# Patient Record
Sex: FEMALE | Race: BLACK OR AFRICAN AMERICAN | NOT HISPANIC OR LATINO | Employment: UNEMPLOYED | URBAN - METROPOLITAN AREA
[De-identification: names, ages, dates, MRNs, and addresses within clinical notes are randomized per-mention and may not be internally consistent; named-entity substitution may affect disease eponyms.]

---

## 2017-08-03 ENCOUNTER — OFFICE VISIT (OUTPATIENT)
Dept: FAMILY MEDICINE | Facility: CLINIC | Age: 27
End: 2017-08-03
Payer: COMMERCIAL

## 2017-08-03 VITALS
RESPIRATION RATE: 16 BRPM | OXYGEN SATURATION: 97 % | HEIGHT: 64 IN | DIASTOLIC BLOOD PRESSURE: 80 MMHG | SYSTOLIC BLOOD PRESSURE: 120 MMHG | BODY MASS INDEX: 38.24 KG/M2 | HEART RATE: 86 BPM | WEIGHT: 224 LBS | TEMPERATURE: 97 F

## 2017-08-03 DIAGNOSIS — K21.9 GASTROESOPHAGEAL REFLUX DISEASE, ESOPHAGITIS PRESENCE NOT SPECIFIED: ICD-10-CM

## 2017-08-03 DIAGNOSIS — M54.42 LEFT-SIDED LOW BACK PAIN WITH LEFT-SIDED SCIATICA, UNSPECIFIED CHRONICITY: ICD-10-CM

## 2017-08-03 DIAGNOSIS — I10 ESSENTIAL HYPERTENSION: ICD-10-CM

## 2017-08-03 DIAGNOSIS — Z00.00 ANNUAL PHYSICAL EXAM: Primary | ICD-10-CM

## 2017-08-03 LAB
CREAT UR-MCNC: 103 MG/DL
MICROALBUMIN UR DL<=1MG/L-MCNC: 12 UG/ML
MICROALBUMIN/CREATININE RATIO: 11.7 UG/MG

## 2017-08-03 PROCEDURE — 99999 PR PBB SHADOW E&M-NEW PATIENT-LVL III: CPT | Mod: PBBFAC,,, | Performed by: REGISTERED NURSE

## 2017-08-03 PROCEDURE — 82570 ASSAY OF URINE CREATININE: CPT

## 2017-08-03 PROCEDURE — 99385 PREV VISIT NEW AGE 18-39: CPT | Mod: S$GLB,,, | Performed by: REGISTERED NURSE

## 2017-08-03 RX ORDER — MELOXICAM 7.5 MG/1
TABLET ORAL
COMMUNITY
Start: 2017-07-12 | End: 2017-08-03 | Stop reason: ALTCHOICE

## 2017-08-03 RX ORDER — OXAPROZIN 600 MG/1
600 TABLET, FILM COATED ORAL DAILY
Qty: 30 TABLET | Refills: 2 | Status: SHIPPED | OUTPATIENT
Start: 2017-08-03 | End: 2018-05-01

## 2017-08-03 RX ORDER — CYCLOBENZAPRINE HCL 10 MG
TABLET ORAL
COMMUNITY
Start: 2017-07-12 | End: 2017-08-03 | Stop reason: ALTCHOICE

## 2017-08-03 RX ORDER — TIZANIDINE 4 MG/1
4 TABLET ORAL 3 TIMES DAILY PRN
Qty: 60 TABLET | Refills: 0 | Status: SHIPPED | OUTPATIENT
Start: 2017-08-03 | End: 2017-09-22 | Stop reason: SDUPTHER

## 2017-08-03 RX ORDER — AMLODIPINE BESYLATE 10 MG/1
TABLET ORAL
COMMUNITY
Start: 2017-07-13 | End: 2017-08-09

## 2017-08-03 RX ORDER — FAMOTIDINE 40 MG/1
40 TABLET, FILM COATED ORAL
COMMUNITY
End: 2017-08-03

## 2017-08-07 NOTE — PROGRESS NOTES
Subjective:       Patient ID: Nathan Carlos is a 26 y.o. female.    Chief Complaint: Annual Exam      HPI    Nathan is a new patient to the office today, for her annual wellness exam.  I have reviewed the patient's medical history in detail and updated the computerized patient record.    Followed by Dr. Cl Nicole (GI) for chronic GERD and GI issues.  Plans to have an EGD in near future pending insurance approval.  On hold at this time due to deductible issues.  Currently on Dexilant daily, takes as needed.    She has been on Norvasc for HTN ~ 1 year, originally ordered by Dr. Judith Garcia.  Does not check BP on regular basis.  Denies salt or caffeine intake, reports healthy diet, increased baked foods.  Does not exercise.    Reports back pain with sciatica w/out injury or trauma.  Seen at  [facility] ED on 7/12/2017, no xrays done.  RX given for Mobic 7.5 mg to take twice daily with Flexeril 10 mg TID.  She has been taking meds as ordered but not helping.  Massage helps some.    GYN --- followed by Dr. Reid at [facility].      Review of Systems   Constitutional: Negative for activity change, appetite change, chills, diaphoresis, fatigue, fever and unexpected weight change.   HENT: Negative.    Eyes: Negative.    Respiratory: Negative.  Stridor: irregular.    Cardiovascular: Negative for chest pain, palpitations and leg swelling.   Gastrointestinal: Negative.    Endocrine: Negative for cold intolerance, heat intolerance, polydipsia, polyphagia and polyuria.   Genitourinary: Positive for menstrual problem. Negative for dysuria, flank pain, frequency, hematuria, vaginal bleeding, vaginal discharge and vaginal pain.   Musculoskeletal: Positive for back pain. Negative for gait problem, joint swelling, neck pain and neck stiffness.   Skin: Negative.    Neurological: Negative.    Hematological: Negative for adenopathy. Does not bruise/bleed easily.   Psychiatric/Behavioral: Negative.          Patient Active  "Problem List   Diagnosis    Essential hypertension    Gastroesophageal reflux disease    Left-sided low back pain with left-sided sciatica    Obesity, Class II, BMI 35-39.9, with comorbidity       Past Medical History:   Diagnosis Date    GERD (gastroesophageal reflux disease)     Hypertension        Past Surgical History:   Procedure Laterality Date    ANKLE SURGERY Left     BUNIONECTOMY Bilateral        Family History   Problem Relation Age of Onset    Hypertension Mother     Fibroids Mother     No Known Problems Father     Diabetes Sister     Hypertension Brother     Hypertension Maternal Aunt     Diabetes Maternal Aunt     Diabetes Maternal Uncle     Hypertension Maternal Grandmother     Leukemia Maternal Grandfather     Hypertension Maternal Grandfather     Stroke Neg Hx      Social History     Social History    Marital status: Single    Number of children: 0     Occupational History     [employer]     Social History Main Topics    Smoking status: Never Smoker    Smokeless tobacco: Never Used    Alcohol use No    Drug use: No    Sexual activity: Yes     Partners: Male     Birth control/ protection: Condom         Review of patient's allergies indicates:  No Known Allergies        Objective:     Vitals:    08/03/17 0905 08/03/17 0953   BP: (!) 140/90 120/80   Pulse: 86    Resp: 16    Temp: 97.4 °F (36.3 °C)    SpO2: 97%    Weight: 101.6 kg (223 lb 15.8 oz)    Height: 5' 4" (1.626 m)        Physical Exam   Constitutional: She is oriented to person, place, and time. She appears well-developed and well-nourished. No distress.   HENT:   Head: Normocephalic and atraumatic.   Right Ear: External ear normal.   Left Ear: External ear normal.   Nose: Nose normal.   Mouth/Throat: Oropharynx is clear and moist. No oropharyngeal exudate.   Eyes: Conjunctivae and EOM are normal. Pupils are equal, round, and reactive to light. Right eye exhibits no discharge. Left eye exhibits no " discharge. No scleral icterus.   Neck: Normal range of motion. Neck supple. No JVD present. No tracheal deviation present. No thyromegaly present.   Cardiovascular: Normal rate, regular rhythm, normal heart sounds and intact distal pulses.  Exam reveals no gallop and no friction rub.    No murmur heard.  Pulmonary/Chest: Effort normal and breath sounds normal. No stridor. No respiratory distress. She has no wheezes. She exhibits no tenderness.   Abdominal: Soft. Bowel sounds are normal. She exhibits no distension and no mass. There is no tenderness.   Musculoskeletal: Normal range of motion. She exhibits no edema or deformity.        Lumbar back: She exhibits pain and spasm. She exhibits normal range of motion, no swelling, no edema, no deformity and normal pulse.        Back:    Lymphadenopathy:     She has no cervical adenopathy.   Neurological: She is alert and oriented to person, place, and time. She has normal strength and normal reflexes. She displays no atrophy, no tremor and normal reflexes. No cranial nerve deficit or sensory deficit. She exhibits normal muscle tone. She displays no seizure activity. Coordination and gait normal.   Skin: Skin is warm and dry. Capillary refill takes less than 2 seconds. No rash noted. She is not diaphoretic. No erythema.   Psychiatric: She has a normal mood and affect. Her behavior is normal. Judgment and thought content normal.       Medication List with Changes/Refills   New Medications    OXAPROZIN (DAYPRO) 600 MG TABLET    Take 1 tablet (600 mg total) by mouth once daily.    TIZANIDINE (ZANAFLEX) 4 MG TABLET    Take 1 tablet (4 mg total) by mouth 3 (three) times daily as needed.   Current Medications    AMLODIPINE (NORVASC) 10 MG TABLET       Discontinued Medications    CYCLOBENZAPRINE (FLEXERIL) 10 MG TABLET        FAMOTIDINE (PEPCID) 40 MG TABLET    Take 40 mg by mouth.    MELOXICAM (MOBIC) 7.5 MG TABLET           Diagnosis       1. Annual physical exam    2. Essential  hypertension    3. Gastroesophageal reflux disease, esophagitis presence not specified    4. Left-sided low back pain with left-sided sciatica, unspecified chronicity    5. Obesity, Class II, BMI 35-39.9, with comorbidity          Assessment/ Plan     Annual physical exam  -     CBC auto differential; Future; Expected date: 08/03/2017  -     Comprehensive metabolic panel; Future; Expected date: 08/03/2017  -     TSH; Future; Expected date: 08/03/2017  -     Lipid panel; Future; Expected date: 08/03/2017  -     Microalbumin/creatinine urine ratio    Essential hypertension        -     Continue amlodipine 10 mg tab daily for HTN.  -     CBC auto differential; Future; Expected date: 08/03/2017  -     Comprehensive metabolic panel; Future; Expected date: 08/03/2017  -     TSH; Future; Expected date: 08/03/2017  -     Lipid panel; Future; Expected date: 08/03/2017  -     Microalbumin/creatinine urine ratio    Gastroesophageal reflux disease, esophagitis presence not specified        -     Followed by Dr. Nicole, on Dexilant, EGD pending.    Left-sided low back pain with left-sided sciatica, unspecified chronicity  -     oxaprozin (DAYPRO) 600 mg tablet; Take 1 tablet (600 mg total) by mouth once daily.  Dispense: 30 tablet; Refill: 2  -     tizanidine (ZANAFLEX) 4 MG tablet; Take 1 tablet (4 mg total) by mouth 3 (three) times daily as needed.  Dispense: 60 tablet; Refill: 0  -     Ice/heat, rest, HEP.  -     Massage.    Obesity, Class II, BMI 35-39.9, with comorbidity  -     CBC auto differential; Future; Expected date: 08/03/2017  -     Comprehensive metabolic panel; Future; Expected date: 08/03/2017  -     TSH; Future; Expected date: 08/03/2017  -     Lipid panel; Future; Expected date: 08/03/2017        Healthy diet, exercise, weight loss.  Lab pending.  Follow-up in clinic as needed.        Patient Care Team:  Yue Anguiano MD as PCP - General (Family Medicine)  Agnieszka Reid MD (Obstetrics and Gynecology)  SHAHRAM  Cl Nicole MD as Consulting Physician (Gastroenterology)      VANCE Wagner  [facility]

## 2017-08-08 ENCOUNTER — LAB VISIT (OUTPATIENT)
Dept: LAB | Facility: HOSPITAL | Age: 27
End: 2017-08-08
Attending: REGISTERED NURSE
Payer: COMMERCIAL

## 2017-08-08 DIAGNOSIS — Z00.00 ANNUAL PHYSICAL EXAM: ICD-10-CM

## 2017-08-08 DIAGNOSIS — I10 ESSENTIAL HYPERTENSION: ICD-10-CM

## 2017-08-08 LAB
ALBUMIN SERPL BCP-MCNC: 3.7 G/DL
ALP SERPL-CCNC: 74 U/L
ALT SERPL W/O P-5'-P-CCNC: 27 U/L
ANION GAP SERPL CALC-SCNC: 10 MMOL/L
AST SERPL-CCNC: 22 U/L
BASOPHILS # BLD AUTO: 0.02 K/UL
BASOPHILS NFR BLD: 0.2 %
BILIRUB SERPL-MCNC: 0.3 MG/DL
BUN SERPL-MCNC: 9 MG/DL
CALCIUM SERPL-MCNC: 9.3 MG/DL
CHLORIDE SERPL-SCNC: 106 MMOL/L
CHOLEST/HDLC SERPL: 4.4 {RATIO}
CO2 SERPL-SCNC: 26 MMOL/L
CREAT SERPL-MCNC: 0.8 MG/DL
DIFFERENTIAL METHOD: ABNORMAL
EOSINOPHIL # BLD AUTO: 0.1 K/UL
EOSINOPHIL NFR BLD: 1.4 %
ERYTHROCYTE [DISTWIDTH] IN BLOOD BY AUTOMATED COUNT: 14.8 %
EST. GFR  (AFRICAN AMERICAN): >60 ML/MIN/1.73 M^2
EST. GFR  (NON AFRICAN AMERICAN): >60 ML/MIN/1.73 M^2
GLUCOSE SERPL-MCNC: 86 MG/DL
HCT VFR BLD AUTO: 42.7 %
HDL/CHOLESTEROL RATIO: 22.8 %
HDLC SERPL-MCNC: 206 MG/DL
HDLC SERPL-MCNC: 47 MG/DL
HGB BLD-MCNC: 14.5 G/DL
LDLC SERPL CALC-MCNC: 137.2 MG/DL
LYMPHOCYTES # BLD AUTO: 3.3 K/UL
LYMPHOCYTES NFR BLD: 37.6 %
MCH RBC QN AUTO: 26.9 PG
MCHC RBC AUTO-ENTMCNC: 34 G/DL
MCV RBC AUTO: 79 FL
MONOCYTES # BLD AUTO: 0.8 K/UL
MONOCYTES NFR BLD: 8.6 %
NEUTROPHILS # BLD AUTO: 4.6 K/UL
NEUTROPHILS NFR BLD: 52.1 %
NONHDLC SERPL-MCNC: 159 MG/DL
PLATELET # BLD AUTO: 296 K/UL
PMV BLD AUTO: 11.3 FL
POTASSIUM SERPL-SCNC: 4.2 MMOL/L
PROT SERPL-MCNC: 7.1 G/DL
RBC # BLD AUTO: 5.39 M/UL
SODIUM SERPL-SCNC: 142 MMOL/L
TRIGL SERPL-MCNC: 109 MG/DL
TSH SERPL DL<=0.005 MIU/L-ACNC: 3.31 UIU/ML
WBC # BLD AUTO: 8.81 K/UL

## 2017-08-08 PROCEDURE — 85025 COMPLETE CBC W/AUTO DIFF WBC: CPT

## 2017-08-08 PROCEDURE — 36415 COLL VENOUS BLD VENIPUNCTURE: CPT | Mod: PO

## 2017-08-08 PROCEDURE — 80061 LIPID PANEL: CPT

## 2017-08-08 PROCEDURE — 84443 ASSAY THYROID STIM HORMONE: CPT

## 2017-08-08 PROCEDURE — 80053 COMPREHEN METABOLIC PANEL: CPT

## 2017-08-09 ENCOUNTER — TELEPHONE (OUTPATIENT)
Dept: FAMILY MEDICINE | Facility: CLINIC | Age: 27
End: 2017-08-09

## 2017-08-09 PROBLEM — E78.00 HYPERCHOLESTEREMIA: Status: ACTIVE | Noted: 2017-08-09

## 2017-08-09 RX ORDER — LISINOPRIL 10 MG/1
10 TABLET ORAL DAILY
Qty: 30 TABLET | Refills: 6 | Status: SHIPPED | OUTPATIENT
Start: 2017-08-09 | End: 2018-02-13

## 2017-08-09 NOTE — TELEPHONE ENCOUNTER
Depending on how she is doing, she can just continue the Daypro only or I can order different muscle relaxer to take the place of the Zanaflex.  Is she sure the muscle relaxer causing issue, as the NSAID can cause stomach issues ---- taking w/ food?

## 2017-08-09 NOTE — TELEPHONE ENCOUNTER
Pt states that muscle relaxer prescribed makes he stomach ache and wants to know what else can be prescribed.

## 2017-08-09 NOTE — TELEPHONE ENCOUNTER
Take 1/2 of Daypro twice daily with food or can change over to OTC NSAID for pain w/ food.    Increase water intake with blood pressure medication, this is not a new medication.  When did the dry mouth start?

## 2017-08-09 NOTE — TELEPHONE ENCOUNTER
She states it started last month. 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 Pt states all she drinks is water and tea.

## 2017-08-09 NOTE — TELEPHONE ENCOUNTER
Lab/urine reviewed --- okay other than slight elevated cholesterol level.  Need to focus on healthy diet, low-fat/cholesterol intake, weight reduction and healthy changes in lifestyle.  Develop routine exercise regimen.

## 2017-08-09 NOTE — TELEPHONE ENCOUNTER
Pt notified of new medication sent to pharmacy and to bring a log of BP readings to nurse visit next week. Pt voiced understanding,

## 2017-08-09 NOTE — TELEPHONE ENCOUNTER
Stop amlodipine.  RX for lisinopril sent to pharmacy.  Nurse visit 1 week for BP recheck.  Have her bring log of home BP readings to nurse visit.

## 2017-08-14 RX ORDER — FLUCONAZOLE 150 MG/1
150 TABLET ORAL DAILY
Qty: 1 TABLET | Refills: 0 | Status: SHIPPED | OUTPATIENT
Start: 2017-08-14 | End: 2017-08-15

## 2017-08-14 NOTE — TELEPHONE ENCOUNTER
Pt would like you to call something in for a yeast infection, She has used monistat and it hasn't worked

## 2017-08-14 NOTE — TELEPHONE ENCOUNTER
----- Message from Jordana Redd sent at 8/14/2017  4:04 PM CDT -----  Contact: [phone]  .  [id number] - [place], [place] - [address] [facility] [address] services  [address] [address]  Phone: [phone] Fax: [phone]  Pt would like to consult with nurse about yeast infection medication. Pt pharmacy [facility]. Please call pt back at [phone]. Thx. maylin       [id number] - [place], [place] - [address] [address] [address]  Phone: [phone] Fax: [phone]

## 2017-08-14 NOTE — TELEPHONE ENCOUNTER
----- Message from Skylar Williamson sent at 8/14/2017  7:23 AM CDT -----  Contact: pt  Pt would like to speak with nurse. Pt would give no further information.

## 2017-08-16 ENCOUNTER — CLINICAL SUPPORT (OUTPATIENT)
Dept: FAMILY MEDICINE | Facility: CLINIC | Age: 27
End: 2017-08-16
Payer: COMMERCIAL

## 2017-08-16 VITALS — SYSTOLIC BLOOD PRESSURE: 136 MMHG | DIASTOLIC BLOOD PRESSURE: 94 MMHG

## 2017-08-30 ENCOUNTER — LAB VISIT (OUTPATIENT)
Dept: LAB | Facility: HOSPITAL | Age: 27
End: 2017-08-30
Attending: REGISTERED NURSE
Payer: COMMERCIAL

## 2017-08-30 ENCOUNTER — OFFICE VISIT (OUTPATIENT)
Dept: FAMILY MEDICINE | Facility: CLINIC | Age: 27
End: 2017-08-30
Payer: COMMERCIAL

## 2017-08-30 VITALS
OXYGEN SATURATION: 97 % | HEIGHT: 64 IN | WEIGHT: 221.56 LBS | DIASTOLIC BLOOD PRESSURE: 84 MMHG | RESPIRATION RATE: 17 BRPM | TEMPERATURE: 97 F | SYSTOLIC BLOOD PRESSURE: 118 MMHG | HEART RATE: 84 BPM | BODY MASS INDEX: 37.83 KG/M2

## 2017-08-30 DIAGNOSIS — K21.9 GASTROESOPHAGEAL REFLUX DISEASE, ESOPHAGITIS PRESENCE NOT SPECIFIED: ICD-10-CM

## 2017-08-30 DIAGNOSIS — L83 ACANTHOSIS NIGRICANS: ICD-10-CM

## 2017-08-30 DIAGNOSIS — R11.0 NAUSEA: ICD-10-CM

## 2017-08-30 DIAGNOSIS — N92.6 IRREGULAR MENSES: ICD-10-CM

## 2017-08-30 DIAGNOSIS — I10 ESSENTIAL HYPERTENSION: ICD-10-CM

## 2017-08-30 DIAGNOSIS — N92.6 IRREGULAR MENSES: Primary | ICD-10-CM

## 2017-08-30 DIAGNOSIS — N64.4 BREAST TENDERNESS IN FEMALE: ICD-10-CM

## 2017-08-30 DIAGNOSIS — E78.00 HYPERCHOLESTEREMIA: ICD-10-CM

## 2017-08-30 LAB
B-HCG UR QL: NEGATIVE
CTP QC/QA: YES
ERYTHROCYTE [DISTWIDTH] IN BLOOD BY AUTOMATED COUNT: 14.5 %
HCT VFR BLD AUTO: 43.6 %
HGB BLD-MCNC: 14.8 G/DL
INSULIN COLLECTION INTERVAL: ABNORMAL
INSULIN SERPL-ACNC: 33.6 UU/ML
MCH RBC QN AUTO: 27 PG
MCHC RBC AUTO-ENTMCNC: 33.9 G/DL
MCV RBC AUTO: 80 FL
PLATELET # BLD AUTO: 269 K/UL
PMV BLD AUTO: 11 FL
RBC # BLD AUTO: 5.48 M/UL
WBC # BLD AUTO: 9.16 K/UL

## 2017-08-30 PROCEDURE — 80048 BASIC METABOLIC PNL TOTAL CA: CPT

## 2017-08-30 PROCEDURE — 85027 COMPLETE CBC AUTOMATED: CPT

## 2017-08-30 PROCEDURE — 36415 COLL VENOUS BLD VENIPUNCTURE: CPT | Mod: PO

## 2017-08-30 PROCEDURE — 3008F BODY MASS INDEX DOCD: CPT | Mod: S$GLB,,, | Performed by: REGISTERED NURSE

## 2017-08-30 PROCEDURE — 83036 HEMOGLOBIN GLYCOSYLATED A1C: CPT

## 2017-08-30 PROCEDURE — 99214 OFFICE O/P EST MOD 30 MIN: CPT | Mod: 25,S$GLB,, | Performed by: REGISTERED NURSE

## 2017-08-30 PROCEDURE — 83525 ASSAY OF INSULIN: CPT

## 2017-08-30 PROCEDURE — 81025 URINE PREGNANCY TEST: CPT | Mod: QW,S$GLB,, | Performed by: REGISTERED NURSE

## 2017-08-30 PROCEDURE — 84702 CHORIONIC GONADOTROPIN TEST: CPT

## 2017-08-30 PROCEDURE — 99999 PR PBB SHADOW E&M-EST. PATIENT-LVL III: CPT | Mod: PBBFAC,,, | Performed by: REGISTERED NURSE

## 2017-08-30 PROCEDURE — 84403 ASSAY OF TOTAL TESTOSTERONE: CPT

## 2017-08-30 RX ORDER — ONDANSETRON 4 MG/1
4-8 TABLET, ORALLY DISINTEGRATING ORAL
Qty: 30 TABLET | Refills: 0 | Status: SHIPPED | OUTPATIENT
Start: 2017-08-30 | End: 2017-08-30 | Stop reason: SDUPTHER

## 2017-08-30 RX ORDER — ONDANSETRON 4 MG/1
4-8 TABLET, ORALLY DISINTEGRATING ORAL
Qty: 30 TABLET | Refills: 0 | Status: SHIPPED | OUTPATIENT
Start: 2017-08-30 | End: 2017-09-22

## 2017-08-30 NOTE — PROGRESS NOTES
"Subjective:       Patient ID: Nathan Carlos is a 26 y.o. female.    Chief Complaint: Nausea; Breast Problem; and Headache      HPI    Nathan is here today with c/o nausea and headaches x 1 week.  Seem to occur cyclically, mid-morning, clears up toward evening time.  Does report decreased intake of sweets but high-carb intake mainly bread.  Also having some B breast/nipple tenderness over the past week or so.  Does not do a BSE at exam.  Does have irregular cycles, LMP was beginning of this year.  Denies caffeine intake daily.  She has recently taken 2 UPT at home, both negative.  Has had some mild issues with GERD recently, possibly contributing to the nausea.  Not taking PPI.      Review of Systems   Constitutional: Negative.    Gastrointestinal: Positive for nausea. Negative for abdominal pain, constipation, diarrhea and vomiting.   Endocrine: Negative for cold intolerance, heat intolerance, polydipsia, polyphagia and polyuria.   Genitourinary: Positive for menstrual problem (irregular cycles).   Neurological: Positive for headaches. Negative for weakness, light-headedness and numbness.         Patient Active Problem List   Diagnosis    Essential hypertension    Gastroesophageal reflux disease    Left-sided low back pain with left-sided sciatica    Obesity, Class II, BMI 35-39.9, with comorbidity    Hypercholesteremia         Objective:     Vitals:    08/30/17 0852   BP: 118/84   Pulse: 84   Resp: 17   Temp: 97.4 °F (36.3 °C)   TempSrc: Tympanic   SpO2: 97%   Weight: 100.5 kg (221 lb 9 oz)   Height: 5' 4" (1.626 m)   PainSc: 0-No pain       Physical Exam   Constitutional: She is oriented to person, place, and time. She appears well-developed and well-nourished. No distress.   Cardiovascular: Normal rate, regular rhythm and normal heart sounds.    Pulmonary/Chest: Effort normal and breath sounds normal. Right breast exhibits no mass. Left breast exhibits no mass. Breasts are symmetrical.   Genitourinary: " There is breast tenderness (generalized mild tenderness B breast, no mass palpated).   Neurological: She is alert and oriented to person, place, and time. She exhibits normal muscle tone. Coordination normal.   Skin: Skin is warm and dry. Rash (acanthosis noted to neck) noted. She is not diaphoretic.         Medication List with Changes/Refills   New Medications    ONDANSETRON (ZOFRAN-ODT) 4 MG TBDL    Take 1-2 tablets (4-8 mg total) by mouth every 6 to 8 hours as needed (NAUSEA).   Current Medications    LISINOPRIL 10 MG TABLET    Take 1 tablet (10 mg total) by mouth once daily.    OXAPROZIN (DAYPRO) 600 MG TABLET    Take 1 tablet (600 mg total) by mouth once daily.    TIZANIDINE (ZANAFLEX) 4 MG TABLET    Take 1 tablet (4 mg total) by mouth 3 (three) times daily as needed.         Component      Latest Ref Rng & Units 8/30/2017   Preg Test, Ur      Negative Negative    Acceptable       Yes       Diagnosis       1. Irregular menses    2. Nausea    3. Breast tenderness in female    4. Acanthosis nigricans    5. Hypercholesteremia    6. Essential hypertension    7. Gastroesophageal reflux disease, esophagitis presence not specified          Assessment/ Plan     Irregular menses  -     POCT Urine Pregnancy  -     Hemoglobin A1c; Future; Expected date: 08/30/2017  -     Insulin, random; Future; Expected date: 08/30/2017  -     Testosterone; Future; Expected date: 08/30/2017  -     hCG, quantitative; Future; Expected date: 08/30/2017  -     CBC Without Differential; Future; Expected date: 08/30/2017  -     Basic metabolic panel; Future; Expected date: 08/30/2017    Nausea  -     ondansetron (ZOFRAN-ODT) 4 MG TbDL; Take 1-2 tablets (4-8 mg total) by mouth every 6 to 8 hours as needed (NAUSEA).  Dispense: 30 tablet; Refill: 0  -     Hemoglobin A1c; Future; Expected date: 08/30/2017  -     Insulin, random; Future; Expected date: 08/30/2017  -     Testosterone; Future; Expected date: 08/30/2017  -     hCG,  quantitative; Future; Expected date: 08/30/2017  -     CBC Without Differential; Future; Expected date: 08/30/2017  -     Basic metabolic panel; Future; Expected date: 08/30/2017    Breast tenderness in female  -     hCG, quantitative; Future; Expected date: 08/30/2017    Acanthosis nigricans  -     Hemoglobin A1c; Future; Expected date: 08/30/2017  -     Insulin, random; Future; Expected date: 08/30/2017  -     Testosterone; Future; Expected date: 08/30/2017  -     CBC Without Differential; Future; Expected date: 08/30/2017  -     Basic metabolic panel; Future; Expected date: 08/30/2017    Hypercholesteremia        -     Total cholesterol mildly elevated at this time = 206        -     Healthy diet, weight reduction, routine exercise.  -     CBC Without Differential; Future; Expected date: 08/30/2017  -     Basic metabolic panel; Future; Expected date: 08/30/2017      Component      Latest Ref Rng & Units 8/8/2017   Cholesterol      120 - 199 mg/dL 206 (H)   Triglycerides      30 - 150 mg/dL 109   HDL      40 - 75 mg/dL 47   LDL Cholesterol      63.0 - 159.0 mg/dL 137.2   HDL/Chol Ratio      20.0 - 50.0 % 22.8   Total Cholesterol/HDL Ratio      2.0 - 5.0 4.4   Non-HDL Cholesterol      mg/dL 159       Essential hypertension        -     Stable and controlled, on lisinopril 10 mg daily as ordered.  -     CBC Without Differential; Future; Expected date: 08/30/2017  -     Basic metabolic panel; Future; Expected date: 08/30/2017    Gastroesophageal reflux disease, esophagitis presence not specified        -     Avoid GERD triggers.        -     Prilosec-OTC daily.        Lab pending.  BSE as advised, monitor for any changes.  Follow-up in clinic as needed.        VANCE Wagner  [facility]

## 2017-08-31 LAB
ANION GAP SERPL CALC-SCNC: 9 MMOL/L
BUN SERPL-MCNC: 9 MG/DL
CALCIUM SERPL-MCNC: 9.2 MG/DL
CHLORIDE SERPL-SCNC: 106 MMOL/L
CO2 SERPL-SCNC: 24 MMOL/L
CREAT SERPL-MCNC: 0.8 MG/DL
EST. GFR  (AFRICAN AMERICAN): >60 ML/MIN/1.73 M^2
EST. GFR  (NON AFRICAN AMERICAN): >60 ML/MIN/1.73 M^2
ESTIMATED AVG GLUCOSE: 108 MG/DL
GLUCOSE SERPL-MCNC: 74 MG/DL
HBA1C MFR BLD HPLC: 5.4 %
HCG INTACT+B SERPL-ACNC: <1.2 MIU/ML
POTASSIUM SERPL-SCNC: 4.7 MMOL/L
SODIUM SERPL-SCNC: 139 MMOL/L
TESTOST SERPL-MCNC: 92 NG/DL

## 2017-09-01 ENCOUNTER — TELEPHONE (OUTPATIENT)
Dept: FAMILY MEDICINE | Facility: CLINIC | Age: 27
End: 2017-09-01

## 2017-09-01 NOTE — TELEPHONE ENCOUNTER
----- Message from Saba Damian sent at 9/1/2017 10:54 AM CDT -----  Call pt at [phone]//regarding my test results//juliana herrera

## 2017-09-07 ENCOUNTER — OFFICE VISIT (OUTPATIENT)
Dept: FAMILY MEDICINE | Facility: CLINIC | Age: 27
End: 2017-09-07
Payer: COMMERCIAL

## 2017-09-07 ENCOUNTER — LAB VISIT (OUTPATIENT)
Dept: LAB | Facility: HOSPITAL | Age: 27
End: 2017-09-07
Attending: REGISTERED NURSE
Payer: COMMERCIAL

## 2017-09-07 VITALS
TEMPERATURE: 98 F | DIASTOLIC BLOOD PRESSURE: 86 MMHG | SYSTOLIC BLOOD PRESSURE: 122 MMHG | HEIGHT: 64 IN | OXYGEN SATURATION: 98 % | WEIGHT: 219.38 LBS | HEART RATE: 102 BPM | BODY MASS INDEX: 37.45 KG/M2 | RESPIRATION RATE: 17 BRPM

## 2017-09-07 DIAGNOSIS — E88.810 INSULIN RESISTANCE SYNDROME: ICD-10-CM

## 2017-09-07 DIAGNOSIS — R79.89 HIGH SERUM TESTOSTERONE: ICD-10-CM

## 2017-09-07 DIAGNOSIS — N89.8 VAGINAL LESION: ICD-10-CM

## 2017-09-07 DIAGNOSIS — N89.8 VAGINAL LESION: Primary | ICD-10-CM

## 2017-09-07 PROCEDURE — 3008F BODY MASS INDEX DOCD: CPT | Mod: S$GLB,,, | Performed by: REGISTERED NURSE

## 2017-09-07 PROCEDURE — 87529 HSV DNA AMP PROBE: CPT

## 2017-09-07 PROCEDURE — 99214 OFFICE O/P EST MOD 30 MIN: CPT | Mod: S$GLB,,, | Performed by: REGISTERED NURSE

## 2017-09-07 PROCEDURE — 99999 PR PBB SHADOW E&M-EST. PATIENT-LVL III: CPT | Mod: PBBFAC,,, | Performed by: REGISTERED NURSE

## 2017-09-07 RX ORDER — SPIRONOLACTONE 25 MG/1
25 TABLET ORAL DAILY
Qty: 30 TABLET | Refills: 11 | Status: SHIPPED | OUTPATIENT
Start: 2017-09-07 | End: 2018-09-18 | Stop reason: SDUPTHER

## 2017-09-07 RX ORDER — METFORMIN HYDROCHLORIDE 500 MG/1
500 TABLET, EXTENDED RELEASE ORAL
Qty: 30 TABLET | Refills: 11 | Status: SHIPPED | OUTPATIENT
Start: 2017-09-07 | End: 2017-12-20

## 2017-09-07 NOTE — PROGRESS NOTES
"Subjective:       Patient ID: Nathan Carlos is a 26 y.o. female.    Chief Complaint: STD CHECK      HPI    Nathan is here today to discuss vaginal skin sores.  She has been having chronic break-outs in the vaginal area, but only seems to be around menses when wearing pads and liners.  Not sure if she is just sensitive to the feminine products and has irritated her skin.  She did have STD testing at beginning of this year and was told all was negative.  She is pretty sure an HSV test was done.  She was seen this AM at  [facility] ED for vaginal pain and the sores.  However, exam was somewhat limited b/c she is currently on her cycle.  ED doctor told her that they were not going to tell her she has HSV b/c "it would mess up my record".  She was given a RX for Bactroban cream to use on the sores but she was not told exactly what she had, she is still not sure what is going on.  Area painful, tender and swollen.    Discussed recent lab results with her today.  Per last note, she was having issues with nausea and headaches, irregular cycles and breast tenderness.  Cycle has started, breast pain resolved now.  Does report decreased intake of sweets but high-carb intake mainly bread.  Noted on last exam was some acanthosis nigricans around neck.  Noted to have elevated insulin and testosterone level, normal A1c.       Component      Latest Ref Rng & Units 8/30/2017   Insulin      <25.0 uU/mL 33.6 (H)   Insulin Collection Interval       blood     Component      Latest Ref Rng & Units 8/30/2017   Testosterone, Total      5.0 - 73.0 ng/dL 92 (H)     Component      Latest Ref Rng & Units 8/30/2017   Hemoglobin A1C      4.0 - 5.6 % 5.4   Estimated Avg Glucose      68 - 131 mg/dL 108         Review of Systems   Constitutional: Negative.    Respiratory: Negative.    Cardiovascular: Negative.    Skin: Positive for wound (c/o vaginal sore).   Neurological: Negative.          Patient Active Problem List   Diagnosis    Essential " "hypertension    Gastroesophageal reflux disease    Left-sided low back pain with left-sided sciatica    Obesity, Class II, BMI 35-39.9, with comorbidity    Hypercholesteremia         Objective:     Vitals:    09/07/17 1108   BP: 122/86   BP Location: Left arm   Patient Position: Sitting   BP Method: Large (Manual)   Pulse: 102   Resp: 17   Temp: 98.2 °F (36.8 °C)   TempSrc: Tympanic   SpO2: 98%   Weight: 99.5 kg (219 lb 5.7 oz)   Height: 5' 4" (1.626 m)       Physical Exam   Constitutional: She is oriented to person, place, and time. She appears well-developed and well-nourished.   Genitourinary:       There is lesion on the left labia.   Neurological: She is alert and oriented to person, place, and time.   Psychiatric: She has a normal mood and affect. Her behavior is normal. Judgment and thought content normal.   Vitals reviewed.        Medication List with Changes/Refills   New Medications    METFORMIN (GLUCOPHAGE-XR) 500 MG 24 HR TABLET    Take 1 tablet (500 mg total) by mouth daily with dinner or evening meal.    SPIRONOLACTONE (ALDACTONE) 25 MG TABLET    Take 1 tablet (25 mg total) by mouth once daily.   Current Medications    LISINOPRIL 10 MG TABLET    Take 1 tablet (10 mg total) by mouth once daily.    ONDANSETRON (ZOFRAN-ODT) 4 MG TBDL    Take 1-2 tablets (4-8 mg total) by mouth every 6 to 8 hours as needed (NAUSEA).    OXAPROZIN (DAYPRO) 600 MG TABLET    Take 1 tablet (600 mg total) by mouth once daily.    TIZANIDINE (ZANAFLEX) 4 MG TABLET    Take 1 tablet (4 mg total) by mouth 3 (three) times daily as needed.           Diagnosis       1. Vaginal lesion    2. Insulin resistance syndrome    3. High serum testosterone          Assessment/ Plan     Vaginal lesion  -     Herpes Simplex Virus 1&2 PCR Non-Blood Genital; Future; Expected date: 09/07/2017    Insulin resistance syndrome  -     metformin (GLUCOPHAGE-XR) 500 MG 24 hr tablet; Take 1 tablet (500 mg total) by mouth daily with dinner or evening meal. "  Dispense: 30 tablet; Refill: 11    High serum testosterone  -     spironolactone (ALDACTONE) 25 MG tablet; Take 1 tablet (25 mg total) by mouth once daily.  Dispense: 30 tablet; Refill: 11        HSV culture pending.  Medications discussed, take as directed.  Low-carb/starch diet daily.  Routine exercise program.  Follow-up in 6 months for recheck.      VANCE Wagner  [facility]

## 2017-09-11 ENCOUNTER — TELEPHONE (OUTPATIENT)
Dept: FAMILY MEDICINE | Facility: CLINIC | Age: 27
End: 2017-09-11

## 2017-09-11 DIAGNOSIS — B00.9 HSV-2 (HERPES SIMPLEX VIRUS 2) INFECTION: ICD-10-CM

## 2017-09-11 LAB
HSV PCR SPECIMEN SOURCE: ABNORMAL
HSV1 PCR RESULT: NOT DETECTED
HSV2 PCR RESULT: DETECTED

## 2017-09-11 RX ORDER — VALACYCLOVIR HYDROCHLORIDE 1 G/1
1000 TABLET, FILM COATED ORAL 2 TIMES DAILY
Qty: 20 TABLET | Refills: 0 | Status: SHIPPED | OUTPATIENT
Start: 2017-09-11 | End: 2017-10-09 | Stop reason: ALTCHOICE

## 2017-09-12 NOTE — TELEPHONE ENCOUNTER
----- Message from Guilherme Arteaga sent at 9/12/2017  7:56 AM CDT -----  Contact: pt  She's calling in regards to her lab results, please advise, [phone] (home)

## 2017-09-20 ENCOUNTER — TELEPHONE (OUTPATIENT)
Dept: FAMILY MEDICINE | Facility: CLINIC | Age: 27
End: 2017-09-20

## 2017-09-22 ENCOUNTER — OFFICE VISIT (OUTPATIENT)
Dept: FAMILY MEDICINE | Facility: CLINIC | Age: 27
End: 2017-09-22
Payer: COMMERCIAL

## 2017-09-22 VITALS
WEIGHT: 222.44 LBS | BODY MASS INDEX: 39.41 KG/M2 | TEMPERATURE: 99 F | RESPIRATION RATE: 16 BRPM | OXYGEN SATURATION: 98 % | HEIGHT: 63 IN | HEART RATE: 106 BPM | SYSTOLIC BLOOD PRESSURE: 121 MMHG | DIASTOLIC BLOOD PRESSURE: 82 MMHG

## 2017-09-22 DIAGNOSIS — M54.32 SCIATICA OF LEFT SIDE: ICD-10-CM

## 2017-09-22 DIAGNOSIS — R11.0 NAUSEA: ICD-10-CM

## 2017-09-22 DIAGNOSIS — K21.9 GASTROESOPHAGEAL REFLUX DISEASE, ESOPHAGITIS PRESENCE NOT SPECIFIED: Primary | ICD-10-CM

## 2017-09-22 DIAGNOSIS — M54.42 LEFT-SIDED LOW BACK PAIN WITH LEFT-SIDED SCIATICA, UNSPECIFIED CHRONICITY: ICD-10-CM

## 2017-09-22 PROCEDURE — 3008F BODY MASS INDEX DOCD: CPT | Mod: S$GLB,,, | Performed by: FAMILY MEDICINE

## 2017-09-22 PROCEDURE — 96372 THER/PROPH/DIAG INJ SC/IM: CPT | Mod: S$GLB,,, | Performed by: FAMILY MEDICINE

## 2017-09-22 PROCEDURE — 99999 PR PBB SHADOW E&M-EST. PATIENT-LVL IV: CPT | Mod: PBBFAC,,, | Performed by: FAMILY MEDICINE

## 2017-09-22 PROCEDURE — 99214 OFFICE O/P EST MOD 30 MIN: CPT | Mod: 25,S$GLB,, | Performed by: FAMILY MEDICINE

## 2017-09-22 RX ORDER — GABAPENTIN 300 MG/1
300 CAPSULE ORAL 3 TIMES DAILY
Qty: 30 CAPSULE | Refills: 0 | Status: SHIPPED | OUTPATIENT
Start: 2017-09-22 | End: 2017-11-03 | Stop reason: SDUPTHER

## 2017-09-22 RX ORDER — OMEPRAZOLE 20 MG/1
20 CAPSULE, DELAYED RELEASE ORAL DAILY
Qty: 30 CAPSULE | Refills: 0 | Status: SHIPPED | OUTPATIENT
Start: 2017-09-22 | End: 2017-10-16 | Stop reason: ALTCHOICE

## 2017-09-22 RX ORDER — ONDANSETRON 2 MG/ML
4 INJECTION INTRAMUSCULAR; INTRAVENOUS ONCE
Status: COMPLETED | OUTPATIENT
Start: 2017-09-22 | End: 2017-09-22

## 2017-09-22 RX ORDER — BETAMETHASONE SODIUM PHOSPHATE AND BETAMETHASONE ACETATE 3; 3 MG/ML; MG/ML
12 INJECTION, SUSPENSION INTRA-ARTICULAR; INTRALESIONAL; INTRAMUSCULAR; SOFT TISSUE ONCE
Status: COMPLETED | OUTPATIENT
Start: 2017-09-22 | End: 2017-09-22

## 2017-09-22 RX ORDER — TIZANIDINE 4 MG/1
4 TABLET ORAL 3 TIMES DAILY PRN
Qty: 60 TABLET | Refills: 0 | Status: SHIPPED | OUTPATIENT
Start: 2017-09-22 | End: 2017-11-28 | Stop reason: SDUPTHER

## 2017-09-22 RX ADMIN — BETAMETHASONE SODIUM PHOSPHATE AND BETAMETHASONE ACETATE 12 MG: 3; 3 INJECTION, SUSPENSION INTRA-ARTICULAR; INTRALESIONAL; INTRAMUSCULAR; SOFT TISSUE at 03:09

## 2017-09-22 RX ADMIN — ONDANSETRON 4 MG: 2 INJECTION INTRAMUSCULAR; INTRAVENOUS at 03:09

## 2017-09-22 NOTE — PATIENT INSTRUCTIONS
Understanding Lumbar Radiculopathy    Lumbar radiculopathy is irritation or inflammation of a nerve root in the low back. It causes symptoms that spread out from the back down one or both legs. To understand this condition, it helps to understand the parts of the spine:  · Vertebrae. These are bones that stack to form the spine. The lumbar spine contains the 5 bottom vertebrae.  · Disks. These are soft pads of tissue between the vertebrae. They act as shock absorbers for the spine.  · Spinal canal. This is a tunnel formed within the stacked vertebrae. In the lumbar spine, nerves run through this canal.  · Nerves. These branch off and leave the spinal canal, traveling out to parts of the body. As they leave the spinal canal, nerves pass through openings between the vertebrae. The nerve root is the part of the nerve that is closest to the spinal canal.  · Sciatic nerve. This is a large nerve formed from several nerve roots in the low back. This nerve extends down the back of the leg to the foot.  With lumbar radiculopathy, nerve roots in the low back become irritated. This leads to pain and symptoms. The sciatic nerve is commonly involved, so the condition is often called sciatica.  What causes lumbar radiculopathy?  Aging, injury, poor posture, extra body weight, and other issues can lead to problems in the low back. These problems may then irritate nerve roots. They include:  · Damage to a disk in the lumbar spine. The damaged disk may then press on nearby nerve roots.  · Degeneration from wear and tear, and aging. This can lead to narrowing (stenosis) of the openings between the vertebrae. The narrowed openings press on nerve roots as they leave the spinal canal.  · Unstable spine. This is when a vertebra slips forward. It can then press on a nerve root.  Other, less common things can put pressure on nerves in the low back. These include diabetes, infection, or a tumor.  Symptoms of lumbar radiculopathy  These  include:  · Pain in the low back  · Pain, numbness, tingling, or weakness that travels into the buttocks, hip, groin, or leg  · Muscle spasms  Treatment for lumbar radiculopathy  In most cases, your healthcare provider will first try treatments that help relieve symptoms. These may include:  · Prescription and over-the-counter pain medicines. These help relieve pain, swelling, and irritation.  · Limits on positions and activities that increase pain. But lying in bed or avoiding all movement is only recommended for a short period of time.  · Physical therapy, including exercises and stretches. This helps decrease pain and increase movement and function.  · Steroid shots into the lower back. This may help relieve symptoms for a time.  · Weight-loss program. If you are overweight, losing extra pounds may help relieve symptoms.  In some cases, you may need surgery to fix the underlying problem. This depends on the cause, the symptoms, and how long the pain has lasted.  Possible complications  Over time, an irritated and inflamed nerve may become damaged. This may lead to long-lasting (permanent) numbness or weakness in your legs and feet. If symptoms change suddenly or get worse, be sure to let your healthcare provider know.  When to call your healthcare provider  Call your healthcare provider right away if you have any of these:  · New pain or pain that gets worse  · New or increasing weakness, tingling, or numbness in your leg or foot  · Problems controlling your bladder or bowel   Date Last Reviewed: 3/10/2016  © 9837-6842 [employer]. [address] [place] [address], [place] [address]. All rights reserved. This information is not intended as a substitute for professional medical care. Always follow your healthcare professional's instructions.        Sciatica    Sciatica is a condition that causes pain in the lower back that spreads down into the buttock, hip, and leg. Sometimes the leg pain can happen without  any back pain. Sciatica happens when a spinal nerve is irritated or has pressure put on it as comes out of the spinal canal in the lower back. This most often happens when a bulge or rupture of a nearby spinal disk presses on the nerve. Sciatica can also be caused by a narrowing of the spinal canal (spinal stenosis) or spasm of the muscle in the buttocks that the sciatic nerve passes through (pyriform muscle). Sciatica is also called lumbar radiculopathy.  Sciatica may begin after a sudden twisting or bending force, such as in a car accident. Or it can happen after a simple awkward movement. In either case, muscle spasm often also happens. Muscle spasm makes the pain worse.  A healthcare provider makes a diagnosis of sciatica from your symptoms and a physical exam. Unless you had an injury from a car accident or fall, you usually wont have X-rays taken at this time. This is because the nerves and disks in your back cant be seen on an X-ray. If the provider sees signs of a compressed nerve, you will need to schedule an MRI scan as an outpatient. Signs of a compressed nerve include loss of strength in a leg.  Most sciatica gets better with medicine, exercise, and physical therapy. If your symptoms continue after at least 3 months of medical treatment, you may need surgery or injections to your lower back.  Home care  Follow these tips when caring for yourself at home:  · You may need to stay in bed the first few days. But as soon as possible, begin sitting up or walking. This will help you avoid problems that come from staying in bed for long periods.  · When in bed, try to find a position that is comfortable. A firm mattress is best. Try lying flat on your back with pillows under your knees. You can also try lying on your side with your knees bent up toward your chest and a pillow between your knees.  · Avoid sitting for long periods. This puts more stress on your lower back than standing or walking.  · Use heat  from a hot shower, hot bath, or heating pad to help ease pain. Massage can also help. You can also try using an ice pack. You can make your own ice pack by putting ice cubes in a plastic bag. Wrap the bag in a thin towel. Try both heat and cold to see which works best. Use the method that feels best for 20 minutes several times a day.  · You may use acetaminophen or ibuprofen to ease pain, unless another pain medicine was prescribed. Note: If you have chronic liver or kidney disease, talk with your healthcare provider before taking these medicines. Also talk with your provider if youve had a stomach ulcer or gastrointestinal bleeding.  · Use safe lifting methods. Dont lift anything heavier than 15 pounds until all of the pain is gone.  Follow-up care  Follow up with your healthcare provider, or as advised. You may need physical therapy or additional tests.  If X-rays were taken, a radiologist will look at them. You will be told of any new findings that may affect your care.  When to seek medical advice  Call your healthcare provider right away if any of these occur:  · Pain gets worse even after taking prescribed medicine  · Weakness or numbness in 1 or both legs or hips  · Numbness in your groin or genital area  · You cant control your bowel or bladder  · Fever  · Redness or swelling over your back or spine   Date Last Reviewed: 8/1/2016  © 2248-1685 [employer]. [address] [place] [address], [place] [address]. All rights reserved. This information is not intended as a substitute for professional medical care. Always follow your healthcare professional's instructions.

## 2017-09-22 NOTE — PROGRESS NOTES
Subjective:       Patient ID: Nathan Carlos is a 26 y.o. female.    Chief Complaint: Gastroesophageal Reflux      HPI   Ms. Carlos presents to clinic today for complaints of reflux and sciatica.   She states she went to [facility] last month and was diagnosed with sciatica.   She states the pain is on her left side and radiates down the leg.     She also sates she has reflux.  She has not tried any medicine for this.   She did not have reflux issues till this year.   She has had some emesis that was clear today.   She states she does not eat any spicy foods.       Review of Systems   Constitutional: Negative for fever.   Gastrointestinal: Positive for vomiting. Negative for abdominal pain and nausea.       Medication List with Changes/Refills   Current Medications    LISINOPRIL 10 MG TABLET    Take 1 tablet (10 mg total) by mouth once daily.    METFORMIN (GLUCOPHAGE-XR) 500 MG 24 HR TABLET    Take 1 tablet (500 mg total) by mouth daily with dinner or evening meal.    OXAPROZIN (DAYPRO) 600 MG TABLET    Take 1 tablet (600 mg total) by mouth once daily.    SPIRONOLACTONE (ALDACTONE) 25 MG TABLET    Take 1 tablet (25 mg total) by mouth once daily.    TIZANIDINE (ZANAFLEX) 4 MG TABLET    Take 1 tablet (4 mg total) by mouth 3 (three) times daily as needed.    VALACYCLOVIR (VALTREX) 1000 MG TABLET    Take 1 tablet (1,000 mg total) by mouth 2 (two) times daily.   Discontinued Medications    ONDANSETRON (ZOFRAN-ODT) 4 MG TBDL    Take 1-2 tablets (4-8 mg total) by mouth every 6 to 8 hours as needed (NAUSEA).       Patient Active Problem List   Diagnosis    Essential hypertension    Gastroesophageal reflux disease    Left-sided low back pain with left-sided sciatica    Obesity, Class II, BMI 35-39.9, with comorbidity    Hypercholesteremia    High serum testosterone    Insulin resistance syndrome    HSV-2 (herpes simplex virus 2) infection         Objective:     Physical Exam   Constitutional: She is oriented  to person, place, and time. She appears well-developed and well-nourished. No distress.   HENT:   Head: Normocephalic and atraumatic.   Right Ear: External ear normal.   Left Ear: External ear normal.   Eyes: EOM are normal. Right eye exhibits no discharge. Left eye exhibits no discharge.   Cardiovascular: Normal rate and regular rhythm.    Pulmonary/Chest: Effort normal and breath sounds normal. No respiratory distress. She has no wheezes.   Abdominal: Soft. Bowel sounds are normal. She exhibits no distension. There is no tenderness. There is no rebound.   Musculoskeletal: She exhibits no edema.   Gait antalgic due to pain  No spinal or paraspinal tenderness    Neurological: She is alert and oriented to person, place, and time.   Skin: Skin is warm and dry. She is not diaphoretic. No erythema.   Psychiatric: She has a normal mood and affect.   Vitals reviewed.    Vitals:    09/22/17 1446   BP: 121/82   Pulse: 106   Resp: 16   Temp: 98.8 °F (37.1 °C)       Assessment/  PLAN     Gastroesophageal reflux disease, esophagitis presence not specified  -     omeprazole (PRILOSEC) 20 MG capsule; Take 1 capsule (20 mg total) by mouth once daily.  Dispense: 30 capsule; Refill: 0    Sciatica of left side  -     gabapentin (NEURONTIN) 300 MG capsule; Take 1 capsule (300 mg total) by mouth 3 (three) times daily.  Dispense: 30 capsule; Refill: 0  -     betamethasone acetate-betamethasone sodium phosphate injection 12 mg; Inject 2 mLs (12 mg total) into the muscle once.  -     tizanidine (ZANAFLEX) 4 MG tablet; Take 1 tablet (4 mg total) by mouth 3 (three) times daily as needed.  Dispense: 60 tablet; Refill: 0    Left-sided low back pain with left-sided sciatica, unspecified chronicity  -     tizanidine (ZANAFLEX) 4 MG tablet; Take 1 tablet (4 mg total) by mouth 3 (three) times daily as needed.  Dispense: 60 tablet; Refill: 0    Nausea  -     ondansetron injection 4 mg; Inject 4 mg into the muscle once.    plan as above   rtc  matilde Anguiano MD  [facility]

## 2017-09-29 ENCOUNTER — TELEPHONE (OUTPATIENT)
Dept: FAMILY MEDICINE | Facility: CLINIC | Age: 27
End: 2017-09-29

## 2017-09-29 NOTE — TELEPHONE ENCOUNTER
----- Message from Yazmin Gonzales sent at 9/29/2017  3:52 PM CDT -----  Contact: Pt   State she's calling rg the paperwork that was given to her and now is being sent home due to the paperwork not being sent back and can be reached at [phone]//marisabel/dbw

## 2017-09-29 NOTE — TELEPHONE ENCOUNTER
----- Message from Tricia Medina sent at 9/29/2017 12:21 PM CDT -----  Contact: Patient  Patient called to speak with the nurse; she is checking to see if a fax from [employer] was received. She can be contacted at [phone].    Thanks,  Tricia

## 2017-10-02 ENCOUNTER — TELEPHONE (OUTPATIENT)
Dept: FAMILY MEDICINE | Facility: CLINIC | Age: 27
End: 2017-10-02

## 2017-10-02 NOTE — TELEPHONE ENCOUNTER
----- Message from Jessi Howard sent at 10/2/2017  1:06 PM CDT -----  Contact: pt  Please call pt @ [phone], pt need to know if paper work is ready to be picked up.

## 2017-10-02 NOTE — TELEPHONE ENCOUNTER
Spoke with the patient and informed her that we would contact her when the paperwork would be ready to be picked up.

## 2017-10-05 ENCOUNTER — TELEPHONE (OUTPATIENT)
Dept: FAMILY MEDICINE | Facility: CLINIC | Age: 27
End: 2017-10-05

## 2017-10-05 NOTE — TELEPHONE ENCOUNTER
----- Message from Tisha Troncoso sent at 10/5/2017 12:29 PM CDT -----  Contact: self [phone]  States that she is returning call to nurse regarding her paperwork. Please call back at [phone]//thank you acc

## 2017-10-09 DIAGNOSIS — B00.9 HSV-2 (HERPES SIMPLEX VIRUS 2) INFECTION: ICD-10-CM

## 2017-10-09 DIAGNOSIS — B00.9 HSV-2 (HERPES SIMPLEX VIRUS 2) INFECTION: Primary | ICD-10-CM

## 2017-10-09 RX ORDER — VALACYCLOVIR HYDROCHLORIDE 500 MG/1
500 TABLET, FILM COATED ORAL DAILY
Qty: 30 TABLET | Refills: 3 | Status: SHIPPED | OUTPATIENT
Start: 2017-10-09 | End: 2017-12-20 | Stop reason: SDUPTHER

## 2017-10-09 RX ORDER — VALACYCLOVIR HYDROCHLORIDE 500 MG/1
500 TABLET, FILM COATED ORAL DAILY
Qty: 30 TABLET | Refills: 3 | Status: SHIPPED | OUTPATIENT
Start: 2017-10-09 | End: 2017-10-09 | Stop reason: SDUPTHER

## 2017-10-09 NOTE — TELEPHONE ENCOUNTER
----- Message from Jessi Howard sent at 10/9/2017  2:55 PM CDT -----  Contact: pt  Pt returning nurse call, please call pt @ [phone].

## 2017-10-09 NOTE — TELEPHONE ENCOUNTER
I refilled it earlier and put her on a smaller dose.   She only needs the 500 mg for suppression of hsv     Thanks

## 2017-10-09 NOTE — TELEPHONE ENCOUNTER
----- Message from Cha Rivera sent at 10/9/2017  2:17 PM CDT -----  Contact: patient  Calling concerning a RX renewal (Valacyclovir 1 mg 2 x daily). Patient states another provider( Qasim Paez) prescribed and requesting this Dr. Anguiano to fill. Please call patient today ASAP @ [phone]. Thanks, breana [facility] [address] [place], [place] [id number] [address] [address]  Phone: [phone] Fax: [phone]

## 2017-10-10 ENCOUNTER — TELEPHONE (OUTPATIENT)
Dept: FAMILY MEDICINE | Facility: CLINIC | Age: 27
End: 2017-10-10

## 2017-10-10 NOTE — TELEPHONE ENCOUNTER
----- Message from Lupe Betancourt sent at 10/10/2017  8:52 AM CDT -----  Contact: Desirae/Jennifer  States she's calling regarding her FMLA paper work, question 6 for time off. How much time the patient will need off (they need more specific). State they need it by 10/22, initial and dated. Please call Desirae at [phone]. Thank you

## 2017-10-12 ENCOUNTER — TELEPHONE (OUTPATIENT)
Dept: FAMILY MEDICINE | Facility: CLINIC | Age: 27
End: 2017-10-12

## 2017-10-12 NOTE — TELEPHONE ENCOUNTER
----- Message from Winifred Weeks sent at 10/12/2017 10:27 AM CDT -----  Contact: Patient   Patient wanted to know if Dr. Anguiano has her work release package, Please call her at [phone].    Thanks  td

## 2017-10-12 NOTE — TELEPHONE ENCOUNTER
Dr. Anguiano, the patient is returning to work on Monday 10/16/17, and needs a clearance, and her limitiations, would like to know if we could write this for her, please, Thanks

## 2017-10-16 ENCOUNTER — TELEPHONE (OUTPATIENT)
Dept: FAMILY MEDICINE | Facility: CLINIC | Age: 27
End: 2017-10-16

## 2017-10-16 ENCOUNTER — OFFICE VISIT (OUTPATIENT)
Dept: FAMILY MEDICINE | Facility: CLINIC | Age: 27
End: 2017-10-16
Payer: COMMERCIAL

## 2017-10-16 VITALS
OXYGEN SATURATION: 99 % | HEIGHT: 64 IN | TEMPERATURE: 98 F | DIASTOLIC BLOOD PRESSURE: 80 MMHG | SYSTOLIC BLOOD PRESSURE: 120 MMHG | BODY MASS INDEX: 38.42 KG/M2 | WEIGHT: 225.06 LBS | HEART RATE: 89 BPM | RESPIRATION RATE: 16 BRPM

## 2017-10-16 DIAGNOSIS — Z76.89 RETURN TO WORK EVALUATION: Primary | ICD-10-CM

## 2017-10-16 DIAGNOSIS — K21.9 GASTROESOPHAGEAL REFLUX DISEASE, ESOPHAGITIS PRESENCE NOT SPECIFIED: ICD-10-CM

## 2017-10-16 PROCEDURE — 99999 PR PBB SHADOW E&M-EST. PATIENT-LVL III: CPT | Mod: PBBFAC,,, | Performed by: FAMILY MEDICINE

## 2017-10-16 PROCEDURE — 99214 OFFICE O/P EST MOD 30 MIN: CPT | Mod: S$GLB,,, | Performed by: FAMILY MEDICINE

## 2017-10-16 RX ORDER — PANTOPRAZOLE SODIUM 20 MG/1
20 TABLET, DELAYED RELEASE ORAL DAILY
Qty: 30 TABLET | Refills: 0 | Status: SHIPPED | OUTPATIENT
Start: 2017-10-16 | End: 2017-11-03 | Stop reason: ALTCHOICE

## 2017-10-16 NOTE — TELEPHONE ENCOUNTER
----- Message from Tisha Troncoso sent at 10/16/2017  9:35 AM CDT -----  Contact: self [phone]  States that the paperwork is being faxed over and should be received in an hour. States that restrictions should be put on work release. Please call back at [phone]//thank you acc

## 2017-10-16 NOTE — PROGRESS NOTES
Subjective:       Patient ID: Nathan Carlos is a 26 y.o. female.    Chief Complaint: Employment Physical (Return to work)      HPI   Ms. Carlos presents to clinic today for return to work clearance.   She has been off for some days due to sciatica pain.   She stats she has been doing well.   She states the pain is mostly gone and she is ready to go back to work.   She still has some intermittent episodes of vomiting. She states she was not able to get the medication that was called in due to cost.     Review of Systems   Constitutional: Negative for fever.   HENT: Negative for rhinorrhea and sore throat.    Respiratory: Positive for cough.    Gastrointestinal: Positive for vomiting. Negative for abdominal pain and nausea.       Medication List with Changes/Refills   New Medications    PANTOPRAZOLE (PROTONIX) 20 MG TABLET    Take 1 tablet (20 mg total) by mouth once daily.   Current Medications    GABAPENTIN (NEURONTIN) 300 MG CAPSULE    Take 1 capsule (300 mg total) by mouth 3 (three) times daily.    LISINOPRIL 10 MG TABLET    Take 1 tablet (10 mg total) by mouth once daily.    METFORMIN (GLUCOPHAGE-XR) 500 MG 24 HR TABLET    Take 1 tablet (500 mg total) by mouth daily with dinner or evening meal.    OXAPROZIN (DAYPRO) 600 MG TABLET    Take 1 tablet (600 mg total) by mouth once daily.    SPIRONOLACTONE (ALDACTONE) 25 MG TABLET    Take 1 tablet (25 mg total) by mouth once daily.    TIZANIDINE (ZANAFLEX) 4 MG TABLET    Take 1 tablet (4 mg total) by mouth 3 (three) times daily as needed.    VALACYCLOVIR (VALTREX) 500 MG TABLET    Take 1 tablet (500 mg total) by mouth once daily.   Discontinued Medications    OMEPRAZOLE (PRILOSEC) 20 MG CAPSULE    Take 1 capsule (20 mg total) by mouth once daily.       Patient Active Problem List   Diagnosis    Essential hypertension    Gastroesophageal reflux disease    Left-sided low back pain with left-sided sciatica    Obesity, Class II, BMI 35-39.9, with comorbidity     Hypercholesteremia    High serum testosterone    Insulin resistance syndrome    HSV-2 (herpes simplex virus 2) infection         Objective:     Physical Exam   Constitutional: She is oriented to person, place, and time. She appears well-developed and well-nourished. No distress.   HENT:   Head: Normocephalic and atraumatic.   Right Ear: External ear normal.   Left Ear: External ear normal.   Eyes: EOM are normal. Right eye exhibits no discharge. Left eye exhibits no discharge.   Cardiovascular: Normal rate and regular rhythm.    Pulmonary/Chest: Effort normal and breath sounds normal. No respiratory distress. She has no wheezes.   Abdominal: Soft. Bowel sounds are normal. There is no tenderness.   Musculoskeletal: She exhibits no edema or tenderness.   Normal gait   No spinal/ paraspinal tenderness      Neurological: She is alert and oriented to person, place, and time.   Skin: Skin is warm and dry. She is not diaphoretic. No erythema.   Psychiatric: She has a normal mood and affect.   Vitals reviewed.    Vitals:    10/16/17 0858   BP: 120/80   Pulse: 89   Resp: 16   Temp: 97.5 °F (36.4 °C)       Assessment/  PLAN     Return to work evaluation  - form filled out for patient to return to work     Gastroesophageal reflux disease, esophagitis presence not specified  -     pantoprazole (PROTONIX) 20 MG tablet; Take 1 tablet (20 mg total) by mouth once daily.  Dispense: 30 tablet; Refill: 0    Plan as above   rtc prn         Yue Anguiano MD  [facility]

## 2017-10-16 NOTE — TELEPHONE ENCOUNTER
----- Message from Clara Weeks sent at 10/16/2017 12:34 PM CDT -----  Contact: pt   Call pt regarding if a fax was received from her job.    ..[phone] (home)

## 2017-10-17 ENCOUNTER — TELEPHONE (OUTPATIENT)
Dept: FAMILY MEDICINE | Facility: CLINIC | Age: 27
End: 2017-10-17

## 2017-10-17 NOTE — TELEPHONE ENCOUNTER
----- Message from Jessi Howard sent at 10/17/2017  8:08 AM CDT -----  Contact: pt  Please call pt @ [phone], pt need to know if fax was received.

## 2017-10-19 ENCOUNTER — TELEPHONE (OUTPATIENT)
Dept: FAMILY MEDICINE | Facility: CLINIC | Age: 27
End: 2017-10-19

## 2017-10-19 NOTE — TELEPHONE ENCOUNTER
----- Message from Marcia Hidalgo MA sent at 10/19/2017 11:52 AM CDT -----  Contact: Pt  Please advise as the message was routed to Melva Anguiano NP staff. Thanks.  ----- Message -----  From: Shawna Guadarrama  Sent: 10/19/2017   9:52 AM  To: Silvio Frye Staff    PT states she can't come back to work until the leave of absence paper work is filled out and returned. .[phone] (home)

## 2017-10-20 ENCOUNTER — TELEPHONE (OUTPATIENT)
Dept: FAMILY MEDICINE | Facility: CLINIC | Age: 27
End: 2017-10-20

## 2017-10-20 NOTE — TELEPHONE ENCOUNTER
She can take it daily if she wants to suppress it   I prefer her not taking it if she does not need it   How many outbreaks has she had this year?

## 2017-10-20 NOTE — TELEPHONE ENCOUNTER
Pt came in to have questions answered. Pt educated on Herpes virus. Pt voiced understanding and repeated teaching.

## 2017-10-20 NOTE — TELEPHONE ENCOUNTER
----- Message from Maryjane Brunner sent at 10/20/2017 11:46 AM CDT -----  Contact: Pt  Pt states she is returning the nurse call, please contact the pt at [phone]

## 2017-10-31 ENCOUNTER — TELEPHONE (OUTPATIENT)
Dept: FAMILY MEDICINE | Facility: CLINIC | Age: 27
End: 2017-10-31

## 2017-10-31 NOTE — TELEPHONE ENCOUNTER
----- Message from Cha Rivera sent at 10/31/2017  8:57 AM CDT -----  Contact: patient  Calling concerning the status of her get disability papers. States there is a deadline to send paperwork of 11/3 fax to number listed on paperwork.  Please call patient today ASAP @ [phone]. Thanks, breana

## 2017-11-02 DIAGNOSIS — M54.30 SCIATICA, UNSPECIFIED LATERALITY: Primary | ICD-10-CM

## 2017-11-03 ENCOUNTER — OFFICE VISIT (OUTPATIENT)
Dept: FAMILY MEDICINE | Facility: CLINIC | Age: 27
End: 2017-11-03
Payer: COMMERCIAL

## 2017-11-03 VITALS
TEMPERATURE: 97 F | RESPIRATION RATE: 16 BRPM | HEART RATE: 73 BPM | BODY MASS INDEX: 38.88 KG/M2 | WEIGHT: 227.75 LBS | OXYGEN SATURATION: 98 % | DIASTOLIC BLOOD PRESSURE: 80 MMHG | SYSTOLIC BLOOD PRESSURE: 130 MMHG | HEIGHT: 64 IN

## 2017-11-03 DIAGNOSIS — K21.9 GASTROESOPHAGEAL REFLUX DISEASE, ESOPHAGITIS PRESENCE NOT SPECIFIED: Primary | ICD-10-CM

## 2017-11-03 DIAGNOSIS — M54.32 SCIATICA OF LEFT SIDE: ICD-10-CM

## 2017-11-03 PROCEDURE — 99999 PR PBB SHADOW E&M-EST. PATIENT-LVL IV: CPT | Mod: PBBFAC,,, | Performed by: FAMILY MEDICINE

## 2017-11-03 PROCEDURE — 99214 OFFICE O/P EST MOD 30 MIN: CPT | Mod: S$GLB,,, | Performed by: FAMILY MEDICINE

## 2017-11-03 RX ORDER — MUPIROCIN 20 MG/G
OINTMENT TOPICAL
COMMUNITY
Start: 2017-09-07 | End: 2018-02-13

## 2017-11-03 RX ORDER — PANTOPRAZOLE SODIUM 40 MG/1
40 TABLET, DELAYED RELEASE ORAL DAILY
Qty: 30 TABLET | Refills: 0 | Status: SHIPPED | OUTPATIENT
Start: 2017-11-03 | End: 2017-12-20

## 2017-11-03 RX ORDER — GABAPENTIN 300 MG/1
300 CAPSULE ORAL 3 TIMES DAILY
Qty: 30 CAPSULE | Refills: 0 | Status: SHIPPED | OUTPATIENT
Start: 2017-11-03 | End: 2017-11-28 | Stop reason: SDUPTHER

## 2017-11-03 NOTE — PROGRESS NOTES
Subjective:       Patient ID: Nathan Carlos is a 26 y.o. female.    Chief Complaint: Gastroesophageal Reflux      HPI   Ms. Carlos presents to clinic today for complaints of reflux.   She states she was given Dexilant from her GI doctor, but could not afford the actual prescription.   She states she needs another PPI sent in.   Her Gi doctor is Dr. Herbert. She states he wants to do a EGD, but she is waiting on trying to save money for this.   She also has had some emesis. The emesis is food colored.   She states she has been vomiting all week at night.   She states she tries to avoid acid foods.       Review of Systems   Constitutional: Negative for fever.   Respiratory: Positive for cough.    Gastrointestinal: Positive for vomiting. Negative for abdominal pain and blood in stool.   Genitourinary: Negative for hematuria.       Medication List with Changes/Refills   New Medications    PANTOPRAZOLE (PROTONIX) 40 MG TABLET    Take 1 tablet (40 mg total) by mouth once daily.    RANITIDINE (ZANTAC) 150 MG TABLET    Take 1 tablet (150 mg total) by mouth nightly.   Current Medications    LISINOPRIL 10 MG TABLET    Take 1 tablet (10 mg total) by mouth once daily.    METFORMIN (GLUCOPHAGE-XR) 500 MG 24 HR TABLET    Take 1 tablet (500 mg total) by mouth daily with dinner or evening meal.    MUPIROCIN (BACTROBAN) 2 % OINTMENT        OXAPROZIN (DAYPRO) 600 MG TABLET    Take 1 tablet (600 mg total) by mouth once daily.    SPIRONOLACTONE (ALDACTONE) 25 MG TABLET    Take 1 tablet (25 mg total) by mouth once daily.    TIZANIDINE (ZANAFLEX) 4 MG TABLET    Take 1 tablet (4 mg total) by mouth 3 (three) times daily as needed.    VALACYCLOVIR (VALTREX) 500 MG TABLET    Take 1 tablet (500 mg total) by mouth once daily.   Changed and/or Refilled Medications    Modified Medication Previous Medication    GABAPENTIN (NEURONTIN) 300 MG CAPSULE gabapentin (NEURONTIN) 300 MG capsule       Take 1 capsule (300 mg total) by mouth 3 (three) times  daily.    Take 1 capsule (300 mg total) by mouth 3 (three) times daily.   Discontinued Medications    PANTOPRAZOLE (PROTONIX) 20 MG TABLET    Take 1 tablet (20 mg total) by mouth once daily.       Patient Active Problem List   Diagnosis    Essential hypertension    Gastroesophageal reflux disease    Left-sided low back pain with left-sided sciatica    Obesity, Class II, BMI 35-39.9, with comorbidity    Hypercholesteremia    High serum testosterone    Insulin resistance syndrome    HSV-2 (herpes simplex virus 2) infection         Objective:     Physical Exam   Constitutional: She is oriented to person, place, and time. She appears well-developed and well-nourished. No distress.   HENT:   Head: Normocephalic and atraumatic.   Right Ear: External ear normal.   Left Ear: External ear normal.   Eyes: EOM are normal. Right eye exhibits no discharge. Left eye exhibits no discharge.   Cardiovascular: Normal rate and regular rhythm.    Pulmonary/Chest: Effort normal and breath sounds normal. No respiratory distress. She has no wheezes.   Abdominal: Soft. Bowel sounds are normal. She exhibits no distension. There is no tenderness. There is no guarding.   Musculoskeletal: She exhibits no edema.   Neurological: She is alert and oriented to person, place, and time.   Skin: Skin is warm and dry. She is not diaphoretic. No erythema.   Psychiatric: She has a normal mood and affect.   Vitals reviewed.    Vitals:    11/03/17 0808   BP: 130/80   Pulse: 73   Resp: 16   Temp: 97 °F (36.1 °C)       Assessment/  PLAN     Gastroesophageal reflux disease, esophagitis presence not specified  -     ranitidine (ZANTAC) 150 MG tablet; Take 1 tablet (150 mg total) by mouth nightly.  Dispense: 60 tablet; Refill: 3  -     pantoprazole (PROTONIX) 40 MG tablet; Take 1 tablet (40 mg total) by mouth once daily.  Dispense: 30 tablet; Refill: 0    Sciatica of left side  -     gabapentin (NEURONTIN) 300 MG capsule; Take 1 capsule (300 mg total)  by mouth 3 (three) times daily.  Dispense: 30 capsule; Refill: 0  -     Ambulatory Referral to Physical/Occupational Therapy      Plan as above   rtc prn     Yue Anguiano MD  [facility]

## 2017-11-07 ENCOUNTER — TELEPHONE (OUTPATIENT)
Dept: FAMILY MEDICINE | Facility: CLINIC | Age: 27
End: 2017-11-07

## 2017-11-07 NOTE — TELEPHONE ENCOUNTER
----- Message from Lupe Betancourt sent at 11/7/2017  8:23 AM CST -----  Contact: pt  States she talked to Ms Jarvis yesterday regarding her Short Term Disability paper work. States she's calling to make sure it was faxed today.  States today is the deadline. Please call pt at [phone]. Thank you

## 2017-11-10 ENCOUNTER — TELEPHONE (OUTPATIENT)
Dept: FAMILY MEDICINE | Facility: CLINIC | Age: 27
End: 2017-11-10

## 2017-11-10 NOTE — TELEPHONE ENCOUNTER
----- Message from Kristen Osorio sent at 11/10/2017  9:32 AM CST -----  Patient returning nurse call. Please adv/call [phone].//thanks. cw

## 2017-11-10 NOTE — TELEPHONE ENCOUNTER
----- Message from Meme Damian sent at 11/10/2017  9:03 AM CST -----  Contact: pt  She's calling to verify that her disability paperwork was received and if it could be sent back, please advise [phone] (home)

## 2017-11-13 ENCOUNTER — TELEPHONE (OUTPATIENT)
Dept: FAMILY MEDICINE | Facility: CLINIC | Age: 27
End: 2017-11-13

## 2017-11-13 NOTE — TELEPHONE ENCOUNTER
Spoke with the patient and informed her that this was faxed over and that I spoke with Geneva with [facility] and she stated that they did not do FCE's, will have to refer to another PT.

## 2017-11-13 NOTE — TELEPHONE ENCOUNTER
----- Message from Yazmin Gonzales sent at 11/13/2017 10:13 AM CST -----  Contact: PT   States she's calling to see if the paperwork that was faxed over is ready/ states the deadline is tomorrow and wants to know if it's been filled out and can be reached at [phone]//thanks/dbw

## 2017-11-13 NOTE — TELEPHONE ENCOUNTER
----- Message from Skylar Williamson sent at 11/13/2017 12:21 PM CST -----  Contact: Geneva @ [facility]  The pt has brought in a form to be completed.  They are not familiar with the form.  They don't do FCE's.  Please call Geneva at [phone]

## 2017-11-13 NOTE — TELEPHONE ENCOUNTER
----- Message from Lupe Betancourt sent at 11/13/2017 12:41 PM CST -----  Contact: PT  States she has a question regarding her paper work. Please call pt at [phone]. Thank you

## 2017-11-15 ENCOUNTER — TELEPHONE (OUTPATIENT)
Dept: FAMILY MEDICINE | Facility: CLINIC | Age: 27
End: 2017-11-15

## 2017-11-15 DIAGNOSIS — M54.32 BILATERAL SCIATICA: Primary | ICD-10-CM

## 2017-11-15 DIAGNOSIS — M54.31 BILATERAL SCIATICA: Primary | ICD-10-CM

## 2017-11-15 NOTE — TELEPHONE ENCOUNTER
----- Message from Niraj Dhaliwal sent at 11/15/2017 10:17 AM CST -----  Contact: [phone]   Pt would like to consult with the nurse about ER visit and medication .  Please call back at [phone].  x-

## 2017-11-15 NOTE — TELEPHONE ENCOUNTER
Please inform patient that I have put in mri order  Please schedule mri and let patient know     Thanks !

## 2017-11-15 NOTE — TELEPHONE ENCOUNTER
Dr. Anguiano, the patient stated that she went to the ER last night and was given a couple of prescriptions, also they recommended a MRI and the patient would like to know if you would order this for her or your recommendations, please advise, Thanks

## 2017-11-16 ENCOUNTER — TELEPHONE (OUTPATIENT)
Dept: FAMILY MEDICINE | Facility: CLINIC | Age: 27
End: 2017-11-16

## 2017-11-16 NOTE — TELEPHONE ENCOUNTER
----- Message from Jessi Howard sent at 11/16/2017  7:56 AM CST -----  Contact: Sarina/Smiley  Please all Carline @ [phone] regarding pt restriction to return to work, Emp# [id number].

## 2017-11-20 ENCOUNTER — TELEPHONE (OUTPATIENT)
Dept: FAMILY MEDICINE | Facility: CLINIC | Age: 27
End: 2017-11-20

## 2017-11-20 NOTE — TELEPHONE ENCOUNTER
----- Message from Deena Santos sent at 11/20/2017 11:34 AM CST -----  Contact: [employer] (Keren)  Please give Keren a call at [phone] regarding the disability paper work. Caller has questions regarding the medical findings and why restrictions are necessary.

## 2017-11-21 ENCOUNTER — TELEPHONE (OUTPATIENT)
Dept: RADIOLOGY | Facility: HOSPITAL | Age: 27
End: 2017-11-21

## 2017-11-21 ENCOUNTER — TELEPHONE (OUTPATIENT)
Dept: FAMILY MEDICINE | Facility: CLINIC | Age: 27
End: 2017-11-21

## 2017-11-21 NOTE — TELEPHONE ENCOUNTER
----- Message from Deena Santos sent at 11/21/2017 11:44 AM CST -----  Contact: Keren Medrano  Caller needs to speak with nurse regarding a short term disability claim and caller hasn't follow up questions and further medical findings and why restrictions are necessary. Please give Keren a call at [phone].

## 2017-11-22 ENCOUNTER — TELEPHONE (OUTPATIENT)
Dept: FAMILY MEDICINE | Facility: CLINIC | Age: 27
End: 2017-11-22

## 2017-11-22 ENCOUNTER — HOSPITAL ENCOUNTER (OUTPATIENT)
Dept: RADIOLOGY | Facility: HOSPITAL | Age: 27
Discharge: HOME | End: 2017-11-22
Attending: FAMILY MEDICINE
Payer: COMMERCIAL

## 2017-11-22 DIAGNOSIS — M54.32 BILATERAL SCIATICA: ICD-10-CM

## 2017-11-22 DIAGNOSIS — M54.31 BILATERAL SCIATICA: ICD-10-CM

## 2017-11-22 PROCEDURE — 72148 MRI LUMBAR SPINE W/O DYE: CPT | Mod: 26,,, | Performed by: RADIOLOGY

## 2017-11-22 PROCEDURE — 72148 MRI LUMBAR SPINE W/O DYE: CPT | Mod: TC,PO

## 2017-11-22 NOTE — TELEPHONE ENCOUNTER
Called patient and went over mri   She states she needs fce to go back to work   Told her we will try to find a PT that does this

## 2017-11-22 NOTE — TELEPHONE ENCOUNTER
----- Message from Flor Rios sent at 11/22/2017  8:39 AM CST -----  Contact: Patient  Patient needs a note regarding why she needs to be out of work, please call her back at [phone]. Thank you

## 2017-11-22 NOTE — TELEPHONE ENCOUNTER
----- Message from Dominick Avila sent at 11/22/2017  2:08 PM CST -----  Contact: Pt   Pt called in regards to [place] paperwork callback number is..[phone] (home)

## 2017-11-27 ENCOUNTER — TELEPHONE (OUTPATIENT)
Dept: FAMILY MEDICINE | Facility: CLINIC | Age: 27
End: 2017-11-27

## 2017-11-27 NOTE — TELEPHONE ENCOUNTER
----- Message from Kristen Osorio sent at 11/27/2017  8:34 AM CST -----  Patient states she would like to speak to nurse regarding her last conversation with provider on last Wednesday. Patient states she will elaborate when she speak to nurse. Please adv/call [phone].//thanks. cw

## 2017-11-28 ENCOUNTER — OFFICE VISIT (OUTPATIENT)
Dept: FAMILY MEDICINE | Facility: CLINIC | Age: 27
End: 2017-11-28
Payer: COMMERCIAL

## 2017-11-28 ENCOUNTER — LAB VISIT (OUTPATIENT)
Dept: LAB | Facility: HOSPITAL | Age: 27
End: 2017-11-28
Attending: FAMILY MEDICINE
Payer: COMMERCIAL

## 2017-11-28 VITALS
HEART RATE: 76 BPM | SYSTOLIC BLOOD PRESSURE: 120 MMHG | OXYGEN SATURATION: 98 % | RESPIRATION RATE: 16 BRPM | HEIGHT: 64 IN | BODY MASS INDEX: 40.04 KG/M2 | DIASTOLIC BLOOD PRESSURE: 80 MMHG | TEMPERATURE: 98 F | WEIGHT: 234.56 LBS

## 2017-11-28 DIAGNOSIS — R10.9 ABDOMINAL PAIN, UNSPECIFIED ABDOMINAL LOCATION: Primary | ICD-10-CM

## 2017-11-28 DIAGNOSIS — M54.32 SCIATICA OF LEFT SIDE: ICD-10-CM

## 2017-11-28 DIAGNOSIS — Z09 FOLLOW UP: ICD-10-CM

## 2017-11-28 DIAGNOSIS — M54.42 LEFT-SIDED LOW BACK PAIN WITH LEFT-SIDED SCIATICA, UNSPECIFIED CHRONICITY: ICD-10-CM

## 2017-11-28 DIAGNOSIS — R10.9 ABDOMINAL PAIN, UNSPECIFIED ABDOMINAL LOCATION: ICD-10-CM

## 2017-11-28 PROCEDURE — 99999 PR PBB SHADOW E&M-EST. PATIENT-LVL III: CPT | Mod: PBBFAC,,, | Performed by: FAMILY MEDICINE

## 2017-11-28 PROCEDURE — 86677 HELICOBACTER PYLORI ANTIBODY: CPT

## 2017-11-28 PROCEDURE — 99214 OFFICE O/P EST MOD 30 MIN: CPT | Mod: S$GLB,,, | Performed by: FAMILY MEDICINE

## 2017-11-28 PROCEDURE — 36415 COLL VENOUS BLD VENIPUNCTURE: CPT | Mod: PO

## 2017-11-28 RX ORDER — CYCLOBENZAPRINE HCL 5 MG
5 TABLET ORAL
COMMUNITY
Start: 2017-11-14 | End: 2017-11-28

## 2017-11-28 RX ORDER — GABAPENTIN 300 MG/1
300 CAPSULE ORAL 3 TIMES DAILY
Qty: 90 CAPSULE | Refills: 0 | Status: SHIPPED | OUTPATIENT
Start: 2017-11-28 | End: 2017-11-29 | Stop reason: SDUPTHER

## 2017-11-28 RX ORDER — TIZANIDINE 4 MG/1
4 TABLET ORAL 2 TIMES DAILY PRN
Qty: 60 TABLET | Refills: 0 | Status: SHIPPED | OUTPATIENT
Start: 2017-11-28 | End: 2017-11-29 | Stop reason: SDUPTHER

## 2017-11-28 RX ORDER — DICLOFENAC POTASSIUM 50 MG/1
50 TABLET, FILM COATED ORAL
COMMUNITY
Start: 2017-11-14 | End: 2017-12-20

## 2017-11-28 NOTE — TELEPHONE ENCOUNTER
Please advise pt she should not take zanaflex and flexeril at the same time   She can take one on one day and the other on the other day if needed

## 2017-11-28 NOTE — PROGRESS NOTES
Subjective:       Patient ID: Nathan Carlos is a 26 y.o. female.    Chief Complaint: Follow-up      HPI  Ms. Carlos presents to clinic today for follow up.   She states she is feeling better and is ready to go back to work.   She states she still has some abdominal pain, but it is better with the Zantac.   She states she is saving money to get a EGD.     Review of Systems   Constitutional: Negative for fever.   Respiratory: Negative for cough and shortness of breath.    Cardiovascular: Negative for chest pain.   Gastrointestinal: Positive for abdominal pain.   Musculoskeletal: Positive for back pain.       Medication List with Changes/Refills   Current Medications    CYCLOBENZAPRINE (FLEXERIL) 5 MG TABLET    Take 5 mg by mouth.    DICLOFENAC (CATAFLAM) 50 MG TABLET    Take 50 mg by mouth.    GABAPENTIN (NEURONTIN) 300 MG CAPSULE    Take 1 capsule (300 mg total) by mouth 3 (three) times daily.    LISINOPRIL 10 MG TABLET    Take 1 tablet (10 mg total) by mouth once daily.    METFORMIN (GLUCOPHAGE-XR) 500 MG 24 HR TABLET    Take 1 tablet (500 mg total) by mouth daily with dinner or evening meal.    MUPIROCIN (BACTROBAN) 2 % OINTMENT        OXAPROZIN (DAYPRO) 600 MG TABLET    Take 1 tablet (600 mg total) by mouth once daily.    PANTOPRAZOLE (PROTONIX) 40 MG TABLET    Take 1 tablet (40 mg total) by mouth once daily.    RANITIDINE (ZANTAC) 150 MG TABLET    Take 1 tablet (150 mg total) by mouth nightly.    SPIRONOLACTONE (ALDACTONE) 25 MG TABLET    Take 1 tablet (25 mg total) by mouth once daily.    TIZANIDINE (ZANAFLEX) 4 MG TABLET    Take 1 tablet (4 mg total) by mouth 3 (three) times daily as needed.    VALACYCLOVIR (VALTREX) 500 MG TABLET    Take 1 tablet (500 mg total) by mouth once daily.       Patient Active Problem List   Diagnosis    Essential hypertension    Gastroesophageal reflux disease    Left-sided low back pain with left-sided sciatica    Obesity, Class II, BMI 35-39.9, with comorbidity     Hypercholesteremia    High serum testosterone    Insulin resistance syndrome    HSV-2 (herpes simplex virus 2) infection         Objective:     Physical Exam   Constitutional: She is oriented to person, place, and time. She appears well-developed and well-nourished. No distress.   HENT:   Head: Normocephalic and atraumatic.   Right Ear: External ear normal.   Left Ear: External ear normal.   Eyes: EOM are normal. Right eye exhibits no discharge. Left eye exhibits no discharge.   Cardiovascular: Normal rate and regular rhythm.    Pulmonary/Chest: Effort normal and breath sounds normal. No respiratory distress. She has no wheezes.   Musculoskeletal: She exhibits no edema.   Neurological: She is alert and oriented to person, place, and time.   Skin: Skin is warm and dry. She is not diaphoretic. No erythema.   Psychiatric: She has a normal mood and affect.   Vitals reviewed.    Vitals:    11/28/17 0836   BP: 120/80   Pulse: 76   Resp: 16   Temp: 97.8 °F (36.6 °C)       Assessment/  PLAN     Abdominal pain, unspecified abdominal location  -     H.Pylori Antibody IgG; Future; Expected date: 11/28/2017    Follow up  - cleared for return to work     Plan as above   rtc prn          Yue Anguiano MD  [facility]

## 2017-11-29 ENCOUNTER — TELEPHONE (OUTPATIENT)
Dept: FAMILY MEDICINE | Facility: CLINIC | Age: 27
End: 2017-11-29

## 2017-11-29 DIAGNOSIS — M54.42 LEFT-SIDED LOW BACK PAIN WITH LEFT-SIDED SCIATICA, UNSPECIFIED CHRONICITY: ICD-10-CM

## 2017-11-29 DIAGNOSIS — M54.32 SCIATICA OF LEFT SIDE: ICD-10-CM

## 2017-11-29 LAB — H PYLORI IGG SERPL QL IA: POSITIVE

## 2017-11-29 RX ORDER — GABAPENTIN 300 MG/1
300 CAPSULE ORAL 3 TIMES DAILY
Qty: 90 CAPSULE | Refills: 0 | Status: SHIPPED | OUTPATIENT
Start: 2017-11-29 | End: 2018-02-13

## 2017-11-29 RX ORDER — TIZANIDINE 4 MG/1
4 TABLET ORAL 2 TIMES DAILY PRN
Qty: 60 TABLET | Refills: 0 | Status: SHIPPED | OUTPATIENT
Start: 2017-11-29 | End: 2017-12-20

## 2017-11-29 NOTE — TELEPHONE ENCOUNTER
----- Message from Clara Weeks sent at 11/29/2017  2:44 PM CST -----  Contact: pt   Pt states that she was reminding nurse to fax her letter so that she can return back to work on tomorrow. Pt states that she can go back to work if that letter isn't fax today.    ..[phone] (home)

## 2017-11-29 NOTE — TELEPHONE ENCOUNTER
----- Message from Tisha Troncoos sent at 11/29/2017  8:16 AM CST -----  Contact: self [phone]  States that she is calling to check status on release to return to work. Also states that she needs a refill on Gabapentin 300mg and tizanidine 4mg. Pt uses     [facility] [address] [place] [address] [address] [address]  Phone: [phone] Fax: [phone]    Please call back at [phone]//thank you acc

## 2017-11-29 NOTE — TELEPHONE ENCOUNTER
, the patient is calling in reference to the release to work letter, also needs refills on her tizanide, I think you may have already refilled the Gabapentin, please Thanks

## 2017-11-30 ENCOUNTER — TELEPHONE (OUTPATIENT)
Dept: FAMILY MEDICINE | Facility: CLINIC | Age: 27
End: 2017-11-30

## 2017-11-30 ENCOUNTER — PATIENT MESSAGE (OUTPATIENT)
Dept: FAMILY MEDICINE | Facility: CLINIC | Age: 27
End: 2017-11-30

## 2017-11-30 RX ORDER — CLARITHROMYCIN 250 MG/1
500 TABLET, FILM COATED ORAL EVERY 12 HOURS
Qty: 56 TABLET | Refills: 0 | Status: SHIPPED | OUTPATIENT
Start: 2017-11-30 | End: 2017-12-14

## 2017-11-30 RX ORDER — AMOXICILLIN 500 MG/1
1000 CAPSULE ORAL EVERY 12 HOURS
Qty: 56 CAPSULE | Refills: 0 | Status: SHIPPED | OUTPATIENT
Start: 2017-11-30 | End: 2017-12-14

## 2017-11-30 NOTE — TELEPHONE ENCOUNTER
----- Message from Lynne Osorio sent at 11/30/2017 11:59 AM CST -----  Contact: pt  The pt request a call no additional info given, pt can be reached at [phone]///thxMW

## 2017-11-30 NOTE — TELEPHONE ENCOUNTER
----- Message from Judith Phillips sent at 11/30/2017  9:03 AM CST -----  Pt at [phone]//states she is calling to get her results and if  has sent over a letter to her employer releasing her to return to work//please call//marisabel/chris

## 2017-11-30 NOTE — TELEPHONE ENCOUNTER
----- Message from Meme Damian sent at 11/30/2017  2:16 PM CST -----  Contact: pt  She's calling to see if lab results can be sent to Dr. Nicole-fax# [phone], she stated that he needs to know that she is being treated, and stated that the medication that she takes, Dexilant is 60 mg, please advise [phone] (home)

## 2017-11-30 NOTE — TELEPHONE ENCOUNTER
----- Message from Skylar Anguiano sent at 11/30/2017  9:21 AM CST -----  Please call pt back at [phone]. Pt miss the office call.

## 2017-12-01 ENCOUNTER — TELEPHONE (OUTPATIENT)
Dept: FAMILY MEDICINE | Facility: CLINIC | Age: 27
End: 2017-12-01

## 2017-12-01 NOTE — TELEPHONE ENCOUNTER
----- Message from Winifred Weeks sent at 12/1/2017  1:58 PM CST -----  Contact: Patient   Patient will come in on Monday to  paperwork, Please call her at [phone].    Thanks  td

## 2017-12-05 ENCOUNTER — PATIENT MESSAGE (OUTPATIENT)
Dept: FAMILY MEDICINE | Facility: CLINIC | Age: 27
End: 2017-12-05

## 2017-12-05 ENCOUNTER — TELEPHONE (OUTPATIENT)
Dept: FAMILY MEDICINE | Facility: CLINIC | Age: 27
End: 2017-12-05

## 2017-12-05 NOTE — TELEPHONE ENCOUNTER
----- Message from Serafin Juarez sent at 12/5/2017  3:16 PM CST -----  Contact: self [phone]  Would like to consult with nurse regarding Leave of Abscence paperwork, would like to inform nurse the 2 visit days are okay to fax.   Please call back at [phone].   Md Magui

## 2017-12-05 NOTE — TELEPHONE ENCOUNTER
----- Message from Winifred Weeks sent at 12/5/2017  2:41 PM CST -----  Contact: Patient   Patient needs to get a copy of her visit showing what days that she was seen on, Please call her at [phone].    Thanks  td

## 2017-12-06 NOTE — TELEPHONE ENCOUNTER
----- Message from Shawna Guadarrama sent at 12/5/2017  3:36 PM CST -----  Contact: PT   PT returning nurse call. Pt states she is at work and nurse can go ahead and fax over whatever information she has. .[phone] (home)

## 2017-12-11 ENCOUNTER — PATIENT MESSAGE (OUTPATIENT)
Dept: FAMILY MEDICINE | Facility: CLINIC | Age: 27
End: 2017-12-11

## 2017-12-12 ENCOUNTER — PATIENT MESSAGE (OUTPATIENT)
Dept: FAMILY MEDICINE | Facility: CLINIC | Age: 27
End: 2017-12-12

## 2017-12-20 ENCOUNTER — OFFICE VISIT (OUTPATIENT)
Dept: FAMILY MEDICINE | Facility: CLINIC | Age: 27
End: 2017-12-20
Payer: COMMERCIAL

## 2017-12-20 ENCOUNTER — TELEPHONE (OUTPATIENT)
Dept: INTERNAL MEDICINE | Facility: CLINIC | Age: 27
End: 2017-12-20

## 2017-12-20 VITALS
SYSTOLIC BLOOD PRESSURE: 110 MMHG | HEIGHT: 64 IN | RESPIRATION RATE: 16 BRPM | HEART RATE: 86 BPM | DIASTOLIC BLOOD PRESSURE: 70 MMHG | BODY MASS INDEX: 40.01 KG/M2 | TEMPERATURE: 97 F | WEIGHT: 234.38 LBS | OXYGEN SATURATION: 98 %

## 2017-12-20 DIAGNOSIS — B00.9 HSV-2 (HERPES SIMPLEX VIRUS 2) INFECTION: ICD-10-CM

## 2017-12-20 DIAGNOSIS — G89.29 CHRONIC LEFT-SIDED LOW BACK PAIN WITH LEFT-SIDED SCIATICA: ICD-10-CM

## 2017-12-20 DIAGNOSIS — M54.32 SCIATICA OF LEFT SIDE: ICD-10-CM

## 2017-12-20 DIAGNOSIS — M54.42 CHRONIC LEFT-SIDED LOW BACK PAIN WITH LEFT-SIDED SCIATICA: ICD-10-CM

## 2017-12-20 DIAGNOSIS — N89.8 VAGINAL DISCHARGE: Primary | ICD-10-CM

## 2017-12-20 DIAGNOSIS — Z00.00 HEALTH CARE MAINTENANCE: ICD-10-CM

## 2017-12-20 LAB
CANDIDA RRNA VAG QL PROBE: POSITIVE
G VAGINALIS RRNA GENITAL QL PROBE: NEGATIVE
T VAGINALIS RRNA GENITAL QL PROBE: NEGATIVE

## 2017-12-20 PROCEDURE — 99214 OFFICE O/P EST MOD 30 MIN: CPT | Mod: S$GLB,,, | Performed by: FAMILY MEDICINE

## 2017-12-20 PROCEDURE — 99999 PR PBB SHADOW E&M-EST. PATIENT-LVL V: CPT | Mod: PBBFAC,,, | Performed by: FAMILY MEDICINE

## 2017-12-20 PROCEDURE — 87480 CANDIDA DNA DIR PROBE: CPT

## 2017-12-20 PROCEDURE — 87591 N.GONORRHOEAE DNA AMP PROB: CPT

## 2017-12-20 PROCEDURE — 87660 TRICHOMONAS VAGIN DIR PROBE: CPT

## 2017-12-20 PROCEDURE — 88175 CYTOPATH C/V AUTO FLUID REDO: CPT

## 2017-12-20 RX ORDER — VALACYCLOVIR HYDROCHLORIDE 500 MG/1
500 TABLET, FILM COATED ORAL DAILY
Qty: 30 TABLET | Refills: 3 | Status: SHIPPED | OUTPATIENT
Start: 2017-12-20 | End: 2019-09-04

## 2017-12-20 RX ORDER — FLUCONAZOLE 150 MG/1
150 TABLET ORAL DAILY
Qty: 1 TABLET | Refills: 0 | Status: SHIPPED | OUTPATIENT
Start: 2017-12-20 | End: 2017-12-21

## 2017-12-20 NOTE — PROGRESS NOTES
Subjective:       Patient ID: Nathan Carlos is a 26 y.o. female.    Chief Complaint: Vaginitis      HPI  Ms. Carlos presents to clinic today for complaints of vaginal discharge.   She denies any chills.   She has had some frequency.   She was on antibiotic recently.     She still has back pain.       Review of Systems   Constitutional: Negative for chills.   Gastrointestinal: Negative for constipation.   Genitourinary: Positive for frequency and vaginal discharge. Negative for dysuria, flank pain, hematuria and urgency.   Skin: Negative for rash.       Medication List with Changes/Refills   Current Medications    GABAPENTIN (NEURONTIN) 300 MG CAPSULE    Take 1 capsule (300 mg total) by mouth 3 (three) times daily.    LISINOPRIL 10 MG TABLET    Take 1 tablet (10 mg total) by mouth once daily.    METFORMIN (GLUCOPHAGE-XR) 500 MG 24 HR TABLET    Take 1 tablet (500 mg total) by mouth daily with dinner or evening meal.    MUPIROCIN (BACTROBAN) 2 % OINTMENT        OXAPROZIN (DAYPRO) 600 MG TABLET    Take 1 tablet (600 mg total) by mouth once daily.    SPIRONOLACTONE (ALDACTONE) 25 MG TABLET    Take 1 tablet (25 mg total) by mouth once daily.    VALACYCLOVIR (VALTREX) 500 MG TABLET    Take 1 tablet (500 mg total) by mouth once daily.   Discontinued Medications    DICLOFENAC (CATAFLAM) 50 MG TABLET    Take 50 mg by mouth.    PANTOPRAZOLE (PROTONIX) 40 MG TABLET    Take 1 tablet (40 mg total) by mouth once daily.    RANITIDINE (ZANTAC) 150 MG TABLET    Take 1 tablet (150 mg total) by mouth nightly.    TIZANIDINE (ZANAFLEX) 4 MG TABLET    Take 1 tablet (4 mg total) by mouth 2 (two) times daily as needed.       Patient Active Problem List   Diagnosis    Essential hypertension    Gastroesophageal reflux disease    Left-sided low back pain with left-sided sciatica    Obesity, Class II, BMI 35-39.9, with comorbidity    Hypercholesteremia    High serum testosterone    Insulin resistance syndrome    HSV-2 (herpes simplex  virus 2) infection         Objective:     Physical Exam   Constitutional: She is oriented to person, place, and time. She appears well-developed and well-nourished. No distress.   HENT:   Head: Normocephalic and atraumatic.   Right Ear: External ear normal.   Left Ear: External ear normal.   Eyes: EOM are normal. Right eye exhibits no discharge. Left eye exhibits no discharge.   Cardiovascular: Normal rate and regular rhythm.    Pulmonary/Chest: Effort normal and breath sounds normal. No respiratory distress. She has no wheezes.   Genitourinary: Vaginal discharge found.   Genitourinary Comments: White discharge that is thick    Musculoskeletal: She exhibits no edema.   Neurological: She is alert and oriented to person, place, and time.   Skin: Skin is warm and dry. She is not diaphoretic. No erythema.   Psychiatric: She has a normal mood and affect.   Vitals reviewed.    Vitals:    12/20/17 0942   BP: 110/70   Pulse: 86   Resp: 16   Temp: 97.4 °F (36.3 °C)       Assessment/  PLAN     Vaginal discharge  -     C. trachomatis/N. gonorrhoeae by AMP DNA Cervicovaginal  -     Vaginosis Screen by DNA Probe    HSV-2 (herpes simplex virus 2) infection  -     valACYclovir (VALTREX) 500 MG tablet; Take 1 tablet (500 mg total) by mouth once daily.  Dispense: 30 tablet; Refill: 3    Sciatica of left side  -     Ambulatory Referral to Orthopedics    Chronic left-sided low back pain with left-sided sciatica  -     Ambulatory Referral to Orthopedics    Health care maintenance  -     Liquid-based pap smear, screening    Other orders  -     fluconazole (DIFLUCAN) 150 MG Tab; Take 1 tablet (150 mg total) by mouth once daily.  Dispense: 1 tablet; Refill: 0      Plan as above   rtc prn      Yue Anguiano MD  [facility]

## 2017-12-20 NOTE — TELEPHONE ENCOUNTER
----- Message from Meme Damian sent at 12/20/2017  2:02 PM CST -----  Contact: pt  She's calling to discuss the referral sent to Dr. Paiz, she stated that his office called her and stated that they don't see back pain patients and should be referred to Dr. Kirkland in pain management, please advise [phone] (home)

## 2017-12-21 DIAGNOSIS — B37.9 YEAST INFECTION: Primary | ICD-10-CM

## 2017-12-21 DIAGNOSIS — G89.29 CHRONIC LEFT-SIDED LOW BACK PAIN WITH LEFT-SIDED SCIATICA: Primary | ICD-10-CM

## 2017-12-21 DIAGNOSIS — M54.42 CHRONIC LEFT-SIDED LOW BACK PAIN WITH LEFT-SIDED SCIATICA: Primary | ICD-10-CM

## 2017-12-21 LAB
C TRACH DNA SPEC QL NAA+PROBE: NOT DETECTED
N GONORRHOEA DNA SPEC QL NAA+PROBE: NOT DETECTED

## 2017-12-21 RX ORDER — FLUCONAZOLE 150 MG/1
150 TABLET ORAL DAILY
Qty: 1 TABLET | Refills: 0 | Status: SHIPPED | OUTPATIENT
Start: 2017-12-21 | End: 2017-12-22

## 2017-12-27 ENCOUNTER — TELEPHONE (OUTPATIENT)
Dept: FAMILY MEDICINE | Facility: CLINIC | Age: 27
End: 2017-12-27

## 2017-12-27 ENCOUNTER — PATIENT MESSAGE (OUTPATIENT)
Dept: FAMILY MEDICINE | Facility: CLINIC | Age: 27
End: 2017-12-27

## 2017-12-27 DIAGNOSIS — R09.81 CONGESTION OF NASAL SINUS: Primary | ICD-10-CM

## 2017-12-27 RX ORDER — AZELASTINE 1 MG/ML
1 SPRAY, METERED NASAL 2 TIMES DAILY
Qty: 30 ML | Refills: 0 | Status: SHIPPED | OUTPATIENT
Start: 2017-12-27 | End: 2018-02-13

## 2017-12-27 NOTE — TELEPHONE ENCOUNTER
----- Message from Yue Anguiano MD sent at 12/27/2017 10:39 AM CST -----  Contact: pt  Ordered astelin to [facility] on [address]   Please let her know       Thanks     ----- Message -----  From: Matheus Sears LPN  Sent: 12/27/2017  10:21 AM  To: Yue Anguiano MD        ----- Message -----  From: Lynne Osorio  Sent: 12/27/2017  10:09 AM  To: Silvio Turner Staff    The pt states she needs something called in for congestion and ear pain, the pt can be reached at  [phone]///thxMW

## 2018-01-04 ENCOUNTER — HOSPITAL ENCOUNTER (OUTPATIENT)
Dept: RADIOLOGY | Facility: HOSPITAL | Age: 28
Discharge: HOME | End: 2018-01-04
Attending: ANESTHESIOLOGY
Payer: COMMERCIAL

## 2018-01-04 ENCOUNTER — OFFICE VISIT (OUTPATIENT)
Dept: PAIN MEDICINE | Facility: CLINIC | Age: 28
End: 2018-01-04
Payer: COMMERCIAL

## 2018-01-04 VITALS
HEART RATE: 78 BPM | BODY MASS INDEX: 39.9 KG/M2 | WEIGHT: 233.69 LBS | DIASTOLIC BLOOD PRESSURE: 85 MMHG | HEIGHT: 64 IN | SYSTOLIC BLOOD PRESSURE: 146 MMHG

## 2018-01-04 DIAGNOSIS — M53.3 SACROILIAC JOINT PAIN: ICD-10-CM

## 2018-01-04 DIAGNOSIS — M47.816 LUMBAR SPONDYLOSIS: Primary | ICD-10-CM

## 2018-01-04 DIAGNOSIS — M47.816 LUMBAR SPONDYLOSIS: ICD-10-CM

## 2018-01-04 PROCEDURE — 99204 OFFICE O/P NEW MOD 45 MIN: CPT | Mod: S$GLB,,, | Performed by: ANESTHESIOLOGY

## 2018-01-04 PROCEDURE — 72114 X-RAY EXAM L-S SPINE BENDING: CPT | Mod: TC,FY,PO

## 2018-01-04 PROCEDURE — 99999 PR PBB SHADOW E&M-EST. PATIENT-LVL III: CPT | Mod: PBBFAC,,, | Performed by: ANESTHESIOLOGY

## 2018-01-04 PROCEDURE — 72114 X-RAY EXAM L-S SPINE BENDING: CPT | Mod: 26,FY,, | Performed by: RADIOLOGY

## 2018-01-04 RX ORDER — MELOXICAM 15 MG/1
15 TABLET ORAL DAILY
Qty: 30 TABLET | Refills: 0 | Status: SHIPPED | OUTPATIENT
Start: 2018-01-04 | End: 2018-02-03

## 2018-01-04 NOTE — LETTER
January 7, 2018      Yue Anguiano MD  [address]  [place] [address]           [facility]  [address] [place] [address]  Phone: [phone]  Fax: [phone]          Patient: Nathan Carlos   MR Number: [id number]   YOB: 1990   Date of Visit: 1/4/2018       Dear Dr. Yue Anguiano:    Thank you for referring Nathan Carlos to me for evaluation. Attached you will find relevant portions of my assessment and plan of care.    If you have questions, please do not hesitate to call me. I look forward to following Nathan Carlos along with you.    Sincerely,    Yo Kirkland MD    Enclosure  CC:  No Recipients    If you would like to receive this communication electronically, please contact [email or web address] or [phone] to request more information on HOSTING Link access.    For providers and/or their staff who would like to refer a patient to [facility], please contact us through our one-stop-shop provider referral line, [facility] , at [phone].    If you feel you have received this communication in error or would no longer like to receive these types of communications, please e-mail [email or web address]

## 2018-01-04 NOTE — PROGRESS NOTES
Chief Pain Complaint:  Left Buttock Pain       History of Present Illness:   Nathan Carlos is a 27 y.o. female  who is presenting with a chief complaint of Left Buttock Pain. The patient began experiencing this problem insidiously, and the pain has been gradually worsening over the past 3 month(s). The pain is described as throbbing, cramping, aching and heavy and is located in the left buttock. Pain is intermittent and lasts hours. The pain radiates to pain is nonradiating. The patient rates her pain a 8 out of ten and interferes with activities of daily living a 8 out of ten. Pain is exacerbated by getting up from a seated position, and is improved by rest. Patient reports no prior trauma, no prior spinal surgery     - pertinent negatives: No fever, No chills, No weight loss, No bladder dysfunction, No bowel dysfunction, No extremity weakness, No saddle anesthesia  - pertinent positives: none    - medications, other therapies tried (physical therapy, injections):     >> NSAIDs, Tylenol and gabapentin    >> Has previously undergone Physical Therapy with minimal improvement     >> Has NOT previously undergone spinal injection/s      Imaging / Labs / Studies (reviewed on 1/4/2018):    Results for orders placed during the hospital encounter of 11/22/17   MRI Lumbar Spine Without Contrast    Narrative Technique: Standard noncontrast multiplanar multisequence imaging of the lumbar spine was performed.    Findings: There is anatomic spinal alignment.  Disc interspaces are of normal height and signal intensity.  Vertebral marrow signal pattern and architecture are normal.  Distal cord is unremarkable with conus medullaris terminating at L1-L2.  Paravertebral soft tissues are symmetric and normal in appearance.    T12-L1: No disc protrusion, neuroforaminal narrowing, or central canal stenosis.    L1-L2: No disc protrusion, neuroforaminal narrowing, or central canal stenosis.    L2-L3: No disc protrusion, neuroforaminal  "narrowing, or central canal stenosis.    L3-L4: No disc protrusion, neuroforaminal narrowing, or central canal stenosis.    L4-L5: No disc protrusion, neuroforaminal narrowing, or central canal stenosis.    L5-S1: Mild bilateral facet hypertrophy. No disc protrusion, neuroforaminal narrowing, or central canal stenosis.    Impression  Negative MRI of the lumbar spine.        Electronically signed by: SHERIF BEJARANO MD  Date:     11/22/17  Time:    13:39        Review of Systems:  CONSTITUTIONAL: patient denies any fever, chills, or weight loss  SKIN: patient denies any rash or itching  RESPIRATORY: patient denies having any shortness of breath  GASTROINTESTINAL: patient denies having any diarrhea, constipation, or bowel incontinence  GENITOURINARY: patient denies having any abnormal bladder function    MUSCULOSKELETAL:  - patient complains of the above noted pain/s (see chief pain complaint)    NEUROLOGICAL:   - pain as above  - strength in Lower extremities is intact, BILATERALLY  - sensation in Lower extremities is intact, BILATERALLY  - patient denies any loss of bowel or bladder control      PSYCHIATRIC: patient denies any change in mood    Other:  All other systems reviewed and are negative      Physical Exam:  BP (!) 146/85 (BP Location: Right arm, Patient Position: Sitting)   Pulse 78   Ht 5' 4" (1.626 m)   Wt 106 kg (233 lb 11 oz)   BMI 40.11 kg/m²  (reviewed on 1/4/2018)  General: Alert and oriented, in no apparent distress.  Gait: normal gait.  Skin: No rashes, No discoloration, No obvious lesions  HEENT: Normocephalic, atraumatic. Pupils equal and round.  Cardiovascular: Regular rate and rhythm , no significant peripheral edema present  Respiratory: Without audible wheezing, without use of accessory muscles of respiration.    Musculoskeletal:    Lumbar Spine    - Pain on flexion of lumbar spine Absent  - Straight Leg Raise:  Absent    - Pain on extension of lumbar spine Absent  - TTP over the lumbar " facet joints Absent  - Lumbar facet loading Absent    -Pain on palpation over the SI joint  Present on left   - JULISSA: Present on left      Neuro:    Strength:  LE R/L: HF: 5/5, HE: 5/5, KF: 5/5; KE: 5/5; FE: 5/5; FF: 5/5    Extremity Reflexes: Brisk and symmetric throughout.      Extremity Sensory: Sensation to pinprick and temperature symmetric. Proprioception intact.      Psych:  Mood and affect is appropriate      Assessment:    Nathan Carlos is a 27 y.o. year old female who is presenting with   Encounter Diagnoses   Name Primary?    Lumbar spondylosis Yes    Sacroiliac joint pain        Plan:    1. Interventional: Left SI Joint Injection with Local.    2. Pharmacologic: Meloxicam 15 mg PO Q day. Tylenol PRN.     3. Rehabilitative: Continue PT.    4. Diagnostic: Lumbar Xray.     5. Follow up: Post Injection.    30  minutes were spent in this encounter with more than 50% of the time used for counseling and review of the plan.  Imaging / studies reviewed, detailed above.  I discussed in detail the risks, benefits, and alternatives to any and all potential treatment options.  All questions and concerns were fully addressed today in clinic. Medical decision making moderate.    Thank you for the opportunity to assist in the care of this patient.    Best wishes,    Signed:    Yo Kirkland MD          Disclaimer:  This note may have been prepared using voice recognition software, it may have not been extensively proofed, as such there could be errors within the text such as sound alike errors.

## 2018-01-17 ENCOUNTER — HOSPITAL ENCOUNTER (OUTPATIENT)
Dept: RADIOLOGY | Facility: HOSPITAL | Age: 28
Discharge: HOME | End: 2018-01-17
Attending: ANESTHESIOLOGY | Admitting: ANESTHESIOLOGY
Payer: COMMERCIAL

## 2018-01-17 ENCOUNTER — HOSPITAL ENCOUNTER (OUTPATIENT)
Facility: HOSPITAL | Age: 28
Discharge: HOME | End: 2018-01-17
Attending: ANESTHESIOLOGY | Admitting: ANESTHESIOLOGY
Payer: COMMERCIAL

## 2018-01-17 ENCOUNTER — SURGERY (OUTPATIENT)
Age: 28
End: 2018-01-17

## 2018-01-17 VITALS
HEART RATE: 79 BPM | OXYGEN SATURATION: 99 % | SYSTOLIC BLOOD PRESSURE: 120 MMHG | DIASTOLIC BLOOD PRESSURE: 81 MMHG | RESPIRATION RATE: 17 BRPM

## 2018-01-17 DIAGNOSIS — M53.3 SACROILIAC JOINT PAIN: ICD-10-CM

## 2018-01-17 PROCEDURE — 25000003 PHARM REV CODE 250: Performed by: ANESTHESIOLOGY

## 2018-01-17 PROCEDURE — 63600175 PHARM REV CODE 636 W HCPCS

## 2018-01-17 PROCEDURE — 25000003 PHARM REV CODE 250

## 2018-01-17 PROCEDURE — 27096 INJECT SACROILIAC JOINT: CPT | Mod: LT,,, | Performed by: ANESTHESIOLOGY

## 2018-01-17 PROCEDURE — 27096 INJECT SACROILIAC JOINT: CPT | Mod: TC

## 2018-01-17 PROCEDURE — 63600175 PHARM REV CODE 636 W HCPCS: Performed by: ANESTHESIOLOGY

## 2018-01-17 RX ORDER — METHYLPREDNISOLONE ACETATE 40 MG/ML
INJECTION, SUSPENSION INTRA-ARTICULAR; INTRALESIONAL; INTRAMUSCULAR; SOFT TISSUE
Status: DISCONTINUED | OUTPATIENT
Start: 2018-01-17 | End: 2018-01-17 | Stop reason: HOSPADM

## 2018-01-17 RX ORDER — LIDOCAINE HYDROCHLORIDE 20 MG/ML
INJECTION, SOLUTION EPIDURAL; INFILTRATION; INTRACAUDAL; PERINEURAL
Status: DISCONTINUED | OUTPATIENT
Start: 2018-01-17 | End: 2018-01-17 | Stop reason: HOSPADM

## 2018-01-17 RX ADMIN — LIDOCAINE HYDROCHLORIDE 2 ML: 20 INJECTION, SOLUTION EPIDURAL; INFILTRATION; INTRACAUDAL; PERINEURAL at 01:01

## 2018-01-17 RX ADMIN — METHYLPREDNISOLONE ACETATE 80 MG: 40 INJECTION, SUSPENSION INTRA-ARTICULAR; INTRALESIONAL; INTRAMUSCULAR; SOFT TISSUE at 01:01

## 2018-01-17 NOTE — OP NOTE
PROCEDURE: Sacroiliac joint injection under fluoroscopic guidance     SIDE: left      PROCEDURE DATE: 1/17/2018     PREOPERATIVE DIAGNOSIS: Sacroiliitis  POSTOPERATIVE DIAGNOSIS: Sacroiliitis     PROVIDER: Yo Kirkland MD  Assistant(s): None     ANESTHESIA: Local, No Sedation    >> 0 mg of VERSED    >> 0 mcg of FENTANYL      INDICATION: The patient has a history of pain due to sacroiliitis unresponsive to conservative treatments. Fluoroscopy was used to optimize visualization of needle placement and to maximize safety.      PROCEDURE DESCRIPTION: The patient was seen and identified in the preoperative area. Risks, benefits, complications, and alternatives were discussed with the patient. The patient agreed to proceed with the procedure and signed the consent. The site and side of the procedure was identified and marked. An IV was not placed for this procedure.     The patient was taken to the procedural suite. The patient was positioned in prone orientation on procedure table. A time out was performed prior to any intervention. The procedure, site, side, and allergies were stated and agreed to by all present. The lumbosacral area was widely prepped with ChloraPrep. The procedural site was draped in usual sterile fashion. Vital signs were closely monitored throughout this procedure. Conscious sedation was not used for this procedure.     The fluoroscopic camera was placed in contralateral oblique view and was adjusted until the anterior and posterior joint margins of the targeted sacroiliac joint aligned in linear array. The lower pole of the joint was identified, marked, and localized with 1% Lidocaine. A 22 gauge 5 inch spinal needle was introduced and advanced to the joint under fluoroscopic guidance. The joint space was entered and after negative aspiration, 2 mL of injectate was posited into the joint space. The needle was then withdrawn outside of the joint space and after negative aspiration 1 mL of solution  was injected outside of the joint space. The injectant solution used was comprised of 2 mL of 1% PF Lidocaine and 1 mL of Methylprednisolone (40 mg/mL). This techniques was performed for the above noted joint/s.     Description of Findings: Not applicable     Prosthetic devices, grafts, tissues, or devices implanted: None     Specimen Removed: No     Estimated Blood Loss: minimal     COMPLICATIONS: None     DISPOSITION / PLANS: The patient was transferred to the recovery area in a stable condition for observation. The patient was reexamined prior to discharge. There was no evidence of acute neurologic injury following the procedure.  Patient was discharged from the recovery room after meeting discharge criteria. Home discharge instructions were given to the patient by the staff.

## 2018-01-17 NOTE — PLAN OF CARE
Problem: Patient Care Overview  Goal: Plan of Care Review  Outcome: Outcome(s) achieved Date Met: 01/17/18  Patient d/c home in stable condition via wheelchair with ride. Verbalized understanding of d/c instructions. Patient voiced no complaints at this time. Patient stood at side of bed, walked steps with no new motor deficits. Neurologically intact.

## 2018-01-17 NOTE — DISCHARGE SUMMARY
[facility]  Discharge Note       Description of Procedure: Left Sacroiliac Joint Injection under Fluoroscopic Guidance    Procedure Date: 1/17/2018    Admit Date: 1/17/2018  Discharge Date: 1/17/2018     Attending Physician: Isael Ram   Discharge Provider: Isael Ram    Preoperative Diagnosis: Left Sacroiliac Joint Dysfunction      Postoperative Diagnosis: as above, same as preoperative diagnosis    Discharged Condition: Stable    Hospital Course: Patient was admitted for an outpatient procedure. The procedure was tolerated well with no complications.    Final Diagnoses: Same as principal problem.    Disposition: Home, self-care.    Follow up/Patient Instructions:  Follow-up in clinic in 2-3 weeks.    Medications: No medications were prescribed today. The patient was advised to resume normal medication regimen without change.  Specific information was provided regarding restarting any anticoagulant/s.    Discharge Procedure Orders (must include Diet, Follow-up, Activity):  Light activity for the remainder of the day, resume normal activity tomorrow. Resume normal diet. Follow-up in clinic in 2-3 weeks.

## 2018-01-25 ENCOUNTER — OFFICE VISIT (OUTPATIENT)
Dept: FAMILY MEDICINE | Facility: CLINIC | Age: 28
End: 2018-01-25
Payer: COMMERCIAL

## 2018-01-25 VITALS
RESPIRATION RATE: 18 BRPM | HEART RATE: 94 BPM | OXYGEN SATURATION: 98 % | WEIGHT: 232.38 LBS | TEMPERATURE: 97 F | HEIGHT: 64 IN | DIASTOLIC BLOOD PRESSURE: 86 MMHG | BODY MASS INDEX: 39.67 KG/M2 | SYSTOLIC BLOOD PRESSURE: 120 MMHG

## 2018-01-25 DIAGNOSIS — G44.201 ACUTE INTRACTABLE TENSION-TYPE HEADACHE: Primary | ICD-10-CM

## 2018-01-25 PROCEDURE — 99999 PR PBB SHADOW E&M-EST. PATIENT-LVL IV: CPT | Mod: PBBFAC,,, | Performed by: FAMILY MEDICINE

## 2018-01-25 PROCEDURE — 99214 OFFICE O/P EST MOD 30 MIN: CPT | Mod: S$GLB,,, | Performed by: FAMILY MEDICINE

## 2018-01-25 RX ORDER — BUTALBITAL, ACETAMINOPHEN AND CAFFEINE 50; 325; 40 MG/1; MG/1; MG/1
1 TABLET ORAL EVERY 6 HOURS PRN
Qty: 30 TABLET | Refills: 0 | Status: SHIPPED | OUTPATIENT
Start: 2018-01-25 | End: 2018-02-13

## 2018-01-25 NOTE — PATIENT INSTRUCTIONS
Tension Headache    A muscle tension headache is a very common cause of head pain. Its also called a stress headache. When some people are under stress, they tense the muscles of their shoulder, neck, and scalp without knowing it. If this tension lasts long enough, a headache can occur. A tension headache can be quite painful. It can last for hours or even days.  Home care  Follow these tips when caring for yourself at home:  · Dont drive yourself home if you were given pain medicine for your headache. Instead, have someone else drive you home. Try to sleep when you get home. You should feel much better when you wake up.  · Put heat on the back of your neck to help ease neck spasm.  · Drink only clear liquids or eat a light diet until your symptoms get better. This will help you avoid nausea or vomiting.  How to prevent headaches  · Figure out what is causing stress in your life. Learn new ways to handle your stress. Ideas include regular exercise, biofeedback, self-hypnosis, yoga, and meditation. Talk with your healthcare provider to find out more information about managing stress. Many books and digital media are also available on this subject.  · Take time out at the first sign of a tension headache, if possible. Take yourself out of the stressful situation. Find a quiet, comfortable place to sit or lie down and let yourself relax. Heat and deep massage of the tight areas in the neck and shoulders may help ease muscle spasm. You may also get relief from a medicine like ibuprofen or a prescribed muscle relaxant.  Follow-up care  Follow up with your healthcare provider, or as advised. Talk with your provider if you have frequent headaches. He or she can figure out a treatment plan. Ask if you can have medicine to take at home the next time you get a bad headache. This may keep you from having to visit the emergency department in the future. You may need to see a headache specialist (neurologist) if you continue  to have headaches.  When to seek medical advice  Call your healthcare provider right away if any of these occur:  · Your head pain gets worse during sexual intercourse or strenuous activity  · Your head pain doesnt get better within 24 hours  · You arent able to keep liquids down (repeated vomiting)  · Fever of 100.4ºF (38ºC) or higher, or as directed by your healthcare provider  · Stiff neck  · Extreme drowsiness, confusion, or fainting  · Dizziness or dizziness with spinning sensation (vertigo)  · Weakness in an arm or leg or one side of your face  · You have difficulty speaking  · Your vision changes  Date Last Reviewed: 8/1/2016  © 6149-9457 [employer]. [address] [place] [address], [place] [address]. All rights reserved. This information is not intended as a substitute for professional medical care. Always follow your healthcare professional's instructions.

## 2018-01-25 NOTE — PROGRESS NOTES
Subjective:       Patient ID: Nathan Carlos is a 27 y.o. female.    Chief Complaint: Headache and Fatigue      HPI  Ms. Carlos presents to clinic today for complaints of headache.   The headache is there in the morning and in the evening.   At times the headache is there all day.   The headache is located over the temples.   She has been taking tylenol and gabapentin for the headache.   She states the headache gets 8/10 in severity.   The headache are not associated with vomiting.   The headache are associated with phonophobia and photophobia.     Review of Systems   Constitutional: Positive for fatigue.   HENT: Negative for congestion and sore throat.    Eyes: Negative for photophobia and visual disturbance.   Respiratory: Positive for cough.    Gastrointestinal: Positive for vomiting. Negative for abdominal pain.   Neurological: Positive for light-headedness and headaches.       Medication List with Changes/Refills   Current Medications    AZELASTINE (ASTELIN) 137 MCG (0.1 %) NASAL SPRAY    1 spray (137 mcg total) by Nasal route 2 (two) times daily.    GABAPENTIN (NEURONTIN) 300 MG CAPSULE    Take 1 capsule (300 mg total) by mouth 3 (three) times daily.    LISINOPRIL 10 MG TABLET    Take 1 tablet (10 mg total) by mouth once daily.    MELOXICAM (MOBIC) 15 MG TABLET    Take 1 tablet (15 mg total) by mouth once daily.    MUPIROCIN (BACTROBAN) 2 % OINTMENT        OXAPROZIN (DAYPRO) 600 MG TABLET    Take 1 tablet (600 mg total) by mouth once daily.    SPIRONOLACTONE (ALDACTONE) 25 MG TABLET    Take 1 tablet (25 mg total) by mouth once daily.    VALACYCLOVIR (VALTREX) 500 MG TABLET    Take 1 tablet (500 mg total) by mouth once daily.       Patient Active Problem List   Diagnosis    Essential hypertension    Gastroesophageal reflux disease    Left-sided low back pain with left-sided sciatica    Obesity, Class II, BMI 35-39.9, with comorbidity    Hypercholesteremia    High serum testosterone    Insulin  resistance syndrome    HSV-2 (herpes simplex virus 2) infection         Objective:     Physical Exam   Constitutional: She is oriented to person, place, and time. She appears well-developed and well-nourished. No distress.   HENT:   Head: Normocephalic and atraumatic.   Right Ear: External ear normal.   Left Ear: External ear normal.   Mouth/Throat: Oropharynx is clear and moist. No oropharyngeal exudate.   Eyes: EOM are normal. Right eye exhibits no discharge. Left eye exhibits no discharge.   Cardiovascular: Normal rate and regular rhythm.    Pulmonary/Chest: Effort normal and breath sounds normal. No respiratory distress. She has no wheezes.   Musculoskeletal: She exhibits no edema.   Neurological: She is alert and oriented to person, place, and time. No cranial nerve deficit.   Skin: Skin is warm and dry. She is not diaphoretic. No erythema.   Psychiatric: She has a normal mood and affect.   Vitals reviewed.    Vitals:    01/25/18 1340   BP: 120/86   Pulse: 94   Resp: 18   Temp: 97.3 °F (36.3 °C)       Assessment/  PLAN     Acute intractable tension-type headache  -     butalbital-acetaminophen-caffeine -40 mg (FIORICET, ESGIC) -40 mg per tablet; Take 1 tablet by mouth every 6 (six) hours as needed for Pain.  Dispense: 30 tablet; Refill: 0    discussed ways of dealing with headache. Reduce stress, importance of hydration, healthy diet and sleep.       Yue Anguiano MD  [facility]

## 2018-02-01 ENCOUNTER — PATIENT MESSAGE (OUTPATIENT)
Dept: FAMILY MEDICINE | Facility: CLINIC | Age: 28
End: 2018-02-01

## 2018-02-05 ENCOUNTER — HOSPITAL ENCOUNTER (EMERGENCY)
Facility: HOSPITAL | Age: 28
Discharge: HOME | End: 2018-02-05
Attending: EMERGENCY MEDICINE
Payer: COMMERCIAL

## 2018-02-05 ENCOUNTER — PATIENT MESSAGE (OUTPATIENT)
Dept: FAMILY MEDICINE | Facility: CLINIC | Age: 28
End: 2018-02-05

## 2018-02-05 VITALS
WEIGHT: 231 LBS | OXYGEN SATURATION: 99 % | SYSTOLIC BLOOD PRESSURE: 129 MMHG | TEMPERATURE: 99 F | HEIGHT: 64 IN | DIASTOLIC BLOOD PRESSURE: 87 MMHG | BODY MASS INDEX: 39.44 KG/M2 | HEART RATE: 84 BPM | RESPIRATION RATE: 18 BRPM

## 2018-02-05 DIAGNOSIS — K21.9 GASTROESOPHAGEAL REFLUX DISEASE, ESOPHAGITIS PRESENCE NOT SPECIFIED: ICD-10-CM

## 2018-02-05 DIAGNOSIS — R51.9 NONINTRACTABLE HEADACHE, UNSPECIFIED CHRONICITY PATTERN, UNSPECIFIED HEADACHE TYPE: Primary | ICD-10-CM

## 2018-02-05 LAB
B-HCG UR QL: NEGATIVE
BILIRUB UR QL STRIP: NEGATIVE
CLARITY UR: CLEAR
COLOR UR: YELLOW
GLUCOSE UR QL STRIP: NEGATIVE
HGB UR QL STRIP: NEGATIVE
KETONES UR QL STRIP: NEGATIVE
LEUKOCYTE ESTERASE UR QL STRIP: NEGATIVE
NITRITE UR QL STRIP: NEGATIVE
PH UR STRIP: 7 [PH] (ref 5–8)
PROT UR QL STRIP: NEGATIVE
SP GR UR STRIP: 1.02 (ref 1–1.03)
URN SPEC COLLECT METH UR: NORMAL
UROBILINOGEN UR STRIP-ACNC: NEGATIVE EU/DL

## 2018-02-05 PROCEDURE — 81025 URINE PREGNANCY TEST: CPT

## 2018-02-05 PROCEDURE — 81003 URINALYSIS AUTO W/O SCOPE: CPT

## 2018-02-05 PROCEDURE — 99284 EMERGENCY DEPT VISIT MOD MDM: CPT

## 2018-02-05 RX ORDER — KETOROLAC TROMETHAMINE 10 MG/1
10 TABLET, FILM COATED ORAL EVERY 6 HOURS
Qty: 15 TABLET | Refills: 0 | Status: SHIPPED | OUTPATIENT
Start: 2018-02-05 | End: 2018-02-13 | Stop reason: SDUPTHER

## 2018-02-05 RX ORDER — ONDANSETRON 4 MG/1
4 TABLET, ORALLY DISINTEGRATING ORAL EVERY 8 HOURS PRN
Qty: 15 TABLET | Refills: 0 | Status: SHIPPED | OUTPATIENT
Start: 2018-02-05 | End: 2018-02-13

## 2018-02-05 RX ORDER — PANTOPRAZOLE SODIUM 20 MG/1
40 TABLET, DELAYED RELEASE ORAL DAILY
Qty: 30 TABLET | Refills: 0 | Status: SHIPPED | OUTPATIENT
Start: 2018-02-05 | End: 2018-02-13

## 2018-02-05 RX ORDER — HYDROXYZINE PAMOATE 25 MG/1
25 CAPSULE ORAL EVERY 6 HOURS PRN
Qty: 20 CAPSULE | Refills: 0 | Status: SHIPPED | OUTPATIENT
Start: 2018-02-05 | End: 2018-02-13 | Stop reason: SDUPTHER

## 2018-02-05 NOTE — ED PROVIDER NOTES
SCRIBE #1 NOTE: I, Corinne Mack, am scribing for, and in the presence of, Issa Anguiano Jr., MD. I have scribed the entire note.      History      Chief Complaint   Patient presents with    Headache     c/o headache for the last 3 weeks    Gastroesophageal Reflux     c/o acid reflux for the last week        Review of patient's allergies indicates:   Allergen Reactions    Amlodipine      DRY MOUTH        HPI   HPI    2/5/2018, 5:07 PM   History obtained from the patient      History of Present Illness: Nathan Carlos is a 27 y.o. female patient who presents to the Emergency Department for frontal HA which onset gradually a few weeks ago. Pt also c/o acid reflux for the last week. Symptoms are constant and moderate in severity. No mitigating or exacerbating factors reported. Associated sxs include cough, nausea, and emesis. Patient denies any fever, chills, CP, SOB, diarrhea, back pain, neck pain, dizziness, and all other sxs at this time. No prior Tx reported. Pt has Hx of GERD and HTN. No further complaints or concerns at this time. Pt's LNMP was in September 2017.        Arrival mode: Personal vehicle      PCP: Yue Anguiano MD       Past Medical History:  Past Medical History:   Diagnosis Date    GERD (gastroesophageal reflux disease)     Hypertension        Past Surgical History:  Past Surgical History:   Procedure Laterality Date    ANKLE SURGERY Left     BUNIONECTOMY Bilateral          Family History:  Family History   Problem Relation Age of Onset    Hypertension Mother     Fibroids Mother     No Known Problems Father     Diabetes Sister     Hypertension Brother     Hypertension Maternal Aunt     Diabetes Maternal Aunt     Diabetes Maternal Uncle     Hypertension Maternal Grandmother     Leukemia Maternal Grandfather     Hypertension Maternal Grandfather     Stroke Neg Hx        Social History:  Social History     Social History Main Topics    Smoking status: Never Smoker     Smokeless tobacco: Never Used    Alcohol use No    Drug use: No    Sexual activity: Yes     Partners: Male     Birth control/ protection: Condom       ROS   Review of Systems   Constitutional: Negative for chills and fever.   Respiratory: Positive for cough. Negative for shortness of breath.    Cardiovascular: Negative for chest pain and leg swelling.   Gastrointestinal: Positive for nausea and vomiting. Negative for abdominal pain and diarrhea.        (+) acid reflux   Musculoskeletal: Negative for back pain, neck pain and neck stiffness.   Skin: Negative for rash and wound.   Neurological: Positive for headaches. Negative for dizziness, light-headedness and numbness.   All other systems reviewed and are negative.    Physical Exam      Initial Vitals [02/05/18 1559]   BP Pulse Resp Temp SpO2   (!) 146/96 80 18 98.6 °F (37 °C) 97 %      MAP       112.67          Physical Exam  Nursing Notes and Vital Signs Reviewed.  Constitutional: Patient is in no apparent distress. Well-developed and well-nourished. Obese.  Head: Atraumatic. Normocephalic.  Eyes: PERRL. EOM intact. Conjunctivae are not pale. No scleral icterus.  ENT: Mucous membranes are moist. Oropharynx is clear and symmetric.    Neck: Supple. Full ROM. No lymphadenopathy.  Cardiovascular: Regular rate. Regular rhythm. No murmurs, rubs, or gallops. Distal pulses are 2+ and symmetric.  Pulmonary/Chest: No respiratory distress. Clear to auscultation bilaterally. No wheezing or rales.  Abdominal: Soft and non-distended.  There is no tenderness.  No rebound, guarding, or rigidity.  Musculoskeletal: Moves all extremities. No obvious deformities. No edema. No calf tenderness.  Skin: Warm and dry.  Neurological: Patient is alert and oriented to person, place and time. Pupils ERRL and EOM normal. Cranial nerves II-XII are intact. Strength is full bilaterally; it is equal and 5/5 in bilateral upper and lower extremities. Speech is clear and normal. No acute focal  "neurological deficits noted.  Psychiatric: Normal affect. Good eye contact. Appropriate in content.    ED Course    Procedures  ED Vital Signs:  Vitals:    02/05/18 1559   BP: (!) 146/96   Pulse: 80   Resp: 18   Temp: 98.6 °F (37 °C)   SpO2: 97%   Weight: 104.8 kg (231 lb)   Height: 5' 4" (1.626 m)       Abnormal Lab Results:  Labs Reviewed   URINALYSIS   PREGNANCY TEST, URINE RAPID        All Lab Results:  Results for orders placed or performed during the hospital encounter of 02/05/18   Urinalysis   Result Value Ref Range    Specimen UA Urine, Clean Catch     Color, UA Yellow Yellow, Straw, Haley    Appearance, UA Clear Clear    pH, UA 7.0 5.0 - 8.0    Specific Gravity, UA 1.020 1.005 - 1.030    Protein, UA Negative Negative    Glucose, UA Negative Negative    Ketones, UA Negative Negative    Bilirubin (UA) Negative Negative    Occult Blood UA Negative Negative    Nitrite, UA Negative Negative    Urobilinogen, UA Negative <2.0 EU/dL    Leukocytes, UA Negative Negative   Pregnancy, urine rapid (UPT)   Result Value Ref Range    Preg Test, Ur Negative        Imaging Results:  Imaging Results    None                 The Emergency Provider reviewed the vital signs and test results, which are outlined above.    ED Discussion     5:33 PM: Reassessed pt at this time. Pt is awake, alert, and in no distress. Discussed with pt all pertinent ED information and results. Discussed pt dx and plan of tx. Gave pt all f/u and return to the ED instructions. All questions and concerns were addressed at this time. Pt expresses understanding of information and instructions, and is comfortable with plan to discharge. Pt is stable for discharge.    I discussed with patient and/or family/caretaker that evaluation in the ED does not suggest any emergent or life threatening medical conditions requiring immediate intervention beyond what was provided in the ED, and I believe patient is safe for discharge.  Regardless, an unremarkable " evaluation in the ED does not preclude the development or presence of a serious of life threatening condition. As such, patient was instructed to return immediately for any worsening or change in current symptoms.    Patient's headache is either consistent with previous headache and/or lacks features concerning for emergent or life threatening condition.  I do not suspect SAH, meningitis, increased IC pressure, infectious, toxic, vascular, CNS, or other EMC.  I have discussed this at length with patient and/or family/caretaker.      ED Medication(s):  Medications - No data to display    New Prescriptions    No medications on file             Medical Decision Making    Medical Decision Making:   Clinical Tests:   Lab Tests: Ordered and Reviewed           Scribe Attestation:   Scribe #1: I performed the above scribed service and the documentation accurately describes the services I performed. I attest to the accuracy of the note.    Attending:   Physician Attestation Statement for Scribe #1: I, Issa Anguiano Jr., MD, personally performed the services described in this documentation, as scribed by Corinne Mack, in my presence, and it is both accurate and complete.          Clinical Impression       ICD-10-CM ICD-9-CM   1. Nonintractable headache, unspecified chronicity pattern, unspecified headache type R51 784.0   2. Gastroesophageal reflux disease, esophagitis presence not specified K21.9 530.81         Disposition:   Disposition: Discharged  Condition: Stable         Issa Anguiano Jr., MD  02/05/18 1954

## 2018-02-06 ENCOUNTER — PATIENT MESSAGE (OUTPATIENT)
Dept: FAMILY MEDICINE | Facility: CLINIC | Age: 28
End: 2018-02-06

## 2018-02-12 ENCOUNTER — OFFICE VISIT (OUTPATIENT)
Dept: PAIN MEDICINE | Facility: CLINIC | Age: 28
End: 2018-02-12
Payer: COMMERCIAL

## 2018-02-12 VITALS
SYSTOLIC BLOOD PRESSURE: 128 MMHG | RESPIRATION RATE: 18 BRPM | BODY MASS INDEX: 39.44 KG/M2 | HEART RATE: 69 BPM | HEIGHT: 64 IN | WEIGHT: 231 LBS | DIASTOLIC BLOOD PRESSURE: 82 MMHG

## 2018-02-12 DIAGNOSIS — M47.816 LUMBAR SPONDYLOSIS: ICD-10-CM

## 2018-02-12 DIAGNOSIS — M53.3 SACROILIAC JOINT PAIN: Primary | ICD-10-CM

## 2018-02-12 PROCEDURE — 99999 PR PBB SHADOW E&M-EST. PATIENT-LVL III: CPT | Mod: PBBFAC,,, | Performed by: ANESTHESIOLOGY

## 2018-02-12 PROCEDURE — 99213 OFFICE O/P EST LOW 20 MIN: CPT | Mod: S$GLB,,, | Performed by: ANESTHESIOLOGY

## 2018-02-12 PROCEDURE — 3008F BODY MASS INDEX DOCD: CPT | Mod: S$GLB,,, | Performed by: ANESTHESIOLOGY

## 2018-02-12 NOTE — PROGRESS NOTES
Chief Pain Complaint:  Left Buttock Pain     Interval History: The patient was last seen 1/17/2018. At that time she underwent Left Sacroiliac Joint Injection under Fluoroscopic Guidance. The patient reports that  she is/was better following the procedure. She had 90% pain relief with functional improvement. The changes lasted 3 weeks. The changes have continued through this visit.    History of Present Illness:   Nathan Carlos is a 27 y.o. female  who is presenting with a chief complaint of Left Buttock Pain. The patient began experiencing this problem insidiously, and the pain has been gradually worsening over the past 3 month(s). The pain is described as throbbing, cramping, aching and heavy and is located in the left buttock. Pain is intermittent and lasts hours. The pain radiates to pain is nonradiating. The patient rates her pain a 8 out of ten and interferes with activities of daily living a 8 out of ten. Pain is exacerbated by getting up from a seated position, and is improved by rest. Patient reports no prior trauma, no prior spinal surgery     - pertinent negatives: No fever, No chills, No weight loss, No bladder dysfunction, No bowel dysfunction, No extremity weakness, No saddle anesthesia  - pertinent positives: none    - medications, other therapies tried (physical therapy, injections):     >> NSAIDs, Tylenol and gabapentin    >> Has previously undergone Physical Therapy with minimal improvement     >> Has NOT previously undergone spinal injection/s      Imaging / Labs / Studies (reviewed on 2/12/2018):    Results for orders placed during the hospital encounter of 11/22/17   MRI Lumbar Spine Without Contrast    Narrative Technique: Standard noncontrast multiplanar multisequence imaging of the lumbar spine was performed.    Findings: There is anatomic spinal alignment.  Disc interspaces are of normal height and signal intensity.  Vertebral marrow signal pattern and architecture are normal.   "Distal cord is unremarkable with conus medullaris terminating at L1-L2.  Paravertebral soft tissues are symmetric and normal in appearance.    T12-L1: No disc protrusion, neuroforaminal narrowing, or central canal stenosis.    L1-L2: No disc protrusion, neuroforaminal narrowing, or central canal stenosis.    L2-L3: No disc protrusion, neuroforaminal narrowing, or central canal stenosis.    L3-L4: No disc protrusion, neuroforaminal narrowing, or central canal stenosis.    L4-L5: No disc protrusion, neuroforaminal narrowing, or central canal stenosis.    L5-S1: Mild bilateral facet hypertrophy. No disc protrusion, neuroforaminal narrowing, or central canal stenosis.    Impression  Negative MRI of the lumbar spine.        Electronically signed by: SHERIF BEJARANO MD  Date:     11/22/17  Time:    13:39        Review of Systems:  CONSTITUTIONAL: patient denies any fever, chills, or weight loss  SKIN: patient denies any rash or itching  RESPIRATORY: patient denies having any shortness of breath  GASTROINTESTINAL: patient denies having any diarrhea, constipation, or bowel incontinence  GENITOURINARY: patient denies having any abnormal bladder function    MUSCULOSKELETAL:  - patient complains of the above noted pain/s (see chief pain complaint)    NEUROLOGICAL:   - pain as above  - strength in Lower extremities is intact, BILATERALLY  - sensation in Lower extremities is intact, BILATERALLY  - patient denies any loss of bowel or bladder control      PSYCHIATRIC: patient denies any change in mood    Other:  All other systems reviewed and are negative      Physical Exam:  /82 (BP Location: Right arm, Patient Position: Sitting, BP Method: Medium (Automatic))   Pulse 69   Resp 18   Ht 5' 4" (1.626 m)   Wt 104.8 kg (231 lb)   BMI 39.65 kg/m²  (reviewed on 2/12/2018)  General: Alert and oriented, in no apparent distress.  Gait: normal gait.  Skin: No rashes, No discoloration, No obvious lesions  HEENT: Normocephalic, " atraumatic. Pupils equal and round.  Cardiovascular: Regular rate and rhythm , no significant peripheral edema present  Respiratory: Without audible wheezing, without use of accessory muscles of respiration.    Musculoskeletal:    Lumbar Spine    - Pain on flexion of lumbar spine Absent  - Straight Leg Raise:  Absent    - Pain on extension of lumbar spine Absent  - TTP over the lumbar facet joints Absent  - Lumbar facet loading Absent    -Pain on palpation over the SI joint  Present on left   - JULISSA: Present on left      Neuro:    Strength:  LE R/L: HF: 5/5, HE: 5/5, KF: 5/5; KE: 5/5; FE: 5/5; FF: 5/5    Extremity Reflexes: Brisk and symmetric throughout.      Extremity Sensory: Sensation to pinprick and temperature symmetric. Proprioception intact.      Psych:  Mood and affect is appropriate      Assessment:    Nathan Carlos is a 27 y.o. year old female who is presenting with   Encounter Diagnoses   Name Primary?    Sacroiliac joint pain Yes    Lumbar spondylosis        Plan:    1. Interventional: Consider Left SI Joint Injection with local if pain returns.    2. Pharmacologic: Meloxicam and Tylenol PRN.     3. Rehabilitative: Encouraged Pt. SI joint exercises given to patient.     4. Diagnostic: None for now.    5. Follow up: PRN.    30  minutes were spent in this encounter with more than 50% of the time used for counseling and review of the plan.  Imaging / studies reviewed, detailed above.  I discussed in detail the risks, benefits, and alternatives to any and all potential treatment options.  All questions and concerns were fully addressed today in clinic. Medical decision making moderate.    Thank you for the opportunity to assist in the care of this patient.    Best wishes,    Signed:    Yo Kirkland MD          Disclaimer:  This note may have been prepared using voice recognition software, it may have not been extensively proofed, as such there could be errors within the text such as sound alike  errors.

## 2018-02-13 ENCOUNTER — OFFICE VISIT (OUTPATIENT)
Dept: FAMILY MEDICINE | Facility: CLINIC | Age: 28
End: 2018-02-13
Payer: COMMERCIAL

## 2018-02-13 VITALS
BODY MASS INDEX: 40.39 KG/M2 | WEIGHT: 236.56 LBS | HEART RATE: 85 BPM | HEIGHT: 64 IN | TEMPERATURE: 98 F | RESPIRATION RATE: 18 BRPM | DIASTOLIC BLOOD PRESSURE: 80 MMHG | SYSTOLIC BLOOD PRESSURE: 122 MMHG | OXYGEN SATURATION: 98 %

## 2018-02-13 DIAGNOSIS — Z76.0 MEDICATION REFILL: ICD-10-CM

## 2018-02-13 DIAGNOSIS — G47.09 OTHER INSOMNIA: Primary | ICD-10-CM

## 2018-02-13 DIAGNOSIS — I10 ESSENTIAL HYPERTENSION: ICD-10-CM

## 2018-02-13 PROCEDURE — 3008F BODY MASS INDEX DOCD: CPT | Mod: S$GLB,,, | Performed by: FAMILY MEDICINE

## 2018-02-13 PROCEDURE — 99999 PR PBB SHADOW E&M-EST. PATIENT-LVL IV: CPT | Mod: PBBFAC,,, | Performed by: FAMILY MEDICINE

## 2018-02-13 PROCEDURE — 99214 OFFICE O/P EST MOD 30 MIN: CPT | Mod: S$GLB,,, | Performed by: FAMILY MEDICINE

## 2018-02-13 RX ORDER — HYDROXYZINE PAMOATE 25 MG/1
25 CAPSULE ORAL DAILY PRN
Qty: 20 CAPSULE | Refills: 0 | Status: SHIPPED | OUTPATIENT
Start: 2018-02-13 | End: 2018-05-01

## 2018-02-13 RX ORDER — TRAZODONE HYDROCHLORIDE 50 MG/1
50 TABLET ORAL NIGHTLY
Qty: 30 TABLET | Refills: 3 | Status: SHIPPED | OUTPATIENT
Start: 2018-02-13 | End: 2018-06-01

## 2018-02-13 RX ORDER — KETOROLAC TROMETHAMINE 10 MG/1
10 TABLET, FILM COATED ORAL DAILY PRN
Qty: 15 TABLET | Refills: 0 | Status: SHIPPED | OUTPATIENT
Start: 2018-02-13 | End: 2018-05-01

## 2018-02-13 NOTE — PROGRESS NOTES
Subjective:       Patient ID: Nathan Carlos is a 27 y.o. female.    Chief Complaint: Blood Pressure Check      HPI   Ms. Carlos presents to clinic today for blood pressure check.   She states her headache are a bit better.   She states she has not been sleeping well.   She has tried tylenol pm and melatonin for sleep and it does not help.   She states her grandmother has a terminal illness and she has been stressing over that.   She feels a bit depressed and has been crying more.     She still has vomiting on occassion and wants to do EGD.   She is follow up with her GI doctor about this.     She states she has been monitoring her blood pressure and it is better at home.   Her range is in the 120/80s.   She stopped taking lisinopril and her headache are better.     Review of Systems   Constitutional: Positive for activity change. Negative for unexpected weight change.   HENT: Negative for hearing loss, rhinorrhea and trouble swallowing.    Eyes: Negative for discharge and visual disturbance.   Respiratory: Negative for chest tightness and wheezing.    Cardiovascular: Negative for chest pain and palpitations.   Gastrointestinal: Positive for vomiting. Negative for blood in stool, constipation and diarrhea.   Endocrine: Negative for polydipsia and polyuria.   Genitourinary: Negative for difficulty urinating, dysuria, hematuria and menstrual problem.   Musculoskeletal: Negative for arthralgias, joint swelling and neck pain.   Neurological: Positive for weakness and headaches.   Psychiatric/Behavioral: Positive for dysphoric mood. Negative for confusion.       Medication List with Changes/Refills   New Medications    TRAZODONE (DESYREL) 50 MG TABLET    Take 1 tablet (50 mg total) by mouth every evening.   Current Medications    OXAPROZIN (DAYPRO) 600 MG TABLET    Take 1 tablet (600 mg total) by mouth once daily.    SPIRONOLACTONE (ALDACTONE) 25 MG TABLET    Take 1 tablet (25 mg total) by mouth once daily.     VALACYCLOVIR (VALTREX) 500 MG TABLET    Take 1 tablet (500 mg total) by mouth once daily.   Changed and/or Refilled Medications    Modified Medication Previous Medication    HYDROXYZINE PAMOATE (VISTARIL) 25 MG CAP hydrOXYzine pamoate (VISTARIL) 25 MG Cap       Take 1 capsule (25 mg total) by mouth daily as needed (prn nausea and headache).    Take 1 capsule (25 mg total) by mouth every 6 (six) hours as needed (prn nausea and headache).    KETOROLAC (TORADOL) 10 MG TABLET ketorolac (TORADOL) 10 mg tablet       Take 1 tablet (10 mg total) by mouth daily as needed for Pain.    Take 1 tablet (10 mg total) by mouth every 6 (six) hours.   Discontinued Medications    AZELASTINE (ASTELIN) 137 MCG (0.1 %) NASAL SPRAY    1 spray (137 mcg total) by Nasal route 2 (two) times daily.    BUTALBITAL-ACETAMINOPHEN-CAFFEINE -40 MG (FIORICET, ESGIC) -40 MG PER TABLET    Take 1 tablet by mouth every 6 (six) hours as needed for Pain.    GABAPENTIN (NEURONTIN) 300 MG CAPSULE    Take 1 capsule (300 mg total) by mouth 3 (three) times daily.    LISINOPRIL 10 MG TABLET    Take 1 tablet (10 mg total) by mouth once daily.    MUPIROCIN (BACTROBAN) 2 % OINTMENT        ONDANSETRON (ZOFRAN-ODT) 4 MG TBDL    Take 1 tablet (4 mg total) by mouth every 8 (eight) hours as needed (prn nausea).    PANTOPRAZOLE (PROTONIX) 20 MG TABLET    Take 2 tablets (40 mg total) by mouth once daily.       Patient Active Problem List   Diagnosis    Essential hypertension    Gastroesophageal reflux disease    Left-sided low back pain with left-sided sciatica    Obesity, Class II, BMI 35-39.9, with comorbidity    Hypercholesteremia    High serum testosterone    Insulin resistance syndrome    HSV-2 (herpes simplex virus 2) infection         Objective:     Physical Exam   Constitutional: She is oriented to person, place, and time. She appears well-developed and well-nourished. No distress.   HENT:   Head: Normocephalic and atraumatic.   Right Ear:  External ear normal.   Left Ear: External ear normal.   Mouth/Throat: No oropharyngeal exudate.   Eyes: EOM are normal. Right eye exhibits no discharge. Left eye exhibits no discharge.   Cardiovascular: Normal rate and regular rhythm.    Pulmonary/Chest: Effort normal and breath sounds normal. No respiratory distress. She has no wheezes.   Musculoskeletal: She exhibits no edema.   Neurological: She is alert and oriented to person, place, and time.   Skin: Skin is warm and dry. She is not diaphoretic. No erythema.   Psychiatric: She has a normal mood and affect.   Vitals reviewed.    Vitals:    02/13/18 1406   BP: 122/80   Pulse: 85   Resp: 18   Temp: 97.5 °F (36.4 °C)       Assessment/  PLAN     Other insomnia  -     traZODone (DESYREL) 50 MG tablet; Take 1 tablet (50 mg total) by mouth every evening.  Dispense: 30 tablet; Refill: 3    Medication refill  -     hydrOXYzine pamoate (VISTARIL) 25 MG Cap; Take 1 capsule (25 mg total) by mouth daily as needed (prn nausea and headache).  Dispense: 20 capsule; Refill: 0  -     ketorolac (TORADOL) 10 mg tablet; Take 1 tablet (10 mg total) by mouth daily as needed for Pain.  Dispense: 15 tablet; Refill: 0    Essential hypertension  - bp is fine without lisinopril, headache are better   - continue aldactone     Plan as above   rtc prn         Yue Anguiano MD  [facility]

## 2018-03-05 ENCOUNTER — PATIENT MESSAGE (OUTPATIENT)
Dept: FAMILY MEDICINE | Facility: CLINIC | Age: 28
End: 2018-03-05

## 2018-03-06 ENCOUNTER — OFFICE VISIT (OUTPATIENT)
Dept: FAMILY MEDICINE | Facility: CLINIC | Age: 28
End: 2018-03-06
Payer: COMMERCIAL

## 2018-03-06 VITALS
SYSTOLIC BLOOD PRESSURE: 112 MMHG | RESPIRATION RATE: 17 BRPM | HEART RATE: 94 BPM | WEIGHT: 238.56 LBS | HEIGHT: 64 IN | TEMPERATURE: 98 F | DIASTOLIC BLOOD PRESSURE: 80 MMHG | BODY MASS INDEX: 40.73 KG/M2 | OXYGEN SATURATION: 98 %

## 2018-03-06 DIAGNOSIS — J32.9 SINUSITIS, UNSPECIFIED CHRONICITY, UNSPECIFIED LOCATION: Primary | ICD-10-CM

## 2018-03-06 PROCEDURE — 3074F SYST BP LT 130 MM HG: CPT | Mod: S$GLB,,, | Performed by: REGISTERED NURSE

## 2018-03-06 PROCEDURE — 99213 OFFICE O/P EST LOW 20 MIN: CPT | Mod: S$GLB,,, | Performed by: REGISTERED NURSE

## 2018-03-06 PROCEDURE — 3079F DIAST BP 80-89 MM HG: CPT | Mod: S$GLB,,, | Performed by: REGISTERED NURSE

## 2018-03-06 PROCEDURE — 99999 PR PBB SHADOW E&M-EST. PATIENT-LVL IV: CPT | Mod: PBBFAC,,, | Performed by: REGISTERED NURSE

## 2018-03-06 RX ORDER — METHYLPREDNISOLONE 4 MG/1
TABLET ORAL
Qty: 1 PACKAGE | Refills: 0 | Status: SHIPPED | OUTPATIENT
Start: 2018-03-06 | End: 2018-05-01

## 2018-03-06 RX ORDER — AZITHROMYCIN 250 MG/1
TABLET, FILM COATED ORAL
Qty: 6 TABLET | Refills: 0 | Status: SHIPPED | OUTPATIENT
Start: 2018-03-06 | End: 2018-05-01

## 2018-03-06 RX ORDER — FLUNISOLIDE 0.25 MG/ML
2 SOLUTION NASAL 2 TIMES DAILY
Qty: 25 ML | Refills: 2 | Status: SHIPPED | OUTPATIENT
Start: 2018-03-06 | End: 2019-06-12

## 2018-03-06 NOTE — LETTER
March 6, 2018      [facility]  [address] [place] [address]  Phone: [phone]       Patient: Nathan Carlos   YOB: 1990  Date of Visit: 03/06/2018    To Whom It May Concern:    Saida Carlos  was at [facility] on 03/06/2018. She may return to work on 03/07/2018 with no restrictions. If you have any questions or concerns, or if I can be of further assistance, please do not hesitate to contact me.    Sincerely,    Qasim Paez NP

## 2018-03-06 NOTE — PROGRESS NOTES
"Subjective:       Patient ID: Nathan Carlos is a 27 y.o. female.    Chief Complaint: Cough and Sinus Problem      HPI    Nathan is here today with c/o illness x 3 weeks.  She has used OTC meds and Astelin spray but nothing has helped.  Reports worsening of symptoms.  Does have c/o HA, sinus pressure, ear pain/pressure, and productive (yellow) cough.  Does c/o RN, NC, PND and sore throat.  Did lose her voice but now coming back.      Review of Systems   Constitutional: Negative for chills and fever.   HENT: Positive for congestion, ear pain, postnasal drip, rhinorrhea, sinus pressure, sore throat and voice change. Negative for hearing loss, tinnitus and trouble swallowing.    Eyes: Negative.    Respiratory: Positive for cough. Negative for shortness of breath and wheezing.    Cardiovascular: Negative.    Neurological: Positive for headaches. Negative for weakness, light-headedness and numbness.   Hematological: Negative for adenopathy.         Patient Active Problem List   Diagnosis    Essential hypertension    Gastroesophageal reflux disease    Left-sided low back pain with left-sided sciatica    Obesity, Class II, BMI 35-39.9, with comorbidity    Hypercholesteremia    High serum testosterone    Insulin resistance syndrome    HSV-2 (herpes simplex virus 2) infection       Past Medical History:   Diagnosis Date    GERD (gastroesophageal reflux disease)     Hypertension        Past medical, social and family histories have been reviewed today.      Objective:     Vitals:    03/06/18 1116   BP: 112/80   BP Location: Left arm   Patient Position: Sitting   BP Method: Large (Manual)   Pulse: 94   Resp: 17   Temp: 97.8 °F (36.6 °C)   TempSrc: Tympanic   SpO2: 98%   Weight: 108.2 kg (238 lb 8.6 oz)   Height: 5' 4" (1.626 m)       Physical Exam   Constitutional: She is oriented to person, place, and time. She appears well-developed and well-nourished.   HENT:   Head: Normocephalic and atraumatic.   Right " Ear: Tympanic membrane normal.   Left Ear: Tympanic membrane normal.   Nose: Mucosal edema and rhinorrhea present. Right sinus exhibits frontal sinus tenderness. Right sinus exhibits no maxillary sinus tenderness. Left sinus exhibits frontal sinus tenderness. Left sinus exhibits no maxillary sinus tenderness.   Mouth/Throat: Oropharynx is clear and moist.   Eyes: Conjunctivae and EOM are normal. Pupils are equal, round, and reactive to light.   Cardiovascular: Normal rate and regular rhythm.    Pulmonary/Chest: Effort normal and breath sounds normal.   Lymphadenopathy:     She has no cervical adenopathy.   Neurological: She is alert and oriented to person, place, and time.   Vitals reviewed.        Medication List with Changes/Refills   New Medications    AZITHROMYCIN (Z-JEFF) 250 MG TABLET    Take 2 tabs by mouth today, then 1 tab daily x 4 days.    FLUNISOLIDE 25 MCG, 0.025%, (NASALIDE) 25 MCG (0.025 %) SPRY    2 sprays by Nasal route 2 (two) times daily.    METHYLPREDNISOLONE (MEDROL DOSEPACK) 4 MG TABLET    use as directed   Current Medications    HYDROXYZINE PAMOATE (VISTARIL) 25 MG CAP    Take 1 capsule (25 mg total) by mouth daily as needed (prn nausea and headache).    KETOROLAC (TORADOL) 10 MG TABLET    Take 1 tablet (10 mg total) by mouth daily as needed for Pain.    OXAPROZIN (DAYPRO) 600 MG TABLET    Take 1 tablet (600 mg total) by mouth once daily.    SPIRONOLACTONE (ALDACTONE) 25 MG TABLET    Take 1 tablet (25 mg total) by mouth once daily.    TRAZODONE (DESYREL) 50 MG TABLET    Take 1 tablet (50 mg total) by mouth every evening.    VALACYCLOVIR (VALTREX) 500 MG TABLET    Take 1 tablet (500 mg total) by mouth once daily.           Diagnosis       1. Sinusitis, unspecified chronicity, unspecified location          Assessment/ Plan     Sinusitis, unspecified chronicity, unspecified location  -     azithromycin (Z-JEFF) 250 MG tablet; Take 2 tabs by mouth today, then 1 tab daily x 4 days.  Dispense: 6  tablet; Refill: 0  -     methylPREDNISolone (MEDROL DOSEPACK) 4 mg tablet; use as directed  Dispense: 1 Package; Refill: 0  -     flunisolide 25 mcg, 0.025%, (NASALIDE) 25 mcg (0.025 %) Spry; 2 sprays by Nasal route 2 (two) times daily.  Dispense: 25 mL; Refill: 2        Medication discussed, to take as directed.  Symptomatic care, rest, fluids, hydration.  Follow-up in clinic as needed.        VANCE Wagner  [facility]

## 2018-03-27 NOTE — TELEPHONE ENCOUNTER
Pt states that the daypro is giving her stomach issues. She thought both were muscle relaxers. Pt also states that the amlodipine also makes her mouth dry.    normal...

## 2018-04-26 ENCOUNTER — PATIENT MESSAGE (OUTPATIENT)
Dept: PAIN MEDICINE | Facility: CLINIC | Age: 28
End: 2018-04-26

## 2018-05-01 ENCOUNTER — OFFICE VISIT (OUTPATIENT)
Dept: FAMILY MEDICINE | Facility: CLINIC | Age: 28
End: 2018-05-01
Payer: COMMERCIAL

## 2018-05-01 VITALS
RESPIRATION RATE: 20 BRPM | HEART RATE: 96 BPM | TEMPERATURE: 98 F | HEIGHT: 64 IN | BODY MASS INDEX: 42.12 KG/M2 | SYSTOLIC BLOOD PRESSURE: 110 MMHG | OXYGEN SATURATION: 98 % | WEIGHT: 246.69 LBS | DIASTOLIC BLOOD PRESSURE: 74 MMHG

## 2018-05-01 DIAGNOSIS — G43.919 INTRACTABLE MIGRAINE WITHOUT STATUS MIGRAINOSUS, UNSPECIFIED MIGRAINE TYPE: Primary | ICD-10-CM

## 2018-05-01 PROCEDURE — 3074F SYST BP LT 130 MM HG: CPT | Mod: CPTII,S$GLB,, | Performed by: REGISTERED NURSE

## 2018-05-01 PROCEDURE — 99999 PR PBB SHADOW E&M-EST. PATIENT-LVL IV: CPT | Mod: PBBFAC,,, | Performed by: REGISTERED NURSE

## 2018-05-01 PROCEDURE — 99214 OFFICE O/P EST MOD 30 MIN: CPT | Mod: S$GLB,,, | Performed by: REGISTERED NURSE

## 2018-05-01 PROCEDURE — 3008F BODY MASS INDEX DOCD: CPT | Mod: CPTII,S$GLB,, | Performed by: REGISTERED NURSE

## 2018-05-01 PROCEDURE — 3078F DIAST BP <80 MM HG: CPT | Mod: CPTII,S$GLB,, | Performed by: REGISTERED NURSE

## 2018-05-01 RX ORDER — AMITRIPTYLINE HYDROCHLORIDE 10 MG/1
10 TABLET, FILM COATED ORAL NIGHTLY
Qty: 30 TABLET | Refills: 6 | Status: SHIPPED | OUTPATIENT
Start: 2018-05-01 | End: 2018-05-17 | Stop reason: SINTOL

## 2018-05-01 RX ORDER — RIZATRIPTAN BENZOATE 10 MG/1
10 TABLET, ORALLY DISINTEGRATING ORAL
Qty: 12 TABLET | Refills: 6 | Status: SHIPPED | OUTPATIENT
Start: 2018-05-01 | End: 2018-05-17

## 2018-05-02 NOTE — PROGRESS NOTES
"Subjective:       Patient ID: Nathan Carlos is a 27 y.o. female.    Chief Complaint: Headache      HPI    Nathan is here today with c/o HA pain for past few weeks, worsening.  HA occurring more frequently, daily.  Pain is throbbing with c/o eye pain and nausea.  Dark room helps, taking Tylenol for pain but not effective.  Reports h/o migraines in childhood; sister with migraine issues.  She has tried Toradol and hydroxyzine but has not helped.  Triggers unknown.      Review of Systems   Constitutional: Positive for appetite change (due to nausea with HA pain). Negative for chills and fever.   Eyes: Positive for photophobia and pain. Negative for visual disturbance.   Respiratory: Negative.    Cardiovascular: Negative.    Gastrointestinal: Positive for nausea. Negative for abdominal pain and vomiting.   Neurological: Positive for headaches. Negative for weakness, light-headedness and numbness.         Patient Active Problem List   Diagnosis    Essential hypertension    Gastroesophageal reflux disease    Left-sided low back pain with left-sided sciatica    Obesity, Class II, BMI 35-39.9, with comorbidity    Hypercholesteremia    High serum testosterone    Insulin resistance syndrome    HSV-2 (herpes simplex virus 2) infection       Past medical, surgical, family and social histories have been reviewed today.        Objective:     Vitals:    05/01/18 1438   BP: 110/74   Pulse: 96   Resp: 20   Temp: 97.9 °F (36.6 °C)   TempSrc: Tympanic   SpO2: 98%   Weight: 111.9 kg (246 lb 11.1 oz)   Height: 5' 4" (1.626 m)   PainSc:   8   PainLoc: Head       Physical Exam   Constitutional: She is oriented to person, place, and time. She appears well-developed and well-nourished.   HENT:   Head: Normocephalic and atraumatic.   Eyes: Conjunctivae and EOM are normal. Pupils are equal, round, and reactive to light. Right eye exhibits no discharge. Left eye exhibits no discharge. No scleral icterus.   Neck: Normal range " of motion. Neck supple. No JVD present. No tracheal deviation present. No thyromegaly present.   Cardiovascular: Normal rate and regular rhythm.    Pulmonary/Chest: Effort normal and breath sounds normal. No stridor.   Neurological: She is alert and oriented to person, place, and time.   Vitals reviewed.        Medication List with Changes/Refills   New Medications    AMITRIPTYLINE (ELAVIL) 10 MG TABLET    Take 1 tablet (10 mg total) by mouth every evening.    RIZATRIPTAN (MAXALT-MLT) 10 MG DISINTEGRATING TABLET    Take 1 tablet (10 mg total) by mouth as needed for Migraine. May repeat in 2 hours if needed   Current Medications    FLUNISOLIDE 25 MCG, 0.025%, (NASALIDE) 25 MCG (0.025 %) SPRY    2 sprays by Nasal route 2 (two) times daily.    SPIRONOLACTONE (ALDACTONE) 25 MG TABLET    Take 1 tablet (25 mg total) by mouth once daily.    TRAZODONE (DESYREL) 50 MG TABLET    Take 1 tablet (50 mg total) by mouth every evening.    VALACYCLOVIR (VALTREX) 500 MG TABLET    Take 1 tablet (500 mg total) by mouth once daily.   Discontinued Medications    AZITHROMYCIN (Z-EJFF) 250 MG TABLET    Take 2 tabs by mouth today, then 1 tab daily x 4 days.    HYDROXYZINE PAMOATE (VISTARIL) 25 MG CAP    Take 1 capsule (25 mg total) by mouth daily as needed (prn nausea and headache).    KETOROLAC (TORADOL) 10 MG TABLET    Take 1 tablet (10 mg total) by mouth daily as needed for Pain.    METHYLPREDNISOLONE (MEDROL DOSEPACK) 4 MG TABLET    use as directed    OXAPROZIN (DAYPRO) 600 MG TABLET    Take 1 tablet (600 mg total) by mouth once daily.           Diagnosis       1. Intractable migraine without status migrainosus, unspecified migraine type          Assessment/ Plan     Intractable migraine without status migrainosus, unspecified migraine type        -     This problem is currently not controlled.         -     Treatment plan and medication discussed.        -     Wean off trazodone due to likely sedating effects of Elavil and both are  antidepressants.  -     rizatriptan (MAXALT-MLT) 10 MG disintegrating tablet; Take 1 tablet (10 mg total) by mouth as needed for Migraine. May repeat in 2 hours if needed  Dispense: 12 tablet; Refill: 6  -     amitriptyline (ELAVIL) 10 MG tablet; Take 1 tablet (10 mg total) by mouth every evening.  Dispense: 30 tablet; Refill: 6  -     Headache diary.  -     Follow-up if headaches worsen or persist.        VANCE Wagner  [facility]

## 2018-05-07 PROBLEM — G43.919 INTRACTABLE MIGRAINE WITHOUT STATUS MIGRAINOSUS: Status: ACTIVE | Noted: 2018-05-07

## 2018-05-15 ENCOUNTER — PATIENT MESSAGE (OUTPATIENT)
Dept: FAMILY MEDICINE | Facility: CLINIC | Age: 28
End: 2018-05-15

## 2018-05-17 ENCOUNTER — OFFICE VISIT (OUTPATIENT)
Dept: FAMILY MEDICINE | Facility: CLINIC | Age: 28
End: 2018-05-17
Payer: COMMERCIAL

## 2018-05-17 VITALS
WEIGHT: 247.13 LBS | OXYGEN SATURATION: 97 % | TEMPERATURE: 98 F | HEART RATE: 103 BPM | HEIGHT: 64 IN | DIASTOLIC BLOOD PRESSURE: 80 MMHG | SYSTOLIC BLOOD PRESSURE: 122 MMHG | BODY MASS INDEX: 42.19 KG/M2

## 2018-05-17 DIAGNOSIS — G43.919 INTRACTABLE MIGRAINE WITHOUT STATUS MIGRAINOSUS, UNSPECIFIED MIGRAINE TYPE: Primary | ICD-10-CM

## 2018-05-17 PROCEDURE — 3008F BODY MASS INDEX DOCD: CPT | Mod: CPTII,S$GLB,, | Performed by: REGISTERED NURSE

## 2018-05-17 PROCEDURE — 3074F SYST BP LT 130 MM HG: CPT | Mod: CPTII,S$GLB,, | Performed by: REGISTERED NURSE

## 2018-05-17 PROCEDURE — 99214 OFFICE O/P EST MOD 30 MIN: CPT | Mod: S$GLB,,, | Performed by: REGISTERED NURSE

## 2018-05-17 PROCEDURE — 3079F DIAST BP 80-89 MM HG: CPT | Mod: CPTII,S$GLB,, | Performed by: REGISTERED NURSE

## 2018-05-17 PROCEDURE — 99999 PR PBB SHADOW E&M-EST. PATIENT-LVL III: CPT | Mod: PBBFAC,,, | Performed by: REGISTERED NURSE

## 2018-05-17 RX ORDER — BUTALBITAL, ACETAMINOPHEN AND CAFFEINE 50; 325; 40 MG/1; MG/1; MG/1
1 TABLET ORAL
Qty: 60 TABLET | Refills: 0 | Status: SHIPPED | OUTPATIENT
Start: 2018-05-17 | End: 2018-06-01

## 2018-05-17 RX ORDER — PROPRANOLOL HYDROCHLORIDE 60 MG/1
60 CAPSULE, EXTENDED RELEASE ORAL DAILY
Qty: 30 CAPSULE | Refills: 6 | Status: SHIPPED | OUTPATIENT
Start: 2018-05-17 | End: 2018-06-01

## 2018-05-18 ENCOUNTER — LAB VISIT (OUTPATIENT)
Dept: LAB | Facility: HOSPITAL | Age: 28
End: 2018-05-18
Attending: NURSE PRACTITIONER
Payer: COMMERCIAL

## 2018-05-18 ENCOUNTER — OFFICE VISIT (OUTPATIENT)
Dept: OBSTETRICS AND GYNECOLOGY | Facility: CLINIC | Age: 28
End: 2018-05-18
Payer: COMMERCIAL

## 2018-05-18 ENCOUNTER — TELEPHONE (OUTPATIENT)
Dept: OBSTETRICS AND GYNECOLOGY | Facility: CLINIC | Age: 28
End: 2018-05-18

## 2018-05-18 VITALS
WEIGHT: 246.69 LBS | SYSTOLIC BLOOD PRESSURE: 142 MMHG | BODY MASS INDEX: 42.12 KG/M2 | HEIGHT: 64 IN | DIASTOLIC BLOOD PRESSURE: 90 MMHG

## 2018-05-18 DIAGNOSIS — N91.2 AMENORRHEA: ICD-10-CM

## 2018-05-18 DIAGNOSIS — N91.2 AMENORRHEA: Primary | ICD-10-CM

## 2018-05-18 DIAGNOSIS — E88.810 INSULIN RESISTANCE SYNDROME: ICD-10-CM

## 2018-05-18 DIAGNOSIS — R10.9 ABDOMINAL PAIN, UNSPECIFIED ABDOMINAL LOCATION: ICD-10-CM

## 2018-05-18 DIAGNOSIS — R10.9 ABDOMINAL PAIN, UNSPECIFIED ABDOMINAL LOCATION: Primary | ICD-10-CM

## 2018-05-18 DIAGNOSIS — R79.89 HIGH SERUM TESTOSTERONE: ICD-10-CM

## 2018-05-18 LAB
BASOPHILS # BLD AUTO: 0.02 K/UL
BASOPHILS NFR BLD: 0.2 %
DIFFERENTIAL METHOD: NORMAL
EOSINOPHIL # BLD AUTO: 0.1 K/UL
EOSINOPHIL NFR BLD: 1.1 %
ERYTHROCYTE [DISTWIDTH] IN BLOOD BY AUTOMATED COUNT: 13.5 %
FSH SERPL-ACNC: 3.9 MIU/ML
HCG INTACT+B SERPL-ACNC: <2 MIU/ML
HCT VFR BLD AUTO: 42.8 %
HGB BLD-MCNC: 14.5 G/DL
LH SERPL-ACNC: 4.6 MIU/ML
LYMPHOCYTES # BLD AUTO: 3.7 K/UL
LYMPHOCYTES NFR BLD: 40.2 %
MCH RBC QN AUTO: 28 PG
MCHC RBC AUTO-ENTMCNC: 33.9 G/DL
MCV RBC AUTO: 83 FL
MONOCYTES # BLD AUTO: 0.6 K/UL
MONOCYTES NFR BLD: 6.7 %
NEUTROPHILS # BLD AUTO: 4.7 K/UL
NEUTROPHILS NFR BLD: 51.8 %
PLATELET # BLD AUTO: 273 K/UL
PMV BLD AUTO: 10.9 FL
RBC # BLD AUTO: 5.18 M/UL
WBC # BLD AUTO: 9.09 K/UL

## 2018-05-18 PROCEDURE — 3077F SYST BP >= 140 MM HG: CPT | Mod: CPTII,S$GLB,, | Performed by: NURSE PRACTITIONER

## 2018-05-18 PROCEDURE — 83001 ASSAY OF GONADOTROPIN (FSH): CPT

## 2018-05-18 PROCEDURE — 84702 CHORIONIC GONADOTROPIN TEST: CPT | Mod: PO

## 2018-05-18 PROCEDURE — 3008F BODY MASS INDEX DOCD: CPT | Mod: CPTII,S$GLB,, | Performed by: NURSE PRACTITIONER

## 2018-05-18 PROCEDURE — 99999 PR PBB SHADOW E&M-EST. PATIENT-LVL III: CPT | Mod: PBBFAC,,, | Performed by: NURSE PRACTITIONER

## 2018-05-18 PROCEDURE — 81025 URINE PREGNANCY TEST: CPT | Mod: S$GLB,,, | Performed by: NURSE PRACTITIONER

## 2018-05-18 PROCEDURE — 3080F DIAST BP >= 90 MM HG: CPT | Mod: CPTII,S$GLB,, | Performed by: NURSE PRACTITIONER

## 2018-05-18 PROCEDURE — 99204 OFFICE O/P NEW MOD 45 MIN: CPT | Mod: S$GLB,,, | Performed by: NURSE PRACTITIONER

## 2018-05-18 PROCEDURE — 85025 COMPLETE CBC W/AUTO DIFF WBC: CPT | Mod: PO

## 2018-05-18 PROCEDURE — 83002 ASSAY OF GONADOTROPIN (LH): CPT

## 2018-05-18 PROCEDURE — 36415 COLL VENOUS BLD VENIPUNCTURE: CPT | Mod: PO

## 2018-05-18 RX ORDER — MEDROXYPROGESTERONE ACETATE 10 MG/1
10 TABLET ORAL DAILY
Qty: 10 TABLET | Refills: 0 | Status: SHIPPED | OUTPATIENT
Start: 2018-05-18 | End: 2018-09-07

## 2018-05-18 NOTE — PROGRESS NOTES
"  Nathan Carlos is a 27 y.o. female  presents for numerous issues.  Seeing Dr. Nicole for many gastric issues - had CT scan done on Wednesday and was told had ovarian cyst.  No copy or report with her and not sure what side.  Not having pelvic pain. She has not had a cycle since 2017 - upt negative in office today.  She does have an elevated testosterone level on spironolactone for this but not on ocp.  Sexually active this week, sometime use of condoms, not seeking pregnancy.   Has elevated insulin level.   LMP: No LMP recorded..        Past Medical History:   Diagnosis Date    GERD (gastroesophageal reflux disease)     Hyperlipidemia     Hypertension      Past Surgical History:   Procedure Laterality Date    ANKLE SURGERY Left     BUNIONECTOMY Bilateral      Social History     Social History    Marital status: Single     Spouse name: N/A    Number of children: 0    Years of education: N/A     Occupational History     [employer]     Social History Main Topics    Smoking status: Never Smoker    Smokeless tobacco: Never Used    Alcohol use No    Drug use: No    Sexual activity: Yes     Partners: Male     Birth control/ protection: Condom     Other Topics Concern    Not on file     Social History Narrative    No narrative on file     Family History   Problem Relation Age of Onset    Hypertension Mother     Fibroids Mother     No Known Problems Father     Diabetes Sister     Hypertension Brother     Hypertension Maternal Aunt     Diabetes Maternal Aunt     Diabetes Maternal Uncle     Hypertension Maternal Grandmother     Leukemia Maternal Grandfather     Hypertension Maternal Grandfather     Stroke Neg Hx      OB History      Para Term  AB Living    0 0 0 0 0 0    SAB TAB Ectopic Multiple Live Births    0 0 0 0 0          BP (!) 142/90   Ht 5' 4" (1.626 m)   Wt 111.9 kg (246 lb 11.1 oz)   BMI 42.35 kg/m²       ROS:  Per hpi    PHYSICAL " EXAM:  APPEARANCE: Well nourished, well developed, in no acute distress.  AFFECT: WNL, alert and oriented x 3  PELVIC: Normal external genitalia without lesions.  Normal hair distribution.  Adequate perineal body, normal urethral meatus.  Vagina moist and well rugated without lesions or discharge.  Cervix pink, without lesions, discharge or tenderness.  No significant cystocele or rectocele.  Bimanual exam shows uterus to be normal size, regular, mobile and nontender.  Adnexa without masses or tenderness.    EXTREMITIES: No edema.  Physical Exam    1. Abdominal pain, unspecified abdominal location  hCG, quantitative    POCT Urine Pregnancy    US Pelvis Comp with Transvag NON-OB (xpd    CBC auto differential   2. Amenorrhea  hCG, quantitative    POCT Urine Pregnancy    US Pelvis Comp with Transvag NON-OB (xpd    CBC auto differential    Follicle stimulating hormone    Luteinizing hormone   3. High serum testosterone  Follicle stimulating hormone    Luteinizing hormone   4. Insulin resistance syndrome  Follicle stimulating hormone    Luteinizing hormone    AND PLAN:    upt negative  Check us and check labs  If hcg negative start provera and the recheck in 3 weeks for bleeding and to start ocp

## 2018-05-18 NOTE — TELEPHONE ENCOUNTER
hcg negative - sent in provera to [facility] in [place] on [address]  She is to take it daily can start tonight, no sexual activity and may take up to 2 weeks after stopping meds to have cycle which will be heavy and painful - can take tylenol as needed  Keep f/u with me in 3 weeks

## 2018-05-18 NOTE — TELEPHONE ENCOUNTER
Spoke with pt and informed her that [facility] sent in provera to her pharmacy and to take it daily starting today. Advised pt to abstain sexual activity and it may take up to 2 weeks after stopping meds to have cycle which will be heavy and painful. Informed pt that she can take tylenol as needed and to Keep f/u with [facility] in 3 weeks.  Pt verbalized understanding.

## 2018-05-18 NOTE — PATIENT INSTRUCTIONS
Flank Pain, Uncertain Cause  The flank is the area between your upper abdomen and your back. Pain there is often caused by a problem with your kidneys. It might be a kidney infection or a kidney stone. Other causes of flank pain include spinal arthritis, a pinched nerve from a back injury, or a back muscle strain or spasm.  The cause of your flank pain is not certain. You may need other tests.  Home care  Follow these tips when caring for yourself at home:  · You may use acetaminophen or ibuprofen to control pain, unless your health care provider prescribed another medicine. If you have chronic liver or kidney disease, talk with your provider before taking these medicines. Also talk with your provider first if youve ever had a stomach ulcer or GI bleeding.  · If the pain is coming from your muscles, you may get relief with ice or heat. During the first 2 days after the injury, put an ice pack on the painful area for 20 minutes every 2 to 4 hours. This will reduce swelling and pain. A hot shower, hot bath, or heating pad works well for a muscle spasm. You can start with ice, then switch to heat after 2 days. You might find that alternating ice and heat works well. Use the method that feels the best to you.  Follow-up care  Follow up with your healthcare provider if your symptoms dont get better over the next few days.  When to seek medical advice  Call your healthcare provider right away if any of these happen:  · Repeated vomiting  · Fever of 100.4ºF (38ºC) or higher, or as directed by your health care provider  · Flank pain that gets worse  · Pain that spreads to the front of your belly (abdomen)  · Dizziness, weakness, or fainting  · Blood in your urine  · Burning feeling when you urinate or the need to urinate often  · Pain in one of your legs that gets worse  · Numbness or weakness in a leg  Date Last Reviewed: 10/1/2016  © 5155-0565 [employer]. [address] [place] [address], [place] [address]. All  rights reserved. This information is not intended as a substitute for professional medical care. Always follow your healthcare professional's instructions.        Amenorrhea     Normally, the uterine lining builds up and is shed each month during a womans period. With amenorrhea, this process does not happen.   Menstruation occurs when a woman sheds the lining of her uterus (womb). It is also called a period. For most women, this happens once a month. But some women may not get their periods. This is called amenorrhea.  Amenorrhea may be due to a number of causes. These include:  · Pregnancy, breastfeeding, or menopause  · Hormone problems  · Emotional stress  · Being at too low body weight  · Exercising too much  · Poor nutrition or eating disorders  · Taking certain medicines  · Having certain birth defects or genetic disorders  There are 2 types of amenorrhea:  · Primary amenorrhea is when a girl has not had her first period by age 15.  · Secondary amenorrhea is when:  ¨ A girl or woman who has been having normal periods stops getting them for 3 months in a row  ¨ A girl or woman who has been having irregular periods stops getting them for 6 months in a row  One or more tests can be done to find out why youre not having periods. These include blood tests and imaging tests. Once the cause is found, it can be treated. Treatments can range from lifestyle and diet changes to medicines, procedures, or surgery. Your healthcare provider will discuss options with you as needed.  Follow-up care  Follow up with your healthcare provider as advised. You'll be told the results of any tests as soon as they are ready.   Note: Know that you can still become pregnant even if you are not having regular periods. It is important to use some form of birth control if you are sexually active and do not wish to become pregnant.   When to seek medical advice  Call your healthcare provider right way if any of these occur:  · Severe  pain in the abdomen (belly)  · Swelling of the belly  · Sudden, severe vaginal bleeding  · Feeling faint or passing out  Date Last Reviewed: 6/11/2015 © 2000-2017 [employer]. [address] [place] [address], [place] [address]. All rights reserved. This information is not intended as a substitute for professional medical care. Always follow your healthcare professional's instructions.        Metabolic Syndrome: Losing Excess Weight    Metabolic syndrome is a set of five health factors that can lead to serious health problems. The factors greatly increase your risk for diabetes, heart attack, or stroke. Extra weight with a large waist is one of the factors for metabolic syndrome. Being overweight or obese means that you weigh too much for what is healthy for your height. A large waist size is 40 inches or more for men, and 35 inches or more for women.  But you can take steps to lose weight and lower your risk for serious health problems.  Benefits of weight loss  Even with a small weight loss, you may have more energy and feel better. Losing even a small amount of weight can affect your blood pressure, triglycerides, HDL cholesterol, and blood sugar. You may be able to take less medicine for blood pressure, cholesterol, or blood sugar. Or you may be able to stop taking medicine. As you lose weight, your risk for diabetes, heart attack, and stroke will get lower.  Getting started  The best way to lose weight is to do it gradually. For example, lose 1/2 to 1 pound a week. You will need to be more active and eat healthier foods. Make sure to:  · Exercise every day. Talk with your healthcare provider to make sure it is safe for you to exercise. Make sure you start slowly. Begin with 10 to 15 minutes of activity. Try to exercise or be active for at least 30 minutes most days of the week. You can exercise all at once or break it up into 10- or 15-minute sessions. And think about other ways you can be more active  throughout the day.  · Eat healthy foods. Most successful dieters make changes in what, when, and how much they eat. The best way to lose weight is to eat fewer calories. You should make sure you check your portion sizes, eat breakfast, plan your meals and snacks, and eat slowly.  Working with your healthcare provider  Talk with your healthcare provider. He or she can guide you through the process of losing weight. As you begin to make changes, your healthcare provider will:  · Check your weight loss progress  · Check your blood pressure and blood test results  · Talk with you about your results  · Make suggestions about diet and exercise  · Recommend other experts or programs  · Make changes to your medicines and help with any side effects  Getting additional support  It can be hard to make healthy lifestyle changes. It may take some time to create new habits.  Your healthcare provider may suggest other experts or programs to help you, such as:  · Health . A health  gives ongoing support and makes suggestions to help you with healthy lifestyle changes, like weight loss.  · Weight loss programs. There are many safe weight loss programs. Some are free or low-cost.  · Dietitian. He or she can help you make changes to your diet.  · Exercise specialist. He or she can help you with an exercise plan.  · Occupational therapist. He or she can help you make lifestyle changes to help you lose weight more effectively, particularly if you already have health issues or complications.   · Counselor. A counselor can help you deal with your feelings and emotions. There are psychiatrists, psychologists, and social workers who specialize in weight problems.  · Bariatric or obesity specialist. These healthcare providers are experts in obesity. They can help with diet, exercise, behavioral therapy or counseling, medicines for weight loss, and very low-calorie diets.  · Bariatric surgeon. Weight loss surgery may be a choice.  But it is only advised for people who are over a certain weight, who have health problems because of their weight, and who have not been able to lose weight with other treatments.  Keeping the weight off  After losing weight, keeping it off can be even harder. Dont give up. Make sure to:  · Keep exercising. That means at least 40 to 60 minutes most days of the week.  · Keep eating healthy foods. Continue eating foods that are healthy and avoiding those that arent.  · Stay motivated. Watch your health improve. If you eat something unhealthy or skip exercising, dont give up. Simply make your next choice a healthy one.  Date Last Reviewed: 7/1/2016  © 2730-6602 [employer]. [address] [place] [address], [place] [address]. All rights reserved. This information is not intended as a substitute for professional medical care. Always follow your healthcare professional's instructions.        Polycystic Ovary Syndrome  Polycystic ovary syndrome (PCOS) causes harmless, small cysts in the ovaries and other symptoms. PCOS is caused by certain hormones being out of balance. The word syndrome means a group of symptoms. Women with PCOS may have no periods, irregular periods, or very long periods.    Your ovaries  Women store their eggs in their ovaries. Each egg is in a capsule called a follicle. Normally during the reproductive years, one follicle grows to produce a mature egg each month. This egg is released during ovulation and the follicle dissolves.  Hormones out of balance  With polycystic ovary syndrome (PCOS), the hormones that control ovulation are out of balance. These include estrogen, progesterones, and androgen. As a result, ovulation may not occur. Instead, the follicle stays enlarged. This is a fluid-filled sac (cyst). Over time, the ovaries fill with many small cysts. This is why they are called poly (many) cystic ovaries. In some women, the ovaries also make too much androgen.  PCOS symptoms  Women  with PCOS may also have one or more of these symptoms:  · Trouble getting pregnant (fertility problems)  · Weight gain, especially around the waist   · Acne  · Hair growth on the face and other parts of the body  · Patches of thickened, velvety, darkened skin called acanthosis nigricans  Women with PCOS are also at increased risk of developing cancer of the uterine lining, diabetes, and heart disease.   Date Last Reviewed: 5/10/2015  © 8492-2045 [facility]. [address] [place] [address], [place] [address]. All rights reserved. This information is not intended as a substitute for professional medical care. Always follow your healthcare professional's instructions.        Ovarian Cysts    Ovarian cysts are sacs filled with fluid or tissue that form on or inside the ovaries. The ovaries are two small organs located on each side of a womans uterus (womb). They are part of the female reproductive system.  Ovarian cysts are common in women, especially during childbearing years. There are different types of cysts. Most are harmless (benign) and go away on their own. They often cause no symptoms. If symptoms do occur, they can include mild pain or pressure in the lower belly (abdomen).  Cysts that are large or break (rupture) may cause more severe pain and symptoms. In these cases, hospital care or treatment such as surgery may be needed. More extensive treatment may also be needed if a cyst causes an ovary to twist (called torsion) or if a cyst is suspected to be cancerous. Keep in mind that most cysts are not cancerous, however.  General care  · To help relieve pain, your healthcare provider may recommend using over-the-counter pain medicine. If needed, stronger pain medicine may be prescribed.  · Depending on the type of cyst you have, your healthcare provider may advise taking birth control pills. These help shrink cysts in certain cases. They may also help prevent new cysts from forming. Be sure to take these  medicines as directed if they are prescribed.  · Your healthcare provider may advise you to watch your symptoms over time to see if they go away or worsen. Regular ultrasound tests may also be advised. These can help check if a cyst goes away or grows in size.  Follow-up care  Follow up with your healthcare provider, or as advised.  When to seek medical advice  Call your healthcare provider right away if any of these occur:  · Pain worsens or fails to get better with home treatment  · Fever of 100.4°F (38°C) or higher (or other fever amount directed by your healthcare provider)  · Nausea and vomiting  · Weakness, dizziness, or fainting  · Abnormal vaginal bleeding  Date Last Reviewed: 6/11/2015  © 1706-2151 [employer]. [address] [place] [address], [place] [address]. All rights reserved. This information is not intended as a substitute for professional medical care. Always follow your healthcare professional's instructions.        Ovarian Cysts  A cyst is usually a fluid-filled sac, like a small water balloon. Cysts are almost always harmless, and many go away on their own. Usually they grow slowly. They can vary in size from as small as a pea to larger than a grapefruit. Many cause no symptoms at all. Often they are felt only during a pelvic exam. Ovarian cysts are usually not cancerous.       Functional cyst  A functional cyst is the most common kind of cyst. It forms when a follicle does not release a mature egg or continues to grow after releasing the egg. Functional cysts usually occur on only 1 ovary at a time, and shrink on their own in 1 to 3 months. Rarely, a cyst will rupture, causing pain. Pain might also be caused by the twisting of an ovary that is enlarged because of the cyst growing on it.     Dermoid cyst  Sometimes an unfertilized egg will start to grow into different kinds of tissue, such as skin, fat, hair, and teeth. This kind of cyst is called a dermoid cyst. Dermoid cysts can grow on 1  "or both ovaries. Usually they cause no symptoms. But if they leak or the ovary becomes twisted, they can cause severe pain.    Endometrioma ("chocolate" cyst)  Sometimes tissue similar to the lining of the uterus (endometrium) grows and becomes part of the ovary. This kind of cyst is often called a chocolate cyst because of its dark-brown color. These cysts can grow on 1 or both ovaries. They often cause pain, especially around menstruation or during sexual intercourse.    Benign cystadenoma  If the capsule that surrounds the ovary grows, it can form a cystadenoma. These cysts can grow on 1 or both ovaries. Usually they cause no symptoms if they are small. But if they become large, they can press on organs near the ovaries, causing pain. They can also cause pain by stretching the ovarian capsule. A cyst that pushes on the bladder can cause frequent urination. Sometimes these cysts rupture and bleed.  Malignant cysts  These cysts can invade other tissues or spread to other parts of the body.  Date Last Reviewed: 5/11/2015  © 7648-3441 [employer]. [address] [place] [address]. All rights reserved. This information is not intended as a substitute for professional medical care. Always follow your healthcare professional's instructions.        Treatment for Ovarian Cysts  An ovarian cyst is a fluid-filled sac that forms on or inside an ovary. The ovaries are a pair of small, oval-shaped organs in the lower part of a womans belly (abdomen). About once a month, one of the ovaries releases an egg. The ovaries also make the hormones estrogen and progesterone. These hormones are part of pregnancy, the menstrual cycle, and breast growth.  Ovarian cysts are very common in women of all ages. Young girls can also get them, but this is less common. There are different types of ovarian cysts. They can occur for various reasons, and they may need different treatments. A cyst can vary in size from half " an inch to more than 4 inches.  Types of treatment  Treatment for an ovarian cyst will depend on the type of cyst, your age, and your general health. Most women will not need treatment. You may be told to watch your symptoms over time. An ovarian cyst will often go away with no treatment in a few weeks or months.  In some cases, you may need to have follow-up ultrasound tests. These are to check if your cyst has gone away or is not growing. You may not need any other treatment.  If your ultrasound or blood tests show signs of cancer, your doctor may advise surgery. This is done to remove part or all of your ovary. Your doctor might also advise surgery if:  · Your cyst causes ongoing pressure or pain  · Your cyst appears to be growing  · You have a very large cyst  · You have endometriosis and want the cyst removed to help with fertility  Can an ovarian cyst be prevented?  If you have hormone problems, your doctor may advise taking birth control pills. These may help prevent ovarian cysts. Taking antibiotics for a pelvic infection may also prevent a cyst.  Possible complications of an ovarian cyst  An ovarian cyst can sometimes break open (rupture). This may not cause any symptoms. Or it may cause sudden, sharp pain in the lower belly. A ruptured cyst can cause a lot of blood and fluid loss. This can lead to low blood pressure. In some cases, surgery may be needed.  Rarely an ovarian cyst can also cause twisting (torsion) of the fallopian tube. This can block normal blood supply to the ovary. This can lead to sudden pain and may need emergency surgery.  When to call the healthcare provider  Call your healthcare provider right away if you have any of these:  · Sudden belly pain  · Other severe symptoms   Date Last Reviewed: 8/10/2015  © 1634-4032 [employer]. [address] [place] [address], [place] [address]. All rights reserved. This information is not intended as a substitute for professional medical care.  Always follow your healthcare professional's instructions.        Understanding Ovarian Cysts  An ovarian cyst is a fluid-filled sac that forms on or inside an ovary. The ovaries are a pair of small, oval-shaped organs in the lower part of a womans belly (abdomen). About once a month, one of the ovaries releases an egg. The ovaries also make the hormones estrogen and progesterone. These hormones are part of pregnancy, the menstrual cycle, and breast growth.  Ovarian cysts are very common in women of all ages. Young girls can also get them, but this is less common. There are different types of ovarian cysts. They can occur for various reasons, and they may need different treatments. A cyst can vary in size from half an inch to more than 4 inches.  Types of ovarian cysts  There are different types of ovarian cysts:  Functional cyst  This is the most common type of ovarian cyst. They only occur in women who havent gone through menopause. There are 2 types of functional cysts:  · Follicular cyst. This cyst happens when an egg isnt released and it keeps growing inside the ovary.  · Corpus luteum cyst. This type of cyst occurs when the sac around the egg doesnt dissolve after the egg is released.  Endometrioma  This is a cyst filled with old blood and tissue from the lining of the uterus. They are often called chocolate cysts because of their dark color. They can happen in women with endometriosis.  Dermoid cyst  This cyst develops from ovarian cells and eggs. They may have hair, skin, or fat in them. These cysts are common in women of childbearing age.  What causes ovarian cysts?  Cysts can also be caused by:  · Polycystic ovary syndrome (PCOS), a condition that causes multiple cysts on the ovaries  · Pregnancy  · Severe pelvic infection, such as chlamydia  · Noncancerous growths  · Cancer (rare)  · Using fertility medicine to cause ovulation, such as clomiphene  Symptoms of an ovarian cyst  Many women dont have any  symptoms from the cyst. In women with symptoms, the most common is pain or pressure in your lower belly on the side of the cyst. This pain may be dull or sharp, and it may come and go. A cyst that breaks open (ruptures) may lead to sudden, sharp pain.  Other symptoms of an ovarian cyst can include:  · Pain in the lower back or thighs  · Trouble emptying your bladder fully  · Pain during sex  · Weight gain  · Pain during your period  · Breast tenderness  · Abnormal vaginal bleeding (rare)  Diagnosing an ovarian cyst  Your primary care doctor or an obstetrics and gynecology (OB/GYN) doctor may diagnose the condition. Your doctor will ask about your health history and your symptoms. You will also have a physical exam. This will likely include a pelvic exam. During the pelvic exam, your doctor may feel the swelling on your ovary. In women with no symptoms, this is often the first sign of a cyst.  If your doctor thinks you may have an ovarian cyst, you may need tests. These can help your doctor learn the type of cyst. Tests can also help rule out other problems, such as an ectopic pregnancy. The tests may include:  · Ultrasound. This test uses sound waves to view the size, shape, and location of the cyst. The test can also show if the growth is solid or filled with fluid.  · MRI. This uses large magnets and a computer to create a detailed picture of the area.  · Pregnancy test. This is done to check if pregnancy may be the cause of the cyst.  · Blood tests. These check for hormone problems and cancer. They also check if the cyst is bleeding.  · Biopsy. This is a test where a tiny piece of the ovary is taken. The piece is examined in a lab for cancer cells. This may be done if an ultrasound shows a certain type of growth on the ovary.  Date Last Reviewed: 5/6/2015  © 9450-5899 [employer]. [address] [place] [address], [place] [address]. All rights reserved. This information is not intended as a substitute for  professional medical care. Always follow your healthcare professional's instructions.

## 2018-05-21 ENCOUNTER — PATIENT MESSAGE (OUTPATIENT)
Dept: OBSTETRICS AND GYNECOLOGY | Facility: CLINIC | Age: 28
End: 2018-05-21

## 2018-05-21 ENCOUNTER — PATIENT MESSAGE (OUTPATIENT)
Dept: FAMILY MEDICINE | Facility: CLINIC | Age: 28
End: 2018-05-21

## 2018-05-21 NOTE — TELEPHONE ENCOUNTER
Pt is stating that she needs an excuse to return to work, however she will need paperwork in regards to a leave of absence at work for the 3 days that she missed. Please advise.

## 2018-05-21 NOTE — TELEPHONE ENCOUNTER
----- Message from Clive Syed sent at 5/21/2018  8:51 AM CDT -----  Pt is requesting a call from nurse to discuss her return to work. Please fax an documents Jennifer [phone]        Please call pt back at [phone]

## 2018-05-22 NOTE — PROGRESS NOTES
"Subjective:       Patient ID: Nathan Carlos is a 27 y.o. female.    Chief Complaint: Migraine      HPI    Nathan is here today with c/o persistent headache.  Last seen in the office on 5/1/2018, my note is reviewed today.  She was not able to tolerate Elavil as it made her HA worse and caused severe dry mouth.  She has tried Maxalt, Aleve and Tylenol recently, no help.  Fioricet in the remote past.  Does c/o nausea, decreased appetite and floaters in line of vision.  Head has been constantly pounding, no relief.      Review of Systems   Constitutional: Positive for appetite change.   Eyes: Positive for visual disturbance. Negative for pain and redness.   Respiratory: Negative.    Cardiovascular: Negative.    Gastrointestinal: Positive for nausea. Negative for diarrhea and vomiting.   Neurological: Positive for headaches. Negative for weakness, light-headedness and numbness.         Patient Active Problem List   Diagnosis    Essential hypertension    Gastroesophageal reflux disease    Left-sided low back pain with left-sided sciatica    Obesity, Class II, BMI 35-39.9, with comorbidity    Hypercholesteremia    High serum testosterone    Insulin resistance syndrome    HSV-2 (herpes simplex virus 2) infection    Intractable migraine without status migrainosus       Past medical, surgical, family and social histories have been reviewed today.        Objective:     Vitals:    05/17/18 1449   BP: 122/80   Pulse: 103   Temp: 97.9 °F (36.6 °C)   TempSrc: Tympanic   SpO2: 97%   Weight: 112.1 kg (247 lb 2.2 oz)   Height: 5' 4" (1.626 m)       Physical Exam   Constitutional: She is oriented to person, place, and time. She appears well-developed and well-nourished.   HENT:   Head: Normocephalic and atraumatic.   Eyes: Conjunctivae and EOM are normal. Pupils are equal, round, and reactive to light. Right eye exhibits no discharge. Left eye exhibits no discharge. No scleral icterus.   Neck: Normal range of " motion. Neck supple. No JVD present. No tracheal deviation present. No thyromegaly present.   Cardiovascular: Normal rate and regular rhythm.    Pulmonary/Chest: Effort normal and breath sounds normal. No stridor.   Musculoskeletal: Normal range of motion.   Neurological: She is alert and oriented to person, place, and time. No sensory deficit. She exhibits normal muscle tone. Coordination normal.   Vitals reviewed.        Medication List with Changes/Refills   New Medications    BUTALBITAL-ACETAMINOPHEN-CAFFEINE -40 MG (FIORICET, ESGIC) -40 MG PER TABLET    Take 1 tablet by mouth every 4 to 6 hours as needed for Headaches.    PROPRANOLOL (INDERAL LA) 60 MG 24 HR CAPSULE    Take 1 capsule (60 mg total) by mouth once daily.   Current Medications    FLUNISOLIDE 25 MCG, 0.025%, (NASALIDE) 25 MCG (0.025 %) SPRY    2 sprays by Nasal route 2 (two) times daily.    SPIRONOLACTONE (ALDACTONE) 25 MG TABLET    Take 1 tablet (25 mg total) by mouth once daily.    TRAZODONE (DESYREL) 50 MG TABLET    Take 1 tablet (50 mg total) by mouth every evening.    VALACYCLOVIR (VALTREX) 500 MG TABLET    Take 1 tablet (500 mg total) by mouth once daily.   Discontinued Medications    AMITRIPTYLINE (ELAVIL) 10 MG TABLET    Take 1 tablet (10 mg total) by mouth every evening.    RIZATRIPTAN (MAXALT-MLT) 10 MG DISINTEGRATING TABLET    Take 1 tablet (10 mg total) by mouth as needed for Migraine. May repeat in 2 hours if needed           Diagnosis       1. Intractable migraine without status migrainosus, unspecified migraine type          Assessment/ Plan     Intractable migraine without status migrainosus, unspecified migraine type        -     This problem is currently not controlled.         -     Stop Elavil.  -     propranolol (INDERAL LA) 60 MG 24 hr capsule; Take 1 capsule (60 mg total) by mouth once daily.  Dispense: 30 capsule; Refill: 6  -     butalbital-acetaminophen-caffeine -40 mg (FIORICET, ESGIC) -40 mg per  tablet; Take 1 tablet by mouth every 4 to 6 hours as needed for Headaches.  Dispense: 60 tablet; Refill: 0  -     Headache diary.  -     To Optometry for routine eye exam.  -     To RTW on 5/21/2018.  -     Follow-up in 1 month for recheck, or sooner if needed.        VANCE Wagner  [facility]

## 2018-05-23 ENCOUNTER — TELEPHONE (OUTPATIENT)
Dept: RADIOLOGY | Facility: HOSPITAL | Age: 28
End: 2018-05-23

## 2018-05-24 ENCOUNTER — HOSPITAL ENCOUNTER (OUTPATIENT)
Dept: RADIOLOGY | Facility: HOSPITAL | Age: 28
Discharge: HOME | End: 2018-05-24
Attending: NURSE PRACTITIONER
Payer: COMMERCIAL

## 2018-05-24 DIAGNOSIS — R10.9 ABDOMINAL PAIN, UNSPECIFIED ABDOMINAL LOCATION: ICD-10-CM

## 2018-05-24 DIAGNOSIS — N91.2 AMENORRHEA: ICD-10-CM

## 2018-05-24 PROCEDURE — 76856 US EXAM PELVIC COMPLETE: CPT | Mod: 26,,, | Performed by: RADIOLOGY

## 2018-05-24 PROCEDURE — 76830 TRANSVAGINAL US NON-OB: CPT | Mod: TC,PO

## 2018-05-24 PROCEDURE — 76830 TRANSVAGINAL US NON-OB: CPT | Mod: 26,,, | Performed by: RADIOLOGY

## 2018-05-25 ENCOUNTER — PATIENT MESSAGE (OUTPATIENT)
Dept: FAMILY MEDICINE | Facility: CLINIC | Age: 28
End: 2018-05-25

## 2018-05-25 ENCOUNTER — PATIENT MESSAGE (OUTPATIENT)
Dept: OBSTETRICS AND GYNECOLOGY | Facility: CLINIC | Age: 28
End: 2018-05-25

## 2018-05-28 ENCOUNTER — PATIENT MESSAGE (OUTPATIENT)
Dept: OBSTETRICS AND GYNECOLOGY | Facility: CLINIC | Age: 28
End: 2018-05-28

## 2018-05-29 ENCOUNTER — TELEPHONE (OUTPATIENT)
Dept: OBSTETRICS AND GYNECOLOGY | Facility: CLINIC | Age: 28
End: 2018-05-29

## 2018-05-29 ENCOUNTER — PATIENT MESSAGE (OUTPATIENT)
Dept: FAMILY MEDICINE | Facility: CLINIC | Age: 28
End: 2018-05-29

## 2018-05-29 NOTE — TELEPHONE ENCOUNTER
Pt notified that u/s shows fibroids on uterus and needs follow up with gyn has apt scheduled for 6.1.18...morena

## 2018-05-29 NOTE — TELEPHONE ENCOUNTER
Pelvic us is showing some cyst but not to ovaries more to cervix - with the pain she is having (I saw a email from her) want her to f/u with gyn MD to review - not sure why having so much pain   Cancel all appts with me on 6/1 and 6/8 and have her f/u with gyn MD - may have to check [facility] to see if can be seen there

## 2018-05-29 NOTE — TELEPHONE ENCOUNTER
----- Message from Shawna Guadarrama sent at 5/29/2018  3:59 PM CDT -----  Contact: PT   Pt request call back, returning nurse call.[phone] (bupe)

## 2018-06-01 ENCOUNTER — OFFICE VISIT (OUTPATIENT)
Dept: OBSTETRICS AND GYNECOLOGY | Facility: CLINIC | Age: 28
End: 2018-06-01
Payer: COMMERCIAL

## 2018-06-01 VITALS
HEIGHT: 64 IN | SYSTOLIC BLOOD PRESSURE: 112 MMHG | BODY MASS INDEX: 41.18 KG/M2 | WEIGHT: 241.19 LBS | DIASTOLIC BLOOD PRESSURE: 84 MMHG

## 2018-06-01 DIAGNOSIS — R10.9 ABDOMINAL PAIN, UNSPECIFIED ABDOMINAL LOCATION: ICD-10-CM

## 2018-06-01 DIAGNOSIS — N91.2 AMENORRHEA: Primary | ICD-10-CM

## 2018-06-01 PROCEDURE — 3008F BODY MASS INDEX DOCD: CPT | Mod: CPTII,S$GLB,, | Performed by: OBSTETRICS & GYNECOLOGY

## 2018-06-01 PROCEDURE — 3079F DIAST BP 80-89 MM HG: CPT | Mod: CPTII,S$GLB,, | Performed by: OBSTETRICS & GYNECOLOGY

## 2018-06-01 PROCEDURE — 3074F SYST BP LT 130 MM HG: CPT | Mod: CPTII,S$GLB,, | Performed by: OBSTETRICS & GYNECOLOGY

## 2018-06-01 PROCEDURE — 99999 PR PBB SHADOW E&M-EST. PATIENT-LVL III: CPT | Mod: PBBFAC,,, | Performed by: OBSTETRICS & GYNECOLOGY

## 2018-06-01 PROCEDURE — 99213 OFFICE O/P EST LOW 20 MIN: CPT | Mod: S$GLB,,, | Performed by: OBSTETRICS & GYNECOLOGY

## 2018-06-01 RX ORDER — NAPROXEN SODIUM 550 MG/1
550 TABLET ORAL 2 TIMES DAILY PRN
Qty: 30 TABLET | Refills: 0 | Status: SHIPPED | OUTPATIENT
Start: 2018-06-01 | End: 2018-06-11 | Stop reason: SDUPTHER

## 2018-06-01 RX ORDER — SODIUM, POTASSIUM,MAG SULFATES 17.5-3.13G
SOLUTION, RECONSTITUTED, ORAL ORAL
COMMUNITY
Start: 2018-05-18 | End: 2018-07-12

## 2018-06-01 NOTE — PROGRESS NOTES
Subjective:       Patient ID: Nathan Carlos is a 27 y.o. female.    Chief Complaint:  Consult (u/s follow up)      History of Present Illness  HPI  Abdominal Pain  Patient presents for evaluation of abdominal pain. The pain is described as cramping, and is 6/10 in intensity. Pain is located in the RLQ, LLQ and deep pelvis area without radiation. Onset was gradual occurring 3 weeks ago. Symptoms have been gradually worsening since. Aggravating factors: none. Alleviating factors: none. Associated symptoms: constipation. The patient denies anorexia, diarrhea, dysuria, fever, hematochezia, hematuria, nausea and vomiting. Risk factors for pelvic/abdominal pain include history ovarian cysts.  Pt also reports having sporadic periods with last period occurring over 6 months ago.  Completed course of Provera in the past 1-2 days and has noted pain worsening in this time frame.  Pt had a CT scan performed earlier this month which showed bilateral ovarian cysts and a prominent endometrium.  Recent Ultrasound did not identify any ovarian masses/cysts.      GYN & OB History  Patient's last menstrual period was 09/10/2017 (exact date).   Date of Last Pap: 2017    OB History    Para Term  AB Living   0 0 0 0 0 0   SAB TAB Ectopic Multiple Live Births   0 0 0 0 0             Review of Systems  Review of Systems   Constitutional: Negative for activity change, appetite change, diaphoresis, fatigue, fever and unexpected weight change.   Respiratory: Negative for shortness of breath.    Cardiovascular: Negative for chest pain, palpitations and leg swelling.   Gastrointestinal: Positive for abdominal pain and constipation. Negative for bloating, blood in stool, diarrhea, nausea and vomiting.   Genitourinary: Positive for menstrual problem and pelvic pain. Negative for dysuria, flank pain, frequency, genital sores, hematuria, menorrhagia, vaginal bleeding, vaginal discharge, vaginal pain, urinary  incontinence and vaginal odor.   Musculoskeletal: Negative for back pain.   Neurological: Negative for syncope and headaches.           Objective:    Physical Exam:   Constitutional: She is oriented to person, place, and time. She appears well-developed and well-nourished. No distress.                           Neurological: She is alert and oriented to person, place, and time.     Psychiatric: She has a normal mood and affect. Her behavior is normal. Thought content normal.          US pelvis: The uterus measures 10.2 x 3.6 x 5.6 cm.  Endometrial stripe measures 9.1 mm in thickness which is within normal limits.  Nabothian cysts are seen including complex appearing cysts which measure 1.7 x 1.6 x 1.3 cm in aggregate.  Left ovary could not be visualized.  Right ovary measures 3.7 x 2.1 x 2.4 cm.  It contains multiple small follicles and demonstrates flow on Doppler interrogation.  Please note evaluation limited secondary to overlying bowel gas.    Assessment:        1. Amenorrhea    2. Abdominal pain, unspecified abdominal location             Plan:      Amenorrhea  -     Likely related to chronic anovulation.  Labs normal.  Pt just completed Provera and is awaiting withdrawal response.  Pt counseled on expectations and treatment options.  Will monitor progress and return sooner if period does not occur.    Abdominal pain, unspecified abdominal location  -     naproxen sodium (ANAPROX) 550 MG tablet; Take 1 tablet (550 mg total) by mouth 2 (two) times daily as needed (pain).  Dispense: 30 tablet; Refill: 0  -     Clinical presentation is non-specific and pt has several possible etiologies (history of severe constipation, ovarian cysts, due to start menses after provera challenge).  Previously identified cysts appear to have resolved completely and pain has worsened, which suggests that cysts are not etiology of pain.  Recommend Anaprox and observation.  Return if symptoms worsen or fail to improve.      Follow-up in  about 3 months (around 9/1/2018).

## 2018-06-06 ENCOUNTER — TELEPHONE (OUTPATIENT)
Dept: FAMILY MEDICINE | Facility: CLINIC | Age: 28
End: 2018-06-06

## 2018-06-06 ENCOUNTER — PATIENT MESSAGE (OUTPATIENT)
Dept: FAMILY MEDICINE | Facility: CLINIC | Age: 28
End: 2018-06-06

## 2018-06-08 ENCOUNTER — DOCUMENTATION ONLY (OUTPATIENT)
Dept: FAMILY MEDICINE | Facility: CLINIC | Age: 28
End: 2018-06-08

## 2018-06-11 ENCOUNTER — PATIENT MESSAGE (OUTPATIENT)
Dept: FAMILY MEDICINE | Facility: CLINIC | Age: 28
End: 2018-06-11

## 2018-06-11 ENCOUNTER — PATIENT MESSAGE (OUTPATIENT)
Dept: OBSTETRICS AND GYNECOLOGY | Facility: CLINIC | Age: 28
End: 2018-06-11

## 2018-06-11 DIAGNOSIS — R10.9 ABDOMINAL PAIN, UNSPECIFIED ABDOMINAL LOCATION: ICD-10-CM

## 2018-06-11 RX ORDER — NAPROXEN SODIUM 550 MG/1
550 TABLET ORAL 2 TIMES DAILY PRN
Qty: 30 TABLET | Refills: 1 | Status: SHIPPED | OUTPATIENT
Start: 2018-06-11 | End: 2018-09-18

## 2018-06-11 RX ORDER — NAPROXEN SODIUM 550 MG/1
550 TABLET ORAL 2 TIMES DAILY PRN
Qty: 30 TABLET | Refills: 1 | Status: SHIPPED | OUTPATIENT
Start: 2018-06-11 | End: 2018-06-11 | Stop reason: SDUPTHER

## 2018-06-13 LAB — CRC RECOMMENDATION EXT: NORMAL

## 2018-07-12 ENCOUNTER — LAB VISIT (OUTPATIENT)
Dept: LAB | Facility: HOSPITAL | Age: 28
End: 2018-07-12
Attending: FAMILY MEDICINE
Payer: COMMERCIAL

## 2018-07-12 ENCOUNTER — OFFICE VISIT (OUTPATIENT)
Dept: FAMILY MEDICINE | Facility: CLINIC | Age: 28
End: 2018-07-12
Payer: COMMERCIAL

## 2018-07-12 VITALS
WEIGHT: 246.94 LBS | BODY MASS INDEX: 42.16 KG/M2 | OXYGEN SATURATION: 95 % | HEART RATE: 80 BPM | HEIGHT: 64 IN | SYSTOLIC BLOOD PRESSURE: 119 MMHG | DIASTOLIC BLOOD PRESSURE: 71 MMHG | TEMPERATURE: 98 F

## 2018-07-12 DIAGNOSIS — Z00.00 ANNUAL PHYSICAL EXAM: ICD-10-CM

## 2018-07-12 DIAGNOSIS — M54.42 CHRONIC BILATERAL LOW BACK PAIN WITH LEFT-SIDED SCIATICA: ICD-10-CM

## 2018-07-12 DIAGNOSIS — L30.9 DERMATITIS: Primary | ICD-10-CM

## 2018-07-12 DIAGNOSIS — G89.29 CHRONIC BILATERAL LOW BACK PAIN WITH LEFT-SIDED SCIATICA: ICD-10-CM

## 2018-07-12 PROCEDURE — 36415 COLL VENOUS BLD VENIPUNCTURE: CPT | Mod: PO

## 2018-07-12 PROCEDURE — 86703 HIV-1/HIV-2 1 RESULT ANTBDY: CPT

## 2018-07-12 PROCEDURE — 99214 OFFICE O/P EST MOD 30 MIN: CPT | Mod: S$GLB,,, | Performed by: FAMILY MEDICINE

## 2018-07-12 PROCEDURE — 3078F DIAST BP <80 MM HG: CPT | Mod: CPTII,S$GLB,, | Performed by: FAMILY MEDICINE

## 2018-07-12 PROCEDURE — 99999 PR PBB SHADOW E&M-EST. PATIENT-LVL III: CPT | Mod: PBBFAC,,, | Performed by: FAMILY MEDICINE

## 2018-07-12 PROCEDURE — 3074F SYST BP LT 130 MM HG: CPT | Mod: CPTII,S$GLB,, | Performed by: FAMILY MEDICINE

## 2018-07-12 PROCEDURE — 87389 HIV-1 AG W/HIV-1&-2 AB AG IA: CPT

## 2018-07-12 PROCEDURE — 3008F BODY MASS INDEX DOCD: CPT | Mod: CPTII,S$GLB,, | Performed by: FAMILY MEDICINE

## 2018-07-12 PROCEDURE — 86592 SYPHILIS TEST NON-TREP QUAL: CPT

## 2018-07-12 RX ORDER — TRIAMCINOLONE ACETONIDE 1 MG/G
OINTMENT TOPICAL 2 TIMES DAILY
Qty: 80 G | Refills: 0 | Status: SHIPPED | OUTPATIENT
Start: 2018-07-12 | End: 2019-02-07

## 2018-07-12 RX ORDER — HYDROCODONE BITARTRATE AND ACETAMINOPHEN 10; 325 MG/1; MG/1
1 TABLET ORAL
Qty: 10 TABLET | Refills: 0 | Status: SHIPPED | OUTPATIENT
Start: 2018-07-12 | End: 2018-07-17 | Stop reason: SDUPTHER

## 2018-07-12 NOTE — PROGRESS NOTES
Subjective:       Patient ID: Nathan Carlos is a 27 y.o. female.    Chief Complaint: Rash (under arm )      HPI  Ms. Carlos presents to clinic today for rash under her arm.  She states it has been there for 2 weeks.  She has been using powder deodorant.  She states she used a different detergent tide instead of gain.     She also wants std testing.  She also states her back pain is flaring up again.   She has not seen pain management for this recently.   She states the injection does help a bit.   She Is back to lifting and doing things like that at work.     Review of Systems   Constitutional: Negative for fatigue and fever.   HENT: Negative for congestion, rhinorrhea and sore throat.    Eyes: Negative for pain.   Respiratory: Negative for cough and shortness of breath.    Gastrointestinal: Negative for diarrhea and vomiting.   Skin: Positive for rash.       Medication List with Changes/Refills   Current Medications    FLUNISOLIDE 25 MCG, 0.025%, (NASALIDE) 25 MCG (0.025 %) SPRY    2 sprays by Nasal route 2 (two) times daily.    MEDROXYPROGESTERONE (PROVERA) 10 MG TABLET    Take 1 tablet (10 mg total) by mouth once daily.    NAPROXEN SODIUM (ANAPROX) 550 MG TABLET    Take 1 tablet (550 mg total) by mouth 2 (two) times daily as needed (pain).    SPIRONOLACTONE (ALDACTONE) 25 MG TABLET    Take 1 tablet (25 mg total) by mouth once daily.    SUPREP BOWEL PREP KIT 17.5-3.13-1.6 GRAM SOLR        VALACYCLOVIR (VALTREX) 500 MG TABLET    Take 1 tablet (500 mg total) by mouth once daily.       Patient Active Problem List   Diagnosis    Essential hypertension    Gastroesophageal reflux disease    Left-sided low back pain with left-sided sciatica    Obesity, Class II, BMI 35-39.9, with comorbidity    Hypercholesteremia    High serum testosterone    Insulin resistance syndrome    HSV-2 (herpes simplex virus 2) infection    Intractable migraine without status migrainosus         Objective:     Physical Exam    Constitutional: She is oriented to person, place, and time. She appears well-developed and well-nourished. No distress.   HENT:   Head: Normocephalic and atraumatic.   Right Ear: External ear normal.   Left Ear: External ear normal.   Eyes: EOM are normal. Right eye exhibits no discharge. Left eye exhibits no discharge.   Cardiovascular: Normal rate and regular rhythm.    Pulmonary/Chest: Effort normal and breath sounds normal. No respiratory distress. She has no wheezes.   Musculoskeletal: She exhibits no edema.   Neurological: She is alert and oriented to person, place, and time.   Skin: Skin is warm and dry. She is not diaphoretic. No erythema.   Psychiatric: She has a normal mood and affect.   Vitals reviewed.    Vitals:    07/12/18 0950   BP: 119/71   Pulse: 80   Temp: 98.2 °F (36.8 °C)       Assessment/  PLAN     Dermatitis  -     triamcinolone acetonide 0.1% (KENALOG) 0.1 % ointment; Apply topically 2 (two) times daily. for 10 days  Dispense: 80 g; Refill: 0  - use spray deodorant   - switch back to gain or use all free and clear     Chronic bilateral low back pain with left-sided sciatica  -     HYDROcodone-acetaminophen (NORCO)  mg per tablet; Take 1 tablet by mouth every 24 hours as needed for Pain.  Dispense: 10 tablet; Refill: 0    Annual physical exam  -     C. trachomatis/N. gonorrhoeae by AMP DNA Urine  -     HIV-1 and HIV-2 antibodies; Future; Expected date: 07/26/2018  -     RPR; Future; Expected date: 07/26/2018        Yue Anguiano MD  [facility]

## 2018-07-13 LAB
HIV 1+2 AB+HIV1 P24 AG SERPL QL IA: POSITIVE
HIV SUPPLEMENTAL ASSAY INTERPRETATION: NORMAL
HIV-1 RESULT: NEGATIVE
HIV-2 RESULT: NEGATIVE
RPR SER QL: NORMAL

## 2018-07-16 ENCOUNTER — PATIENT MESSAGE (OUTPATIENT)
Dept: FAMILY MEDICINE | Facility: CLINIC | Age: 28
End: 2018-07-16

## 2018-07-17 ENCOUNTER — OFFICE VISIT (OUTPATIENT)
Dept: FAMILY MEDICINE | Facility: CLINIC | Age: 28
End: 2018-07-17
Payer: COMMERCIAL

## 2018-07-17 ENCOUNTER — LAB VISIT (OUTPATIENT)
Dept: LAB | Facility: HOSPITAL | Age: 28
End: 2018-07-17
Attending: FAMILY MEDICINE
Payer: COMMERCIAL

## 2018-07-17 ENCOUNTER — PATIENT MESSAGE (OUTPATIENT)
Dept: FAMILY MEDICINE | Facility: CLINIC | Age: 28
End: 2018-07-17

## 2018-07-17 ENCOUNTER — OFFICE VISIT (OUTPATIENT)
Dept: PAIN MEDICINE | Facility: CLINIC | Age: 28
End: 2018-07-17
Payer: COMMERCIAL

## 2018-07-17 VITALS
SYSTOLIC BLOOD PRESSURE: 149 MMHG | BODY MASS INDEX: 41.59 KG/M2 | TEMPERATURE: 97 F | HEIGHT: 64 IN | DIASTOLIC BLOOD PRESSURE: 86 MMHG | HEART RATE: 70 BPM | WEIGHT: 243.63 LBS | OXYGEN SATURATION: 96 %

## 2018-07-17 VITALS
SYSTOLIC BLOOD PRESSURE: 159 MMHG | HEIGHT: 64 IN | HEART RATE: 60 BPM | WEIGHT: 246.94 LBS | DIASTOLIC BLOOD PRESSURE: 97 MMHG | BODY MASS INDEX: 42.16 KG/M2

## 2018-07-17 DIAGNOSIS — R89.9 ABNORMAL LABORATORY TEST: ICD-10-CM

## 2018-07-17 DIAGNOSIS — M47.816 LUMBAR FACET ARTHROPATHY: ICD-10-CM

## 2018-07-17 DIAGNOSIS — G89.29 CHRONIC BILATERAL LOW BACK PAIN WITH LEFT-SIDED SCIATICA: ICD-10-CM

## 2018-07-17 DIAGNOSIS — M47.816 LUMBAR SPONDYLOSIS: Primary | ICD-10-CM

## 2018-07-17 DIAGNOSIS — R89.9 ABNORMAL LABORATORY TEST: Primary | ICD-10-CM

## 2018-07-17 DIAGNOSIS — M54.42 CHRONIC BILATERAL LOW BACK PAIN WITH LEFT-SIDED SCIATICA: ICD-10-CM

## 2018-07-17 PROCEDURE — 3078F DIAST BP <80 MM HG: CPT | Mod: CPTII,S$GLB,, | Performed by: ANESTHESIOLOGY

## 2018-07-17 PROCEDURE — 36415 COLL VENOUS BLD VENIPUNCTURE: CPT | Mod: PO

## 2018-07-17 PROCEDURE — 99999 PR PBB SHADOW E&M-EST. PATIENT-LVL III: CPT | Mod: PBBFAC,,, | Performed by: ANESTHESIOLOGY

## 2018-07-17 PROCEDURE — 3008F BODY MASS INDEX DOCD: CPT | Mod: CPTII,S$GLB,, | Performed by: FAMILY MEDICINE

## 2018-07-17 PROCEDURE — 99214 OFFICE O/P EST MOD 30 MIN: CPT | Mod: S$GLB,,, | Performed by: ANESTHESIOLOGY

## 2018-07-17 PROCEDURE — 87536 HIV-1 QUANT&REVRSE TRNSCRPJ: CPT

## 2018-07-17 PROCEDURE — 99999 PR PBB SHADOW E&M-EST. PATIENT-LVL IV: CPT | Mod: PBBFAC,,, | Performed by: FAMILY MEDICINE

## 2018-07-17 PROCEDURE — 3079F DIAST BP 80-89 MM HG: CPT | Mod: CPTII,S$GLB,, | Performed by: FAMILY MEDICINE

## 2018-07-17 PROCEDURE — 3077F SYST BP >= 140 MM HG: CPT | Mod: CPTII,S$GLB,, | Performed by: ANESTHESIOLOGY

## 2018-07-17 PROCEDURE — 3077F SYST BP >= 140 MM HG: CPT | Mod: CPTII,S$GLB,, | Performed by: FAMILY MEDICINE

## 2018-07-17 PROCEDURE — 3008F BODY MASS INDEX DOCD: CPT | Mod: CPTII,S$GLB,, | Performed by: ANESTHESIOLOGY

## 2018-07-17 PROCEDURE — 99214 OFFICE O/P EST MOD 30 MIN: CPT | Mod: S$GLB,,, | Performed by: FAMILY MEDICINE

## 2018-07-17 RX ORDER — CYCLOBENZAPRINE HCL 10 MG
10 TABLET ORAL 2 TIMES DAILY PRN
Qty: 60 TABLET | Refills: 0 | Status: SHIPPED | OUTPATIENT
Start: 2018-07-17 | End: 2018-08-16

## 2018-07-17 RX ORDER — HYDROCODONE BITARTRATE AND ACETAMINOPHEN 10; 325 MG/1; MG/1
1 TABLET ORAL
Qty: 15 TABLET | Refills: 0 | Status: SHIPPED | OUTPATIENT
Start: 2018-07-17 | End: 2018-08-23 | Stop reason: SDUPTHER

## 2018-07-17 NOTE — PROGRESS NOTES
Chief Pain Complaint:  Back Pain (patient complains of lower back pain. patient states her back has been hurting for 3 weeks, hasn't had any falls or car accidents.)        History of Present Illness:   Nathan Carlos is a 27 y.o. female  who is presenting with a chief complaint of Back Pain (patient complains of lower back pain. patient states her back has been hurting for 3 weeks, hasn't had any falls or car accidents.)  . The patient began experiencing this problem insidiously, and the pain has been gradually worsening over the past 3 week(s). The pain is described as throbbing, cramping, aching and heavy and is located in the right lumbar spine. Pain is intermittent and lasts hours. The  pain is nonradiating. The patient rates her pain a 8 out of ten and interferes with activities of daily living a 7 out of ten. Pain is exacerbated by extension of the lumbar spine, and is improved by rest. Patient reports no prior trauma, no prior spinal surgery     - pertinent negatives: No fever, No chills, No weight loss, No bladder dysfunction, No bowel dysfunction, No saddle anesthesia  - pertinent positives: none    - medications, other therapies tried (physical therapy, injections):     >> NSAIDs, Tylenol, Norco, zanaflex and flexeril    >> Has previously undergone Physical Therapy    >> Has previously undergone spinal injection/s   Left SI joint injection 1/2018 with 100% relief for 6 months    Imaging / Labs / Studies (reviewed on 7/17/2018):    Results for orders placed during the hospital encounter of 11/22/17   MRI Lumbar Spine Without Contrast    Narrative Technique: Standard noncontrast multiplanar multisequence imaging of the lumbar spine was performed.    Findings: There is anatomic spinal alignment.  Disc interspaces are of normal height and signal intensity.  Vertebral marrow signal pattern and architecture are normal.  Distal cord is unremarkable with conus medullaris terminating at L1-L2.   Paravertebral soft tissues are symmetric and normal in appearance.    T12-L1: No disc protrusion, neuroforaminal narrowing, or central canal stenosis.    L1-L2: No disc protrusion, neuroforaminal narrowing, or central canal stenosis.    L2-L3: No disc protrusion, neuroforaminal narrowing, or central canal stenosis.    L3-L4: No disc protrusion, neuroforaminal narrowing, or central canal stenosis.    L4-L5: No disc protrusion, neuroforaminal narrowing, or central canal stenosis.    L5-S1: Mild bilateral facet hypertrophy. No disc protrusion, neuroforaminal narrowing, or central canal stenosis.    Impression  Negative MRI of the lumbar spine.        Electronically signed by: SHERIF BEJARANO MD  Date:     11/22/17  Time:    13:39        Results for orders placed during the hospital encounter of 01/04/18   X-Ray Lumbar Complete With Flex And Ext    Narrative Comparison: None    Technique: AP, lateral, lateral flexion, lateral extension, bilateral oblique, and lumbosacral coned down views were obtained of the lumbar spine    Findings: There is mild scoliosis of the lower thoracic and upper lumbar spine.  Vertebral body heights are well-maintained.  No spondylolisthesis demonstrated.  No change in spinal alignment with flexion or extension to suggest instability. Intervertebral disk spaces are well preserved.  No pars defects visualized.  Posterior elements appear grossly intact. No acute fractures or subluxations are demonstrated.  The remaining visualized osseous and soft tissue structures demonstrate no appreciable abnormality.    Impression As above.  No acute findings.          Electronically signed by: KEE URIBE MD  Date:     01/04/18  Time:    14:56      Review of Systems:  CONSTITUTIONAL: patient denies any fever, chills, or weight loss  SKIN: patient denies any rash or itching  RESPIRATORY: patient denies having any shortness of breath  GASTROINTESTINAL: patient denies having any diarrhea, constipation, or  "bowel incontinence  GENITOURINARY: patient denies having any abnormal bladder function    MUSCULOSKELETAL:  - patient complains of the above noted pain/s (see chief pain complaint)    NEUROLOGICAL:   - pain as above  - strength in Lower extremities is intact, BILATERALLY  - sensation in Lower extremities is intact, BILATERALLY  - patient denies any loss of bowel or bladder control      PSYCHIATRIC: patient denies any change in mood    Other:  All other systems reviewed and are negative      Physical Exam:  BP (!) 159/97 (BP Location: Right arm, Patient Position: Sitting, BP Method: Medium (Manual))   Pulse 60   Ht 5' 4" (1.626 m)   Wt 112 kg (246 lb 14.6 oz)   LMP 06/05/2018 (Exact Date)   BMI 42.38 kg/m²  (reviewed on 7/17/2018)  General: Alert and oriented, in no apparent distress.  Gait: normal gait.  Skin: No rashes, No discoloration, No obvious lesions  HEENT: Normocephalic, atraumatic. Pupils equal and round.  Cardiovascular: Regular rate and rhythm , no significant peripheral edema present  Respiratory: Without audible wheezing, without use of accessory muscles of respiration.    Musculoskeletal:    Lumbar Spine    - Pain on flexion of lumbar spine Absent  - Straight Leg Raise:  Absent    - Pain on extension of lumbar spine Present  - TTP over the lumbar facet joints Present  - Lumbar facet loading Present    -Pain on palpation over the SI joint  Absent  - JULISSA: Absent      Neuro:    Strength:  UE R/L: D: 5/5; B: 5/5; T: 5/5; WF: 5/5; WE: 5/5; IO: 5/5;  LE R/L: HF: 5/5, HE: 5/5, KF: 5/5; KE: 5/5; FE: 5/5; FF: 5/5    Extremity Reflexes: Brisk and symmetric throughout.      Extremity Sensory: Sensation to pinprick and temperature symmetric. Proprioception intact.      Psych:  Mood and affect is appropriate      Assessment:    Nathan Carlos is a 27 y.o. year old female who is presenting with    Encounter Diagnoses   Name Primary?    Lumbar spondylosis Yes    Lumbar facet arthropathy     Chronic " bilateral low back pain with left-sided sciatica        Plan:    1. Interventional: Schedule patient for Right L3, L4, L5 MBB with local.    2. Pharmacologic: Norco 10/325 mg Po Q day PRN (15 tabs) as a bridge to injection.  checked and appropriate. Tylenol, NSAID's PRN. Flexeril PRN.     3. Rehabilitative: PT post injection.    4. Diagnostic: None for now.    5. Follow up: Follow-up for After Injection.      20 minutes were spent in this encounter with more than 50% of the time used for counseling and review of the plan.  Imaging / studies reviewed, detailed above.  I discussed in detail the risks, benefits, and alternatives to any and all potential treatment options.  All questions and concerns were fully addressed today in clinic. Medical decision making moderate.    Thank you for the opportunity to assist in the care of this patient.    Best wishes,    Signed:    Yo Kirkland MD          Disclaimer:  This note may have been prepared using voice recognition software, it may have not been extensively proofed, as such there could be errors within the text such as sound alike errors.

## 2018-07-17 NOTE — PATIENT INSTRUCTIONS

## 2018-07-17 NOTE — PROGRESS NOTES
Subjective:       Patient ID: Nathan Carlos is a 27 y.o. female.    Chief Complaint: Results      HPI   Ms. Carlos presents to clinic for discussion of test results.   She states she has started to get headaches again.   She states it is better with napping and eating.   She is also having back pain again .  The headaches are not associated with vomiting.     Review of Systems   Constitutional: Negative for activity change and unexpected weight change.   HENT: Negative for hearing loss, rhinorrhea and trouble swallowing.    Eyes: Negative for discharge and visual disturbance.   Respiratory: Negative for chest tightness and wheezing.    Cardiovascular: Negative for chest pain and palpitations.   Gastrointestinal: Positive for nausea. Negative for blood in stool, constipation, diarrhea and vomiting.   Endocrine: Negative for polydipsia and polyuria.   Genitourinary: Negative for difficulty urinating, dysuria, hematuria and menstrual problem.   Musculoskeletal: Negative for arthralgias, joint swelling and neck pain.   Neurological: Positive for headaches. Negative for weakness.   Psychiatric/Behavioral: Negative for confusion and dysphoric mood.       Medication List with Changes/Refills   Current Medications    CYCLOBENZAPRINE (FLEXERIL) 10 MG TABLET    Take 1 tablet (10 mg total) by mouth 2 (two) times daily as needed for Muscle spasms.    FLUNISOLIDE 25 MCG, 0.025%, (NASALIDE) 25 MCG (0.025 %) SPRY    2 sprays by Nasal route 2 (two) times daily.    HYDROCODONE-ACETAMINOPHEN (NORCO)  MG PER TABLET    Take 1 tablet by mouth every 24 hours as needed for Pain.    MEDROXYPROGESTERONE (PROVERA) 10 MG TABLET    Take 1 tablet (10 mg total) by mouth once daily.    NAPROXEN SODIUM (ANAPROX) 550 MG TABLET    Take 1 tablet (550 mg total) by mouth 2 (two) times daily as needed (pain).    SPIRONOLACTONE (ALDACTONE) 25 MG TABLET    Take 1 tablet (25 mg total) by mouth once daily.    TRIAMCINOLONE ACETONIDE 0.1%  (KENALOG) 0.1 % OINTMENT    Apply topically 2 (two) times daily. for 10 days    VALACYCLOVIR (VALTREX) 500 MG TABLET    Take 1 tablet (500 mg total) by mouth once daily.       Patient Active Problem List   Diagnosis    Essential hypertension    Gastroesophageal reflux disease    Left-sided low back pain with left-sided sciatica    Obesity, Class II, BMI 35-39.9, with comorbidity    Hypercholesteremia    High serum testosterone    Insulin resistance syndrome    HSV-2 (herpes simplex virus 2) infection    Intractable migraine without status migrainosus         Objective:     Physical Exam   Constitutional: She is oriented to person, place, and time. She appears well-developed and well-nourished. No distress.   HENT:   Head: Normocephalic and atraumatic.   Eyes: EOM are normal. Right eye exhibits no discharge. Left eye exhibits no discharge.   Cardiovascular: Normal rate and regular rhythm.    Pulmonary/Chest: Effort normal and breath sounds normal. No respiratory distress. She has no wheezes.   Musculoskeletal: She exhibits no edema.   Neurological: She is alert and oriented to person, place, and time.   Skin: Skin is warm and dry. She is not diaphoretic. No erythema.   Psychiatric: She has a normal mood and affect.   Vitals reviewed.    Vitals:    07/17/18 1119   BP: (!) 149/86   Pulse: 70   Temp: 96.6 °F (35.9 °C)       Assessment/  PLAN     Abnormal laboratory test  -     HIV RNA, quantitative, PCR; Future; Expected date: 10/15/2018    Chronic migraine  -     Ambulatory referral to Neurology  - headache may be related to back pain   - she is going to pain management again for her back   - advised on healthy eating, diet and exercise            Yue Anguiano MD  [facility]

## 2018-07-19 LAB
HIV UQ DATE RECEIVED: NORMAL
HIV UQ DATE REPORTED: NORMAL
HIV1 RNA # SERPL NAA+PROBE: <40 COPIES/ML
HIV1 RNA SERPL NAA+PROBE-LOG#: <1.6 LOG (10) COPIES/ML
HIV1 RNA SERPL QL NAA+PROBE: NOT DETECTED

## 2018-08-15 ENCOUNTER — HOSPITAL ENCOUNTER (OUTPATIENT)
Facility: HOSPITAL | Age: 28
Discharge: HOME | End: 2018-08-15
Attending: ANESTHESIOLOGY | Admitting: ANESTHESIOLOGY
Payer: COMMERCIAL

## 2018-08-15 VITALS
OXYGEN SATURATION: 100 % | RESPIRATION RATE: 16 BRPM | DIASTOLIC BLOOD PRESSURE: 102 MMHG | HEART RATE: 99 BPM | SYSTOLIC BLOOD PRESSURE: 136 MMHG

## 2018-08-15 DIAGNOSIS — M47.816 LUMBAR FACET ARTHROPATHY: ICD-10-CM

## 2018-08-15 DIAGNOSIS — M47.816 LUMBAR SPONDYLOSIS: ICD-10-CM

## 2018-08-15 PROCEDURE — 64494 INJ PARAVERT F JNT L/S 2 LEV: CPT | Mod: RT,,, | Performed by: ANESTHESIOLOGY

## 2018-08-15 PROCEDURE — 64493 INJ PARAVERT F JNT L/S 1 LEV: CPT | Mod: RT,,, | Performed by: ANESTHESIOLOGY

## 2018-08-15 PROCEDURE — 25000003 PHARM REV CODE 250: Performed by: ANESTHESIOLOGY

## 2018-08-15 PROCEDURE — 63600175 PHARM REV CODE 636 W HCPCS: Performed by: ANESTHESIOLOGY

## 2018-08-15 PROCEDURE — 25000003 PHARM REV CODE 250

## 2018-08-15 PROCEDURE — 63600175 PHARM REV CODE 636 W HCPCS

## 2018-08-15 RX ORDER — LIDOCAINE HYDROCHLORIDE 20 MG/ML
INJECTION, SOLUTION EPIDURAL; INFILTRATION; INTRACAUDAL; PERINEURAL
Status: DISCONTINUED | OUTPATIENT
Start: 2018-08-15 | End: 2018-08-15 | Stop reason: HOSPADM

## 2018-08-15 RX ORDER — METHYLPREDNISOLONE ACETATE 40 MG/ML
INJECTION, SUSPENSION INTRA-ARTICULAR; INTRALESIONAL; INTRAMUSCULAR; SOFT TISSUE
Status: DISCONTINUED | OUTPATIENT
Start: 2018-08-15 | End: 2018-08-15 | Stop reason: HOSPADM

## 2018-08-15 NOTE — PLAN OF CARE
Problem: Patient Care Overview  Goal: Plan of Care Review  Outcome: Outcome(s) achieved Date Met: 08/15/18  Patient discharged home in stable condition via wheelchair with ride. Verbalized understanding of discharge instructions. Patient voiced no complaints at this time. Patient stood at side of bed, walked steps with no new motor or sensory deficits. Neurologically intact.

## 2018-08-15 NOTE — OP NOTE
Procedure: Lumbar Medial Branch Block under Fluoroscopic Guidance    Side: right     Level:  Sacral ala (Corresponding to the L5 dorsal ramus), L5 transverse process (Corresponding to the L4 medial branch) and L4 transverse process (Corresponding to the L3 medial branch)     PROCEDURE DATE: 8/15/2018    Pre-operative Diagnosis: Lumbar Spondylosis  Post-operative Diagnosis: Lumbar Spondylosis    Provider: Yo Kirkland MD  Assistant(s): none    Anesthesia: Local, No Sedation    >> 0 mg of VERSED    >> 0 mcg of FENTANYL     Indication: Low back pain without radiculopathy. Symptoms unresponsive to conservative treatments. Fluoroscopy was used to optimize visualization of needle placement and to maximize safety.     Procedure Description / Technique:  The patient was seen and identified in the preoperative area. Risks, benefits, complications, and alternatives were discussed with the patient. The patient agreed to proceed with the procedure and signed the consent. The site and side of the procedure was identified and marked. No iv was started.     The patient was taken to the procedural suite and positioned in prone orientation on the procedure table. A pillow was placed under the abdomen to reduce lumbar lordosis. A time out was performed. The procedure, site, side, and allergies were stated and agreed to by all present. The lumbosacral area was widely prepped with ChloraPrep. The procedural site was draped in usual sterile fashion. Vital signs were closely monitored throughout this procedure. Conscious sedation was NOT used for this procedure.    The Right sacral ala and superior articular process was identified and marked on AP fluoroscopic imaging. Subcutaneous tissues were localized using 1% PF Lidocaine to improve patient comfort. A 22 gauge 5 inch spinal needle was advance until the needle rested on OS at the interface of the sacral ala and the base of the sacral superior articular process. After negative  "aspiration, 1 mL of a solution containing 2 mL of 1% PF Lidocaine and 1 mL of Methylprednisolone (40 mg/mL) was injected. No pain or paresthesia was noted on injection. After right side injection, the fluoroscope was obliqued to the Right until the jessika dog outline of the L5 vertebrae came into view. The spinal needle was advanced to the "eye of the jessika dog" and after negative aspiration a 1 mL of the above noted solution was injected. No pain or paresthesia was noted. This technique was repeated at each of the above noted levels. The spinal needle was removed intact following injection at each targeted site. The stylet was replaced prior to needle removal at each site.    Description of Findings: Not applicable    Prosthetic devices, grafts, tissues, or devices implanted: None    Specimen Removed: No    ESTIMATED BLOOD LOSS: minimal    COMPLICATIONS: None    DISPOSITION / PLANS: The patient was transferred to the recovery area in a stable condition for observation. The patient was reexamined prior to discharge. There was no evidence of acute neurologic injury following the procedure.  Patient was discharged from the recovery room after meeting discharge criteria. Home discharge instructions were given to the patient by the staff.  "

## 2018-08-23 ENCOUNTER — PATIENT MESSAGE (OUTPATIENT)
Dept: PAIN MEDICINE | Facility: CLINIC | Age: 28
End: 2018-08-23

## 2018-08-23 DIAGNOSIS — M54.42 CHRONIC BILATERAL LOW BACK PAIN WITH LEFT-SIDED SCIATICA: ICD-10-CM

## 2018-08-23 DIAGNOSIS — G89.29 CHRONIC BILATERAL LOW BACK PAIN WITH LEFT-SIDED SCIATICA: ICD-10-CM

## 2018-08-23 RX ORDER — HYDROCODONE BITARTRATE AND ACETAMINOPHEN 10; 325 MG/1; MG/1
1 TABLET ORAL
Qty: 15 TABLET | Refills: 0 | Status: SHIPPED | OUTPATIENT
Start: 2018-08-23 | End: 2018-09-18

## 2018-08-30 ENCOUNTER — PATIENT MESSAGE (OUTPATIENT)
Dept: FAMILY MEDICINE | Facility: CLINIC | Age: 28
End: 2018-08-30

## 2018-08-30 NOTE — TELEPHONE ENCOUNTER
----- Message from Guilherem Arteaga sent at 8/30/2018  9:17 AM CDT -----  Contact: pt  She's calling in regards to an excuse for her employer in may of this year, she can be reached at [phone] (kjkl)

## 2018-09-04 ENCOUNTER — OFFICE VISIT (OUTPATIENT)
Dept: PAIN MEDICINE | Facility: CLINIC | Age: 28
End: 2018-09-04
Payer: COMMERCIAL

## 2018-09-04 VITALS
SYSTOLIC BLOOD PRESSURE: 129 MMHG | HEIGHT: 64 IN | WEIGHT: 243 LBS | HEART RATE: 82 BPM | RESPIRATION RATE: 18 BRPM | BODY MASS INDEX: 41.48 KG/M2 | DIASTOLIC BLOOD PRESSURE: 89 MMHG

## 2018-09-04 DIAGNOSIS — M47.816 LUMBAR SPONDYLOSIS: Primary | ICD-10-CM

## 2018-09-04 DIAGNOSIS — M54.42 CHRONIC BILATERAL LOW BACK PAIN WITH LEFT-SIDED SCIATICA: ICD-10-CM

## 2018-09-04 DIAGNOSIS — M53.3 SACROILIAC JOINT PAIN: ICD-10-CM

## 2018-09-04 DIAGNOSIS — G89.29 CHRONIC BILATERAL LOW BACK PAIN WITH LEFT-SIDED SCIATICA: ICD-10-CM

## 2018-09-04 DIAGNOSIS — M47.816 LUMBAR FACET ARTHROPATHY: ICD-10-CM

## 2018-09-04 PROCEDURE — 99999 PR PBB SHADOW E&M-EST. PATIENT-LVL III: CPT | Mod: PBBFAC,,, | Performed by: PHYSICIAN ASSISTANT

## 2018-09-04 PROCEDURE — 3008F BODY MASS INDEX DOCD: CPT | Mod: CPTII,S$GLB,, | Performed by: PHYSICIAN ASSISTANT

## 2018-09-04 PROCEDURE — 3074F SYST BP LT 130 MM HG: CPT | Mod: CPTII,S$GLB,, | Performed by: PHYSICIAN ASSISTANT

## 2018-09-04 PROCEDURE — 3079F DIAST BP 80-89 MM HG: CPT | Mod: CPTII,S$GLB,, | Performed by: PHYSICIAN ASSISTANT

## 2018-09-04 PROCEDURE — 99214 OFFICE O/P EST MOD 30 MIN: CPT | Mod: S$GLB,,, | Performed by: PHYSICIAN ASSISTANT

## 2018-09-04 NOTE — PROGRESS NOTES
Chief Pain Complaint:  Low-back Pain      Interval History: Patient was seen on 8/15/18. At that time she underwent Right L3, L4, L5 MBB with local.  The patient reports that she is/was better following the procedure.  she reports 90% pain relief.  The changes lasted ~3 weeks so far.  The changes have continued through this visit. She reports 0/10 pain today.      History of Present Illness:   Nathan Carlos is a 27 y.o. female  who is presenting with a chief complaint of Low-back Pain  . The patient began experiencing this problem insidiously, and the pain has been gradually worsening over the past 3 week(s). The pain is described as throbbing, cramping, aching and heavy and is located in the right lumbar spine. Pain is intermittent and lasts hours. The  pain is nonradiating. The patient rates her pain a 8 out of ten and interferes with activities of daily living a 7 out of ten. Pain is exacerbated by extension of the lumbar spine, and is improved by rest. Patient reports no prior trauma, no prior spinal surgery     - pertinent negatives: No fever, No chills, No weight loss, No bladder dysfunction, No bowel dysfunction, No saddle anesthesia  - pertinent positives: none    - medications, other therapies tried (physical therapy, injections):     >> NSAIDs, Tylenol, Norco, zanaflex and flexeril    >> Has previously undergone Physical Therapy    >> Has previously undergone spinal injection/s   - Left SI joint injection 1/2018 with 100% relief for 6 months   - Right L3, L4, L5 MBB with local on 8/15/18 with 90% pain relief        Imaging / Labs / Studies (reviewed on 9/4/2018):    Results for orders placed during the hospital encounter of 11/22/17   MRI Lumbar Spine Without Contrast    Narrative Technique: Standard noncontrast multiplanar multisequence imaging of the lumbar spine was performed.    Findings: There is anatomic spinal alignment.  Disc interspaces are of normal height and signal intensity.  Vertebral  marrow signal pattern and architecture are normal.  Distal cord is unremarkable with conus medullaris terminating at L1-L2.  Paravertebral soft tissues are symmetric and normal in appearance.    T12-L1: No disc protrusion, neuroforaminal narrowing, or central canal stenosis.    L1-L2: No disc protrusion, neuroforaminal narrowing, or central canal stenosis.    L2-L3: No disc protrusion, neuroforaminal narrowing, or central canal stenosis.    L3-L4: No disc protrusion, neuroforaminal narrowing, or central canal stenosis.    L4-L5: No disc protrusion, neuroforaminal narrowing, or central canal stenosis.    L5-S1: Mild bilateral facet hypertrophy. No disc protrusion, neuroforaminal narrowing, or central canal stenosis.    Impression  Negative MRI of the lumbar spine.         Results for orders placed during the hospital encounter of 01/04/18   X-Ray Lumbar Complete With Flex And Ext    Narrative Comparison: None    Technique: AP, lateral, lateral flexion, lateral extension, bilateral oblique, and lumbosacral coned down views were obtained of the lumbar spine    Findings: There is mild scoliosis of the lower thoracic and upper lumbar spine.  Vertebral body heights are well-maintained.  No spondylolisthesis demonstrated.  No change in spinal alignment with flexion or extension to suggest instability. Intervertebral disk spaces are well preserved.  No pars defects visualized.  Posterior elements appear grossly intact. No acute fractures or subluxations are demonstrated.  The remaining visualized osseous and soft tissue structures demonstrate no appreciable abnormality.    Impression As above.  No acute findings.         Review of Systems:  CONSTITUTIONAL: patient denies any fever, chills, or weight loss  SKIN: patient denies any rash or itching  RESPIRATORY: patient denies having any shortness of breath  GASTROINTESTINAL: patient denies having any diarrhea, constipation, or bowel incontinence  GENITOURINARY: patient denies  "having any abnormal bladder function    MUSCULOSKELETAL:  - patient complains of the above noted pain/s (see chief pain complaint)    NEUROLOGICAL:   - pain as above  - strength in Lower extremities is intact, BILATERALLY  - sensation in Lower extremities is intact, BILATERALLY  - patient denies any loss of bowel or bladder control      PSYCHIATRIC: patient denies any change in mood    Other:  All other systems reviewed and are negative        Physical Exam:  Vitals:  /89 (BP Location: Right arm, Patient Position: Sitting, BP Method: Medium (Automatic))   Pulse 82   Resp 18   Ht 5' 4" (1.626 m)   Wt 110.2 kg (243 lb)   BMI 41.71 kg/m²   (reviewed on 9/4/2018)    General: alert and oriented, in no apparent distress.  Gait: normal gait.  Skin: no rashes, no discoloration, no obvious lesions  HEENT: normocephalic, atraumatic. Pupils equal and round.  Cardiovascular: no significant peripheral edema present.  Respiratory: without use of accessory muscles of respiration.    Musculoskeletal - Lumbar Spine:  - Pain on flexion of lumbar spine: Absent   - Pain on extension of lumbar spine: Absent, improved  - Lumbar facet loading: Absent, improved  - TTP over the lumbar facet joints: Absent, improved  - TTP over the SI joints:  Absent   - TTP over GT bursa: Absent   - Straight Leg Raise: Negative  - JULISSA: Negative    Neuro - Extremities:  - BUE Strength:R/L: D: 5/5; B: 5/5; T: 5/5; WF: 5/5; WE: 5/5; IO: 5/5  - BLE Strength: R/L: HF: 5/5, HE: 5/5, KF: 5/5; KE: 5/5; FE: 5/5; FF: 5/5  - Extremity Reflexes: Brisk and symmetric throughout  - Sensory: Sensation to light touch intact bilaterally      Psych:  Mood and affect is appropriate                    Assessment:  Nathan Carlos is a 27 y.o. year old female who is presenting with    Encounter Diagnoses   Name Primary?    Lumbar spondylosis Yes    Chronic bilateral low back pain with left-sided sciatica     Lumbar facet arthropathy     Sacroiliac joint " pain        Plan:  1. Interventional: S/p Right L3, L4, L5 MBB with local on 8/15/18 with 90% pain relief.    2. Pharmacologic: Continue Flexeril 10mg BID PRN.     3. Rehabilitative: Start PT with passive modalities, including ice and heat, and active modalities, including a regimen for stretching and strengthening. Order sent to [facility] closer to her home, to include aquatic therapy.  I believe weight loss will aid in her goal of lasting pain control.     4. Diagnostic: None for now.    5. Follow up: PRN    - I discussed the risks, benefits, and alternatives to potential treatment options. All questions and concerns were fully addressed today in clinic. Dr. Kirkland was consulted regarding the patient plan and agrees.

## 2018-09-07 ENCOUNTER — PATIENT MESSAGE (OUTPATIENT)
Dept: OBSTETRICS AND GYNECOLOGY | Facility: CLINIC | Age: 28
End: 2018-09-07

## 2018-09-07 ENCOUNTER — OFFICE VISIT (OUTPATIENT)
Dept: OBSTETRICS AND GYNECOLOGY | Facility: CLINIC | Age: 28
End: 2018-09-07
Payer: COMMERCIAL

## 2018-09-07 VITALS
BODY MASS INDEX: 42.61 KG/M2 | WEIGHT: 248.25 LBS | DIASTOLIC BLOOD PRESSURE: 90 MMHG | SYSTOLIC BLOOD PRESSURE: 130 MMHG

## 2018-09-07 DIAGNOSIS — N91.2 AMENORRHEA: ICD-10-CM

## 2018-09-07 PROCEDURE — 3008F BODY MASS INDEX DOCD: CPT | Mod: CPTII,S$GLB,, | Performed by: OBSTETRICS & GYNECOLOGY

## 2018-09-07 PROCEDURE — 81025 URINE PREGNANCY TEST: CPT | Mod: S$GLB,,, | Performed by: OBSTETRICS & GYNECOLOGY

## 2018-09-07 PROCEDURE — 99999 PR PBB SHADOW E&M-EST. PATIENT-LVL III: CPT | Mod: PBBFAC,,, | Performed by: OBSTETRICS & GYNECOLOGY

## 2018-09-07 PROCEDURE — 99212 OFFICE O/P EST SF 10 MIN: CPT | Mod: 25,S$GLB,, | Performed by: OBSTETRICS & GYNECOLOGY

## 2018-09-07 RX ORDER — MEDROXYPROGESTERONE ACETATE 10 MG/1
10 TABLET ORAL DAILY
Qty: 10 TABLET | Refills: 0 | Status: SHIPPED | OUTPATIENT
Start: 2018-09-07 | End: 2018-09-18 | Stop reason: ALTCHOICE

## 2018-09-07 RX ORDER — NORETHINDRONE ACETATE AND ETHINYL ESTRADIOL .03; 1.5 MG/1; MG/1
1 TABLET ORAL DAILY
Qty: 28 EACH | Refills: 6 | Status: SHIPPED | OUTPATIENT
Start: 2018-09-07 | End: 2019-06-12

## 2018-09-07 NOTE — TELEPHONE ENCOUNTER
Take the Provera tablets first (one tablet daily for 10 days) and then wait for her period to start.  After her period starts, then start taking the birth control tablets on the first Sunday after.

## 2018-09-07 NOTE — PROGRESS NOTES
Subjective:       Patient ID: Nathan Carlos is a 27 y.o. female.    Chief Complaint:  Follow-up      History of Present Illness  HPI  Pt is here for follow up.  Pt states that she had a normal period after completion of Provera.  However, has not seen a period since.  Pains are resolved.    GYN & OB History  No LMP recorded.   Date of Last Pap: 2017    OB History    Para Term  AB Living   0 0 0 0 0 0   SAB TAB Ectopic Multiple Live Births   0 0 0 0 0             Review of Systems  Review of Systems   Constitutional: Negative for activity change, appetite change, fatigue, fever and unexpected weight change.   Respiratory: Negative for shortness of breath.    Cardiovascular: Negative for chest pain.   Gastrointestinal: Negative for abdominal pain.   Genitourinary: Positive for menstrual problem. Negative for menorrhagia, vaginal bleeding, vaginal discharge, vaginal pain, dysmenorrhea and vaginal odor.   Musculoskeletal: Positive for back pain.   Neurological: Negative for syncope and headaches.           Objective:    Physical Exam:   Constitutional: She is oriented to person, place, and time. She appears well-developed and well-nourished. No distress.               Genitourinary:   Genitourinary Comments: UPT today Negative               Neurological: She is alert and oriented to person, place, and time.     Psychiatric: She has a normal mood and affect. Her behavior is normal. Thought content normal.          Assessment:        1. Amenorrhea             Plan:      Amenorrhea  -     medroxyPROGESTERone (PROVERA) 10 MG tablet; Take 1 tablet (10 mg total) by mouth once daily. for 10 days  Dispense: 10 tablet; Refill: 0  -     POCT urine pregnancy  -     norethindrone ac-eth estradiol 1.5-30 mg-mcg Tab; Take 1 tablet by mouth once daily.  Dispense: 28 each; Refill: 6  -     Overall picture remains consistent with chronic anovulation/PCOS.  Pt was again counseled on chronic anovulation/PCOS,  including common signs/symptoms and the many factors that influence it.  Pt was also counseled on available treatment options, including the associated risks and benefits of each.  Pt voiced understanding and desires to proceed with Porvera withdrawal followed by OCP.  Medication dosing, side-effects, risks, benefits, and alternatives were discussed.  Medical history was reviewed and pt is a candidate for OCP use.  -     Pt was counseled on the link between obesity and PCOS.  Recommend a healthy diet and exercise regimen with goal 20% weight loss.  -     Pt has appointment with PCP next week (monitoring of Spironolactone use and Insulin Resistance).          Follow-up in about 3 months (around 12/7/2018).

## 2018-09-18 ENCOUNTER — TELEPHONE (OUTPATIENT)
Dept: FAMILY MEDICINE | Facility: CLINIC | Age: 28
End: 2018-09-18

## 2018-09-18 ENCOUNTER — OFFICE VISIT (OUTPATIENT)
Dept: FAMILY MEDICINE | Facility: CLINIC | Age: 28
End: 2018-09-18
Payer: COMMERCIAL

## 2018-09-18 ENCOUNTER — PATIENT MESSAGE (OUTPATIENT)
Dept: FAMILY MEDICINE | Facility: CLINIC | Age: 28
End: 2018-09-18

## 2018-09-18 ENCOUNTER — LAB VISIT (OUTPATIENT)
Dept: LAB | Facility: HOSPITAL | Age: 28
End: 2018-09-18
Attending: FAMILY MEDICINE
Payer: COMMERCIAL

## 2018-09-18 VITALS
OXYGEN SATURATION: 98 % | TEMPERATURE: 98 F | DIASTOLIC BLOOD PRESSURE: 80 MMHG | SYSTOLIC BLOOD PRESSURE: 130 MMHG | BODY MASS INDEX: 42.76 KG/M2 | HEART RATE: 100 BPM | WEIGHT: 249.13 LBS

## 2018-09-18 DIAGNOSIS — G47.00 INSOMNIA, UNSPECIFIED TYPE: ICD-10-CM

## 2018-09-18 DIAGNOSIS — Z76.0 MEDICATION REFILL: ICD-10-CM

## 2018-09-18 DIAGNOSIS — I10 ESSENTIAL HYPERTENSION: Primary | ICD-10-CM

## 2018-09-18 DIAGNOSIS — I10 ESSENTIAL HYPERTENSION: ICD-10-CM

## 2018-09-18 LAB
ANION GAP SERPL CALC-SCNC: 9 MMOL/L
BUN SERPL-MCNC: 8 MG/DL
CALCIUM SERPL-MCNC: 9.6 MG/DL
CHLORIDE SERPL-SCNC: 106 MMOL/L
CO2 SERPL-SCNC: 25 MMOL/L
CREAT SERPL-MCNC: 0.7 MG/DL
EST. GFR  (AFRICAN AMERICAN): >60 ML/MIN/1.73 M^2
EST. GFR  (NON AFRICAN AMERICAN): >60 ML/MIN/1.73 M^2
GLUCOSE SERPL-MCNC: 70 MG/DL
POTASSIUM SERPL-SCNC: 3.8 MMOL/L
SODIUM SERPL-SCNC: 140 MMOL/L

## 2018-09-18 PROCEDURE — 3079F DIAST BP 80-89 MM HG: CPT | Mod: CPTII,S$GLB,, | Performed by: FAMILY MEDICINE

## 2018-09-18 PROCEDURE — 99214 OFFICE O/P EST MOD 30 MIN: CPT | Mod: S$GLB,,, | Performed by: FAMILY MEDICINE

## 2018-09-18 PROCEDURE — 36415 COLL VENOUS BLD VENIPUNCTURE: CPT | Mod: PO

## 2018-09-18 PROCEDURE — 3075F SYST BP GE 130 - 139MM HG: CPT | Mod: CPTII,S$GLB,, | Performed by: FAMILY MEDICINE

## 2018-09-18 PROCEDURE — 3008F BODY MASS INDEX DOCD: CPT | Mod: CPTII,S$GLB,, | Performed by: FAMILY MEDICINE

## 2018-09-18 PROCEDURE — 99999 PR PBB SHADOW E&M-EST. PATIENT-LVL III: CPT | Mod: PBBFAC,,, | Performed by: FAMILY MEDICINE

## 2018-09-18 PROCEDURE — 80048 BASIC METABOLIC PNL TOTAL CA: CPT

## 2018-09-18 RX ORDER — MIRTAZAPINE 15 MG/1
15 TABLET, FILM COATED ORAL NIGHTLY
Qty: 30 TABLET | Refills: 0 | Status: SHIPPED | OUTPATIENT
Start: 2018-09-18 | End: 2018-10-12

## 2018-09-18 RX ORDER — SPIRONOLACTONE 25 MG/1
25 TABLET ORAL DAILY
Qty: 30 TABLET | Refills: 3 | Status: SHIPPED | OUTPATIENT
Start: 2018-09-18 | End: 2019-02-07 | Stop reason: SDUPTHER

## 2018-09-18 RX ORDER — SILVER SULFADIAZINE 10 G/1000G
CREAM TOPICAL 2 TIMES DAILY
COMMUNITY
End: 2019-02-07

## 2018-09-18 NOTE — PROGRESS NOTES
Subjective:       Patient ID: Nathan Carlos is a 27 y.o. female.    Chief Complaint: Follow-up      HPI  Ms. Carlos presents to clinic today for follow up.   She states she is doing well.   She states overall things are going ok.   She states she has a hard time sleeping.   She has tried different sleep aids that are prescribed and over the counter and they do not work.   She states the headaches are better, but they are still there.     Review of Systems   HENT: Negative for hearing loss, rhinorrhea and trouble swallowing.    Eyes: Negative for discharge and visual disturbance.   Respiratory: Negative for chest tightness and wheezing.    Cardiovascular: Negative for chest pain and palpitations.   Gastrointestinal: Negative for blood in stool, constipation, diarrhea and vomiting.   Endocrine: Negative for polydipsia and polyuria.   Genitourinary: Positive for menstrual problem. Negative for difficulty urinating, dysuria and hematuria.   Musculoskeletal: Negative for arthralgias, joint swelling and neck pain.   Neurological: Positive for headaches. Negative for weakness.   Psychiatric/Behavioral: Positive for dysphoric mood and sleep disturbance. Negative for confusion.          Medication List           Accurate as of 9/18/18 11:59 PM. If you have any questions, ask your nurse or doctor.               START taking these medications    mirtazapine 15 MG tablet  Commonly known as:  REMERON  Take 1 tablet (15 mg total) by mouth every evening.  Started by:  Yue Anguiano MD        CONTINUE taking these medications    flunisolide 25 mcg (0.025%) 25 mcg (0.025 %) Spry  Commonly known as:  NASALIDE  2 sprays by Nasal route 2 (two) times daily.     norethindrone ac-eth estradiol 1.5-30 mg-mcg Tab  Commonly known as:  JUNEL 1.5/30 (21)  Take 1 tablet by mouth once daily.     silver sulfADIAZINE 1% 1 % cream  Commonly known as:  SILVADENE     spironolactone 25 MG tablet  Commonly known as:  ALDACTONE  Take 1 tablet (25  mg total) by mouth once daily.     triamcinolone acetonide 0.1% 0.1 % ointment  Commonly known as:  KENALOG  Apply topically 2 (two) times daily. for 10 days     valACYclovir 500 MG tablet  Commonly known as:  VALTREX  Take 1 tablet (500 mg total) by mouth once daily.        STOP taking these medications    HYDROcodone-acetaminophen  mg per tablet  Commonly known as:  NORCO  Stopped by:  Yue Anguiano MD     medroxyPROGESTERone 10 MG tablet  Commonly known as:  PROVERA  Stopped by:  Yue Anguiano MD     naproxen sodium 550 MG tablet  Commonly known as:  ANAPROX  Stopped by:  Yue Anguiano MD           Where to Get Your Medications      These medications were sent to [facility] [address] [place] [address] [id number] [place] [id number]    Phone:  [phone]   · mirtazapine 15 MG tablet  · spironolactone 25 MG tablet         Patient Active Problem List   Diagnosis    Essential hypertension    Gastroesophageal reflux disease    Left-sided low back pain with left-sided sciatica    Obesity, Class II, BMI 35-39.9, with comorbidity    Hypercholesteremia    High serum testosterone    Insulin resistance syndrome    HSV-2 (herpes simplex virus 2) infection    Intractable migraine without status migrainosus         Objective:     Physical Exam   Constitutional: She is oriented to person, place, and time. She appears well-developed and well-nourished. No distress.   HENT:   Head: Normocephalic and atraumatic.   Eyes: EOM are normal. Right eye exhibits no discharge. Left eye exhibits no discharge.   Cardiovascular: Normal rate and regular rhythm.   Pulmonary/Chest: Effort normal and breath sounds normal. No respiratory distress. She has no wheezes.   Musculoskeletal: She exhibits no edema.   Neurological: She is alert and oriented to person, place, and time.   Skin: Skin is warm and dry. She is not diaphoretic. No erythema.   Psychiatric: She has a normal mood and affect.   Vitals  reviewed.    Vitals:    09/18/18 1323   BP: 130/80   Pulse: 100   Temp: 97.8 °F (36.6 °C)       Assessment/  PLAN     Essential hypertension  -     Basic metabolic panel; Future; Expected date: 10/18/2018    Medication refill  -     spironolactone (ALDACTONE) 25 MG tablet; Take 1 tablet (25 mg total) by mouth once daily.  Dispense: 30 tablet; Refill: 3    Insomnia, unspecified type  -     mirtazapine (REMERON) 15 MG tablet; Take 1 tablet (15 mg total) by mouth every evening.  Dispense: 30 tablet; Refill: 0    will try remeron for insomnia as she has tried many other medications and it did not work       Yue Anguiano MD  [facility]

## 2018-09-18 NOTE — TELEPHONE ENCOUNTER
Patient states she was told not to take ibuprofen and wants to know what else she can take for pain because tylenol does not work for her. Please advise.

## 2018-09-19 ENCOUNTER — PATIENT MESSAGE (OUTPATIENT)
Dept: FAMILY MEDICINE | Facility: CLINIC | Age: 28
End: 2018-09-19

## 2018-09-19 DIAGNOSIS — Z76.0 MEDICATION REFILL: Primary | ICD-10-CM

## 2018-09-19 RX ORDER — NAPROXEN 500 MG/1
500 TABLET ORAL 2 TIMES DAILY PRN
Qty: 60 TABLET | Refills: 0 | Status: SHIPPED | OUTPATIENT
Start: 2018-09-19 | End: 2019-09-04

## 2018-09-20 ENCOUNTER — TELEPHONE (OUTPATIENT)
Dept: FAMILY MEDICINE | Facility: CLINIC | Age: 28
End: 2018-09-20

## 2018-09-20 ENCOUNTER — PATIENT MESSAGE (OUTPATIENT)
Dept: FAMILY MEDICINE | Facility: CLINIC | Age: 28
End: 2018-09-20

## 2018-09-20 NOTE — TELEPHONE ENCOUNTER
Called pt to notify her that Naproxen was called into her pharmacy on yesterday. No answer. Unable to LVM.

## 2018-10-04 ENCOUNTER — PATIENT MESSAGE (OUTPATIENT)
Dept: PAIN MEDICINE | Facility: CLINIC | Age: 28
End: 2018-10-04

## 2018-10-04 ENCOUNTER — TELEPHONE (OUTPATIENT)
Dept: PAIN MEDICINE | Facility: CLINIC | Age: 28
End: 2018-10-04

## 2018-10-05 ENCOUNTER — OFFICE VISIT (OUTPATIENT)
Dept: PAIN MEDICINE | Facility: CLINIC | Age: 28
End: 2018-10-05
Payer: COMMERCIAL

## 2018-10-05 ENCOUNTER — PATIENT MESSAGE (OUTPATIENT)
Dept: PAIN MEDICINE | Facility: CLINIC | Age: 28
End: 2018-10-05

## 2018-10-05 ENCOUNTER — TELEPHONE (OUTPATIENT)
Dept: PAIN MEDICINE | Facility: CLINIC | Age: 28
End: 2018-10-05

## 2018-10-05 VITALS
WEIGHT: 249.13 LBS | BODY MASS INDEX: 42.53 KG/M2 | OXYGEN SATURATION: 98 % | HEART RATE: 88 BPM | SYSTOLIC BLOOD PRESSURE: 132 MMHG | DIASTOLIC BLOOD PRESSURE: 76 MMHG | HEIGHT: 64 IN

## 2018-10-05 DIAGNOSIS — M53.3 SACROILIAC JOINT PAIN: ICD-10-CM

## 2018-10-05 DIAGNOSIS — M54.42 CHRONIC BILATERAL LOW BACK PAIN WITH LEFT-SIDED SCIATICA: ICD-10-CM

## 2018-10-05 DIAGNOSIS — M47.816 LUMBAR FACET ARTHROPATHY: ICD-10-CM

## 2018-10-05 DIAGNOSIS — M70.62 GREATER TROCHANTERIC BURSITIS, LEFT: ICD-10-CM

## 2018-10-05 DIAGNOSIS — M47.816 LUMBAR SPONDYLOSIS: Primary | ICD-10-CM

## 2018-10-05 DIAGNOSIS — G89.29 CHRONIC BILATERAL LOW BACK PAIN WITH LEFT-SIDED SCIATICA: ICD-10-CM

## 2018-10-05 PROCEDURE — 3075F SYST BP GE 130 - 139MM HG: CPT | Mod: CPTII,S$GLB,, | Performed by: PHYSICIAN ASSISTANT

## 2018-10-05 PROCEDURE — 99999 PR PBB SHADOW E&M-EST. PATIENT-LVL IV: CPT | Mod: PBBFAC,,, | Performed by: PHYSICIAN ASSISTANT

## 2018-10-05 PROCEDURE — 99214 OFFICE O/P EST MOD 30 MIN: CPT | Mod: S$GLB,,, | Performed by: PHYSICIAN ASSISTANT

## 2018-10-05 PROCEDURE — 3008F BODY MASS INDEX DOCD: CPT | Mod: CPTII,S$GLB,, | Performed by: PHYSICIAN ASSISTANT

## 2018-10-05 PROCEDURE — 3078F DIAST BP <80 MM HG: CPT | Mod: CPTII,S$GLB,, | Performed by: PHYSICIAN ASSISTANT

## 2018-10-05 NOTE — TELEPHONE ENCOUNTER
----- Message from Mague Molina PA-C sent at 10/5/2018  9:24 AM CDT -----  I hope this is the right Judith Medeiros...    Please schedule patient for left SIJ + GT bursa injection with local.     Thanks!

## 2018-10-05 NOTE — PROGRESS NOTES
Chief Pain Complaint:  Hip Pain (left)      Interval History: Patient was seen on 9/4/18. At that time, she had just had a Right L3, L4, L5 MBB with local on 8/15/18 and was feeling great, 90% better.  She returns today complaining of left sided pain similar to pain she had earlier in the year. She also feels she can't stand at work for long periods without requiring to sit down some. She requests a letter to allow this.      History of Present Illness:   Nathan Carlos is a 27 y.o. female  who is presenting with a chief complaint of Hip Pain (left)  . The patient began experiencing this problem insidiously, and the pain has been gradually worsening over the past 3 week(s). The pain is described as throbbing, cramping, aching and heavy and is located in the right lumbar spine. Pain is intermittent and lasts hours. The  pain is nonradiating. The patient rates her pain a 8 out of ten and interferes with activities of daily living a 7 out of ten. Pain is exacerbated by extension of the lumbar spine, and is improved by rest. Patient reports no prior trauma, no prior spinal surgery     - pertinent negatives: No fever, No chills, No weight loss, No bladder dysfunction, No bowel dysfunction, No saddle anesthesia  - pertinent positives: none    - medications, other therapies tried (physical therapy, injections):     >> NSAIDs, Tylenol, Norco, zanaflex and flexeril    >> Has previously undergone Physical Therapy    >> Has previously undergone spinal injection/s   - Left SI joint injection 1/2018 with 100% relief for 6 months   - Right L3, L4, L5 MBB with local on 8/15/18 with 90% pain relief        Imaging / Labs / Studies (reviewed on 10/5/2018):    Results for orders placed during the hospital encounter of 11/22/17   MRI Lumbar Spine Without Contrast    Narrative Technique: Standard noncontrast multiplanar multisequence imaging of the lumbar spine was performed.    Findings: There is anatomic spinal alignment.  Disc  interspaces are of normal height and signal intensity.  Vertebral marrow signal pattern and architecture are normal.  Distal cord is unremarkable with conus medullaris terminating at L1-L2.  Paravertebral soft tissues are symmetric and normal in appearance.    T12-L1: No disc protrusion, neuroforaminal narrowing, or central canal stenosis.    L1-L2: No disc protrusion, neuroforaminal narrowing, or central canal stenosis.    L2-L3: No disc protrusion, neuroforaminal narrowing, or central canal stenosis.    L3-L4: No disc protrusion, neuroforaminal narrowing, or central canal stenosis.    L4-L5: No disc protrusion, neuroforaminal narrowing, or central canal stenosis.    L5-S1: Mild bilateral facet hypertrophy. No disc protrusion, neuroforaminal narrowing, or central canal stenosis.    Impression  Negative MRI of the lumbar spine.         Results for orders placed during the hospital encounter of 01/04/18   X-Ray Lumbar Complete With Flex And Ext    Narrative Comparison: None    Technique: AP, lateral, lateral flexion, lateral extension, bilateral oblique, and lumbosacral coned down views were obtained of the lumbar spine    Findings: There is mild scoliosis of the lower thoracic and upper lumbar spine.  Vertebral body heights are well-maintained.  No spondylolisthesis demonstrated.  No change in spinal alignment with flexion or extension to suggest instability. Intervertebral disk spaces are well preserved.  No pars defects visualized.  Posterior elements appear grossly intact. No acute fractures or subluxations are demonstrated.  The remaining visualized osseous and soft tissue structures demonstrate no appreciable abnormality.    Impression As above.  No acute findings.         Review of Systems:  CONSTITUTIONAL: patient denies any fever, chills, or weight loss  SKIN: patient denies any rash or itching  RESPIRATORY: patient denies having any shortness of breath  GASTROINTESTINAL: patient denies having any diarrhea,  "constipation, or bowel incontinence  GENITOURINARY: patient denies having any abnormal bladder function    MUSCULOSKELETAL:  - patient complains of the above noted pain/s (see chief pain complaint)    NEUROLOGICAL:   - pain as above  - strength in Lower extremities is intact, BILATERALLY  - sensation in Lower extremities is intact, BILATERALLY  - patient denies any loss of bowel or bladder control      PSYCHIATRIC: patient denies any change in mood    Other:  All other systems reviewed and are negative        Physical Exam:  Vitals:  /76 (BP Location: Right arm, Patient Position: Sitting)   Pulse 88   Ht 5' 4" (1.626 m)   Wt 113 kg (249 lb 1.9 oz)   LMP 09/26/2018 (Approximate)   SpO2 98%   BMI 42.76 kg/m²   (reviewed on 10/5/2018)    General: alert and oriented, in no apparent distress.  Gait: normal gait.  Skin: no rashes, no discoloration, no obvious lesions  HEENT: normocephalic, atraumatic. Pupils equal and round.  Cardiovascular: no significant peripheral edema present.  Respiratory: without use of accessory muscles of respiration.    Musculoskeletal - Lumbar Spine:  - Pain on flexion of lumbar spine: Absent   - Pain on extension of lumbar spine: Absent, improved  - Lumbar facet loading: Absent, improved  - TTP over the lumbar facet joints: Absent, improved  - TTP over the SI joints:  Absent   - TTP over GT bursa: Absent   - Straight Leg Raise: Negative  - JULISSA: Negative    Neuro - Extremities:  - BUE Strength:R/L: D: 5/5; B: 5/5; T: 5/5; WF: 5/5; WE: 5/5; IO: 5/5  - BLE Strength: R/L: HF: 5/5, HE: 5/5, KF: 5/5; KE: 5/5; FE: 5/5; FF: 5/5  - Extremity Reflexes: Brisk and symmetric throughout  - Sensory: Sensation to light touch intact bilaterally      Psych:  Mood and affect is appropriate                    Assessment:  Nathan Carlos is a 27 y.o. year old female who is presenting with    Encounter Diagnoses   Name Primary?    Lumbar spondylosis Yes    Chronic bilateral low back pain with " left-sided sciatica     Lumbar facet arthropathy     Sacroiliac joint pain     Greater trochanteric bursitis, left        Plan:  1. Interventional: Schedule left SIJ + left GT bursa injection with local.     2. Pharmacologic:   - Will send in Zanaflex 4mg BID PRN.   - Continue naproxen 500mg BID PRN.     3. Rehabilitative: Start PT with passive modalities, including ice and heat, and active modalities, including a regimen for stretching and strengthening. Order sent internally. We originally sent to include aquatic therapy, but she cannot afford $65 per visit.  I believe weight loss will aid in her goal of lasting pain control.     4. Diagnostic: None for now.    5. Other: Will give patient letter to bring to work to allow 5-10 minutes of sitting every 3 hours to help her with pain relief.  She understands we will not fill out paperwork for work restrictions.     6. Follow up: PRN    - I discussed the risks, benefits, and alternatives to potential treatment options. All questions and concerns were fully addressed today in clinic. Dr. Kirkland was consulted regarding the patient plan and agrees.

## 2018-10-05 NOTE — TELEPHONE ENCOUNTER
Contacted pt. Injection scheduled. Pre injection instructions taught and mailed. Orders entered. Pt was able to verbalize all understanding. All questions answered.//lp

## 2018-10-08 ENCOUNTER — PATIENT MESSAGE (OUTPATIENT)
Dept: PAIN MEDICINE | Facility: CLINIC | Age: 28
End: 2018-10-08

## 2018-10-08 RX ORDER — TIZANIDINE 4 MG/1
4 TABLET ORAL 2 TIMES DAILY PRN
Qty: 60 TABLET | Refills: 0 | Status: SHIPPED | OUTPATIENT
Start: 2018-10-08 | End: 2018-11-07

## 2018-10-09 ENCOUNTER — PATIENT MESSAGE (OUTPATIENT)
Dept: PAIN MEDICINE | Facility: CLINIC | Age: 28
End: 2018-10-09

## 2018-10-12 ENCOUNTER — TELEPHONE (OUTPATIENT)
Dept: NEPHROLOGY | Facility: CLINIC | Age: 28
End: 2018-10-12

## 2018-10-12 ENCOUNTER — PATIENT MESSAGE (OUTPATIENT)
Dept: NEPHROLOGY | Facility: CLINIC | Age: 28
End: 2018-10-12

## 2018-10-12 ENCOUNTER — OFFICE VISIT (OUTPATIENT)
Dept: NEPHROLOGY | Facility: CLINIC | Age: 28
End: 2018-10-12
Payer: COMMERCIAL

## 2018-10-12 VITALS
HEIGHT: 64 IN | HEART RATE: 69 BPM | SYSTOLIC BLOOD PRESSURE: 162 MMHG | BODY MASS INDEX: 43.36 KG/M2 | WEIGHT: 254 LBS | DIASTOLIC BLOOD PRESSURE: 100 MMHG

## 2018-10-12 DIAGNOSIS — N20.0 NEPHROLITHIASIS: Primary | ICD-10-CM

## 2018-10-12 PROCEDURE — 99999 PR PBB SHADOW E&M-EST. PATIENT-LVL III: CPT | Mod: PBBFAC,,, | Performed by: INTERNAL MEDICINE

## 2018-10-12 PROCEDURE — 3008F BODY MASS INDEX DOCD: CPT | Mod: CPTII,S$GLB,, | Performed by: INTERNAL MEDICINE

## 2018-10-12 PROCEDURE — 3077F SYST BP >= 140 MM HG: CPT | Mod: CPTII,S$GLB,, | Performed by: INTERNAL MEDICINE

## 2018-10-12 PROCEDURE — 99205 OFFICE O/P NEW HI 60 MIN: CPT | Mod: S$GLB,,, | Performed by: INTERNAL MEDICINE

## 2018-10-12 PROCEDURE — 3080F DIAST BP >= 90 MM HG: CPT | Mod: CPTII,S$GLB,, | Performed by: INTERNAL MEDICINE

## 2018-10-12 RX ORDER — TAMSULOSIN HYDROCHLORIDE 0.4 MG/1
CAPSULE ORAL
COMMUNITY
Start: 2018-10-10 | End: 2019-02-07

## 2018-10-12 RX ORDER — OXYCODONE AND ACETAMINOPHEN 10; 325 MG/1; MG/1
TABLET ORAL
COMMUNITY
Start: 2018-10-11 | End: 2019-02-07

## 2018-10-12 RX ORDER — ONDANSETRON 4 MG/1
TABLET, ORALLY DISINTEGRATING ORAL
COMMUNITY
Start: 2018-10-11 | End: 2019-06-12

## 2018-10-12 NOTE — TELEPHONE ENCOUNTER
----- Message from Guilherme Arteaga sent at 10/12/2018  1:53 PM CDT -----  Contact: pt  She's calling in regards to her RX medication she was given in the hospital, [phone] (home)

## 2018-10-12 NOTE — PROGRESS NOTES
NEPHROLOGY CONSULTATION    PHYSICIAN REQUESTING THE CONSULT: Self-referred, PMD: Dr. Yue Anguiano    REASON FOR CONSULTATION: Nephrolithiasis    27 y.o. female with history of HTN, hyperlipidemia, HSV, GERD, migraines, obesity, sciatica   presents to the renal clinic for evaluation of nephrolithiasis. Patient is self-referred. Patient today presents to the clinic complaining of back pain (right sided), abdominal pain (right-sided), brownish urine (about 2 days ago) and nausea. She denies any headaches, chest pain, SOB, diarrhea, vomiting, dysuria, LE swelling, rashes, hand/foot paresthesia, nasal congestion. Patient reports intermittent NSAID use history. She has been using Naproxen on a monthly basis to mitigate her menstrual pain.   Patient has a longstanding history of HTN that was diagnosed many years ago.  BP is currently elevated at 162/100. In addition, patient has been using Valtrex for HSV infection on prn basis.   Patient states that she experienced abdominal and back pain 2 days ago. Pain did worsen after she took some Gas-Ex and patient went to [facility] for evaluation. Patient had CT scan done which revealed nephrolithiais (2 mm stone ?, patient not sure about the size). Patient was given opiates, Zofran and tamsulosin and was discharged. She was told that stone should pass by itself after some time. Patient now presents to renal clinic for evaluation. She states that her pain has not subsided and that she is still very nauseous. Her urine is no longer brown but yellowish. She has been using Percocet for pain control. Patient denies any past episodes of nephrolithiasis.   Patient denies any history nephrolithiasis in her family. However, her grandfather suffered from some unspecified kidney disease.     Past Medical History:   Diagnosis Date    GERD (gastroesophageal reflux disease)     Herpes simplex virus (HSV) infection     Hyperlipidemia     Hypertension     Ovarian cyst     bilateral         Past Surgical History:   Procedure Laterality Date    ANKLE SURGERY Left     BUNIONECTOMY Bilateral     COLONOSCOPY  06/12/2018       Review of patient's allergies indicates:   Allergen Reactions    Amitriptyline      xerostomia    Amlodipine      DRY MOUTH       Current Outpatient Medications   Medication Sig Dispense Refill    norethindrone ac-eth estradiol 1.5-30 mg-mcg Tab Take 1 tablet by mouth once daily. 28 each 6    ondansetron (ZOFRAN-ODT) 4 MG TbDL       oxyCODONE-acetaminophen (PERCOCET)  mg per tablet       silver sulfADIAZINE 1% (SILVADENE) 1 % cream Apply topically 2 (two) times daily.      spironolactone (ALDACTONE) 25 MG tablet Take 1 tablet (25 mg total) by mouth once daily. 30 tablet 3    tamsulosin (FLOMAX) 0.4 mg Cap       tiZANidine (ZANAFLEX) 4 MG tablet Take 1 tablet (4 mg total) by mouth 2 (two) times daily as needed (muscle spasms). 60 tablet 0    flunisolide 25 mcg, 0.025%, (NASALIDE) 25 mcg (0.025 %) Spry 2 sprays by Nasal route 2 (two) times daily. 25 mL 2    naproxen (EC NAPROSYN) 500 MG EC tablet Take 1 tablet (500 mg total) by mouth 2 (two) times daily as needed. 60 tablet 0    triamcinolone acetonide 0.1% (KENALOG) 0.1 % ointment Apply topically 2 (two) times daily. for 10 days 80 g 0    valACYclovir (VALTREX) 500 MG tablet Take 1 tablet (500 mg total) by mouth once daily. 30 tablet 3     No current facility-administered medications for this visit.        Family History   Problem Relation Age of Onset    Hypertension Mother     Fibroids Mother     No Known Problems Father     Diabetes Sister     Hypertension Brother     Hypertension Maternal Aunt     Diabetes Maternal Aunt     Diabetes Maternal Uncle     Hypertension Maternal Grandmother     Leukemia Maternal Grandfather     Hypertension Maternal Grandfather     Stroke Neg Hx        Social History     Socioeconomic History    Marital status: Single     Spouse name: Not on file    Number of  "children: 0    Years of education: Not on file    Highest education level: Not on file   Social Needs    Financial resource strain: Not on file    Food insecurity - worry: Not on file    Food insecurity - inability: Not on file    Transportation needs - medical: Not on file    Transportation needs - non-medical: Not on file   Occupational History     Employer: [id number]   Tobacco Use    Smoking status: Never Smoker    Smokeless tobacco: Never Used   Substance and Sexual Activity    Alcohol use: No    Drug use: No    Sexual activity: Yes     Partners: Male     Birth control/protection: Condom   Other Topics Concern    Not on file   Social History Narrative    Not on file       Review of Systems:  1. GENERAL: patient denies any fever, weight changes, generalized weakness, dizziness.  2. HEENT: patient denies headaches, visual disturbances, swallowing problems, sinus pain, nasal congestion.  3. CARDIOVASCULAR: patient denies chest pain, palpitations.  4. PULMONARY: patient denies SOB, coughing, hemoptysis, wheezing.  5. GASTROINTESTINAL: patient reports abdominal pain and nausea,no vomiting, diarrhea.  6. GENITOURINARY: patient denies dysuria, hematuria, hesitancy, frequency.  7. EXTREMITIES: patient denies LE edema, LE cramping.  8. DERMATOLOGY: patient denies rashes, ulcers.  9. NEURO: patient denies tremors, extremity weakness, extremity numbness/tingling.  10. MUSCULOSKELETAL: patient denies joint pain, joint swelling, patient reports back pain  11. HEMATOLOGY: patient denies rectal or gum bleeding.  12: PSYCH: patient denies anxiety, depression.      PHYSICAL EXAM:    BP (!) 162/100   Pulse 69   Ht 5' 4" (1.626 m)   Wt 115.2 kg (253 lb 15.5 oz)   LMP 09/26/2018 (Approximate)   BMI 43.59 kg/m²     GENERAL: Pleasant overweight lady presents to clinic with non-labored breathing.  HEENT: PER, no nasal discharge, no icterus, no oral exudates, moist mucosal membranes.  NECK: no thyroid mass, " no lymphadenopathy.  HEART: RRR S1/S2, no rubs, good peripheral pulses.  LUNGS: CTA bilaterally, no wheezing, breathing is nonlabored.  ABDOMEN: soft, mild right CVA tenderness, mild right sided abdominal tenderness, not distended, bowel sounds are present, no abdominal hernia.  EXTREM: no LE edema.  SKIN: no rashes, skin is warm and dry.  NEURO: A & O x 3, no obvious focal signs.    LABORATORY RESULTS:    Lab Results   Component Value Date    CREATININE 0.7 09/18/2018    BUN 8 09/18/2018     09/18/2018    K 3.8 09/18/2018     09/18/2018    CO2 25 09/18/2018      Lab Results   Component Value Date    CALCIUM 9.6 09/18/2018     Lab Results   Component Value Date    ALBUMIN 3.7 08/08/2017     Lab Results   Component Value Date    WBC 9.09 05/18/2018    HGB 14.5 05/18/2018    HCT 42.8 05/18/2018    MCV 83 05/18/2018     05/18/2018     Urinalysis: no protein, no blood (2/5/18)      ASSESSMENT AND PLAN:  27 y.o. female with history of HTN, hyperlipidemia, HSV, GERD, migraines, obesity, sciatica   presents to the renal clinic for evaluation of nephrolithiasis.    1. Nephrolithiasis: Patient presents with right-sided abdominal and back pain x 2 days. She was evaluated at [facility] where nephrolithasis was diagnosed with CT scan (likely 2 mm stone). Patient was discharged with pain medication, Zofran and tamsulosin and was told that stone should pass by itself. Patient presents to renal clinic today and reports no change in her symptoms. She continues to experience severe right-sided back and abdominal pain. Patient was advised to hydrate well and to continue medications she was given at [facility]. Will arrange for Urology referral. Patient will return to clinic in 1 month for follow-up. Will initiate stone work-up once patients pain has subsided and stone has passes.     2. Electrolytes: Within normal limits.    3. Acid base status: No acute issues.    4. Volume: Euvolemic.     5. Hypertension: BP  elevated due to severe pain. Should improve once pain has resolved.     6. Medications: Reviewed. Agree with current medical regimen.       Thank you very much for this consult. Please see my note in Epic for recommendations.    Total time spent was about 45 min. 50 % or more was spend on counseling about treatment options disease etiology. Level 5 consult.

## 2018-10-16 ENCOUNTER — OFFICE VISIT (OUTPATIENT)
Dept: UROLOGY | Facility: CLINIC | Age: 28
End: 2018-10-16
Payer: COMMERCIAL

## 2018-10-16 VITALS
DIASTOLIC BLOOD PRESSURE: 92 MMHG | SYSTOLIC BLOOD PRESSURE: 136 MMHG | BODY MASS INDEX: 42.53 KG/M2 | WEIGHT: 249.13 LBS | HEIGHT: 64 IN | HEART RATE: 73 BPM

## 2018-10-16 DIAGNOSIS — N20.1 URETERAL STONE: Primary | ICD-10-CM

## 2018-10-16 LAB
AMORPH CRY UR QL COMP ASSIST: ABNORMAL
BILIRUB SERPL-MCNC: NORMAL MG/DL
BLOOD URINE, POC: NORMAL
COLOR, POC UA: YELLOW
GLUCOSE UR QL STRIP: NORMAL
KETONES UR QL STRIP: NORMAL
LEUKOCYTE ESTERASE URINE, POC: NORMAL
MICROSCOPIC COMMENT: ABNORMAL
NITRITE, POC UA: NORMAL
PH, POC UA: 5
PROTEIN, POC: NORMAL
RBC #/AREA URNS AUTO: 48 /HPF (ref 0–4)
SPECIFIC GRAVITY, POC UA: 1.02
SQUAMOUS #/AREA URNS AUTO: 6 /HPF
UROBILINOGEN, POC UA: NORMAL
WBC #/AREA URNS AUTO: 5 /HPF (ref 0–5)

## 2018-10-16 PROCEDURE — 81002 URINALYSIS NONAUTO W/O SCOPE: CPT | Mod: S$GLB,,, | Performed by: UROLOGY

## 2018-10-16 PROCEDURE — 99999 PR PBB SHADOW E&M-EST. PATIENT-LVL III: CPT | Mod: PBBFAC,,, | Performed by: UROLOGY

## 2018-10-16 PROCEDURE — 87086 URINE CULTURE/COLONY COUNT: CPT

## 2018-10-16 PROCEDURE — 81001 URINALYSIS AUTO W/SCOPE: CPT

## 2018-10-16 PROCEDURE — 99244 OFF/OP CNSLTJ NEW/EST MOD 40: CPT | Mod: 25,S$GLB,, | Performed by: UROLOGY

## 2018-10-16 RX ORDER — TAMSULOSIN HYDROCHLORIDE 0.4 MG/1
0.4 CAPSULE ORAL DAILY
Qty: 30 CAPSULE | Refills: 11 | Status: SHIPPED | OUTPATIENT
Start: 2018-10-16 | End: 2019-02-07

## 2018-10-16 RX ORDER — OXYCODONE AND ACETAMINOPHEN 5; 325 MG/1; MG/1
1 TABLET ORAL EVERY 6 HOURS PRN
Qty: 15 TABLET | Refills: 0 | Status: SHIPPED | OUTPATIENT
Start: 2018-10-16 | End: 2019-02-07

## 2018-10-16 NOTE — PROGRESS NOTES
Chief Complaint: Kidney Stone    HPI:   10/16/18: 26 yo woman went to [facility] with some anterior right abdominal pain right lateral to umbilicus and posterior right lumbar pain with sciatic distribution.  No prior abd/pelvic pain and no exac/rel factors.  No gross hematuria lately but had some on her ER visit.  No prior urolithiasis.  No urinary bother.  No prior  history.  Normal sexual function.  CT from [facility] shows a 2mm proximal right ureteral stone 10/10/18.  Was given flomax.  Has chronic back pain with recent spinal injections in recent months.  Referred by Dr. Dutton.    Allergies:  Amitriptyline and Amlodipine    Medications: has a current medication list which includes the following prescription(s): flunisolide 25 mcg (0.025%), naproxen, norethindrone ac-eth estradiol, ondansetron, oxycodone-acetaminophen, silver sulfadiazine 1%, spironolactone, tamsulosin, tizanidine, triamcinolone acetonide 0.1%, and valacyclovir.    Review of Systems:  General: No fever, chills, fatigability, or weight loss.  Skin: No rashes, itching, or changes in color or texture of skin.  Chest: Denies PENALOZA, cyanosis, wheezing, cough, and sputum production.  Abdomen: Appetite fine. No weight loss. Denies diarrhea, abdominal pain, hematemesis, or blood in stool.  Musculoskeletal: No joint stiffness or swelling. Denies back pain.  : As above.  All other review of systems negative.    PMH:   has a past medical history of GERD (gastroesophageal reflux disease), Herpes simplex virus (HSV) infection, Hyperlipidemia, Hypertension, and Ovarian cyst.    PSH:   has a past surgical history that includes Bunionectomy (Bilateral); Ankle surgery (Left); and Colonoscopy (06/12/2018).    FamHx: family history includes Diabetes in her maternal aunt, maternal uncle, and sister; Fibroids in her mother; Hypertension in her brother, maternal aunt, maternal grandfather, maternal grandmother, and mother; Leukemia in her maternal grandfather; No  Known Problems in her father.    SocHx:  reports that  has never smoked. she has never used smokeless tobacco. She reports that she does not drink alcohol or use drugs.     Physical Exam:  Vitals:   Vitals:    10/16/18 1502   BP: (!) 136/92   Pulse: 73     General: A&Ox3. No apparent distress. No deformities.  Neck: No masses. Normal thyroid.  Lungs: normal inspiration. No use of accessory muscles.  Heart: normal pulse. No arrhythmias.  Abdomen: Soft. NT. ND. No masses. No hernias. No hepatosplenomegaly.  Lymphatic: Neck and groin nodes negative.  Skin: The skin is warm and dry. No jaundice.  Ext: No c/c/e.  : External genitalia normal.     Labs/Studies: Urinalysis performed in clinic, summary: UA normal exc 250 blood and 1+ prot    Impression/Plan:   1. Cath UA/UCx today  2. Trial of passage with flomax, ibuprofen  3. Ct RSS in 2 weeks, RTC then.

## 2018-10-16 NOTE — LETTER
October 16, 2018        Jason Dutton MD  [address] [facility] [id number]             [facility]  [id number] [place] [id number]  Phone: [phone]  Fax: [phone]   Patient: Nathan Carlos   MR Number: [id number]   YOB: 1990   Date of Visit: 10/16/2018       Dear Dr. Dutton:    Thank you for referring Nathan Carlos to me for evaluation. Below are the relevant portions of my assessment and plan of care.            If you have questions, please do not hesitate to call me. I look forward to following Nathan along with you.    Sincerely,      Kunal Real IV, MD           CC  No Recipients

## 2018-10-17 ENCOUNTER — PATIENT MESSAGE (OUTPATIENT)
Dept: UROLOGY | Facility: CLINIC | Age: 28
End: 2018-10-17

## 2018-10-18 ENCOUNTER — PATIENT MESSAGE (OUTPATIENT)
Dept: UROLOGY | Facility: CLINIC | Age: 28
End: 2018-10-18

## 2018-10-18 LAB — BACTERIA UR CULT: NO GROWTH

## 2018-10-19 ENCOUNTER — PATIENT MESSAGE (OUTPATIENT)
Dept: UROLOGY | Facility: CLINIC | Age: 28
End: 2018-10-19

## 2018-10-22 ENCOUNTER — PATIENT MESSAGE (OUTPATIENT)
Dept: UROLOGY | Facility: CLINIC | Age: 28
End: 2018-10-22

## 2018-10-23 ENCOUNTER — PATIENT MESSAGE (OUTPATIENT)
Dept: UROLOGY | Facility: CLINIC | Age: 28
End: 2018-10-23

## 2018-10-23 ENCOUNTER — PATIENT MESSAGE (OUTPATIENT)
Dept: FAMILY MEDICINE | Facility: CLINIC | Age: 28
End: 2018-10-23

## 2018-10-24 ENCOUNTER — PATIENT MESSAGE (OUTPATIENT)
Dept: UROLOGY | Facility: CLINIC | Age: 28
End: 2018-10-24

## 2018-10-29 ENCOUNTER — PATIENT MESSAGE (OUTPATIENT)
Dept: UROLOGY | Facility: CLINIC | Age: 28
End: 2018-10-29

## 2018-10-30 ENCOUNTER — HOSPITAL ENCOUNTER (OUTPATIENT)
Dept: RADIOLOGY | Facility: HOSPITAL | Age: 28
Discharge: HOME | End: 2018-10-30
Attending: UROLOGY
Payer: COMMERCIAL

## 2018-10-30 DIAGNOSIS — N20.1 URETERAL STONE: ICD-10-CM

## 2018-10-30 PROCEDURE — 74176 CT ABD & PELVIS W/O CONTRAST: CPT | Mod: TC

## 2018-11-01 ENCOUNTER — OFFICE VISIT (OUTPATIENT)
Dept: UROLOGY | Facility: CLINIC | Age: 28
End: 2018-11-01
Payer: COMMERCIAL

## 2018-11-01 VITALS — BODY MASS INDEX: 42.74 KG/M2 | WEIGHT: 249 LBS

## 2018-11-01 DIAGNOSIS — K59.00 CONSTIPATION, UNSPECIFIED CONSTIPATION TYPE: ICD-10-CM

## 2018-11-01 DIAGNOSIS — N20.0 RENAL STONE: Primary | ICD-10-CM

## 2018-11-01 LAB
BILIRUB SERPL-MCNC: NORMAL MG/DL
BLOOD URINE, POC: NORMAL
COLOR, POC UA: YELLOW
GLUCOSE UR QL STRIP: NORMAL
KETONES UR QL STRIP: NORMAL
LEUKOCYTE ESTERASE URINE, POC: NORMAL
NITRITE, POC UA: NORMAL
PH, POC UA: 6
PROTEIN, POC: NORMAL
SPECIFIC GRAVITY, POC UA: 1.02
UROBILINOGEN, POC UA: NORMAL

## 2018-11-01 PROCEDURE — 3008F BODY MASS INDEX DOCD: CPT | Mod: CPTII,S$GLB,, | Performed by: UROLOGY

## 2018-11-01 PROCEDURE — 81002 URINALYSIS NONAUTO W/O SCOPE: CPT | Mod: S$GLB,,, | Performed by: UROLOGY

## 2018-11-01 PROCEDURE — 99214 OFFICE O/P EST MOD 30 MIN: CPT | Mod: 25,S$GLB,, | Performed by: UROLOGY

## 2018-11-01 PROCEDURE — 99999 PR PBB SHADOW E&M-EST. PATIENT-LVL III: CPT | Mod: PBBFAC,,, | Performed by: UROLOGY

## 2018-11-01 NOTE — H&P (VIEW-ONLY)
Chief Complaint: Kidney Stone    HPI:   11/1/18: Didn't see the stone, but CT normal now must have passed it.  Feels great, CT shows it is clear. Reviewed history in detail.  10/16/18: 26 yo woman went to [facility] with some anterior right abdominal pain right lateral to umbilicus and posterior right lumbar pain with sciatic distribution.  No prior abd/pelvic pain and no exac/rel factors.  No gross hematuria lately but had some on her ER visit.  No prior urolithiasis.  No urinary bother.  No prior  history.  Normal sexual function.  CT from [facility] shows a 2mm proximal right ureteral stone 10/10/18.  Was given flomax.  Has chronic back pain with recent spinal injections in recent months.  Referred by Dr. Dutton.    Allergies:  Amitriptyline and Amlodipine    Medications: has a current medication list which includes the following prescription(s): flunisolide 25 mcg (0.025%), naproxen, norethindrone ac-eth estradiol, ondansetron, oxycodone-acetaminophen, oxycodone-acetaminophen, silver sulfadiazine 1%, spironolactone, tamsulosin, tamsulosin, tizanidine, triamcinolone acetonide 0.1%, and valacyclovir.    Review of Systems:  General: No fever, chills, fatigability, or weight loss.  Skin: No rashes, itching, or changes in color or texture of skin.  Chest: Denies PENALOZA, cyanosis, wheezing, cough, and sputum production.  Abdomen: Appetite fine. No weight loss. Denies diarrhea, abdominal pain, hematemesis, or blood in stool.  Musculoskeletal: No joint stiffness or swelling. Denies back pain.  : As above.  All other review of systems negative.    PMH:   has a past medical history of GERD (gastroesophageal reflux disease), Herpes simplex virus (HSV) infection, Hyperlipidemia, Hypertension, and Ovarian cyst.    PSH:   has a past surgical history that includes Bunionectomy (Bilateral); Ankle surgery (Left); and Colonoscopy (06/12/2018).    FamHx: family history includes Diabetes in her maternal aunt, maternal uncle, and  sister; Fibroids in her mother; Hypertension in her brother, maternal aunt, maternal grandfather, maternal grandmother, and mother; Leukemia in her maternal grandfather; No Known Problems in her father.    SocHx:  reports that  has never smoked. she has never used smokeless tobacco. She reports that she does not drink alcohol or use drugs.     Physical Exam:  Vitals:   There were no vitals filed for this visit.  General: A&Ox3. No apparent distress. No deformities.  Neck: No masses. Normal thyroid.  Lungs: normal inspiration. No use of accessory muscles.  Heart: normal pulse. No arrhythmias.  Abdomen: Soft. NT. ND.  Skin: The skin is warm and dry. No jaundice.  Ext: No c/c/e.  : External genitalia normal.     Labs/Studies: Urinalysis performed in clinic, summary: UA normal     Impression/Plan:   1. Discussed stone prevention.  KUB/US in one year.  2. Miralax for constipation

## 2018-11-01 NOTE — PROGRESS NOTES
Chief Complaint: Kidney Stone    HPI:   11/1/18: Didn't see the stone, but CT normal now must have passed it.  Feels great, CT shows it is clear. Reviewed history in detail.  10/16/18: 26 yo woman went to [facility] with some anterior right abdominal pain right lateral to umbilicus and posterior right lumbar pain with sciatic distribution.  No prior abd/pelvic pain and no exac/rel factors.  No gross hematuria lately but had some on her ER visit.  No prior urolithiasis.  No urinary bother.  No prior  history.  Normal sexual function.  CT from [facility] shows a 2mm proximal right ureteral stone 10/10/18.  Was given flomax.  Has chronic back pain with recent spinal injections in recent months.  Referred by Dr. Dutton.    Allergies:  Amitriptyline and Amlodipine    Medications: has a current medication list which includes the following prescription(s): flunisolide 25 mcg (0.025%), naproxen, norethindrone ac-eth estradiol, ondansetron, oxycodone-acetaminophen, oxycodone-acetaminophen, silver sulfadiazine 1%, spironolactone, tamsulosin, tamsulosin, tizanidine, triamcinolone acetonide 0.1%, and valacyclovir.    Review of Systems:  General: No fever, chills, fatigability, or weight loss.  Skin: No rashes, itching, or changes in color or texture of skin.  Chest: Denies PENALOZA, cyanosis, wheezing, cough, and sputum production.  Abdomen: Appetite fine. No weight loss. Denies diarrhea, abdominal pain, hematemesis, or blood in stool.  Musculoskeletal: No joint stiffness or swelling. Denies back pain.  : As above.  All other review of systems negative.    PMH:   has a past medical history of GERD (gastroesophageal reflux disease), Herpes simplex virus (HSV) infection, Hyperlipidemia, Hypertension, and Ovarian cyst.    PSH:   has a past surgical history that includes Bunionectomy (Bilateral); Ankle surgery (Left); and Colonoscopy (06/12/2018).    FamHx: family history includes Diabetes in her maternal aunt, maternal uncle, and  sister; Fibroids in her mother; Hypertension in her brother, maternal aunt, maternal grandfather, maternal grandmother, and mother; Leukemia in her maternal grandfather; No Known Problems in her father.    SocHx:  reports that  has never smoked. she has never used smokeless tobacco. She reports that she does not drink alcohol or use drugs.     Physical Exam:  Vitals:   There were no vitals filed for this visit.  General: A&Ox3. No apparent distress. No deformities.  Neck: No masses. Normal thyroid.  Lungs: normal inspiration. No use of accessory muscles.  Heart: normal pulse. No arrhythmias.  Abdomen: Soft. NT. ND.  Skin: The skin is warm and dry. No jaundice.  Ext: No c/c/e.  : External genitalia normal.     Labs/Studies: Urinalysis performed in clinic, summary: UA normal     Impression/Plan:   1. Discussed stone prevention.  KUB/US in one year.  2. Miralax for constipation

## 2018-11-05 ENCOUNTER — PATIENT MESSAGE (OUTPATIENT)
Dept: UROLOGY | Facility: CLINIC | Age: 28
End: 2018-11-05

## 2018-11-08 ENCOUNTER — PATIENT MESSAGE (OUTPATIENT)
Dept: UROLOGY | Facility: CLINIC | Age: 28
End: 2018-11-08

## 2018-11-08 ENCOUNTER — HOSPITAL ENCOUNTER (OUTPATIENT)
Facility: HOSPITAL | Age: 28
Discharge: HOME | End: 2018-11-08
Attending: ANESTHESIOLOGY | Admitting: ANESTHESIOLOGY
Payer: COMMERCIAL

## 2018-11-08 ENCOUNTER — PATIENT MESSAGE (OUTPATIENT)
Dept: CARDIOLOGY | Facility: HOSPITAL | Age: 28
End: 2018-11-08

## 2018-11-08 VITALS
OXYGEN SATURATION: 99 % | HEART RATE: 95 BPM | SYSTOLIC BLOOD PRESSURE: 160 MMHG | DIASTOLIC BLOOD PRESSURE: 100 MMHG | RESPIRATION RATE: 16 BRPM

## 2018-11-08 DIAGNOSIS — M70.62 GREATER TROCHANTERIC BURSITIS, LEFT: ICD-10-CM

## 2018-11-08 DIAGNOSIS — M53.3 SACROILIAC JOINT PAIN: ICD-10-CM

## 2018-11-08 PROCEDURE — 63600175 PHARM REV CODE 636 W HCPCS: Performed by: ANESTHESIOLOGY

## 2018-11-08 PROCEDURE — 63600175 PHARM REV CODE 636 W HCPCS

## 2018-11-08 PROCEDURE — 20610 DRAIN/INJ JOINT/BURSA W/O US: CPT | Mod: 59,LT,, | Performed by: ANESTHESIOLOGY

## 2018-11-08 PROCEDURE — 25000003 PHARM REV CODE 250

## 2018-11-08 PROCEDURE — 27096 INJECT SACROILIAC JOINT: CPT | Mod: LT,,, | Performed by: ANESTHESIOLOGY

## 2018-11-08 PROCEDURE — 25000003 PHARM REV CODE 250: Performed by: ANESTHESIOLOGY

## 2018-11-08 RX ORDER — METHYLPREDNISOLONE ACETATE 40 MG/ML
INJECTION, SUSPENSION INTRA-ARTICULAR; INTRALESIONAL; INTRAMUSCULAR; SOFT TISSUE
Status: DISCONTINUED | OUTPATIENT
Start: 2018-11-08 | End: 2018-11-08 | Stop reason: HOSPADM

## 2018-11-08 RX ORDER — LIDOCAINE HYDROCHLORIDE 20 MG/ML
INJECTION, SOLUTION EPIDURAL; INFILTRATION; INTRACAUDAL; PERINEURAL
Status: DISCONTINUED | OUTPATIENT
Start: 2018-11-08 | End: 2018-11-08 | Stop reason: HOSPADM

## 2018-11-08 RX ORDER — TIZANIDINE 4 MG/1
4 TABLET ORAL 2 TIMES DAILY PRN
Qty: 60 TABLET | Refills: 0 | Status: SHIPPED | OUTPATIENT
Start: 2018-11-08 | End: 2018-12-08

## 2018-11-08 NOTE — DISCHARGE SUMMARY
[facility]  Discharge Note       Description of Procedure: Left Sacroiliac Joint and Greater Trochanteric Bursa Injection under Fluoroscopic Guidance    Procedure Date: 11/8/2018    Admit Date: 11/8/2018  Discharge Date: 11/8/2018     Attending Physician: Isael Ram   Discharge Provider: Isael Ram    Preoperative Diagnosis: Sacroiliitis and Greater Trochanteric Bursitis     Postoperative Diagnosis: as above, same as preoperative diagnosis    Discharged Condition: Stable    Hospital Course: Patient was admitted for an outpatient procedure. The procedure was tolerated well with no complications.    Final Diagnoses: Same as principal problem.    Disposition: Home, self-care.    Follow up/Patient Instructions:  Follow-up in clinic in 2-3 weeks.    Medications: No medications were prescribed today. The patient was advised to resume normal medication regimen without change.  Specific information was provided regarding restarting any anticoagulant/s.    Discharge Procedure Orders (must include Diet, Follow-up, Activity):  Light activity for the remainder of the day, resume normal activity tomorrow. Resume normal diet. Follow-up in clinic in 2-3 weeks.

## 2018-11-08 NOTE — PLAN OF CARE
Problem: Patient Care Overview  Goal: Plan of Care Review  Outcome: Outcome(s) achieved Date Met: 11/08/18  Patient discharged home in stable condition via wheelchair with ride. Verbalized understanding of discharge instructions. Patient voiced no complaints at this time. Patient stood at side of bed, walked steps with no new motor or sensory deficits. Neurologically intact.

## 2018-11-08 NOTE — OP NOTE
PROCEDURE: Sacroiliac joint and Greater trochanteric bursa injection under fluoroscopic guidance       SIDE: LEFT Sacroiliac injection and LEFT greater trochanteric bursa injection      PROCEDURE DATE: 11/8/2018    PREOPERATIVE DIAGNOSIS: Sacroiliitis, greater trochanteric bursitis  POSTOPERATIVE DIAGNOSIS: Sacroiliitis, greater trochanteric bursitis    PROVIDER: Yo Kirkland MD  Assistant(s): none    Anesthesia: Local, No Sedation     >> 0 mg of VERSED    >> 0 mcg of FENTANYL     INDICATION: The patient has a history of pain due to sacroiliitis unresponsive to conservative treatments. Fluoroscopy was used to optimize visualization of needle placement and to maximize safety.       PROCEDURE DESCRIPTION: The patient was seen and identified in the preoperative area. Risks, benefits, complications, and alternatives were discussed with the patient. The patient agreed to proceed with the procedure and signed the consent. The site and side of the procedure was identified and marked.     The patient was taken to the procedural suite. The patient was positioned in prone orientation on procedure table. A time out was performed prior to any intervention. The procedure, site, side, and allergies were stated and agreed to by all present. The lumbosacral area extending to the skin overlying the femoral greater trochanter was widely prepped with ChloraPrep. The procedural site was draped in usual sterile fashion. Vital signs were closely monitored throughout this procedure.     The fluoroscopic camera was placed in contralateral oblique view and was adjusted until the anterior and posterior joint margins of the targeted sacroiliac joint aligned in linear array. The lower pole of the joint was identified, marked, and localized with 1% Lidocaine. A 22 gauge 5 inch spinal needle was introduced and advanced to the joint under fluoroscopic guidance. The joint space was entered and after negative aspiration, 2 mL of injectate was  posited into the joint space. The needle was then withdrawn outside of the joint space and after negative aspiration 1 mL of solution was injected outside of the joint space. The injectant solution used was comprised of 4 mL of 1% PF Lidocaine and 2 mL of Methylprednisolone (40 mg/mL). This techniques was performed for the above noted joint/s.    Following Sacroiliac Joint injection, the stylet was replaced and the needle was withdrawn intact. The LEFT greater trochanter was then identified with fluoroscopy. The targeted site of entry was localized with 1% PF Lidocaine. The above noted spinal needle was advanced to the lateral border of the greater trochanter until the osseus interface was met.  After negative aspiration, 3 mL of the above noted solution was injected. No pain or paresthesia was noted on injection. Following injection, the stylet was replaced and the needle was withdrawn intact. This technique was used for the above noted greater trochanteric bursa / bursae. The skin was cleaned and bandages were applied.    Description of Findings: Not applicable    Prosthetic devices, grafts, tissues, or devices implanted: None    Specimen Removed: No    Estimated Blood Loss: minimal    COMPLICATIONS: None    DISPOSITION / PLANS: The patient was transferred to the recovery area in a stable condition for observation. The patient was reexamined prior to discharge. There was no evidence of acute neurologic injury following the procedure.  Patient was discharged from the recovery room after meeting discharge criteria. Home discharge instructions were given to the patient by the staff.

## 2018-11-09 ENCOUNTER — PATIENT MESSAGE (OUTPATIENT)
Dept: UROLOGY | Facility: CLINIC | Age: 28
End: 2018-11-09

## 2018-11-12 ENCOUNTER — PATIENT MESSAGE (OUTPATIENT)
Dept: UROLOGY | Facility: CLINIC | Age: 28
End: 2018-11-12

## 2018-12-03 ENCOUNTER — TELEPHONE (OUTPATIENT)
Dept: OBSTETRICS AND GYNECOLOGY | Facility: CLINIC | Age: 28
End: 2018-12-03

## 2018-12-03 ENCOUNTER — PATIENT MESSAGE (OUTPATIENT)
Dept: OBSTETRICS AND GYNECOLOGY | Facility: CLINIC | Age: 28
End: 2018-12-03

## 2018-12-07 ENCOUNTER — PATIENT MESSAGE (OUTPATIENT)
Dept: PAIN MEDICINE | Facility: CLINIC | Age: 28
End: 2018-12-07

## 2018-12-13 ENCOUNTER — OFFICE VISIT (OUTPATIENT)
Dept: PAIN MEDICINE | Facility: CLINIC | Age: 28
End: 2018-12-13
Payer: COMMERCIAL

## 2018-12-13 VITALS
DIASTOLIC BLOOD PRESSURE: 92 MMHG | HEART RATE: 87 BPM | HEIGHT: 64 IN | SYSTOLIC BLOOD PRESSURE: 127 MMHG | BODY MASS INDEX: 42.51 KG/M2 | WEIGHT: 249 LBS | RESPIRATION RATE: 18 BRPM

## 2018-12-13 DIAGNOSIS — M47.816 LUMBAR SPONDYLOSIS: Primary | ICD-10-CM

## 2018-12-13 DIAGNOSIS — M47.816 LUMBAR FACET ARTHROPATHY: ICD-10-CM

## 2018-12-13 DIAGNOSIS — G89.29 CHRONIC BILATERAL LOW BACK PAIN WITH LEFT-SIDED SCIATICA: ICD-10-CM

## 2018-12-13 DIAGNOSIS — M54.42 CHRONIC BILATERAL LOW BACK PAIN WITH LEFT-SIDED SCIATICA: ICD-10-CM

## 2018-12-13 DIAGNOSIS — M53.3 SACROILIAC JOINT PAIN: ICD-10-CM

## 2018-12-13 DIAGNOSIS — M70.62 GREATER TROCHANTERIC BURSITIS, LEFT: ICD-10-CM

## 2018-12-13 PROCEDURE — 99214 OFFICE O/P EST MOD 30 MIN: CPT | Mod: S$PBB,,, | Performed by: PHYSICIAN ASSISTANT

## 2018-12-13 PROCEDURE — 99999 PR PBB SHADOW E&M-EST. PATIENT-LVL IV: CPT | Mod: PBBFAC,,, | Performed by: PHYSICIAN ASSISTANT

## 2018-12-13 PROCEDURE — 99214 OFFICE O/P EST MOD 30 MIN: CPT | Mod: PBBFAC | Performed by: PHYSICIAN ASSISTANT

## 2018-12-13 RX ORDER — CYCLOBENZAPRINE HCL 10 MG
TABLET ORAL
Qty: 60 TABLET | Refills: 1 | Status: SHIPPED | OUTPATIENT
Start: 2018-12-13 | End: 2019-06-12

## 2018-12-13 NOTE — PROGRESS NOTES
Chief Pain Complaint:  Low-back Pain      Interval History: Patient was seen on 11/8/18. At that time she underwent left SIJ + left GT bursa injection with local.  The patient reports that she is/was mildly better following the procedure.  she reports about 10% pain relief.  GT bursitis has improved, but she still has pain over SIJ and also piriformis on left.  She is no longer working at [employer], so she is standing less.  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      History of Present Illness:   Nathan Carlos is a 27 y.o. female  who is presenting with a chief complaint of Low-back Pain  . The patient began experiencing this problem insidiously, and the pain has been gradually worsening over the past 3 week(s). The pain is described as throbbing, cramping, aching and heavy and is located in the right lumbar spine. Pain is intermittent and lasts hours. The  pain is nonradiating. The patient rates her pain a 8 out of ten and interferes with activities of daily living a 7 out of ten. Pain is exacerbated by extension of the lumbar spine, and is improved by rest. Patient reports no prior trauma, no prior spinal surgery     - pertinent negatives: No fever, No chills, No weight loss, No bladder dysfunction, No bowel dysfunction, No saddle anesthesia  - pertinent positives: none    - medications, other therapies tried (physical therapy, injections):     >> NSAIDs, Tylenol, Norco, zanaflex and flexeril    >> Has previously undergone Physical Therapy    >> Has previously undergone spinal injection/s   - Left SI joint injection 1/2018 with 100% relief for 6 months   - Right L3, L4, L5 MBB with local on 8/15/18 with 90% pain relief   - left SIJ + left GT bursa injection with local on 11/8/18 with good relief of bursitis but only 10% of SIJ pain        Imaging / Labs / Studies (reviewed on 12/13/2018):    Results for orders placed during the hospital encounter of 11/22/17   MRI Lumbar Spine Without Contrast     Narrative Technique: Standard noncontrast multiplanar multisequence imaging of the lumbar spine was performed.  Findings: There is anatomic spinal alignment.  Disc interspaces are of normal height and signal intensity.  Vertebral marrow signal pattern and architecture are normal.  Distal cord is unremarkable with conus medullaris terminating at L1-L2.  Paravertebral soft tissues are symmetric and normal in appearance.  T12-L1: No disc protrusion, neuroforaminal narrowing, or central canal stenosis.  L1-L2: No disc protrusion, neuroforaminal narrowing, or central canal stenosis.  L2-L3: No disc protrusion, neuroforaminal narrowing, or central canal stenosis.  L3-L4: No disc protrusion, neuroforaminal narrowing, or central canal stenosis.  L4-L5: No disc protrusion, neuroforaminal narrowing, or central canal stenosis.  L5-S1: Mild bilateral facet hypertrophy. No disc protrusion, neuroforaminal narrowing, or central canal stenosis.    Impression  Negative MRI of the lumbar spine.         Results for orders placed during the hospital encounter of 01/04/18   X-Ray Lumbar Complete With Flex And Ext    Narrative Comparison: None  Technique: AP, lateral, lateral flexion, lateral extension, bilateral oblique, and lumbosacral coned down views were obtained of the lumbar spine  Findings: There is mild scoliosis of the lower thoracic and upper lumbar spine.  Vertebral body heights are well-maintained.  No spondylolisthesis demonstrated.  No change in spinal alignment with flexion or extension to suggest instability. Intervertebral disk spaces are well preserved.  No pars defects visualized.  Posterior elements appear grossly intact. No acute fractures or subluxations are demonstrated.  The remaining visualized osseous and soft tissue structures demonstrate no appreciable abnormality.    Impression As above.  No acute findings.         Review of Systems:  CONSTITUTIONAL: patient denies any fever, chills, or weight loss  SKIN:  "patient denies any rash or itching  RESPIRATORY: patient denies having any shortness of breath  GASTROINTESTINAL: patient denies having any diarrhea, constipation, or bowel incontinence  GENITOURINARY: patient denies having any abnormal bladder function    MUSCULOSKELETAL:  - patient complains of the above noted pain/s (see chief pain complaint)    NEUROLOGICAL:   - pain as above  - strength in Lower extremities is intact, BILATERALLY  - sensation in Lower extremities is intact, BILATERALLY  - patient denies any loss of bowel or bladder control      PSYCHIATRIC: patient denies any change in mood    Other:  All other systems reviewed and are negative        Physical Exam:  Vitals:  BP (!) 127/92 (BP Location: Right arm, Patient Position: Sitting, BP Method: Medium (Automatic))   Pulse 87   Resp 18   Ht 5' 4" (1.626 m)   Wt 112.9 kg (249 lb)   BMI 42.74 kg/m²   (reviewed on 12/13/2018)    General: alert and oriented, in no apparent distress.  Gait: normal gait.  Skin: no rashes, no discoloration, no obvious lesions  HEENT: normocephalic, atraumatic. Pupils equal and round.  Cardiovascular: no significant peripheral edema present.  Respiratory: without use of accessory muscles of respiration.    Musculoskeletal - Lumbar Spine:  - Pain on flexion of lumbar spine: Absent   - Pain on extension of lumbar spine: Absent  - Lumbar facet loading: Absent  - TTP over the lumbar facet joints: Present on left  - TTP over the SI joints: Present on left  - TTP over the piriformis: Present on left  - TTP over GT bursa: Absent   - Straight Leg Raise: Negative  - JULISSA: Negative    Neuro - Extremities:  - BUE Strength:R/L: D: 5/5; B: 5/5; T: 5/5; WF: 5/5; WE: 5/5; IO: 5/5  - BLE Strength: R/L: HF: 5/5, HE: 5/5, KF: 5/5; KE: 5/5; FE: 5/5; FF: 5/5  - Extremity Reflexes: Brisk and symmetric throughout  - Sensory: Sensation to light touch intact bilaterally      Psych:  Mood and affect is appropriate          Assessment:  Nathan " Nancy Carlos is a 27 y.o. year old female who is presenting with    Encounter Diagnoses   Name Primary?    Lumbar spondylosis Yes    Chronic bilateral low back pain with left-sided sciatica     Lumbar facet arthropathy     Sacroiliac joint pain     Greater trochanteric bursitis, left        Plan:  1. Interventional: S/p left SIJ + left GT bursa injection with local on 11/8/18 with ~10% pain relief. GT bursitis has improved, but she still has pain over SIJ and also piriformis on left.     2. Pharmacologic:   - Start Flexeril 10mg BID PRN.  D/c Zanaflex 4mg BID PRN.   - Continue naproxen 500mg BID PRN.     3. Rehabilitative: Encouraged regular exercise/ stretching.  Will give her exercises on AVS.   I believe weight loss will aid in her goal of lasting pain control.  She will message me if she decides she would like to do formal PT.    4. Diagnostic: None for now.    5. Follow up: PRN    - I discussed the risks, benefits, and alternatives to potential treatment options. All questions and concerns were fully addressed today in clinic. Dr. Kirkland was consulted regarding the patient plan and agrees.

## 2018-12-13 NOTE — PATIENT INSTRUCTIONS
Exercises to Strengthen Your Lower Back  Strong lower back and abdominal muscles work together to support your spine. The exercises below will help strengthen the lower back. It is important that you begin exercising slowly and increase levels gradually.  Always begin any exercise program with stretching. If you feel pain while doing any of these exercises, stop and talk to your doctor about a more specific exercise program that better suits your condition.   Low back stretch  The point of stretching is to make you more flexible and increase your range of motion. Stretch only as much as you are able. Stretch slowly. Do not push your stretch to the limit. If at any point you feel pain while stretching, this is your (temporary) limit.  · Lie on your back with your knees bent and both feet on the ground.  · Slowly raise your left knee to your chest as you flatten your lower back against the floor. Hold for 5 seconds.  · Relax and repeat the exercise with your right knee.  · Do 10 of these exercises for each leg.  · Repeat hugging both knees to your chest at the same time.  Building lower back strength  Start your exercise routine with 10 to 30 minutes a day, 1 to 3 times a day.  Initial exercises  Lying on your back:  1. Ankle pumps: Move your foot up and down, towards your head, and then away. Repeat 10 times with each foot.  2. Heel slides: Slowly bend your knee, drawing the heel of your foot towards you. Then slide your heel/foot from you, straightening your knee. Do not lift your foot off the floor (this is not a leg lift).  3. Abdominal contraction: Bend your knees and put your hands on your stomach. Tighten your stomach muscles. Hold for 5 seconds, then relax. Repeat 10 times.  4. Straight leg raise: Bend one leg at the knee and keep the other leg straight. Tighten your stomach muscles. Slowly lift your straight leg 6 to 12 inches off the floor and hold for up to 5 seconds. Repeat 10 times on each  side.  Standin. Wall squats: Stand with your back against the wall. Move your feet about 12 inches away from the wall. Tighten your stomach muscles, and slowly bend your knees until they are at about a 45 degree angle. Do not go down too far. Hold about 5 seconds. Then slowly return to your starting position. Repeat 10 times.  2. Heel raises: Stand facing the wall. Slowly raise the heels of your feet up and down, while keeping your toes on the floor. If you have trouble balancing, you can touch the wall with your hands. Repeat 10 times.  More advanced exercises  When you feel comfortable enough, try these exercises.  1. Kneeling lumbar extension: Begin on your hands and knees. At the same time, raise and straighten your right arm and left leg until they are parallel to the ground. Hold for 2 seconds and come back slowly to a starting position. Repeat with left arm and right leg, alternating 10 times.  2. Prone lumbar extension: Lie face down, arms extended overhead, palms on the floor. At the same time, raise your right arm and left leg as high as comfortably possible. Hold for 10 seconds and slowly return to start. Repeat with left arm and right leg, alternating 10 times. Gradually build up to 20 times. (Advanced: Repeat this exercise raising both arms and both legs a few inches off the floor at the same time. Hold for 5 seconds and release.)  3. Pelvic tilt: Lie on the floor on your back with your knees bent at 90 degrees. Your feet should be flat on the floor. Inhale, exhale, then slowly contract your abdominal muscles bringing your navel toward your spine. Let your pelvis rock back until your lower back is flat on the floor. Hold for 10 seconds while breathing smoothly.  4. Abdominal crunch: Perform a pelvic tilt (above) flattening your lower back against the floor. Holding the tension in your abdominal muscles, take another breath and raise your shoulder blades off the ground (this is not a full sit-up).  Keep your head in line with your body (dont bend your neck forward). Hold for 2 seconds, then slowly lower.  © 9445-3432 [employer]. [address] [place] [address], [place] [address]. All rights reserved. This information is not intended as a substitute for professional medical care. Always follow your healthcare professional's instructions.

## 2019-01-31 ENCOUNTER — PATIENT MESSAGE (OUTPATIENT)
Dept: FAMILY MEDICINE | Facility: CLINIC | Age: 29
End: 2019-01-31

## 2019-01-31 ENCOUNTER — TELEPHONE (OUTPATIENT)
Dept: FAMILY MEDICINE | Facility: CLINIC | Age: 29
End: 2019-01-31

## 2019-01-31 NOTE — TELEPHONE ENCOUNTER
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

## 2019-02-07 ENCOUNTER — OFFICE VISIT (OUTPATIENT)
Dept: FAMILY MEDICINE | Facility: CLINIC | Age: 29
End: 2019-02-07
Payer: MEDICAID

## 2019-02-07 ENCOUNTER — LAB VISIT (OUTPATIENT)
Dept: LAB | Facility: HOSPITAL | Age: 29
End: 2019-02-07
Attending: REGISTERED NURSE
Payer: MEDICAID

## 2019-02-07 VITALS
TEMPERATURE: 98 F | SYSTOLIC BLOOD PRESSURE: 130 MMHG | HEART RATE: 101 BPM | WEIGHT: 253.06 LBS | OXYGEN SATURATION: 97 % | BODY MASS INDEX: 43.2 KG/M2 | HEIGHT: 64 IN | DIASTOLIC BLOOD PRESSURE: 82 MMHG

## 2019-02-07 DIAGNOSIS — E78.00 HYPERCHOLESTEREMIA: ICD-10-CM

## 2019-02-07 DIAGNOSIS — I10 ESSENTIAL HYPERTENSION: ICD-10-CM

## 2019-02-07 DIAGNOSIS — Z00.00 ANNUAL PHYSICAL EXAM: ICD-10-CM

## 2019-02-07 DIAGNOSIS — R79.89 HIGH SERUM TESTOSTERONE: ICD-10-CM

## 2019-02-07 DIAGNOSIS — F51.04 CHRONIC INSOMNIA: ICD-10-CM

## 2019-02-07 DIAGNOSIS — Z00.00 ANNUAL PHYSICAL EXAM: Primary | ICD-10-CM

## 2019-02-07 DIAGNOSIS — E88.810 INSULIN RESISTANCE SYNDROME: ICD-10-CM

## 2019-02-07 DIAGNOSIS — Z23 NEED FOR HPV VACCINATION: ICD-10-CM

## 2019-02-07 DIAGNOSIS — N20.0 NEPHROLITHIASIS: ICD-10-CM

## 2019-02-07 DIAGNOSIS — E66.01 MORBID OBESITY WITH BMI OF 40.0-44.9, ADULT: ICD-10-CM

## 2019-02-07 PROBLEM — G43.919 INTRACTABLE MIGRAINE WITHOUT STATUS MIGRAINOSUS: Status: RESOLVED | Noted: 2018-05-07 | Resolved: 2019-02-07

## 2019-02-07 PROBLEM — M53.3 SACROILIAC JOINT PAIN: Status: RESOLVED | Noted: 2018-11-08 | Resolved: 2019-02-07

## 2019-02-07 LAB
ALBUMIN SERPL BCP-MCNC: 4 G/DL
ALBUMIN/CREAT UR: 10.8 UG/MG
ALP SERPL-CCNC: 63 U/L
ALT SERPL W/O P-5'-P-CCNC: 37 U/L
ANION GAP SERPL CALC-SCNC: 9 MMOL/L
AST SERPL-CCNC: 23 U/L
BASOPHILS # BLD AUTO: 0.04 K/UL
BASOPHILS NFR BLD: 0.4 %
BILIRUB SERPL-MCNC: 0.3 MG/DL
BUN SERPL-MCNC: 9 MG/DL
CALCIUM SERPL-MCNC: 9.7 MG/DL
CHLORIDE SERPL-SCNC: 103 MMOL/L
CHOLEST SERPL-MCNC: 210 MG/DL
CHOLEST/HDLC SERPL: 4.6 {RATIO}
CO2 SERPL-SCNC: 26 MMOL/L
CREAT SERPL-MCNC: 0.7 MG/DL
CREAT UR-MCNC: 194 MG/DL
DIFFERENTIAL METHOD: ABNORMAL
EOSINOPHIL # BLD AUTO: 0.1 K/UL
EOSINOPHIL NFR BLD: 0.5 %
ERYTHROCYTE [DISTWIDTH] IN BLOOD BY AUTOMATED COUNT: 14.2 %
EST. GFR  (AFRICAN AMERICAN): >60 ML/MIN/1.73 M^2
EST. GFR  (NON AFRICAN AMERICAN): >60 ML/MIN/1.73 M^2
ESTIMATED AVG GLUCOSE: 131 MG/DL
GLUCOSE SERPL-MCNC: 98 MG/DL
HBA1C MFR BLD HPLC: 6.2 %
HCT VFR BLD AUTO: 47.1 %
HDLC SERPL-MCNC: 46 MG/DL
HDLC SERPL: 21.9 %
HGB BLD-MCNC: 14.9 G/DL
IMM GRANULOCYTES # BLD AUTO: 0.03 K/UL
IMM GRANULOCYTES NFR BLD AUTO: 0.3 %
INSULIN COLLECTION INTERVAL: ABNORMAL
INSULIN SERPL-ACNC: 47.9 UU/ML
LDLC SERPL CALC-MCNC: 141.6 MG/DL
LYMPHOCYTES # BLD AUTO: 3.8 K/UL
LYMPHOCYTES NFR BLD: 39.3 %
MCH RBC QN AUTO: 27 PG
MCHC RBC AUTO-ENTMCNC: 31.6 G/DL
MCV RBC AUTO: 86 FL
MICROALBUMIN UR DL<=1MG/L-MCNC: 21 UG/ML
MONOCYTES # BLD AUTO: 0.7 K/UL
MONOCYTES NFR BLD: 7.5 %
NEUTROPHILS # BLD AUTO: 5 K/UL
NEUTROPHILS NFR BLD: 52 %
NONHDLC SERPL-MCNC: 164 MG/DL
NRBC BLD-RTO: 0 /100 WBC
PLATELET # BLD AUTO: 267 K/UL
PMV BLD AUTO: 12.2 FL
POTASSIUM SERPL-SCNC: 4.1 MMOL/L
PROT SERPL-MCNC: 7.6 G/DL
RBC # BLD AUTO: 5.51 M/UL
SODIUM SERPL-SCNC: 138 MMOL/L
TESTOST SERPL-MCNC: 168 NG/DL
TRIGL SERPL-MCNC: 112 MG/DL
TSH SERPL DL<=0.005 MIU/L-ACNC: 2.92 UIU/ML
WBC # BLD AUTO: 9.59 K/UL

## 2019-02-07 PROCEDURE — 99214 OFFICE O/P EST MOD 30 MIN: CPT | Mod: PBBFAC,PO,25 | Performed by: REGISTERED NURSE

## 2019-02-07 PROCEDURE — 83036 HEMOGLOBIN GLYCOSYLATED A1C: CPT

## 2019-02-07 PROCEDURE — 99999 PR PBB SHADOW E&M-EST. PATIENT-LVL IV: ICD-10-PCS | Mod: PBBFAC,,, | Performed by: REGISTERED NURSE

## 2019-02-07 PROCEDURE — 84443 ASSAY THYROID STIM HORMONE: CPT

## 2019-02-07 PROCEDURE — 99395 PREV VISIT EST AGE 18-39: CPT | Mod: 25,S$PBB,, | Performed by: REGISTERED NURSE

## 2019-02-07 PROCEDURE — 99999 PR PBB SHADOW E&M-EST. PATIENT-LVL IV: CPT | Mod: PBBFAC,,, | Performed by: REGISTERED NURSE

## 2019-02-07 PROCEDURE — 80061 LIPID PANEL: CPT

## 2019-02-07 PROCEDURE — 85025 COMPLETE CBC W/AUTO DIFF WBC: CPT

## 2019-02-07 PROCEDURE — 99395 PR PREVENTIVE VISIT,EST,18-39: ICD-10-PCS | Mod: 25,S$PBB,, | Performed by: REGISTERED NURSE

## 2019-02-07 PROCEDURE — 82043 UR ALBUMIN QUANTITATIVE: CPT

## 2019-02-07 PROCEDURE — 90471 IMMUNIZATION ADMIN: CPT | Mod: PBBFAC,PO

## 2019-02-07 PROCEDURE — 36415 COLL VENOUS BLD VENIPUNCTURE: CPT | Mod: PO

## 2019-02-07 PROCEDURE — 83525 ASSAY OF INSULIN: CPT

## 2019-02-07 PROCEDURE — 84403 ASSAY OF TOTAL TESTOSTERONE: CPT

## 2019-02-07 PROCEDURE — 80053 COMPREHEN METABOLIC PANEL: CPT

## 2019-02-07 RX ORDER — MIRTAZAPINE 15 MG/1
15 TABLET, FILM COATED ORAL NIGHTLY
COMMUNITY
End: 2019-02-07 | Stop reason: SDUPTHER

## 2019-02-07 RX ORDER — SPIRONOLACTONE 50 MG/1
50 TABLET, FILM COATED ORAL DAILY
Qty: 30 TABLET | Refills: 6 | Status: SHIPPED | OUTPATIENT
Start: 2019-02-07 | End: 2019-06-12

## 2019-02-07 RX ORDER — MIRTAZAPINE 15 MG/1
15 TABLET, FILM COATED ORAL NIGHTLY
Qty: 30 TABLET | Refills: 6 | Status: SHIPPED | OUTPATIENT
Start: 2019-02-07 | End: 2019-08-05 | Stop reason: SDUPTHER

## 2019-02-11 ENCOUNTER — PATIENT MESSAGE (OUTPATIENT)
Dept: FAMILY MEDICINE | Facility: CLINIC | Age: 29
End: 2019-02-11

## 2019-02-14 ENCOUNTER — PATIENT MESSAGE (OUTPATIENT)
Dept: FAMILY MEDICINE | Facility: CLINIC | Age: 29
End: 2019-02-14

## 2019-02-14 ENCOUNTER — TELEPHONE (OUTPATIENT)
Dept: FAMILY MEDICINE | Facility: CLINIC | Age: 29
End: 2019-02-14

## 2019-02-14 NOTE — TELEPHONE ENCOUNTER
Labs reviewed:    1.  Testosterone level has increased ----- I have already increased her dose at her last visit ---- continue taking 50 mg once daily as ordered.    2.  Lipids ---- TC has gone up some --- not starting medication at this time.  She needs to focus on healthy diet, increase exercise with weight reduction.  Follow a low-fat/cholesterol diet.    3.  A1c ---- shows Pre-DM, insulin level is also elevated.  No medication for now.  Needs to focus on low-carb/starch diet, cut back on sugars.  Exercise.    Recheck lab in 3 mo.

## 2019-02-14 NOTE — TELEPHONE ENCOUNTER
Pt notified of lab results and verbalized understanding. Pt asked when she got her HPV at visit the swelling has gone down but it is still sore and has a knot.//rf

## 2019-02-14 NOTE — TELEPHONE ENCOUNTER
Localized reactions to vaccine can occur.  Can try using warm or cold compresses, antihistamines if itching, and corticosteroid topically to reduce warmth, erythema and tenderness.

## 2019-02-18 NOTE — PROGRESS NOTES
Subjective:       Patient ID: Nathan Carlos is a 28 y.o. female.    Chief Complaint   Patient presents with    Annual Exam         HPI    Nathan Carlos is here today for an annual wellness exam.  I have reviewed the patient's medical history in detail and updated the computerized patient record.    GYN --- followed by Dr. Vidales      Review of Systems   Constitutional: Positive for unexpected weight change (gain ~ 4 lbs since 9/2018). Negative for activity change, appetite change, chills, diaphoresis, fatigue and fever.        EXERCISE:  None.  DIET:  High in carbs daily, overall non-healthy with increased intake of cookies and chips.  Minimal fruits/veg.   HENT: Negative.    Eyes: Negative.    Respiratory: Negative.    Cardiovascular: Negative for chest pain, palpitations and leg swelling.   Gastrointestinal: Positive for constipation. Negative for abdominal distention, abdominal pain, anal bleeding, blood in stool, diarrhea, nausea, rectal pain and vomiting.   Endocrine: Negative for polydipsia, polyphagia and polyuria.   Genitourinary: Negative.    Musculoskeletal: Positive for back pain (followed by Dr. Kirkland). Negative for gait problem and joint swelling.   Skin: Negative.    Neurological: Negative.    Hematological: Negative for adenopathy. Does not bruise/bleed easily.   Psychiatric/Behavioral: Negative.          Review of patient's allergies indicates:   Allergen Reactions    Amitriptyline      xerostomia    Amlodipine      DRY MOUTH       Current Outpatient Medications on File Prior to Visit   Medication Sig Dispense Refill    cyclobenzaprine (FLEXERIL) 10 MG tablet Take 1/2 to 1 tab BID PRN muscle spasms. May cause drowsiness. 60 tablet 1    flunisolide 25 mcg, 0.025%, (NASALIDE) 25 mcg (0.025 %) Spry 2 sprays by Nasal route 2 (two) times daily. 25 mL 2    naproxen (EC NAPROSYN) 500 MG EC tablet Take 1 tablet (500 mg total) by mouth 2 (two) times daily as needed. 60 tablet 0     "norethindrone ac-eth estradiol 1.5-30 mg-mcg Tab Take 1 tablet by mouth once daily. 28 each 6    ondansetron (ZOFRAN-ODT) 4 MG TbDL       valACYclovir (VALTREX) 500 MG tablet Take 1 tablet (500 mg total) by mouth once daily. 30 tablet 3     No current facility-administered medications on file prior to visit.        Patient Active Problem List   Diagnosis    Essential hypertension    Gastroesophageal reflux disease    Left-sided low back pain with left-sided sciatica    Hypercholesteremia    High serum testosterone    Insulin resistance syndrome    HSV-2 (herpes simplex virus 2) infection    Nephrolithiasis       Past Medical History:   Diagnosis Date    GERD (gastroesophageal reflux disease)     Herpes simplex virus (HSV) infection     Hyperlipidemia     Hypertension     Ovarian cyst     bilateral        Past Surgical History:   Procedure Laterality Date    ANKLE SURGERY Left     BUNIONECTOMY Bilateral     COLONOSCOPY  06/12/2018       Family History   Problem Relation Age of Onset    Hypertension Mother     Fibroids Mother     No Known Problems Father     Diabetes Sister     Hypertension Brother     Hypertension Maternal Aunt     Diabetes Maternal Aunt     Diabetes Maternal Uncle     Hypertension Maternal Grandmother     Leukemia Maternal Grandfather     Hypertension Maternal Grandfather     Stroke Neg Hx        Social History     Socioeconomic History    Marital status: Single    Number of children: 0   Occupational History     Employer: [id number]   Tobacco Use    Smoking status: Never Smoker    Smokeless tobacco: Never Used   Substance and Sexual Activity    Alcohol use: No    Drug use: No    Sexual activity: Yes     Partners: Male     Birth control/protection: Condom             Objective:     Vitals:    02/07/19 0805   BP: 130/82   Pulse: 101   Temp: 97.8 °F (36.6 °C)   TempSrc: Oral   SpO2: 97%   Weight: 114.8 kg (253 lb 1.4 oz)   Height: 5' 4" (1.626 m)   PainSc: " 0-No pain         Physical Exam   Constitutional: She is oriented to person, place, and time. She appears well-developed and well-nourished. No distress.   HENT:   Head: Normocephalic and atraumatic.   Right Ear: External ear normal.   Left Ear: External ear normal.   Nose: Nose normal.   Mouth/Throat: Oropharynx is clear and moist. No oropharyngeal exudate.   Eyes: Conjunctivae and EOM are normal. Pupils are equal, round, and reactive to light. Right eye exhibits no discharge. Left eye exhibits no discharge. No scleral icterus.   Neck: Normal range of motion. Neck supple. No JVD present. No tracheal deviation present. No thyromegaly present.   Cardiovascular: Normal rate, regular rhythm, normal heart sounds and intact distal pulses. Exam reveals no gallop and no friction rub.   No murmur heard.  Pulmonary/Chest: Effort normal and breath sounds normal. No respiratory distress. She has no rales. She exhibits no tenderness.   Abdominal: Soft. Bowel sounds are normal. She exhibits no distension and no mass. There is no tenderness.   Genitourinary:   Genitourinary Comments: Deferred to Gyn   Musculoskeletal: Normal range of motion. She exhibits no edema, tenderness or deformity.   Lymphadenopathy:     She has no cervical adenopathy.   Neurological: She is alert and oriented to person, place, and time. No sensory deficit. She exhibits normal muscle tone. Coordination normal.   Skin: Skin is warm and dry. Capillary refill takes less than 2 seconds. No rash noted. She is not diaphoretic.   Psychiatric: She has a normal mood and affect. Her behavior is normal. Judgment and thought content normal.         Diagnosis       1. Annual physical exam    2. Essential hypertension    3. Hypercholesteremia    4. Insulin resistance syndrome    5. High serum testosterone    6. Nephrolithiasis    7. Chronic insomnia    8. Morbid obesity with BMI of 40.0-44.9, adult    9. Need for HPV vaccination          Assessment/ Plan     Annual  physical exam  -     CBC auto differential; Future; Expected date: 02/07/2019  -     Comprehensive metabolic panel; Future; Expected date: 02/07/2019  -     TSH; Future; Expected date: 02/07/2019  -     Lipid panel; Future; Expected date: 02/07/2019  -     Hemoglobin A1c; Future; Expected date: 02/07/2019  -     Microalbumin/creatinine urine ratio  -     Insulin, random; Future; Expected date: 02/07/2019  -     Testosterone; Future; Expected date: 02/07/2019    Essential hypertension  -     CBC auto differential; Future; Expected date: 02/07/2019  -     Comprehensive metabolic panel; Future; Expected date: 02/07/2019  -     TSH; Future; Expected date: 02/07/2019  -     Lipid panel; Future; Expected date: 02/07/2019  -     Hemoglobin A1c; Future; Expected date: 02/07/2019  -     Microalbumin/creatinine urine ratio  -     Insulin, random; Future; Expected date: 02/07/2019  -     Testosterone; Future; Expected date: 02/07/2019    Hypercholesteremia  -     CBC auto differential; Future; Expected date: 02/07/2019  -     Comprehensive metabolic panel; Future; Expected date: 02/07/2019  -     TSH; Future; Expected date: 02/07/2019  -     Lipid panel; Future; Expected date: 02/07/2019  -     Hemoglobin A1c; Future; Expected date: 02/07/2019  -     Insulin, random; Future; Expected date: 02/07/2019  -     Testosterone; Future; Expected date: 02/07/2019    Insulin resistance syndrome  -     spironolactone (ALDACTONE) 50 MG tablet; Take 1 tablet (50 mg total) by mouth once daily.  Dispense: 30 tablet; Refill: 6  -     CBC auto differential; Future; Expected date: 02/07/2019  -     Comprehensive metabolic panel; Future; Expected date: 02/07/2019  -     TSH; Future; Expected date: 02/07/2019  -     Lipid panel; Future; Expected date: 02/07/2019  -     Hemoglobin A1c; Future; Expected date: 02/07/2019  -     Microalbumin/creatinine urine ratio  -     Insulin, random; Future; Expected date: 02/07/2019  -     Testosterone; Future;  Expected date: 02/07/2019    High serum testosterone  · Increase from 25 to 50 mg once daily due to continued c/o facial hair, fatigue, weight issues, acne.  -     spironolactone (ALDACTONE) 50 MG tablet; Take 1 tablet (50 mg total) by mouth once daily.  Dispense: 30 tablet; Refill: 6  -     Testosterone; Future; Expected date: 02/07/2019    Nephrolithiasis  · Stable, no problems reported.    Chronic insomnia  · Stable on Remeron, refilled.  -     mirtazapine (REMERON) 15 MG tablet; Take 1 tablet (15 mg total) by mouth every evening.  Dispense: 30 tablet; Refill: 6    Morbid obesity with BMI of 40.0-44.9, adult  · Healthy dietary changes, routine exercise.  · Low-carb/starch intake.    Need for HPV vaccination  -     (In Office Administered) HPV Vaccine (9-Valent) (3 Dose) (IM)          HPV # 2 due in 2 mo, # 3 in 6 months.  Lab pending.  Follow-up in clinic as needed.          VANCE Wagner  [facility]

## 2019-04-08 ENCOUNTER — PATIENT MESSAGE (OUTPATIENT)
Dept: PAIN MEDICINE | Facility: CLINIC | Age: 29
End: 2019-04-08

## 2019-04-08 ENCOUNTER — PATIENT MESSAGE (OUTPATIENT)
Dept: FAMILY MEDICINE | Facility: CLINIC | Age: 29
End: 2019-04-08

## 2019-04-09 RX ORDER — SPIRONOLACTONE 25 MG/1
50 TABLET ORAL DAILY
Qty: 60 TABLET | Refills: 6 | Status: SHIPPED | OUTPATIENT
Start: 2019-04-09 | End: 2019-11-08 | Stop reason: SDUPTHER

## 2019-04-12 ENCOUNTER — OFFICE VISIT (OUTPATIENT)
Dept: PAIN MEDICINE | Facility: CLINIC | Age: 29
End: 2019-04-12
Payer: MEDICAID

## 2019-04-12 VITALS
HEIGHT: 64 IN | HEART RATE: 100 BPM | DIASTOLIC BLOOD PRESSURE: 91 MMHG | SYSTOLIC BLOOD PRESSURE: 134 MMHG | WEIGHT: 253 LBS | RESPIRATION RATE: 18 BRPM | BODY MASS INDEX: 43.19 KG/M2

## 2019-04-12 DIAGNOSIS — M54.42 CHRONIC BILATERAL LOW BACK PAIN WITH LEFT-SIDED SCIATICA: Primary | ICD-10-CM

## 2019-04-12 DIAGNOSIS — G89.29 CHRONIC BILATERAL LOW BACK PAIN WITH LEFT-SIDED SCIATICA: Primary | ICD-10-CM

## 2019-04-12 DIAGNOSIS — M47.816 LUMBAR SPONDYLOSIS: ICD-10-CM

## 2019-04-12 DIAGNOSIS — M70.62 GREATER TROCHANTERIC BURSITIS, LEFT: ICD-10-CM

## 2019-04-12 DIAGNOSIS — M53.3 SACROILIAC JOINT PAIN: ICD-10-CM

## 2019-04-12 DIAGNOSIS — M47.816 LUMBAR FACET ARTHROPATHY: ICD-10-CM

## 2019-04-12 PROCEDURE — 99999 PR PBB SHADOW E&M-EST. PATIENT-LVL IV: ICD-10-PCS | Mod: PBBFAC,,, | Performed by: PHYSICIAN ASSISTANT

## 2019-04-12 PROCEDURE — 99214 PR OFFICE/OUTPT VISIT, EST, LEVL IV, 30-39 MIN: ICD-10-PCS | Mod: S$PBB,,, | Performed by: PHYSICIAN ASSISTANT

## 2019-04-12 PROCEDURE — 99214 OFFICE O/P EST MOD 30 MIN: CPT | Mod: S$PBB,,, | Performed by: PHYSICIAN ASSISTANT

## 2019-04-12 PROCEDURE — 99999 PR PBB SHADOW E&M-EST. PATIENT-LVL IV: CPT | Mod: PBBFAC,,, | Performed by: PHYSICIAN ASSISTANT

## 2019-04-12 PROCEDURE — 99214 OFFICE O/P EST MOD 30 MIN: CPT | Mod: PBBFAC | Performed by: PHYSICIAN ASSISTANT

## 2019-04-12 RX ORDER — TOPIRAMATE 25 MG/1
TABLET ORAL
Qty: 60 TABLET | Refills: 0 | Status: SHIPPED | OUTPATIENT
Start: 2019-04-12 | End: 2019-06-12 | Stop reason: DRUGHIGH

## 2019-04-12 NOTE — PROGRESS NOTES
Chief Pain Complaint:  Low-back Pain      Interval History: Since her last visit, she reports her left lower back pain has been persistent.  She is no longer working at [employer] and has been doing at home .  She states that she has been doing at home exercises given last visit, but pain has been persistent.       History of Present Illness:   Nathan Carlos is a 28 y.o. female  who is presenting with a chief complaint of Low-back Pain. The patient began experiencing this problem insidiously, and the pain has been gradually worsening. The pain is described as throbbing, cramping, aching and heavy and is located in the right lumbar spine. Pain is intermittent and lasts hours. The  pain is nonradiating. The patient rates her pain a 8 out of ten and interferes with activities of daily living a 7 out of ten. Pain is exacerbated by extension of the lumbar spine, and is improved by rest. Patient reports no prior trauma, no prior spinal surgery     - pertinent negatives: No fever, No chills, No weight loss, No bladder dysfunction, No bowel dysfunction, No saddle anesthesia  - pertinent positives: none    - medications, other therapies tried (physical therapy, injections):     >> NSAIDs, Tylenol, Norco, zanaflex and flexeril    >> Has previously undergone Physical Therapy    >> Has previously undergone spinal injection/s   - Left SI joint injection 1/2018 with 100% relief for 6 months   - Right L3, L4, L5 MBB with local on 8/15/18 with 90% pain relief   - left SIJ + left GT bursa injection with local on 11/8/18 with good relief of bursitis but only 10% of SIJ pain        Imaging / Labs / Studies (reviewed on 4/12/2019):    Results for orders placed during the hospital encounter of 11/22/17   MRI Lumbar Spine Without Contrast    Narrative Technique: Standard noncontrast multiplanar multisequence imaging of the lumbar spine was performed.  Findings: There is anatomic spinal alignment.  Disc interspaces are of  normal height and signal intensity.  Vertebral marrow signal pattern and architecture are normal.  Distal cord is unremarkable with conus medullaris terminating at L1-L2.  Paravertebral soft tissues are symmetric and normal in appearance.  T12-L1: No disc protrusion, neuroforaminal narrowing, or central canal stenosis.  L1-L2: No disc protrusion, neuroforaminal narrowing, or central canal stenosis.  L2-L3: No disc protrusion, neuroforaminal narrowing, or central canal stenosis.  L3-L4: No disc protrusion, neuroforaminal narrowing, or central canal stenosis.  L4-L5: No disc protrusion, neuroforaminal narrowing, or central canal stenosis.  L5-S1: Mild bilateral facet hypertrophy. No disc protrusion, neuroforaminal narrowing, or central canal stenosis.    Impression  Negative MRI of the lumbar spine.         Results for orders placed during the hospital encounter of 01/04/18   X-Ray Lumbar Complete With Flex And Ext    Narrative Comparison: None  Technique: AP, lateral, lateral flexion, lateral extension, bilateral oblique, and lumbosacral coned down views were obtained of the lumbar spine  Findings: There is mild scoliosis of the lower thoracic and upper lumbar spine.  Vertebral body heights are well-maintained.  No spondylolisthesis demonstrated.  No change in spinal alignment with flexion or extension to suggest instability. Intervertebral disk spaces are well preserved.  No pars defects visualized.  Posterior elements appear grossly intact. No acute fractures or subluxations are demonstrated.  The remaining visualized osseous and soft tissue structures demonstrate no appreciable abnormality.    Impression As above.  No acute findings.         Review of Systems:  CONSTITUTIONAL: patient denies any fever, chills, or weight loss  SKIN: patient denies any rash or itching  RESPIRATORY: patient denies having any shortness of breath  GASTROINTESTINAL: patient denies having any diarrhea, constipation, or bowel  "incontinence  GENITOURINARY: patient denies having any abnormal bladder function    MUSCULOSKELETAL:  - patient complains of the above noted pain/s (see chief pain complaint)    NEUROLOGICAL:   - pain as above  - strength in Lower extremities is intact, BILATERALLY  - sensation in Lower extremities is intact, BILATERALLY  - patient denies any loss of bowel or bladder control      PSYCHIATRIC: patient denies any change in mood    Other:  All other systems reviewed and are negative        Physical Exam:  Vitals:  BP (!) 134/91 (BP Location: Right arm, Patient Position: Sitting, BP Method: Medium (Automatic))   Pulse 100   Resp 18   Ht 5' 4" (1.626 m)   Wt 114.8 kg (253 lb)   BMI 43.43 kg/m²   (reviewed on 4/12/2019)    General: alert and oriented, in no apparent distress.  Gait: normal gait.  Skin: no rashes, no discoloration, no obvious lesions  HEENT: normocephalic, atraumatic. Pupils equal and round.  Cardiovascular: no significant peripheral edema present.  Respiratory: without use of accessory muscles of respiration.    Musculoskeletal - Lumbar Spine:  - Pain on flexion of lumbar spine: Absent   - Pain on extension of lumbar spine: Present  - Lumbar facet loading: Present on left  - TTP over the lumbar facet joints: Present on left  - TTP over the SI joints: Present on left  - TTP over GT bursa: Absent   - Straight Leg Raise: Negative  - JULISSA: Negative    Neuro - Extremities:  - BUE Strength:R/L: D: 5/5; B: 5/5; T: 5/5; WF: 5/5; WE: 5/5; IO: 5/5  - BLE Strength: R/L: HF: 5/5, HE: 5/5, KF: 5/5; KE: 5/5; FE: 5/5; FF: 5/5  - Extremity Reflexes: Brisk and symmetric throughout  - Sensory: Sensation to light touch intact bilaterally      Psych:  Mood and affect is appropriate          Assessment:  Nathan Carlos is a 28 y.o. year old female who is presenting with    Encounter Diagnoses   Name Primary?    Chronic bilateral low back pain with left-sided sciatica Yes    Lumbar spondylosis     Lumbar facet " arthropathy     Sacroiliac joint pain     Greater trochanteric bursitis, left        Plan:  1. Interventional: Schedule left L3-5 MBB with local.      2. Pharmacologic:   - Will start Topamax 25mg BID (with titration instructions, take 1 tablet QHS x 1 week then increase to BID increasing as tolerated).   - D/c Flexeril 10mg BID PRN - due to no relief.  - Continue naproxen 500mg BID PRN.     3. Rehabilitative: Encouraged regular exercise/ stretching.  Will give her exercises on AVS.   I believe weight loss will aid in her goal of lasting pain control.       4. Diagnostic: None for now.    5. Follow up: PRN    - I discussed the risks, benefits, and alternatives to potential treatment options. All questions and concerns were fully addressed today in clinic. Dr. Kirkland was consulted regarding the patient plan and agrees.

## 2019-04-25 ENCOUNTER — PATIENT MESSAGE (OUTPATIENT)
Dept: PAIN MEDICINE | Facility: CLINIC | Age: 29
End: 2019-04-25

## 2019-04-25 RX ORDER — TIZANIDINE 4 MG/1
TABLET ORAL
Qty: 60 TABLET | Refills: 0 | Status: SHIPPED | OUTPATIENT
Start: 2019-04-25 | End: 2019-06-12 | Stop reason: SDUPTHER

## 2019-05-16 ENCOUNTER — HOSPITAL ENCOUNTER (OUTPATIENT)
Facility: HOSPITAL | Age: 29
Discharge: HOME | End: 2019-05-16
Attending: ANESTHESIOLOGY | Admitting: ANESTHESIOLOGY
Payer: MEDICAID

## 2019-05-16 VITALS
OXYGEN SATURATION: 99 % | BODY MASS INDEX: 43.19 KG/M2 | RESPIRATION RATE: 18 BRPM | HEART RATE: 85 BPM | DIASTOLIC BLOOD PRESSURE: 91 MMHG | SYSTOLIC BLOOD PRESSURE: 145 MMHG | WEIGHT: 253 LBS | HEIGHT: 64 IN

## 2019-05-16 DIAGNOSIS — M47.816 LUMBAR FACET ARTHROPATHY: Primary | ICD-10-CM

## 2019-05-16 DIAGNOSIS — G89.29 CHRONIC BILATERAL LOW BACK PAIN WITH LEFT-SIDED SCIATICA: ICD-10-CM

## 2019-05-16 DIAGNOSIS — M47.816 LUMBAR SPONDYLOSIS: ICD-10-CM

## 2019-05-16 DIAGNOSIS — M54.42 CHRONIC BILATERAL LOW BACK PAIN WITH LEFT-SIDED SCIATICA: ICD-10-CM

## 2019-05-16 PROCEDURE — 63600175 PHARM REV CODE 636 W HCPCS: Performed by: ANESTHESIOLOGY

## 2019-05-16 PROCEDURE — 63600175 PHARM REV CODE 636 W HCPCS

## 2019-05-16 PROCEDURE — 64493 PR INJ DX/THER AGNT PARAVERT FACET JOINT,IMG GUIDE,LUMBAR/SAC,1ST LVL: ICD-10-PCS | Mod: LT,,, | Performed by: ANESTHESIOLOGY

## 2019-05-16 PROCEDURE — 64494 INJ PARAVERT F JNT L/S 2 LEV: CPT | Mod: LT,,, | Performed by: ANESTHESIOLOGY

## 2019-05-16 PROCEDURE — 64493 INJ PARAVERT F JNT L/S 1 LEV: CPT | Mod: LT,,, | Performed by: ANESTHESIOLOGY

## 2019-05-16 PROCEDURE — 25000003 PHARM REV CODE 250

## 2019-05-16 PROCEDURE — 64494 PR INJ DX/THER AGNT PARAVERT FACET JOINT,IMG GUIDE,LUMBAR/SAC, 2ND LEVEL: ICD-10-PCS | Mod: LT,,, | Performed by: ANESTHESIOLOGY

## 2019-05-16 RX ORDER — METHYLPREDNISOLONE ACETATE 40 MG/ML
INJECTION, SUSPENSION INTRA-ARTICULAR; INTRALESIONAL; INTRAMUSCULAR; SOFT TISSUE
Status: DISCONTINUED | OUTPATIENT
Start: 2019-05-16 | End: 2019-05-16 | Stop reason: HOSPADM

## 2019-05-16 RX ORDER — TIZANIDINE 4 MG/1
4 TABLET ORAL 2 TIMES DAILY PRN
Qty: 60 TABLET | Refills: 0 | Status: SHIPPED | OUTPATIENT
Start: 2019-05-16 | End: 2019-06-15

## 2019-05-16 NOTE — H&P
Chief Pain Complaint:  Low-back Pain        Interval History: Since her last visit, she reports her left lower back pain has been persistent.  She is no longer working at [employer] and has been doing at home .  She states that she has been doing at home exercises given last visit, but pain has been persistent.         History of Present Illness:   Nathan Carlos is a 28 y.o. female  who is presenting with a chief complaint of Low-back Pain. The patient began experiencing this problem insidiously, and the pain has been gradually worsening. The pain is described as throbbing, cramping, aching and heavy and is located in the right lumbar spine. Pain is intermittent and lasts hours. The  pain is nonradiating. The patient rates her pain a 8 out of ten and interferes with activities of daily living a 7 out of ten. Pain is exacerbated by extension of the lumbar spine, and is improved by rest. Patient reports no prior trauma, no prior spinal surgery      - pertinent negatives: No fever, No chills, No weight loss, No bladder dysfunction, No bowel dysfunction, No saddle anesthesia  - pertinent positives: none    - medications, other therapies tried (physical therapy, injections):     >> NSAIDs, Tylenol, Norco, zanaflex and flexeril    >> Has previously undergone Physical Therapy    >> Has previously undergone spinal injection/s              - Left SI joint injection 1/2018 with 100% relief for 6 months              - Right L3, L4, L5 MBB with local on 8/15/18 with 90% pain relief              - left SIJ + left GT bursa injection with local on 11/8/18 with good relief of bursitis but only 10% of SIJ pain           Imaging / Labs / Studies (reviewed on 4/12/2019):          Results for orders placed during the hospital encounter of 11/22/17   MRI Lumbar Spine Without Contrast     Narrative Technique: Standard noncontrast multiplanar multisequence imaging of the lumbar spine was performed.  Findings: There is anatomic  spinal alignment.  Disc interspaces are of normal height and signal intensity.  Vertebral marrow signal pattern and architecture are normal.  Distal cord is unremarkable with conus medullaris terminating at L1-L2.  Paravertebral soft tissues are symmetric and normal in appearance.  T12-L1: No disc protrusion, neuroforaminal narrowing, or central canal stenosis.  L1-L2: No disc protrusion, neuroforaminal narrowing, or central canal stenosis.  L2-L3: No disc protrusion, neuroforaminal narrowing, or central canal stenosis.  L3-L4: No disc protrusion, neuroforaminal narrowing, or central canal stenosis.  L4-L5: No disc protrusion, neuroforaminal narrowing, or central canal stenosis.  L5-S1: Mild bilateral facet hypertrophy. No disc protrusion, neuroforaminal narrowing, or central canal stenosis.     Impression  Negative MRI of the lumbar spine.                 Results for orders placed during the hospital encounter of 01/04/18   X-Ray Lumbar Complete With Flex And Ext     Narrative Comparison: None  Technique: AP, lateral, lateral flexion, lateral extension, bilateral oblique, and lumbosacral coned down views were obtained of the lumbar spine  Findings: There is mild scoliosis of the lower thoracic and upper lumbar spine.  Vertebral body heights are well-maintained.  No spondylolisthesis demonstrated.  No change in spinal alignment with flexion or extension to suggest instability. Intervertebral disk spaces are well preserved.  No pars defects visualized.  Posterior elements appear grossly intact. No acute fractures or subluxations are demonstrated.  The remaining visualized osseous and soft tissue structures demonstrate no appreciable abnormality.     Impression As above.  No acute findings.            Review of Systems:  CONSTITUTIONAL: patient denies any fever, chills, or weight loss  SKIN: patient denies any rash or itching  RESPIRATORY: patient denies having any shortness of breath  GASTROINTESTINAL: patient denies  "having any diarrhea, constipation, or bowel incontinence  GENITOURINARY: patient denies having any abnormal bladder function     MUSCULOSKELETAL:  - patient complains of the above noted pain/s (see chief pain complaint)     NEUROLOGICAL:   - pain as above  - strength in Lower extremities is intact, BILATERALLY  - sensation in Lower extremities is intact, BILATERALLY  - patient denies any loss of bowel or bladder control      PSYCHIATRIC: patient denies any change in mood     Other:  All other systems reviewed and are negative           Physical Exam:  Vitals:  BP (!) 134/91 (BP Location: Right arm, Patient Position: Sitting, BP Method: Medium (Automatic))   Pulse 100   Resp 18   Ht 5' 4" (1.626 m)   Wt 114.8 kg (253 lb)   BMI 43.43 kg/m²   (reviewed on 4/12/2019)     General: alert and oriented, in no apparent distress.  Gait: normal gait.  Skin: no rashes, no discoloration, no obvious lesions  HEENT: normocephalic, atraumatic. Pupils equal and round.  Cardiovascular: no significant peripheral edema present.  Respiratory: without use of accessory muscles of respiration.     Musculoskeletal - Lumbar Spine:  - Pain on flexion of lumbar spine: Absent   - Pain on extension of lumbar spine: Present  - Lumbar facet loading: Present on left  - TTP over the lumbar facet joints: Present on left  - TTP over the SI joints: Present on left  - TTP over GT bursa: Absent   - Straight Leg Raise: Negative  - JULISSA: Negative     Neuro - Extremities:  - BUE Strength:R/L: D: 5/5; B: 5/5; T: 5/5; WF: 5/5; WE: 5/5; IO: 5/5  - BLE Strength: R/L: HF: 5/5, HE: 5/5, KF: 5/5; KE: 5/5; FE: 5/5; FF: 5/5  - Extremity Reflexes: Brisk and symmetric throughout  - Sensory: Sensation to light touch intact bilaterally      Psych:  Mood and affect is appropriate              Assessment:  Nathan Carlos is a 28 y.o. year old female who is presenting with          Encounter Diagnoses   Name Primary?    Chronic bilateral low back pain with " left-sided sciatica Yes    Lumbar spondylosis      Lumbar facet arthropathy      Sacroiliac joint pain      Greater trochanteric bursitis, left           Plan: Proceed with left L3-5 MBB with local.

## 2019-05-16 NOTE — OP NOTE
Procedure: Lumbar Medial Branch Block under Fluoroscopic Guidance    Side: left     Level:  Sacral ala (Corresponding to the L5 dorsal ramus), L5 transverse process (Corresponding to the L4 medial branch) and L4 transverse process (Corresponding to the L3 medial branch)     PROCEDURE DATE: 5/16/2019    Pre-operative Diagnosis: Lumbar Spondylosis  Post-operative Diagnosis: Lumbar Spondylosis    Provider: Yo Kirkland MD  Assistant(s): none    Anesthesia: Local, No Sedation    >> 0 mg of VERSED    >> 0 mcg of FENTANYL     Indication: Low back pain without radiculopathy. Symptoms unresponsive to conservative treatments. Fluoroscopy was used to optimize visualization of needle placement and to maximize safety.     Procedure Description / Technique:  The patient was seen and identified in the preoperative area. Risks, benefits, complications, and alternatives were discussed with the patient. The patient agreed to proceed with the procedure and signed the consent. The site and side of the procedure was identified and marked. No iv was started.     The patient was taken to the procedural suite and positioned in prone orientation on the procedure table. A pillow was placed under the abdomen to reduce lumbar lordosis. A time out was performed. The procedure, site, side, and allergies were stated and agreed to by all present. The lumbosacral area was widely prepped with ChloraPrep. The procedural site was draped in usual sterile fashion. Vital signs were closely monitored throughout this procedure. Conscious sedation was NOT used for this procedure.    The Left sacral ala and superior articular process was identified and marked on AP fluoroscopic imaging. Subcutaneous tissues were localized using 1% PF Lidocaine to improve patient comfort. A 22 gauge 5 inch spinal needle was advance until the needle rested on OS at the interface of the sacral ala and the base of the sacral superior articular process. After negative  "aspiration, 0.75 mL of a solution containing 2 mL of 1% PF Lidocaine and 1 mL of Methylprednisolone (40 mg/mL) was injected. No pain or paresthesia was noted on injection. After left side injection, the fluoroscope was obliqued to the Left until the jessika dog outline of the L5 vertebrae came into view. The spinal needle was advanced to the "eye of the jessika dog" and after negative aspiration a 0.75 mL of the above noted solution was injected. No pain or paresthesia was noted. This technique was repeated at each of the above noted levels. The spinal needle was removed intact following injection at each targeted site. The stylet was replaced prior to needle removal at each site.    Description of Findings: Not applicable    Prosthetic devices, grafts, tissues, or devices implanted: None    Specimen Removed: No    ESTIMATED BLOOD LOSS: minimal    COMPLICATIONS: None    DISPOSITION / PLANS: The patient was transferred to the recovery area in a stable condition for observation. The patient was reexamined prior to discharge. There was no evidence of acute neurologic injury following the procedure.  Patient was discharged from the recovery room after meeting discharge criteria. Home discharge instructions were given to the patient by the staff.  "

## 2019-05-16 NOTE — PLAN OF CARE
Problem: Adult Inpatient Plan of Care  Goal: Plan of Care Review  Outcome: Outcome(s) achieved Date Met: 05/16/19  Patient d/c home in stable condition via wheelchair with ride. Verbalized understanding of d/c instructions. Patient voiced no complaints. Patient stood at side of bed, walked steps with no new motor deficits. Neuro intact.

## 2019-05-16 NOTE — DISCHARGE SUMMARY
[facility]  Discharge Note       Description of Procedure: Left L3, 4, 5 Lumbar Medial Branch Block under Fluoroscopic Guidance    Procedure Date: 5/16/2019    Admit Date: 5/16/2019  Discharge Date: 5/16/2019     Attending Physician: Isael Ram   Discharge Provider: Isael Ram    Preoperative Diagnosis: Lumbar Spondylosis, Lumbar Facet Arthropathy     Postoperative Diagnosis: as above, same as preoperative diagnosis    Discharged Condition: Stable    Hospital Course: Patient was admitted for an outpatient procedure. The procedure was tolerated well with no complications.    Final Diagnoses: Same as principal problem.    Disposition: Home, self-care.    Follow up/Patient Instructions:  Follow-up in clinic in 2-3 weeks.    Medications: No medications were prescribed today. The patient was advised to resume normal medication regimen without change.  Specific information was provided regarding restarting any anticoagulant/s.    Discharge Procedure Orders (must include Diet, Follow-up, Activity):  Light activity for the remainder of the day, resume normal activity tomorrow. Resume normal diet. Follow-up in clinic in 2-3 weeks.

## 2019-05-21 ENCOUNTER — PATIENT MESSAGE (OUTPATIENT)
Dept: FAMILY MEDICINE | Facility: CLINIC | Age: 29
End: 2019-05-21

## 2019-05-21 RX ORDER — FLUCONAZOLE 150 MG/1
150 TABLET ORAL DAILY
Qty: 1 TABLET | Refills: 0 | Status: SHIPPED | OUTPATIENT
Start: 2019-05-21 | End: 2019-05-22

## 2019-05-21 NOTE — TELEPHONE ENCOUNTER
Patient requesting medication for yeast infection with vaginal irritation/white discharge.  Please advise

## 2019-05-29 ENCOUNTER — PATIENT MESSAGE (OUTPATIENT)
Dept: OBSTETRICS AND GYNECOLOGY | Facility: CLINIC | Age: 29
End: 2019-05-29

## 2019-06-06 ENCOUNTER — TELEPHONE (OUTPATIENT)
Dept: OBSTETRICS AND GYNECOLOGY | Facility: CLINIC | Age: 29
End: 2019-06-06

## 2019-06-06 NOTE — TELEPHONE ENCOUNTER
Spoke to pt, pt states she wants to reschedule appt due to weather. Offered pt 06/12/2019 at 2:00 PM. Pt accepted time, informed pt that since she has a previous appt at the same location we will try to accommodate her time frame. Sent message to Christina to confirm schedule time.

## 2019-06-06 NOTE — TELEPHONE ENCOUNTER
----- Message from Deena Santos sent at 6/6/2019  8:09 AM CDT -----  Contact: Pt  Please give pt a call at .[phone] (home) she is calling to see if she can come in later today when the water goes down some

## 2019-06-12 ENCOUNTER — PATIENT MESSAGE (OUTPATIENT)
Dept: PAIN MEDICINE | Facility: CLINIC | Age: 29
End: 2019-06-12

## 2019-06-12 ENCOUNTER — LAB VISIT (OUTPATIENT)
Dept: LAB | Facility: HOSPITAL | Age: 29
End: 2019-06-12
Attending: NURSE PRACTITIONER
Payer: MEDICAID

## 2019-06-12 ENCOUNTER — OFFICE VISIT (OUTPATIENT)
Dept: PAIN MEDICINE | Facility: CLINIC | Age: 29
End: 2019-06-12
Payer: MEDICAID

## 2019-06-12 ENCOUNTER — PATIENT MESSAGE (OUTPATIENT)
Dept: OBSTETRICS AND GYNECOLOGY | Facility: CLINIC | Age: 29
End: 2019-06-12

## 2019-06-12 ENCOUNTER — TELEPHONE (OUTPATIENT)
Dept: OBSTETRICS AND GYNECOLOGY | Facility: CLINIC | Age: 29
End: 2019-06-12

## 2019-06-12 ENCOUNTER — OFFICE VISIT (OUTPATIENT)
Dept: OBSTETRICS AND GYNECOLOGY | Facility: CLINIC | Age: 29
End: 2019-06-12
Payer: MEDICAID

## 2019-06-12 VITALS
SYSTOLIC BLOOD PRESSURE: 129 MMHG | RESPIRATION RATE: 18 BRPM | BODY MASS INDEX: 44.73 KG/M2 | HEART RATE: 70 BPM | HEIGHT: 64 IN | DIASTOLIC BLOOD PRESSURE: 93 MMHG | WEIGHT: 262 LBS

## 2019-06-12 VITALS
DIASTOLIC BLOOD PRESSURE: 96 MMHG | SYSTOLIC BLOOD PRESSURE: 152 MMHG | BODY MASS INDEX: 45.09 KG/M2 | HEIGHT: 64 IN | WEIGHT: 264.13 LBS

## 2019-06-12 DIAGNOSIS — M47.816 LUMBAR FACET ARTHROPATHY: ICD-10-CM

## 2019-06-12 DIAGNOSIS — Z01.419 CERVICAL SMEAR, AS PART OF ROUTINE GYNECOLOGICAL EXAMINATION: ICD-10-CM

## 2019-06-12 DIAGNOSIS — M70.62 GREATER TROCHANTERIC BURSITIS, LEFT: ICD-10-CM

## 2019-06-12 DIAGNOSIS — M53.3 SACROILIAC JOINT PAIN: ICD-10-CM

## 2019-06-12 DIAGNOSIS — Z00.00 PREVENTATIVE HEALTH CARE: ICD-10-CM

## 2019-06-12 DIAGNOSIS — Z71.1 CONCERN ABOUT STD IN FEMALE WITHOUT DIAGNOSIS: Primary | ICD-10-CM

## 2019-06-12 DIAGNOSIS — G89.29 CHRONIC BILATERAL LOW BACK PAIN WITH LEFT-SIDED SCIATICA: Primary | ICD-10-CM

## 2019-06-12 DIAGNOSIS — Z71.1 CONCERN ABOUT STD IN FEMALE WITHOUT DIAGNOSIS: ICD-10-CM

## 2019-06-12 DIAGNOSIS — M47.816 LUMBAR SPONDYLOSIS: ICD-10-CM

## 2019-06-12 DIAGNOSIS — M54.42 CHRONIC BILATERAL LOW BACK PAIN WITH LEFT-SIDED SCIATICA: Primary | ICD-10-CM

## 2019-06-12 PROCEDURE — 99395 PREV VISIT EST AGE 18-39: CPT | Mod: S$PBB,,, | Performed by: NURSE PRACTITIONER

## 2019-06-12 PROCEDURE — 99212 OFFICE O/P EST SF 10 MIN: CPT | Mod: PBBFAC,27 | Performed by: NURSE PRACTITIONER

## 2019-06-12 PROCEDURE — 99999 PR PBB SHADOW E&M-EST. PATIENT-LVL II: ICD-10-PCS | Mod: PBBFAC,,, | Performed by: NURSE PRACTITIONER

## 2019-06-12 PROCEDURE — 87510 GARDNER VAG DNA DIR PROBE: CPT

## 2019-06-12 PROCEDURE — 86703 HIV-1/HIV-2 1 RESULT ANTBDY: CPT

## 2019-06-12 PROCEDURE — 87480 CANDIDA DNA DIR PROBE: CPT

## 2019-06-12 PROCEDURE — 99214 PR OFFICE/OUTPT VISIT, EST, LEVL IV, 30-39 MIN: ICD-10-PCS | Mod: S$PBB,,, | Performed by: PHYSICIAN ASSISTANT

## 2019-06-12 PROCEDURE — 87491 CHLMYD TRACH DNA AMP PROBE: CPT

## 2019-06-12 PROCEDURE — 84702 CHORIONIC GONADOTROPIN TEST: CPT

## 2019-06-12 PROCEDURE — 99999 PR PBB SHADOW E&M-EST. PATIENT-LVL IV: CPT | Mod: PBBFAC,,, | Performed by: PHYSICIAN ASSISTANT

## 2019-06-12 PROCEDURE — 86592 SYPHILIS TEST NON-TREP QUAL: CPT

## 2019-06-12 PROCEDURE — 99999 PR PBB SHADOW E&M-EST. PATIENT-LVL II: CPT | Mod: PBBFAC,,, | Performed by: NURSE PRACTITIONER

## 2019-06-12 PROCEDURE — 99395 PR PREVENTIVE VISIT,EST,18-39: ICD-10-PCS | Mod: S$PBB,,, | Performed by: NURSE PRACTITIONER

## 2019-06-12 PROCEDURE — 88142 CYTOPATH C/V THIN LAYER: CPT

## 2019-06-12 PROCEDURE — 99999 PR PBB SHADOW E&M-EST. PATIENT-LVL IV: ICD-10-PCS | Mod: PBBFAC,,, | Performed by: PHYSICIAN ASSISTANT

## 2019-06-12 PROCEDURE — 36415 COLL VENOUS BLD VENIPUNCTURE: CPT

## 2019-06-12 PROCEDURE — 99214 OFFICE O/P EST MOD 30 MIN: CPT | Mod: S$PBB,,, | Performed by: PHYSICIAN ASSISTANT

## 2019-06-12 PROCEDURE — 99214 OFFICE O/P EST MOD 30 MIN: CPT | Mod: PBBFAC | Performed by: PHYSICIAN ASSISTANT

## 2019-06-12 RX ORDER — TOPIRAMATE 50 MG/1
TABLET, FILM COATED ORAL
Qty: 60 TABLET | Refills: 1 | Status: SHIPPED | OUTPATIENT
Start: 2019-06-12 | End: 2019-06-12 | Stop reason: SDUPTHER

## 2019-06-12 RX ORDER — TOPIRAMATE 50 MG/1
TABLET, FILM COATED ORAL
Qty: 60 TABLET | Refills: 1 | Status: SHIPPED | OUTPATIENT
Start: 2019-06-12 | End: 2019-06-13

## 2019-06-12 RX ORDER — NORGESTIMATE AND ETHINYL ESTRADIOL 0.25-0.035
1 KIT ORAL DAILY
Qty: 28 TABLET | Refills: 11 | Status: SHIPPED | OUTPATIENT
Start: 2019-06-12 | End: 2019-11-10

## 2019-06-12 NOTE — TELEPHONE ENCOUNTER
----- Message from Tisha Troncoso sent at 6/12/2019  1:48 PM CDT -----  Contact: self [phone]  States that she is running late for her appt due to transportation being late to pick her up. States that transportation is on the way to pick her up now. Please call back at [phone]//thank you acc

## 2019-06-12 NOTE — PATIENT INSTRUCTIONS
- message Mague Molina PA-C in 4 weeks regarding relief with topamax. Will consider right RFA at that time

## 2019-06-12 NOTE — TELEPHONE ENCOUNTER
Spoke to pt, pt states transportation was late getting to her and she was on the way, informed pt that Dr. Vidales is in clinic until 3:00, offered pt to schedule her with another provider at a later time, pt accepted. Scheduled pt with Hanh Yoo NP for today at 3:30 PM. Pt verbalized understanding.

## 2019-06-12 NOTE — PROGRESS NOTES
"CC: Well woman exam    Nathan Carlos is a 28 y.o. female  presents for well woman exam.  Patient is insulin resistant and currently on spironolactone. Weight is 264# BMI 45. Is sexually active, concerned about STD exposure. Last pap exam was normal. No cycles since " taking Provera in ".     Past Medical History:   Diagnosis Date    GERD (gastroesophageal reflux disease)     Herpes simplex virus (HSV) infection     Hyperlipidemia     Hypertension     Ovarian cyst     bilateral      Past Surgical History:   Procedure Laterality Date    ANKLE SURGERY Left     BUNIONECTOMY Bilateral     COLONOSCOPY  2018    LUMBAR FUSION       Social History     Socioeconomic History    Marital status: Single     Spouse name: Not on file    Number of children: 0    Years of education: Not on file    Highest education level: Not on file   Occupational History     Employer: [id number]   Social Needs    Financial resource strain: Not on file    Food insecurity:     Worry: Not on file     Inability: Not on file    Transportation needs:     Medical: Not on file     Non-medical: Not on file   Tobacco Use    Smoking status: Never Smoker    Smokeless tobacco: Never Used   Substance and Sexual Activity    Alcohol use: Yes     Frequency: Monthly or less     Drinks per session: 1 or 2     Binge frequency: Never    Drug use: No    Sexual activity: Yes     Partners: Male     Birth control/protection: Condom   Lifestyle    Physical activity:     Days per week: Not on file     Minutes per session: Not on file    Stress: Not on file   Relationships    Social connections:     Talks on phone: Not on file     Gets together: Not on file     Attends Bahai service: Not on file     Active member of club or organization: Not on file     Attends meetings of clubs or organizations: Not on file     Relationship status: Not on file   Other Topics Concern    Not on file   Social History Narrative    " "Not on file     Family History   Problem Relation Age of Onset    Hypertension Mother     Fibroids Mother     No Known Problems Father     Diabetes Sister     Hypertension Brother     Hypertension Maternal Aunt     Diabetes Maternal Aunt     Diabetes Maternal Uncle     Hypertension Maternal Grandmother     Leukemia Maternal Grandfather     Hypertension Maternal Grandfather     Stroke Neg Hx      OB History        0    Para   0    Term   0       0    AB   0    Living   0       SAB   0    TAB   0    Ectopic   0    Multiple   0    Live Births   0                 BP (!) 152/96 (BP Location: Left arm, Patient Position: Sitting, BP Method: Medium (Manual))   Ht 5' 4" (1.626 m)   Wt 119.8 kg (264 lb 1.8 oz)   LMP 2019   BMI 45.33 kg/m²       ROS:  GENERAL: Denies weight gain or weight loss. Feeling well overall.   SKIN: Denies rash or lesions.   HEAD: Denies head injury or headache.   NODES: Denies enlarged lymph nodes.   CHEST: Denies chest pain or shortness of breath.   CARDIOVASCULAR: Denies palpitations or left sided chest pain.   ABDOMEN: No abdominal pain, constipation, diarrhea, nausea, vomiting or rectal bleeding.   URINARY: No frequency, dysuria, hematuria, or burning on urination.  REPRODUCTIVE: See HPI.   BREASTS: The patient performs breast self-examination and denies pain, lumps, or nipple discharge.   HEMATOLOGIC: No easy bruisability or excessive bleeding.   MUSCULOSKELETAL: Denies joint pain or swelling.   NEUROLOGIC: Denies syncope or weakness.   PSYCHIATRIC: Denies depression, anxiety or mood swings.    PHYSICAL EXAM:  APPEARANCE: Obese female, in no acute distress.  AFFECT: WNL, alert and oriented x 3  SKIN: Positive for  acne and hirsutism  NECK: Neck symmetric without masses or thyromegaly  NODES: No inguinal, cervical, axillary, or femoral lymph node enlargement  CHEST: Good respiratory effect  ABDOMEN: Soft.  No tenderness or masses.  No hepatosplenomegaly.  No " hernias.  BREASTS: Symmetrical, no skin changes or visible lesions.  No palpable masses, nipple discharge bilaterally.  PELVIC: Normal external genitalia without lesions.  Normal hair distribution.  Adequate perineal body, normal urethral meatus.  Vagina moist and well rugated without lesions or discharge.  Cervix pink, without lesions, discharge or tenderness.  No significant cystocele or rectocele.  Bimanual exam shows uterus to be normal size, regular, mobile and nontender.  Adnexa without masses or tenderness.    EXTREMITIES: No edema.    1. Concern about STD in female without diagnosis  RPR    HIV 1/2 Ag/Ab (4th Gen)    C. trachomatis/N. gonorrhoeae by AMP DNA    Vaginosis Screen by DNA Probe   2. Preventative health care  Liquid-based pap smear, screening   3. Cervical smear, as part of routine gynecological examination  Liquid-based pap smear, screening     PLAN:  Discussed exercise and healthy eating.  OCP to regulate cycles  STD assessment  Pap exam  Patient was counseled today on A.C.S. Pap guidelines and recommendations for yearly pelvic exams, mammograms and monthly self breast exams; to see her PCP for other health maintenance.

## 2019-06-12 NOTE — PROGRESS NOTES
Chief Pain Complaint:  Low-back Pain      Interval History: Patient was seen on 5/16/19. At that time she underwent left L3-5 MBB with local.  The patient reports that she is/was better following the procedure.  she reports 90% pain relief.  The changes lasted 4 weeks so far.  The changes have continued through this visit.      History of Present Illness:   Nathan Carlos is a 28 y.o. female  who is presenting with a chief complaint of Low-back Pain. The patient began experiencing this problem insidiously, and the pain has been gradually worsening. The pain is described as throbbing, cramping, aching and heavy and is located in the right lumbar spine. Pain is intermittent and lasts hours. The  pain is nonradiating. The patient rates her pain a 8 out of ten and interferes with activities of daily living a 7 out of ten. Pain is exacerbated by extension of the lumbar spine, and is improved by rest. Patient reports no prior trauma, no prior spinal surgery     - pertinent negatives: No fever, No chills, No weight loss, No bladder dysfunction, No bowel dysfunction, No saddle anesthesia  - pertinent positives: none    - medications, other therapies tried (physical therapy, injections):     >> NSAIDs, Tylenol, Norco, zanaflex and flexeril (no relief)    >> Has previously undergone Physical Therapy    >> Has previously undergone spinal injection/s   - Left SI joint injection 1/2018 with 100% relief for 6 months   - Right L3, L4, L5 MBB with local on 8/15/18 with 90% pain relief   - left SIJ + left GT bursa injection with local on 11/8/18 with good relief of bursitis but only 10% of SIJ pain   - left L3-5 MBB with local on 5/16/19 with 90% pain relief        Imaging / Labs / Studies (reviewed on 6/12/2019):    Results for orders placed during the hospital encounter of 11/22/17   MRI Lumbar Spine Without Contrast    Narrative Technique: Standard noncontrast multiplanar multisequence imaging of the lumbar spine was  performed.  Findings: There is anatomic spinal alignment.  Disc interspaces are of normal height and signal intensity.  Vertebral marrow signal pattern and architecture are normal.  Distal cord is unremarkable with conus medullaris terminating at L1-L2.  Paravertebral soft tissues are symmetric and normal in appearance.  T12-L1: No disc protrusion, neuroforaminal narrowing, or central canal stenosis.  L1-L2: No disc protrusion, neuroforaminal narrowing, or central canal stenosis.  L2-L3: No disc protrusion, neuroforaminal narrowing, or central canal stenosis.  L3-L4: No disc protrusion, neuroforaminal narrowing, or central canal stenosis.  L4-L5: No disc protrusion, neuroforaminal narrowing, or central canal stenosis.  L5-S1: Mild bilateral facet hypertrophy. No disc protrusion, neuroforaminal narrowing, or central canal stenosis.    Impression  Negative MRI of the lumbar spine.     Results for orders placed during the hospital encounter of 01/04/18   X-Ray Lumbar Complete With Flex And Ext    Narrative Comparison: None  Technique: AP, lateral, lateral flexion, lateral extension, bilateral oblique, and lumbosacral coned down views were obtained of the lumbar spine  Findings: There is mild scoliosis of the lower thoracic and upper lumbar spine.  Vertebral body heights are well-maintained.  No spondylolisthesis demonstrated.  No change in spinal alignment with flexion or extension to suggest instability. Intervertebral disk spaces are well preserved.  No pars defects visualized.  Posterior elements appear grossly intact. No acute fractures or subluxations are demonstrated.  The remaining visualized osseous and soft tissue structures demonstrate no appreciable abnormality.    Impression As above.  No acute findings.         Review of Systems:  CONSTITUTIONAL: patient denies any fever, chills, or weight loss  SKIN: patient denies any rash or itching  RESPIRATORY: patient denies having any shortness of  "breath  GASTROINTESTINAL: patient denies having any diarrhea, constipation, or bowel incontinence  GENITOURINARY: patient denies having any abnormal bladder function    MUSCULOSKELETAL:  - patient complains of the above noted pain/s (see chief pain complaint)    NEUROLOGICAL:   - pain as above  - strength in Lower extremities is intact, BILATERALLY  - sensation in Lower extremities is intact, BILATERALLY  - patient denies any loss of bowel or bladder control      PSYCHIATRIC: patient denies any change in mood    Other:  All other systems reviewed and are negative        Physical Exam:  Vitals:  BP (!) 129/93 (BP Location: Right arm, Patient Position: Sitting, BP Method: Medium (Automatic))   Pulse 70   Resp 18   Ht 5' 4" (1.626 m)   Wt 114.8 kg (253 lb)   BMI 43.43 kg/m²   (reviewed on 6/12/2019)    General: alert and oriented, in no apparent distress, obese.  Gait: normal gait.  Skin: no rashes, no discoloration, no obvious lesions  HEENT: normocephalic, atraumatic. Pupils equal and round.  Cardiovascular: no significant peripheral edema present.  Respiratory: without use of accessory muscles of respiration.    Musculoskeletal - Lumbar Spine:  - Pain on flexion of lumbar spine: Absent   - Pain on extension of lumbar spine: Present  - Lumbar facet loading: Present on right, left improved  - TTP over the lumbar facet joints: Present on right  - TTP over the SI joints: Present, less so than over facet joints  - TTP over GT bursa: Absent   - Straight Leg Raise: Negative  - JULISSA: Negative    Neuro - Extremities:  - BUE Strength:R/L: D: 5/5; B: 5/5; T: 5/5; WF: 5/5; WE: 5/5; IO: 5/5  - BLE Strength: R/L: HF: 5/5, HE: 5/5, KF: 5/5; KE: 5/5; FE: 5/5; FF: 5/5  - Extremity Reflexes: Brisk and symmetric throughout  - Sensory: Sensation to light touch intact bilaterally      Psych:  Mood and affect is appropriate          Assessment:  Nathan Carlos is a 28 y.o. year old female who is presenting with    Encounter " Diagnoses   Name Primary?    Chronic bilateral low back pain with left-sided sciatica Yes    Lumbar spondylosis     Lumbar facet arthropathy     Sacroiliac joint pain     Greater trochanteric bursitis, left        Plan:  1. Interventional: S/p left L3-5 MBB with local on 5/16/19 with 90% pain relief.  Consider right L3-5 RFA with RN IV sedation.     2. Pharmacologic:   - Will start Topamax 50mg BID (with titration instructions, take 1 tablet QHS x 1 week then increase to BID increasing as tolerated).  She previously tried topamax 25mg with titration with limited relief, but no side effects.  - Continue naproxen 500mg BID PRN and tizanidine 4mg PRN.    3. Rehabilitative: Encouraged regular exercise/ stretching.   I believe weight loss will aid in her goal of lasting pain control.       4. Diagnostic: None for now.    5. Follow up: PRN/ She will message me in about 4 weeks     - I discussed the risks, benefits, and alternatives to potential treatment options. All questions and concerns were fully addressed today in clinic. Dr. Kirkland was consulted regarding the patient plan and agrees.

## 2019-06-13 ENCOUNTER — PATIENT MESSAGE (OUTPATIENT)
Dept: FAMILY MEDICINE | Facility: CLINIC | Age: 29
End: 2019-06-13

## 2019-06-13 ENCOUNTER — PATIENT MESSAGE (OUTPATIENT)
Dept: PAIN MEDICINE | Facility: CLINIC | Age: 29
End: 2019-06-13

## 2019-06-13 LAB
BACTERIAL VAGINOSIS DNA: POSITIVE
C TRACH DNA SPEC QL NAA+PROBE: NOT DETECTED
CANDIDA GLABRATA DNA: NEGATIVE
CANDIDA KRUSEI DNA: NEGATIVE
CANDIDA RRNA VAG QL PROBE: NEGATIVE
HCG INTACT+B SERPL-ACNC: <1.2 MIU/ML
N GONORRHOEA DNA SPEC QL NAA+PROBE: NOT DETECTED
RPR SER QL: NORMAL
T VAGINALIS RRNA GENITAL QL PROBE: NEGATIVE

## 2019-06-13 RX ORDER — METRONIDAZOLE 500 MG/1
500 TABLET ORAL 2 TIMES DAILY
Qty: 14 TABLET | Refills: 0 | Status: SHIPPED | OUTPATIENT
Start: 2019-06-13 | End: 2019-06-20

## 2019-06-13 RX ORDER — TOPIRAMATE 50 MG/1
TABLET, FILM COATED ORAL
Qty: 60 TABLET | Refills: 1 | Status: SHIPPED | OUTPATIENT
Start: 2019-06-13 | End: 2020-01-24 | Stop reason: SDUPTHER

## 2019-06-13 NOTE — PROGRESS NOTES
Please call patient and add a pharmacy to her medical record. Needs Flagyl, please sent the rx to me to sign. Let her know that cx indicated BV.

## 2019-06-14 ENCOUNTER — TELEPHONE (OUTPATIENT)
Dept: OBSTETRICS AND GYNECOLOGY | Facility: CLINIC | Age: 29
End: 2019-06-14

## 2019-06-14 ENCOUNTER — PATIENT MESSAGE (OUTPATIENT)
Dept: OBSTETRICS AND GYNECOLOGY | Facility: CLINIC | Age: 29
End: 2019-06-14

## 2019-06-14 DIAGNOSIS — Z21 HIV TEST POSITIVE: Primary | ICD-10-CM

## 2019-06-14 DIAGNOSIS — R89.9 ABNORMAL LABORATORY TEST: Primary | ICD-10-CM

## 2019-06-14 LAB — HIV 1+2 AB+HIV1 P24 AG SERPL QL IA: ABNORMAL

## 2019-06-14 NOTE — TELEPHONE ENCOUNTER
----- Message from Hanh Yoo NP sent at 6/14/2019  1:17 PM CDT -----  Needs additional HIV testing

## 2019-06-17 ENCOUNTER — LAB VISIT (OUTPATIENT)
Dept: LAB | Facility: HOSPITAL | Age: 29
End: 2019-06-17
Attending: REGISTERED NURSE
Payer: MEDICAID

## 2019-06-17 ENCOUNTER — OFFICE VISIT (OUTPATIENT)
Dept: FAMILY MEDICINE | Facility: CLINIC | Age: 29
End: 2019-06-17
Payer: MEDICAID

## 2019-06-17 VITALS
DIASTOLIC BLOOD PRESSURE: 100 MMHG | HEIGHT: 64 IN | OXYGEN SATURATION: 98 % | TEMPERATURE: 99 F | BODY MASS INDEX: 44.04 KG/M2 | WEIGHT: 257.94 LBS | SYSTOLIC BLOOD PRESSURE: 140 MMHG | HEART RATE: 97 BPM

## 2019-06-17 DIAGNOSIS — R11.0 NAUSEA: ICD-10-CM

## 2019-06-17 DIAGNOSIS — R89.9 ABNORMAL LABORATORY TEST: ICD-10-CM

## 2019-06-17 DIAGNOSIS — I10 ESSENTIAL HYPERTENSION: Primary | ICD-10-CM

## 2019-06-17 PROCEDURE — 99999 PR PBB SHADOW E&M-EST. PATIENT-LVL IV: CPT | Mod: PBBFAC,,, | Performed by: REGISTERED NURSE

## 2019-06-17 PROCEDURE — 87535 HIV-1 PROBE&REVERSE TRNSCRPJ: CPT

## 2019-06-17 PROCEDURE — 99214 PR OFFICE/OUTPT VISIT, EST, LEVL IV, 30-39 MIN: ICD-10-PCS | Mod: 25,S$PBB,, | Performed by: REGISTERED NURSE

## 2019-06-17 PROCEDURE — 99214 OFFICE O/P EST MOD 30 MIN: CPT | Mod: PBBFAC,PO | Performed by: REGISTERED NURSE

## 2019-06-17 PROCEDURE — 99214 OFFICE O/P EST MOD 30 MIN: CPT | Mod: 25,S$PBB,, | Performed by: REGISTERED NURSE

## 2019-06-17 PROCEDURE — 96372 THER/PROPH/DIAG INJ SC/IM: CPT | Mod: PBBFAC,PO

## 2019-06-17 PROCEDURE — 36415 COLL VENOUS BLD VENIPUNCTURE: CPT | Mod: PO

## 2019-06-17 PROCEDURE — 99999 PR PBB SHADOW E&M-EST. PATIENT-LVL IV: ICD-10-PCS | Mod: PBBFAC,,, | Performed by: REGISTERED NURSE

## 2019-06-17 RX ORDER — ONDANSETRON 4 MG/1
4 TABLET, ORALLY DISINTEGRATING ORAL 3 TIMES DAILY PRN
Qty: 30 TABLET | Refills: 0 | Status: SHIPPED | OUTPATIENT
Start: 2019-06-17 | End: 2019-09-04

## 2019-06-17 RX ORDER — METOPROLOL SUCCINATE 50 MG/1
50 TABLET, EXTENDED RELEASE ORAL DAILY
Qty: 30 TABLET | Refills: 6 | Status: SHIPPED | OUTPATIENT
Start: 2019-06-17 | End: 2020-01-06 | Stop reason: SDUPTHER

## 2019-06-17 RX ORDER — PROMETHAZINE HYDROCHLORIDE 25 MG/ML
25 INJECTION, SOLUTION INTRAMUSCULAR; INTRAVENOUS
Status: COMPLETED | OUTPATIENT
Start: 2019-06-17 | End: 2019-06-17

## 2019-06-17 RX ADMIN — PROMETHAZINE HYDROCHLORIDE 25 MG: 25 INJECTION INTRAMUSCULAR; INTRAVENOUS at 03:06

## 2019-06-17 NOTE — PATIENT INSTRUCTIONS
Promethazine injection  What is this medicine?  PROMETHAZINE (proe METH a zeen) is an antihistamine. It is used to treat allergic reactions and to treat or prevent nausea and vomiting from illness or motion sickness. It is also used to make you sleep before surgery, and to help treat pain or nausea after surgery.  How should I use this medicine?  This medicine is for injection into a muscle, or into a vein. It is given by a health care professional in a hospital or clinic setting.  Talk to your pediatrician regarding the use of this medicine in children. This medicine should not be given to infants and children younger than 2 years old.  What side effects may I notice from receiving this medicine?  Side effects that you should report to your doctor or health care professional as soon as possible:  · allergic reactions like skin rash, itching or hives, swelling of the face, lips, or tongue  · blurred vision  · burning, blistering, pain, redness, and/or swelling at the injection site  · irregular heartbeat, palpitations or chest pain  · muscle or facial twitches  · pain or difficulty passing urine  · seizures  · slowed or shallow breathing  · unusual bleeding or bruising  · yellowing of the eyes or skin  Side effects that usually do not require medical attention (report to your doctor or health care professional if they continue or are bothersome):  · headache  · nightmares, agitation, nervousness, excitability, not able to sleep (these are more likely in children)  · stuffy nose  What may interact with this medicine?  Do not take this medicine with any of the following medications:  · cisapride  · dofetilide  · dronedarone  · MAOIs like Carbex, Eldepryl, Marplan, Nardil, Parnate  · pimozide  · quinidine, including dextromethorphan; quinidine  · thioridazine  · ziprasidone  This medicine may also interact with the following medications:  · certain medicines for depression, anxiety, or psychotic  disturbances  · certain medicines for anxiety or sleep  · certain medicines for seizures like carbamazepine, phenobarbital, phenytoin  · certain medicines for movement abnormalities as in Parkinson's disease, or for gastrointestinal problems  · epinephrine  · medicines for allergies or colds  · muscle relaxants  · narcotic medicines for pain  · other medicines that prolong the QT interval (cause an abnormal heart rhythm)  · tramadol  · trimethobenzamide  What if I miss a dose?  This does not apply.  Where should I keep my medicine?  This drug is given in a hospital or clinic and will not be stored at home.  What should I tell my health care provider before I take this medicine?  They need to know if you have any of these conditions:  · glaucoma  · high blood pressure or heart disease  · kidney disease  · liver disease  · lung or breathing disease, like asthma  · prostate trouble  · pain or difficulty passing urine  · seizures  · an unusual or allergic reaction to promethazine or phenothiazines, other medicines, foods, dyes, or preservatives  · pregnant or trying to get pregnant  · breast-feeding  What should I watch for while using this medicine?  Your condition will be monitored carefully while you are receiving this medicine.  Your healthcare professional will discuss with you the risks and the benefits of using this medicine. This medicine has caused serious side effects in some patients after it was injected into a vein. Watch closely for any signs or symptoms of a local reaction like burning, pain, redness, swelling, and blistering and tell your healthcare professional immediately if any occur. These symptoms may occur when you receive the injection or may occur hours or even days after the injection.  You may get drowsy or dizzy. Do not drive, use machinery, or do anything that needs mental alertness until you know how this medicine affects you. To reduce the risk of dizzy or fainting spells, do not stand or  sit up quickly, especially if you are an older patient. Alcohol may increase dizziness and drowsiness. Avoid alcoholic drinks.  Your mouth may get dry. Chewing sugarless gum or sucking hard candy, and drinking plenty of water will help.  This medicine may cause dry eyes and blurred vision. If you wear contact lenses you may feel some discomfort. Lubricating drops may help. See your eye doctor if the problem does not go away or is severe.  Keep out of the sun, or wear protective clothing outdoors and use a sunscreen. Do not use sun lamps or sun tanning beds or booths.  If you are diabetic, check your blood-sugar levels regularly.  NOTE:This sheet is a summary. It may not cover all possible information. If you have questions about this medicine, talk to your doctor, pharmacist, or health care provider. Copyright© 2017 [employer]

## 2019-06-17 NOTE — PROGRESS NOTES
Phenergan 25mg IM injection administered left ventral gluteal. Tolerated without difficulty. Instructed to wait in waiting area for 15 mins. Agreed to do so.     3:55p 15 min follow up. Denies abnormal feelings.

## 2019-06-21 LAB — HIV 1 RNA+PROVIRAL DNA PLAS QL NAA+PROBE: NORMAL

## 2019-06-30 NOTE — PROGRESS NOTES
Subjective:       Patient ID: Nathan Carlos is a 28 y.o. female.    Chief Complaint   Patient presents with    Hypertension    Headache    Nausea       HPI    Nathan Carlos is here today with c/o not feeling well over past several days.  Reports dizziness, HA pain, blurred vision and nausea.  Currently on spironolactone for HTN and elevated testosterone level.  Reports last week, BP running ~ 130 to 150 over 90s.  Does admit to increased daily sodium intake.  Decreased appetite due to HA pain and nausea.  Does try to walk regularly for exercise.  Occ edema to lower extremities reported.        Review of Systems   Constitutional: Positive for appetite change and fatigue. Negative for activity change.   Eyes: Positive for visual disturbance. Negative for pain and redness.   Respiratory: Negative.    Cardiovascular: Positive for leg swelling. Negative for chest pain and palpitations.   Neurological: Positive for light-headedness and headaches. Negative for weakness and numbness.       Review of patient's allergies indicates:   Allergen Reactions    Amitriptyline      xerostomia    Amlodipine      DRY MOUTH       Patient Active Problem List   Diagnosis    Essential hypertension    Gastroesophageal reflux disease    Left-sided low back pain with left-sided sciatica    Hypercholesteremia    High serum testosterone    Insulin resistance syndrome    HSV-2 (herpes simplex virus 2) infection    Nephrolithiasis    Chronic bilateral low back pain with left-sided sciatica    Polycystic ovaries       Current Outpatient Medications on File Prior to Visit   Medication Sig Dispense Refill    mirtazapine (REMERON) 15 MG tablet Take 1 tablet (15 mg total) by mouth every evening. 30 tablet 6    naproxen (EC NAPROSYN) 500 MG EC tablet Take 1 tablet (500 mg total) by mouth 2 (two) times daily as needed. 60 tablet 0    norgestimate-ethinyl estradiol (ORTHO-CYCLEN) 0.25-35 mg-mcg per tablet Take 1 tablet  "by mouth once daily. 28 tablet 11    spironolactone (ALDACTONE) 25 MG tablet Take 2 tablets (50 mg total) by mouth once daily. 60 tablet 6    topiramate (TOPAMAX) 50 MG tablet Take 1 tablet (50 mg total) by mouth at night x 1 week then increase to 2 (two) times daily. Increase as tolerated. 60 tablet 1    valACYclovir (VALTREX) 500 MG tablet Take 1 tablet (500 mg total) by mouth once daily. 30 tablet 3     No current facility-administered medications on file prior to visit.          Past medical, surgical, family and social histories have been reviewed today.        Objective:     Vitals:    06/17/19 1453   BP: (!) 140/100   Pulse: 97   Temp: 98.5 °F (36.9 °C)   TempSrc: Oral   SpO2: 98%   Weight: 117 kg (257 lb 15 oz)   Height: 5' 4" (1.626 m)   PainSc: 10-Worst pain ever         Physical Exam   Constitutional: She is oriented to person, place, and time. She appears well-developed and well-nourished. No distress.   HENT:   Head: Normocephalic and atraumatic.   Eyes: Pupils are equal, round, and reactive to light.   Neck: Normal range of motion. Neck supple. No JVD present. No tracheal deviation present. No thyromegaly present.   Cardiovascular: Normal rate and regular rhythm.   Pulmonary/Chest: Effort normal and breath sounds normal.   Musculoskeletal: Normal range of motion. She exhibits no edema, tenderness or deformity.   Neurological: She is alert and oriented to person, place, and time. She has normal strength. She displays no atrophy and no tremor. No sensory deficit. Coordination and gait normal.   Skin: Skin is warm and dry. Capillary refill takes less than 2 seconds. She is not diaphoretic.   Psychiatric: She has a normal mood and affect. Her behavior is normal. Judgment and thought content normal.         Diagnosis       1. Essential hypertension    2. Nausea          Assessment/ Plan     Essential hypertension  -     metoprolol succinate (TOPROL-XL) 50 MG 24 hr tablet; Take 1 tablet (50 mg total) by " mouth once daily.  Dispense: 30 tablet; Refill: 6    Nausea  -     promethazine injection 25 mg  -     ondansetron (ZOFRAN-ODT) 4 MG TbDL; Take 1 tablet (4 mg total) by mouth 3 (three) times daily as needed (Nausea/Vomiting).  Dispense: 30 tablet; Refill: 0        Phenergan injection today for increased nausea (cousin here to drive her home).  Continue spironolactone 50 mg daily.  Added Toprol-XL 50 mg once daily.  Zofran prn NV.  Low-salt/caffeine intake, regular exercise, weight reduction.  To see nurse in 1 week for BP recheck.  Follow-up in clinic as needed.        VANCE Wagner  [facility]

## 2019-07-15 ENCOUNTER — PATIENT MESSAGE (OUTPATIENT)
Dept: OBSTETRICS AND GYNECOLOGY | Facility: CLINIC | Age: 29
End: 2019-07-15

## 2019-07-17 ENCOUNTER — PATIENT MESSAGE (OUTPATIENT)
Dept: PAIN MEDICINE | Facility: CLINIC | Age: 29
End: 2019-07-17

## 2019-07-22 ENCOUNTER — PATIENT MESSAGE (OUTPATIENT)
Dept: PAIN MEDICINE | Facility: CLINIC | Age: 29
End: 2019-07-22

## 2019-07-23 ENCOUNTER — PATIENT MESSAGE (OUTPATIENT)
Dept: PAIN MEDICINE | Facility: CLINIC | Age: 29
End: 2019-07-23

## 2019-07-23 ENCOUNTER — TELEPHONE (OUTPATIENT)
Dept: PAIN MEDICINE | Facility: CLINIC | Age: 29
End: 2019-07-23

## 2019-07-23 DIAGNOSIS — M47.816 LUMBAR FACET ARTHROPATHY: ICD-10-CM

## 2019-07-23 DIAGNOSIS — M54.42 CHRONIC BILATERAL LOW BACK PAIN WITH LEFT-SIDED SCIATICA: Primary | ICD-10-CM

## 2019-07-23 DIAGNOSIS — G89.29 CHRONIC BILATERAL LOW BACK PAIN WITH LEFT-SIDED SCIATICA: Primary | ICD-10-CM

## 2019-07-23 RX ORDER — TIZANIDINE 4 MG/1
TABLET ORAL
Qty: 60 TABLET | Refills: 0 | Status: SHIPPED | OUTPATIENT
Start: 2019-07-23 | End: 2019-09-04

## 2019-07-23 RX ORDER — TIZANIDINE 4 MG/1
TABLET ORAL
Qty: 60 TABLET | Refills: 0 | Status: SHIPPED | OUTPATIENT
Start: 2019-07-23 | End: 2019-07-23 | Stop reason: SDUPTHER
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
## 2019-07-23 NOTE — TELEPHONE ENCOUNTER
----- Message from Mague Molina PA-C sent at 7/23/2019  9:13 AM CDT -----  Bilateral L3-5 MBB with sedation    F/u @ ON

## 2019-08-05 ENCOUNTER — PATIENT MESSAGE (OUTPATIENT)
Dept: FAMILY MEDICINE | Facility: CLINIC | Age: 29
End: 2019-08-05

## 2019-08-05 DIAGNOSIS — F51.04 CHRONIC INSOMNIA: ICD-10-CM

## 2019-08-05 RX ORDER — MIRTAZAPINE 15 MG/1
15 TABLET, FILM COATED ORAL NIGHTLY
Qty: 30 TABLET | Refills: 6 | Status: SHIPPED | OUTPATIENT
Start: 2019-08-05 | End: 2020-01-24 | Stop reason: SDUPTHER

## 2019-08-06 ENCOUNTER — TELEPHONE (OUTPATIENT)
Dept: FAMILY MEDICINE | Facility: CLINIC | Age: 29
End: 2019-08-06

## 2019-08-14 ENCOUNTER — HOSPITAL ENCOUNTER (OUTPATIENT)
Facility: HOSPITAL | Age: 29
Discharge: HOME | End: 2019-08-14
Attending: ANESTHESIOLOGY | Admitting: ANESTHESIOLOGY
Payer: MEDICAID

## 2019-08-14 VITALS
HEIGHT: 64 IN | WEIGHT: 259.56 LBS | RESPIRATION RATE: 17 BRPM | OXYGEN SATURATION: 96 % | TEMPERATURE: 98 F | DIASTOLIC BLOOD PRESSURE: 88 MMHG | HEART RATE: 73 BPM | BODY MASS INDEX: 44.31 KG/M2 | SYSTOLIC BLOOD PRESSURE: 141 MMHG

## 2019-08-14 DIAGNOSIS — M47.816 LUMBAR FACET ARTHROPATHY: Primary | ICD-10-CM

## 2019-08-14 LAB
B-HCG UR QL: NEGATIVE
CTP QC/QA: YES

## 2019-08-14 PROCEDURE — 25000003 PHARM REV CODE 250: Performed by: ANESTHESIOLOGY

## 2019-08-14 PROCEDURE — 63600175 PHARM REV CODE 636 W HCPCS: Performed by: ANESTHESIOLOGY

## 2019-08-14 PROCEDURE — 64494 INJ PARAVERT F JNT L/S 2 LEV: CPT | Mod: 50 | Performed by: ANESTHESIOLOGY

## 2019-08-14 PROCEDURE — 64493 PR INJ DX/THER AGNT PARAVERT FACET JOINT,IMG GUIDE,LUMBAR/SAC,1ST LVL: ICD-10-PCS | Mod: 50,,, | Performed by: ANESTHESIOLOGY

## 2019-08-14 PROCEDURE — 64493 INJ PARAVERT F JNT L/S 1 LEV: CPT | Mod: 50,,, | Performed by: ANESTHESIOLOGY

## 2019-08-14 PROCEDURE — 64494 INJ PARAVERT F JNT L/S 2 LEV: CPT | Mod: 50,,, | Performed by: ANESTHESIOLOGY

## 2019-08-14 PROCEDURE — 64495 INJ PARAVERT F JNT L/S 3 LEV: CPT | Mod: 50 | Performed by: ANESTHESIOLOGY

## 2019-08-14 PROCEDURE — 99152 MOD SED SAME PHYS/QHP 5/>YRS: CPT | Mod: ,,, | Performed by: ANESTHESIOLOGY

## 2019-08-14 PROCEDURE — 64494 PR INJ DX/THER AGNT PARAVERT FACET JOINT,IMG GUIDE,LUMBAR/SAC, 2ND LEVEL: ICD-10-PCS | Mod: 50,,, | Performed by: ANESTHESIOLOGY

## 2019-08-14 PROCEDURE — 81025 URINE PREGNANCY TEST: CPT | Performed by: ANESTHESIOLOGY

## 2019-08-14 PROCEDURE — 99152 PR MOD CONSCIOUS SEDATION, SAME PHYS, 5+ YRS, FIRST 15 MIN: ICD-10-PCS | Mod: ,,, | Performed by: ANESTHESIOLOGY

## 2019-08-14 PROCEDURE — 64493 INJ PARAVERT F JNT L/S 1 LEV: CPT | Mod: 50 | Performed by: ANESTHESIOLOGY

## 2019-08-14 RX ORDER — METHYLPREDNISOLONE ACETATE 40 MG/ML
INJECTION, SUSPENSION INTRA-ARTICULAR; INTRALESIONAL; INTRAMUSCULAR; SOFT TISSUE
Status: DISCONTINUED | OUTPATIENT
Start: 2019-08-14 | End: 2019-08-14 | Stop reason: HOSPADM

## 2019-08-14 RX ORDER — FENTANYL CITRATE 50 UG/ML
50 INJECTION, SOLUTION INTRAMUSCULAR; INTRAVENOUS ONCE
Status: DISCONTINUED | OUTPATIENT
Start: 2019-08-14 | End: 2019-08-14 | Stop reason: HOSPADM

## 2019-08-14 RX ORDER — MIDAZOLAM HYDROCHLORIDE 1 MG/ML
INJECTION, SOLUTION INTRAMUSCULAR; INTRAVENOUS
Status: DISCONTINUED | OUTPATIENT
Start: 2019-08-14 | End: 2019-08-14 | Stop reason: HOSPADM

## 2019-08-14 RX ORDER — FENTANYL CITRATE 50 UG/ML
INJECTION, SOLUTION INTRAMUSCULAR; INTRAVENOUS
Status: DISCONTINUED | OUTPATIENT
Start: 2019-08-14 | End: 2019-08-14 | Stop reason: HOSPADM

## 2019-08-14 RX ORDER — LIDOCAINE HYDROCHLORIDE 20 MG/ML
INJECTION, SOLUTION EPIDURAL; INFILTRATION; INTRACAUDAL; PERINEURAL
Status: DISCONTINUED | OUTPATIENT
Start: 2019-08-14 | End: 2019-08-14 | Stop reason: HOSPADM

## 2019-08-14 RX ORDER — TIZANIDINE 4 MG/1
4 TABLET ORAL 2 TIMES DAILY PRN
Qty: 60 TABLET | Refills: 0 | Status: SHIPPED | OUTPATIENT
Start: 2019-08-14 | End: 2019-09-11

## 2019-08-14 NOTE — H&P
Chief Pain Complaint:  Low-back Pain        Interval History: Patient was seen on 5/16/19. At that time she underwent left L3-5 MBB with local.  The patient reports that she is/was better following the procedure.  she reports 90% pain relief.  The changes lasted 4 weeks so far.  The changes have continued through this visit.        History of Present Illness:   Nathan Carlos is a 28 y.o. female  who is presenting with a chief complaint of Low-back Pain. The patient began experiencing this problem insidiously, and the pain has been gradually worsening. The pain is described as throbbing, cramping, aching and heavy and is located in the right lumbar spine. Pain is intermittent and lasts hours. The  pain is nonradiating. The patient rates her pain a 8 out of ten and interferes with activities of daily living a 7 out of ten. Pain is exacerbated by extension of the lumbar spine, and is improved by rest. Patient reports no prior trauma, no prior spinal surgery      - pertinent negatives: No fever, No chills, No weight loss, No bladder dysfunction, No bowel dysfunction, No saddle anesthesia  - pertinent positives: none    - medications, other therapies tried (physical therapy, injections):     >> NSAIDs, Tylenol, Norco, zanaflex and flexeril (no relief)    >> Has previously undergone Physical Therapy    >> Has previously undergone spinal injection/s              - Left SI joint injection 1/2018 with 100% relief for 6 months              - Right L3, L4, L5 MBB with local on 8/15/18 with 90% pain relief              - left SIJ + left GT bursa injection with local on 11/8/18 with good relief of bursitis but only 10% of SIJ pain              - left L3-5 MBB with local on 5/16/19 with 90% pain relief           Imaging / Labs / Studies (reviewed on 6/12/2019):          Results for orders placed during the hospital encounter of 11/22/17   MRI Lumbar Spine Without Contrast     Narrative Technique: Standard noncontrast  multiplanar multisequence imaging of the lumbar spine was performed.  Findings: There is anatomic spinal alignment.  Disc interspaces are of normal height and signal intensity.  Vertebral marrow signal pattern and architecture are normal.  Distal cord is unremarkable with conus medullaris terminating at L1-L2.  Paravertebral soft tissues are symmetric and normal in appearance.  T12-L1: No disc protrusion, neuroforaminal narrowing, or central canal stenosis.  L1-L2: No disc protrusion, neuroforaminal narrowing, or central canal stenosis.  L2-L3: No disc protrusion, neuroforaminal narrowing, or central canal stenosis.  L3-L4: No disc protrusion, neuroforaminal narrowing, or central canal stenosis.  L4-L5: No disc protrusion, neuroforaminal narrowing, or central canal stenosis.  L5-S1: Mild bilateral facet hypertrophy. No disc protrusion, neuroforaminal narrowing, or central canal stenosis.     Impression  Negative MRI of the lumbar spine.           Results for orders placed during the hospital encounter of 01/04/18   X-Ray Lumbar Complete With Flex And Ext     Narrative Comparison: None  Technique: AP, lateral, lateral flexion, lateral extension, bilateral oblique, and lumbosacral coned down views were obtained of the lumbar spine  Findings: There is mild scoliosis of the lower thoracic and upper lumbar spine.  Vertebral body heights are well-maintained.  No spondylolisthesis demonstrated.  No change in spinal alignment with flexion or extension to suggest instability. Intervertebral disk spaces are well preserved.  No pars defects visualized.  Posterior elements appear grossly intact. No acute fractures or subluxations are demonstrated.  The remaining visualized osseous and soft tissue structures demonstrate no appreciable abnormality.     Impression As above.  No acute findings.            Review of Systems:  CONSTITUTIONAL: patient denies any fever, chills, or weight loss  SKIN: patient denies any rash or  "itching  RESPIRATORY: patient denies having any shortness of breath  GASTROINTESTINAL: patient denies having any diarrhea, constipation, or bowel incontinence  GENITOURINARY: patient denies having any abnormal bladder function     MUSCULOSKELETAL:  - patient complains of the above noted pain/s (see chief pain complaint)     NEUROLOGICAL:   - pain as above  - strength in Lower extremities is intact, BILATERALLY  - sensation in Lower extremities is intact, BILATERALLY  - patient denies any loss of bowel or bladder control      PSYCHIATRIC: patient denies any change in mood     Other:  All other systems reviewed and are negative           Physical Exam:  Vitals:  BP (!) 129/93 (BP Location: Right arm, Patient Position: Sitting, BP Method: Medium (Automatic))   Pulse 70   Resp 18   Ht 5' 4" (1.626 m)   Wt 114.8 kg (253 lb)   BMI 43.43 kg/m²   (reviewed on 6/12/2019)     General: alert and oriented, in no apparent distress, obese.  Gait: normal gait.  Skin: no rashes, no discoloration, no obvious lesions  HEENT: normocephalic, atraumatic. Pupils equal and round.  Cardiovascular: no significant peripheral edema present.  Respiratory: without use of accessory muscles of respiration.     Musculoskeletal - Lumbar Spine:  - Pain on flexion of lumbar spine: Absent   - Pain on extension of lumbar spine: Present  - Lumbar facet loading: Present on right, left improved  - TTP over the lumbar facet joints: Present on right  - TTP over the SI joints: Present, less so than over facet joints  - TTP over GT bursa: Absent   - Straight Leg Raise: Negative  - JULISSA: Negative     Neuro - Extremities:  - BUE Strength:R/L: D: 5/5; B: 5/5; T: 5/5; WF: 5/5; WE: 5/5; IO: 5/5  - BLE Strength: R/L: HF: 5/5, HE: 5/5, KF: 5/5; KE: 5/5; FE: 5/5; FF: 5/5  - Extremity Reflexes: Brisk and symmetric throughout  - Sensory: Sensation to light touch intact bilaterally      Psych:  Mood and affect is appropriate              Assessment:  Nathan " Nancy Carlos is a 28 y.o. year old female who is presenting with          Encounter Diagnoses   Name Primary?    Chronic bilateral low back pain with left-sided sciatica Yes    Lumbar spondylosis      Lumbar facet arthropathy      Sacroiliac joint pain      Greater trochanteric bursitis, left           Plan: Proceed with bilateral L3-5 MBB

## 2019-08-14 NOTE — DISCHARGE INSTRUCTIONS

## 2019-08-14 NOTE — PLAN OF CARE
Dr. Kirkland just spoke with pt. Pt ready for discharge. Pt. Stated she has some pain relief (7/10). No needs voiced

## 2019-08-14 NOTE — OP NOTE
Procedure: Lumbar Medial Branch Block under Fluoroscopic Guidance    Side: bilateral     Level:  Sacral ala (Corresponding to the L5 dorsal ramus), L5 transverse process (Corresponding to the L4 medial branch) and L4 transverse process (Corresponding to the L3 medial branch)     PROCEDURE DATE: 8/14/2019    Pre-operative Diagnosis: Lumbar Spondylosis  Post-operative Diagnosis: Lumbar Spondylosis    Provider: Yo Kirkland MD  Assistant(s): none    Anesthesia: Local, IV Sedation    >> 2 mg of VERSED    >> 100 mcg of FENTANYL     Indication: Low back pain without radiculopathy. Symptoms unresponsive to conservative treatments. Fluoroscopy was used to optimize visualization of needle placement and to maximize safety.     Procedure Description / Technique:  The patient was seen and identified in the preoperative area. Risks, benefits, complications, and alternatives were discussed with the patient. The patient agreed to proceed with the procedure and signed the consent. The site and side of the procedure was identified and marked. An iv was started.     The patient was taken to the procedural suite and positioned in prone orientation on the procedure table. A pillow was placed under the abdomen to reduce lumbar lordosis. A time out was performed. The procedure, site, side, and allergies were stated and agreed to by all present. The lumbosacral area was widely prepped with ChloraPrep. The procedural site was draped in usual sterile fashion. Vital signs were closely monitored throughout this procedure. Conscious sedation was used for this procedure to decrease patient anxiety.    The Right sacral ala and superior articular process was identified and marked on AP fluoroscopic imaging. Subcutaneous tissues were localized using 1% PF Lidocaine to improve patient comfort. A 22 gauge 5 inch spinal needle was advance until the needle rested on OS at the interface of the sacral ala and the base of the sacral superior articular  "process. After negative aspiration, 0.75 mL of a solution containing 4 mL of 1% PF Lidocaine and 2 mL of Methylprednisolone (40 mg/mL) was injected. No pain or paresthesia was noted on injection. After right side injection, the fluoroscope was obliqued to the Right until the jessika dog outline of the L5 vertebrae came into view. The spinal needle was advanced to the "eye of the jessika dog" and after negative aspiration a 0.75 mL of the above noted solution was injected. No pain or paresthesia was noted. This technique was repeated at each of the above noted levels. The spinal needle was removed intact following injection at each targeted site. The stylet was replaced prior to needle removal at each site.    The Left sacral ala and superior articular process was identified and marked on AP fluoroscopic imaging. Subcutaneous tissues were localized using 1% PF Lidocaine to improve patient comfort. A 22 gauge 5 inch spinal needle was advance until the needle rested on OS at the interface of the sacral ala and the base of the sacral superior articular process. After negative aspiration, 0.75 mL of a solution containing 4 mL of 1% PF Lidocaine and 2 mL of Methylprednisolone (40 mg/mL) was injected. No pain or paresthesia was noted on injection. After left side injection, the fluoroscope was obliqued to the Left until the jessika dog outline of the L5 vertebrae came into view. The spinal needle was advanced to the "eye of the jessika dog" and after negative aspiration a 0.75 mL of the above noted solution was injected. No pain or paresthesia was noted. This technique was repeated at each of the above noted levels. The spinal needle was removed intact following injection at each targeted site. The stylet was replaced prior to needle removal at each site.    Description of Findings: Not applicable    Prosthetic devices, grafts, tissues, or devices implanted: None    Specimen Removed: No    ESTIMATED BLOOD LOSS: " minimal    COMPLICATIONS: None    DISPOSITION / PLANS: The patient was transferred to the recovery area in a stable condition for observation. The patient was reexamined prior to discharge. There was no evidence of acute neurologic injury following the procedure.  Patient was discharged from the recovery room after meeting discharge criteria. Home discharge instructions were given to the patient by the staff.

## 2019-08-14 NOTE — PLAN OF CARE
Pt. States her apin is a 7/10. She states she is feeling some pain relief to her lower back. Reviewed discharge instructions, verbalized understanding. No needs voiced at this time. Tolerating fluids well.

## 2019-08-14 NOTE — DISCHARGE SUMMARY
[facility]  Discharge Note       Description of Procedure: Bilateral L3, 4, 5 Lumbar Medial Branch Block under Fluoroscopic Guidance    Procedure Date: 8/14/2019    Admit Date: 8/14/2019  Discharge Date: 8/14/2019     Attending Physician: Isael Ram   Discharge Provider: Isael Ram    Preoperative Diagnosis: Lumbar Spondylosis, Lumbar Facet Arthropathy     Postoperative Diagnosis: as above, same as preoperative diagnosis    Discharged Condition: Stable    Hospital Course: Patient was admitted for an outpatient procedure. The procedure was tolerated well with no complications.    Final Diagnoses: Same as principal problem.    Disposition: Home, self-care.    Follow up/Patient Instructions:  Follow-up in clinic in 2-3 weeks.    Medications: No medications were prescribed today. The patient was advised to resume normal medication regimen without change.  Specific information was provided regarding restarting any anticoagulant/s.    Discharge Procedure Orders (must include Diet, Follow-up, Activity):  Light activity for the remainder of the day, resume normal activity tomorrow. Resume normal diet. Follow-up in clinic in 2-3 weeks.

## 2019-09-03 ENCOUNTER — PATIENT MESSAGE (OUTPATIENT)
Dept: PAIN MEDICINE | Facility: CLINIC | Age: 29
End: 2019-09-03

## 2019-09-03 ENCOUNTER — PATIENT MESSAGE (OUTPATIENT)
Dept: FAMILY MEDICINE | Facility: CLINIC | Age: 29
End: 2019-09-03

## 2019-09-03 RX ORDER — NABUMETONE 500 MG/1
500 TABLET, FILM COATED ORAL 2 TIMES DAILY PRN
Qty: 30 TABLET | Refills: 0 | Status: SHIPPED | OUTPATIENT
Start: 2019-09-03 | End: 2019-09-18

## 2019-09-04 ENCOUNTER — OFFICE VISIT (OUTPATIENT)
Dept: FAMILY MEDICINE | Facility: CLINIC | Age: 29
End: 2019-09-04
Payer: MEDICAID

## 2019-09-04 VITALS
WEIGHT: 263.44 LBS | HEART RATE: 100 BPM | SYSTOLIC BLOOD PRESSURE: 132 MMHG | HEIGHT: 64 IN | BODY MASS INDEX: 44.97 KG/M2 | TEMPERATURE: 98 F | DIASTOLIC BLOOD PRESSURE: 78 MMHG

## 2019-09-04 DIAGNOSIS — B37.2 CANDIDAL INTERTRIGO: Primary | ICD-10-CM

## 2019-09-04 PROCEDURE — 99999 PR PBB SHADOW E&M-EST. PATIENT-LVL IV: CPT | Mod: PBBFAC,,, | Performed by: REGISTERED NURSE

## 2019-09-04 PROCEDURE — 99214 OFFICE O/P EST MOD 30 MIN: CPT | Mod: PBBFAC,PO | Performed by: REGISTERED NURSE

## 2019-09-04 PROCEDURE — 99213 OFFICE O/P EST LOW 20 MIN: CPT | Mod: S$PBB,,, | Performed by: REGISTERED NURSE

## 2019-09-04 PROCEDURE — 99999 PR PBB SHADOW E&M-EST. PATIENT-LVL IV: ICD-10-PCS | Mod: PBBFAC,,, | Performed by: REGISTERED NURSE

## 2019-09-04 PROCEDURE — 99213 PR OFFICE/OUTPT VISIT, EST, LEVL III, 20-29 MIN: ICD-10-PCS | Mod: S$PBB,,, | Performed by: REGISTERED NURSE

## 2019-09-04 RX ORDER — CLOTRIMAZOLE AND BETAMETHASONE DIPROPIONATE 10; .64 MG/G; MG/G
CREAM TOPICAL 2 TIMES DAILY
Qty: 45 G | Refills: 1 | Status: SHIPPED | OUTPATIENT
Start: 2019-09-04 | End: 2019-11-10

## 2019-09-04 NOTE — PATIENT INSTRUCTIONS
Candida Skin Infection (Adult)  Candida is type of yeast. It grows naturally on the skin and in the mouth. If it grows out of control, it can cause an infection. Candida can cause infections in the genital area, skin folds, in the mouth, and under the breasts. Anyone can get this infection. It is more common in a person with a weak immune system, such as from diabetes, HIV, or cancer. Its also more common in someone who has been on antibiotic therapy. And its more common people who are overweight or who have incontinence. Wearing tight-fitting clothing and taking part in activities with lots of skin-to-skin contact can also put you at risk.  Candida causes the skin to become bright red and inflamed. The border of the infected part of the skin is often raised. The infection causes pain and itching. Sometimes the skin peels and bleeds. In the mouth, candida is called thrush, and may cause white thickened areas.  A Candida rash is most often treated with an antifungal cream or ointment. The rash will clear a few days after starting the medicine. Infections that dont go away may need a prescription medicine. In rare cases, a bacterial infection can also occur.  Home care  Your healthcare provider will recommend an antifungal cream or ointment for the rash. He or she may also prescribe a medicine for the itch. Follow all instructions for using these medicines. Dont use cornstarch powder. Cornstarch can cause the Candida infection to get worse.  General care:  · Keep your skin clean by washing the area twice a day.  · Use the cream as directed until your rash is gone. Once the skin has healed, keep it dry to prevent another infection.   · If you are overweight, talk with your healthcare provider about a plan to lose excess weight.  · Avoid clothes that fit tightly.  Follow-up care  Follow up with your healthcare provider, or as advised. Your rash will clear in 7 to 14 days. Call your healthcare provider if the rash  is not gone after 14 days.  When to seek medical advice  Call your healthcare provider right away if any of these occur:  · Pain or redness that gets worse or spreads  · Fluid coming from the skin  · Yellow crusts on the skin  · Fever of 100.4°F (38°C) or higher, or as directed by your healthcare provider  Date Last Reviewed: 9/1/2016  © 1388-2180 [employer]. [address] [place] [address], [place] [address]. All rights reserved. This information is not intended as a substitute for professional medical care. Always follow your healthcare professional's instructions.

## 2019-09-04 NOTE — PROGRESS NOTES
Subjective:       Patient ID: Nathan Carlos is a 28 y.o. female.    Chief Complaint   Patient presents with    Rash     bilat under arm pit        HPI    Nathan Carlos is here today with c/o persistent skin rash to both axillae x few weeks.  Area tender, burns and itches.  She has been using steroid and Silvadene creams but thinks making worse.  Denies fever, chills, boils, or drainage from skin.  Does report increased sweating and armpit moisture.      Review of Systems   Constitutional: Negative.    Skin: Positive for rash.   Neurological: Negative.          Review of patient's allergies indicates:   Allergen Reactions    Amitriptyline      xerostomia    Amlodipine      DRY MOUTH         Medication List with Changes/Refills   New Medications    CLOTRIMAZOLE-BETAMETHASONE 1-0.05% (LOTRISONE) CREAM    Apply topically 2 (two) times daily.   Current Medications    METOPROLOL SUCCINATE (TOPROL-XL) 50 MG 24 HR TABLET    Take 1 tablet (50 mg total) by mouth once daily.    MIRTAZAPINE (REMERON) 15 MG TABLET    Take 1 tablet (15 mg total) by mouth every evening.    NABUMETONE (RELAFEN) 500 MG TABLET    Take 1 tablet (500 mg total) by mouth 2 (two) times daily as needed for Pain (take with food).    NORGESTIMATE-ETHINYL ESTRADIOL (ORTHO-CYCLEN) 0.25-35 MG-MCG PER TABLET    Take 1 tablet by mouth once daily.    SPIRONOLACTONE (ALDACTONE) 25 MG TABLET    Take 2 tablets (50 mg total) by mouth once daily.    TIZANIDINE (ZANAFLEX) 4 MG TABLET    Take 1 tablet (4 mg total) by mouth 2 (two) times daily as needed.    TOPIRAMATE (TOPAMAX) 50 MG TABLET    Take 1 tablet (50 mg total) by mouth at night x 1 week then increase to 2 (two) times daily. Increase as tolerated.   Discontinued Medications    NAPROXEN (EC NAPROSYN) 500 MG EC TABLET    Take 1 tablet (500 mg total) by mouth 2 (two) times daily as needed.    ONDANSETRON (ZOFRAN-ODT) 4 MG TBDL    Take 1 tablet (4 mg total) by mouth 3 (three) times daily as needed  "(Nausea/Vomiting).    TIZANIDINE (ZANAFLEX) 4 MG TABLET    Take 1/2 to 1 tab BID PRN muscle spasms. May cause drowsiness.    VALACYCLOVIR (VALTREX) 500 MG TABLET    Take 1 tablet (500 mg total) by mouth once daily.       Patient Active Problem List   Diagnosis    Essential hypertension    Gastroesophageal reflux disease    Left-sided low back pain with left-sided sciatica    Hypercholesteremia    High serum testosterone    Insulin resistance syndrome    HSV-2 (herpes simplex virus 2) infection    Nephrolithiasis    Chronic bilateral low back pain with left-sided sciatica    Polycystic ovaries    Lumbar facet arthropathy         Past medical, surgical, family and social histories have been reviewed today.        Objective:     Vitals:    09/04/19 0924   BP: 132/78   Pulse: 100   Temp: 98.2 °F (36.8 °C)   Weight: 119.5 kg (263 lb 7.2 oz)   Height: 5' 4" (1.626 m)   PainSc:   5         Physical Exam   Constitutional: She appears well-developed and well-nourished.   Skin: Rash (fungal rash to B axillae) noted.   Vitals reviewed.        Diagnosis       1. Candidal intertrigo          Assessment/ Plan     Candidal intertrigo  -     clotrimazole-betamethasone 1-0.05% (LOTRISONE) cream; Apply topically 2 (two) times daily.  Dispense: 45 g; Refill: 1      Skin care discussed.  Fungal triggers and prevention discussed.  Follow-up in clinic as needed.      Future Appointments   Date Time Provider Department Center   9/11/2019  1:40 PM Mague Molina PA-C [facility] IN [facility]   10/30/2019  9:00 AM [facility] XR2 [facility] XRAY [facility]   10/30/2019  9:15 AM [facility] US4 [facility] ULSOUND [facility]   11/4/2019  3:20 PM Kunal Real IV, MD [facility] UROLOGY  Medical C       Patient Care Team:  Primary Doctor No as PCP - General  Agnieszka Reid MD (Obstetrics and Gynecology)  SHAHRAM Nicole MD as Consulting Physician (Gastroenterology)      VANCE Wagner  [facility]   "

## 2019-09-11 ENCOUNTER — TELEPHONE (OUTPATIENT)
Dept: PAIN MEDICINE | Facility: CLINIC | Age: 29
End: 2019-09-11

## 2019-09-11 ENCOUNTER — OFFICE VISIT (OUTPATIENT)
Dept: PAIN MEDICINE | Facility: CLINIC | Age: 29
End: 2019-09-11
Payer: MEDICAID

## 2019-09-11 ENCOUNTER — PATIENT MESSAGE (OUTPATIENT)
Dept: FAMILY MEDICINE | Facility: CLINIC | Age: 29
End: 2019-09-11

## 2019-09-11 ENCOUNTER — PATIENT MESSAGE (OUTPATIENT)
Dept: PAIN MEDICINE | Facility: CLINIC | Age: 29
End: 2019-09-11

## 2019-09-11 VITALS
DIASTOLIC BLOOD PRESSURE: 92 MMHG | BODY MASS INDEX: 44.9 KG/M2 | HEIGHT: 64 IN | HEART RATE: 84 BPM | RESPIRATION RATE: 18 BRPM | SYSTOLIC BLOOD PRESSURE: 139 MMHG | WEIGHT: 263 LBS

## 2019-09-11 DIAGNOSIS — M70.62 GREATER TROCHANTERIC BURSITIS, LEFT: ICD-10-CM

## 2019-09-11 DIAGNOSIS — M47.816 LUMBAR SPONDYLOSIS: ICD-10-CM

## 2019-09-11 DIAGNOSIS — G89.29 CHRONIC BILATERAL LOW BACK PAIN WITH LEFT-SIDED SCIATICA: Primary | ICD-10-CM

## 2019-09-11 DIAGNOSIS — M54.42 CHRONIC BILATERAL LOW BACK PAIN WITH LEFT-SIDED SCIATICA: Primary | ICD-10-CM

## 2019-09-11 DIAGNOSIS — M53.3 SACROILIAC JOINT PAIN: ICD-10-CM

## 2019-09-11 DIAGNOSIS — M47.816 FACET ARTHRITIS OF LUMBAR REGION: ICD-10-CM

## 2019-09-11 PROCEDURE — 99214 OFFICE O/P EST MOD 30 MIN: CPT | Mod: PBBFAC | Performed by: PHYSICIAN ASSISTANT

## 2019-09-11 PROCEDURE — 99999 PR PBB SHADOW E&M-EST. PATIENT-LVL IV: ICD-10-PCS | Mod: PBBFAC,,, | Performed by: PHYSICIAN ASSISTANT

## 2019-09-11 PROCEDURE — 99214 PR OFFICE/OUTPT VISIT, EST, LEVL IV, 30-39 MIN: ICD-10-PCS | Mod: S$PBB,,, | Performed by: PHYSICIAN ASSISTANT

## 2019-09-11 PROCEDURE — 99999 PR PBB SHADOW E&M-EST. PATIENT-LVL IV: CPT | Mod: PBBFAC,,, | Performed by: PHYSICIAN ASSISTANT

## 2019-09-11 PROCEDURE — 99214 OFFICE O/P EST MOD 30 MIN: CPT | Mod: S$PBB,,, | Performed by: PHYSICIAN ASSISTANT

## 2019-09-11 RX ORDER — METHOCARBAMOL 500 MG/1
500 TABLET, FILM COATED ORAL 2 TIMES DAILY PRN
Qty: 60 TABLET | Refills: 0 | Status: SHIPPED | OUTPATIENT
Start: 2019-09-11 | End: 2019-11-10

## 2019-09-11 NOTE — TELEPHONE ENCOUNTER
----- Message from Cha Rivera sent at 9/11/2019  1:48 PM CDT -----  Contact: patient  Calling concerning a delay in getting RX. Requesting an alternative RX. Please call patient @ [phone]. Thanks, breana Brandon [address] [address] [employer]  [address] [facility] [address]  Phone: [phone] Fax: [phone]

## 2019-09-11 NOTE — PROGRESS NOTES
Chief Pain Complaint:  Low-back Pain    Interval History: Patient was seen on 8/14/19. At that time she underwent bilateral L3-5 MBB.  The patient reports that she is/was unchanged following the procedure.      History of Present Illness:   Nathan Carlos is a 28 y.o. female  who is presenting with a chief complaint of Low-back Pain. The patient began experiencing this problem insidiously, and the pain has been gradually worsening. The pain is described as throbbing, cramping, aching and heavy and is located in the bilateral lumbar spine. Pain is intermittent and lasts hours. The  pain is nonradiating. The patient rates her pain a 8 out of ten and interferes with activities of daily living a 7 out of ten. Pain is exacerbated by extension of the lumbar spine, and is improved by rest. Patient reports no prior trauma, no prior spinal surgery     - pertinent negatives: No fever, No chills, No weight loss, No bladder dysfunction, No bowel dysfunction, No saddle anesthesia  - pertinent positives: none    - medications, other therapies tried (physical therapy, injections):     >> NSAIDs, Tylenol, Norco, zanaflex and flexeril (no relief), topamax (no relief)    >> Has previously undergone Physical Therapy    >> Has previously undergone spinal injection/s   - Left SI joint injection 1/2018 with 100% relief for 6 months   - Right L3, L4, L5 MBB with local on 8/15/18 with 90% pain relief   - left SIJ + left GT bursa injection with local on 11/8/18 with good relief of bursitis but only 10% of SIJ pain   - left L3-5 MBB with local on 5/16/19 with 90% pain relief   - bilateral L3-5 MBB on 8/14/19 with limited relief      Imaging / Labs / Studies (reviewed on 9/11/2019):    Results for orders placed during the hospital encounter of 11/22/17   MRI Lumbar Spine Without Contrast    Narrative Technique: Standard noncontrast multiplanar multisequence imaging of the lumbar spine was performed.  Findings: There is anatomic spinal  alignment.  Disc interspaces are of normal height and signal intensity.  Vertebral marrow signal pattern and architecture are normal.  Distal cord is unremarkable with conus medullaris terminating at L1-L2.  Paravertebral soft tissues are symmetric and normal in appearance.  T12-L1: No disc protrusion, neuroforaminal narrowing, or central canal stenosis.  L1-L2: No disc protrusion, neuroforaminal narrowing, or central canal stenosis.  L2-L3: No disc protrusion, neuroforaminal narrowing, or central canal stenosis.  L3-L4: No disc protrusion, neuroforaminal narrowing, or central canal stenosis.  L4-L5: No disc protrusion, neuroforaminal narrowing, or central canal stenosis.  L5-S1: Mild bilateral facet hypertrophy. No disc protrusion, neuroforaminal narrowing, or central canal stenosis.    Impression  Negative MRI of the lumbar spine.     Results for orders placed during the hospital encounter of 01/04/18   X-Ray Lumbar Complete With Flex And Ext    Narrative Comparison: None  Technique: AP, lateral, lateral flexion, lateral extension, bilateral oblique, and lumbosacral coned down views were obtained of the lumbar spine  Findings: There is mild scoliosis of the lower thoracic and upper lumbar spine.  Vertebral body heights are well-maintained.  No spondylolisthesis demonstrated.  No change in spinal alignment with flexion or extension to suggest instability. Intervertebral disk spaces are well preserved.  No pars defects visualized.  Posterior elements appear grossly intact. No acute fractures or subluxations are demonstrated.  The remaining visualized osseous and soft tissue structures demonstrate no appreciable abnormality.    Impression As above.  No acute findings.         Review of Systems:  CONSTITUTIONAL: patient denies any fever, chills, or weight loss  SKIN: patient denies any rash or itching  RESPIRATORY: patient denies having any shortness of breath  GASTROINTESTINAL: patient denies having any diarrhea,  "constipation, or bowel incontinence  GENITOURINARY: patient denies having any abnormal bladder function    MUSCULOSKELETAL:  - patient complains of the above noted pain/s (see chief pain complaint)    NEUROLOGICAL:   - pain as above  - strength in Lower extremities is intact, BILATERALLY  - sensation in Lower extremities is intact, BILATERALLY  - patient denies any loss of bowel or bladder control      PSYCHIATRIC: patient denies any change in mood    Other:  All other systems reviewed and are negative        Physical Exam:  Vitals:  BP (!) 139/92 (BP Location: Right arm, Patient Position: Sitting, BP Method: Medium (Automatic))   Pulse 84   Resp 18   Ht 5' 4" (1.626 m)   Wt 119.3 kg (263 lb)   BMI 45.14 kg/m²   (reviewed on 9/11/2019)    General: alert and oriented, in no apparent distress, obese.  Gait: normal gait.  Skin: no rashes, no discoloration, no obvious lesions  HEENT: normocephalic, atraumatic. Pupils equal and round.  Cardiovascular: no significant peripheral edema present.  Respiratory: without use of accessory muscles of respiration.    Musculoskeletal - Lumbar Spine:  - Pain on flexion of lumbar spine: Absent   - Pain on extension of lumbar spine: Present  - Lumbar facet loading: Present    - TTP over the lumbar facet joints: Present on right  - TTP over the SI joints: Present, less so than over facet joints  - Straight Leg Raise: Negative  - JULISSA: Negative    Neuro - Extremities:  - BUE Strength:R/L: D: 5/5; B: 5/5; T: 5/5; WF: 5/5; WE: 5/5; IO: 5/5  - BLE Strength: R/L: HF: 5/5, HE: 5/5, KF: 5/5; KE: 5/5; FE: 5/5; FF: 5/5  - Extremity Reflexes: Brisk and symmetric throughout  - Sensory: Sensation to light touch intact bilaterally      Psych:  Mood and affect is appropriate          Assessment:  Nathan Carlos is a 28 y.o. year old female who is presenting with    Encounter Diagnoses   Name Primary?    Chronic bilateral low back pain with left-sided sciatica Yes    Facet arthritis of " lumbar region     Lumbar spondylosis     Sacroiliac joint pain     Greater trochanteric bursitis, left        Plan:  1. Interventional: S/p bilateral L3-5 MBB on 8/14/19 with limited relief.    2. Pharmacologic:   - Will Start Robaxin 500mg to take 1/2 to 1 tab BID PRN muscle spasms.  she understands this could cause drowsiness.  - D/c Nabumetone 500mg BID PRN pain.       3. Rehabilitative:   - Start PT with passive modalities, including ice and heat, and active modalities, including a regimen for stretching and strengthening.  Order sent to [facility] in [place].  I believe weight loss will aid in her goal of lasting pain control.     - She requests to be on disability, but I inform her that is not something that we would complete.     4. Diagnostic: None for now.    5. Follow up: PRN     - I discussed the risks, benefits, and alternatives to potential treatment options. All questions and concerns were fully addressed today in clinic. Dr. Kirkland was consulted regarding the patient plan and agrees.

## 2019-09-11 NOTE — TELEPHONE ENCOUNTER
----- Message from Efraín Helm sent at 9/11/2019 11:35 AM CDT -----  Contact: PT   Type:  Sooner Apoointment Request    Caller is requesting a sooner appointment.  Caller declined first available appointment listed below.  Caller will not accept being placed on the waitlist and is requesting a message be sent to doctor.  Name of Caller: Pt   When is the first available appointment?09/11/19  Symptoms:f/u   Would the patient rather a call back or a response via [facility]? Call back   Best Call Back Number: [phone] (home)   Additional Information: n/a

## 2019-09-20 ENCOUNTER — PATIENT MESSAGE (OUTPATIENT)
Dept: FAMILY MEDICINE | Facility: CLINIC | Age: 29
End: 2019-09-20

## 2019-09-20 ENCOUNTER — HOSPITAL ENCOUNTER (EMERGENCY)
Facility: HOSPITAL | Age: 29
Discharge: HOME | End: 2019-09-20
Attending: EMERGENCY MEDICINE
Payer: MEDICAID

## 2019-09-20 VITALS
WEIGHT: 263.44 LBS | TEMPERATURE: 98 F | HEIGHT: 64 IN | OXYGEN SATURATION: 98 % | DIASTOLIC BLOOD PRESSURE: 99 MMHG | SYSTOLIC BLOOD PRESSURE: 149 MMHG | RESPIRATION RATE: 17 BRPM | BODY MASS INDEX: 44.97 KG/M2 | HEART RATE: 76 BPM

## 2019-09-20 DIAGNOSIS — A60.00 GENITAL HERPES SIMPLEX, UNSPECIFIED SITE: Primary | ICD-10-CM

## 2019-09-20 PROCEDURE — 99283 EMERGENCY DEPT VISIT LOW MDM: CPT | Mod: ER

## 2019-09-20 RX ORDER — VALACYCLOVIR HYDROCHLORIDE 500 MG/1
500 TABLET, FILM COATED ORAL 2 TIMES DAILY
Qty: 6 TABLET | Refills: 3 | Status: SHIPPED | OUTPATIENT
Start: 2019-09-20 | End: 2019-09-23

## 2019-09-20 RX ORDER — VALACYCLOVIR HYDROCHLORIDE 1 G/1
1000 TABLET, FILM COATED ORAL DAILY
Qty: 5 TABLET | Refills: 0 | Status: SHIPPED | OUTPATIENT
Start: 2019-09-20 | End: 2019-09-20

## 2019-09-20 NOTE — ED PROVIDER NOTES
Encounter Date: 9/20/2019       History     Chief Complaint   Patient presents with    Sore     Herpes Outbreak     Patient is a 28-year-old female with a past medical history of general herpes, who presents today with complaints of general herpes.  She states that outbreak just started and she was unable see her primary care physician.  She states that she used to take Valtrex but ran out of the medication.  She is requesting herpes medication.  Denies all other symptoms including fever/chills, dysuria, abdominal pain, vaginal discharge. Last menstrual period last month.  Symptoms are constant in duration and moderate in severity.  No prior evaluation        Review of patient's allergies indicates:   Allergen Reactions    Amitriptyline      xerostomia    Amlodipine      DRY MOUTH     Past Medical History:   Diagnosis Date    GERD (gastroesophageal reflux disease)     Herpes simplex virus (HSV) infection     Hyperlipidemia     Hypertension     Ovarian cyst     bilateral      Past Surgical History:   Procedure Laterality Date    ANKLE SURGERY Left     Bilateral L3-5 MBB Bilateral 8/14/2019    Performed by Yo Kirkland [facility] at [facility]    BUNIONECTOMY Bilateral     COLONOSCOPY  06/12/2018    LUMBAR FUSION       Family History   Problem Relation Age of Onset    Hypertension Mother     Fibroids Mother     No Known Problems Father     Diabetes Sister     Hypertension Brother     Hypertension Maternal Aunt     Diabetes Maternal Aunt     Diabetes Maternal Uncle     Hypertension Maternal Grandmother     Leukemia Maternal Grandfather     Hypertension Maternal Grandfather     Stroke Neg Hx      Social History     Tobacco Use    Smoking status: Never Smoker    Smokeless tobacco: Never Used   Substance Use Topics    Alcohol use: Yes     Frequency: Never     Drinks per session: Patient refused     Binge frequency: Never     Comment: special occasions    Drug use: No     Review of Systems    Constitutional: Negative for chills, diaphoresis and fever.   HENT: Negative for congestion, rhinorrhea and sore throat.    Eyes: Negative for pain, redness and visual disturbance.   Respiratory: Negative for cough and shortness of breath.    Cardiovascular: Negative for chest pain, palpitations and leg swelling.   Gastrointestinal: Negative for abdominal distention, abdominal pain, blood in stool, constipation, diarrhea, nausea and vomiting.   Genitourinary: Positive for genital sores. Negative for dysuria, hematuria, vaginal bleeding and vaginal discharge.   Musculoskeletal: Negative for arthralgias and joint swelling.   Skin: Negative for rash and wound.   Neurological: Negative for seizures, syncope and headaches.   All other systems reviewed and are negative.      Physical Exam     Initial Vitals [09/20/19 0846]   BP Pulse Resp Temp SpO2   (!) 149/99 76 17 97.6 °F (36.4 °C) 98 %      MAP       --         Physical Exam    Nursing note and vitals reviewed.  Constitutional: She appears well-developed and well-nourished. No distress.   HENT:   Head: Normocephalic and atraumatic.   Mouth/Throat: Oropharynx is clear and moist.   Eyes: Conjunctivae and EOM are normal. Pupils are equal, round, and reactive to light.   Neck: Neck supple. No tracheal deviation present.   Cardiovascular: Normal rate, regular rhythm, normal heart sounds and intact distal pulses.   Pulmonary/Chest: Breath sounds normal. No respiratory distress.   Abdominal: Soft. She exhibits no distension. There is no tenderness. There is no rebound and no guarding.   Genitourinary:   Genitourinary Comments: Vaginal herpes   Musculoskeletal: Normal range of motion. She exhibits no edema or tenderness.   Neurological: She is alert and oriented to person, place, and time.   No focal deficits   Skin: Skin is warm. No rash noted. No erythema.   Psychiatric: She has a normal mood and affect. Her behavior is normal.         ED Course   Procedures  Labs  Reviewed - No data to display       Imaging Results    None          Medication List      START taking these medications    valACYclovir 1000 MG tablet  Commonly known as:  VALTREX  Take 1 tablet (1,000 mg total) by mouth once daily. for 5 days        ASK your doctor about these medications    clotrimazole-betamethasone 1-0.05% cream  Commonly known as:  LOTRISONE  Apply topically 2 (two) times daily.     methocarbamol 500 MG Tab  Commonly known as:  ROBAXIN  Take 1 tablet (500 mg total) by mouth 2 (two) times daily as needed (muscle spasms).     metoprolol succinate 50 MG 24 hr tablet  Commonly known as:  TOPROL-XL  Take 1 tablet (50 mg total) by mouth once daily.     mirtazapine 15 MG tablet  Commonly known as:  REMERON  Take 1 tablet (15 mg total) by mouth every evening.     norgestimate-ethinyl estradiol 0.25-35 mg-mcg per tablet  Commonly known as:  ORTHO-CYCLEN  Take 1 tablet by mouth once daily.     spironolactone 25 MG tablet  Commonly known as:  ALDACTONE  Take 2 tablets (50 mg total) by mouth once daily.     topiramate 50 MG tablet  Commonly known as:  TOPAMAX  Take 1 tablet (50 mg total) by mouth at night x 1 week then increase to 2 (two) times daily. Increase as tolerated.           Where to Get Your Medications      You can get these medications from any pharmacy    Bring a paper prescription for each of these medications  · valACYclovir 1000 MG tablet                                 Clinical Impression:   Final diagnoses:  [A60.00] Genital herpes simplex, unspecified site (Primary)    Disposition:   Disposition: Discharged  Condition: Stable                        Dwayne Jiang MD  09/20/19 1042

## 2019-09-24 RX ORDER — VALACYCLOVIR HYDROCHLORIDE 1 G/1
TABLET, FILM COATED ORAL
Refills: 0 | COMMUNITY
Start: 2019-09-20 | End: 2019-12-06

## 2019-09-24 RX ORDER — VALACYCLOVIR HYDROCHLORIDE 500 MG/1
500 TABLET, FILM COATED ORAL 2 TIMES DAILY
Qty: 6 TABLET | Refills: 3 | OUTPATIENT
Start: 2019-09-24 | End: 2019-09-27

## 2019-09-24 NOTE — TELEPHONE ENCOUNTER
----- Message from Jessi Howard sent at 9/24/2019 10:45 AM CDT -----  Contact: pt  Type:  RX Refill Request    Who Called: Patient  Refill or New Rx:Refill  RX Name and Strength:Valacyclovir   How is the patient currently taking it? (ex. 1XDay):na  Is this a 30 day or 90 day RX:30  Preferred Pharmacy with phone number:[facility]  Local or Mail Order:local  Ordering Provider:NP cothern  Would the patient rather a call back or a response via [facility]? Call back  Best Call Back Number:[phone]  Additional Information: pt states she left several message since Friday

## 2019-09-26 ENCOUNTER — PATIENT MESSAGE (OUTPATIENT)
Dept: FAMILY MEDICINE | Facility: CLINIC | Age: 29
End: 2019-09-26

## 2019-09-26 ENCOUNTER — DOCUMENTATION ONLY (OUTPATIENT)
Dept: FAMILY MEDICINE | Facility: CLINIC | Age: 29
End: 2019-09-26

## 2019-09-26 RX ORDER — VALACYCLOVIR HYDROCHLORIDE 1 G/1
TABLET, FILM COATED ORAL
Refills: 0 | Status: CANCELLED | OUTPATIENT
Start: 2019-09-26

## 2019-09-26 NOTE — TELEPHONE ENCOUNTER
Patient called for a refill on the following medication .    Patient was notified that physician was out and a message was sent.

## 2019-09-27 NOTE — TELEPHONE ENCOUNTER
Spoke with pt states that she has already received refill and that she called before the pharmacy had called her. States that refill is not needed at this time.

## 2019-10-28 ENCOUNTER — PATIENT MESSAGE (OUTPATIENT)
Dept: PAIN MEDICINE | Facility: CLINIC | Age: 29
End: 2019-10-28

## 2019-10-28 RX ORDER — NABUMETONE 500 MG/1
500 TABLET, FILM COATED ORAL 2 TIMES DAILY PRN
Qty: 60 TABLET | Refills: 0 | Status: SHIPPED | OUTPATIENT
Start: 2019-10-28 | End: 2019-11-10

## 2019-11-01 ENCOUNTER — PATIENT MESSAGE (OUTPATIENT)
Dept: FAMILY MEDICINE | Facility: CLINIC | Age: 29
End: 2019-11-01

## 2019-11-08 ENCOUNTER — PATIENT MESSAGE (OUTPATIENT)
Dept: FAMILY MEDICINE | Facility: CLINIC | Age: 29
End: 2019-11-08

## 2019-11-10 ENCOUNTER — HOSPITAL ENCOUNTER (EMERGENCY)
Facility: HOSPITAL | Age: 29
Discharge: HOME | End: 2019-11-10
Attending: EMERGENCY MEDICINE
Payer: MEDICAID

## 2019-11-10 VITALS
RESPIRATION RATE: 20 BRPM | SYSTOLIC BLOOD PRESSURE: 140 MMHG | DIASTOLIC BLOOD PRESSURE: 90 MMHG | BODY MASS INDEX: 45.26 KG/M2 | TEMPERATURE: 98 F | WEIGHT: 265.13 LBS | HEART RATE: 90 BPM | HEIGHT: 64 IN

## 2019-11-10 DIAGNOSIS — M25.512 NONTRAUMATIC PAIN OF LEFT SHOULDER: Primary | ICD-10-CM

## 2019-11-10 DIAGNOSIS — S46.912A STRAIN OF LEFT SHOULDER, INITIAL ENCOUNTER: ICD-10-CM

## 2019-11-10 PROCEDURE — 99284 EMERGENCY DEPT VISIT MOD MDM: CPT | Mod: ER

## 2019-11-10 RX ORDER — IBUPROFEN 800 MG/1
800 TABLET ORAL EVERY 6 HOURS PRN
Qty: 30 TABLET | Refills: 0 | Status: SHIPPED | OUTPATIENT
Start: 2019-11-10 | End: 2019-12-06

## 2019-11-10 RX ORDER — CYCLOBENZAPRINE HCL 10 MG
10 TABLET ORAL 3 TIMES DAILY PRN
Qty: 15 TABLET | Refills: 0 | Status: SHIPPED | OUTPATIENT
Start: 2019-11-10 | End: 2019-11-15

## 2019-11-10 NOTE — DISCHARGE INSTRUCTIONS
Your prescriptions have been sent electronically to [facility].    Do not drive or operate heavy machinery after taking the Flexeril. This medication may cause drowsiness and can impair your judgment.     Follow up with primary care provider.

## 2019-11-10 NOTE — ED NOTES
Aaox3, skin warm and dry, resp unlabored and even. amb with steady gait and sin well. C/o left shoulder pain for 1 week. Denies any injury. Moving freely.

## 2019-11-10 NOTE — ED PROVIDER NOTES
"Encounter Date: 11/10/2019       History     Chief Complaint   Patient presents with    Shoulder Pain     left shoulder pain for 1 week denies any injury     The history is provided by the patient.   Arm Injury    The incident occurred several days ago (approximately one week). The injury mechanism is unknown (denies any known injury). She came to the ER via by private vehicle. There is an injury to the left shoulder. There have been prior injuries to these areas (reports that she was seen at Urgent Care for similar symptoms in April 2019). She is right-handed. She has been behaving normally. She has received no recent medical care. Pertinent negatives include no chest pain, no numbness, no visual disturbance, no abdominal pain, no nausea, no vomiting, no headaches, no neck pain, no focal weakness, no decreased responsiveness, no light-headedness, no loss of consciousness, no tingling, no weakness, no cough, no difficulty breathing and no memory loss.   Seen at [facility] for same complaint on 04/24/2019 after "using her left arm too much". Patient states that the pain resolved after a few days of taking muscle relaxants and antiinflammatory medication. She states now that the Robaxin and Naprosyn she was previously prescribed for her shoulder pain no longer help.  She denies any further complaints or concerns.         PCP:     Primary Doctor No        Review of patient's allergies indicates:   Allergen Reactions    Amitriptyline      xerostomia    Amlodipine      DRY MOUTH     Past Medical History:   Diagnosis Date    GERD (gastroesophageal reflux disease)     Herpes simplex virus (HSV) infection     High serum testosterone 9/7/2017    Hyperlipidemia     Hypertension     Insulin resistance syndrome 9/7/2017    Lumbar facet arthropathy 8/14/2019    Morbid obesity     Ovarian cyst     bilateral      Past Surgical History:   Procedure Laterality Date    ANKLE SURGERY Left     BUNIONECTOMY Bilateral  "    COLONOSCOPY  06/12/2018    INJECTION OF ANESTHETIC AGENT AROUND MEDIAL BRANCH NERVES INNERVATING LUMBAR FACET JOINT Bilateral 8/14/2019    Procedure: Bilateral L3-5 MBB;  Surgeon: Yo Kirkland MD;  [facility];  Service: Pain Management;  Laterality: Bilateral;    LUMBAR FUSION       Family History   Problem Relation Age of Onset    Hypertension Mother     Fibroids Mother     No Known Problems Father     Diabetes Sister     Hypertension Brother     Hypertension Maternal Aunt     Diabetes Maternal Aunt     Diabetes Maternal Uncle     Hypertension Maternal Grandmother     Leukemia Maternal Grandfather     Hypertension Maternal Grandfather     Stroke Neg Hx      Social History     Tobacco Use    Smoking status: Never Smoker    Smokeless tobacco: Never Used   Substance Use Topics    Alcohol use: Yes     Frequency: Never     Drinks per session: Patient refused     Binge frequency: Never     Comment: special occasions    Drug use: No     Review of Systems   Constitutional: Negative for chills, decreased responsiveness and fever.   HENT: Negative for congestion and sore throat.    Eyes: Negative for photophobia and visual disturbance.   Respiratory: Negative for cough, chest tightness and shortness of breath.    Cardiovascular: Negative for chest pain and palpitations.   Gastrointestinal: Negative for abdominal pain, diarrhea, nausea and vomiting.   Genitourinary: Negative for dysuria.   Musculoskeletal: Negative for back pain and neck pain.        Positive for pain of the left shoulder region.    Skin: Negative for rash.   Neurological: Negative for dizziness, tingling, tremors, focal weakness, loss of consciousness, weakness, light-headedness, numbness and headaches.   Hematological: Does not bruise/bleed easily.   Psychiatric/Behavioral: Negative for confusion and memory loss.       Physical Exam     Initial Vitals [11/10/19 1204]   BP Pulse Resp Temp SpO2   (!) 140/90 90 20 98.2 °F  (36.8 °C) --      MAP       --         Physical Exam    Nursing note and vitals reviewed.  Constitutional: She appears well-developed and well-nourished. She is Obese . She is cooperative. She does not appear ill. No distress.   HENT:   Head: Normocephalic and atraumatic.   Nose: Nose normal.   Mouth/Throat: Uvula is midline, oropharynx is clear and moist and mucous membranes are normal.   Eyes: Conjunctivae, EOM and lids are normal. Pupils are equal, round, and reactive to light.   Neck: Trachea normal and normal range of motion. Neck supple.   Cardiovascular: Normal rate, regular rhythm, intact distal pulses and normal pulses.   Pulmonary/Chest: Effort normal. No respiratory distress.   Musculoskeletal: She exhibits no edema.        Left shoulder: She exhibits decreased range of motion (secondary to pain - worse with abduction) and tenderness (mild reproducible tenderness noted on deep palpation of the left trapezius and infraspinatus musculature - no bony tenderness or tenderness to deltoid region - neurovascular intact distally). She exhibits no bony tenderness, no swelling, no effusion, no deformity, no spasm and normal strength.        Arms:  Neurological: She is alert and oriented to person, place, and time. She has normal strength. No sensory deficit. Gait normal. GCS eye subscore is 4. GCS verbal subscore is 5. GCS motor subscore is 6.   Neurovascular intact to all extremities.    Skin: Skin is warm, dry and intact. Capillary refill takes less than 2 seconds. No rash noted.   Normal color and turgor.    Psychiatric: She has a normal mood and affect. Her speech is normal and behavior is normal. Cognition and memory are normal.         ED Course   Procedures      1255 HOURS DISPOSITION: The patient is resting comfortably in no acute distress.  She is hemodynamically stable and is without objective evidence for acute process requiring urgent intervention or hospitalization. I provided counseling to patient  "with regard to condition, the treatment plan, specific conditions for return, and the importance of follow up. Detailed written and verbal instructions provided to patient and she expressed a verbal understanding of the discharge instructions and overall management plan. Reiterated the importance of medication administration and safety and advised patient to follow up with primary care provider in 3-5 days or sooner if needed.  Answered questions at this time. The patient is stable for discharge.                 Medical Decision Making:   History:   Old Records Summarized: records from clinic visits.       <> Summary of Records: [facility] on [facility]/24/2019 after "using her left arm too much". Patient states that the pain resolved after a few days of taking muscle relaxants and antiinflammatory medication.                                  Clinical Impression:       ICD-10-CM ICD-9-CM   1. Nontraumatic pain of left shoulder M25.512 719.41   2. Strain of left shoulder, initial encounter S46.912A 840.9           Disposition:   Disposition: Discharged  Condition: Stable  I discussed with patient that the evaluation in the emergency department does not suggest any emergent or life threatening medical condition requiring immediate intervention beyond what was provided in the ED, and I believe patient is safe for discharge.  Regardless, an unremarkable evaluation in the ED does not preclude the development or presence of a serious of life threatening condition. As such, patient was instructed to return immediately for any worsening or change in current symptoms. I also discussed the results of my evaluation and diagnosis with patient and she concurs with the evaluation and management plan.  Detailed written and verbal instructions provided to patient and she expressed a verbal understanding of the discharge instructions and overall management plan. Reiterated the importance of medication " administration and safety and advised patient to follow up with primary care provider in 3-5 days or sooner if needed.  Also advised patient to return to the ER for any complications.     Regarding SHOULDER PAIN, for treatment, I advised patient to: apply cold packs or ice bags to shoulder (15 min on and 15 min off) several times per day; rest shoulder for the next few days; slowly return to your regular activities; take ibuprofen (to also help with inflammation) or acetaminophen to manage pain. Patient was provided information and also shown proper exercises to stretch and strengthen rotator cuff tendons and shoulder muscles.         New Prescriptions    CYCLOBENZAPRINE (FLEXERIL) 10 MG TABLET    Take 1 tablet (10 mg total) by mouth 3 (three) times daily as needed (Muscle Pain).    IBUPROFEN (ADVIL,MOTRIN) 800 MG TABLET    Take 1 tablet (800 mg total) by mouth every 6 (six) hours as needed for Pain.         Follow-up Information     Schedule an appointment as soon as possible for a visit  with [facility] [place].    Why:  To obtain a primary care provider  Contact information:  [address]  [place] [address] [phone]             Go to  [facility].    Why:  As needed  Contact information:  [address] [facility] [address]                            Thai Bundy NP  11/10/19 1315

## 2019-11-11 RX ORDER — SPIRONOLACTONE 25 MG/1
TABLET ORAL
Qty: 180 TABLET | Refills: 1 | Status: SHIPPED | OUTPATIENT
Start: 2019-11-11 | End: 2019-12-06

## 2019-11-11 RX ORDER — SPIRONOLACTONE 25 MG/1
50 TABLET ORAL DAILY
Qty: 60 TABLET | Refills: 6 | OUTPATIENT
Start: 2019-11-11 | End: 2020-11-10

## 2019-12-03 ENCOUNTER — PATIENT MESSAGE (OUTPATIENT)
Dept: PAIN MEDICINE | Facility: CLINIC | Age: 29
End: 2019-12-03

## 2019-12-03 RX ORDER — TIZANIDINE 4 MG/1
4 TABLET ORAL 2 TIMES DAILY PRN
Qty: 60 TABLET | Refills: 0 | Status: SHIPPED | OUTPATIENT
Start: 2019-12-03 | End: 2020-01-02

## 2019-12-04 ENCOUNTER — TELEPHONE (OUTPATIENT)
Dept: FAMILY MEDICINE | Facility: CLINIC | Age: 29
End: 2019-12-04

## 2019-12-04 NOTE — TELEPHONE ENCOUNTER
Called patient in reference to her message about yeast infection. Instead of coming in she would like something called in to her pharmacy.

## 2019-12-04 NOTE — PROGRESS NOTES
Subjective:       Patient ID: Nathan Carlos is a 28 y.o. female.    Chief Complaint   Patient presents with    Vaginitis       HPI    Nathan Carlos is here today with c/o chronic recurrent vaginal issues.  She was treated for yeast a few months ago and does not feel back to normal.  Does drink a lot of sodas and switches bath soap frequently --- Aveeno, Dove, Dial.  Does c/o vaginal irritation, itching and discomfort.  Denies discharge or pelvic pain.    She reports having problems sleeping, Remeron not helping.  Finds her mind races with anxious-provoking thoughts at times although she does not really consider her to be dealing w/ anxiety.  Appetite fluctuates.      Review of Systems   Constitutional: Negative.    Genitourinary: Positive for vaginal pain (more of an irritation). Negative for difficulty urinating, dyspareunia, dysuria, genital sores, hematuria, pelvic pain, vaginal bleeding and vaginal discharge.   Psychiatric/Behavioral: Positive for sleep disturbance. Negative for agitation, confusion, decreased concentration, dysphoric mood, hallucinations, self-injury and suicidal ideas. The patient is not nervous/anxious and is not hyperactive.          Review of patient's allergies indicates:   Allergen Reactions    Amitriptyline      xerostomia    Amlodipine      DRY MOUTH         Medication List with Changes/Refills   Current Medications    METOPROLOL SUCCINATE (TOPROL-XL) 50 MG 24 HR TABLET    Take 1 tablet (50 mg total) by mouth once daily.    MIRTAZAPINE (REMERON) 15 MG TABLET    Take 1 tablet (15 mg total) by mouth every evening.    SPIRONOLACTONE (ALDACTONE) 50 MG TABLET    Take 50 mg by mouth once daily.    TIZANIDINE (ZANAFLEX) 4 MG TABLET    Take 1 tablet (4 mg total) by mouth 2 (two) times daily as needed.    TOPIRAMATE (TOPAMAX) 50 MG TABLET    Take 1 tablet (50 mg total) by mouth at night x 1 week then increase to 2 (two) times daily. Increase as tolerated.    VALACYCLOVIR  "(VALTREX) 500 MG TABLET    TAKE 1 TABLET BY MOUTH TWICE DAILY FOR 3 DAYS   Discontinued Medications    IBUPROFEN (ADVIL,MOTRIN) 800 MG TABLET    Take 1 tablet (800 mg total) by mouth every 6 (six) hours as needed for Pain.    SPIRONOLACTONE (ALDACTONE) 25 MG TABLET    TAKE 2 TABLETS BY MOUTH ONCE DAILY    VALACYCLOVIR (VALTREX) 1000 MG TABLET    TAKE 1 TABLET BY MOUTH ONCE DAILY FOR 5 DAYS    VALACYCLOVIR (VALTREX) 500 MG TABLET    Take 1 tablet (500 mg total) by mouth 2 (two) times daily. for 3 days       Patient Active Problem List   Diagnosis    Essential hypertension    Gastroesophageal reflux disease    Left-sided low back pain with left-sided sciatica    Hypercholesteremia    High serum testosterone    Insulin resistance syndrome    HSV-2 (herpes simplex virus 2) infection    Nephrolithiasis    Chronic bilateral low back pain with left-sided sciatica    Polycystic ovaries    Lumbar facet arthropathy         Past medical, surgical, family and social histories have been reviewed today.        Objective:     Vitals:    12/06/19 0802   BP: 135/80   Pulse: 92   Temp: 98.4 °F (36.9 °C)   Weight: 119.8 kg (264 lb 1.8 oz)   Height: 5' 4" (1.626 m)   PainSc: 0-No pain         Physical Exam   Constitutional: She appears well-developed and well-nourished.   Genitourinary:   Genitourinary Comments: Declines exam today.   Psychiatric: She has a normal mood and affect. Her speech is normal and behavior is normal. Judgment normal. She is not withdrawn and not actively hallucinating. Thought content is not paranoid and not delusional. Cognition and memory are normal. She is attentive.   Vitals reviewed.        Diagnosis       1. Vaginal irritation    2. Chronic insomnia    3. Need for influenza vaccination    4. Need for tetanus booster          Assessment/ Plan     Vaginal irritation  -     fluconazole (DIFLUCAN) 200 MG Tab; Take 1 tablet (200 mg total) by mouth once daily. for 3 days  Dispense: 3 tablet; Refill: " 0  -     C. trachomatis/N. gonorrhoeae by AMP DNA    Chronic insomnia  -     traZODone (DESYREL) 50 MG tablet; Take 1 tablet (50 mg total) by mouth nightly as needed for Insomnia.  Dispense: 30 tablet; Refill: 6    Need for influenza vaccination    Need for tetanus booster  -     Td Vaccine (Adult) - Preservative Free    Other orders  -     Influenza - Quadrivalent (PF)          Vaccines --- Update flu and tetanus today.  Skin care and infection prevention discussed.  Trial of trazodone to help with sleep.  Follow-up in clinic as needed.      Patient Care Team:  Primary Doctor No as PCP - General  Agnieszka Reid MD (Obstetrics and Gynecology)  SHAHRAM Nicole MD as Consulting Physician (Gastroenterology)      VANCE Wagner  [facility]

## 2019-12-06 ENCOUNTER — OFFICE VISIT (OUTPATIENT)
Dept: FAMILY MEDICINE | Facility: CLINIC | Age: 29
End: 2019-12-06
Payer: MEDICAID

## 2019-12-06 VITALS
SYSTOLIC BLOOD PRESSURE: 135 MMHG | HEART RATE: 92 BPM | TEMPERATURE: 98 F | WEIGHT: 264.13 LBS | BODY MASS INDEX: 45.09 KG/M2 | DIASTOLIC BLOOD PRESSURE: 80 MMHG | HEIGHT: 64 IN

## 2019-12-06 DIAGNOSIS — Z23 NEED FOR TETANUS BOOSTER: ICD-10-CM

## 2019-12-06 DIAGNOSIS — N89.8 VAGINAL IRRITATION: Primary | ICD-10-CM

## 2019-12-06 DIAGNOSIS — F51.04 CHRONIC INSOMNIA: ICD-10-CM

## 2019-12-06 DIAGNOSIS — Z23 NEED FOR INFLUENZA VACCINATION: ICD-10-CM

## 2019-12-06 PROCEDURE — 87491 CHLMYD TRACH DNA AMP PROBE: CPT

## 2019-12-06 PROCEDURE — 99999 PR PBB SHADOW E&M-EST. PATIENT-LVL III: CPT | Mod: PBBFAC,,, | Performed by: REGISTERED NURSE

## 2019-12-06 PROCEDURE — 99999 PR PBB SHADOW E&M-EST. PATIENT-LVL III: ICD-10-PCS | Mod: PBBFAC,,, | Performed by: REGISTERED NURSE

## 2019-12-06 PROCEDURE — 90714 TD VACC NO PRESV 7 YRS+ IM: CPT | Mod: PBBFAC,PO

## 2019-12-06 PROCEDURE — 99214 PR OFFICE/OUTPT VISIT, EST, LEVL IV, 30-39 MIN: ICD-10-PCS | Mod: 25,S$PBB,, | Performed by: REGISTERED NURSE

## 2019-12-06 PROCEDURE — 90471 IMMUNIZATION ADMIN: CPT | Mod: PBBFAC,PO

## 2019-12-06 PROCEDURE — 99213 OFFICE O/P EST LOW 20 MIN: CPT | Mod: PBBFAC,PO,25 | Performed by: REGISTERED NURSE

## 2019-12-06 PROCEDURE — 99214 OFFICE O/P EST MOD 30 MIN: CPT | Mod: 25,S$PBB,, | Performed by: REGISTERED NURSE

## 2019-12-06 RX ORDER — SPIRONOLACTONE 50 MG/1
50 TABLET, FILM COATED ORAL DAILY
Refills: 6 | COMMUNITY
Start: 2019-11-10 | End: 2020-04-06 | Stop reason: SDUPTHER

## 2019-12-06 RX ORDER — TRAZODONE HYDROCHLORIDE 50 MG/1
50 TABLET ORAL NIGHTLY PRN
Qty: 30 TABLET | Refills: 6 | Status: SHIPPED | OUTPATIENT
Start: 2019-12-06 | End: 2020-12-07

## 2019-12-06 RX ORDER — FLUCONAZOLE 200 MG/1
200 TABLET ORAL DAILY
Qty: 3 TABLET | Refills: 0 | Status: SHIPPED | OUTPATIENT
Start: 2019-12-06 | End: 2019-12-09

## 2019-12-06 RX ORDER — VALACYCLOVIR HYDROCHLORIDE 500 MG/1
TABLET, FILM COATED ORAL
Refills: 3 | COMMUNITY
Start: 2019-11-29 | End: 2020-01-16 | Stop reason: SDUPTHER

## 2019-12-09 ENCOUNTER — PATIENT MESSAGE (OUTPATIENT)
Dept: FAMILY MEDICINE | Facility: CLINIC | Age: 29
End: 2019-12-09

## 2019-12-09 LAB
C TRACH DNA SPEC QL NAA+PROBE: NOT DETECTED
N GONORRHOEA DNA SPEC QL NAA+PROBE: NOT DETECTED

## 2019-12-09 NOTE — TELEPHONE ENCOUNTER
Called and spoke with patient she advised she is still   irrated in  same spot she was seen for. She is having a discharge now. And would like something called in

## 2019-12-11 ENCOUNTER — PATIENT MESSAGE (OUTPATIENT)
Dept: FAMILY MEDICINE | Facility: CLINIC | Age: 29
End: 2019-12-11

## 2019-12-11 NOTE — TELEPHONE ENCOUNTER
Response from pt received states that vaginal d/c is brown when she wipes but there is no irritation in the inside.     Please advise.

## 2019-12-12 ENCOUNTER — PATIENT MESSAGE (OUTPATIENT)
Dept: FAMILY MEDICINE | Facility: CLINIC | Age: 29
End: 2019-12-12

## 2019-12-12 RX ORDER — METRONIDAZOLE 500 MG/1
500 TABLET ORAL EVERY 12 HOURS
Qty: 14 TABLET | Refills: 0 | Status: SHIPPED | OUTPATIENT
Start: 2019-12-12 | End: 2019-12-19

## 2019-12-26 ENCOUNTER — TELEPHONE (OUTPATIENT)
Dept: PAIN MEDICINE | Facility: CLINIC | Age: 29
End: 2019-12-26

## 2019-12-26 NOTE — TELEPHONE ENCOUNTER
Debbie from [facility] stated she faxed a Plan of Care for miriam to sign . Informed her Miriam was out and will be back tomorrow.

## 2019-12-26 NOTE — TELEPHONE ENCOUNTER
----- Message from Yazmin Gonzales sent at 12/26/2019  2:17 PM CST -----  Contact: shaniqua from [facility]   States a plan of care was faxed over and pls sign / dated 10/14/19 and can be reached at [phone]

## 2020-01-03 ENCOUNTER — PATIENT MESSAGE (OUTPATIENT)
Dept: ADMINISTRATIVE | Facility: OTHER | Age: 30
End: 2020-01-03

## 2020-01-06 ENCOUNTER — PATIENT MESSAGE (OUTPATIENT)
Dept: FAMILY MEDICINE | Facility: CLINIC | Age: 30
End: 2020-01-06

## 2020-01-06 ENCOUNTER — PATIENT MESSAGE (OUTPATIENT)
Dept: OBSTETRICS AND GYNECOLOGY | Facility: CLINIC | Age: 30
End: 2020-01-06

## 2020-01-06 DIAGNOSIS — I10 ESSENTIAL HYPERTENSION: ICD-10-CM

## 2020-01-06 RX ORDER — METOPROLOL SUCCINATE 50 MG/1
50 TABLET, EXTENDED RELEASE ORAL DAILY
Qty: 30 TABLET | Refills: 1 | Status: SHIPPED | OUTPATIENT
Start: 2020-01-06 | End: 2020-03-09

## 2020-01-06 RX ORDER — NORGESTIMATE AND ETHINYL ESTRADIOL 0.25-0.035
1 KIT ORAL DAILY
Qty: 28 TABLET | Refills: 11 | Status: SHIPPED | OUTPATIENT
Start: 2020-01-06 | End: 2020-01-24 | Stop reason: SDUPTHER

## 2020-01-06 RX ORDER — METRONIDAZOLE 500 MG/1
TABLET ORAL
Refills: 0 | OUTPATIENT
Start: 2020-01-06

## 2020-01-16 ENCOUNTER — PATIENT MESSAGE (OUTPATIENT)
Dept: FAMILY MEDICINE | Facility: CLINIC | Age: 30
End: 2020-01-16

## 2020-01-16 NOTE — TELEPHONE ENCOUNTER
Patient advised she only taking them when she fill like a breakout is starting up. Patient said she is not taking them on a daily basis.

## 2020-01-16 NOTE — TELEPHONE ENCOUNTER
RX for flares calls for 6 pills only.  Is she taking the med for outbreaks only or daily to suppress virus?

## 2020-01-17 ENCOUNTER — PATIENT MESSAGE (OUTPATIENT)
Dept: FAMILY MEDICINE | Facility: CLINIC | Age: 30
End: 2020-01-17

## 2020-01-19 ENCOUNTER — HOSPITAL ENCOUNTER (EMERGENCY)
Facility: HOSPITAL | Age: 30
Discharge: HOME | End: 2020-01-19
Attending: FAMILY MEDICINE
Payer: MEDICAID

## 2020-01-19 VITALS
OXYGEN SATURATION: 98 % | RESPIRATION RATE: 16 BRPM | TEMPERATURE: 98 F | HEART RATE: 81 BPM | HEIGHT: 64 IN | SYSTOLIC BLOOD PRESSURE: 139 MMHG | DIASTOLIC BLOOD PRESSURE: 96 MMHG | BODY MASS INDEX: 45.73 KG/M2 | WEIGHT: 267.88 LBS

## 2020-01-19 DIAGNOSIS — M54.6 CHRONIC THORACIC BACK PAIN, UNSPECIFIED BACK PAIN LATERALITY: Primary | ICD-10-CM

## 2020-01-19 DIAGNOSIS — G89.29 CHRONIC THORACIC BACK PAIN, UNSPECIFIED BACK PAIN LATERALITY: Primary | ICD-10-CM

## 2020-01-19 PROCEDURE — 96372 THER/PROPH/DIAG INJ SC/IM: CPT | Mod: ER

## 2020-01-19 PROCEDURE — 25000003 PHARM REV CODE 250: Mod: ER | Performed by: FAMILY MEDICINE

## 2020-01-19 PROCEDURE — 63600175 PHARM REV CODE 636 W HCPCS: Mod: ER | Performed by: FAMILY MEDICINE

## 2020-01-19 PROCEDURE — 99284 EMERGENCY DEPT VISIT MOD MDM: CPT | Mod: 25,ER

## 2020-01-19 RX ORDER — VALACYCLOVIR HYDROCHLORIDE 500 MG/1
TABLET, FILM COATED ORAL
Qty: 6 TABLET | Refills: 3 | Status: SHIPPED | OUTPATIENT
Start: 2020-01-19 | End: 2020-07-14 | Stop reason: SDUPTHER

## 2020-01-19 RX ORDER — METHOCARBAMOL 500 MG/1
750 TABLET, FILM COATED ORAL
Status: COMPLETED | OUTPATIENT
Start: 2020-01-19 | End: 2020-01-19

## 2020-01-19 RX ORDER — TIZANIDINE 4 MG/1
4 TABLET ORAL EVERY 6 HOURS PRN
COMMUNITY
End: 2020-02-07 | Stop reason: ALTCHOICE

## 2020-01-19 RX ORDER — KETOROLAC TROMETHAMINE 30 MG/ML
30 INJECTION, SOLUTION INTRAMUSCULAR; INTRAVENOUS
Status: COMPLETED | OUTPATIENT
Start: 2020-01-19 | End: 2020-01-19

## 2020-01-19 RX ADMIN — METHOCARBAMOL TABLETS 1000 MG: 500 TABLET, COATED ORAL at 05:01

## 2020-01-19 RX ADMIN — KETOROLAC TROMETHAMINE 30 MG: 30 INJECTION, SOLUTION INTRAMUSCULAR at 05:01

## 2020-01-19 NOTE — ED PROVIDER NOTES
"Encounter Date: 1/19/2020       History     Chief Complaint   Patient presents with    Back Pain     x1 day,      This is a 29-year-old  female with morbid obesity, GERD, chronic back pain who presents to the emergency department for upper back pain.  Patient had just finished her shift at [employer] for which she worked from 8:00 p.m. to 5:00 a.m. and states that she has had back pain throughout her shift.  States that she has a history of osteoarthritis.  States that she took a prescribed anti-inflammatory as well as tizanidine during her shift without relief.  Patient states that she lives boxes and water buckets throughout her shift.  Denies any trauma.  Denies any urinary or bowel incontinence.  Denies any saddle numbness.  Denies fevers chills.  States "Flexeril does not work for may and nor does ibuprofen."        Review of patient's allergies indicates:   Allergen Reactions    Amitriptyline      xerostomia    Amlodipine      DRY MOUTH     Past Medical History:   Diagnosis Date    GERD (gastroesophageal reflux disease)     Herpes simplex virus (HSV) infection     High serum testosterone 9/7/2017    Hyperlipidemia     Hypertension     Insulin resistance syndrome 9/7/2017    Lumbar facet arthropathy 8/14/2019    Morbid obesity     Ovarian cyst     bilateral      Past Surgical History:   Procedure Laterality Date    ANKLE SURGERY Left     BUNIONECTOMY Bilateral     COLONOSCOPY  06/12/2018    INJECTION OF ANESTHETIC AGENT AROUND MEDIAL BRANCH NERVES INNERVATING LUMBAR FACET JOINT Bilateral 8/14/2019    Procedure: Bilateral L3-5 MBB;  Surgeon: Yo Kirkland MD;  Location: [facility];  Service: Pain Management;  Laterality: Bilateral;    LUMBAR FUSION       Family History   Problem Relation Age of Onset    Hypertension Mother     Fibroids Mother     No Known Problems Father     Diabetes Sister     Hypertension Brother     Hypertension Maternal Aunt     Diabetes " "Maternal Aunt     Diabetes Maternal Uncle     Hypertension Maternal Grandmother     Leukemia Maternal Grandfather     Hypertension Maternal Grandfather     Stroke Neg Hx      Social History     Tobacco Use    Smoking status: Never Smoker    Smokeless tobacco: Never Used   Substance Use Topics    Alcohol use: Yes     Frequency: Never     Drinks per session: Patient refused     Binge frequency: Never     Comment: special occasions    Drug use: No     Review of Systems   Constitutional: Negative for chills, diaphoresis and fever.   HENT: Negative for congestion, postnasal drip, rhinorrhea, sneezing and sore throat.    Eyes: Negative for visual disturbance.   Respiratory: Negative for cough, chest tightness and shortness of breath.    Cardiovascular: Negative for chest pain, palpitations and leg swelling.   Gastrointestinal: Negative for abdominal pain, constipation, diarrhea, nausea and vomiting.   Genitourinary: Negative for dysuria, frequency, hematuria and urgency.   Musculoskeletal: Positive for back pain. Negative for gait problem and myalgias.   Skin: Negative for rash.   Neurological: Negative for weakness, light-headedness, numbness and headaches.   Hematological: Does not bruise/bleed easily.   All other systems reviewed and are negative.      Physical Exam     Initial Vitals [01/19/20 0512]   BP Pulse Resp Temp SpO2   (!) 139/96 81 16 97.7 °F (36.5 °C) 98 %      MAP       --         Vitals:    01/19/20 0512   BP: (!) 139/96   Pulse: 81   Resp: 16   Temp: 97.7 °F (36.5 °C)   TempSrc: Oral   SpO2: 98%   Weight: 121.5 kg (267 lb 13.7 oz)   Height: 5' 4" (1.626 m)       Physical Exam    Nursing note and vitals reviewed.  Constitutional: She appears well-developed and well-nourished. She is not diaphoretic. No distress.   HENT:   Head: Normocephalic.   Right Ear: External ear normal.   Left Ear: External ear normal.   Mouth/Throat: Oropharynx is clear and moist.   Eyes: Conjunctivae and EOM are normal. " Pupils are equal, round, and reactive to light.   Neck: Normal range of motion. Neck supple. No JVD present.   Cardiovascular: Normal rate, regular rhythm and normal heart sounds.   Pulmonary/Chest: Breath sounds normal. No respiratory distress. She has no wheezes.   Abdominal: Soft. Bowel sounds are normal. She exhibits no distension. There is no tenderness. There is no rebound and no guarding.   Musculoskeletal: Normal range of motion. She exhibits tenderness. She exhibits no edema.   Tenderness palpation along paraspinal T4-T7   Neurological: She is alert and oriented to person, place, and time. She has normal strength. No cranial nerve deficit or sensory deficit.   Skin: Skin is warm and dry. Capillary refill takes less than 2 seconds. No rash noted.   Psychiatric: She has a normal mood and affect. Thought content normal.         ED Course   Procedures  Labs Reviewed - No data to display       Imaging Results    None                       Medications   ketorolac injection 30 mg (30 mg Intramuscular Given 1/19/20 0539)   methocarbamol tablet 1,000 mg (1,000 mg Oral Given 1/19/20 0539)               I discussed with patient that the evaluation in the emergency department does not suggest any emergent or life threatening medical condition requiring immediate intervention beyond what was provided in the ED, and I believe patient is safe for discharge.  Regardless, an unremarkable evaluation in the ED does not preclude the development or presence of a serious of life threatening condition. As such, patient was instructed to return immediately for any worsening or change in current symptoms. I also discussed the results of my evaluation and diagnosis with patient and she concurs with the evaluation and management plan.  Detailed written and verbal instructions provided to patient and she expressed a verbal understanding of the discharge instructions and overall management plan. Reiterated the importance of medication  "administration and safety and advised patient to follow up with primary care provider in 3-5 days or sooner if needed.  Also advised patient to return to the ER for any complications.                Clinical Impression:       ICD-10-CM ICD-9-CM   1. Chronic thoracic back pain, unspecified back pain laterality M54.6 724.1    G89.29 338.29     Discharge Medication List as of 1/19/2020  6:01 AM        Follow-up Information     Schedule an appointment as soon as possible for a visit  with [facility] [place].    Contact information:  [id number]  [place] [id number] [phone]                     Disposition:   Disposition: Discharged  Condition: Stable      Portions of this note may have been created with voice recognition software. Occasional "wrong-word" or "sound-a-like" substitutions may have occurred due to the inherent limitations of voice recognition software. Please, read the note carefully and recognize, using context, where substitutions have occurred.                  Ashley Dunbar MD  01/20/20 0134    "

## 2020-01-19 NOTE — DISCHARGE INSTRUCTIONS
Addended by: DEEPTHI WOOD on: 6/21/2019 01:51 PM     Modules accepted: Orders     Regarding BACK PAIN, it is important to rest and slowly start to increase activity as pain decreases or as tolerable.  I also recommend the use of ice and heat to help decrease swelling and pain and prevent tissue damage (it is important to cover ice with a towel and not applying it directly to skin and apply it for 20-30 minutes every 2 hours as needed). Heat will help decrease pain and muscle spasms Do not fall asleep with heating pad and apply heat to lower back for 20-30 minutes every 2 hours as needed). For prevention, use correct body movements (bend at the hips and knees when picking up objects and use leg muscles as you lift the load; keep objects close to chest when lifting it; and avoid twisting or lifting anything above the waist; change position often when standing for long periods of time; never reach, pull, or push while seated; exercise frequently and warm up before exercising; and maintain a healthy weight.  Follow up with primary care provider if no improvement is noticed in next three days.  Take all medications as prescribed and avoid operating heavy machinery or driving if prescribed pain medications or muscle relaxants.  Return to the emergency department if you develop incontinence; notice increased swelling or pain in lower back; begin to have difficulty moving lower extremities; or develop numbness to legs.

## 2020-01-20 ENCOUNTER — PATIENT MESSAGE (OUTPATIENT)
Dept: PAIN MEDICINE | Facility: CLINIC | Age: 30
End: 2020-01-20

## 2020-01-24 ENCOUNTER — OFFICE VISIT (OUTPATIENT)
Dept: PAIN MEDICINE | Facility: CLINIC | Age: 30
End: 2020-01-24
Payer: MEDICAID

## 2020-01-24 VITALS
RESPIRATION RATE: 18 BRPM | DIASTOLIC BLOOD PRESSURE: 94 MMHG | SYSTOLIC BLOOD PRESSURE: 142 MMHG | HEIGHT: 64 IN | HEART RATE: 76 BPM | WEIGHT: 267 LBS | BODY MASS INDEX: 45.58 KG/M2

## 2020-01-24 DIAGNOSIS — M53.3 SACROILIAC JOINT PAIN: ICD-10-CM

## 2020-01-24 DIAGNOSIS — M70.62 GREATER TROCHANTERIC BURSITIS, LEFT: ICD-10-CM

## 2020-01-24 DIAGNOSIS — M54.42 CHRONIC BILATERAL LOW BACK PAIN WITH LEFT-SIDED SCIATICA: Primary | ICD-10-CM

## 2020-01-24 DIAGNOSIS — G89.29 CHRONIC BILATERAL LOW BACK PAIN WITH LEFT-SIDED SCIATICA: Primary | ICD-10-CM

## 2020-01-24 DIAGNOSIS — M47.816 FACET ARTHRITIS OF LUMBAR REGION: ICD-10-CM

## 2020-01-24 DIAGNOSIS — M47.816 LUMBAR SPONDYLOSIS: ICD-10-CM

## 2020-01-24 PROCEDURE — 99214 PR OFFICE/OUTPT VISIT, EST, LEVL IV, 30-39 MIN: ICD-10-PCS | Mod: S$PBB,,, | Performed by: PHYSICIAN ASSISTANT

## 2020-01-24 PROCEDURE — 99214 OFFICE O/P EST MOD 30 MIN: CPT | Mod: PBBFAC | Performed by: PHYSICIAN ASSISTANT

## 2020-01-24 PROCEDURE — 99999 PR PBB SHADOW E&M-EST. PATIENT-LVL IV: ICD-10-PCS | Mod: PBBFAC,,, | Performed by: PHYSICIAN ASSISTANT

## 2020-01-24 PROCEDURE — 99214 OFFICE O/P EST MOD 30 MIN: CPT | Mod: S$PBB,,, | Performed by: PHYSICIAN ASSISTANT

## 2020-01-24 PROCEDURE — 99999 PR PBB SHADOW E&M-EST. PATIENT-LVL IV: CPT | Mod: PBBFAC,,, | Performed by: PHYSICIAN ASSISTANT

## 2020-01-24 RX ORDER — METHYLPREDNISOLONE ACETATE 40 MG/ML
40 INJECTION, SUSPENSION INTRA-ARTICULAR; INTRALESIONAL; INTRAMUSCULAR; SOFT TISSUE
Status: COMPLETED | OUTPATIENT
Start: 2020-01-24 | End: 2020-01-24

## 2020-01-24 RX ORDER — ZONISAMIDE 25 MG/1
CAPSULE ORAL
Qty: 60 CAPSULE | Refills: 0 | Status: SHIPPED | OUTPATIENT
Start: 2020-01-24 | End: 2020-03-04 | Stop reason: SDUPTHER

## 2020-01-24 RX ADMIN — METHYLPREDNISOLONE ACETATE 40 MG: 40 INJECTION, SUSPENSION INTRALESIONAL; INTRAMUSCULAR; INTRASYNOVIAL; SOFT TISSUE at 10:01

## 2020-01-24 NOTE — H&P (VIEW-ONLY)
Chief Pain Complaint:  Low-back Pain    Interval History: Since last visit on 9/11/2019,  Patient went to ER a couple days ago for excruciating back pain.  No meds have been helpful.  She has now tried zanaflex, Flexeril, Robaxin. Also tried Mobic, nabumetone, ibuprofen - all with no relief.     History of Present Illness:   Nathan Carlos is a 29 y.o. female  who is presenting with a chief complaint of Low-back Pain. The patient began experiencing this problem insidiously, and the pain has been gradually worsening. The pain is described as throbbing, cramping, aching and heavy and is located in the bilateral lumbar spine. Pain is intermittent and lasts hours. The  pain is nonradiating. The patient rates her pain a 8 out of ten and interferes with activities of daily living a 7 out of ten. Pain is exacerbated by extension of the lumbar spine, and is improved by rest. Patient reports no prior trauma, no prior spinal surgery     - pertinent negatives: No fever, No chills, No weight loss, No bladder dysfunction, No bowel dysfunction, No saddle anesthesia  - pertinent positives: none    - medications, other therapies tried (physical therapy, injections):     >> NSAIDs, Tylenol, Norco, zanaflex and flexeril (no relief), topamax (no relief)    >> Has previously undergone Physical Therapy    >> Has previously undergone spinal injection/s   - Left SI joint injection 1/2018 with 100% relief for 6 months   - Right L3, L4, L5 MBB with local on 8/15/18 with 90% pain relief   - left SIJ + left GT bursa injection with local on 11/8/18 with good relief of bursitis but only 10% of SIJ pain   - left L3-5 MBB with local on 5/16/19 with 90% pain relief   - bilateral L3-5 MBB on 8/14/19 with limited relief      Imaging / Labs / Studies (reviewed on 1/24/2020):    Results for orders placed during the hospital encounter of 11/22/17   MRI Lumbar Spine Without Contrast    Narrative Technique: Standard noncontrast multiplanar  multisequence imaging of the lumbar spine was performed.  Findings: There is anatomic spinal alignment.  Disc interspaces are of normal height and signal intensity.  Vertebral marrow signal pattern and architecture are normal.  Distal cord is unremarkable with conus medullaris terminating at L1-L2.  Paravertebral soft tissues are symmetric and normal in appearance.  T12-L1: No disc protrusion, neuroforaminal narrowing, or central canal stenosis.  L1-L2: No disc protrusion, neuroforaminal narrowing, or central canal stenosis.  L2-L3: No disc protrusion, neuroforaminal narrowing, or central canal stenosis.  L3-L4: No disc protrusion, neuroforaminal narrowing, or central canal stenosis.  L4-L5: No disc protrusion, neuroforaminal narrowing, or central canal stenosis.  L5-S1: Mild bilateral facet hypertrophy. No disc protrusion, neuroforaminal narrowing, or central canal stenosis.    Impression  Negative MRI of the lumbar spine.     Results for orders placed during the hospital encounter of 01/04/18   X-Ray Lumbar Complete With Flex And Ext    Narrative Comparison: None  Technique: AP, lateral, lateral flexion, lateral extension, bilateral oblique, and lumbosacral coned down views were obtained of the lumbar spine  Findings: There is mild scoliosis of the lower thoracic and upper lumbar spine.  Vertebral body heights are well-maintained.  No spondylolisthesis demonstrated.  No change in spinal alignment with flexion or extension to suggest instability. Intervertebral disk spaces are well preserved.  No pars defects visualized.  Posterior elements appear grossly intact. No acute fractures or subluxations are demonstrated.  The remaining visualized osseous and soft tissue structures demonstrate no appreciable abnormality.    Impression As above.  No acute findings.         Review of Systems:  CONSTITUTIONAL: patient denies any fever, chills, or weight loss  SKIN: patient denies any rash or itching  RESPIRATORY: patient  "denies having any shortness of breath  GASTROINTESTINAL: patient denies having any diarrhea, constipation, or bowel incontinence  GENITOURINARY: patient denies having any abnormal bladder function    MUSCULOSKELETAL:  - patient complains of the above noted pain/s (see chief pain complaint)    NEUROLOGICAL:   - pain as above  - strength in Lower extremities is intact, BILATERALLY  - sensation in Lower extremities is intact, BILATERALLY  - patient denies any loss of bowel or bladder control      PSYCHIATRIC: patient denies any change in mood    Other:  All other systems reviewed and are negative        Physical Exam:  Vitals:  BP (!) 142/94 (BP Location: Right arm, Patient Position: Sitting, BP Method: Medium (Automatic))   Pulse 76   Resp 18   Ht 5' 4" (1.626 m)   Wt 121.1 kg (267 lb)   BMI 45.83 kg/m²   (reviewed on 1/24/2020)    General: alert and oriented, in no apparent distress, obese.  Gait: normal gait.  Skin: no rashes, no discoloration, no obvious lesions  HEENT: normocephalic, atraumatic. Pupils equal and round.  Cardiovascular: no significant peripheral edema present.  Respiratory: without use of accessory muscles of respiration.    Musculoskeletal - Lumbar Spine:  - Pain on flexion of lumbar spine: Present   - Pain on extension of lumbar spine: Present  - Lumbar facet loading: Present    - TTP over the lumbar facet joints: Present on right - less so than over SIJ  - TTP over the SI joints: Present on right  - TTP over GT bursa: absent  - Straight Leg Raise: Negative  - JULISSA: Negative    Neuro - Extremities:  - BUE Strength:R/L: D: 5/5; B: 5/5; T: 5/5; WF: 5/5; WE: 5/5; IO: 5/5  - BLE Strength: R/L: HF: 5/5, HE: 5/5, KF: 5/5; KE: 5/5; FE: 5/5; FF: 5/5  - Extremity Reflexes: Brisk and symmetric throughout  - Sensory: Sensation to light touch intact bilaterally      Psych:  Mood and affect is appropriate          Assessment:  Nathan Carlos is a 29 y.o. year old female who is presenting " with    Encounter Diagnoses   Name Primary?    Chronic bilateral low back pain with left-sided sciatica Yes    Facet arthritis of lumbar region     Lumbar spondylosis     Sacroiliac joint pain     Greater trochanteric bursitis, left        Plan:  1. Interventional:   - Schedule right SIJ injection.   - Procedure note: An IM injection of depo-medrol 0.5ml of 400mg/5mL injection was administered during clinic visit.  This was well tolerated.  **She states no relief with toradol injection in ER.    2. Pharmacologic: Will start Zonegran 25mg BID (with titration instructions, take 1 tablet QHS x 1 week then increase to BID increasing as tolerated).     3. Rehabilitative: SIJ exercises given.  She reports physical therapy made pain worse.  Encouraged regular exercise and stretching.     4. Diagnostic: None for now.    5. Follow up: 4 weeks post injection     - I discussed the risks, benefits, and alternatives to potential treatment options. All questions and concerns were fully addressed today in clinic. Dr. Kirkland was consulted regarding the patient plan and agrees.

## 2020-01-28 NOTE — PRE ADMISSION SCREENING
Spoke with  Nathan regarding procedure scheduled on 2/5  Arrival time-will call back with arrival time after insurance approves  Has patient been sick with fever or on antibiotics within the last 7 days? no  Has patient received a vaccination within the last 7 days? no  Has the patient stopped all medications as directed? no  Does patient have a pacemaker and or defibrillator? no  Does the patient have a ride to and from procedure and someone reliable to remain with patient? yes  Is the patient diabetic? No, insulin resitant  Does the patient have sleep apnea? No Or use O2 at home? no  Is the patient receiving sedation? yes  Is the patient instructed to remain NPO beginning at midnight the night before their procedure? yes  Procedure location confirmed with patient? yes

## 2020-02-03 ENCOUNTER — PATIENT MESSAGE (OUTPATIENT)
Dept: ADMINISTRATIVE | Facility: OTHER | Age: 30
End: 2020-02-03

## 2020-02-05 ENCOUNTER — HOSPITAL ENCOUNTER (OUTPATIENT)
Facility: HOSPITAL | Age: 30
Discharge: HOME | End: 2020-02-05
Attending: ANESTHESIOLOGY | Admitting: ANESTHESIOLOGY
Payer: MEDICAID

## 2020-02-05 ENCOUNTER — PATIENT MESSAGE (OUTPATIENT)
Dept: PAIN MEDICINE | Facility: CLINIC | Age: 30
End: 2020-02-05

## 2020-02-05 VITALS
TEMPERATURE: 98 F | BODY MASS INDEX: 43.89 KG/M2 | WEIGHT: 257.06 LBS | SYSTOLIC BLOOD PRESSURE: 111 MMHG | OXYGEN SATURATION: 96 % | RESPIRATION RATE: 16 BRPM | DIASTOLIC BLOOD PRESSURE: 70 MMHG | HEART RATE: 78 BPM | HEIGHT: 64 IN

## 2020-02-05 DIAGNOSIS — M53.3 SACROILIAC JOINT DYSFUNCTION: ICD-10-CM

## 2020-02-05 LAB
B-HCG UR QL: NEGATIVE
CTP QC/QA: YES

## 2020-02-05 PROCEDURE — 25000003 PHARM REV CODE 250: Performed by: ANESTHESIOLOGY

## 2020-02-05 PROCEDURE — 99152 MOD SED SAME PHYS/QHP 5/>YRS: CPT | Performed by: ANESTHESIOLOGY

## 2020-02-05 PROCEDURE — 99152 PR MOD CONSCIOUS SEDATION, SAME PHYS, 5+ YRS, FIRST 15 MIN: ICD-10-PCS | Mod: ,,, | Performed by: ANESTHESIOLOGY

## 2020-02-05 PROCEDURE — 99152 MOD SED SAME PHYS/QHP 5/>YRS: CPT | Mod: ,,, | Performed by: ANESTHESIOLOGY

## 2020-02-05 PROCEDURE — 27096 PR INJECTION,SACROILIAC JOINT: ICD-10-PCS | Mod: RT,,, | Performed by: ANESTHESIOLOGY

## 2020-02-05 PROCEDURE — 27096 INJECT SACROILIAC JOINT: CPT | Mod: RT,,, | Performed by: ANESTHESIOLOGY

## 2020-02-05 PROCEDURE — 27096 INJECT SACROILIAC JOINT: CPT | Performed by: ANESTHESIOLOGY

## 2020-02-05 PROCEDURE — 63600175 PHARM REV CODE 636 W HCPCS: Performed by: ANESTHESIOLOGY

## 2020-02-05 PROCEDURE — 81025 URINE PREGNANCY TEST: CPT | Performed by: ANESTHESIOLOGY

## 2020-02-05 RX ORDER — METHYLPREDNISOLONE ACETATE 40 MG/ML
INJECTION, SUSPENSION INTRA-ARTICULAR; INTRALESIONAL; INTRAMUSCULAR; SOFT TISSUE
Status: DISCONTINUED | OUTPATIENT
Start: 2020-02-05 | End: 2020-02-05 | Stop reason: HOSPADM

## 2020-02-05 RX ORDER — MIDAZOLAM HYDROCHLORIDE 1 MG/ML
INJECTION, SOLUTION INTRAMUSCULAR; INTRAVENOUS
Status: DISCONTINUED | OUTPATIENT
Start: 2020-02-05 | End: 2020-02-05 | Stop reason: HOSPADM

## 2020-02-05 RX ORDER — FENTANYL CITRATE 50 UG/ML
25 INJECTION, SOLUTION INTRAMUSCULAR; INTRAVENOUS ONCE
Status: COMPLETED | OUTPATIENT
Start: 2020-02-05 | End: 2020-02-05

## 2020-02-05 RX ORDER — LIDOCAINE HYDROCHLORIDE 20 MG/ML
INJECTION, SOLUTION EPIDURAL; INFILTRATION; INTRACAUDAL; PERINEURAL
Status: DISCONTINUED | OUTPATIENT
Start: 2020-02-05 | End: 2020-02-05 | Stop reason: HOSPADM

## 2020-02-05 RX ORDER — FENTANYL CITRATE 50 UG/ML
INJECTION, SOLUTION INTRAMUSCULAR; INTRAVENOUS
Status: DISCONTINUED | OUTPATIENT
Start: 2020-02-05 | End: 2020-02-05 | Stop reason: HOSPADM

## 2020-02-05 RX ADMIN — FENTANYL CITRATE 25 MCG: 0.05 INJECTION, SOLUTION INTRAMUSCULAR; INTRAVENOUS at 01:02

## 2020-02-05 NOTE — PLAN OF CARE
Discharge instructions reviewed with pt. Pt states understanding and has no further questions. Pt AAOx4, stable.

## 2020-02-05 NOTE — DISCHARGE SUMMARY
[facility]  Discharge Note       Description of Procedure: Right Sacroiliac Joint Injection under Fluoroscopic Guidance    Procedure Date: 2/5/2020    Admit Date: 2/5/2020  Discharge Date: 2/5/2020     Attending Physician: Isael Ram   Discharge Provider: Isael Ram    Preoperative Diagnosis: Sacroiliitis     Postoperative Diagnosis: as above, same as preoperative diagnosis    Discharged Condition: Stable    Hospital Course: Patient was admitted for an outpatient procedure. The procedure was tolerated well with no complications.    Final Diagnoses: Same as principal problem.    Disposition: Home, self-care.    Follow up/Patient Instructions:  Follow-up in clinic in 2-3 weeks.    Medications: No medications were prescribed today. The patient was advised to resume normal medication regimen without change.  Specific information was provided regarding restarting any anticoagulant/s.    Discharge Procedure Orders (must include Diet, Follow-up, Activity):  Light activity for the remainder of the day, resume normal activity tomorrow. Resume normal diet. Follow-up in clinic in 2-3 weeks.

## 2020-02-05 NOTE — DISCHARGE INSTRUCTIONS

## 2020-02-05 NOTE — OP NOTE
PROCEDURE: Sacroiliac joint injection under fluoroscopic guidance     SIDE: right      PROCEDURE DATE: 2/5/2020    PREOPERATIVE DIAGNOSIS: Sacroiliitis  POSTOPERATIVE DIAGNOSIS: Sacroiliitis    PROVIDER: Yo Kirkland MD  Assistant(s): None    ANESTHESIA: Local, IV Sedation    >> 2 mg of VERSED    >> 100 mcg of FENTANYL     INDICATION: The patient has a history of pain due to sacroiliitis unresponsive to conservative treatments. Fluoroscopy was used to optimize visualization of needle placement and to maximize safety.     PROCEDURE DESCRIPTION: The patient was seen and identified in the preoperative area. Risks, benefits, complications, and alternatives were discussed with the patient. The patient agreed to proceed with the procedure and signed the consent. The site and side of the procedure was identified and marked. An IV was started.     The patient was taken to the procedural suite. The patient was positioned in prone orientation on procedure table. A time out was performed prior to any intervention. The procedure, site, side, and allergies were stated and agreed to by all present. The lumbosacral area was widely prepped with ChloraPrep. The procedural site was draped in usual sterile fashion. Vital signs were closely monitored throughout this procedure. Conscious sedation was used for this procedure to decrease patient anxiety.    The fluoroscopic camera was placed in contralateral oblique view and was adjusted until the anterior and posterior joint margins of the targeted sacroiliac joint aligned in linear array. The lower pole of the joint was identified, marked, and localized with 1% Lidocaine. A 22 gauge 5 inch spinal needle was introduced and advanced to the joint under fluoroscopic guidance. The joint space was entered and after negative aspiration, 2 mL of injectate was posited into the joint space. The needle was then withdrawn outside of the joint space and after negative aspiration 1 mL of solution was  injected outside of the joint space. The injectant solution used was comprised of 2 mL of 1% PF Lidocaine and 1 mL of Methylprednisolone (40 mg/mL). This techniques was performed for the above noted joint/s.    Description of Findings: Not applicable    Prosthetic devices, grafts, tissues, or devices implanted: None    Specimen Removed: No    Estimated Blood Loss: minimal    COMPLICATIONS: None    DISPOSITION / PLANS: The patient was transferred to the recovery area in a stable condition for observation. The patient was reexamined prior to discharge. There was no evidence of acute neurologic injury following the procedure.  Patient was discharged from the recovery room after meeting discharge criteria. Home discharge instructions were given to the patient by the staff.

## 2020-02-05 NOTE — PLAN OF CARE
Pt complaining of 10/10 pain. 25mcg fentanyl ordered per MD Kirkland. Will continue to monitor. Pt to be discharged at 1400.

## 2020-02-06 ENCOUNTER — PATIENT MESSAGE (OUTPATIENT)
Dept: PAIN MEDICINE | Facility: CLINIC | Age: 30
End: 2020-02-06

## 2020-02-07 ENCOUNTER — TELEPHONE (OUTPATIENT)
Dept: PAIN MEDICINE | Facility: CLINIC | Age: 30
End: 2020-02-07

## 2020-02-07 RX ORDER — CHLORZOXAZONE 500 MG/1
500 TABLET ORAL 2 TIMES DAILY PRN
Qty: 60 TABLET | Refills: 0 | Status: SHIPPED | OUTPATIENT
Start: 2020-02-07 | End: 2020-03-04 | Stop reason: SDUPTHER

## 2020-02-07 NOTE — TELEPHONE ENCOUNTER
Brock Molina  Pt informed that the injection take up to 3 weeks to take full effect and also the current medication prescribe also take about three weeks to take full effect.  Pt asked for something to be called in today for her current pain all concerns forward to [place] all questions answered at this time//dp

## 2020-02-07 NOTE — TELEPHONE ENCOUNTER
Return call to PT concerning RX refill. The refill has been approved at this time and can be picked up at pharmacy on records.  At this time all questions and concerns have been addressed at this time. Pt expressed understanding and gratitude.//dp

## 2020-02-10 ENCOUNTER — PATIENT MESSAGE (OUTPATIENT)
Dept: FAMILY MEDICINE | Facility: CLINIC | Age: 30
End: 2020-02-10

## 2020-02-11 NOTE — PROGRESS NOTES
Subjective:       Patient ID: Nathan Carlos is a 29 y.o. female.    Chief Complaint   Patient presents with    Abdominal Pain       HPI    Nathan Carlos is here today with c/o epigastric stomach pain x 2 weeks.  Pain comes & goes, described as sharp and stabbing.  Does have h/o GERD with H-Pylori infection in 2016.  Prilosec not helping, has been taking for the past 2 weeks.  Denies bloody or dark stools, alcohol intake, or any dietary changes.          Review of Systems   Constitutional: Negative.    Respiratory: Negative.    Cardiovascular: Negative.    Gastrointestinal: Positive for abdominal pain. Negative for abdominal distention, anal bleeding, blood in stool, constipation, diarrhea, nausea, rectal pain and vomiting.   Genitourinary: Negative.    Musculoskeletal: Negative.    Skin: Negative.    Neurological: Negative.          Review of patient's allergies indicates:   Allergen Reactions    Amitriptyline      xerostomia    Amlodipine      DRY MOUTH         Patient Active Problem List   Diagnosis    Essential hypertension    Gastroesophageal reflux disease    Left-sided low back pain with left-sided sciatica    Hypercholesteremia    High serum testosterone    Insulin resistance syndrome    HSV-2 (herpes simplex virus 2) infection    Nephrolithiasis    Chronic bilateral low back pain with left-sided sciatica    Polycystic ovaries    Lumbar facet arthropathy    Sacroiliac joint dysfunction       Medication List with Changes/Refills   Current Medications    CHLORZOXAZONE (PARAFON FORTE) 500 MG TAB    Take 1 tablet (500 mg total) by mouth 2 (two) times daily as needed (muscle spasms).    METOPROLOL SUCCINATE (TOPROL-XL) 50 MG 24 HR TABLET    Take 1 tablet (50 mg total) by mouth once daily.    SPIRONOLACTONE (ALDACTONE) 50 MG TABLET    Take 50 mg by mouth once daily.    TRAZODONE (DESYREL) 50 MG TABLET    Take 1 tablet (50 mg total) by mouth nightly as needed for Insomnia.     "VALACYCLOVIR (VALTREX) 500 MG TABLET    TAKE 1 TABLET BY MOUTH TWICE DAILY FOR 3 DAYS    ZONISAMIDE (ZONEGRAN) 25 MG CAP    Take 1 cap QHS x 1 week. then increase to 2 caps QHS or 1 cap BID thereafter.             Past medical, surgical, family and social histories have been reviewed today.        Objective:     Vitals:    02/12/20 0844   BP: 112/80   Pulse: 78   Temp: 98.1 °F (36.7 °C)   TempSrc: Oral   Weight: 116.2 kg (256 lb 2.8 oz)   PainSc:   6   PainLoc: Abdomen       Estimated body mass index is 44.12 kg/m² as calculated from the following:    Height as of 2/5/20: 5' 4" (1.626 m).    Weight as of 2/5/20: 116.6 kg (257 lb 0.9 oz).      Physical Exam   Constitutional: She is oriented to person, place, and time. She appears well-developed and well-nourished. No distress.   Cardiovascular: Normal rate and regular rhythm.   Pulmonary/Chest: Effort normal and breath sounds normal.   Abdominal: Soft. Bowel sounds are normal. She exhibits no distension and no mass. There is no hepatosplenomegaly. There is tenderness. There is no rigidity, no rebound, no guarding, no tenderness at McBurney's point and negative Serrano's sign.       Musculoskeletal: Normal range of motion. She exhibits no edema.   Neurological: She is alert and oriented to person, place, and time. No sensory deficit. She exhibits normal muscle tone. Coordination normal.   Skin: Skin is warm. Capillary refill takes less than 2 seconds. No rash noted. She is not diaphoretic.   Psychiatric: She has a normal mood and affect. Her behavior is normal. Judgment and thought content normal.         Diagnosis       1. Epigastric pain    2. History of Helicobacter pylori infection          Assessment/ Plan     Epigastric pain  -     CBC auto differential; Future; Expected date: 02/12/2020  -     Comprehensive metabolic panel; Future; Expected date: 02/12/2020  -     H. pylori antigen, stool; Future; Expected date: 02/12/2020  -     Amylase; Future; Expected date: " 02/12/2020  -     Lipase; Future; Expected date: 02/12/2020    History of Helicobacter pylori infection  -     H. pylori antigen, stool; Future; Expected date: 02/12/2020        Lab pending.  Dietary intake and modifications as needed discussed.  Nexium or Prilosec once daily in AM on empty stomach.  Follow-up in clinic as needed.        Patient Care Team:  Qasim Paez NP as PCP - General (General Practice)  Agnieszka Reid MD (Obstetrics and Gynecology)  SHAHRAM Nicole MD as Consulting Physician (Gastroenterology)      VANCE Wagner  [facility]

## 2020-02-12 ENCOUNTER — OFFICE VISIT (OUTPATIENT)
Dept: FAMILY MEDICINE | Facility: CLINIC | Age: 30
End: 2020-02-12
Payer: MEDICAID

## 2020-02-12 ENCOUNTER — PATIENT MESSAGE (OUTPATIENT)
Dept: FAMILY MEDICINE | Facility: CLINIC | Age: 30
End: 2020-02-12

## 2020-02-12 ENCOUNTER — DOCUMENTATION ONLY (OUTPATIENT)
Dept: FAMILY MEDICINE | Facility: CLINIC | Age: 30
End: 2020-02-12

## 2020-02-12 ENCOUNTER — LAB VISIT (OUTPATIENT)
Dept: LAB | Facility: HOSPITAL | Age: 30
End: 2020-02-12
Payer: MEDICAID

## 2020-02-12 VITALS
BODY MASS INDEX: 43.97 KG/M2 | HEART RATE: 78 BPM | TEMPERATURE: 98 F | SYSTOLIC BLOOD PRESSURE: 112 MMHG | DIASTOLIC BLOOD PRESSURE: 80 MMHG | WEIGHT: 256.19 LBS

## 2020-02-12 DIAGNOSIS — Z86.19 HISTORY OF HELICOBACTER PYLORI INFECTION: ICD-10-CM

## 2020-02-12 DIAGNOSIS — R10.13 EPIGASTRIC PAIN: Primary | ICD-10-CM

## 2020-02-12 DIAGNOSIS — R10.13 EPIGASTRIC PAIN: ICD-10-CM

## 2020-02-12 LAB
BASOPHILS # BLD AUTO: 0.08 K/UL (ref 0–0.2)
BASOPHILS NFR BLD: 0.6 % (ref 0–1.9)
DIFFERENTIAL METHOD: ABNORMAL
EOSINOPHIL # BLD AUTO: 0.1 K/UL (ref 0–0.5)
EOSINOPHIL NFR BLD: 0.7 % (ref 0–8)
ERYTHROCYTE [DISTWIDTH] IN BLOOD BY AUTOMATED COUNT: 14 % (ref 11.5–14.5)
HCT VFR BLD AUTO: 48.9 % (ref 37–48.5)
HGB BLD-MCNC: 15.4 G/DL (ref 12–16)
IMM GRANULOCYTES # BLD AUTO: 0.04 K/UL (ref 0–0.04)
IMM GRANULOCYTES NFR BLD AUTO: 0.3 % (ref 0–0.5)
LYMPHOCYTES # BLD AUTO: 5.7 K/UL (ref 1–4.8)
LYMPHOCYTES NFR BLD: 41.8 % (ref 18–48)
MCH RBC QN AUTO: 27.6 PG (ref 27–31)
MCHC RBC AUTO-ENTMCNC: 31.5 G/DL (ref 32–36)
MCV RBC AUTO: 88 FL (ref 82–98)
MONOCYTES # BLD AUTO: 1.1 K/UL (ref 0.3–1)
MONOCYTES NFR BLD: 7.7 % (ref 4–15)
NEUTROPHILS # BLD AUTO: 6.7 K/UL (ref 1.8–7.7)
NEUTROPHILS NFR BLD: 48.9 % (ref 38–73)
NRBC BLD-RTO: 0 /100 WBC
PLATELET # BLD AUTO: 257 K/UL (ref 150–350)
PMV BLD AUTO: 11.6 FL (ref 9.2–12.9)
RBC # BLD AUTO: 5.58 M/UL (ref 4–5.4)
WBC # BLD AUTO: 13.67 K/UL (ref 3.9–12.7)

## 2020-02-12 PROCEDURE — 99214 PR OFFICE/OUTPT VISIT, EST, LEVL IV, 30-39 MIN: ICD-10-PCS | Mod: S$PBB,,, | Performed by: REGISTERED NURSE

## 2020-02-12 PROCEDURE — 85025 COMPLETE CBC W/AUTO DIFF WBC: CPT

## 2020-02-12 PROCEDURE — 99214 OFFICE O/P EST MOD 30 MIN: CPT | Mod: S$PBB,,, | Performed by: REGISTERED NURSE

## 2020-02-12 PROCEDURE — 87338 HPYLORI STOOL AG IA: CPT

## 2020-02-12 PROCEDURE — 36415 COLL VENOUS BLD VENIPUNCTURE: CPT | Mod: PO

## 2020-02-12 PROCEDURE — 82150 ASSAY OF AMYLASE: CPT

## 2020-02-12 PROCEDURE — 99999 PR PBB SHADOW E&M-EST. PATIENT-LVL III: CPT | Mod: PBBFAC,,, | Performed by: REGISTERED NURSE

## 2020-02-12 PROCEDURE — 99213 OFFICE O/P EST LOW 20 MIN: CPT | Mod: PBBFAC,PO | Performed by: REGISTERED NURSE

## 2020-02-12 PROCEDURE — 99999 PR PBB SHADOW E&M-EST. PATIENT-LVL III: ICD-10-PCS | Mod: PBBFAC,,, | Performed by: REGISTERED NURSE

## 2020-02-12 PROCEDURE — 83690 ASSAY OF LIPASE: CPT

## 2020-02-12 PROCEDURE — 80053 COMPREHEN METABOLIC PANEL: CPT

## 2020-02-13 ENCOUNTER — PATIENT MESSAGE (OUTPATIENT)
Dept: FAMILY MEDICINE | Facility: CLINIC | Age: 30
End: 2020-02-13

## 2020-02-13 LAB
ALBUMIN SERPL BCP-MCNC: 3.9 G/DL (ref 3.5–5.2)
ALP SERPL-CCNC: 72 U/L (ref 55–135)
ALT SERPL W/O P-5'-P-CCNC: 37 U/L (ref 10–44)
AMYLASE SERPL-CCNC: 42 U/L (ref 20–110)
ANION GAP SERPL CALC-SCNC: 8 MMOL/L (ref 8–16)
AST SERPL-CCNC: 21 U/L (ref 10–40)
BILIRUB SERPL-MCNC: 0.3 MG/DL (ref 0.1–1)
BUN SERPL-MCNC: 12 MG/DL (ref 6–20)
CALCIUM SERPL-MCNC: 9.6 MG/DL (ref 8.7–10.5)
CHLORIDE SERPL-SCNC: 105 MMOL/L (ref 95–110)
CO2 SERPL-SCNC: 25 MMOL/L (ref 23–29)
CREAT SERPL-MCNC: 0.8 MG/DL (ref 0.5–1.4)
EST. GFR  (AFRICAN AMERICAN): >60 ML/MIN/1.73 M^2
EST. GFR  (NON AFRICAN AMERICAN): >60 ML/MIN/1.73 M^2
GLUCOSE SERPL-MCNC: 123 MG/DL (ref 70–110)
LIPASE SERPL-CCNC: 15 U/L (ref 4–60)
POTASSIUM SERPL-SCNC: 4.2 MMOL/L (ref 3.5–5.1)
PROT SERPL-MCNC: 7.2 G/DL (ref 6–8.4)
SODIUM SERPL-SCNC: 138 MMOL/L (ref 136–145)

## 2020-02-14 ENCOUNTER — PATIENT MESSAGE (OUTPATIENT)
Dept: FAMILY MEDICINE | Facility: CLINIC | Age: 30
End: 2020-02-14

## 2020-02-14 NOTE — TELEPHONE ENCOUNTER
Pleases see my chart message.    Called patient to advise her that physician is out of the office and will communicate with her on Monday.  Patient verbally agreed to wait till Monday to speak with physician.

## 2020-02-17 ENCOUNTER — PATIENT MESSAGE (OUTPATIENT)
Dept: FAMILY MEDICINE | Facility: CLINIC | Age: 30
End: 2020-02-17

## 2020-02-17 LAB — H PYLORI AG STL QL IA: NOT DETECTED

## 2020-03-04 ENCOUNTER — OFFICE VISIT (OUTPATIENT)
Dept: PAIN MEDICINE | Facility: CLINIC | Age: 30
End: 2020-03-04
Payer: MEDICAID

## 2020-03-04 VITALS
RESPIRATION RATE: 18 BRPM | BODY MASS INDEX: 43.71 KG/M2 | DIASTOLIC BLOOD PRESSURE: 78 MMHG | HEIGHT: 64 IN | WEIGHT: 256 LBS | HEART RATE: 80 BPM | SYSTOLIC BLOOD PRESSURE: 131 MMHG

## 2020-03-04 DIAGNOSIS — M47.816 FACET ARTHRITIS OF LUMBAR REGION: ICD-10-CM

## 2020-03-04 DIAGNOSIS — M53.3 SACROILIAC JOINT PAIN: ICD-10-CM

## 2020-03-04 DIAGNOSIS — M54.42 CHRONIC BILATERAL LOW BACK PAIN WITH LEFT-SIDED SCIATICA: Primary | ICD-10-CM

## 2020-03-04 DIAGNOSIS — G89.29 CHRONIC BILATERAL LOW BACK PAIN WITH LEFT-SIDED SCIATICA: Primary | ICD-10-CM

## 2020-03-04 DIAGNOSIS — M70.62 GREATER TROCHANTERIC BURSITIS, LEFT: ICD-10-CM

## 2020-03-04 DIAGNOSIS — M46.1 SACROILIITIS: ICD-10-CM

## 2020-03-04 PROCEDURE — 99999 PR PBB SHADOW E&M-EST. PATIENT-LVL III: ICD-10-PCS | Mod: PBBFAC,,, | Performed by: PHYSICIAN ASSISTANT

## 2020-03-04 PROCEDURE — 99214 OFFICE O/P EST MOD 30 MIN: CPT | Mod: S$PBB,,, | Performed by: PHYSICIAN ASSISTANT

## 2020-03-04 PROCEDURE — 99214 PR OFFICE/OUTPT VISIT, EST, LEVL IV, 30-39 MIN: ICD-10-PCS | Mod: S$PBB,,, | Performed by: PHYSICIAN ASSISTANT

## 2020-03-04 PROCEDURE — 99999 PR PBB SHADOW E&M-EST. PATIENT-LVL III: CPT | Mod: PBBFAC,,, | Performed by: PHYSICIAN ASSISTANT

## 2020-03-04 PROCEDURE — 99213 OFFICE O/P EST LOW 20 MIN: CPT | Mod: PBBFAC | Performed by: PHYSICIAN ASSISTANT

## 2020-03-04 RX ORDER — ZONISAMIDE 25 MG/1
CAPSULE ORAL
Qty: 60 CAPSULE | Refills: 0 | Status: SHIPPED | OUTPATIENT
Start: 2020-03-04 | End: 2020-08-10

## 2020-03-04 NOTE — PROGRESS NOTES
Chief Pain Complaint:  Low-back Pain    Interval History (3/4/2020): Patient was seen on 2/5/20. At that time she underwent right SIJ injection .  The patient reports that she is/was better following the procedure.  she reports 80% pain relief.  The changes lasted 4 weeks so far.  The changes have continued through this visit.    Interval History (1/24/20): Since last visit on 9/11/2019,  Patient went to ER a couple days ago for excruciating back pain.  No meds have been helpful.  She has now tried zanaflex, Flexeril, Robaxin. Also tried Mobic, nabumetone, ibuprofen - all with no relief.     History of Present Illness:   Nathan Carlos is a 29 y.o. female  who is presenting with a chief complaint of Low-back Pain. The patient began experiencing this problem insidiously, and the pain has been gradually worsening. The pain is described as throbbing, cramping, aching and heavy and is located in the bilateral lumbar spine. Pain is intermittent and lasts hours. The  pain is nonradiating. The patient rates her pain a 8 out of ten and interferes with activities of daily living a 7 out of ten. Pain is exacerbated by extension of the lumbar spine, and is improved by rest. Patient reports no prior trauma, no prior spinal surgery     - pertinent negatives: No fever, No chills, No weight loss, No bladder dysfunction, No bowel dysfunction, No saddle anesthesia  - pertinent positives: none    - medications, other therapies tried (physical therapy, injections):     >> NSAIDs, Tylenol, Norco, zanaflex and flexeril (no relief), topamax (no relief)    >> Has previously undergone Physical Therapy    >> Has previously undergone spinal injection/s   - Left SI joint injection 1/2018 with 100% relief for 6 months   - Right L3, L4, L5 MBB with local on 8/15/18 with 90% pain relief   - left SIJ + left GT bursa injection with local on 11/8/18 with good relief of bursitis but only 10% of SIJ pain   - left L3-5 MBB with local on 5/16/19  with 90% pain relief   - bilateral L3-5 MBB on 8/14/19 with limited relief   - right SIJ injection on 2/5/20 with 80% pain relief        Imaging / Labs / Studies (reviewed on 3/4/2020):    Results for orders placed during the hospital encounter of 11/22/17   MRI Lumbar Spine Without Contrast    Narrative Technique: Standard noncontrast multiplanar multisequence imaging of the lumbar spine was performed.  Findings: There is anatomic spinal alignment.  Disc interspaces are of normal height and signal intensity.  Vertebral marrow signal pattern and architecture are normal.  Distal cord is unremarkable with conus medullaris terminating at L1-L2.  Paravertebral soft tissues are symmetric and normal in appearance.  T12-L1: No disc protrusion, neuroforaminal narrowing, or central canal stenosis.  L1-L2: No disc protrusion, neuroforaminal narrowing, or central canal stenosis.  L2-L3: No disc protrusion, neuroforaminal narrowing, or central canal stenosis.  L3-L4: No disc protrusion, neuroforaminal narrowing, or central canal stenosis.  L4-L5: No disc protrusion, neuroforaminal narrowing, or central canal stenosis.  L5-S1: Mild bilateral facet hypertrophy. No disc protrusion, neuroforaminal narrowing, or central canal stenosis.    Impression  Negative MRI of the lumbar spine.     Results for orders placed during the hospital encounter of 01/04/18   X-Ray Lumbar Complete With Flex And Ext    Narrative Comparison: None  Technique: AP, lateral, lateral flexion, lateral extension, bilateral oblique, and lumbosacral coned down views were obtained of the lumbar spine  Findings: There is mild scoliosis of the lower thoracic and upper lumbar spine.  Vertebral body heights are well-maintained.  No spondylolisthesis demonstrated.  No change in spinal alignment with flexion or extension to suggest instability. Intervertebral disk spaces are well preserved.  No pars defects visualized.  Posterior elements appear grossly intact. No acute  "fractures or subluxations are demonstrated.  The remaining visualized osseous and soft tissue structures demonstrate no appreciable abnormality.    Impression As above.  No acute findings.         Review of Systems:  CONSTITUTIONAL: patient denies any fever, chills, or weight loss  SKIN: patient denies any rash or itching  RESPIRATORY: patient denies having any shortness of breath  GASTROINTESTINAL: patient denies having any diarrhea, constipation, or bowel incontinence  GENITOURINARY: patient denies having any abnormal bladder function    MUSCULOSKELETAL:  - patient complains of the above noted pain/s (see chief pain complaint)    NEUROLOGICAL:   - pain as above  - strength in Lower extremities is intact, BILATERALLY  - sensation in Lower extremities is intact, BILATERALLY  - patient denies any loss of bowel or bladder control      PSYCHIATRIC: patient denies any change in mood    Other:  All other systems reviewed and are negative        Physical Exam:  Vitals:  /78 (BP Location: Right arm, Patient Position: Sitting, BP Method: Medium (Automatic))   Pulse 80   Resp 18   Ht 5' 4" (1.626 m)   Wt 116.1 kg (256 lb)   BMI 43.94 kg/m²   (reviewed on 3/4/2020)    General: alert and oriented, in no apparent distress, obese.  Gait: normal gait.  Skin: no rashes, no discoloration, no obvious lesions  HEENT: normocephalic, atraumatic. Pupils equal and round.  Cardiovascular: no significant peripheral edema present.  Respiratory: without use of accessory muscles of respiration.    Musculoskeletal - Lumbar Spine:  - Pain on flexion of lumbar spine: Present   - Pain on extension of lumbar spine: Present  - Lumbar facet loading: Present    - TTP over the lumbar facet joints: Present on right - less so than over SIJ  - TTP over the SI joints: Present on right, mild, improved since procedure   - TTP over GT bursa: absent  - TTP over piriformis: absent  - Straight Leg Raise: Negative  - JULISSA: Negative    Neuro - " Extremities:  - BUE Strength:R/L: D: 5/5; B: 5/5; T: 5/5; WF: 5/5; WE: 5/5; IO: 5/5  - BLE Strength: R/L: HF: 5/5, HE: 5/5, KF: 5/5; KE: 5/5; FE: 5/5; FF: 5/5  - Extremity Reflexes: Brisk and symmetric throughout  - Sensory: Sensation to light touch intact bilaterally      Psych:  Mood and affect is appropriate          Assessment:  Nathan Carlos is a 29 y.o. year old female who is presenting with    Encounter Diagnoses   Name Primary?    Chronic bilateral low back pain with left-sided sciatica Yes    Facet arthritis of lumbar region     Sacroiliac joint pain     Greater trochanteric bursitis, left     Sacroiliitis        Plan:  1. Interventional: S/p right SIJ injection on 2/5/20 with 80% pain relief.     2. Pharmacologic: Will increase Zonegran 25mg BID to 50mg BID - if she decides to do so.     3. Rehabilitative: SIJ exercises given.  She reports physical therapy made pain worse.  Encouraged regular exercise and stretching.     4. Diagnostic: None for now.    5. Follow up: PRN     - I discussed the risks, benefits, and alternatives to potential treatment options. All questions and concerns were fully addressed today in clinic.

## 2020-03-07 DIAGNOSIS — I10 ESSENTIAL HYPERTENSION: ICD-10-CM

## 2020-03-09 RX ORDER — SPIRONOLACTONE 25 MG/1
TABLET ORAL
Qty: 60 TABLET | Refills: 0 | Status: SHIPPED | OUTPATIENT
Start: 2020-03-09 | End: 2020-04-06 | Stop reason: SDUPTHER

## 2020-03-09 RX ORDER — SPIRONOLACTONE 50 MG/1
TABLET, FILM COATED ORAL
Refills: 0 | OUTPATIENT
Start: 2020-03-09

## 2020-03-09 RX ORDER — METOPROLOL SUCCINATE 50 MG/1
TABLET, EXTENDED RELEASE ORAL
Qty: 30 TABLET | Refills: 0 | Status: SHIPPED | OUTPATIENT
Start: 2020-03-09 | End: 2020-04-06 | Stop reason: SDUPTHER

## 2020-03-14 NOTE — TELEPHONE ENCOUNTER
----- Message from Niraj Dhaliwal sent at 9/20/2017  8:11 AM CDT -----  Contact: self- [phone]  Pt would like consult with nurse regarding new prescription medication. Please call back at [phone]. MD Donis   REASON FOR follow   Copd , lung cancer   HISTORY OF PRESENT ILLNESS:    Tonja Deluca is a 81y.o. year old female   Patient is doing well shortness of breath is stable with exertion she did not have any hospital visits for COPD exacerbation or need steroids. Sputum is nonpurulent no hemoptysis she is compliant with his inhalers. She is on 2 L of oxygen   She was seen at [facility] clinic recommendation was made not to proceed with mediastinoscopy but to treat as stage III lung cancer  He is presently getting combined chemoradiation therapy  Last visit   Pt is here post ebus tbna of 4 l ln   Results do not shows metastatic disease   Case d/w Dr Suze Elena - he is concerned about metastasis to mediastrinum and I could only get one pass of LN . Pt is over all doing better since she started home o2   Using symbicort bid   Has albuterol for rescue   No cough hemoptysis    Last visit   is accompanied by her friend. 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 Patient had a PET CT scan which showed 4L lymph node which was PET positive she has been referred here for evaluation for endobronchial ultrasound and transbronchial needle aspiration. Patient also has shortness of breath with exertion grade 3. She does cough but does not complain of active wheeze occasionally wheezes with exertion denies chest pain no hemoptysis she is on Symbicort. But does not have a rescue inhaler                 LUNG CANCER SCREENING     1. CRITERIA MET    []     CT ORDERED  []      2. CRITERIA NOT MET   [x]      3. REFUSED                    []        REASON CRITERIA NOT MET     1. SMOKING LESS THAN 30 PY  []      2. AGE LESS THAN 55 or GREATER 77 YEARS  []      3. QUIT SMOKING 15 YEARS OR GREATER   []      4. RECENT CT WITH IN 11 MONTHS    []      5. LIFE EXPECTANCY < 5 YEARS   []      6.  SIGNS  AND SYMPTOMS OF LUNG CANCER   [x]         Immunization   Immunization History   Administered Date(s) Administered    Influenza A ([id number]) Vaccine PF IM 02/02/2010    Influenza Whole 11/12/2008    Influenza, Triv, inactivated, subunit, adjuvanted, IM (Fluad 65 yrs and older) 10/02/2019    Pneumococcal Conjugate 13-valent ([id number]) 07/13/2017    Pneumococcal Polysaccharide ([id number]) 12/03/2018        Pneumococcal Vaccine     [x] Up to date    [] Indicated   [] Refused  [] Contraindicated       Influenza Vaccine   [x] Up to date    [] Indicated   [] Refused  [] Contraindicated        PAST MEDICAL HISTORY:       Diagnosis Date    Arthritis     CAD (coronary artery disease)     fatty pockets in the atrium, states \"30% beating\"    Cancer ([facility] [address]     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 Cathren Ahumada Mediastinal lymphadenopathy     On home O2     all the time    SOB (shortness of breath)     Wears dentures     upper,does not wear lower    Wears glasses          Family History:       Problem Relation Age of Onset    Heart Disease Mother     High Blood Pressure Mother     Cancer Mother         cervix    Heart Disease Father     High Blood Pressure Father     Stroke Father     Cancer Sister         cervix    Stroke Sister     Cancer Paternal Grandfather         lung       SURGICAL HISTORY:   Past Surgical History:   Procedure Laterality Date    ANKLE SURGERY Right     broken ankle with hardware inserted and later removed    APPENDECTOMY      BONE MARROW BIOPSY Left 12/05/2019    pelvic, benign    BRONCHOSCOPY N/A 1/28/2020    EBUS WITH STATION 4L LYMPH NODE FNA performed by Pham Pete MD at [facility], no Edenilson Simon MD   OXYGEN Inhale 2 L into the lungs daily as needed   Yes Historical Provider, MD   HYDROcodone-acetaminophen (NORCO) 5-325 MG per tablet Take 1 tablet by mouth every 6 hours as needed for Pain. Yes Historical Provider, MD   albuterol sulfate  (90 Base) MCG/ACT inhaler Inhale 2 puffs into the lungs every 6 hours as needed for Wheezing 1/9/20 3/12/20 Yes Hien Peralta MD   atorvastatin (LIPITOR) 80 MG tablet daily  12/6/19  Yes Historical Provider, MD   carvedilol (COREG) 25 MG tablet TAKE ONE TABLET BY MOUTH TWICE A DAY WITH MEALS 11/8/19  Yes Zhane Medhkour, DO   SYMBICORT 160-4.5 MCG/ACT AERO INHALE 1 PUFF BY MOUTH AS DIRECTED TWO TIMES A DAY 11/1/19  Yes Zhane Medhkour, DO   traZODone (DESYREL) 50 MG tablet TAKE ONE TABLET BY MOUTH DAILY AT BEDTIME 10/2/19  Yes Zhane Medhkour, DO   sertraline (ZOLOFT) 100 MG tablet Take 1.5 tablets by mouth daily  Patient taking differently: Take 200 mg by mouth daily  9/6/19  Yes Kyra Karimi DO   clopidogrel (PLAVIX) 75 MG tablet Take 75 mg by mouth daily   Yes Historical Provider, MD   aspirin 81 MG tablet Take 81 mg by mouth daily   Yes Historical Provider, MD   amLODIPine (NORVASC) 10 MG tablet TAKE ONE TABLET BY MOUTH EVERY DAY 1/4/19  Yes Deidre Constantino MD   buPROPion (WELLBUTRIN SR) 150 MG extended release tablet Take 1 tablet by mouth daily 7/13/17  Yes Deidre Constantino MD          Review of Systems -  General ROS: negative for - chills, fatigue, fever or weight loss  ENT ROS: negative for - headaches, oral lesions or sore throat  Cardiovascular ROS: no chest pain , orthopnea or pnd   Gastrointestinal ROS: no abdominal pain, change in bowel habits, or black or bloody stools but has odynophagia  Skin - no rash   Neuro - no blurry vision , no loc .  No focal weakness   msk - no jt tenderness or swelling    Vascular - no claudication , rest completed and negative   Lymphatic - complete and negative   Hematology - oncology - complete

## 2020-03-16 ENCOUNTER — PATIENT MESSAGE (OUTPATIENT)
Dept: OBSTETRICS AND GYNECOLOGY | Facility: CLINIC | Age: 30
End: 2020-03-16

## 2020-04-01 DIAGNOSIS — I10 ESSENTIAL HYPERTENSION: ICD-10-CM

## 2020-04-01 RX ORDER — METOPROLOL SUCCINATE 50 MG/1
TABLET, EXTENDED RELEASE ORAL
Refills: 0 | OUTPATIENT
Start: 2020-04-01

## 2020-04-01 RX ORDER — SPIRONOLACTONE 25 MG/1
TABLET ORAL
Refills: 0 | OUTPATIENT
Start: 2020-04-01

## 2020-04-06 ENCOUNTER — OFFICE VISIT (OUTPATIENT)
Dept: FAMILY MEDICINE | Facility: CLINIC | Age: 30
End: 2020-04-06
Payer: MEDICAID

## 2020-04-06 ENCOUNTER — PATIENT MESSAGE (OUTPATIENT)
Dept: FAMILY MEDICINE | Facility: CLINIC | Age: 30
End: 2020-04-06

## 2020-04-06 DIAGNOSIS — N89.8 VAGINAL DISCHARGE: ICD-10-CM

## 2020-04-06 DIAGNOSIS — J30.9 ALLERGIC RHINITIS, UNSPECIFIED SEASONALITY, UNSPECIFIED TRIGGER: ICD-10-CM

## 2020-04-06 DIAGNOSIS — I10 ESSENTIAL HYPERTENSION: Primary | ICD-10-CM

## 2020-04-06 PROCEDURE — 99213 PR OFFICE/OUTPT VISIT, EST, LEVL III, 20-29 MIN: ICD-10-PCS | Mod: 95,,, | Performed by: REGISTERED NURSE

## 2020-04-06 PROCEDURE — 99213 OFFICE O/P EST LOW 20 MIN: CPT | Mod: 95,,, | Performed by: REGISTERED NURSE

## 2020-04-06 RX ORDER — SPIRONOLACTONE 25 MG/1
50 TABLET ORAL DAILY
Qty: 180 TABLET | Refills: 1 | Status: SHIPPED | OUTPATIENT
Start: 2020-04-06 | End: 2020-10-06

## 2020-04-06 RX ORDER — METOPROLOL SUCCINATE 50 MG/1
50 TABLET, EXTENDED RELEASE ORAL DAILY
Qty: 90 TABLET | Refills: 1 | Status: SHIPPED | OUTPATIENT
Start: 2020-04-06 | End: 2020-10-06

## 2020-04-06 NOTE — PROGRESS NOTES
"      PRIMARY CARE TELEMEDICINE NOTE      The patient location is:  Home  The chief complaint leading to consultation is: Medication refill  Total time spent with patient: 15 min      Visit type: Virtual visit with synchronous audio only and video  Each patient to whom he or she provides medical services by telemedicine is:  (1) informed of the relationship between the physician and patient and the respective role of any other health care provider with respect to management of the patient.  (2) notified that he or she may decline to receive medical services by telemedicine and may withdraw from such care at any time.      SUBJECTIVE:     Patient ID: Nathan Carlos is a 29 y.o. female.    Chief Complaint: Medication Refill      HPI    Patient requesting refill on her HTN medication.  Taking as ordered, but out of refills.  Is not checking home BP, states "I feel fine".  Denies headache, chest pain, edema, SOB, dizziness or blurred vision.    Reports allergies acting up, has just recently started herself on a generic Claritin.  Reports mild sneezing, runny nose and eyes, itching to throat and eyes.  No cough, fever or chills.    Reports possible yeast infection, Monostat not helping.  Having slight brownish colored discharge w/out odor.  Last cycle started on 3/6/2020, taking Midol for the cramping.        Review of patient's allergies indicates:   Allergen Reactions    Amitriptyline      xerostomia    Amlodipine      DRY MOUTH         Patient Active Problem List   Diagnosis    Essential hypertension    Gastroesophageal reflux disease    Left-sided low back pain with left-sided sciatica    Hypercholesteremia    High serum testosterone    Insulin resistance syndrome    HSV-2 (herpes simplex virus 2) infection    Nephrolithiasis    Chronic bilateral low back pain with left-sided sciatica    Polycystic ovaries    Lumbar facet arthropathy    Sacroiliac joint dysfunction           Current Outpatient " Medications:     metoprolol succinate (TOPROL-XL) 50 MG 24 hr tablet, Take 1 tablet by mouth once daily, Disp: 30 tablet, Rfl: 0    spironolactone (ALDACTONE) 25 MG tablet, Take 2 tablets by mouth once daily, Disp: 60 tablet, Rfl: 0    spironolactone (ALDACTONE) 50 MG tablet, Take 50 mg by mouth once daily., Disp: , Rfl: 6    traZODone (DESYREL) 50 MG tablet, Take 1 tablet (50 mg total) by mouth nightly as needed for Insomnia., Disp: 30 tablet, Rfl: 6    valACYclovir (VALTREX) 500 MG tablet, TAKE 1 TABLET BY MOUTH TWICE DAILY FOR 3 DAYS, Disp: 6 tablet, Rfl: 3    zonisamide (ZONEGRAN) 25 MG Cap, Take 1 to 2 caps BID, Disp: 60 capsule, Rfl: 0      Past Medical History:   Diagnosis Date    GERD (gastroesophageal reflux disease)     Herpes simplex virus (HSV) infection     High serum testosterone 9/7/2017    Hyperlipidemia     Hypertension     Insulin resistance syndrome 9/7/2017    Lumbar facet arthropathy 8/14/2019    Morbid obesity     Ovarian cyst     bilateral        Past Surgical History:   Procedure Laterality Date    ANKLE SURGERY Left     BUNIONECTOMY Bilateral     COLONOSCOPY  06/12/2018    INJECTION OF ANESTHETIC AGENT AROUND MEDIAL BRANCH NERVES INNERVATING LUMBAR FACET JOINT Bilateral 8/14/2019    Procedure: Bilateral L3-5 MBB;  Surgeon: Yo Kirkland MD;  Location: [facility];  Service: Pain Management;  Laterality: Bilateral;    INJECTION OF ANESTHETIC AGENT INTO SACROILIAC JOINT Right 2/5/2020    Procedure: Right SIJ Injection with local;  Surgeon: Yo Kirkland MD;  Location: [facility];  Service: Pain Management;  Laterality: Right;    LUMBAR FUSION         Family History   Problem Relation Age of Onset    Hypertension Mother     Fibroids Mother     No Known Problems Father     Diabetes Sister     Hypertension Brother     Hypertension Maternal Aunt     Diabetes Maternal Aunt     Diabetes Maternal Uncle     Hypertension Maternal Grandmother     Leukemia Maternal  Grandfather     Hypertension Maternal Grandfather     Stroke Neg Hx        Social History     Tobacco Use    Smoking status: Never Smoker    Smokeless tobacco: Never Used   Substance Use Topics    Alcohol use: Yes     Frequency: Never     Drinks per session: Patient refused     Binge frequency: Never     Comment: special occasions    Drug use: No             Review of Systems   Constitutional: Negative.    HENT: Positive for rhinorrhea and sneezing. Negative for congestion and ear pain.    Eyes: Positive for discharge and itching. Negative for photophobia, pain, redness and visual disturbance.   Respiratory: Negative.    Cardiovascular: Negative.    Genitourinary: Positive for pelvic pain (menstrual cramps) and vaginal discharge (brown, no odor). Negative for dysuria, hematuria, menstrual problem and vaginal pain.   Allergic/Immunologic: Positive for environmental allergies.   Neurological: Negative.              OBJECTIVE:    Physical Exam:  Constitutional:  In NAD, pleasant, cooperative.  Appears well-developed and well-nourished.   Respiratory:  Unlabored, in no resp distress.  Neurological: Alert and oriented to person, place, and time.   Psychiatric: Normal mood and affect, behavior is normal. Judgment and thought content normal.       Complete PE deferred due to video visit      ASSESSMENT:        1. Essential hypertension    2. Allergic rhinitis, unspecified seasonality, unspecified trigger    3. Vaginal discharge              PLAN:    Essential hypertension --- control unknown at this time, advised on home monitoring.  Medication refilled.  Healthy diet and lifestyle changes discussed.  -     metoprolol succinate (TOPROL-XL) 50 MG 24 hr tablet; Take 1 tablet (50 mg total) by mouth once daily.  Dispense: 90 tablet; Refill: 1  -     spironolactone (ALDACTONE) 25 MG tablet; Take 2 tablets (50 mg total) by mouth once daily.  Dispense: 180 tablet; Refill: 1    Allergic rhinitis, unspecified seasonality,  unspecified trigger --- continue Claritin once daily, add Flonase if needed and can get otc.    Vaginal discharge --- reports brownish colored discharge with cramping, likely due to impending menstrual cycle.  No douching, drink plenty of water.          Follow-up:  Return to clinic prn.

## 2020-04-17 ENCOUNTER — PATIENT MESSAGE (OUTPATIENT)
Dept: OBSTETRICS AND GYNECOLOGY | Facility: CLINIC | Age: 30
End: 2020-04-17

## 2020-04-20 ENCOUNTER — PATIENT MESSAGE (OUTPATIENT)
Dept: PAIN MEDICINE | Facility: CLINIC | Age: 30
End: 2020-04-20

## 2020-04-22 ENCOUNTER — PATIENT MESSAGE (OUTPATIENT)
Dept: PAIN MEDICINE | Facility: CLINIC | Age: 30
End: 2020-04-22

## 2020-04-22 ENCOUNTER — OFFICE VISIT (OUTPATIENT)
Dept: PAIN MEDICINE | Facility: CLINIC | Age: 30
End: 2020-04-22
Payer: MEDICAID

## 2020-04-22 DIAGNOSIS — M46.1 SACROILIITIS: ICD-10-CM

## 2020-04-22 DIAGNOSIS — M47.816 LUMBAR FACET ARTHROPATHY: ICD-10-CM

## 2020-04-22 DIAGNOSIS — M47.816 LUMBAR SPONDYLOSIS: ICD-10-CM

## 2020-04-22 DIAGNOSIS — M70.62 GREATER TROCHANTERIC BURSITIS, LEFT: ICD-10-CM

## 2020-04-22 DIAGNOSIS — M47.816 FACET ARTHRITIS OF LUMBAR REGION: Primary | ICD-10-CM

## 2020-04-22 PROCEDURE — 99213 PR OFFICE/OUTPT VISIT, EST, LEVL III, 20-29 MIN: ICD-10-PCS | Mod: 95,,, | Performed by: ANESTHESIOLOGY

## 2020-04-22 PROCEDURE — 99213 OFFICE O/P EST LOW 20 MIN: CPT | Mod: 95,,, | Performed by: ANESTHESIOLOGY

## 2020-04-22 RX ORDER — TIZANIDINE 4 MG/1
4 TABLET ORAL 2 TIMES DAILY PRN
Qty: 60 TABLET | Refills: 0 | Status: SHIPPED | OUTPATIENT
Start: 2020-04-22 | End: 2020-05-22

## 2020-04-22 RX ORDER — CELECOXIB 200 MG/1
200 CAPSULE ORAL 2 TIMES DAILY
Qty: 60 CAPSULE | Refills: 0 | Status: SHIPPED | OUTPATIENT
Start: 2020-04-22 | End: 2020-04-22

## 2020-04-22 RX ORDER — MELOXICAM 15 MG/1
15 TABLET ORAL DAILY
Qty: 30 TABLET | Refills: 0 | Status: SHIPPED | OUTPATIENT
Start: 2020-04-22 | End: 2020-05-22

## 2020-04-22 NOTE — PROGRESS NOTES
The patient location is: home  The chief complaint leading to consultation is: lumbar back pain   Visit type: audiovisual  Total time spent with patient: 15 mins   Each patient to whom he or she provides medical services by telemedicine is:  (1) informed of the relationship between the physician and patient and the respective role of any other health care provider with respect to management of the patient; and (2) notified that he or she may decline to receive medical services by telemedicine and may withdraw from such care at any time.    Chief Pain Complaint:  Lumbar Back Pain    History of Present Illness:   Nathan Carlos is a 29 y.o. female  who is presenting with a chief complaint of Low-back Pain. The patient began experiencing this problem insidiously, and the pain has been gradually worsening. The pain is described as throbbing, cramping, aching and heavy and is located in the bilateral lumbar spine. Pain is intermittent and lasts hours. The  pain is nonradiating. The patient rates her pain a 8 out of ten and interferes with activities of daily living a 7 out of ten. Pain is exacerbated by extension of the lumbar spine, and is improved by rest. Patient reports no prior trauma, no prior spinal surgery     - pertinent negatives: No fever, No chills, No weight loss, No bladder dysfunction, No bowel dysfunction, No saddle anesthesia  - pertinent positives: none    - medications, other therapies tried (physical therapy, injections):     >> NSAIDs, Tylenol, Norco, zanaflex and flexeril (no relief), topamax (no relief)    >> Has previously undergone Physical Therapy    >> Has previously undergone spinal injection/s   - Left SI joint injection 1/2018 with 100% relief for 6 months   - Right L3, L4, L5 MBB with local on 8/15/18 with 90% pain relief   - left SIJ + left GT bursa injection with local on 11/8/18 with good relief of bursitis but only 10% of SIJ pain   - left L3-5 MBB with local on 5/16/19 with 90%  pain relief   - bilateral L3-5 MBB on 8/14/19 with limited relief   - right SIJ injection on 2/5/20 with 80% pain relief        Imaging / Labs / Studies (reviewed on 4/22/2020):    Results for orders placed during the hospital encounter of 11/22/17   MRI Lumbar Spine Without Contrast    Narrative Technique: Standard noncontrast multiplanar multisequence imaging of the lumbar spine was performed.  Findings: There is anatomic spinal alignment.  Disc interspaces are of normal height and signal intensity.  Vertebral marrow signal pattern and architecture are normal.  Distal cord is unremarkable with conus medullaris terminating at L1-L2.  Paravertebral soft tissues are symmetric and normal in appearance.  T12-L1: No disc protrusion, neuroforaminal narrowing, or central canal stenosis.  L1-L2: No disc protrusion, neuroforaminal narrowing, or central canal stenosis.  L2-L3: No disc protrusion, neuroforaminal narrowing, or central canal stenosis.  L3-L4: No disc protrusion, neuroforaminal narrowing, or central canal stenosis.  L4-L5: No disc protrusion, neuroforaminal narrowing, or central canal stenosis.  L5-S1: Mild bilateral facet hypertrophy. No disc protrusion, neuroforaminal narrowing, or central canal stenosis.    Impression  Negative MRI of the lumbar spine.     Results for orders placed during the hospital encounter of 01/04/18   X-Ray Lumbar Complete With Flex And Ext    Narrative Comparison: None  Technique: AP, lateral, lateral flexion, lateral extension, bilateral oblique, and lumbosacral coned down views were obtained of the lumbar spine  Findings: There is mild scoliosis of the lower thoracic and upper lumbar spine.  Vertebral body heights are well-maintained.  No spondylolisthesis demonstrated.  No change in spinal alignment with flexion or extension to suggest instability. Intervertebral disk spaces are well preserved.  No pars defects visualized.  Posterior elements appear grossly intact. No acute  fractures or subluxations are demonstrated.  The remaining visualized osseous and soft tissue structures demonstrate no appreciable abnormality.    Impression As above.  No acute findings.         Review of Systems:  CONSTITUTIONAL: patient denies any fever, chills, or weight loss  SKIN: patient denies any rash or itching  RESPIRATORY: patient denies having any shortness of breath  GASTROINTESTINAL: patient denies having any diarrhea, constipation, or bowel incontinence  GENITOURINARY: patient denies having any abnormal bladder function    MUSCULOSKELETAL:  - patient complains of the above noted pain/s (see chief pain complaint)    NEUROLOGICAL:   - pain as above  - strength in Lower extremities is intact, BILATERALLY  - sensation in Lower extremities is intact, BILATERALLY  - patient denies any loss of bowel or bladder control      PSYCHIATRIC: patient denies any change in mood    Other:  All other systems reviewed and are negative        Physical Exam:  Telemed exam     GENERAL: Well appearing, in no acute distress, alert and oriented x3.    PSYCH:  Mood and affect appropriate.  SKIN: Skin color, texture, turgor normal, no rashes or lesions.  HEAD/FACE:  Normocephalic, atraumatic. Cranial nerves grossly intact.  CV:  No peripheral edema noted  PULM:  No difficulty breathing  Neuro/MSK:  NECK: No pain to palpation over the cervical paraspinous muscles. Spurling Negative (self-performed). No pain with neck flexion, extension, or lateral flexion.  BACK: Straight leg raising in the seated position (self-performed) is negative to radicular pain. Pain to palpation over the facet joints of the lumbar spine or spinous processes. Normal range of motion without pain reproduction.  EXTREMITIES: Peripheral joint active ROM is full and pain free without obvious instability or laxity in all four extremities. No deformities, edema, or skin discoloration.   MUSCULOSKELETAL:  There is no pain with palpation over the sacroiliac  joints bilaterally.  FABERs test is negative (self-performed).  FADIRs test is negative (self-performed).   No atrophy or tone abnormalities are noted.  NEURO:  Able to toe walk and heel walk without difficulty  GAIT: normal.    Vitals:  There were no vitals taken for this visit.  (reviewed on 4/22/2020)    General: alert and oriented, in no apparent distress, obese.  Gait: normal gait.  Skin: no rashes, no discoloration, no obvious lesions  HEENT: normocephalic, atraumatic. Pupils equal and round.  Cardiovascular: no significant peripheral edema present.  Respiratory: without use of accessory muscles of respiration.    Musculoskeletal - Lumbar Spine:  - Pain on flexion of lumbar spine: Present   - Pain on extension of lumbar spine: Present  - Lumbar facet loading: Present    - TTP over the lumbar facet joints: Present on right - less so than over SIJ  - TTP over the SI joints: Present on right, mild, improved since procedure   - TTP over GT bursa: absent  - TTP over piriformis: absent  - Straight Leg Raise: Negative  - JULISSA: Negative    Neuro - Extremities:  - BUE Strength:R/L: D: 5/5; B: 5/5; T: 5/5; WF: 5/5; WE: 5/5; IO: 5/5  - BLE Strength: R/L: HF: 5/5, HE: 5/5, KF: 5/5; KE: 5/5; FE: 5/5; FF: 5/5  - Extremity Reflexes: Brisk and symmetric throughout  - Sensory: Sensation to light touch intact bilaterally      Psych:  Mood and affect is appropriate          Assessment:  Nathan Carlos is a 29 y.o. year old female who is presenting with   Encounter Diagnoses   Name Primary?    Facet arthritis of lumbar region Yes    Greater trochanteric bursitis, left     Sacroiliitis     Lumbar spondylosis     Lumbar facet arthropathy        Plan:  1. Interventional:  Consider repate bilateral L3, 4,5 MBB.       S/p right SIJ injection on 2/5/20 with 80% pain relief.     2. Pharmacologic: Celebrex 200 mg PO BID PRN. Tizanidne 4 mg PO BID PRN.        Patient is no longer on Zonegran as this did not help.     3.  Rehabilitative: SIJ exercises given.  She reports physical therapy made pain worse.  Encouraged regular exercise and stretching.     4. Diagnostic: None for now.    5. Follow up: PRN

## 2020-04-24 ENCOUNTER — PATIENT MESSAGE (OUTPATIENT)
Dept: FAMILY MEDICINE | Facility: CLINIC | Age: 30
End: 2020-04-24

## 2020-04-24 NOTE — TELEPHONE ENCOUNTER
Spoke with patient - not an acute issue, no abdominal pain or changes in bowel or bladder habits. Notes was watching a commercial about prediabetes and they mentioned stomach size and she was wondering if her stomach size is normal. Advised patient to make an appointment with you in the morning next week for further examination.

## 2020-05-26 ENCOUNTER — PATIENT MESSAGE (OUTPATIENT)
Dept: FAMILY MEDICINE | Facility: CLINIC | Age: 30
End: 2020-05-26

## 2020-05-26 ENCOUNTER — PATIENT MESSAGE (OUTPATIENT)
Dept: PAIN MEDICINE | Facility: CLINIC | Age: 30
End: 2020-05-26

## 2020-05-26 RX ORDER — HYDROCODONE BITARTRATE AND ACETAMINOPHEN 10; 325 MG/1; MG/1
1 TABLET ORAL EVERY 6 HOURS PRN
Qty: 15 TABLET | Refills: 0 | Status: SHIPPED | OUTPATIENT
Start: 2020-05-26 | End: 2020-07-21 | Stop reason: SDUPTHER

## 2020-05-27 ENCOUNTER — TELEPHONE (OUTPATIENT)
Dept: FAMILY MEDICINE | Facility: CLINIC | Age: 30
End: 2020-05-27

## 2020-05-27 DIAGNOSIS — K21.9 HIATAL HERNIA WITH GERD: Primary | ICD-10-CM

## 2020-05-27 DIAGNOSIS — K44.9 HIATAL HERNIA WITH GERD: Primary | ICD-10-CM

## 2020-05-28 ENCOUNTER — PATIENT MESSAGE (OUTPATIENT)
Dept: FAMILY MEDICINE | Facility: CLINIC | Age: 30
End: 2020-05-28

## 2020-06-21 ENCOUNTER — HOSPITAL ENCOUNTER (EMERGENCY)
Facility: HOSPITAL | Age: 30
Discharge: HOME | End: 2020-06-21
Attending: EMERGENCY MEDICINE
Payer: MEDICAID

## 2020-06-21 VITALS
OXYGEN SATURATION: 96 % | SYSTOLIC BLOOD PRESSURE: 138 MMHG | RESPIRATION RATE: 20 BRPM | TEMPERATURE: 99 F | DIASTOLIC BLOOD PRESSURE: 86 MMHG | HEIGHT: 64 IN | WEIGHT: 256.5 LBS | BODY MASS INDEX: 43.79 KG/M2 | HEART RATE: 84 BPM

## 2020-06-21 DIAGNOSIS — G44.209 TENSION HEADACHE: Primary | ICD-10-CM

## 2020-06-21 PROCEDURE — 99283 EMERGENCY DEPT VISIT LOW MDM: CPT | Mod: ER

## 2020-06-21 RX ORDER — BUTALBITAL, ACETAMINOPHEN, CAFFEINE AND CODEINE PHOSPHATE 300; 50; 40; 30 MG/1; MG/1; MG/1; MG/1
1 CAPSULE ORAL EVERY 6 HOURS PRN
Qty: 12 CAPSULE | Refills: 0 | Status: SHIPPED | OUTPATIENT
Start: 2020-06-21 | End: 2020-08-10

## 2020-06-21 NOTE — Clinical Note
Kerrietricia Terrance was seen and treated in our emergency department on 6/21/2020.  She may return to work on 06/21/2020.       If you have any questions or concerns, please don't hesitate to call.      Lillie JAMES RN

## 2020-07-01 ENCOUNTER — TELEPHONE (OUTPATIENT)
Dept: PAIN MEDICINE | Facility: CLINIC | Age: 30
End: 2020-07-01

## 2020-07-01 NOTE — TELEPHONE ENCOUNTER
----- Message from Cha Rivera sent at 7/1/2020  8:34 AM CDT -----  Regarding: want to be seen  Contact: patient  Calling to get in today for back pain. Please call patient ASAP @ [phone]

## 2020-07-02 ENCOUNTER — OFFICE VISIT (OUTPATIENT)
Dept: PAIN MEDICINE | Facility: CLINIC | Age: 30
End: 2020-07-02
Payer: MEDICAID

## 2020-07-02 DIAGNOSIS — M46.1 SACROILIITIS: ICD-10-CM

## 2020-07-02 DIAGNOSIS — M70.62 GREATER TROCHANTERIC BURSITIS, LEFT: ICD-10-CM

## 2020-07-02 DIAGNOSIS — M53.3 SACROILIAC JOINT PAIN: ICD-10-CM

## 2020-07-02 DIAGNOSIS — M47.816 FACET ARTHRITIS OF LUMBAR REGION: Primary | ICD-10-CM

## 2020-07-02 DIAGNOSIS — M47.816 LUMBAR SPONDYLOSIS: ICD-10-CM

## 2020-07-02 PROCEDURE — 99214 OFFICE O/P EST MOD 30 MIN: CPT | Mod: 95,,, | Performed by: PHYSICIAN ASSISTANT

## 2020-07-02 PROCEDURE — 99214 PR OFFICE/OUTPT VISIT, EST, LEVL IV, 30-39 MIN: ICD-10-PCS | Mod: 95,,, | Performed by: PHYSICIAN ASSISTANT

## 2020-07-02 RX ORDER — TIZANIDINE 4 MG/1
TABLET ORAL
Qty: 60 TABLET | Refills: 0 | Status: SHIPPED | OUTPATIENT
Start: 2020-07-02 | End: 2020-07-30

## 2020-07-02 NOTE — PROGRESS NOTES
The patient location is: work office  The chief complaint leading to consultation is: lumbar back pain   Visit type: audiovisual  Total time spent with patient: 15 mins   Each patient to whom he or she provides medical services by telemedicine is:  (1) informed of the relationship between the physician and patient and the respective role of any other health care provider with respect to management of the patient; and (2) notified that he or she may decline to receive medical services by telemedicine and may withdraw from such care at any time.      Chief Pain Complaint:  Lumbar Back Pain    History of Present Illness:   Nathan Carlos is a 29 y.o. female  who is presenting with a chief complaint of Low-back Pain. The patient began experiencing this problem insidiously, and the pain has been gradually worsening. The pain is described as throbbing, cramping, aching and heavy and is located in the bilateral lumbar spine. Pain is intermittent and lasts hours. The  pain is nonradiating. The patient rates her pain a 8 out of ten and interferes with activities of daily living a 7 out of ten. Pain is exacerbated by extension of the lumbar spine, and is improved by rest. Patient reports no prior trauma, no prior spinal surgery     - pertinent negatives: No fever, No chills, No weight loss, No bladder dysfunction, No bowel dysfunction, No saddle anesthesia  - pertinent positives: none    - medications, other therapies tried (physical therapy, injections):     >> NSAIDs, Tylenol, Norco, zanaflex and flexeril (no relief), topamax (no relief)    >> Has previously undergone Physical Therapy    >> Has previously undergone spinal injection/s   - Left SI joint injection 1/2018 with 100% relief for 6 months   - Right L3, L4, L5 MBB with local on 8/15/18 with 90% pain relief   - left SIJ + left GT bursa injection with local on 11/8/18 with good relief of bursitis but only 10% of SIJ pain   - left L3-5 MBB with local on 5/16/19  with 90% pain relief   - bilateral L3-5 MBB on 8/14/19 with limited relief   - right SIJ injection on 2/5/20 with 80% pain relief        Imaging / Labs / Studies (reviewed on 7/2/2020):    Results for orders placed during the hospital encounter of 11/22/17   MRI Lumbar Spine Without Contrast    Narrative Technique: Standard noncontrast multiplanar multisequence imaging of the lumbar spine was performed.  Findings: There is anatomic spinal alignment.  Disc interspaces are of normal height and signal intensity.  Vertebral marrow signal pattern and architecture are normal.  Distal cord is unremarkable with conus medullaris terminating at L1-L2.  Paravertebral soft tissues are symmetric and normal in appearance.  T12-L1: No disc protrusion, neuroforaminal narrowing, or central canal stenosis.  L1-L2: No disc protrusion, neuroforaminal narrowing, or central canal stenosis.  L2-L3: No disc protrusion, neuroforaminal narrowing, or central canal stenosis.  L3-L4: No disc protrusion, neuroforaminal narrowing, or central canal stenosis.  L4-L5: No disc protrusion, neuroforaminal narrowing, or central canal stenosis.  L5-S1: Mild bilateral facet hypertrophy. No disc protrusion, neuroforaminal narrowing, or central canal stenosis.    Impression  Negative MRI of the lumbar spine.     Results for orders placed during the hospital encounter of 01/04/18   X-Ray Lumbar Complete With Flex And Ext    Narrative Comparison: None  Technique: AP, lateral, lateral flexion, lateral extension, bilateral oblique, and lumbosacral coned down views were obtained of the lumbar spine  Findings: There is mild scoliosis of the lower thoracic and upper lumbar spine.  Vertebral body heights are well-maintained.  No spondylolisthesis demonstrated.  No change in spinal alignment with flexion or extension to suggest instability. Intervertebral disk spaces are well preserved.  No pars defects visualized.  Posterior elements appear grossly intact. No acute  fractures or subluxations are demonstrated.  The remaining visualized osseous and soft tissue structures demonstrate no appreciable abnormality.    Impression As above.  No acute findings.         Review of Systems:  CONSTITUTIONAL: patient denies any fever, chills, or weight loss  SKIN: patient denies any rash or itching  RESPIRATORY: patient denies having any shortness of breath  GASTROINTESTINAL: patient denies having any diarrhea, constipation, or bowel incontinence  GENITOURINARY: patient denies having any abnormal bladder function    MUSCULOSKELETAL:  - patient complains of the above noted pain/s (see chief pain complaint)    NEUROLOGICAL:   - pain as above  - strength in Lower extremities is intact, BILATERALLY  - sensation in Lower extremities is intact, BILATERALLY  - patient denies any loss of bowel or bladder control      PSYCHIATRIC: patient denies any change in mood    Other:  All other systems reviewed and are negative        Physical Exam:  Telemed exam   GENERAL: Well appearing, in no acute distress, alert and oriented x3, overweight.    PSYCH:  Mood and affect appropriate.  SKIN: Skin color, texture, turgor normal, no rashes or lesions.  HEAD/FACE:  Normocephalic, atraumatic. Cranial nerves grossly intact.  CV:  No peripheral edema noted  PULM:  No difficulty breathing  Neuro/MSK:  NECK: No pain to palpation over the cervical paraspinous muscles. Spurling Negative (self-performed). No pain with neck flexion, extension, or lateral flexion.  BACK: Straight leg raising in the seated position (self-performed) is negative to radicular pain. Pain to palpation over the facet joints of the lumbar spine, less so over SIJ. Normal range of motion. Pain with extension.  EXTREMITIES: Peripheral joint active ROM is full and pain free without obvious instability or laxity in all four extremities. No deformities, edema, or skin discoloration.   MUSCULOSKELETAL:  There is no pain with palpation over the sacroiliac  joints bilaterally.  FABERs test is negative (self-performed).  FADIRs test is negative (self-performed).   No atrophy or tone abnormalities are noted.  NEURO:  Able to toe walk and heel walk without difficulty  GAIT: normal.      Last Physical Exam:   Vitals:  There were no vitals taken for this visit.  (reviewed on 7/2/2020)    General: alert and oriented, in no apparent distress, obese.  Gait: normal gait.  Skin: no rashes, no discoloration, no obvious lesions  HEENT: normocephalic, atraumatic. Pupils equal and round.  Cardiovascular: no significant peripheral edema present.  Respiratory: without use of accessory muscles of respiration.    Musculoskeletal - Lumbar Spine:  - Pain on flexion of lumbar spine: Present   - Pain on extension of lumbar spine: Present  - Lumbar facet loading: Present    - TTP over the lumbar facet joints: Present on right - less so than over SIJ  - TTP over the SI joints: Present on right, mild, improved since procedure   - TTP over GT bursa: absent  - TTP over piriformis: absent  - Straight Leg Raise: Negative  - JULISSA: Negative    Neuro - Extremities:  - BUE Strength:R/L: D: 5/5; B: 5/5; T: 5/5; WF: 5/5; WE: 5/5; IO: 5/5  - BLE Strength: R/L: HF: 5/5, HE: 5/5, KF: 5/5; KE: 5/5; FE: 5/5; FF: 5/5  - Extremity Reflexes: Brisk and symmetric throughout  - Sensory: Sensation to light touch intact bilaterally      Psych:  Mood and affect is appropriate          Assessment:  Nathan Carlos is a 29 y.o. year old female who is presenting with   Encounter Diagnoses   Name Primary?    Facet arthritis of lumbar region Yes    Greater trochanteric bursitis, left     Sacroiliitis     Lumbar spondylosis     Sacroiliac joint pain        Plan:  1. Interventional:    - Schedule bilateral L3-5 MBB. Consider SIJ injection for pain not relieved with injection.  - She last had right SIJ injection on 2/5/20 with 80% pain relief.     2. Pharmacologic:   - Celebrex 200 mg PO BID PRN - was not covered  by insurance.  - Refill Tizanidne 4 mg PO BID PRN.   - Try Salon Pas 4% lidocaine patches for pain topically.  - Patient is no longer on Zonegran as this did not help.     3. Rehabilitative: She reports physical therapy made pain worse.  Encouraged regular exercise and stretching.     4. Diagnostic: None for now.    5. Follow up: 4 weeks post-injection.    - I discussed the risks, benefits, and alternatives to potential treatment options. All questions and concerns were fully addressed today in clinic.

## 2020-07-04 ENCOUNTER — HOSPITAL ENCOUNTER (EMERGENCY)
Facility: HOSPITAL | Age: 30
Discharge: HOME | End: 2020-07-04
Attending: EMERGENCY MEDICINE
Payer: MEDICAID

## 2020-07-04 VITALS
SYSTOLIC BLOOD PRESSURE: 140 MMHG | TEMPERATURE: 99 F | DIASTOLIC BLOOD PRESSURE: 97 MMHG | HEIGHT: 64 IN | BODY MASS INDEX: 43.57 KG/M2 | WEIGHT: 255.19 LBS | HEART RATE: 67 BPM | OXYGEN SATURATION: 98 % | RESPIRATION RATE: 18 BRPM

## 2020-07-04 DIAGNOSIS — G89.29 CHRONIC BILATERAL LOW BACK PAIN WITHOUT SCIATICA: Primary | ICD-10-CM

## 2020-07-04 DIAGNOSIS — M54.50 CHRONIC BILATERAL LOW BACK PAIN WITHOUT SCIATICA: Primary | ICD-10-CM

## 2020-07-04 DIAGNOSIS — S39.012A STRAIN OF LUMBAR REGION, INITIAL ENCOUNTER: ICD-10-CM

## 2020-07-04 PROCEDURE — 63600175 PHARM REV CODE 636 W HCPCS: Mod: ER | Performed by: EMERGENCY MEDICINE

## 2020-07-04 PROCEDURE — 96372 THER/PROPH/DIAG INJ SC/IM: CPT | Mod: ER

## 2020-07-04 PROCEDURE — 99284 EMERGENCY DEPT VISIT MOD MDM: CPT | Mod: 25,ER

## 2020-07-04 RX ORDER — NORGESTIMATE AND ETHINYL ESTRADIOL 0.25-0.035
KIT ORAL
COMMUNITY
Start: 2020-06-28 | End: 2020-08-10

## 2020-07-04 RX ORDER — MIRTAZAPINE 15 MG/1
TABLET, FILM COATED ORAL
COMMUNITY
Start: 2020-05-27 | End: 2020-08-10

## 2020-07-04 RX ORDER — KETOROLAC TROMETHAMINE 30 MG/ML
15 INJECTION, SOLUTION INTRAMUSCULAR; INTRAVENOUS
Status: COMPLETED | OUTPATIENT
Start: 2020-07-04 | End: 2020-07-04

## 2020-07-04 RX ORDER — DICLOFENAC SODIUM 10 MG/G
GEL TOPICAL
COMMUNITY
Start: 2020-05-29 | End: 2020-08-10

## 2020-07-04 RX ORDER — ORPHENADRINE CITRATE 30 MG/ML
60 INJECTION INTRAMUSCULAR; INTRAVENOUS
Status: COMPLETED | OUTPATIENT
Start: 2020-07-04 | End: 2020-07-04

## 2020-07-04 RX ADMIN — KETOROLAC TROMETHAMINE 15 MG: 30 INJECTION, SOLUTION INTRAMUSCULAR at 09:07

## 2020-07-04 RX ADMIN — ORPHENADRINE CITRATE 60 MG: 30 INJECTION INTRAMUSCULAR; INTRAVENOUS at 09:07

## 2020-07-04 NOTE — Clinical Note
Nathan Carlos was seen and treated in our emergency department on 7/4/2020.  She may return to work on 07/06/2020.       If you have any questions or concerns, please don't hesitate to call.      Zully Agudelo RN

## 2020-07-04 NOTE — ED PROVIDER NOTES
Encounter Date: 7/4/2020       History     Chief Complaint   Patient presents with    Back Pain     pt reports back pain for past two weeks     The history is provided by the patient.   Back Pain   This is a chronic problem. The current episode started yesterday. The problem has been unchanged. The pain is associated with lifting heavy objects. The pain is present in the lumbar spine. The quality of the pain is described as stabbing. The pain does not radiate. The symptoms are aggravated by bending and twisting. Pertinent negatives include no chest pain, no fever, no dysuria and no weakness.     Review of patient's allergies indicates:   Allergen Reactions    Amitriptyline      xerostomia    Amlodipine      DRY MOUTH     Past Medical History:   Diagnosis Date    GERD (gastroesophageal reflux disease)     Herpes simplex virus (HSV) infection     High serum testosterone 9/7/2017    Hyperlipidemia     Hypertension     Insulin resistance syndrome 9/7/2017    Lumbar facet arthropathy 8/14/2019    Morbid obesity     Ovarian cyst     bilateral      Past Surgical History:   Procedure Laterality Date    ANKLE SURGERY Left     BUNIONECTOMY Bilateral     COLONOSCOPY  06/12/2018    INJECTION OF ANESTHETIC AGENT AROUND MEDIAL BRANCH NERVES INNERVATING LUMBAR FACET JOINT Bilateral 8/14/2019    Procedure: Bilateral L3-5 MBB;  Surgeon: Yo Kirkland MD;  Location: [facility];  Service: Pain Management;  Laterality: Bilateral;    INJECTION OF ANESTHETIC AGENT INTO SACROILIAC JOINT Right 2/5/2020    Procedure: Right SIJ Injection with local;  Surgeon: Yo Kirkland MD;  Location: [facility];  Service: Pain Management;  Laterality: Right;    LUMBAR FUSION       Family History   Problem Relation Age of Onset    Hypertension Mother     Fibroids Mother     No Known Problems Father     Diabetes Sister     Hypertension Brother     Hypertension Maternal Aunt     Diabetes Maternal Aunt     Diabetes Maternal  Uncle     Hypertension Maternal Grandmother     Leukemia Maternal Grandfather     Hypertension Maternal Grandfather     Stroke Neg Hx      Social History     Tobacco Use    Smoking status: Never Smoker    Smokeless tobacco: Never Used   Substance Use Topics    Alcohol use: Yes     Frequency: Never     Drinks per session: Patient refused     Binge frequency: Never     Comment: special occasions    Drug use: No     Review of Systems   Constitutional: Negative for fever.   HENT: Negative for sore throat.    Respiratory: Negative for shortness of breath.    Cardiovascular: Negative for chest pain.   Gastrointestinal: Negative for nausea.   Genitourinary: Negative for dysuria.   Musculoskeletal: Positive for back pain.   Skin: Negative for rash.   Neurological: Negative for weakness.   Hematological: Does not bruise/bleed easily.       Physical Exam     Initial Vitals [07/04/20 0840]   BP Pulse Resp Temp SpO2   (!) 140/97 67 18 98.7 °F (37.1 °C) 98 %      MAP       --         Physical Exam    Nursing note and vitals reviewed.  Constitutional: She appears well-developed and well-nourished. No distress.   HENT:   Head: Normocephalic and atraumatic.   Eyes: Conjunctivae and EOM are normal. Pupils are equal, round, and reactive to light.   Neck: Normal range of motion.   Cardiovascular: Normal rate and intact distal pulses.   Pulmonary/Chest: No respiratory distress.   Abdominal: She exhibits no distension.   Musculoskeletal: Normal range of motion. No tenderness or edema.      Lumbar back: She exhibits spasm. She exhibits no bony tenderness.   Neurological: She is alert and oriented to person, place, and time.   Skin: Skin is warm and dry. No rash noted.   Psychiatric: She has a normal mood and affect. Thought content normal.         ED Course   Procedures  Labs Reviewed - No data to display       Imaging Results    None              patient states this is her same pain, and is set up to see pain management next  week for an BRUNILDA.  States she just needs a shot to help hold her over.                            Clinical Impression:       ICD-10-CM ICD-9-CM   1. Chronic bilateral low back pain without sciatica  M54.5 724.2    G89.29 338.29   2. Strain of lumbar region, initial encounter  S39.012A 847.2           Disposition:   Disposition: Discharged  Condition: Stable     ED Disposition Condition    Discharge Stable        ED Prescriptions     None        Follow-up Information     Follow up With Specialties Details Why Contact Info    Qasim Paez NP General Practice   [address] [facility] [address]  [phone]                                       Dion Conley MD  07/04/20 0901

## 2020-07-04 NOTE — Clinical Note
Nathan Carlos was seen and treated in our emergency department on 7/4/2020.  She may return to work on 07/06/2020.       If you have any questions or concerns, please don't hesitate to call.      Zully Agudelo RN RN

## 2020-07-06 ENCOUNTER — TELEPHONE (OUTPATIENT)
Dept: PAIN MEDICINE | Facility: CLINIC | Age: 30
End: 2020-07-06

## 2020-07-06 DIAGNOSIS — Z03.818 ENCOUNTER FOR OBSERVATION FOR SUSPECTED EXPOSURE TO OTHER BIOLOGICAL AGENTS RULED OUT: ICD-10-CM

## 2020-07-06 NOTE — TELEPHONE ENCOUNTER
----- Message from Demi Hood sent at 7/6/2020  8:20 AM CDT -----  Regarding: personal issuse over the weekend  Contact: pt  Caller is requesting a call back regarding personal issues that happen over the weekend. Please call back at [phone]. Thanks.

## 2020-07-10 NOTE — PRE-PROCEDURE INSTRUCTIONS
Spoke with patient regarding procedure scheduled on   Arrival time  0730  Has patient been sick with fever or on antibiotics within the last 7 days? no  Has patient received a vaccination within the last 7 days? no  Has the patient stopped all medications as directed? na  Does patient have a pacemaker and or defibrillator? no  Does the patient have a ride to and from procedure and someone reliable to remain with patient? Syd [phone]  Is the patient diabetic? Insulin resistant  Does the patient have sleep apnea? Or use O2 at home? No no  Is the patient receiving sedation? yes  Is the patient instructed to remain NPO beginning at midnight the night before their procedure? yes  Procedure location confirmed with patient? yes  Covid testin [phone]

## 2020-07-13 ENCOUNTER — LAB VISIT (OUTPATIENT)
Dept: OTOLARYNGOLOGY | Facility: CLINIC | Age: 30
End: 2020-07-13
Payer: MEDICAID

## 2020-07-13 DIAGNOSIS — Z03.818 ENCOUNTER FOR OBSERVATION FOR SUSPECTED EXPOSURE TO OTHER BIOLOGICAL AGENTS RULED OUT: ICD-10-CM

## 2020-07-13 PROCEDURE — U0003 INFECTIOUS AGENT DETECTION BY NUCLEIC ACID (DNA OR RNA); SEVERE ACUTE RESPIRATORY SYNDROME CORONAVIRUS 2 (SARS-COV-2) (CORONAVIRUS DISEASE [COVID-19]), AMPLIFIED PROBE TECHNIQUE, MAKING USE OF HIGH THROUGHPUT TECHNOLOGIES AS DESCRIBED BY CMS-2020-01-R: HCPCS

## 2020-07-15 LAB — SARS-COV-2 RNA RESP QL NAA+PROBE: NOT DETECTED

## 2020-07-16 ENCOUNTER — HOSPITAL ENCOUNTER (OUTPATIENT)
Facility: HOSPITAL | Age: 30
Discharge: HOME | End: 2020-07-16
Attending: ANESTHESIOLOGY | Admitting: ANESTHESIOLOGY
Payer: MEDICAID

## 2020-07-16 VITALS
WEIGHT: 254.44 LBS | SYSTOLIC BLOOD PRESSURE: 143 MMHG | RESPIRATION RATE: 16 BRPM | OXYGEN SATURATION: 98 % | TEMPERATURE: 99 F | BODY MASS INDEX: 43.44 KG/M2 | HEIGHT: 64 IN | DIASTOLIC BLOOD PRESSURE: 84 MMHG | HEART RATE: 78 BPM

## 2020-07-16 DIAGNOSIS — M47.816 LUMBAR FACET ARTHROPATHY: Primary | ICD-10-CM

## 2020-07-16 LAB
B-HCG UR QL: NEGATIVE
CTP QC/QA: YES
POCT GLUCOSE: 152 MG/DL (ref 70–110)

## 2020-07-16 PROCEDURE — 64494 INJ PARAVERT F JNT L/S 2 LEV: CPT | Mod: 50,,, | Performed by: ANESTHESIOLOGY

## 2020-07-16 PROCEDURE — 64493 PR INJ DX/THER AGNT PARAVERT FACET JOINT,IMG GUIDE,LUMBAR/SAC,1ST LVL: ICD-10-PCS | Mod: 50,,, | Performed by: ANESTHESIOLOGY

## 2020-07-16 PROCEDURE — 81025 URINE PREGNANCY TEST: CPT | Performed by: ANESTHESIOLOGY

## 2020-07-16 PROCEDURE — 63600175 PHARM REV CODE 636 W HCPCS: Performed by: ANESTHESIOLOGY

## 2020-07-16 PROCEDURE — 99153 MOD SED SAME PHYS/QHP EA: CPT | Performed by: ANESTHESIOLOGY

## 2020-07-16 PROCEDURE — 64493 INJ PARAVERT F JNT L/S 1 LEV: CPT | Mod: 50,,, | Performed by: ANESTHESIOLOGY

## 2020-07-16 PROCEDURE — 64493 INJ PARAVERT F JNT L/S 1 LEV: CPT | Mod: 50 | Performed by: ANESTHESIOLOGY

## 2020-07-16 PROCEDURE — 25000003 PHARM REV CODE 250: Performed by: ANESTHESIOLOGY

## 2020-07-16 PROCEDURE — 82962 GLUCOSE BLOOD TEST: CPT | Performed by: ANESTHESIOLOGY

## 2020-07-16 PROCEDURE — 64495 INJ PARAVERT F JNT L/S 3 LEV: CPT | Mod: 50 | Performed by: ANESTHESIOLOGY

## 2020-07-16 PROCEDURE — 64494 INJ PARAVERT F JNT L/S 2 LEV: CPT | Mod: 50 | Performed by: ANESTHESIOLOGY

## 2020-07-16 PROCEDURE — 64494 PR INJ DX/THER AGNT PARAVERT FACET JOINT,IMG GUIDE,LUMBAR/SAC, 2ND LEVEL: ICD-10-PCS | Mod: 50,,, | Performed by: ANESTHESIOLOGY

## 2020-07-16 PROCEDURE — 99152 MOD SED SAME PHYS/QHP 5/>YRS: CPT | Performed by: ANESTHESIOLOGY

## 2020-07-16 RX ORDER — FENTANYL CITRATE 50 UG/ML
INJECTION, SOLUTION INTRAMUSCULAR; INTRAVENOUS
Status: DISCONTINUED | OUTPATIENT
Start: 2020-07-16 | End: 2020-07-16 | Stop reason: HOSPADM

## 2020-07-16 RX ORDER — METHYLPREDNISOLONE ACETATE 40 MG/ML
INJECTION, SUSPENSION INTRA-ARTICULAR; INTRALESIONAL; INTRAMUSCULAR; SOFT TISSUE
Status: DISCONTINUED | OUTPATIENT
Start: 2020-07-16 | End: 2020-07-16 | Stop reason: HOSPADM

## 2020-07-16 RX ORDER — LIDOCAINE HYDROCHLORIDE 10 MG/ML
INJECTION, SOLUTION EPIDURAL; INFILTRATION; INTRACAUDAL; PERINEURAL
Status: DISCONTINUED | OUTPATIENT
Start: 2020-07-16 | End: 2020-07-16 | Stop reason: HOSPADM

## 2020-07-16 RX ORDER — LIDOCAINE HYDROCHLORIDE 20 MG/ML
INJECTION, SOLUTION EPIDURAL; INFILTRATION; INTRACAUDAL; PERINEURAL
Status: DISCONTINUED | OUTPATIENT
Start: 2020-07-16 | End: 2020-07-16 | Stop reason: HOSPADM

## 2020-07-16 RX ORDER — ACETAMINOPHEN 325 MG/1
650 TABLET ORAL ONCE
Status: COMPLETED | OUTPATIENT
Start: 2020-07-16 | End: 2020-07-16

## 2020-07-16 RX ORDER — MIDAZOLAM HYDROCHLORIDE 1 MG/ML
INJECTION, SOLUTION INTRAMUSCULAR; INTRAVENOUS
Status: DISCONTINUED | OUTPATIENT
Start: 2020-07-16 | End: 2020-07-16 | Stop reason: HOSPADM

## 2020-07-16 RX ADMIN — ACETAMINOPHEN 650 MG: 325 TABLET ORAL at 09:07

## 2020-07-16 NOTE — DISCHARGE INSTRUCTIONS

## 2020-07-16 NOTE — OP NOTE
Procedure: Lumbar Medial Branch Block under Fluoroscopic Guidance    Side: bilateral     Level:  Sacral ala (Corresponding to the L5 dorsal ramus), L5 transverse process (Corresponding to the L4 medial branch) and L4 transverse process (Corresponding to the L3 medial branch)     PROCEDURE DATE: 7/16/2020    Pre-operative Diagnosis: Lumbar Spondylosis  Post-operative Diagnosis: Lumbar Spondylosis    Provider: Yo Kirkland MD  Assistant(s): none    Anesthesia: Local, IV Sedation    >> 2 mg of VERSED    >> 200 mcg of FENTANYL     Indication: Low back pain without radiculopathy. Symptoms unresponsive to conservative treatments. Fluoroscopy was used to optimize visualization of needle placement and to maximize safety.     Procedure Description / Technique:  The patient was seen and identified in the preoperative area. Risks, benefits, complications, and alternatives were discussed with the patient. The patient agreed to proceed with the procedure and signed the consent. The site and side of the procedure was identified and marked. An iv was started.     The patient was taken to the procedural suite and positioned in prone orientation on the procedure table. A pillow was placed under the abdomen to reduce lumbar lordosis. A time out was performed. The procedure, site, side, and allergies were stated and agreed to by all present. The lumbosacral area was widely prepped with ChloraPrep. The procedural site was draped in usual sterile fashion. Vital signs were closely monitored throughout this procedure. Conscious sedation was used for this procedure to decrease patient anxiety.    The Right sacral ala and superior articular process was identified and marked on AP fluoroscopic imaging. Subcutaneous tissues were localized using 1% PF Lidocaine to improve patient comfort. A 22 gauge 5 inch spinal needle was advance until the needle rested on OS at the interface of the sacral ala and the base of the sacral superior articular  "process. After negative aspiration, 0.75 mL of a solution containing 4 mL of 1% PF Lidocaine and 2 mL of Methylprednisolone (40 mg/mL) was injected. No pain or paresthesia was noted on injection. After right side injection, the fluoroscope was obliqued to the Right until the jessika dog outline of the L5 vertebrae came into view. The spinal needle was advanced to the "eye of the jessika dog" and after negative aspiration a 0.75 mL of the above noted solution was injected. No pain or paresthesia was noted. This technique was repeated at each of the above noted levels. The spinal needle was removed intact following injection at each targeted site. The stylet was replaced prior to needle removal at each site.    The Left sacral ala and superior articular process was identified and marked on AP fluoroscopic imaging. Subcutaneous tissues were localized using 1% PF Lidocaine to improve patient comfort. A 22 gauge 5 inch spinal needle was advance until the needle rested on OS at the interface of the sacral ala and the base of the sacral superior articular process. After negative aspiration, 0.75 mL of a solution containing 4 mL of 1% PF Lidocaine and 2 mL of Methylprednisolone (40 mg/mL) was injected. No pain or paresthesia was noted on injection. After left side injection, the fluoroscope was obliqued to the Left until the jessika dog outline of the L5 vertebrae came into view. The spinal needle was advanced to the "eye of the jsesika dog" and after negative aspiration a 0.75 mL of the above noted solution was injected. No pain or paresthesia was noted. This technique was repeated at each of the above noted levels. The spinal needle was removed intact following injection at each targeted site. The stylet was replaced prior to needle removal at each site.    Description of Findings: Not applicable    Prosthetic devices, grafts, tissues, or devices implanted: None    Specimen Removed: No    ESTIMATED BLOOD LOSS: " minimal    COMPLICATIONS: None    DISPOSITION / PLANS: The patient was transferred to the recovery area in a stable condition for observation. The patient was reexamined prior to discharge. There was no evidence of acute neurologic injury following the procedure.  Patient was discharged from the recovery room after meeting discharge criteria. Home discharge instructions were given to the patient by the staff.

## 2020-07-16 NOTE — PLAN OF CARE
Discharge instructions reviewed with patient, verbalized understanding. 4 injection sites to lower back; clean, dry and intact. Patient complaining of pain 10/10, notified Dr. Kirkland; 650 mg of tylenol ordered. Vital signs stable. Discharging home with ride.

## 2020-07-16 NOTE — DISCHARGE SUMMARY
[facility]  Discharge Note       Description of Procedure: Bilateral L3, 4, 5 Lumbar Medial Branch Block under Fluoroscopic Guidance    Procedure Date: 7/16/2020    Admit Date: 7/16/2020  Discharge Date: 7/16/2020     Attending Physician: Isael Ram   Discharge Provider: Isael Ram    Preoperative Diagnosis: Lumbar Spondylosis, Lumbar Facet Arthropathy     Postoperative Diagnosis: as above, same as preoperative diagnosis    Discharged Condition: Stable    Hospital Course: Patient was admitted for an outpatient procedure. The procedure was tolerated well with no complications.    Final Diagnoses: Same as principal problem.    Disposition: Home, self-care.    Follow up/Patient Instructions:  Follow-up in clinic in 2-3 weeks.    Medications: No medications were prescribed today. The patient was advised to resume normal medication regimen without change.  Specific information was provided regarding restarting any anticoagulant/s.    Discharge Procedure Orders (must include Diet, Follow-up, Activity):  Light activity for the remainder of the day, resume normal activity tomorrow. Resume normal diet. Follow-up in clinic in 2-3 weeks.

## 2020-07-21 ENCOUNTER — OFFICE VISIT (OUTPATIENT)
Dept: PAIN MEDICINE | Facility: CLINIC | Age: 30
End: 2020-07-21
Payer: MEDICAID

## 2020-07-21 DIAGNOSIS — M53.3 SACROILIAC JOINT PAIN: ICD-10-CM

## 2020-07-21 DIAGNOSIS — M79.18 MYOFASCIAL MUSCLE PAIN: ICD-10-CM

## 2020-07-21 DIAGNOSIS — M47.816 LUMBAR FACET ARTHROPATHY: ICD-10-CM

## 2020-07-21 DIAGNOSIS — M47.816 LUMBAR SPONDYLOSIS: Primary | ICD-10-CM

## 2020-07-21 PROCEDURE — 99213 PR OFFICE/OUTPT VISIT, EST, LEVL III, 20-29 MIN: ICD-10-PCS | Mod: 95,,, | Performed by: ANESTHESIOLOGY

## 2020-07-21 PROCEDURE — 99213 OFFICE O/P EST LOW 20 MIN: CPT | Mod: 95,,, | Performed by: ANESTHESIOLOGY

## 2020-07-21 RX ORDER — HYDROCODONE BITARTRATE AND ACETAMINOPHEN 10; 325 MG/1; MG/1
1 TABLET ORAL EVERY 6 HOURS PRN
Qty: 15 TABLET | Refills: 0 | Status: SHIPPED | OUTPATIENT
Start: 2020-07-21 | End: 2020-08-10

## 2020-07-30 ENCOUNTER — OFFICE VISIT (OUTPATIENT)
Dept: PAIN MEDICINE | Facility: CLINIC | Age: 30
End: 2020-07-30
Payer: MEDICAID

## 2020-07-30 VITALS
SYSTOLIC BLOOD PRESSURE: 143 MMHG | HEART RATE: 83 BPM | BODY MASS INDEX: 42.8 KG/M2 | WEIGHT: 250.69 LBS | HEIGHT: 64 IN | DIASTOLIC BLOOD PRESSURE: 99 MMHG

## 2020-07-30 DIAGNOSIS — M47.816 LUMBAR SPONDYLOSIS: ICD-10-CM

## 2020-07-30 DIAGNOSIS — M79.18 MYOFASCIAL MUSCLE PAIN: Primary | ICD-10-CM

## 2020-07-30 DIAGNOSIS — M47.816 LUMBAR FACET ARTHROPATHY: ICD-10-CM

## 2020-07-30 PROCEDURE — 99213 OFFICE O/P EST LOW 20 MIN: CPT | Mod: S$PBB,,, | Performed by: ANESTHESIOLOGY

## 2020-07-30 PROCEDURE — 99213 PR OFFICE/OUTPT VISIT, EST, LEVL III, 20-29 MIN: ICD-10-PCS | Mod: S$PBB,,, | Performed by: ANESTHESIOLOGY

## 2020-07-30 PROCEDURE — 99999 PR PBB SHADOW E&M-EST. PATIENT-LVL III: ICD-10-PCS | Mod: PBBFAC,,, | Performed by: ANESTHESIOLOGY

## 2020-07-30 PROCEDURE — 99213 OFFICE O/P EST LOW 20 MIN: CPT | Mod: PBBFAC | Performed by: ANESTHESIOLOGY

## 2020-07-30 PROCEDURE — 99999 PR PBB SHADOW E&M-EST. PATIENT-LVL III: CPT | Mod: PBBFAC,,, | Performed by: ANESTHESIOLOGY

## 2020-07-30 RX ORDER — BACLOFEN 10 MG/1
10 TABLET ORAL 2 TIMES DAILY PRN
Qty: 60 TABLET | Refills: 0 | Status: SHIPPED | OUTPATIENT
Start: 2020-07-30 | End: 2020-08-27

## 2020-07-30 NOTE — PROGRESS NOTES
Chief Pain Complaint:  Lumbar Back Pain    History of Present Illness:   Nathan Carlos is a 29 y.o. female  who is presenting with a chief complaint of Low-back Pain. The patient began experiencing this problem insidiously, and the pain has been gradually worsening. The pain is described as throbbing, cramping, aching and heavy and is located in the bilateral lumbar spine. Pain is intermittent and lasts hours. The  pain is nonradiating. The patient rates her pain a 8 out of ten and interferes with activities of daily living a 7 out of ten. Pain is exacerbated by extension of the lumbar spine, and is improved by rest. Patient reports no prior trauma, no prior spinal surgery     - pertinent negatives: No fever, No chills, No weight loss, No bladder dysfunction, No bowel dysfunction, No saddle anesthesia  - pertinent positives: none    - medications, other therapies tried (physical therapy, injections):     >> NSAIDs, Tylenol, Norco, zanaflex and flexeril (no relief), topamax (no relief)    >> Has previously undergone Physical Therapy    >> Has previously undergone spinal injection/s   - Left SI joint injection 1/2018 with 100% relief for 6 months   - Right L3, L4, L5 MBB with local on 8/15/18 with 90% pain relief   - left SIJ + left GT bursa injection with local on 11/8/18 with good relief of bursitis but only 10% of SIJ pain   - left L3-5 MBB with local on 5/16/19 with 90% pain relief   - bilateral L3-5 MBB on 8/14/19 with limited relief   - right SIJ injection on 2/5/20 with 80% pain relief   - bilateral L3-5 MBB on 7/16/2020 with 100% relief of lumbar back pain      Imaging / Labs / Studies (reviewed on 7/30/2020):    Results for orders placed during the hospital encounter of 11/22/17   MRI Lumbar Spine Without Contrast    Narrative Technique: Standard noncontrast multiplanar multisequence imaging of the lumbar spine was performed.  Findings: There is anatomic spinal alignment.  Disc interspaces are of  normal height and signal intensity.  Vertebral marrow signal pattern and architecture are normal.  Distal cord is unremarkable with conus medullaris terminating at L1-L2.  Paravertebral soft tissues are symmetric and normal in appearance.  T12-L1: No disc protrusion, neuroforaminal narrowing, or central canal stenosis.  L1-L2: No disc protrusion, neuroforaminal narrowing, or central canal stenosis.  L2-L3: No disc protrusion, neuroforaminal narrowing, or central canal stenosis.  L3-L4: No disc protrusion, neuroforaminal narrowing, or central canal stenosis.  L4-L5: No disc protrusion, neuroforaminal narrowing, or central canal stenosis.  L5-S1: Mild bilateral facet hypertrophy. No disc protrusion, neuroforaminal narrowing, or central canal stenosis.    Impression  Negative MRI of the lumbar spine.     Results for orders placed during the hospital encounter of 01/04/18   X-Ray Lumbar Complete With Flex And Ext    Narrative Comparison: None  Technique: AP, lateral, lateral flexion, lateral extension, bilateral oblique, and lumbosacral coned down views were obtained of the lumbar spine  Findings: There is mild scoliosis of the lower thoracic and upper lumbar spine.  Vertebral body heights are well-maintained.  No spondylolisthesis demonstrated.  No change in spinal alignment with flexion or extension to suggest instability. Intervertebral disk spaces are well preserved.  No pars defects visualized.  Posterior elements appear grossly intact. No acute fractures or subluxations are demonstrated.  The remaining visualized osseous and soft tissue structures demonstrate no appreciable abnormality.    Impression As above.  No acute findings.         Review of Systems:  CONSTITUTIONAL: patient denies any fever, chills, or weight loss  SKIN: patient denies any rash or itching  RESPIRATORY: patient denies having any shortness of breath  GASTROINTESTINAL: patient denies having any diarrhea, constipation, or bowel  "incontinence  GENITOURINARY: patient denies having any abnormal bladder function    MUSCULOSKELETAL:  - patient complains of the above noted pain/s (see chief pain complaint)    NEUROLOGICAL:   - pain as above  - strength in Lower extremities is intact, BILATERALLY  - sensation in Lower extremities is intact, BILATERALLY  - patient denies any loss of bowel or bladder control      PSYCHIATRIC: patient denies any change in mood    Other:  All other systems reviewed and are negative        Physical Exam:  Telemed exam   GENERAL: Well appearing, in no acute distress, alert and oriented x3, overweight.    PSYCH:  Mood and affect appropriate.  SKIN: Skin color, texture, turgor normal, no rashes or lesions.  HEAD/FACE:  Normocephalic, atraumatic. Cranial nerves grossly intact.  CV:  No peripheral edema noted  PULM:  No difficulty breathing  Neuro/MSK:  NECK: No pain to palpation over the cervical paraspinous muscles. Spurling Negative (self-performed). No pain with neck flexion, extension, or lateral flexion.  BACK: Straight leg raising in the seated position (self-performed) is negative to radicular pain. Pain to palpation over the facet joints of the lumbar spine, less so over SIJ. Normal range of motion. Pain with extension.  EXTREMITIES: Peripheral joint active ROM is full and pain free without obvious instability or laxity in all four extremities. No deformities, edema, or skin discoloration.   MUSCULOSKELETAL:  There is no pain with palpation over the sacroiliac joints bilaterally.  FABERs test is negative (self-performed).  FADIRs test is negative (self-performed).   No atrophy or tone abnormalities are noted.  NEURO:  Able to toe walk and heel walk without difficulty  GAIT: normal.      Last Physical Exam:   Vitals:  BP (!) 143/99   Pulse 83   Ht 5' 4" (1.626 m)   Wt 113.7 kg (250 lb 10.6 oz)   BMI 43.03 kg/m²   (reviewed on 7/30/2020)    General: alert and oriented, in no apparent distress, obese.  Gait: " normal gait.  Skin: no rashes, no discoloration, no obvious lesions  HEENT: normocephalic, atraumatic. Pupils equal and round.  Cardiovascular: no significant peripheral edema present.  Respiratory: without use of accessory muscles of respiration.    Musculoskeletal - Lumbar Spine:  - Pain on flexion of lumbar spine: Present   - Pain on extension of lumbar spine: Present  - Lumbar facet loading: Improved   - TTP over the lumbar facet joints: Absent   - TTP over the para lumbar muscles   - TTP over the SI joints: Absent   - TTP over GT bursa: absent  - TTP over piriformis: absent  - Straight Leg Raise: Negative  - JULISSA: Negative    Neuro - Extremities:  - BUE Strength:R/L: D: 5/5; B: 5/5; T: 5/5; WF: 5/5; WE: 5/5; IO: 5/5  - BLE Strength: R/L: HF: 5/5, HE: 5/5, KF: 5/5; KE: 5/5; FE: 5/5; FF: 5/5  - Extremity Reflexes: Brisk and symmetric throughout  - Sensory: Sensation to light touch intact bilaterally      Psych:  Mood and affect is appropriate          Assessment:  Nathan Carlos is a 29 y.o. year old female who is presenting with   Encounter Diagnoses   Name Primary?    Myofascial muscle pain Yes    Lumbar spondylosis     Lumbar facet arthropathy        Plan:  1. Interventional:    - Schedule bilateral L3-5 MBB on 7/16/2020 with 100% relief of lumbar back pain. Pain now mostly higher and myofacial.   - She last had right SIJ injection on 2/5/20 with 80% pain relief.     2. Pharmacologic:   - Discontinue Tizanidne 4 mg PO BID PRN. Start Baclofen 10 mg Po BID PRN.  - Try Salon Pas 4% lidocaine patches for pain topically.  - Patient is no longer on Zonegran as this did not help.     3. Rehabilitative: Internal referal to PT with dry needling. TENS UNIT.     4. Diagnostic: None for now.    5. Follow up: PRN.

## 2020-08-05 ENCOUNTER — CLINICAL SUPPORT (OUTPATIENT)
Dept: REHABILITATION | Facility: HOSPITAL | Age: 30
End: 2020-08-05
Attending: ANESTHESIOLOGY
Payer: MEDICAID

## 2020-08-05 DIAGNOSIS — M53.3 SACROILIAC JOINT DYSFUNCTION: Primary | ICD-10-CM

## 2020-08-05 DIAGNOSIS — M25.60 DECREASED RANGE OF MOTION: ICD-10-CM

## 2020-08-05 DIAGNOSIS — M79.18 MYOFASCIAL MUSCLE PAIN: ICD-10-CM

## 2020-08-05 DIAGNOSIS — R53.1 GENERALIZED WEAKNESS: ICD-10-CM

## 2020-08-05 DIAGNOSIS — M54.50 CHRONIC BILATERAL LOW BACK PAIN WITHOUT SCIATICA: ICD-10-CM

## 2020-08-05 DIAGNOSIS — G89.29 CHRONIC BILATERAL LOW BACK PAIN WITHOUT SCIATICA: ICD-10-CM

## 2020-08-05 DIAGNOSIS — R26.2 DIFFICULTY WALKING: ICD-10-CM

## 2020-08-05 PROCEDURE — 97162 PT EVAL MOD COMPLEX 30 MIN: CPT

## 2020-08-05 NOTE — PLAN OF CARE
"[facility]  Physical Therapy Initial Evaluation    Date: 8/5/2020   Name: Nathan Carlos  Clinic Number: [id number]    Therapy Diagnosis:   Encounter Diagnoses   Name Primary?    Myofascial muscle pain     Chronic bilateral low back pain without sciatica     Difficulty walking     Generalized weakness     Sacroiliac joint dysfunction Yes    Decreased range of motion      Physician: Yo Kirkland MD    Physician Orders: PT Eval and Treat   Medical Diagnosis from Referral: M79.18 (ICD-10-CM) - Myofascial muscle pain  Evaluation Date: 8/5/2020  Authorization Period Expiration: 7/30/21  Plan of Care Expiration: 11/5/2020  Visit # / Visits authorized: 1/ 1    Time In: 1:30 pm   Time Out: 2:05 pm   Total Appointment Time (timed & untimed codes): 35 minutes    Precautions: Standard    Subjective   Date of onset: pt reports that pain began in 2017  History of current condition - Nathan reports: to PT with complaints of low and mid back pain that she states has been going on since 2017. Pt reports no TOBY, however, states that as she has gained weight she feels that her back pain has gotten worse. Pt reports 2 rounds of lumbar injections in her low back and SIJ. She reports mild pain relief with these injections, but states that since injections, her pain has moved up her back into her mid back. Pt reports that she tried PT in the past, but "I feel like I got worse when I did the therapy"     Medical History:   Past Medical History:   Diagnosis Date    GERD (gastroesophageal reflux disease)     Herpes simplex virus (HSV) infection     High serum testosterone 9/7/2017    Hyperlipidemia     Hypertension     Insulin resistance syndrome 9/7/2017    Lumbar facet arthropathy 8/14/2019    Morbid obesity     Ovarian cyst     bilateral        Surgical History:   Nathan Carlos  has a past surgical history that includes Bunionectomy (Bilateral); Ankle surgery (Left); Colonoscopy " (06/12/2018); Lumbar fusion; Injection of anesthetic agent around medial branch nerves innervating lumbar facet joint (Bilateral, 8/14/2019); Injection of anesthetic agent into sacroiliac joint (Right, 2/5/2020); and Injection of anesthetic agent around medial branch nerves innervating lumbar facet joint (Bilateral, 7/16/2020).    Medications:   Nathan has a current medication list which includes the following prescription(s): baclofen, butalbital-acetaminop-caf-cod, diclofenac sodium, hydrocodone-acetaminophen, metoprolol succinate, mirtazapine, spironolactone, sprintec (28), trazodone, valacyclovir, and zonisamide.    Allergies:   Review of patient's allergies indicates:   Allergen Reactions    Amitriptyline      xerostomia    Amlodipine      DRY MOUTH        Imaging: no recent MRI    Prior Therapy: >> Has previously undergone Physical Therapy    >> Has previously undergone spinal injection/s              - Left SI joint injection 1/2018 with 100% relief for 6 months              - Right L3, L4, L5 MBB with local on 8/15/18 with 90% pain relief              - left SIJ + left GT bursa injection with local on 11/8/18 with good relief of bursitis but only 10% of SIJ pain              - left L3-5 MBB with local on 5/16/19 with 90% pain relief              - bilateral L3-5 MBB on 8/14/19 with limited relief              - right SIJ injection on 2/5/20 with 80% pain relief              - bilateral L3-5 MBB on 7/16/2020 with 100% relief of lumbar back pain    Social History: pt lives with her fiance    Occupation: pt reports that she is not working currently   Prior Level of Function: pt reports that prior to back pain starting in 2017 that she was able to work without pain   Current Level of Function: pt reports that she is unable to work d/t pain in her back     Pain:  Current 3/10, worst 9/10, best 2/10   Location:   Low back/ mid back   Description: Aching, Dull, Grabbing, Tight and Deep  Aggravating Factors:  "Sitting, Standing, Bending and working   Easing Factors: massage, heating pad and hot bath    Patients goals: " I just don't want to live with this pain and have to keep getting injections"     Objective       Observation: pt pleasant and appropriate throughout evaluation; A&O x 4     Posture:  Sway back; increased resting lumbar lordosis; increased tone in thoracic paraspinals     Lumbar Range of Motion:    % of normal motion Pain   Flexion 75   yes        Extension 25   yes        Left Side Bending 0 yes        Right Side Bending 0 yes        Left rotation   25 no        Right Rotation   25 Yes              Lower Extremity Strength  Right LE  Left LE    Knee extension: 4-/5 Knee extension: 4-/5   Knee flexion: 4-/5 Knee flexion: 4-/5   Hip flexion: 3+/5 Hip flexion: 3+/5   Hip extension:  3+/5 Hip extension: 3+/5   Hip abduction: 3+/5 Hip abduction: 3+/5   Hip adduction: 4-/5 Hip adduction 4-/5   Ankle dorsiflexion: 4/5 Ankle dorsiflexion: 4/5   Ankle plantarflexion: 4+/5 Ankle plantarflexion: 4+/5         Special Tests:  -Repeated Flexion: increases pain   -Repeated Ext: increases pain   -Piriformis Test: impaired flexibility in B piriformis   -Bridge Test: pain       DTR:   Right Left Comment   Patellar (L3-4) 2+ 2+    Achilles (S1) 2+ 2+        Neuro Dynamic Testing:    Sciatic nerve:      SLR: R = negative     L = negative           Joint Moility: hypomobility in lumbar and thoracic spine with PA's     Palpation: tenderness to palpation in lumbar and thoracic paraspinals as well as multifidi muscles     Sensation: intact     Flexibility: impaired piriformis and HS length bilaterally       Limitation/Restriction for FOTO Lumbar Survey    Therapist reviewed FOTO scores for Nathan Carlos on 8/5/2020.   FOTO documents entered into [employer] - see Media section.    Limitation Score: 66%         TREATMENT   No treatment separate from evaluaiton    HEP issued and educated on home performance of :  "     [facility]  Open books  Bridges       Home Exercises and Patient Education Provided    Education provided:   - - PT POC  - HEP  - PT prognosis and diagnosis  - all questions and concerns answered       Written Home Exercises Provided: yes.  Exercises were reviewed and Nathan was able to demonstrate them prior to the end of the session.  Nathan demonstrated fair  understanding of the education provided.     See EMR under Patient Instructions for exercises provided 8/5/2020.    Assessment   Nathan is a 29 y.o. female referred to outpatient Physical Therapy with a medical diagnosis of M79.18 (ICD-10-CM) - Myofascial muscle pain. Patient presents with complaints of low and mid back pain that has been ongoing for 3 years. Pt presents with findings consistent with SIJ dysfunction as well as increased resting lordosis and postural faults. Pt is obese and carries increased adipose tissue which could be contributory to pain. Pt also presents with significant increased tone in lumbar and thoracic paraspinals with multiple trigger points noted in thoracic paraspinals and periscapular muscles. Pt was issued a home TENS unit to day as prescribed by her physician and was taught on proper use and wear time.     Patient prognosis is Fair.   Patientt will benefit from skilled outpatient Physical Therapy to address the deficits stated above and in the chart below, provide patient /family education, and to maximize patientt's level of independence.     Plan of care discussed with patient: Yes  Patient's spiritual, cultural and educational needs considered and patient is agreeable to the plan of care and goals as stated below:     Anticipated Barriers for therapy: chronicity of pain; pt has failed PT in the past; long commute to therapy     Medical Necessity is demonstrated by the following  History  Co-morbidities and personal factors that may impact the plan of care Co-morbidities:   high BMI, HTN and multiple prior  injections    Personal Factors:   age  social background     moderate   Examination  Body Structures and Functions, activity limitations and participation restrictions that may impact the plan of care Body Regions:   back  lower extremities  trunk    Body Systems:    gross symmetry  ROM  strength  gross coordinated movement  balance  transfers  transitions  motor control    Participation Restrictions:   Unable to work; pain with daily activities     Activity limitations:   Learning and applying knowledge  no deficits    General Tasks and Commands  no deficits    Communication  no deficits    Mobility  walking    Self care  dressing  looking after one's health    Domestic Life  shopping  cooking  doing house work (cleaning house, washing dishes, laundry)  assisting others    Interactions/Relationships  no deficits    Life Areas  employment    Community and Social Life  no deficits         moderate   Clinical Presentation evolving clinical presentation with changing clinical characteristics moderate   Decision Making/ Complexity Score: moderate     Goals:  Short Term Goals: 4 weeks   1. Pt will be independent with HEP performance in order to continue PT progress at home.   2. Pt will report pain at worst in low back of 6/10 or less  3. Pt will demonstrate ability to don and doff personal TENS unit for pain management  4. Pt will tolerate performance of 30 minutes of exercise without pain reports higher than 6/10 throughout  5. Pt will improve hip extension strength to at least 4-/5 in order to progress gait performance and pelvic control in standing and walking       Long Term Goals: 8 weeks   1. Pt will improve FOTO disability score to 56% disability or less in order to improve overall QOL and return to PLOF.   2. Pt will demonstrate ability to stand for up to 1 hour without increased pain in low and mid back   3. Pt will report pain at worst in low/mid back of 3/10 or less  4. Pt will demonstrate ability to walk 1  mile or greater without increased pain in low back         Plan   Plan of care Certification: 8/5/2020 to 11/5/2020.    Outpatient Physical Therapy 1-2 times weekly for 8 weeks to include the following interventions: Cervical/Lumbar Traction, Electrical Stimulation PRN, Manual Therapy, Moist Heat/ Ice, Neuromuscular Re-ed, Therapeutic Activites, Therapeutic Exercise, Ultrasound and any other therapies or modalities as clinically indicated and appropriate.     Nathalia Syed, PT, DPT  8/5/2020

## 2020-08-10 ENCOUNTER — OFFICE VISIT (OUTPATIENT)
Dept: OBSTETRICS AND GYNECOLOGY | Facility: CLINIC | Age: 30
End: 2020-08-10
Payer: MEDICAID

## 2020-08-10 ENCOUNTER — LAB VISIT (OUTPATIENT)
Dept: LAB | Facility: HOSPITAL | Age: 30
End: 2020-08-10
Attending: NURSE PRACTITIONER
Payer: MEDICAID

## 2020-08-10 VITALS — BODY MASS INDEX: 44.5 KG/M2 | WEIGHT: 259.25 LBS | DIASTOLIC BLOOD PRESSURE: 90 MMHG | SYSTOLIC BLOOD PRESSURE: 140 MMHG

## 2020-08-10 DIAGNOSIS — E28.2 PCOS (POLYCYSTIC OVARIAN SYNDROME): Primary | ICD-10-CM

## 2020-08-10 DIAGNOSIS — Z01.419 GYNECOLOGIC EXAM NORMAL: ICD-10-CM

## 2020-08-10 DIAGNOSIS — Z00.00 PREVENTATIVE HEALTH CARE: ICD-10-CM

## 2020-08-10 DIAGNOSIS — E28.2 PCOS (POLYCYSTIC OVARIAN SYNDROME): ICD-10-CM

## 2020-08-10 PROBLEM — R26.2 DIFFICULTY WALKING: Status: RESOLVED | Noted: 2020-08-05 | Resolved: 2020-08-10

## 2020-08-10 PROBLEM — N20.0 NEPHROLITHIASIS: Status: RESOLVED | Noted: 2018-10-12 | Resolved: 2020-08-10

## 2020-08-10 PROBLEM — M53.3 SACROILIAC JOINT DYSFUNCTION: Status: RESOLVED | Noted: 2020-02-05 | Resolved: 2020-08-10

## 2020-08-10 PROBLEM — R53.1 GENERALIZED WEAKNESS: Status: RESOLVED | Noted: 2020-08-05 | Resolved: 2020-08-10

## 2020-08-10 LAB
ALBUMIN SERPL BCP-MCNC: 3.3 G/DL (ref 3.5–5.2)
ALP SERPL-CCNC: 57 U/L (ref 55–135)
ALT SERPL W/O P-5'-P-CCNC: 28 U/L (ref 10–44)
ANION GAP SERPL CALC-SCNC: 11 MMOL/L (ref 8–16)
AST SERPL-CCNC: 18 U/L (ref 10–40)
BASOPHILS # BLD AUTO: 0.02 K/UL (ref 0–0.2)
BASOPHILS NFR BLD: 0.2 % (ref 0–1.9)
BILIRUB SERPL-MCNC: 0.2 MG/DL (ref 0.1–1)
BUN SERPL-MCNC: 13 MG/DL (ref 6–20)
CALCIUM SERPL-MCNC: 9.3 MG/DL (ref 8.7–10.5)
CHLORIDE SERPL-SCNC: 105 MMOL/L (ref 95–110)
CO2 SERPL-SCNC: 25 MMOL/L (ref 23–29)
CREAT SERPL-MCNC: 0.7 MG/DL (ref 0.5–1.4)
DIFFERENTIAL METHOD: NORMAL
EOSINOPHIL # BLD AUTO: 0.1 K/UL (ref 0–0.5)
EOSINOPHIL NFR BLD: 0.9 % (ref 0–8)
ERYTHROCYTE [DISTWIDTH] IN BLOOD BY AUTOMATED COUNT: 13.7 % (ref 11.5–14.5)
EST. GFR  (AFRICAN AMERICAN): >60 ML/MIN/1.73 M^2
EST. GFR  (NON AFRICAN AMERICAN): >60 ML/MIN/1.73 M^2
GLUCOSE SERPL-MCNC: 172 MG/DL (ref 70–110)
HCT VFR BLD AUTO: 42.1 % (ref 37–48.5)
HGB BLD-MCNC: 14 G/DL (ref 12–16)
IMM GRANULOCYTES # BLD AUTO: 0.04 K/UL (ref 0–0.04)
IMM GRANULOCYTES NFR BLD AUTO: 0.4 % (ref 0–0.5)
LYMPHOCYTES # BLD AUTO: 3.6 K/UL (ref 1–4.8)
LYMPHOCYTES NFR BLD: 35.9 % (ref 18–48)
MCH RBC QN AUTO: 28.3 PG (ref 27–31)
MCHC RBC AUTO-ENTMCNC: 33.3 G/DL (ref 32–36)
MCV RBC AUTO: 85 FL (ref 82–98)
MONOCYTES # BLD AUTO: 0.8 K/UL (ref 0.3–1)
MONOCYTES NFR BLD: 8.2 % (ref 4–15)
NEUTROPHILS # BLD AUTO: 5.5 K/UL (ref 1.8–7.7)
NEUTROPHILS NFR BLD: 54.4 % (ref 38–73)
NRBC BLD-RTO: 0 /100 WBC
PLATELET # BLD AUTO: 286 K/UL (ref 150–350)
PMV BLD AUTO: 11.1 FL (ref 9.2–12.9)
POTASSIUM SERPL-SCNC: 4.1 MMOL/L (ref 3.5–5.1)
PROT SERPL-MCNC: 6.9 G/DL (ref 6–8.4)
RBC # BLD AUTO: 4.94 M/UL (ref 4–5.4)
SODIUM SERPL-SCNC: 141 MMOL/L (ref 136–145)
WBC # BLD AUTO: 10.08 K/UL (ref 3.9–12.7)

## 2020-08-10 PROCEDURE — 36415 COLL VENOUS BLD VENIPUNCTURE: CPT

## 2020-08-10 PROCEDURE — 82040 ASSAY OF SERUM ALBUMIN: CPT

## 2020-08-10 PROCEDURE — 80053 COMPREHEN METABOLIC PANEL: CPT

## 2020-08-10 PROCEDURE — 99395 PREV VISIT EST AGE 18-39: CPT | Mod: S$PBB,,, | Performed by: NURSE PRACTITIONER

## 2020-08-10 PROCEDURE — 99212 OFFICE O/P EST SF 10 MIN: CPT | Mod: PBBFAC | Performed by: NURSE PRACTITIONER

## 2020-08-10 PROCEDURE — 99395 PR PREVENTIVE VISIT,EST,18-39: ICD-10-PCS | Mod: S$PBB,,, | Performed by: NURSE PRACTITIONER

## 2020-08-10 PROCEDURE — 83525 ASSAY OF INSULIN: CPT

## 2020-08-10 PROCEDURE — 99999 PR PBB SHADOW E&M-EST. PATIENT-LVL II: CPT | Mod: PBBFAC,,, | Performed by: NURSE PRACTITIONER

## 2020-08-10 PROCEDURE — 83036 HEMOGLOBIN GLYCOSYLATED A1C: CPT

## 2020-08-10 PROCEDURE — 99999 PR PBB SHADOW E&M-EST. PATIENT-LVL II: ICD-10-PCS | Mod: PBBFAC,,, | Performed by: NURSE PRACTITIONER

## 2020-08-10 PROCEDURE — 85025 COMPLETE CBC W/AUTO DIFF WBC: CPT

## 2020-08-10 RX ORDER — MUPIROCIN CALCIUM 20 MG/G
CREAM TOPICAL
Qty: 30 G | Refills: 0 | Status: SHIPPED | OUTPATIENT
Start: 2020-08-10 | End: 2020-11-23

## 2020-08-10 RX ORDER — LEVONORGESTREL AND ETHINYL ESTRADIOL 0.15-0.03
1 KIT ORAL DAILY
Qty: 84 TABLET | Refills: 4 | Status: SHIPPED | OUTPATIENT
Start: 2020-08-10 | End: 2020-08-12

## 2020-08-10 RX ORDER — METRONIDAZOLE 500 MG/1
500 TABLET ORAL 2 TIMES DAILY
Qty: 14 TABLET | Refills: 0 | Status: SHIPPED | OUTPATIENT
Start: 2020-08-10 | End: 2020-08-17

## 2020-08-10 RX ORDER — FLUCONAZOLE 150 MG/1
150 TABLET ORAL DAILY
Qty: 2 TABLET | Refills: 0 | Status: SHIPPED | OUTPATIENT
Start: 2020-08-10 | End: 2020-08-12

## 2020-08-10 NOTE — PROGRESS NOTES
CC: Well woman exam    Nathan Carlos is a 29 y.o. female  presents for well woman exam.  LMP: Patient's last menstrual period was 2020..  Patient reports that cycles are every 26-30 days, heavy. Patient has PCOS, no exercise regimen or diet adherence reported. Last pap exam was normal. Patient denies pelvic pain. H/O HSV, no reported outbreaks. Patient is currently on OCP and Spironolactone.     Past Medical History:   Diagnosis Date    GERD (gastroesophageal reflux disease)     Herpes simplex virus (HSV) infection     High serum testosterone 2017    Hyperlipidemia     Hypertension     Insulin resistance syndrome 2017    Lumbar facet arthropathy 2019    Morbid obesity     Ovarian cyst     bilateral      Past Surgical History:   Procedure Laterality Date    ANKLE SURGERY Left     BUNIONECTOMY Bilateral     COLONOSCOPY  2018    INJECTION OF ANESTHETIC AGENT AROUND MEDIAL BRANCH NERVES INNERVATING LUMBAR FACET JOINT Bilateral 2019    Procedure: Bilateral L3-5 MBB;  Surgeon: Yo Kirkland MD;  Location: [facility];  Service: Pain Management;  Laterality: Bilateral;    INJECTION OF ANESTHETIC AGENT AROUND MEDIAL BRANCH NERVES INNERVATING LUMBAR FACET JOINT Bilateral 2020    Procedure: Bilateral L3-5 MBB;  Surgeon: Yo Kirkland MD;  Location: [facility];  Service: Pain Management;  Laterality: Bilateral;    INJECTION OF ANESTHETIC AGENT INTO SACROILIAC JOINT Right 2020    Procedure: Right SIJ Injection with local;  Surgeon: Yo Kirkland MD;  Location: [facility];  Service: Pain Management;  Laterality: Right;    LUMBAR FUSION       Social History     Socioeconomic History    Marital status: Single     Spouse name: Not on file    Number of children: 0    Years of education: Not on file    Highest education level: Not on file   Occupational History     Employer: [id number]   Social Needs    Financial resource strain: Somewhat hard     Food insecurity     Worry: Never true     Inability: Never true    Transportation needs     Medical: No     Non-medical: No   Tobacco Use    Smoking status: Never Smoker    Smokeless tobacco: Never Used   Substance and Sexual Activity    Alcohol use: Yes     Frequency: Never     Drinks per session: Patient refused     Binge frequency: Never     Comment: special occasions    Drug use: No    Sexual activity: Yes     Partners: Male     Birth control/protection: Condom, Pill   Lifestyle    Physical activity     Days per week: 1 day     Minutes per session: 0 min    Stress: Very much   Relationships    Social connections     Talks on phone: Three times a week     Gets together: More than three times a week     Attends Quaker service: Not on file     Active member of club or organization: No     Attends meetings of clubs or organizations: Never     Relationship status: Never    Other Topics Concern    Not on file   Social History Narrative    Not on file     Family History   Problem Relation Age of Onset    Hypertension Mother     Fibroids Mother     No Known Problems Father     Diabetes Sister     Hypertension Brother     Hypertension Maternal Aunt     Diabetes Maternal Aunt     Diabetes Maternal Uncle     Hypertension Maternal Grandmother     Leukemia Maternal Grandfather     Hypertension Maternal Grandfather     Stroke Neg Hx      OB History        0    Para   0    Term   0       0    AB   0    Living   0       SAB   0    TAB   0    Ectopic   0    Multiple   0    Live Births   0                 BP (!) 140/90   Wt 117.6 kg (259 lb 4.2 oz)   LMP 2020   BMI 44.50 kg/m²       ROS:  GENERAL: Denies weight gain or weight loss. Feeling well overall.   SKIN: Denies rash or lesions.   HEAD: Denies head injury or headache.   NODES: Denies enlarged lymph nodes.   CHEST: Denies chest pain or shortness of breath.   CARDIOVASCULAR: Denies palpitations or left sided chest  pain.   ABDOMEN: No abdominal pain, constipation, diarrhea, nausea, vomiting or rectal bleeding.   URINARY: No frequency, dysuria, hematuria, or burning on urination.  REPRODUCTIVE: See HPI.   BREASTS: The patient performs breast self-examination and denies pain, lumps, or nipple discharge.   HEMATOLOGIC: No easy bruisability or excessive bleeding.   MUSCULOSKELETAL: Denies joint pain or swelling.   NEUROLOGIC: Denies syncope or weakness.   PSYCHIATRIC: Denies depression, anxiety or mood swings.    PHYSICAL EXAM:  APPEARANCE: Obese female, in no acute distress.  AFFECT: WNL, alert and oriented x 3  SKIN: No acne or hirsutism  NECK: Neck symmetric without masses or thyromegaly  NODES: No inguinal, cervical, axillary, or femoral lymph node enlargement  CHEST: Good respiratory effect  ABDOMEN: Soft.  No tenderness or masses.  No hepatosplenomegaly.  No hernias.  BREASTS: Symmetrical, no skin changes or visible lesions.  No palpable masses, nipple discharge bilaterally.  PELVIC: Normal external genitalia without lesions.  Normal hair distribution.  Adequate perineal body, normal urethral meatus.  Vagina thick, green discharge.Cervix pink, without lesions, discharge or tenderness.  No significant cystocele or rectocele.  Bimanual exam shows uterus to be normal size, regular, mobile and nontender.  Adnexa without masses or tenderness.    EXTREMITIES: No edema.    1. PCOS (polycystic ovarian syndrome)  TESTOSTERONE PANEL    Hemoglobin A1C    CBC auto differential    Comprehensive metabolic panel    Insulin, random   2. Preventative health care  POCT Wet Prep   3. Gynecologic exam normal  POCT Wet Prep    PLAN:  Empirically treat for BV  Repeat labs  Discussed need to improve adherence to exercise and diet  Referral for Nutritional services  Change OCP to Seasonal  Patient was counseled today on A.C.S. Pap guidelines and recommendations for yearly pelvic exams, mammograms and monthly self breast exams; to see her PCP for  other health maintenance.

## 2020-08-11 ENCOUNTER — TELEPHONE (OUTPATIENT)
Dept: OBSTETRICS AND GYNECOLOGY | Facility: CLINIC | Age: 30
End: 2020-08-11

## 2020-08-11 ENCOUNTER — CLINICAL SUPPORT (OUTPATIENT)
Dept: REHABILITATION | Facility: HOSPITAL | Age: 30
End: 2020-08-11
Attending: ANESTHESIOLOGY
Payer: MEDICAID

## 2020-08-11 DIAGNOSIS — R26.2 DIFFICULTY WALKING: ICD-10-CM

## 2020-08-11 DIAGNOSIS — M54.50 CHRONIC BILATERAL LOW BACK PAIN WITHOUT SCIATICA: ICD-10-CM

## 2020-08-11 DIAGNOSIS — R53.1 GENERALIZED WEAKNESS: Primary | ICD-10-CM

## 2020-08-11 DIAGNOSIS — G89.29 CHRONIC BILATERAL LOW BACK PAIN WITHOUT SCIATICA: ICD-10-CM

## 2020-08-11 PROBLEM — M54.42 CHRONIC BILATERAL LOW BACK PAIN WITH LEFT-SIDED SCIATICA: Status: RESOLVED | Noted: 2019-05-16 | Resolved: 2020-08-11

## 2020-08-11 PROBLEM — M25.60 DECREASED RANGE OF MOTION: Status: RESOLVED | Noted: 2020-08-05 | Resolved: 2020-08-11

## 2020-08-11 LAB
ESTIMATED AVG GLUCOSE: 177 MG/DL (ref 68–131)
HBA1C MFR BLD HPLC: 7.8 % (ref 4–5.6)
INSULIN COLLECTION INTERVAL: ABNORMAL
INSULIN SERPL-ACNC: 111.5 UU/ML

## 2020-08-11 PROCEDURE — 97110 THERAPEUTIC EXERCISES: CPT

## 2020-08-11 NOTE — PROGRESS NOTES
Physical Therapy Daily Treatment Note     Name: Nathan Carlos  Clinic Number: [id number]    Therapy Diagnosis:   Encounter Diagnoses   Name Primary?    Chronic bilateral low back pain without sciatica     Generalized weakness Yes    Difficulty walking      Physician: Yo Kirkland MD    Visit Date: 8/11/2020    Physician Orders: PT Eval and Treat   Medical Diagnosis from Referral: M79.18 (ICD-10-CM) - Myofascial muscle pain  Evaluation Date: 8/5/2020  Authorization Period Expiration: 7/30/21  Plan of Care Expiration: 11/5/2020  Visit # / Visits authorized: 1/ 1    Precautions: Standard    Time In: 12:45 pm  Time Out: 1:30 pm  Total Billable Time: 45 minutes    SUBJECTIVE     Today, pt reports: she is feeling pretty good and having no pain today. States she was compliant with home exercise program but is not able to remember any of her exercises  He/She was not compliant with home exercise program.  Response to previous treatment: states she felt fine  Functional change: none noted    Pre-Treatment Pain: 0/10, tightness in B hips   Post-Treatment Pain: 0/10  Location: low back   TREATMENT     Nathan received therapeutic exercises to develop strength, endurance, flexibility and core stabilization for 27 minutes including:    Exercise 8/11/2020   Bike (for LE strength and endurance)  Level 1, 5 minutes    Mini Bridges  2 x 10    Single knee to chest  30 seconds B    Lower trunk rotations  20 x B    Open books  15 x B    Clamshells  2 x 8 B                Nathan received the following manual therapy techniques: Soft tissue Mobilization were applied to the: back and hips for 10 minutes, including:  STM of bilateral thoracic paraspinals, quadratus lumborum , lumbar paraspinals , gluteus melissa, gluteus medius  and piriformis      Nathan participated in neuromuscular re-education activities to improve: Coordination and Posture for 8 minutes. The following activities were included:    Exercise 8/11/2020   Arm  arc  0#, 1 x 10   3#, 3 x 10    Posterior pelvic tilt 30 x                                Home Exercises Provided and Patient Education Provided     Education/Self-Care provided:    Patient educated on biomechanical justification for therapeutic exercise and importance of compliance with HEP in order to improve overall impairments and QOL     Written Home Exercises Provided: yes.  Exercises were reviewed and Nathan was able to demonstrate them prior to the end of the session.  Nathan demonstrated good  understanding of the education provided.     See EMR under Patient Instructions for exercises provided prior visit.    ASSESSMENT   Pt tolerated manual therapy well with reports of decreased pain and tension in musculature following intervention. Pt tolerated exercise well with reports of increased fatigue in musculature. Tolerable discomfort in low back noted with bridges. Pt demonstrated good understanding of exercises and required minimal cueing to maintain proper form following initial exercise demonstration.    Nathan is progressing well towards her goals.   Pt prognosis is Fair.     Pt will continue to benefit from skilled outpatient physical therapy to address the deficits listed in the problem list box on initial evaluation, provide pt/family education and to maximize pt's level of independence in the home and community environment.     Pt's spiritual, cultural and educational needs considered and pt agreeable to plan of care and goals.     Anticipated Barriers for therapy: chronicity of pain; pt has failed PT in the past; long commute to therapy     Goals:  Short Term Goals: 4 weeks   1. Pt will be independent with HEP performance in order to continue PT progress at home.   2. Pt will report pain at worst in low back of 6/10 or less  3. Pt will demonstrate ability to don and doff personal TENS unit for pain management  4. Pt will tolerate performance of 30 minutes of exercise without pain reports higher  than 6/10 throughout  5. Pt will improve hip extension strength to at least 4-/5 in order to progress gait performance and pelvic control in standing and walking         Long Term Goals: 8 weeks   1. Pt will improve FOTO disability score to 56% disability or less in order to improve overall QOL and return to PLOF.   2. Pt will demonstrate ability to stand for up to 1 hour without increased pain in low and mid back   3. Pt will report pain at worst in low/mid back of 3/10 or less  4. Pt will demonstrate ability to walk 1 mile or greater without increased pain in low back     PLAN   Continue Plan of Care (POC) and progress per patient tolerance.    Evaluation: 8/5/2020  POC Expiration: 11/5/2020    Lillie Sylvester, PT, DPT

## 2020-08-12 ENCOUNTER — OFFICE VISIT (OUTPATIENT)
Dept: FAMILY MEDICINE | Facility: CLINIC | Age: 30
End: 2020-08-12
Payer: MEDICAID

## 2020-08-12 DIAGNOSIS — I10 ESSENTIAL HYPERTENSION: ICD-10-CM

## 2020-08-12 DIAGNOSIS — E11.9 TYPE 2 DIABETES MELLITUS WITHOUT COMPLICATION, WITHOUT LONG-TERM CURRENT USE OF INSULIN: Primary | ICD-10-CM

## 2020-08-12 PROBLEM — R53.1 GENERALIZED WEAKNESS: Status: RESOLVED | Noted: 2020-08-05 | Resolved: 2020-08-12

## 2020-08-12 PROBLEM — E88.810 INSULIN RESISTANCE SYNDROME: Status: RESOLVED | Noted: 2017-09-07 | Resolved: 2020-08-12

## 2020-08-12 PROCEDURE — 99213 PR OFFICE/OUTPT VISIT, EST, LEVL III, 20-29 MIN: ICD-10-PCS | Mod: 95,,, | Performed by: REGISTERED NURSE

## 2020-08-12 PROCEDURE — 99213 OFFICE O/P EST LOW 20 MIN: CPT | Mod: 95,,, | Performed by: REGISTERED NURSE

## 2020-08-12 RX ORDER — IRBESARTAN 75 MG/1
75 TABLET ORAL DAILY
Qty: 30 TABLET | Refills: 6 | Status: SHIPPED | OUTPATIENT
Start: 2020-08-12 | End: 2021-03-01

## 2020-08-12 RX ORDER — NORGESTIMATE AND ETHINYL ESTRADIOL 0.25-0.035
KIT ORAL
Qty: 84 TABLET | Refills: 4 | Status: SHIPPED | OUTPATIENT
Start: 2020-08-12 | End: 2020-11-23

## 2020-08-12 RX ORDER — INSULIN PUMP SYRINGE, 3 ML
EACH MISCELLANEOUS
Qty: 1 EACH | Refills: 0 | Status: SHIPPED | OUTPATIENT
Start: 2020-08-12 | End: 2023-08-01

## 2020-08-12 RX ORDER — LANCETS
EACH MISCELLANEOUS
Qty: 100 EACH | Refills: 2 | Status: SHIPPED | OUTPATIENT
Start: 2020-08-12 | End: 2023-08-01

## 2020-08-12 NOTE — PROGRESS NOTES
The patient location is: [place]  The chief complaint leading to consultation is: Lumbar Back Pain     Visit type: audiovisual    Face to Face time with patient: 10  15 minutes of total time spent on the encounter, which includes face to face time and non-face to face time preparing to see the patient (eg, review of tests), Obtaining and/or reviewing separately obtained history, Documenting clinical information in the electronic or other health record, Independently interpreting results (not separately reported) and communicating results to the patient/family/caregiver, or Care coordination (not separately reported).     Each patient to whom he or she provides medical services by telemedicine is:  (1) informed of the relationship between the physician and patient and the respective role of any other health care provider with respect to management of the patient; and (2) notified that he or she may decline to receive medical services by telemedicine and may withdraw from such care at any time.    Chief Pain Complaint:  Lumbar Back Pain    History of Present Illness:   Nathan Carlos is a 29 y.o. female  who is presenting with a chief complaint of Low-back Pain. The patient began experiencing this problem insidiously, and the pain has been gradually worsening. The pain is described as throbbing, cramping, aching and heavy and is located in the bilateral lumbar spine. Pain is intermittent and lasts hours. The  pain is nonradiating. The patient rates her pain a 8 out of ten and interferes with activities of daily living a 7 out of ten. Pain is exacerbated by extension of the lumbar spine, and is improved by rest. Patient reports no prior trauma, no prior spinal surgery     - pertinent negatives: No fever, No chills, No weight loss, No bladder dysfunction, No bowel dysfunction, No saddle anesthesia  - pertinent positives: none    - medications, other therapies tried (physical therapy, injections):     >> NSAIDs,  Tylenol, Norco, zanaflex and flexeril (no relief), topamax (no relief)    >> Has previously undergone Physical Therapy    >> Has previously undergone spinal injection/s   - Left SI joint injection 1/2018 with 100% relief for 6 months   - Right L3, L4, L5 MBB with local on 8/15/18 with 90% pain relief   - left SIJ + left GT bursa injection with local on 11/8/18 with good relief of bursitis but only 10% of SIJ pain   - left L3-5 MBB with local on 5/16/19 with 90% pain relief   - bilateral L3-5 MBB on 8/14/19 with limited relief   - right SIJ injection on 2/5/20 with 80% pain relief   - bilateral L3, 4,5 MBB 7/16/2020 with mild relief thus far      Imaging / Labs / Studies (reviewed on 8/12/2020):    Results for orders placed during the hospital encounter of 11/22/17   MRI Lumbar Spine Without Contrast    Narrative Technique: Standard noncontrast multiplanar multisequence imaging of the lumbar spine was performed.  Findings: There is anatomic spinal alignment.  Disc interspaces are of normal height and signal intensity.  Vertebral marrow signal pattern and architecture are normal.  Distal cord is unremarkable with conus medullaris terminating at L1-L2.  Paravertebral soft tissues are symmetric and normal in appearance.  T12-L1: No disc protrusion, neuroforaminal narrowing, or central canal stenosis.  L1-L2: No disc protrusion, neuroforaminal narrowing, or central canal stenosis.  L2-L3: No disc protrusion, neuroforaminal narrowing, or central canal stenosis.  L3-L4: No disc protrusion, neuroforaminal narrowing, or central canal stenosis.  L4-L5: No disc protrusion, neuroforaminal narrowing, or central canal stenosis.  L5-S1: Mild bilateral facet hypertrophy. No disc protrusion, neuroforaminal narrowing, or central canal stenosis.    Impression  Negative MRI of the lumbar spine.     Results for orders placed during the hospital encounter of 01/04/18   X-Ray Lumbar Complete With Flex And Ext    Narrative Comparison:  None  Technique: AP, lateral, lateral flexion, lateral extension, bilateral oblique, and lumbosacral coned down views were obtained of the lumbar spine  Findings: There is mild scoliosis of the lower thoracic and upper lumbar spine.  Vertebral body heights are well-maintained.  No spondylolisthesis demonstrated.  No change in spinal alignment with flexion or extension to suggest instability. Intervertebral disk spaces are well preserved.  No pars defects visualized.  Posterior elements appear grossly intact. No acute fractures or subluxations are demonstrated.  The remaining visualized osseous and soft tissue structures demonstrate no appreciable abnormality.    Impression As above.  No acute findings.         Review of Systems:  CONSTITUTIONAL: patient denies any fever, chills, or weight loss  SKIN: patient denies any rash or itching  RESPIRATORY: patient denies having any shortness of breath  GASTROINTESTINAL: patient denies having any diarrhea, constipation, or bowel incontinence  GENITOURINARY: patient denies having any abnormal bladder function    MUSCULOSKELETAL:  - patient complains of the above noted pain/s (see chief pain complaint)    NEUROLOGICAL:   - pain as above  - strength in Lower extremities is intact, BILATERALLY  - sensation in Lower extremities is intact, BILATERALLY  - patient denies any loss of bowel or bladder control      PSYCHIATRIC: patient denies any change in mood    Other:  All other systems reviewed and are negative        Physical Exam:  Telemed exam     GENERAL: Well appearing, in no acute distress, alert and oriented x3.    PSYCH:  Mood and affect appropriate.  SKIN: Skin color, texture, turgor normal, no rashes or lesions.  HEAD/FACE:  Normocephalic, atraumatic. Cranial nerves grossly intact.  CV:  No peripheral edema noted  PULM:  No difficulty breathing  Neuro/MSK:  NECK: No pain to palpation over the cervical paraspinous muscles. Spurling Negative (self-performed). No pain with  neck flexion, extension, or lateral flexion.  BACK: Straight leg raising in the seated position (self-performed) is negative to radicular pain. Pain to palpation over the facet joints of the lumbar spine or spinous processes. Normal range of motion without pain reproduction.  EXTREMITIES: Peripheral joint active ROM is full and pain free without obvious instability or laxity in all four extremities. No deformities, edema, or skin discoloration.   MUSCULOSKELETAL:  There is no pain with palpation over the sacroiliac joints bilaterally.  FABERs test is negative (self-performed).  FADIRs test is negative (self-performed).   No atrophy or tone abnormalities are noted.  NEURO:  Able to toe walk and heel walk without difficulty  GAIT: normal.    Vitals:  There were no vitals taken for this visit.  (reviewed on 8/12/2020)    General: alert and oriented, in no apparent distress, obese.  Gait: normal gait.  Skin: no rashes, no discoloration, no obvious lesions  HEENT: normocephalic, atraumatic. Pupils equal and round.  Cardiovascular: no significant peripheral edema present.  Respiratory: without use of accessory muscles of respiration.    Musculoskeletal - Lumbar Spine:  - Pain on flexion of lumbar spine: Present   - Pain on extension of lumbar spine: Present  - Lumbar facet loading: Present    - TTP over the lumbar facet joints: Present improved   - TTP over the SI joints: Absent   - TTP over GT bursa: absent  - TTP over piriformis: absent  - Straight Leg Raise: Negative  - JULISSA: Negative    Neuro - Extremities:  - BUE Strength:R/L: D: 5/5; B: 5/5; T: 5/5; WF: 5/5; WE: 5/5; IO: 5/5  - BLE Strength: R/L: HF: 5/5, HE: 5/5, KF: 5/5; KE: 5/5; FE: 5/5; FF: 5/5  - Extremity Reflexes: Brisk and symmetric throughout  - Sensory: Sensation to light touch intact bilaterally      Psych:  Mood and affect is appropriate          Assessment:  Nathan Carlos is a 29 y.o. year old female who is presenting with   Encounter Diagnoses    Name Primary?    Lumbar spondylosis Yes    Sacroiliac joint pain     Lumbar facet arthropathy     Myofascial muscle pain        Plan:  1. Interventional:  Patient s/p Bilateral L3, 4,5 MBB 7/16/2020 with mild relief thus far. Pain is mostly myofascial. Patient reassured that it will improved when the steroids kick in.        S/p right SIJ injection on 2/5/20 with 80% pain relief.     2. Pharmacologic: Norco 10/325 mg PO Q 6hrs (15 tabs) bridge until steroids kick in. Continue Celebrex 200 mg PO BID PRN. Tizanidne 4 mg PO BID PRN.            3. Rehabilitative:  Encouraged regular exercise and stretching. Consider formal PT.     4. Diagnostic: None for now.    5. Follow up: PRN.

## 2020-08-12 NOTE — PROGRESS NOTES
PRIMARY CARE TELEMEDICINE NOTE      Patient location:  Home  Visit type: Virtual visit with synchronous audio and video  Each patient to whom he or she provides medical services by telemedicine is:  (1) informed of the relationship between the physician and patient and the respective role of any other health care provider with respect to management of the patient  (2) notified that he or she may decline to receive medical services by telemedicine and may withdraw from such care at any time        SUBJECTIVE     Patient ID: Nathan Carlos is a 29 y.o. female.      CHIEF COMPLAINT:  DIABETES      HPI    Patient recently had lab return with an elevated A1c.  Here to discuss further treatment.  She has been on metformin in the past for IRS but caused GI issues.  Reports dry mouth, polydipsia and headaches.  Report decreased appetite so she skips and goes for long periods of time w/out eating.  Reports home BP running -150//DBP .  Diet:  More water, less fried, no soda, lyte replacements, carbs/starches, salt, minimal caffeine.        Lab Results   Component Value Date    HGBA1C 7.8 (H) 08/10/2020           Review of patient's allergies indicates:   Allergen Reactions    Amitriptyline      xerostomia    Amlodipine      DRY MOUTH         Patient Active Problem List    Diagnosis Date Noted    Generalized weakness 08/05/2020    Lumbar facet arthropathy 08/14/2019    HSV-2 (herpes simplex virus 2) infection 09/11/2017    High serum testosterone 09/07/2017    Insulin resistance syndrome 09/07/2017    Hypercholesteremia 08/09/2017    Essential hypertension 08/03/2017    Gastroesophageal reflux disease 08/03/2017    Left-sided low back pain with left-sided sciatica 08/03/2017    Polycystic ovaries 11/10/2015         Current Outpatient Medications:     baclofen (LIORESAL) 10 MG tablet, Take 1 tablet (10 mg total) by mouth 2 (two) times daily as needed., Disp: 60 tablet, Rfl: 0    fluconazole  (DIFLUCAN) 150 MG Tab, Take 1 tablet (150 mg total) by mouth once daily. May repeat in 72 hours if symptoms not resolved. for 2 days, Disp: 2 tablet, Rfl: 0    levonorgestrel-ethinyl estradiol (SEASONALE) 0.15 mg-30 mcg (91) per tablet, Take 1 tablet by mouth once daily., Disp: 84 tablet, Rfl: 4    metoprolol succinate (TOPROL-XL) 50 MG 24 hr tablet, Take 1 tablet (50 mg total) by mouth once daily., Disp: 90 tablet, Rfl: 1    metroNIDAZOLE (FLAGYL) 500 MG tablet, Take 1 tablet (500 mg total) by mouth 2 (two) times daily. for 7 days, Disp: 14 tablet, Rfl: 0    mupirocin calcium 2% (BACTROBAN) 2 % cream, Apply to affected area 3 times daily, Disp: 30 g, Rfl: 0    spironolactone (ALDACTONE) 25 MG tablet, Take 2 tablets (50 mg total) by mouth once daily., Disp: 180 tablet, Rfl: 1    traZODone (DESYREL) 50 MG tablet, Take 1 tablet (50 mg total) by mouth nightly as needed for Insomnia., Disp: 30 tablet, Rfl: 6    valACYclovir (VALTREX) 500 MG tablet, TAKE 1 TABLET BY MOUTH TWICE DAILY FOR 3 DAYS, Disp: 6 tablet, Rfl: 1      Past Medical History:   Diagnosis Date    GERD (gastroesophageal reflux disease)     Herpes simplex virus (HSV) infection     High serum testosterone 9/7/2017    Hyperlipidemia     Hypertension     Insulin resistance syndrome 9/7/2017    Lumbar facet arthropathy 8/14/2019    Morbid obesity     Ovarian cyst     bilateral        Past Surgical History:   Procedure Laterality Date    ANKLE SURGERY Left     BUNIONECTOMY Bilateral     COLONOSCOPY  06/12/2018    INJECTION OF ANESTHETIC AGENT AROUND MEDIAL BRANCH NERVES INNERVATING LUMBAR FACET JOINT Bilateral 8/14/2019    Procedure: Bilateral L3-5 MBB;  Surgeon: Yo Kirkland MD;  [facility];  Service: Pain Management;  Laterality: Bilateral;    INJECTION OF ANESTHETIC AGENT AROUND MEDIAL BRANCH NERVES INNERVATING LUMBAR FACET JOINT Bilateral 7/16/2020    Procedure: Bilateral L3-5 MBB;  Surgeon: Yo Kirkland MD;  Location:  [facility];  Service: Pain Management;  Laterality: Bilateral;    INJECTION OF ANESTHETIC AGENT INTO SACROILIAC JOINT Right 2/5/2020    Procedure: Right SIJ Injection with local;  Surgeon: Yo Kirkland MD;  Location: [facility];  Service: Pain Management;  Laterality: Right;    LUMBAR FUSION         Family History   Problem Relation Age of Onset    Hypertension Mother     Fibroids Mother     No Known Problems Father     Diabetes Sister     Hypertension Brother     Hypertension Maternal Aunt     Diabetes Maternal Aunt     Diabetes Maternal Uncle     Hypertension Maternal Grandmother     Leukemia Maternal Grandfather     Hypertension Maternal Grandfather     Stroke Neg Hx        Social History     Tobacco Use    Smoking status: Never Smoker    Smokeless tobacco: Never Used   Substance Use Topics    Alcohol use: Yes     Frequency: Never     Drinks per session: Patient refused     Binge frequency: Never     Comment: special occasions    Drug use: No         REVIEW OF SYSTEMS  Review of Systems   Constitutional: Positive for appetite change, fatigue and unexpected weight change. Negative for activity change, chills and diaphoresis.   HENT: Negative for hearing loss, rhinorrhea and trouble swallowing.    Eyes: Negative.  Negative for discharge and visual disturbance.   Respiratory: Negative.  Negative for chest tightness and wheezing.    Cardiovascular: Negative.  Negative for chest pain and palpitations.   Gastrointestinal: Negative for blood in stool, constipation, diarrhea and vomiting.   Endocrine: Positive for polydipsia. Negative for polyphagia and polyuria.   Genitourinary: Negative for difficulty urinating, dysuria, hematuria and menstrual problem.   Musculoskeletal: Negative for arthralgias, joint swelling and neck pain.   Neurological: Positive for headaches. Negative for tremors, weakness, light-headedness and numbness.   Psychiatric/Behavioral: Negative for confusion and dysphoric mood.           OBJECTIVE:  Physical Exam:  Constitutional:  In NAD, pleasant, cooperative.  Appears well-developed and well-nourished.   Respiratory:  Unlabored, in no resp distress.  Neurological: Alert and oriented to person, place, and time.   Psychiatric: Normal mood and affect, behavior is normal. Judgment and thought content normal.       Complete PE deferred due to video visit      ASSESSMENT       1. Type 2 diabetes mellitus without complication, without long-term current use of insulin    2. Essential hypertension           PLAN    Type 2 diabetes mellitus without complication, without long-term current use of insulin --- new diagnosis, A1c mildly elevated.  Healthy diet and lifestyle changes discussed, declines referral to Dietician at this time.  Medication started, take as directed.  I will have her come back to the office in a few days to have nurse show her how to use the glucometer, advised her to bring supplies.  -     irbesartan (AVAPRO) 75 MG tablet; Take 1 tablet (75 mg total) by mouth once daily.  Dispense: 30 tablet; Refill: 6  -     empagliflozin (JARDIANCE) 10 mg tablet; Take 1 tablet (10 mg total) by mouth once daily.  Dispense: 30 tablet; Refill: 6  -     blood-glucose meter kit; To check BG 2 times daily, to use with insurance preferred meter  Dispense: 1 each; Refill: 0  -     lancets Misc; To check BG 2 times daily, to use with insurance preferred meter  Dispense: 100 each; Refill: 2  -     blood sugar diagnostic Strp; To check BG 2 times daily, to use with insurance preferred meter  Dispense: 100 strip; Refill: 2    Essential hypertension --- uncontrolled on BB with D.  Added ARB for better control and for DM-2 protocol.  -     irbesartan (AVAPRO) 75 MG tablet; Take 1 tablet (75 mg total) by mouth once daily.  Dispense: 30 tablet; Refill: 6        Follow-up:   Lab in 3 months with follow-up 1 week later.  Return prn.          Total time spent with patient was 15 minutes to include discussion,  counseling and chart review.  All patient questions were answered at appointment.

## 2020-08-14 ENCOUNTER — CLINICAL SUPPORT (OUTPATIENT)
Dept: REHABILITATION | Facility: HOSPITAL | Age: 30
End: 2020-08-14
Attending: ANESTHESIOLOGY
Payer: MEDICAID

## 2020-08-14 ENCOUNTER — CLINICAL SUPPORT (OUTPATIENT)
Dept: FAMILY MEDICINE | Facility: CLINIC | Age: 30
End: 2020-08-14
Payer: MEDICAID

## 2020-08-14 ENCOUNTER — TELEPHONE (OUTPATIENT)
Dept: FAMILY MEDICINE | Facility: CLINIC | Age: 30
End: 2020-08-14

## 2020-08-14 VITALS — DIASTOLIC BLOOD PRESSURE: 88 MMHG | SYSTOLIC BLOOD PRESSURE: 130 MMHG

## 2020-08-14 DIAGNOSIS — M25.60 DECREASED RANGE OF MOTION: ICD-10-CM

## 2020-08-14 DIAGNOSIS — R53.1 GENERALIZED WEAKNESS: ICD-10-CM

## 2020-08-14 DIAGNOSIS — E11.9 TYPE 2 DIABETES MELLITUS WITHOUT COMPLICATION, WITHOUT LONG-TERM CURRENT USE OF INSULIN: Primary | ICD-10-CM

## 2020-08-14 PROCEDURE — 97110 THERAPEUTIC EXERCISES: CPT

## 2020-08-14 NOTE — TELEPHONE ENCOUNTER
RX for Januvia sent to pharmacy to take place of Jardiance.  Lab ordered for 3 months, see me 1 week later to go over results.  Have her send me her glucose readings toward the end of next week.

## 2020-08-14 NOTE — PROGRESS NOTES
Patient blood pressure was 130/88.   Patient educated and demonstrated correctly the proper use of blood glucose meter.

## 2020-08-14 NOTE — TELEPHONE ENCOUNTER
Called pt and booked the lab appt for 3 months and then a 1 wk lab result appt. And let her know about med that was called in.  She did  the med that was called in.

## 2020-08-14 NOTE — PROGRESS NOTES
Physical Therapy Daily Treatment Note     Name: Nathan Cralos  Clinic Number: [id number]    Therapy Diagnosis:   Encounter Diagnoses   Name Primary?    Generalized weakness     Decreased range of motion      Physician: Yo Kirkland MD    Visit Date: 8/14/2020    Physician Orders: PT Eval and Treat   Medical Diagnosis from Referral: M79.18 (ICD-10-CM) - Myofascial muscle pain  Evaluation Date: 8/5/2020  Authorization Period Expiration: 7/30/21  Plan of Care Expiration: 11/5/2020  Visit # / Visits authorized: 3 (1 of 10)    Precautions: Standard    Time In: 10:50 am  Time Out: 11:30 am  Total Billable Time: 40 minutes    SUBJECTIVE     Today, pt reports: she is feeling pretty good and only 1/10 pain currently. States the back feels tight. States she has been keeping up with her home exercises.   He/She was compliant with home exercise program.  Response to previous treatment: decreased tension in mid and low back   Functional change: progression of exercises tolerated well     Pre-Treatment Pain: 0/10, tightness in B hips   Post-Treatment Pain: 0/10  Location: low back   TREATMENT     Nathan received therapeutic exercises to develop strength, endurance, flexibility and core stabilization for 17 minutes including:    Exercise 8/14/2020   Bike (for LE strength and endurance)  Level 1, 5 minutes    Mini Bridges with posterior pelvic tilt  3 x 10    Single knee to chest     Lower trunk rotations  20 x B, 2 sec hold   Open books     Clamshells  3 x 10  B                Nathan received the following manual therapy techniques: Soft tissue Mobilization were applied to the: back and hips for 15 minutes, including:  STM of bilateral thoracic paraspinals, quadratus lumborum , lumbar paraspinals , gluteus melissa, gluteus medius  and piriformis      Nathan participated in neuromuscular re-education activities to improve: Coordination and Posture for 8 minutes. The following activities were included:    Exercise  8/14/2020   Arm arc     Posterior pelvic tilt 20 x    Supine marches with posterior pelvic tilt  3 x 10 B    Prone scapular retractions 3 x 10                        Home Exercises Provided and Patient Education Provided     Education/Self-Care provided:    Patient educated on biomechanical justification for therapeutic exercise and importance of compliance with HEP in order to improve overall impairments and QOL     Written Home Exercises Provided: yes.  Exercises were reviewed and Nathan was able to demonstrate them prior to the end of the session.  Nathan demonstrated good  understanding of the education provided.     See EMR under Patient Instructions for exercises provided prior visit.    ASSESSMENT   Pt tolerated manual therapy well with reports of decreased pain and tension in musculature following intervention. Pt tolerated exercise well with reports of increased fatigue in musculature. Tolerable discomfort in low back noted with bridges and supine marches. Pt demonstrated good understanding of exercises and required minimal cueing to maintain proper form following proper re-training of posterior pelvic tilt and initial exercise demonstration.    Nathan is progressing well towards her goals.   Pt prognosis is Fair.     Pt will continue to benefit from skilled outpatient physical therapy to address the deficits listed in the problem list box on initial evaluation, provide pt/family education and to maximize pt's level of independence in the home and community environment.     Pt's spiritual, cultural and educational needs considered and pt agreeable to plan of care and goals.     Anticipated Barriers for therapy: chronicity of pain; pt has failed PT in the past; long commute to therapy     Goals:  Short Term Goals: 4 weeks   1. Pt will be independent with HEP performance in order to continue PT progress at home.   2. Pt will report pain at worst in low back of 6/10 or less  3. Pt will demonstrate ability  to don and doff personal TENS unit for pain management  4. Pt will tolerate performance of 30 minutes of exercise without pain reports higher than 6/10 throughout  5. Pt will improve hip extension strength to at least 4-/5 in order to progress gait performance and pelvic control in standing and walking         Long Term Goals: 8 weeks   1. Pt will improve FOTO disability score to 56% disability or less in order to improve overall QOL and return to PLOF.   2. Pt will demonstrate ability to stand for up to 1 hour without increased pain in low and mid back   3. Pt will report pain at worst in low/mid back of 3/10 or less  4. Pt will demonstrate ability to walk 1 mile or greater without increased pain in low back     PLAN   Continue Plan of Care (POC) and progress per patient tolerance.    Evaluation: 8/5/2020  POC Expiration: 11/5/2020    Lillie Sylvester, PT, DPT

## 2020-08-17 LAB
ALBUMIN SERPL-MCNC: 3.9 G/DL (ref 3.6–5.1)
SHBG SERPL-SCNC: 113 NMOL/L (ref 17–124)
TESTOST FREE SERPL-MCNC: 2.2 PG/ML (ref 0.2–5)
TESTOST SERPL-MCNC: 54 NG/DL (ref 2–45)
TESTOSTERONE.FREE+WB SERPL-MCNC: 4 NG/DL (ref 0.5–8.5)

## 2020-08-20 ENCOUNTER — PATIENT MESSAGE (OUTPATIENT)
Dept: OBSTETRICS AND GYNECOLOGY | Facility: CLINIC | Age: 30
End: 2020-08-20

## 2020-08-20 ENCOUNTER — TELEPHONE (OUTPATIENT)
Dept: OBSTETRICS AND GYNECOLOGY | Facility: CLINIC | Age: 30
End: 2020-08-20

## 2020-08-24 DIAGNOSIS — E11.9 TYPE 2 DIABETES MELLITUS WITHOUT COMPLICATION, WITHOUT LONG-TERM CURRENT USE OF INSULIN: Primary | ICD-10-CM

## 2020-08-24 RX ORDER — METFORMIN HYDROCHLORIDE 500 MG/1
500 TABLET, EXTENDED RELEASE ORAL
Qty: 90 TABLET | Refills: 1 | Status: SHIPPED | OUTPATIENT
Start: 2020-08-24 | End: 2020-11-23

## 2020-08-25 ENCOUNTER — CLINICAL SUPPORT (OUTPATIENT)
Dept: REHABILITATION | Facility: HOSPITAL | Age: 30
End: 2020-08-25
Attending: ANESTHESIOLOGY
Payer: MEDICAID

## 2020-08-25 DIAGNOSIS — R26.2 DIFFICULTY WALKING: ICD-10-CM

## 2020-08-25 DIAGNOSIS — M54.50 CHRONIC BILATERAL LOW BACK PAIN WITHOUT SCIATICA: ICD-10-CM

## 2020-08-25 DIAGNOSIS — R53.1 GENERALIZED WEAKNESS: ICD-10-CM

## 2020-08-25 DIAGNOSIS — M25.60 DECREASED RANGE OF MOTION: ICD-10-CM

## 2020-08-25 DIAGNOSIS — G89.29 CHRONIC BILATERAL LOW BACK PAIN WITHOUT SCIATICA: ICD-10-CM

## 2020-08-25 PROCEDURE — 97110 THERAPEUTIC EXERCISES: CPT

## 2020-08-26 ENCOUNTER — TELEPHONE (OUTPATIENT)
Dept: PAIN MEDICINE | Facility: CLINIC | Age: 30
End: 2020-08-26

## 2020-08-26 ENCOUNTER — PATIENT MESSAGE (OUTPATIENT)
Dept: PAIN MEDICINE | Facility: CLINIC | Age: 30
End: 2020-08-26

## 2020-08-27 RX ORDER — TIZANIDINE 4 MG/1
TABLET ORAL
Qty: 60 TABLET | Refills: 0 | Status: SHIPPED | OUTPATIENT
Start: 2020-08-27 | End: 2020-10-22 | Stop reason: SDUPTHER

## 2020-08-27 RX ORDER — TIZANIDINE 4 MG/1
TABLET ORAL
Qty: 60 TABLET | Refills: 0 | Status: CANCELLED | OUTPATIENT
Start: 2020-08-27

## 2020-08-27 NOTE — PROGRESS NOTES
Physical Therapy Daily Treatment Note     Name: Nathan Carlos  Clinic Number: [id number]    Therapy Diagnosis:   Encounter Diagnoses   Name Primary?    Chronic bilateral low back pain without sciatica     Difficulty walking     Generalized weakness     Decreased range of motion      Physician: Yo Kirkland MD    Visit Date: 8/25/2020    Physician Orders: PT Eval and Treat   Medical Diagnosis from Referral: M79.18 (ICD-10-CM) - Myofascial muscle pain  Evaluation Date: 8/5/2020  Authorization Period Expiration: 7/30/21  Plan of Care Expiration: 11/5/2020  Visit # / Visits authorized: 4 (2 of 10)    Precautions: Standard    Time In: 3:07 pm  Time Out: 3:45 pm  Total Billable Time: 38 minutes    SUBJECTIVE     Today, pt reports: she is feeling pretty good and no pain currently. States she was having a lot of pain in the low back and posterior hips over the weekend. Is scared to go back to work for fear of pain returning   He/She was compliant with home exercise program.  Response to previous treatment: decreased tension in mid and low back   Functional change: none noted    Pre-Treatment Pain: 0/10, tightness in B hips   Post-Treatment Pain: 0/10  Location: low back   TREATMENT     Nathan received therapeutic exercises to develop strength, endurance, flexibility and core stabilization for 20 minutes including:    Exercise 8/25/2020   Bike (for LE strength and endurance)  Level 1, 5 minutes    Mini Bridges with posterior pelvic tilt  3 x 10    Single knee to chest     Lower trunk rotations     Open books  15x B    Clamshells  3 x 12 B    Series 6 3 minute pec stretch   15 reps of all other upper extremity motions           Nathan received the following manual therapy techniques: Soft tissue Mobilization were applied to the: back and hips for 10 minutes, including:  STM of bilateral thoracic paraspinals, quadratus lumborum , lumbar paraspinals , gluteus melissa, gluteus medius  and piriformis      Nathan  participated in neuromuscular re-education activities to improve: Coordination and Posture for 8 minutes. The following activities were included:    Exercise 8/25/2020   Arm arc     Posterior pelvic tilt 20 x    Supine marches with posterior pelvic tilt     Prone W's 3 x 10                        Home Exercises Provided and Patient Education Provided     Education/Self-Care provided:    Patient educated on biomechanical justification for therapeutic exercise and importance of compliance with HEP in order to improve overall impairments and QOL     Written Home Exercises Provided: yes.  Exercises were reviewed and Nathan was able to demonstrate them prior to the end of the session.  Nathan demonstrated good  understanding of the education provided.     See EMR under Patient Instructions for exercises provided prior visit.    ASSESSMENT   Pt tolerated manual therapy well with reports of decreased pain and tension in musculature following intervention. Pt tolerated exercise well with reports of increased fatigue in musculature and no pain throughout interventions. Improved form and control noted with posterior pelvic tilts today. Able to maintain pelvic position while performing bridges with less cueing required from therapist.  Pt demonstrated good understanding of exercises and required minimal cueing to maintain proper form following initial exercise demonstration.    Nathan is progressing well towards her goals.   Pt prognosis is Fair.     Pt will continue to benefit from skilled outpatient physical therapy to address the deficits listed in the problem list box on initial evaluation, provide pt/family education and to maximize pt's level of independence in the home and community environment.     Pt's spiritual, cultural and educational needs considered and pt agreeable to plan of care and goals.     Anticipated Barriers for therapy: chronicity of pain; pt has failed PT in the past; long commute to therapy      Goals:  Short Term Goals: 4 weeks   1. Pt will be independent with HEP performance in order to continue PT progress at home.   2. Pt will report pain at worst in low back of 6/10 or less  3. Pt will demonstrate ability to don and doff personal TENS unit for pain management  4. Pt will tolerate performance of 30 minutes of exercise without pain reports higher than 6/10 throughout  5. Pt will improve hip extension strength to at least 4-/5 in order to progress gait performance and pelvic control in standing and walking         Long Term Goals: 8 weeks   1. Pt will improve FOTO disability score to 56% disability or less in order to improve overall QOL and return to PLOF.   2. Pt will demonstrate ability to stand for up to 1 hour without increased pain in low and mid back   3. Pt will report pain at worst in low/mid back of 3/10 or less  4. Pt will demonstrate ability to walk 1 mile or greater without increased pain in low back     PLAN   Continue Plan of Care (POC) and progress per patient tolerance. Incorporate standing activities.    Evaluation: 8/5/2020  POC Expiration: 11/5/2020    Lillie Sylvester, PT, DPT

## 2020-08-28 ENCOUNTER — CLINICAL SUPPORT (OUTPATIENT)
Dept: REHABILITATION | Facility: HOSPITAL | Age: 30
End: 2020-08-28
Attending: ANESTHESIOLOGY
Payer: MEDICAID

## 2020-08-28 DIAGNOSIS — G89.29 CHRONIC BILATERAL LOW BACK PAIN WITHOUT SCIATICA: ICD-10-CM

## 2020-08-28 DIAGNOSIS — M25.60 DECREASED RANGE OF MOTION: ICD-10-CM

## 2020-08-28 DIAGNOSIS — M54.50 CHRONIC BILATERAL LOW BACK PAIN WITHOUT SCIATICA: ICD-10-CM

## 2020-08-28 DIAGNOSIS — R53.1 GENERALIZED WEAKNESS: ICD-10-CM

## 2020-08-28 DIAGNOSIS — R26.2 DIFFICULTY WALKING: ICD-10-CM

## 2020-08-28 PROCEDURE — 97110 THERAPEUTIC EXERCISES: CPT

## 2020-08-28 NOTE — PROGRESS NOTES
Physical Therapy Daily Treatment Note     Name: Nathan Carlos  Clinic Number: [id number]    Therapy Diagnosis:   Encounter Diagnoses   Name Primary?    Chronic bilateral low back pain without sciatica     Difficulty walking     Generalized weakness     Decreased range of motion      Physician: Yo Kirkland MD    Visit Date: 8/28/2020    Physician Orders: PT Eval and Treat   Medical Diagnosis from Referral: M79.18 (ICD-10-CM) - Myofascial muscle pain  Evaluation Date: 8/5/2020  Authorization Period Expiration: 7/30/21  Plan of Care Expiration: 11/5/2020  Visit # / Visits authorized: 5 (3 of 10)    Precautions: Standard    Time In: 3:05 pm  Time Out: 3:50 pm  Total Billable Time: 45 minutes    SUBJECTIVE     Today, pt reports: increased low back pain today. States she has been hurting for the past few days and does not know why.   He/She was compliant with home exercise program.  Response to previous treatment: decreased tension in mid and low back   Functional change: none noted    Pre-Treatment Pain: 7/10, tightness in B hips   Post-Treatment Pain: 6/10  Location: low back   TREATMENT     Nathan received therapeutic exercises to develop strength, endurance, flexibility and core stabilization for 20 minutes including:    Exercise 8/28/2020   Bike (for LE strength and endurance)  Level 1, 5 minutes    Mini Bridges with posterior pelvic tilt  3 x 10    Single knee to chest     Lower trunk rotations     Open books  15 x 3 sec hold B    Clamshells  3 x 12 B with ball between ankles    Series 6    Sit to stand  3 x 10 from [employer] mat   Ball roll outs 5 x 5 sec holds   Forward, R and L                Nathan received the following manual therapy techniques: Soft tissue Mobilization were applied to the: back and hips for 10 minutes, including:  STM of bilateral thoracic paraspinals, quadratus lumborum , lumbar paraspinals , gluteus melissa, gluteus medius  and piriformis      Nathan participated in  neuromuscular re-education activities to improve: Coordination and Posture for 15 minutes. The following activities were included:    Exercise 8/28/2020   Arm arc     Posterior pelvic tilt    Supine marches with posterior pelvic tilt     Prone W's 3 x 10    Abdominal bracing  20 x 5 sec hold    Tandem stance  1 minute B    Scapular retractions Yellow band 2 x 8            Home Exercises Provided and Patient Education Provided     Education/Self-Care provided:    Patient educated on biomechanical justification for therapeutic exercise and importance of compliance with HEP in order to improve overall impairments and QOL     Written Home Exercises Provided: yes.  Exercises were reviewed and Nathan was able to demonstrate them prior to the end of the session.  Nathan demonstrated good  understanding of the education provided.     See EMR under Patient Instructions for exercises provided prior visit.    ASSESSMENT   Pt tolerated manual therapy well with reports of mild decreased pain and tension in musculature following intervention. Pt tolerated exercise well with reports of increased fatigue in musculature and mild but tolerable discomfort in mid and low back throughout interventions.she requires frequent cueing to avoid compensations such as shoulder hiking and arching her back with scapular retractions. Patient left session with mild decrease in overall pain but states that her back does feel looser     Nathan is progressing well towards her goals.   Pt prognosis is Fair.     Pt will continue to benefit from skilled outpatient physical therapy to address the deficits listed in the problem list box on initial evaluation, provide pt/family education and to maximize pt's level of independence in the home and community environment.     Pt's spiritual, cultural and educational needs considered and pt agreeable to plan of care and goals.     Anticipated Barriers for therapy: chronicity of pain; pt has failed PT in the  past; long commute to therapy     Goals:  Short Term Goals: 4 weeks   1. Pt will be independent with HEP performance in order to continue PT progress at home.   2. Pt will report pain at worst in low back of 6/10 or less  3. Pt will demonstrate ability to don and doff personal TENS unit for pain management  4. Pt will tolerate performance of 30 minutes of exercise without pain reports higher than 6/10 throughout  5. Pt will improve hip extension strength to at least 4-/5 in order to progress gait performance and pelvic control in standing and walking         Long Term Goals: 8 weeks   1. Pt will improve FOTO disability score to 56% disability or less in order to improve overall QOL and return to PLOF.   2. Pt will demonstrate ability to stand for up to 1 hour without increased pain in low and mid back   3. Pt will report pain at worst in low/mid back of 3/10 or less  4. Pt will demonstrate ability to walk 1 mile or greater without increased pain in low back     PLAN   Continue Plan of Care (POC) and progress per patient tolerance. Continue to increase standing interventions to improve tolerance for standing for prolonged periods of time at work.     Evaluation: 8/5/2020  POC Expiration: 11/5/2020    Lillie Sylvester, PT, DPT

## 2020-09-03 ENCOUNTER — PATIENT OUTREACH (OUTPATIENT)
Dept: ADMINISTRATIVE | Facility: HOSPITAL | Age: 30
End: 2020-09-03

## 2020-09-03 ENCOUNTER — CLINICAL SUPPORT (OUTPATIENT)
Dept: REHABILITATION | Facility: HOSPITAL | Age: 30
End: 2020-09-03
Payer: MEDICAID

## 2020-09-03 DIAGNOSIS — G89.29 CHRONIC BILATERAL LOW BACK PAIN WITHOUT SCIATICA: ICD-10-CM

## 2020-09-03 DIAGNOSIS — R26.2 DIFFICULTY WALKING: ICD-10-CM

## 2020-09-03 DIAGNOSIS — M54.50 CHRONIC BILATERAL LOW BACK PAIN WITHOUT SCIATICA: ICD-10-CM

## 2020-09-03 DIAGNOSIS — R53.1 GENERALIZED WEAKNESS: ICD-10-CM

## 2020-09-03 DIAGNOSIS — M25.60 DECREASED RANGE OF MOTION: ICD-10-CM

## 2020-09-03 PROCEDURE — 97110 THERAPEUTIC EXERCISES: CPT

## 2020-09-03 NOTE — PROGRESS NOTES
"  Physical Therapy Progress Note     Name: Nathan Carlos  Clinic Number: [id number]    Therapy Diagnosis:   Encounter Diagnoses   Name Primary?    Chronic bilateral low back pain without sciatica     Difficulty walking     Generalized weakness     Decreased range of motion      Physician: Yo Kirkland MD    Visit Date: 9/3/2020    Physician Orders: PT Eval and Treat   Medical Diagnosis from Referral: M79.18 (ICD-10-CM) - Myofascial muscle pain  Evaluation Date: 8/5/2020  Authorization Period Expiration: 7/30/21  Plan of Care Expiration: 11/5/2020  Visit # / Visits authorized: 6 (4 of 10)    Precautions: Standard    Time In: 1:45 pm  Time Out: 2:35 pm  Total Billable Time: 50 minutes    SUBJECTIVE     Today, pt reports: continued low back pain. States that she has been hurting so much that she has to get her fiance to massage her back daily. States pain is keeping her up at night. States she has been doing "some of the exercises sometimes."  He/She was not compliant with home exercise program.  Response to previous treatment: decreased tension in mid and low back   Functional change: progression of exercises tolerated well     Pre-Treatment Pain: 6/10, tightness in B hips   Post-Treatment Pain: 3/10  Location: low back     Objective      Posture:  Sway back; increased resting lumbar lordosis; increased tone in thoracic paraspinals      Lumbar Range of Motion:     % of normal motion Pain   Flexion 75    yes         Extension 25    yes         Left Side Bending 25 yes         Right Side Bending 25 yes         Left rotation    25 no         Right Rotation    25 Yes                Lower Extremity Strength  Right LE   Left LE     Knee extension: 4/5 Knee extension: 4/5   Knee flexion: 4-/5 Knee flexion: 4-/5   Hip flexion: 4-/5 Hip flexion: 4-/5   Hip extension:  3+/5 Hip extension: 3+/5   Hip abduction: 4-/5 Hip abduction: 4-/5   Hip adduction: 4-/5 Hip adduction 4-/5   Ankle dorsiflexion: 4+/5 Ankle " dorsiflexion: 4+/5   Ankle plantarflexion: 4+/5 Ankle plantarflexion: 4+/5            Special Tests:  -Repeated Flexion: increases pain   -Repeated Ext: increases pain   -Piriformis Test: impaired flexibility in B piriformis   -Bridge Test: pain (pain with movement decreased since initial evaluation)         Joint Moility: hypomobility in lumbar and thoracic spine with PA's      Palpation: tenderness to palpation in lumbar and thoracic paraspinals as well as multifidi muscles B. Tenderness to palpation of B quadratus lumborum, glutes and piriformis muscles.        Flexibility: impaired piriformis and HS length bilaterally        TREATMENT     Nathan received therapeutic exercises to develop strength, endurance, flexibility and core stabilization for 20 minutes including:    Exercise 9/3/2020   Bike (for LE strength and endurance)  L   Mini Bridges with posterior pelvic tilt  3 x 10    glute stretch    Lower trunk rotations  Manual assist from therapist   ~1 minute B    Open books  15 x 3 sec hold B    Clamshells  3 x 12 B with ball between ankles    Series 6    Sit to stand  3 x 10 from [employer] mat   Ball roll outs                Nathan received the following manual therapy techniques: Soft tissue Mobilization were applied to the: back and hips for 10 minutes, including:  STM of bilateral gluteus melissa, gluteus medius  and piriformis      Nathan participated in neuromuscular re-education activities to improve: Coordination and Posture for 20 minutes. The following activities were included:    Exercise 9/3/2020   Arm arc     Posterior pelvic tilt    Seated marches with posterior pelvic tilt  3 x 10    Prone W's 3 x 10    Abdominal bracing     Tandem stance  2 x 1 minute B    Scapular retractions Yellow band 3 x 10    Side stepping with band at ankles  Yellow band, 8 x width of the mat        Home Exercises Provided and Patient Education Provided     Education/Self-Care provided:    Patient educated on  biomechanical justification for therapeutic exercise and importance of compliance with HEP in order to improve overall impairments and QOL     Written Home Exercises Provided: yes.  Exercises were reviewed and Nathan was able to demonstrate them prior to the end of the session.  Nathan demonstrated good  understanding of the education provided.     See EMR under Patient Instructions for exercises provided prior visit.    ASSESSMENT   Pt tolerated manual therapy well with reports of mild decreased pain and tension in musculature following intervention. Pt tolerated exercise well with reports of increased fatigue in musculature and mild but tolerable discomfort in mid and low back throughout interventions. She again requires frequent cueing to avoid compensations such as shoulder hiking and arching her back with scapular retractions. Cueing to maintain abdominal bracing throughout session, especially with sit to stand. Patient left session with significant decrease in overall pain and tension in low back. Patient demonstrates mild improvement in lumbar side banding and continues to report discomfort with lumbar range of motion in all directions. Mild improvement in lower extremity strength noted; however, greater improvement in strength was expected at this time. Patient and therapist had a discussion regarding compliance with therapy and home exercises in order to gain sufficient strength in lower extremity and core musculature to be able to perform daily functional activities and work related activities without increased pain. Patient will continue to benefit from skilled physical therapy in order to further address goals, maximize function and QOL.     Nathan is progressing well towards her goals.   Pt prognosis is Fair.     Pt will continue to benefit from skilled outpatient physical therapy to address the deficits listed in the problem list box on initial evaluation, provide pt/family education and to maximize  pt's level of independence in the home and community environment.     Pt's spiritual, cultural and educational needs considered and pt agreeable to plan of care and goals.     Anticipated Barriers for therapy: chronicity of pain; pt has failed PT in the past; long commute to therapy     Goals:  Short Term Goals: 4 weeks   1. Pt will be independent with HEP performance in order to continue PT progress at home.   2. Pt will report pain at worst in low back of 6/10 or less  3. Pt will demonstrate ability to don and doff personal TENS unit for pain management  4. Pt will tolerate performance of 30 minutes of exercise without pain reports higher than 6/10 throughout  5. Pt will improve hip extension strength to at least 4-/5 in order to progress gait performance and pelvic control in standing and walking         Long Term Goals: 8 weeks   1. Pt will improve FOTO disability score to 56% disability or less in order to improve overall QOL and return to PLOF.   2. Pt will demonstrate ability to stand for up to 1 hour without increased pain in low and mid back   3. Pt will report pain at worst in low/mid back of 3/10 or less  4. Pt will demonstrate ability to walk 1 mile or greater without increased pain in low back     PLAN   Continue Plan of Care (POC) and progress per patient tolerance. Continue to increase standing interventions to improve tolerance for standing for prolonged periods of time at work.     Evaluation: 8/5/2020  Progress Note: 9/4/2020  POC Expiration: 11/5/2020    Lillie Sylvester, PT, DPT

## 2020-09-03 NOTE — PROGRESS NOTES
Contacted patient about overdue DM Eye Exam. Patient states she had an exam at [facility]    Report requested

## 2020-09-03 NOTE — LETTER
AUTHORIZATION FOR RELEASE OF   CONFIDENTIAL INFORMATION    Dear [facility],    We are seeing Nathan Carlos, date of birth 1990, in the clinic at [facility]. Qasim Paez NP is the patient's PCP. Nathan Carlos has an outstanding lab/procedure at the time we reviewed her chart. In order to help keep her health information updated, she has authorized us to request the following medical record(s):        (  )  MAMMOGRAM                                      (  )  COLONOSCOPY      (  )  PAP SMEAR                                          (  )  OUTSIDE LAB RESULTS     (  )  DEXA SCAN                                          ( X )  EYE EXAM            (  )  FOOT EXAM                                          (  )  ENTIRE RECORD     (  )  OUTSIDE IMMUNIZATIONS                 (  )  _______________         Please fax records to [facility], Andrea D Cothern, NP, [phone]     If you have any questions, please contact Milo Carter at [phone].           Patient Name: Nathan Carlos  : 1990  Patient Phone #: [phone]

## 2020-09-10 ENCOUNTER — CLINICAL SUPPORT (OUTPATIENT)
Dept: REHABILITATION | Facility: HOSPITAL | Age: 30
End: 2020-09-10
Payer: MEDICAID

## 2020-09-10 DIAGNOSIS — G89.29 CHRONIC BILATERAL LOW BACK PAIN WITHOUT SCIATICA: ICD-10-CM

## 2020-09-10 DIAGNOSIS — M54.50 CHRONIC BILATERAL LOW BACK PAIN WITHOUT SCIATICA: ICD-10-CM

## 2020-09-10 DIAGNOSIS — M25.60 DECREASED RANGE OF MOTION: ICD-10-CM

## 2020-09-10 DIAGNOSIS — R26.2 DIFFICULTY WALKING: ICD-10-CM

## 2020-09-10 DIAGNOSIS — R53.1 GENERALIZED WEAKNESS: ICD-10-CM

## 2020-09-10 PROCEDURE — 97110 THERAPEUTIC EXERCISES: CPT

## 2020-09-10 PROCEDURE — 97112 NEUROMUSCULAR REEDUCATION: CPT

## 2020-09-10 PROCEDURE — 97140 MANUAL THERAPY 1/> REGIONS: CPT

## 2020-09-10 NOTE — PROGRESS NOTES
Physical Therapy Progress Note     Name: Nathan Carlos  Clinic Number: [id number]    Therapy Diagnosis:   Encounter Diagnoses   Name Primary?    Chronic bilateral low back pain without sciatica     Difficulty walking     Generalized weakness     Decreased range of motion      Physician: Yo Kirkland MD    Visit Date: 9/10/2020    Physician Orders: PT Eval and Treat   Medical Diagnosis from Referral: M79.18 (ICD-10-CM) - Myofascial muscle pain  Evaluation Date: 8/5/2020  Authorization Period Expiration: 7/30/21  Plan of Care Expiration: 11/5/2020  Visit # / Visits authorized: 6 (5 of 10)    Precautions: Standard    Time In: 1:45 pm  Time Out: 2:30 pm  Total Billable Time: 45 minutes    SUBJECTIVE     Today, pt reports: no pain today; states that she is noticing that she is getting less muscle spasms in B hips.   He/She was not compliant with home exercise program.  Response to previous treatment: decreased pain tension in mid and low back following session  Functional change: progression of exercises tolerated well     Pre-Treatment Pain: 0/10, tightness in B hips   Post-Treatment Pain: 3/10  Location: low back     Objective        TREATMENT     Nathan received therapeutic exercises to develop strength, endurance, flexibility and core stabilization for 20 minutes including:    Exercise 9/10/2020   Bike (for LE strength and endurance)     Bridges with posterior pelvic tilt  3 x 12   glute stretch    Lower trunk rotations  2 minutes    Open books  15 x 3 sec hold B    Straight leg raise abduction 2 x 10 B    Series 6    Sit to stand  5#, 3 x 10 from [employer] mat   Ball roll outs                Nathan received the following manual therapy techniques: Soft tissue Mobilization were applied to the: back and hips for 13 minutes, including:  STM of bilateral gluteus melissa, gluteus medius  and piriformis      Nathan participated in neuromuscular re-education activities to improve: Coordination and Posture for 12  minutes. The following activities were included:    Exercise 9/10/2020   Arm arc     Posterior pelvic tilt    Supine marches with posterior pelvic tilt  2#, 3 x 8 B     Prone W's    Abdominal bracing     Tandem stance     Scapular retractions    Side stepping with band at ankles  Yellow band, 8 laps in parallel bars       Home Exercises Provided and Patient Education Provided     Education/Self-Care provided:    Patient educated on biomechanical justification for therapeutic exercise and importance of compliance with HEP in order to improve overall impairments and QOL     Written Home Exercises Provided: yes.  Exercises were reviewed and Nathan was able to demonstrate them prior to the end of the session.  Nathan demonstrated good  understanding of the education provided.     See EMR under Patient Instructions for exercises provided prior visit.    ASSESSMENT   Pt tolerated manual therapy well with reports of tolerable discomfort during intervention and mild decreased tension in musculature following intervention. Pt tolerated exercise well with reports of increased fatigue in musculature but no discomfort in back or hips throughout session. Patient reports significant improvement in function based on FOTO scores. Patient will continue to benefit from skilled physical therapy in order to further address goals, maximize function and QOL.     Nathan is progressing well towards her goals.   Pt prognosis is Fair.     Pt will continue to benefit from skilled outpatient physical therapy to address the deficits listed in the problem list box on initial evaluation, provide pt/family education and to maximize pt's level of independence in the home and community environment.     Pt's spiritual, cultural and educational needs considered and pt agreeable to plan of care and goals.     Anticipated Barriers for therapy: chronicity of pain; pt has failed PT in the past; long commute to therapy     Goals:  Short Term Goals: 4  weeks   1. Pt will be independent with HEP performance in order to continue PT progress at home.   2. Pt will report pain at worst in low back of 6/10 or less  3. Pt will demonstrate ability to don and doff personal TENS unit for pain management  4. Pt will tolerate performance of 30 minutes of exercise without pain reports higher than 6/10 throughout  5. Pt will improve hip extension strength to at least 4-/5 in order to progress gait performance and pelvic control in standing and walking         Long Term Goals: 8 weeks   1. Pt will improve FOTO disability score to 56% disability or less in order to improve overall QOL and return to PLOF.   2. Pt will demonstrate ability to stand for up to 1 hour without increased pain in low and mid back   3. Pt will report pain at worst in low/mid back of 3/10 or less  4. Pt will demonstrate ability to walk 1 mile or greater without increased pain in low back     PLAN   Continue Plan of Care (POC) and progress per patient tolerance. Continue to increase standing interventions to improve tolerance for standing for prolonged periods of time at work.     Evaluation: 8/5/2020  Progress Note: 9/4/2020  POC Expiration: 11/5/2020    Lillie Sylvester, PT, DPT

## 2020-09-18 ENCOUNTER — CLINICAL SUPPORT (OUTPATIENT)
Dept: REHABILITATION | Facility: HOSPITAL | Age: 30
End: 2020-09-18
Payer: MEDICAID

## 2020-09-18 DIAGNOSIS — R26.2 DIFFICULTY WALKING: ICD-10-CM

## 2020-09-18 DIAGNOSIS — R53.1 GENERALIZED WEAKNESS: ICD-10-CM

## 2020-09-18 DIAGNOSIS — M25.60 DECREASED RANGE OF MOTION: ICD-10-CM

## 2020-09-18 DIAGNOSIS — G89.29 CHRONIC BILATERAL LOW BACK PAIN WITHOUT SCIATICA: ICD-10-CM

## 2020-09-18 DIAGNOSIS — M54.50 CHRONIC BILATERAL LOW BACK PAIN WITHOUT SCIATICA: ICD-10-CM

## 2020-09-18 PROCEDURE — 97110 THERAPEUTIC EXERCISES: CPT

## 2020-09-18 NOTE — PROGRESS NOTES
Physical Therapy Progress Note     Name: Nathan Cruz Carlos  Clinic Number: [id number]    Therapy Diagnosis:   Encounter Diagnoses   Name Primary?    Chronic bilateral low back pain without sciatica     Difficulty walking     Generalized weakness     Decreased range of motion      Physician: Yo Kirkland MD    Visit Date: 9/18/2020    Physician Orders: PT Eval and Treat   Medical Diagnosis from Referral: M79.18 (ICD-10-CM) - Myofascial muscle pain  Evaluation Date: 8/5/2020  Authorization Period Expiration: 7/30/21  Plan of Care Expiration: 11/5/2020  Visit # / Visits authorized: 7 (6 of 10)    Precautions: Standard    Time In: 1:30 pm  Time Out: 2:30 pm  Total Billable Time: 45 minutes    SUBJECTIVE     Today, pt reports: she is feeling pretty good today and has noticed a decrease in pain overall, however states she does continue to have increased pain with repetitive bending such as with doing laundry  He/She was not compliant with home exercise program.  Response to previous treatment: decreased pain tension in mid and low back following session  Functional change: increased standing interventions tolerated well     Pre-Treatment Pain: 1/10, tightness in B hips   Post-Treatment Pain: 1/10  Location: B posterior hips      TREATMENT     Nathan received therapeutic exercises to develop strength, endurance, flexibility and core stabilization for 25 minutes including:    Exercise 9/18/2020   Bike (for LE strength and endurance)  Level 2, 5 minutes    Bridges with posterior pelvic tilt  3 x 10 with 10# on hips    glute stretch    Lower trunk rotations  3 minutes    Open books     Straight leg raise abduction    Series 6    Sit to stand  5#, 3 x 10 from HiLo mat  One set with weight in center, one set with weight on R, one set with weight on L   *Progress   Ball roll outs    Seated hip internal rotation Yellow band, 3 x 8    Step ups 6 inch   2 x 10 B        Nathan received the following manual therapy  techniques: Soft tissue Mobilization were applied to the: back and hips for 10 minutes, including:  STM of bilateral gluteus melissa, gluteus medius  and piriformis      Nathan participated in neuromuscular re-education activities to improve: Coordination and Posture for 10 minutes. The following activities were included:    Exercise 9/18/2020   Arm arc     Posterior pelvic tilt    Supine marches with posterior pelvic tilt  2#, 3 x 8 B     Prone W's    Abdominal bracing     Tandem stance     Scapular retractions    Side stepping with band at ankles  Yellow band, 18x in parallel bars  *Progress        Home Exercises Provided and Patient Education Provided     Education/Self-Care provided:    Patient educated on biomechanical justification for therapeutic exercise and importance of compliance with HEP in order to improve overall impairments and QOL     Written Home Exercises Provided: yes.  Exercises were reviewed and Nathan was able to demonstrate them prior to the end of the session.  Nathan demonstrated good  understanding of the education provided.     See EMR under Patient Instructions for exercises provided prior visit.    ASSESSMENT   Pt tolerated manual therapy well with reports of tolerable discomfort during intervention and decreased tension in musculature following intervention. Pt tolerated exercise well with reports of increased fatigue in musculature and mild but tolerable discomfort in musculature following interventions. Standing interventions such as side stepping, sit to stand and step ups in particular cause increased fatigue in B hips, after which the patient states she is having some discomfort. Pain returns to baseline following manual therapy.    Nathan is progressing well towards her goals.   Pt prognosis is Fair.     Pt will continue to benefit from skilled outpatient physical therapy to address the deficits listed in the problem list box on initial evaluation, provide pt/family education  and to maximize pt's level of independence in the home and community environment.     Pt's spiritual, cultural and educational needs considered and pt agreeable to plan of care and goals.     Anticipated Barriers for therapy: chronicity of pain; pt has failed PT in the past; long commute to therapy     Goals:  Short Term Goals: 4 weeks   1. Pt will be independent with HEP performance in order to continue PT progress at home.   2. Pt will report pain at worst in low back of 6/10 or less  3. Pt will demonstrate ability to don and doff personal TENS unit for pain management  4. Pt will tolerate performance of 30 minutes of exercise without pain reports higher than 6/10 throughout  5. Pt will improve hip extension strength to at least 4-/5 in order to progress gait performance and pelvic control in standing and walking         Long Term Goals: 8 weeks   1. Pt will improve FOTO disability score to 56% disability or less in order to improve overall QOL and return to PLOF.   2. Pt will demonstrate ability to stand for up to 1 hour without increased pain in low and mid back   3. Pt will report pain at worst in low/mid back of 3/10 or less  4. Pt will demonstrate ability to walk 1 mile or greater without increased pain in low back     PLAN   Continue Plan of Care (POC) and progress per patient tolerance. Continue to increase standing interventions to improve tolerance for standing for prolonged periods of time at work.     Evaluation: 8/5/2020  Progress Note: 9/4/2020  POC Expiration: 11/5/2020    Lillie Sylvester, PT, DPT

## 2020-09-22 ENCOUNTER — CLINICAL SUPPORT (OUTPATIENT)
Dept: REHABILITATION | Facility: HOSPITAL | Age: 30
End: 2020-09-22
Payer: MEDICAID

## 2020-09-22 DIAGNOSIS — M25.60 DECREASED RANGE OF MOTION: ICD-10-CM

## 2020-09-22 DIAGNOSIS — G89.29 CHRONIC BILATERAL LOW BACK PAIN WITHOUT SCIATICA: ICD-10-CM

## 2020-09-22 DIAGNOSIS — R53.1 GENERALIZED WEAKNESS: ICD-10-CM

## 2020-09-22 DIAGNOSIS — M54.50 CHRONIC BILATERAL LOW BACK PAIN WITHOUT SCIATICA: ICD-10-CM

## 2020-09-22 DIAGNOSIS — R26.2 DIFFICULTY WALKING: ICD-10-CM

## 2020-09-22 PROCEDURE — 97110 THERAPEUTIC EXERCISES: CPT | Mod: CQ

## 2020-09-22 NOTE — PROGRESS NOTES
Physical Therapy Daily Treatment Note     Name: Nathan Carlos  Clinic Number: [id number]    Therapy Diagnosis:   Encounter Diagnoses   Name Primary?    Chronic bilateral low back pain without sciatica     Difficulty walking     Generalized weakness     Decreased range of motion      Physician: Yo Kirkland MD    Visit Date: 9/22/2020    Physician Orders: PT Eval and Treat   Medical Diagnosis from Referral: M79.18 (ICD-10-CM) - Myofascial muscle pain  Evaluation Date: 8/5/2020  Authorization Period Expiration: 7/30/21  Plan of Care Expiration: 11/5/2020  Visit # / Visits authorized: 8 (7 of 10)    Precautions: Standard    Time In: 1:30 pm  Time Out: 2:30 pm  Total Billable Time: 45 minutes    SUBJECTIVE     Today, pt reports: she is hurting more today and states she feels like her increased pain is due to the weather.   She was compliant with home exercise program. Patient able to recall 25% of her home exercise program but states she does the exercises 2x/day.    Response to previous treatment: Felt good for the rest of the day.   Functional change: decreased mopping, sweeping, and bending forward.     Pre-Treatment Pain: 6/10, tightness in B hips   Post-Treatment Pain: 4/10  Location: B posterior hips      TREATMENT     Nathan received therapeutic exercises to develop strength, endurance, flexibility and core stabilization for 15 minutes including:    Exercise 9/22/2020   Bike (for LE strength and endurance)  Level 2, 5 minutes    Bridges with posterior pelvic tilt  3 x 10 with 10# on hips deferred today   glute stretch    Lower trunk rotations  3 minutes    Open books     Straight leg raise abduction    Series 6    Sit to stand  5#, 3 x 10 from HiLo mat  One set with weight in center, one set with weight on R, one set with weight on L   *Progress deferred today   Ball roll outs x5 3 directions seated   Seated hip internal rotation Yellow band, 3 x 8 deferred today   Step ups 6 inch   2 x 10 B         Nathan received the following manual therapy techniques: Soft tissue Mobilization were applied to the: back and hips for 10 minutes, including:  STM of bilateral gluteus melissa, gluteus medius  and piriformis and scarum      Nathan participated in neuromuscular re-education activities to improve: Coordination and Posture for 10 minutes. The following activities were included:    Exercise 9/22/2020   Arm arc     Posterior pelvic tilt    Supine marches with posterior pelvic tilt  2#, 3 x 8 B  deferred today   Prone W's    Abdominal bracing     Tandem stance  3 minutes   Scapular retractions    Side stepping with band at ankles  Yellow band, 18x in parallel bars        Cues for avoiding locking out knees with side steps.      Home Exercises Provided and Patient Education Provided     Education/Self-Care provided:    Patient educated on biomechanical justification for therapeutic exercise and importance of compliance with HEP in order to improve overall impairments and QOL     Written Home Exercises Provided: yes.  Exercises were reviewed and Nathan was able to demonstrate them prior to the end of the session.  Nathan demonstrated good  understanding of the education provided.     See EMR under Patient Instructions for exercises provided prior visit.    ASSESSMENT   Pt tolerated manual therapy well with reports of tolerable discomfort during intervention and decreased tension in musculature following intervention. Pt tolerated exercise well with reports of increased fatigue in musculature and mild but tolerable discomfort in musculature following interventions. Standing interventions cause fatigue in B hips and low back. Patient reports less pain following session.     Nathan is progressing well towards her goals.   Pt prognosis is Fair.     Pt will continue to benefit from skilled outpatient physical therapy to address the deficits listed in the problem list box on initial evaluation, provide pt/family  education and to maximize pt's level of independence in the home and community environment.     Pt's spiritual, cultural and educational needs considered and pt agreeable to plan of care and goals.     Anticipated Barriers for therapy: chronicity of pain; pt has failed PT in the past; long commute to therapy     Goals:  Short Term Goals: 4 weeks   1. Pt will be independent with HEP performance in order to continue PT progress at home.   2. Pt will report pain at worst in low back of 6/10 or less  3. Pt will demonstrate ability to don and doff personal TENS unit for pain management  4. Pt will tolerate performance of 30 minutes of exercise without pain reports higher than 6/10 throughout  5. Pt will improve hip extension strength to at least 4-/5 in order to progress gait performance and pelvic control in standing and walking         Long Term Goals: 8 weeks   1. Pt will improve FOTO disability score to 56% disability or less in order to improve overall QOL and return to PLOF.   2. Pt will demonstrate ability to stand for up to 1 hour without increased pain in low and mid back   3. Pt will report pain at worst in low/mid back of 3/10 or less  4. Pt will demonstrate ability to walk 1 mile or greater without increased pain in low back     PLAN   Continue Plan of Care (POC) and progress per patient tolerance. Continue to increase standing interventions to improve tolerance for standing for prolonged periods of time at work.     Evaluation: 8/5/2020  Progress Note: 9/4/2020  POC Expiration: 11/5/2020    Frederick Seals, PTA

## 2020-09-24 ENCOUNTER — HOSPITAL ENCOUNTER (EMERGENCY)
Facility: HOSPITAL | Age: 30
Discharge: HOME | End: 2020-09-24
Attending: EMERGENCY MEDICINE
Payer: MEDICAID

## 2020-09-24 ENCOUNTER — PATIENT MESSAGE (OUTPATIENT)
Dept: FAMILY MEDICINE | Facility: CLINIC | Age: 30
End: 2020-09-24

## 2020-09-24 ENCOUNTER — TELEPHONE (OUTPATIENT)
Dept: FAMILY MEDICINE | Facility: CLINIC | Age: 30
End: 2020-09-24

## 2020-09-24 VITALS
TEMPERATURE: 99 F | SYSTOLIC BLOOD PRESSURE: 150 MMHG | RESPIRATION RATE: 20 BRPM | OXYGEN SATURATION: 97 % | BODY MASS INDEX: 44.41 KG/M2 | DIASTOLIC BLOOD PRESSURE: 80 MMHG | HEIGHT: 64 IN | HEART RATE: 90 BPM | WEIGHT: 260.13 LBS

## 2020-09-24 DIAGNOSIS — G56.02 CARPAL TUNNEL SYNDROME OF LEFT WRIST: Primary | ICD-10-CM

## 2020-09-24 PROCEDURE — 29125 APPL SHORT ARM SPLINT STATIC: CPT | Mod: LT,ER

## 2020-09-24 PROCEDURE — 99283 EMERGENCY DEPT VISIT LOW MDM: CPT | Mod: 25,ER

## 2020-09-24 RX ORDER — DICLOFENAC SODIUM 50 MG/1
50 TABLET, DELAYED RELEASE ORAL 2 TIMES DAILY
Qty: 20 TABLET | Refills: 0 | Status: SHIPPED | OUTPATIENT
Start: 2020-09-24 | End: 2020-11-23

## 2020-09-24 NOTE — TELEPHONE ENCOUNTER
Called pt and pt stated that she woke up this morning and her left hand first second and third digit are numb on the tip of fingers and they hurt. Pt stated no chest pain no SOB and no numbness or tingling up the arm.

## 2020-09-24 NOTE — ED PROVIDER NOTES
Encounter Date: 9/24/2020       History     Chief Complaint   Patient presents with    Numbness     left thumb, 2nd and 3rd finger left hand since 5:30am.hx problems with carpal tunnels     The history is provided by the patient.   L wrist pain and finger numbness that began this morning. Pt denies any injury, fevers, swelling or any other symptoms at this time    Review of patient's allergies indicates:   Allergen Reactions    Amitriptyline      xerostomia    Amlodipine      DRY MOUTH     Past Medical History:   Diagnosis Date    GERD (gastroesophageal reflux disease)     Herpes simplex virus (HSV) infection     High serum testosterone 9/7/2017    Hyperlipidemia     Hypertension     Insulin resistance syndrome 9/7/2017    Lumbar facet arthropathy 8/14/2019    Morbid obesity     Ovarian cyst     bilateral      Past Surgical History:   Procedure Laterality Date    ANKLE SURGERY Left     BUNIONECTOMY Bilateral     COLONOSCOPY  06/12/2018    INJECTION OF ANESTHETIC AGENT AROUND MEDIAL BRANCH NERVES INNERVATING LUMBAR FACET JOINT Bilateral 8/14/2019    Procedure: Bilateral L3-5 MBB;  Surgeon: Yo Kirkland MD;  Location: [facility];  Service: Pain Management;  Laterality: Bilateral;    INJECTION OF ANESTHETIC AGENT AROUND MEDIAL BRANCH NERVES INNERVATING LUMBAR FACET JOINT Bilateral 7/16/2020    Procedure: Bilateral L3-5 MBB;  Surgeon: Yo Kirkland MD;  Location: [facility];  Service: Pain Management;  Laterality: Bilateral;    INJECTION OF ANESTHETIC AGENT INTO SACROILIAC JOINT Right 2/5/2020    Procedure: Right SIJ Injection with local;  Surgeon: Yo Kirkland MD;  Location: [facility];  Service: Pain Management;  Laterality: Right;    LUMBAR FUSION       Family History   Problem Relation Age of Onset    Hypertension Mother     Fibroids Mother     No Known Problems Father     Diabetes Sister     Hypertension Brother     Hypertension Maternal Aunt     Diabetes Maternal Aunt      Diabetes Maternal Uncle     Hypertension Maternal Grandmother     Leukemia Maternal Grandfather     Hypertension Maternal Grandfather     Stroke Neg Hx      Social History     Tobacco Use    Smoking status: Never Smoker    Smokeless tobacco: Never Used   Substance Use Topics    Alcohol use: Yes     Frequency: Never     Drinks per session: Patient refused     Binge frequency: Never     Comment: special occasions    Drug use: No     Review of Systems   Constitutional: Negative for fever.   HENT: Negative for sore throat.    Respiratory: Negative for shortness of breath.    Cardiovascular: Negative for chest pain.   Gastrointestinal: Negative for nausea.   Genitourinary: Negative for dysuria.   Musculoskeletal: Negative for back pain.        + L wrist pain and finger numbness   Skin: Negative for rash.   Neurological: Negative for weakness.   Hematological: Does not bruise/bleed easily.   All other systems reviewed and are negative.      Physical Exam     Initial Vitals [09/24/20 1645]   BP Pulse Resp Temp SpO2   (!) 150/80 90 20 98.9 °F (37.2 °C) 97 %      MAP       --         Physical Exam    Constitutional: She appears well-developed and well-nourished. She is not diaphoretic. No distress.   HENT:   Head: Normocephalic and atraumatic.   Eyes: Conjunctivae and EOM are normal. Pupils are equal, round, and reactive to light.   Neck: Normal range of motion. Neck supple.   Cardiovascular: Normal rate, regular rhythm and normal heart sounds.   No murmur heard.  Pulmonary/Chest: Breath sounds normal. No respiratory distress. She has no wheezes. She has no rales.   Abdominal: Soft. Bowel sounds are normal. There is no abdominal tenderness. There is no rebound and no guarding.   Musculoskeletal: Normal range of motion. No edema.      Left wrist: She exhibits tenderness.      Comments: Left Hand: No obvious deformity. There is no swelling.  There is mild tenderness to volar wrist. Full flexion and extension of the  wrist. Radial, median, and ulnar nerves are intact. Radial and ulnar pulses are 2+. Normal capillary refill.  Distal sensation is intact. + Tinels and phalens   Neurological: She is alert and oriented to person, place, and time. No cranial nerve deficit. GCS score is 15. GCS eye subscore is 4. GCS verbal subscore is 5. GCS motor subscore is 6.   Skin: Skin is warm and dry. Capillary refill takes less than 2 seconds.   Psychiatric: She has a normal mood and affect. Thought content normal.         ED Course   Splint Application    Date/Time: 9/24/2020 4:50 PM  Performed by: JEAN CLAUDE Espinoza Jr.  Authorized by: Frederick Gibson MD   Consent Done: Yes  Consent: Verbal consent obtained.  Consent given by: patient  Location details: left wrist  Splint type: volar short arm (velcro)  Post-procedure: The splinted body part was neurovascularly unchanged following the procedure.  Patient tolerance: Patient tolerated the procedure well with no immediate complications        Labs Reviewed - No data to display       Imaging Results    None                                      Clinical Impression:     ICD-10-CM ICD-9-CM   1. Carpal tunnel syndrome of left wrist  G56.02 354.0                          ED Disposition Condition    Discharge Stable        ED Prescriptions     Medication Sig Dispense Start Date End Date Auth. Provider    diclofenac (VOLTAREN) 50 MG EC tablet Take 1 tablet (50 mg total) by mouth 2 (two) times daily. 20 tablet 9/24/2020  JEAN CLAUDE Espinoza Jr.        Follow-up Information     Follow up With Specialties Details Why Contact Info    Qasim Paez NP General Practice In 1 week  [address] [facility] [address]  [phone]                                         JEAN CLAUDE Espinoza Jr.  09/24/20 [id number]

## 2020-09-24 NOTE — ED NOTES
Aaox3, skin warm and dry, resp unlabored and even. amb with steady gait and sin well. C/o numbness since this am left thumb, 2nd and 3rd fingers.

## 2020-09-24 NOTE — TELEPHONE ENCOUNTER
----- Message from Demi Hood sent at 2020  3:10 PM CDT -----  Regardin finger and her thumb on her left hand is numb  Contact: pt  Caller is requesting a call back regarding her fingers and her thumb on her left hand is numb.  Please call back at [phone].  Thanks.

## 2020-09-25 ENCOUNTER — HOSPITAL ENCOUNTER (OUTPATIENT)
Dept: RADIOLOGY | Facility: HOSPITAL | Age: 30
Discharge: HOME | End: 2020-09-25
Attending: FAMILY MEDICINE
Payer: MEDICAID

## 2020-09-25 ENCOUNTER — OFFICE VISIT (OUTPATIENT)
Dept: ORTHOPEDICS | Facility: CLINIC | Age: 30
End: 2020-09-25
Payer: MEDICAID

## 2020-09-25 ENCOUNTER — TELEPHONE (OUTPATIENT)
Dept: ORTHOPEDICS | Facility: CLINIC | Age: 30
End: 2020-09-25

## 2020-09-25 VITALS
SYSTOLIC BLOOD PRESSURE: 116 MMHG | DIASTOLIC BLOOD PRESSURE: 84 MMHG | HEIGHT: 64 IN | HEART RATE: 97 BPM | WEIGHT: 260.13 LBS | BODY MASS INDEX: 44.41 KG/M2

## 2020-09-25 DIAGNOSIS — M25.532 LEFT WRIST PAIN: ICD-10-CM

## 2020-09-25 DIAGNOSIS — G56.00 CARPAL TUNNEL SYNDROME, UNSPECIFIED LATERALITY: ICD-10-CM

## 2020-09-25 DIAGNOSIS — M25.532 LEFT WRIST PAIN: Primary | ICD-10-CM

## 2020-09-25 DIAGNOSIS — E11.9 TYPE 2 DIABETES MELLITUS WITHOUT COMPLICATION, WITHOUT LONG-TERM CURRENT USE OF INSULIN: ICD-10-CM

## 2020-09-25 DIAGNOSIS — R20.2 PARESTHESIAS IN LEFT HAND: Primary | ICD-10-CM

## 2020-09-25 DIAGNOSIS — G56.00 CARPAL TUNNEL SYNDROME, UNSPECIFIED LATERALITY: Primary | ICD-10-CM

## 2020-09-25 PROCEDURE — 73130 X-RAY EXAM OF HAND: CPT | Mod: TC,LT

## 2020-09-25 PROCEDURE — 99999 PR PBB SHADOW E&M-EST. PATIENT-LVL IV: CPT | Mod: PBBFAC,,, | Performed by: FAMILY MEDICINE

## 2020-09-25 PROCEDURE — 73110 X-RAY EXAM OF WRIST: CPT | Mod: 26,LT,, | Performed by: RADIOLOGY

## 2020-09-25 PROCEDURE — 99214 PR OFFICE/OUTPT VISIT, EST, LEVL IV, 30-39 MIN: ICD-10-PCS | Mod: S$PBB,,, | Performed by: FAMILY MEDICINE

## 2020-09-25 PROCEDURE — 73130 XR HAND COMPLETE 3 VIEW LEFT: ICD-10-PCS | Mod: 26,LT,, | Performed by: RADIOLOGY

## 2020-09-25 PROCEDURE — 99999 PR PBB SHADOW E&M-EST. PATIENT-LVL IV: ICD-10-PCS | Mod: PBBFAC,,, | Performed by: FAMILY MEDICINE

## 2020-09-25 PROCEDURE — 73110 X-RAY EXAM OF WRIST: CPT | Mod: TC,LT

## 2020-09-25 PROCEDURE — 99214 OFFICE O/P EST MOD 30 MIN: CPT | Mod: PBBFAC,25 | Performed by: FAMILY MEDICINE

## 2020-09-25 PROCEDURE — 73110 XR WRIST COMPLETE 3 VIEWS LEFT: ICD-10-PCS | Mod: 26,LT,, | Performed by: RADIOLOGY

## 2020-09-25 PROCEDURE — 73130 X-RAY EXAM OF HAND: CPT | Mod: 26,LT,, | Performed by: RADIOLOGY

## 2020-09-25 PROCEDURE — 99214 OFFICE O/P EST MOD 30 MIN: CPT | Mod: S$PBB,,, | Performed by: FAMILY MEDICINE

## 2020-09-25 NOTE — LETTER
September 25, 2020      Qasim Paez, NP  [address] [place] [address]           [facility]  [address]  [place] [address]  Phone: [phone]  Fax: [phone]          Patient: Nathan Carlos   MR Number: [id number]   YOB: 1990   Date of Visit: 9/25/2020       Dear Qasim Paez:    Thank you for referring Nathan Carlos to me for evaluation. Attached you will find relevant portions of my assessment and plan of care.    If you have questions, please do not hesitate to call me. I look forward to following Nathan Carlos along with you.    Sincerely,    Andrea Ayala MD    Enclosure  CC:  No Recipients    If you would like to receive this communication electronically, please contact [email or web address] or [phone] to request more information on [employer] access.    For providers and/or their staff who would like to refer a patient to [facility], please contact us through our one-stop-shop provider referral line, [facility], at [phone].    If you feel you have received this communication in error or would no longer like to receive these types of communications, please e-mail [email or web address]

## 2020-09-25 NOTE — PROGRESS NOTES
Subjective:     Patient ID: Nathan Carlos is a 29 y.o. female.    Chief Complaint: Numbness of the Left Hand and Pain of the Left Wrist      HPI:  Woke up with left hand paresthesias in the thumb index and middle fingers 1 day ago.  No injury.  Seen in ER and given left wrist splint.  Not working at present but this causes problems with ADLs at home.  Problem - left hand paresthesias    Duration - 24 hr    Severity -    Pain - 7   Limitations - affects ADLs    Previous Treatment - splint started  today    Other Information - does not know of any repetitive motion that would aggravate this problem.  Told in ER that this was probably carpal tunnel syndrome.  Right-hand dominant.    Past Medical History:   Diagnosis Date    Carpal tunnel syndrome     GERD (gastroesophageal reflux disease)     Herpes simplex virus (HSV) infection     High serum testosterone 9/7/2017    Hyperlipidemia     Hypertension     Insulin resistance syndrome 9/7/2017    Lumbar facet arthropathy 8/14/2019    Morbid obesity     Ovarian cyst     bilateral      Past Surgical History:   Procedure Laterality Date    ANKLE SURGERY Left     BUNIONECTOMY Bilateral     COLONOSCOPY  06/12/2018    INJECTION OF ANESTHETIC AGENT AROUND MEDIAL BRANCH NERVES INNERVATING LUMBAR FACET JOINT Bilateral 8/14/2019    Procedure: Bilateral L3-5 MBB;  Surgeon: Yo Kirkland MD;  Location: [facility];  Service: Pain Management;  Laterality: Bilateral;    INJECTION OF ANESTHETIC AGENT AROUND MEDIAL BRANCH NERVES INNERVATING LUMBAR FACET JOINT Bilateral 7/16/2020    Procedure: Bilateral L3-5 MBB;  Surgeon: Yo Kirkland MD;  Location: [facility];  Service: Pain Management;  Laterality: Bilateral;    INJECTION OF ANESTHETIC AGENT INTO SACROILIAC JOINT Right 2/5/2020    Procedure: Right SIJ Injection with local;  Surgeon: Yo Kirkland MD;  Location: [facility];  Service: Pain Management;  Laterality: Right;    LUMBAR FUSION       Family History    Problem Relation Age of Onset    Hypertension Mother     Fibroids Mother     No Known Problems Father     Diabetes Sister     Hypertension Brother     Hypertension Maternal Aunt     Diabetes Maternal Aunt     Diabetes Maternal Uncle     Hypertension Maternal Grandmother     Leukemia Maternal Grandfather     Hypertension Maternal Grandfather     Stroke Neg Hx      Social History     Socioeconomic History    Marital status: Single     Spouse name: Not on file    Number of children: 0    Years of education: Not on file    Highest education level: Not on file   Occupational History     Employer: [id number]   Social Needs    Financial resource strain: Somewhat hard    Food insecurity     Worry: Never true     Inability: Never true    Transportation needs     Medical: No     Non-medical: No   Tobacco Use    Smoking status: Never Smoker    Smokeless tobacco: Never Used   Substance and Sexual Activity    Alcohol use: Yes     Frequency: Never     Drinks per session: Patient refused     Binge frequency: Never     Comment: special occasions    Drug use: No    Sexual activity: Yes     Partners: Male     Birth control/protection: Condom, Pill   Lifestyle    Physical activity     Days per week: 1 day     Minutes per session: 0 min    Stress: Very much   Relationships    Social connections     Talks on phone: Three times a week     Gets together: More than three times a week     Attends [employer] service: Not on file     Active member of club or organization: No     Attends meetings of clubs or organizations: Never     Relationship status: Never    Other Topics Concern    Not on file   Social History Narrative    Not on file       Current Outpatient Medications:     blood sugar diagnostic Strp, To check BG 2 times daily, to use with insurance preferred meter, Disp: 100 strip, Rfl: 2    blood-glucose meter kit, To check BG 2 times daily, to use with insurance preferred meter, Disp: 1  each, Rfl: 0    diclofenac (VOLTAREN) 50 MG EC tablet, Take 1 tablet (50 mg total) by mouth 2 (two) times daily., Disp: 20 tablet, Rfl: 0    empagliflozin (JARDIANCE) 10 mg tablet, Take 1 tablet (10 mg total) by mouth once daily., Disp: 30 tablet, Rfl: 6    irbesartan (AVAPRO) 75 MG tablet, Take 1 tablet (75 mg total) by mouth once daily., Disp: 30 tablet, Rfl: 6    lancets Misc, To check BG 2 times daily, to use with insurance preferred meter, Disp: 100 each, Rfl: 2    metFORMIN (GLUCOPHAGE-XR) 500 MG ER 24hr tablet, Take 1 tablet (500 mg total) by mouth daily with dinner or evening meal., Disp: 90 tablet, Rfl: 1    metoprolol succinate (TOPROL-XL) 50 MG 24 hr tablet, Take 1 tablet (50 mg total) by mouth once daily., Disp: 90 tablet, Rfl: 1    mupirocin calcium 2% (BACTROBAN) 2 % cream, Apply to affected area 3 times daily, Disp: 30 g, Rfl: 0    norgestimate-ethinyl estradioL (ORTHO-CYCLEN) 0.25-35 mg-mcg per tablet, Patient to take only the active pills., Disp: 84 tablet, Rfl: 4    SITagliptin (JANUVIA) 25 MG Tab, Take 1 tablet (25 mg total) by mouth once daily., Disp: 90 tablet, Rfl: 1    spironolactone (ALDACTONE) 25 MG tablet, Take 2 tablets (50 mg total) by mouth once daily., Disp: 180 tablet, Rfl: 1    tiZANidine (ZANAFLEX) 4 MG tablet, Take 1/2 to 1 tab BID PRN muscle spasms. May cause drowsiness., Disp: 60 tablet, Rfl: 0    traZODone (DESYREL) 50 MG tablet, Take 1 tablet (50 mg total) by mouth nightly as needed for Insomnia., Disp: 30 tablet, Rfl: 6    valACYclovir (VALTREX) 500 MG tablet, TAKE 1 TABLET BY MOUTH TWICE DAILY FOR 3 DAYS, Disp: 6 tablet, Rfl: 1  Review of patient's allergies indicates:   Allergen Reactions    Amitriptyline      xerostomia    Amlodipine      DRY MOUTH     Review of Systems   Constitutional: Negative for chills, fever and weight loss.   Respiratory: Negative for shortness of breath.    Cardiovascular: Negative for chest pain and palpitations.       Objective:    Body mass index is 44.65 kg/m².  Vitals:    09/25/20 1504   BP: 116/84   Pulse: 97           Ortho/SPM Exam  General - A&O, NAD.     Respiratory Effort - Normal    Extremity (Body Part) - left hand   -No acute deformity/swelling    ROM - full volar and dorsiflexion    TTP - no tenderness    Stability -normal    Strength -  strength 3/5 compared to 5/5 on the right hand    Neurovascular Intact - negative Tinel and negative Phalen    Other -     Psychiatric - Affect & Cognition WNL      Plan for Improvement - Voltaren gel, Tylenol, nerve conduction study then see hand specialist.      IMAGING: [place] Fluoro for Pain Management  See OP Notes for results.     IMPRESSION: See OP Notes for results.     This procedure was auto-finalized by: Virtual Radiologist       Radiographs / Imaging : Relevant imaging results reviewed by me and interpreted by me, discussed with the patient and / or family       Assessment:     Encounter Diagnoses   Name Primary?    Carpal tunnel syndrome, unspecified laterality     Paresthesias in left hand Yes    Type 2 diabetes mellitus without complication, without long-term current use of insulin         Plan:   Paresthesias in left hand  -     EMG W/ ULTRASOUND AND NERVE CONDUCTION TEST 1 Extremity; Future    Carpal tunnel syndrome, unspecified laterality  -     Ambulatory referral/consult to Orthopedics    Type 2 diabetes mellitus without complication, without long-term current use of insulin        The patient verbalized good understanding of the medical issues discussed today and expressed appreciation for the care provided.  Patient was given the opportunity to ask questions and be an active participant in their medical care. Patient had no further questions or concerns at this time.     The patient was encouraged to participate in appropriate physical activity.      Andrea Ayala M.D.  [facility]        This note was dictated using voice recognition software and may  have sound a like errors.

## 2020-09-25 NOTE — PATIENT INSTRUCTIONS
Apply ice to affected area three times a day for (15) fifteen minutes for the next 48 hours, and reduce activity during that time.  Voltaren gel three times a day to affected area if recommended.  Oral medication if recommended.  Physical therapy if recommended at home or at clinic.  Keep recheck visit.      Try Voltaren gel 3 times a day    Tylenol as needed for pain    Nerve conduction study and then see hand specialist

## 2020-09-26 ENCOUNTER — HOSPITAL ENCOUNTER (EMERGENCY)
Facility: HOSPITAL | Age: 30
Discharge: HOME | End: 2020-09-26
Attending: STUDENT IN AN ORGANIZED HEALTH CARE EDUCATION/TRAINING PROGRAM
Payer: MEDICAID

## 2020-09-26 VITALS
OXYGEN SATURATION: 98 % | TEMPERATURE: 103 F | RESPIRATION RATE: 20 BRPM | HEART RATE: 102 BPM | DIASTOLIC BLOOD PRESSURE: 56 MMHG | HEIGHT: 64 IN | SYSTOLIC BLOOD PRESSURE: 114 MMHG | BODY MASS INDEX: 44.04 KG/M2 | WEIGHT: 257.94 LBS

## 2020-09-26 DIAGNOSIS — U07.1 COVID-19 VIRUS INFECTION: Primary | ICD-10-CM

## 2020-09-26 DIAGNOSIS — R05.9 COUGH: ICD-10-CM

## 2020-09-26 DIAGNOSIS — U07.1 COVID-19 VIRUS DETECTED: ICD-10-CM

## 2020-09-26 LAB
ALBUMIN SERPL BCP-MCNC: 3.5 G/DL (ref 3.5–5.2)
ALP SERPL-CCNC: 65 U/L (ref 55–135)
ALT SERPL W/O P-5'-P-CCNC: 228 U/L (ref 10–44)
ANION GAP SERPL CALC-SCNC: 11 MMOL/L (ref 8–16)
AST SERPL-CCNC: 242 U/L (ref 10–40)
B-HCG UR QL: NEGATIVE
BASOPHILS # BLD AUTO: 0.02 K/UL (ref 0–0.2)
BASOPHILS NFR BLD: 0.2 % (ref 0–1.9)
BILIRUB SERPL-MCNC: 0.3 MG/DL (ref 0.1–1)
BUN SERPL-MCNC: 9 MG/DL (ref 6–20)
CALCIUM SERPL-MCNC: 8.7 MG/DL (ref 8.7–10.5)
CHLORIDE SERPL-SCNC: 99 MMOL/L (ref 95–110)
CO2 SERPL-SCNC: 27 MMOL/L (ref 23–29)
CREAT SERPL-MCNC: 0.8 MG/DL (ref 0.5–1.4)
D DIMER PPP IA.FEU-MCNC: 1.51 MG/L FEU
DIFFERENTIAL METHOD: NORMAL
EOSINOPHIL # BLD AUTO: 0 K/UL (ref 0–0.5)
EOSINOPHIL NFR BLD: 0.1 % (ref 0–8)
ERYTHROCYTE [DISTWIDTH] IN BLOOD BY AUTOMATED COUNT: 13.2 % (ref 11.5–14.5)
EST. GFR  (AFRICAN AMERICAN): >60 ML/MIN/1.73 M^2
EST. GFR  (NON AFRICAN AMERICAN): >60 ML/MIN/1.73 M^2
GLUCOSE SERPL-MCNC: 194 MG/DL (ref 70–110)
HCT VFR BLD AUTO: 43.2 % (ref 37–48.5)
HGB BLD-MCNC: 14.3 G/DL (ref 12–16)
IMM GRANULOCYTES # BLD AUTO: 0.02 K/UL (ref 0–0.04)
IMM GRANULOCYTES NFR BLD AUTO: 0.2 % (ref 0–0.5)
LYMPHOCYTES # BLD AUTO: 2 K/UL (ref 1–4.8)
LYMPHOCYTES NFR BLD: 23.1 % (ref 18–48)
MCH RBC QN AUTO: 27.4 PG (ref 27–31)
MCHC RBC AUTO-ENTMCNC: 33.1 G/DL (ref 32–36)
MCV RBC AUTO: 83 FL (ref 82–98)
MONOCYTES # BLD AUTO: 0.7 K/UL (ref 0.3–1)
MONOCYTES NFR BLD: 7.5 % (ref 4–15)
NEUTROPHILS # BLD AUTO: 5.9 K/UL (ref 1.8–7.7)
NEUTROPHILS NFR BLD: 68.9 % (ref 38–73)
NRBC BLD-RTO: 0 /100 WBC
PLATELET # BLD AUTO: 175 K/UL (ref 150–350)
PMV BLD AUTO: 11.6 FL (ref 9.2–12.9)
POCT GLUCOSE: 186 MG/DL (ref 70–110)
POTASSIUM SERPL-SCNC: 3.7 MMOL/L (ref 3.5–5.1)
PROT SERPL-MCNC: 6.8 G/DL (ref 6–8.4)
RBC # BLD AUTO: 5.21 M/UL (ref 4–5.4)
SARS-COV-2 RDRP RESP QL NAA+PROBE: POSITIVE
SODIUM SERPL-SCNC: 137 MMOL/L (ref 136–145)
WBC # BLD AUTO: 8.63 K/UL (ref 3.9–12.7)

## 2020-09-26 PROCEDURE — 96360 HYDRATION IV INFUSION INIT: CPT | Mod: ER

## 2020-09-26 PROCEDURE — U0002 COVID-19 LAB TEST NON-CDC: HCPCS | Mod: ER

## 2020-09-26 PROCEDURE — 82962 GLUCOSE BLOOD TEST: CPT | Mod: ER

## 2020-09-26 PROCEDURE — 85025 COMPLETE CBC W/AUTO DIFF WBC: CPT | Mod: ER

## 2020-09-26 PROCEDURE — 99284 EMERGENCY DEPT VISIT MOD MDM: CPT | Mod: 25,ER

## 2020-09-26 PROCEDURE — 81025 URINE PREGNANCY TEST: CPT | Mod: ER

## 2020-09-26 PROCEDURE — 25500020 PHARM REV CODE 255: Mod: ER | Performed by: STUDENT IN AN ORGANIZED HEALTH CARE EDUCATION/TRAINING PROGRAM

## 2020-09-26 PROCEDURE — 25000003 PHARM REV CODE 250: Mod: ER | Performed by: STUDENT IN AN ORGANIZED HEALTH CARE EDUCATION/TRAINING PROGRAM

## 2020-09-26 PROCEDURE — 85379 FIBRIN DEGRADATION QUANT: CPT | Mod: ER

## 2020-09-26 PROCEDURE — 80053 COMPREHEN METABOLIC PANEL: CPT | Mod: ER

## 2020-09-26 RX ORDER — ACETAMINOPHEN 500 MG
1000 TABLET ORAL
Status: COMPLETED | OUTPATIENT
Start: 2020-09-26 | End: 2020-09-26

## 2020-09-26 RX ORDER — IBUPROFEN 200 MG
400 TABLET ORAL
Status: DISCONTINUED | OUTPATIENT
Start: 2020-09-26 | End: 2020-09-26 | Stop reason: HOSPADM

## 2020-09-26 RX ADMIN — SODIUM CHLORIDE 1000 ML: 0.9 INJECTION, SOLUTION INTRAVENOUS at 07:09

## 2020-09-26 RX ADMIN — ACETAMINOPHEN 1000 MG: 500 TABLET ORAL at 06:09

## 2020-09-26 RX ADMIN — IOHEXOL 100 ML: 350 INJECTION, SOLUTION INTRAVENOUS at 08:09

## 2020-09-26 NOTE — ED PROVIDER NOTES
Encounter Date: 9/26/2020       History     Chief Complaint   Patient presents with    COVID-19 Concerns     29-year-old obese female with history of hypertension, insulin resistance, and GERD.  She presents today due to feeling weak with body aches.  She says she began having some sinus congestion 4 days ago.  Since then she has had the onset of muscle aches and feeling weak.  She says that she has had decreased appetite due to food not tasting rate.  She denies any chest pain, shortness of breath, or sick contacts.    The history is provided by the patient and medical records.   Fever  Primary symptoms of the febrile illness include fever, fatigue and myalgias. Primary symptoms do not include headaches, cough, shortness of breath, abdominal pain, nausea, vomiting, diarrhea, arthralgias or rash. The current episode started 3 to 5 days ago. This is a new problem. The problem has been gradually worsening.   The maximum temperature recorded prior to her arrival was 102 to 102.9 F.   The fatigue began 2 days ago. The fatigue has been worsening since its onset.   Myalgias began 2 days ago. The myalgias have been gradually worsening since their onset. The myalgias are generalized. The myalgias are dull. The myalgias are associated with weakness.     Review of patient's allergies indicates:   Allergen Reactions    Amitriptyline      xerostomia    Amlodipine      DRY MOUTH     Past Medical History:   Diagnosis Date    Carpal tunnel syndrome     GERD (gastroesophageal reflux disease)     Herpes simplex virus (HSV) infection     High serum testosterone 9/7/2017    Hyperlipidemia     Hypertension     Insulin resistance syndrome 9/7/2017    Lumbar facet arthropathy 8/14/2019    Morbid obesity     Ovarian cyst     bilateral      Past Surgical History:   Procedure Laterality Date    ANKLE SURGERY Left     BUNIONECTOMY Bilateral     COLONOSCOPY  06/12/2018    INJECTION OF ANESTHETIC AGENT AROUND MEDIAL BRANCH  NERVES INNERVATING LUMBAR FACET JOINT Bilateral 8/14/2019    Procedure: Bilateral L3-5 MBB;  Surgeon: Yo Kirkland MD;  Location: [facility];  Service: Pain Management;  Laterality: Bilateral;    INJECTION OF ANESTHETIC AGENT AROUND MEDIAL BRANCH NERVES INNERVATING LUMBAR FACET JOINT Bilateral 7/16/2020    Procedure: Bilateral L3-5 MBB;  Surgeon: Yo Kirkland MD;  Location: [facility];  Service: Pain Management;  Laterality: Bilateral;    INJECTION OF ANESTHETIC AGENT INTO SACROILIAC JOINT Right 2/5/2020    Procedure: Right SIJ Injection with local;  Surgeon: Yo Kirkland MD;  Location: [facility];  Service: Pain Management;  Laterality: Right;    LUMBAR FUSION       Family History   Problem Relation Age of Onset    Hypertension Mother     Fibroids Mother     No Known Problems Father     Diabetes Sister     Hypertension Brother     Hypertension Maternal Aunt     Diabetes Maternal Aunt     Diabetes Maternal Uncle     Hypertension Maternal Grandmother     Leukemia Maternal Grandfather     Hypertension Maternal Grandfather     Stroke Neg Hx      Social History     Tobacco Use    Smoking status: Never Smoker    Smokeless tobacco: Never Used   Substance Use Topics    Alcohol use: Yes     Frequency: Never     Drinks per session: Patient refused     Binge frequency: Never     Comment: special occasions    Drug use: No     Review of Systems   Constitutional: Positive for fatigue and fever. Negative for chills.   HENT: Negative for congestion, dental problem, ear pain, facial swelling, nosebleeds and sinus pressure.    Eyes: Negative for pain and discharge.   Respiratory: Negative for cough, shortness of breath and stridor.    Cardiovascular: Negative for chest pain and palpitations.   Gastrointestinal: Negative for abdominal distention, abdominal pain, constipation, diarrhea, nausea and vomiting.   Genitourinary: Negative for difficulty urinating, flank pain, frequency and urgency.    Musculoskeletal: Positive for myalgias. Negative for arthralgias and neck pain.   Skin: Negative for rash and wound.   Neurological: Positive for weakness. Negative for dizziness, syncope, numbness and headaches.   Psychiatric/Behavioral: Negative for hallucinations and suicidal ideas.       Physical Exam     Initial Vitals [09/26/20 1756]   BP Pulse Resp Temp SpO2   133/77 (!) 112 20 (!) 102.8 °F (39.3 °C) 96 %      MAP       --         Physical Exam    Nursing note and vitals reviewed.  Constitutional: She appears well-developed and well-nourished. No distress.   HENT:   Head: Normocephalic and atraumatic.   Right Ear: External ear normal.   Left Ear: External ear normal.   Nose: Nose normal.   Mouth/Throat: Oropharynx is clear and moist.   Eyes: Conjunctivae and EOM are normal. Pupils are equal, round, and reactive to light.   Neck: Normal range of motion. Neck supple.   Cardiovascular: Normal rate, regular rhythm, normal heart sounds and intact distal pulses.   Pulmonary/Chest: Breath sounds normal. No stridor. No respiratory distress. She has no wheezes. She has no rhonchi. She has no rales.   Abdominal: Soft. Bowel sounds are normal. There is no abdominal tenderness.   Musculoskeletal: Normal range of motion. No tenderness or edema.   Neurological: She is alert and oriented to person, place, and time. She has normal strength. No cranial nerve deficit or sensory deficit.   Skin: Skin is warm and dry. No rash noted.   Psychiatric: She has a normal mood and affect. Thought content normal.         ED Course   Procedures  Labs Reviewed   SARS-COV-2 RNA AMPLIFICATION, QUAL - Abnormal; Notable for the following components:       Result Value    SARS-CoV-2 RNA, Amplification, Qual Positive (*)     All other components within normal limits   COMPREHENSIVE METABOLIC PANEL - Abnormal; Notable for the following components:    Glucose 194 (*)      (*)      (*)     All other components within normal limits    D DIMER, QUANTITATIVE - Abnormal; Notable for the following components:    D-Dimer 1.51 (*)     All other components within normal limits   POCT GLUCOSE - Abnormal; Notable for the following components:    POCT Glucose 186 (*)     All other components within normal limits   CBC W/ AUTO DIFFERENTIAL   PREGNANCY TEST, URINE RAPID    Narrative:     Specimen Source->Urine          Imaging Results          CTA Chest Non-Coronary - PE Study (Final result)  Result time 09/26/20 20:14:06    Final result by Keegan Teran MD (09/26/20 20:14:06)                 Impression:      Findings favor COVID-19 similar to the same date radiograph.    All CT scans at this facility are performed  using dose modulation techniques as appropriate to performed exam including the following:  automated exposure control; adjustment of mA and/or kV according to the patients size (this includes techniques or standardized protocols for targeted exams where dose is matched to indication/reason for exam: i.e. extremities or head);  iterative reconstruction technique.      Electronically signed by: Keegan Teran  Date:    09/26/2020  Time:    20:14             Narrative:    EXAMINATION:  CTA CHEST NON CORONARY    CLINICAL HISTORY:  PE suspected, intermediate prob, positive D-dimer;    TECHNIQUE:  Low dose axial images, sagittal and coronal reformations were obtained from the thoracic inlet to the lung bases following the IV administration of 100 mL of Omnipaque 350.  Contrast timing was optimized to evaluate the pulmonary arteries.  MIP images were performed.    COMPARISON:  Chest radiograph 09/26/2020    FINDINGS:  Base of Neck: No significant abnormality.    Thoracic soft tissues: Unremarkable.    Aorta: Left-sided aortic arch.  No aneurysm and no significant atherosclerosis    Heart: Normal size. No effusion.    Pulmonary vasculature: Pulmonary arteries are well opacified.  There is no pulmonary thromboembolism.    Sharona/Mediastinum: No  pathologic sagar enlargement.    Airways: Patent.    Lungs/Pleura: Scattered peripheral predominantly basilar opacities.  Some of these are solid in other sub solid.  Some but the pleura and some do not.  There is no pleural fluid or pneumothorax.  Findings are suggestive of COVID-19.    Esophagus: Unremarkable.    Upper Abdomen: No abnormality of the partially imaged upper abdomen.    Bones: No acute fracture. No suspicious lytic or sclerotic lesions.                               X-Ray Chest AP Portable (Final result)  Result time 09/26/20 20:02:15    Final result by Keegan Teran MD (09/26/20 20:02:15)                 Impression:      As above.      Electronically signed by: Keegan Teran  Date:    09/26/2020  Time:    20:02             Narrative:    EXAMINATION:  XR CHEST AP PORTABLE    CLINICAL HISTORY:  Cough    TECHNIQUE:  Single frontal view of the chest was performed.    COMPARISON:  None    FINDINGS:  Questionable peripheral subsolid opacities, nonspecific.  No pleural effusions.  Findings are indeterminate but coronavirus could be considered.    The cardiac silhouette is normal in size. The hilar and mediastinal contours are unremarkable.    Bones are intact.                              X-Rays:   Independently Interpreted Readings:   Chest X-Ray: Nonspecific opacities bilaterally.     Medical Decision Making:   Independently Interpreted Test(s):   I have ordered and independently interpreted X-rays - see prior notes.  Clinical Tests:   Lab Tests: Ordered and Reviewed  Radiological Study: Ordered and Reviewed    Additional MDM:   PERC Rule:   Age is greater than or equal to 50 = 0.0  Heart Rate is greater than or equal to 100 = 1.0  SaO2 on room air < 95% = 0.0  Unilateral leg swelling = 0.0  Hemoptysis = 0.0  Recent surgery or trauma = 0.0  Prior PE or DVT =  0.0  Hormone use = 0.00  PERC Score = 1    Well's Criteria Score:  -Clinical symptoms of DVT (leg swelling, pain with palpation) = 0.0  -Other  diagnosis less likely than pulmonary embolism =            0.0  -Heart Rate >100 =   1.5  -Immobilization (= or > than 3 days) or surgery in the previous 4 weeks = 0.0  -Previous DVT/PE = 0.0  -Hemoptysis =          0.0  -Malignancy =           0.0  Well's Probability Score =    1.5                 Attending Attestation:             Attending ED Notes:   29-year-old female presents due to fever, myalgias, and fatigue.  Found to be positive for coronavirus.  Lab work unremarkable other than mildly elevated D-dimer and transaminitis.  Transaminitis could be explained by viral illness.  Due to hypercoagulability of coronavirus infection and elevated D-dimer a CTA was obtained which did not show any evidence of pulmonary embolus.  Patient without any episodes of hypoxia or breathing difficulties while in the emergency department.  Patient is stable on room air and safe for discharge.  Told to utilize Tylenol and ibuprofen for symptomatic relief.  Patient also told that she can utilize Afrin as needed for sinus congestion.                    Clinical Impression:       ICD-10-CM ICD-9-CM   1. COVID-19 virus infection  U07.1    2. Cough  R05 786.2                          ED Disposition Condition    Discharge Stable        ED Prescriptions     None        Follow-up Information     Follow up With Specialties Details Why Contact Info    Qasim Paez NP General Practice Schedule an appointment as soon as possible for a visit in 3 days For follow-up on today's visit., As needed [address]  [facility] [address]  [phone]      [facility] Go to  As needed, If symptoms worsen [id number] [place] [id number] [phone]                                       Vikram Hall MD  09/27/20 3704

## 2020-09-28 ENCOUNTER — TELEPHONE (OUTPATIENT)
Dept: PHYSICAL MEDICINE AND REHAB | Facility: CLINIC | Age: 30
End: 2020-09-28

## 2020-09-28 ENCOUNTER — PATIENT MESSAGE (OUTPATIENT)
Dept: FAMILY MEDICINE | Facility: CLINIC | Age: 30
End: 2020-09-28

## 2020-09-28 DIAGNOSIS — U07.1 COVID-19 VIRUS INFECTION: Primary | ICD-10-CM

## 2020-09-28 RX ORDER — DEXAMETHASONE 6 MG/1
6 TABLET ORAL DAILY
Qty: 10 TABLET | Refills: 0 | Status: SHIPPED | OUTPATIENT
Start: 2020-09-28 | End: 2020-10-08

## 2020-09-28 RX ORDER — BUTALBITAL, ACETAMINOPHEN AND CAFFEINE 50; 325; 40 MG/1; MG/1; MG/1
1 TABLET ORAL
Qty: 45 TABLET | Refills: 0 | Status: SHIPPED | OUTPATIENT
Start: 2020-09-28 | End: 2020-11-23

## 2020-09-28 RX ORDER — ALBUTEROL SULFATE 90 UG/1
2 AEROSOL, METERED RESPIRATORY (INHALATION)
Qty: 18 G | Refills: 0 | Status: SHIPPED | OUTPATIENT
Start: 2020-09-28 | End: 2020-11-23

## 2020-09-30 ENCOUNTER — PATIENT MESSAGE (OUTPATIENT)
Dept: FAMILY MEDICINE | Facility: CLINIC | Age: 30
End: 2020-09-30

## 2020-10-01 ENCOUNTER — NURSE TRIAGE (OUTPATIENT)
Dept: ADMINISTRATIVE | Facility: CLINIC | Age: 30
End: 2020-10-01

## 2020-10-01 DIAGNOSIS — U07.1 COVID-19 VIRUS INFECTION: Primary | ICD-10-CM

## 2020-10-01 DIAGNOSIS — R05.9 COUGH: ICD-10-CM

## 2020-10-01 RX ORDER — PROMETHAZINE HYDROCHLORIDE AND DEXTROMETHORPHAN HYDROBROMIDE 6.25; 15 MG/5ML; MG/5ML
5 SYRUP ORAL
Qty: 118 ML | Refills: 0 | Status: SHIPPED | OUTPATIENT
Start: 2020-10-01 | End: 2020-11-23

## 2020-10-01 NOTE — TELEPHONE ENCOUNTER
Pt escalated in home monitoring queue. Pt states her symptoms are not getting worse they are actually getting better. She only gets mildly SOB when walking and that makes her start coughing but pt states this is getting better. Her PCP called in a rx for cough medicine today and she has steroids and inhaler already that help. Gave number to [facility] if symptoms worsen or any further concerns.    Reason for Disposition   [1] COVID-19 diagnosed by positive lab test AND [2] mild symptoms (e.g., cough, fever, others) AND [3] no complications or SOB    Additional Information   Negative: Severe difficulty breathing (e.g., struggling for each breath, speaks in single words)   Negative: Difficult to awaken or acting confused (e.g., disoriented, slurred speech)   Negative: Bluish (or gray) lips or face now   Negative: Shock suspected (e.g., cold/pale/clammy skin, too weak to stand, low BP, rapid pulse)   Negative: Sounds like a life-threatening emergency to the triager   Negative: [1] COVID-19 exposure AND [2] no symptoms   Negative: COVID-19 and Breastfeeding, questions about   Negative: [1] Adult with possible COVID-19 symptoms AND [2] triager concerned about severity of symptoms or other causes   Negative: SEVERE or constant chest pain or pressure (Exception: mild central chest pain, present only when coughing)   Negative: MODERATE difficulty breathing (e.g., speaks in phrases, SOB even at rest, pulse 100-120)   Negative: MILD difficulty breathing (e.g., minimal/no SOB at rest, SOB with walking, pulse <100)   Negative: Chest pain   Negative: Patient sounds very sick or weak to the triager   Negative: Fever > 103 F (39.4 C)   Negative: [1] Fever > 101 F (38.3 C) AND [2] age > 60   Negative: [1] Fever > 100.0 F (37.8 C) AND [2] bedridden (e.g., nursing home patient, CVA, chronic illness, recovering from surgery)   Negative: HIGH RISK patient (e.g., age > 64 years, diabetes, heart or lung disease, weak immune  system) (Exception: has already been evaluated by healthcare provider and has no new or worsening symptoms)   Negative: [1] COVID-19 infection suspected by caller or triager AND [2] mild symptoms (cough, fever, or others) AND [3] no complications or SOB   Negative: Fever present > 3 days (72 hours)   Negative: [1] Fever returns after gone for over 24 hours AND [2] symptoms worse or not improved   Negative: [1] Continuous (nonstop) coughing interferes with work or school AND [2] no improvement using cough treatment per protocol   Negative: Cough present > 3 weeks    Protocols used: CORONAVIRUS (COVID-19) DIAGNOSED OR [id number]

## 2020-10-07 ENCOUNTER — PATIENT MESSAGE (OUTPATIENT)
Dept: FAMILY MEDICINE | Facility: CLINIC | Age: 30
End: 2020-10-07

## 2020-10-07 DIAGNOSIS — R39.198 DIFFICULTY URINATING: Primary | ICD-10-CM

## 2020-10-14 ENCOUNTER — OFFICE VISIT (OUTPATIENT)
Dept: ORTHOPEDICS | Facility: CLINIC | Age: 30
End: 2020-10-14
Payer: MEDICAID

## 2020-10-14 VITALS
DIASTOLIC BLOOD PRESSURE: 88 MMHG | HEART RATE: 70 BPM | BODY MASS INDEX: 43.87 KG/M2 | HEIGHT: 64 IN | SYSTOLIC BLOOD PRESSURE: 129 MMHG | WEIGHT: 257 LBS

## 2020-10-14 DIAGNOSIS — G56.02 CARPAL TUNNEL SYNDROME OF LEFT WRIST: Primary | ICD-10-CM

## 2020-10-14 PROCEDURE — 99999 PR PBB SHADOW E&M-EST. PATIENT-LVL IV: CPT | Mod: PBBFAC,,, | Performed by: ORTHOPAEDIC SURGERY

## 2020-10-14 PROCEDURE — 99213 PR OFFICE/OUTPT VISIT, EST, LEVL III, 20-29 MIN: ICD-10-PCS | Mod: S$PBB,,, | Performed by: ORTHOPAEDIC SURGERY

## 2020-10-14 PROCEDURE — 99213 OFFICE O/P EST LOW 20 MIN: CPT | Mod: S$PBB,,, | Performed by: ORTHOPAEDIC SURGERY

## 2020-10-14 PROCEDURE — 99999 PR PBB SHADOW E&M-EST. PATIENT-LVL IV: ICD-10-PCS | Mod: PBBFAC,,, | Performed by: ORTHOPAEDIC SURGERY

## 2020-10-14 PROCEDURE — 99214 OFFICE O/P EST MOD 30 MIN: CPT | Mod: PBBFAC | Performed by: ORTHOPAEDIC SURGERY

## 2020-10-14 NOTE — PROGRESS NOTES
Subjective:     Patient ID: Nathan Carlos is a 29 y.o. female.    Chief Complaint: Pain of the Left Hand    The patient is a 29-year-old female with left carpal tunnel syndrome symptomatic for the last several months.  The numbness is intermittent.  The numbness lasts more than an hour during the days.  She has tried splinting without improvement.  She is scheduled for nerve conduction studies 10/20/2020      Past Medical History:   Diagnosis Date    Carpal tunnel syndrome     GERD (gastroesophageal reflux disease)     Herpes simplex virus (HSV) infection     High serum testosterone 9/7/2017    Hyperlipidemia     Hypertension     Insulin resistance syndrome 9/7/2017    Lumbar facet arthropathy 8/14/2019    Morbid obesity     Ovarian cyst     bilateral      Past Surgical History:   Procedure Laterality Date    ANKLE SURGERY Left     BUNIONECTOMY Bilateral     COLONOSCOPY  06/12/2018    INJECTION OF ANESTHETIC AGENT AROUND MEDIAL BRANCH NERVES INNERVATING LUMBAR FACET JOINT Bilateral 8/14/2019    Procedure: Bilateral L3-5 MBB;  Surgeon: Yo Kirkland MD;  Location: [facility];  Service: Pain Management;  Laterality: Bilateral;    INJECTION OF ANESTHETIC AGENT AROUND MEDIAL BRANCH NERVES INNERVATING LUMBAR FACET JOINT Bilateral 7/16/2020    Procedure: Bilateral L3-5 MBB;  Surgeon: Yo Kirkland MD;  Location: [facility];  Service: Pain Management;  Laterality: Bilateral;    INJECTION OF ANESTHETIC AGENT INTO SACROILIAC JOINT Right 2/5/2020    Procedure: Right SIJ Injection with local;  Surgeon: Yo Kirkland MD;  Location: [facility];  Service: Pain Management;  Laterality: Right;    LUMBAR FUSION       Family History   Problem Relation Age of Onset    Hypertension Mother     Fibroids Mother     No Known Problems Father     Diabetes Sister     Hypertension Brother     Hypertension Maternal Aunt     Diabetes Maternal Aunt     Diabetes Maternal Uncle     Hypertension Maternal  Grandmother     Leukemia Maternal Grandfather     Hypertension Maternal Grandfather     Stroke Neg Hx      Social History     Socioeconomic History    Marital status: Single     Spouse name: Not on file    Number of children: 0    Years of education: Not on file    Highest education level: Not on file   Occupational History     Employer: [id number]   Social Needs    Financial resource strain: Somewhat hard    Food insecurity     Worry: Never true     Inability: Never true    Transportation needs     Medical: No     Non-medical: No   Tobacco Use    Smoking status: Never Smoker    Smokeless tobacco: Never Used   Substance and Sexual Activity    Alcohol use: Yes     Frequency: Never     Drinks per session: Patient refused     Binge frequency: Never     Comment: special occasions    Drug use: No    Sexual activity: Yes     Partners: Male     Birth control/protection: Condom, Pill   Lifestyle    Physical activity     Days per week: 1 day     Minutes per session: 0 min    Stress: Very much   Relationships    Social connections     Talks on phone: Three times a week     Gets together: More than three times a week     Attends Buddhist service: Not on file     Active member of club or organization: No     Attends meetings of clubs or organizations: Never     Relationship status: Never    Other Topics Concern    Not on file   Social History Narrative    Not on file     Medication List with Changes/Refills   Current Medications    ALBUTEROL (PROVENTIL/VENTOLIN HFA) 90 MCG/ACTUATION INHALER    Inhale 2 puffs into the lungs every 4 to 6 hours as needed for Wheezing.    BLOOD SUGAR DIAGNOSTIC STRP    To check BG 2 times daily, to use with insurance preferred meter    BLOOD-GLUCOSE METER KIT    To check BG 2 times daily, to use with insurance preferred meter    BUTALBITAL-ACETAMINOPHEN-CAFFEINE -40 MG (FIORICET, ESGIC) -40 MG PER TABLET    Take 1 tablet by mouth every 4 to 6 hours  as needed for Headaches.    DICLOFENAC (VOLTAREN) 50 MG EC TABLET    Take 1 tablet (50 mg total) by mouth 2 (two) times daily.    EMPAGLIFLOZIN (JARDIANCE) 10 MG TABLET    Take 1 tablet (10 mg total) by mouth once daily.    IRBESARTAN (AVAPRO) 75 MG TABLET    Take 1 tablet (75 mg total) by mouth once daily.    LANCETS MISC    To check BG 2 times daily, to use with insurance preferred meter    METFORMIN (GLUCOPHAGE-XR) 500 MG ER 24HR TABLET    Take 1 tablet (500 mg total) by mouth daily with dinner or evening meal.    METOPROLOL SUCCINATE (TOPROL-XL) 50 MG 24 HR TABLET    Take 1 tablet by mouth once daily    MUPIROCIN CALCIUM 2% (BACTROBAN) 2 % CREAM    Apply to affected area 3 times daily    NORGESTIMATE-ETHINYL ESTRADIOL (ORTHO-CYCLEN) 0.25-35 MG-MCG PER TABLET    Patient to take only the active pills.    PROMETHAZINE-DEXTROMETHORPHAN (PROMETHAZINE-DM) 6.25-15 MG/5 ML SYRP    Take 5 mLs by mouth every 4 to 6 hours as needed.    SITAGLIPTIN (JANUVIA) 25 MG TAB    Take 1 tablet (25 mg total) by mouth once daily.    SPIRONOLACTONE (ALDACTONE) 25 MG TABLET    Take 2 tablets by mouth once daily    TIZANIDINE (ZANAFLEX) 4 MG TABLET    Take 1/2 to 1 tab BID PRN muscle spasms. May cause drowsiness.    TRAZODONE (DESYREL) 50 MG TABLET    Take 1 tablet (50 mg total) by mouth nightly as needed for Insomnia.    VALACYCLOVIR (VALTREX) 500 MG TABLET    TAKE 1 TABLET BY MOUTH TWICE DAILY FOR 3 DAYS     Review of patient's allergies indicates:   Allergen Reactions    Amitriptyline      xerostomia    Amlodipine      DRY MOUTH     Review of Systems   Constitution: Negative for malaise/fatigue.   HENT: Negative for hearing loss.    Eyes: Negative for double vision and visual disturbance.   Cardiovascular: Negative for chest pain.   Respiratory: Negative for shortness of breath.    Endocrine: Negative for cold intolerance.   Hematologic/Lymphatic: Does not bruise/bleed easily.   Skin: Negative for poor wound healing and suspicious  lesions.   Musculoskeletal: Positive for back pain and falls. Negative for gout, joint pain and joint swelling.   Gastrointestinal: Positive for heartburn. Negative for nausea and vomiting.   Genitourinary: Negative for dysuria.   Neurological: Positive for numbness, paresthesias, sensory change and weakness.   Psychiatric/Behavioral: Negative for depression, memory loss and substance abuse. The patient is not nervous/anxious.    Allergic/Immunologic: Negative for persistent infections.       Objective:   Body mass index is 44.11 kg/m².  Vitals:    10/14/20 0912   BP: 129/88   Pulse: 70                General    Constitutional: She is oriented to person, place, and time. She appears well-developed and well-nourished. No distress.   HENT:   Head: Normocephalic.   Eyes: EOM are normal.   Neck: Normal range of motion.   Pulmonary/Chest: Effort normal.   Neurological: She is oriented to person, place, and time.   Psychiatric: She has a normal mood and affect.             Right Hand/Wrist Exam     Inspection   Scars: Wrist - absent Hand -  absent  Effusion: Wrist - absent Hand -  absent    Pain   Wrist - The patient exhibits pain of the flexor/pronator group.    Tests   Phalens sign: positive  Tinel's sign (median nerve): positive  Carpal Tunnel Compression Test: positive    Atrophy   Thenar:  negative  Intrinsic:  negative    Other     Neuorologic Exam    Median Distribution: abnormal  Ulnar Distribution: normal  Radial Distribution: normal    Comments:  The patient has a positive Tinel and positive Phalen sign.  There is no thenar atrophy noted.      Left Hand/Wrist Exam     Inspection   Scars: Wrist - absent Hand -  absent  Effusion: Wrist - absent Hand -  absent    Pain   Wrist - The patient exhibits pain of the flexor/pronator group.    Tests   Phalens sign: positive  Tinel's sign (median nerve): positive  Carpal Tunnel Compression Test: positive    Atrophy  Thenar:  Negative  Intrinsic: negative    Other      Sensory Exam  Median Distribution: abnormal  Ulnar Distribution: normal  Radial Distribution: normal    Comments:  The patient has a positive Tinel and positive Phalen sign.  There is no thenar atrophy noted.          Vascular Exam       Capillary Refill  Right Hand: normal capillary refill  Left Hand: normal capillary refill     Radiographs were not obtained today  Assessment:     Encounter Diagnosis   Name Primary?    Carpal tunnel syndrome of left wrist Yes        Plan:       The patient has tried a wrist splint without improvement.  She is scheduled for nerve conduction studies and will return with the studies.              Disclaimer: This note was prepared using a voice recognition system and is likely to have sound alike errors within the text.

## 2020-10-20 ENCOUNTER — OFFICE VISIT (OUTPATIENT)
Dept: PHYSICAL MEDICINE AND REHAB | Facility: CLINIC | Age: 30
End: 2020-10-20
Payer: MEDICAID

## 2020-10-20 ENCOUNTER — PATIENT OUTREACH (OUTPATIENT)
Dept: ADMINISTRATIVE | Facility: OTHER | Age: 30
End: 2020-10-20

## 2020-10-20 VITALS
DIASTOLIC BLOOD PRESSURE: 86 MMHG | HEIGHT: 64 IN | WEIGHT: 257 LBS | BODY MASS INDEX: 43.87 KG/M2 | RESPIRATION RATE: 14 BRPM | SYSTOLIC BLOOD PRESSURE: 125 MMHG | HEART RATE: 87 BPM

## 2020-10-20 DIAGNOSIS — G56.03 BILATERAL CARPAL TUNNEL SYNDROME: Primary | ICD-10-CM

## 2020-10-20 DIAGNOSIS — E66.01 MORBID OBESITY WITH BMI OF 40.0-44.9, ADULT: ICD-10-CM

## 2020-10-20 PROCEDURE — 95913 NRV CNDJ TEST 13/> STUDIES: CPT | Mod: 26,S$PBB,, | Performed by: PHYSICAL MEDICINE & REHABILITATION

## 2020-10-20 PROCEDURE — 99204 OFFICE O/P NEW MOD 45 MIN: CPT | Mod: 25,S$PBB,, | Performed by: PHYSICAL MEDICINE & REHABILITATION

## 2020-10-20 PROCEDURE — 99999 PR PBB SHADOW E&M-EST. PATIENT-LVL IV: ICD-10-PCS | Mod: PBBFAC,,, | Performed by: PHYSICAL MEDICINE & REHABILITATION

## 2020-10-20 PROCEDURE — 95913 NRV CNDJ TEST 13/> STUDIES: CPT | Mod: PBBFAC | Performed by: PHYSICAL MEDICINE & REHABILITATION

## 2020-10-20 PROCEDURE — 99214 OFFICE O/P EST MOD 30 MIN: CPT | Mod: PBBFAC | Performed by: PHYSICAL MEDICINE & REHABILITATION

## 2020-10-20 PROCEDURE — 99204 PR OFFICE/OUTPT VISIT, NEW, LEVL IV, 45-59 MIN: ICD-10-PCS | Mod: 25,S$PBB,, | Performed by: PHYSICAL MEDICINE & REHABILITATION

## 2020-10-20 PROCEDURE — 99999 PR PBB SHADOW E&M-EST. PATIENT-LVL IV: CPT | Mod: PBBFAC,,, | Performed by: PHYSICAL MEDICINE & REHABILITATION

## 2020-10-20 PROCEDURE — 95913 PR NERVE CONDUCTION STUDY; 13 OR MORE STUDIES: ICD-10-PCS | Mod: 26,S$PBB,, | Performed by: PHYSICAL MEDICINE & REHABILITATION

## 2020-10-20 NOTE — PATIENT INSTRUCTIONS
Carpal Tunnel Syndrome    Carpal tunnel syndrome is a painful condition of the wrist and arm. It is caused by pressure on the median nerve.  The median nerve is one of the nerves that give feeling and movement to the hand. It passes through a tunnel in the wrist called the carpal tunnel. This tunnel is made up of bones and ligaments. Narrowing of this tunnel or swelling of the tissues inside the tunnel puts pressure on the median nerve. This causes numbness, pins and needles, or electric shooting pains in your hand and forearm. Often the pain is worse at night and may wake you when you are asleep.  Carpal tunnel syndrome may occur during pregnancy and with use of birth control pills. It is more common in workers who must often bend their wrists. It is also common in people who work with power tools that cause strong vibrations.  Home care  · Rest the painful wrist. Avoid repeated bending of the wrist back and forth. This puts pressure on the median nerve. Avoid using power tools with strong vibrations.  · If you were given a splint, wear it at night while you sleep. You may also wear it during the day for comfort.  · Move your fingers and wrists often to avoid stiffness.  · Elevate your arms on pillows when you lie down.  · Try using the unaffected hand more.  · Try not to hold your wrists in a bent, downward position.  · Sometimes changes in the work place may ease symptoms. If you type most of the day, it may help to change the position of your keyboard or add a wrist support. Your wrist should be in a neutral position and not bent back when typing.  · You may use over-the-counter pain medicine to treat pain and inflammation, unless another medicine was prescribed. Anti-inflammatory pain medicines, such as ibuprofen or naproxen may be more effective than acetaminophen, which treats pain, but not inflammation. If you have chronic liver or kidney disease or ever had a stomach ulcer or GI bleeding, talk with your  doctor before using these medicines.  · Opioid pain medicine will only give temporary relief and does not treat the problem. If pain continues, you may need a shot of a steroid drug into your wrist.  · If the above methods fail, you may need surgery. This will open the carpal tunnel and release the pressure on the trapped nerve.  Follow-up care  Follow up with your healthcare provider, or as advised, if the pain doesnt begin to improve within the next week.  If X-rays were taken, you will be notified of any new findings that may affect your care.  When to seek medical advice  Call your healthcare provider right away if any of these occur:  · Pain not improving with the above treatment  · Fingers or hand become cold, blue, numb, or tingly  · Your whole arm becomes swollen or weak  Date Last Reviewed: 11/23/2015  © 2298-1101 [employer]. [address] [place] [address]. All rights reserved. This information is not intended as a substitute for professional medical care. Always follow your healthcare professional's instructions.        Carpal Tunnel Syndrome Prevention Tips  Some repetitive hand activities put you at higher risk for carpal tunnel syndrome (CTS). But you can reduce your risk. Learn how to change the way you use your hands. Below are tips for at home and on the job. Be sure to also follow the hand and wrist safety policies at your workplace.      Keep your wrist in a neutral (straight) position when exercising.      Keep your wrist in neutral  Keep a neutral (straight) wrist position as often as you can. Dont use your wrist in a bent (flexed) position for long periods of time. This includes extended or twisted positions.  Watch your   Dont just use your thumb and index finger to grasp or lift. This can put stress on your wrist. When you can, use your whole hand and all its fingers to grasp an object.  Minimize repetition  Dont move your arms or hands or hold an object in  the same way for long periods of time. Even simple, light tasks can cause injury this way. Instead, alternate tasks or switch hands.  Rest your hands  Give your hands a break from time to time with a rest. Even a few minutes once an hour can help.  Reduce speed and force  Slow down the speed in which you do a forceful, repetitive motion. This gives your wrist time to recover from the effort. Use power tools to help reduce the force.  Strengthen the muscles  Weak muscles may lead to a poor wrist or arm position. Exercises will make your hand and arm muscles stronger. This can help you keep a better position.  Date Last Reviewed: 9/11/2015 © 2000-2017 [employer]. [address] [place] [address], [place] [address]. All rights reserved. This information is not intended as a substitute for professional medical care. Always follow your healthcare professional's instructions.        Understanding Carpal Tunnel Syndrome    The carpal tunnel is a narrow space inside the wrist. It is ringed by bone and a band of tough tissue called the transverse carpal ligament. A major nerve called the median nerve runs from the forearm into the hand through the carpal tunnel. Tendons also run through the carpal tunnel.  With carpal tunnel syndrome, the tendons or nearby tissues within the carpal tunnel may swell or thicken. Or the transverse carpal ligament may harden and shorten. This narrows the space in the carpal tunnel and puts pressure on the median nerve. This pressure leads to tingling and numbness of the hand and wrist. In time, the condition can make even simple tasks hard to do.  What causes carpal tunnel syndrome?  Doctors arent entirely clear why the condition occurs. Certain things may make a person more likely to have it. These include:  · Being female  · Being pregnant  · Being overweight  · Having diabetes or rheumatoid arthritis  Symptoms of carpal tunnel syndrome  Symptoms often come and go. At first, symptoms  may occur mainly at night. Later, they may be noticed during the day as well. They may get worse with activities such as driving, reading, typing, or holding a phone. Symptoms can include:  · Tingling and numbness in the hand or wrist  · Sharp pain that shoots up the arm or down to the fingers  · Hand stiffness or cramping, especially in the morning  · Trouble making a fist  · Hand weakness and clumsiness  Treatment for carpal tunnel syndrome  Certain treatments help reduce the pressure on the median nerve and relieve symptoms. Choices for treatment may include one or more of the following:  · Wrist splint. This involves wearing a special brace on the wrist and hand. The splint holds the wrist straight, in a neutral position. This helps keep the carpal tunnel as open as possible.  · Cortisone shots. Cortisone is a medicine that helps reduce swelling. It is injected directly into the wrist. It helps shrink tissues inside the carpal tunnel. This relieves symptoms for a time.  · Pain medicines. You may take over-the-counter or prescription medicines to help reduce swelling and relieve symptoms.  · Surgery. If the condition doesnt respond to other treatments and doesnt go away on its own, you may need surgery. During surgery, the surgeon cuts the transverse carpal ligament to relieve pressure on the median nerve.     When to call your healthcare provider  Call your healthcare provider right away if you have any of these:  · Fever of 100.4°F (38°C) or higher, or as directed  · Symptoms that dont get better, or get worse  · New symptoms   Date Last Reviewed: 3/10/2016  © 3620-6370 [employer]. [address] [place] [address], [place] [address]. All rights reserved. This information is not intended as a substitute for professional medical care. Always follow your healthcare professional's instructions.      Weight Management: Exercise and Activity    Studies show that people who exercise are the most likely to lose  weight and keep it off. Exercise burns calories. It helps build muscle to make your body stronger. Make exercise an important part of your weight-management plan.  Make activity part of your day  You may not think you have the time to exercise. But you can work activity into your daily life--you just need to be committed. Take 10 minutes out of your lunch hour to take a walk. Walk to the [employer] to get your paper instead of having it delivered. Make it a habit to take the stairs instead of the elevator. Park in a far away parking spot instead of the closest. Youll be surprised at how fast these little changes can make a difference.  Some people really cannot walk very far, and tire out quickly with exercise. Instead of becoming discouraged, resolve to do what you can do, and work to make that a regular frequent habit.   The benefits of exercise  Exercise offers many benefits including:   · Exercise increases your metabolism (the speed at which your body burns calories).  · Regular exercise can increase the amount of muscle in your body. Muscle burns calories faster than fat. The more muscle you have, the more calories you burn.  · Exercise gives you energy and curbs your appetite.  · Exercise decreases stress and helps you sleep better.  Make exercise fun  Exercise can be fun. Choose an activity you enjoy. You may even get a friend to do it with you:  · Take a resistance-training or aerobics class  · Join a team sport  · Take a dance class  · Walk the dog  · Ride a bike  If you have health problems, be sure to ask your healthcare provider before you start an exercise program. Have a  help you develop a plan thats safe for you.   Date Last Reviewed: 2/4/2016 © 2000-2017 [employer]. [address] [place] [address]. All rights reserved. This information is not intended as a substitute for professional medical care. Always follow your healthcare professional's  instructions.        Weight Management: Fact and Fiction    Knowing the truth about losing weight can help you separate what works from what doesnt. Dont be taken in by expensive weight-loss fads like pills, herbs, and special foods that promise unbelievable results. Theres no magic way to lose weight. If you have questions about weight loss, ask your healthcare provider.  Fiction: The faster I lose weight, the better.  Fact: Rapid weight loss is usually due to loss of water or muscle mass. What youre trying to get rid of is extra fat. Aim to lose a 1/2 pound to 2 pounds a week. Then youre more likely to lose fat rather than water or muscle.  Fiction: Skipping meals will help me lose weight.  Fact: When you skip meals, you dont give your body the energy it needs to work. Hunger makes you more likely to overeat later on. Its best to spread your meals throughout the day. Eat at least three meals a day.  Fiction: I cant start exercising until I lose weight.  Fact: The sooner you start exercising the better. Exercise helps burn more calories, tone your muscles, and keep your appetite in check. People who continue to exercise after they lose weight are more likely to keep the weight off.  Fiction: The fewer calories I eat, the better.  Fact: This seems like it should be true, but its not. When you eat too few calories, your body acts as if its on a desert island. It thinks food is scarce, so it slows down your metabolism (how fast you burn calories) to save energy. By eating too few calories, you make it harder to lose weight.  Fiction: Once I lose weight, I can go back to living the way I did before.  Fact: Going back to your old eating habits and giving up exercise is a sure way to regain any weight youve lost. The lifestyle changes that help you lose extra weight can also help keep it off. This is why you need to make realistic changes you can stick with.  Fiction: Low-fat and fat-free mean  low-calorie.  Fact: All foods, even fat-free ones, have calories. Eat too many calories and youll gain weight. Its OK to treat yourself to a fat-free cookie or two. Just dont eat the whole box! A dietitian will help you figure this out, and will likely recommend that you eat three meals a day, with protein with each meal.   Date Last Reviewed: 2/4/2016  © 2836-7370 [employer]. [address] [place] [address], [place] [address]. All rights reserved. This information is not intended as a substitute for professional medical care. Always follow your healthcare professional's instructions.        Weight Management: Getting Started  Healthy bodies come in all shapes and sizes. Not all bodies are made to be thin. For some people, a healthy weight is higher than the average weight listed on weight charts. Your healthcare provider can help you decide on a healthy weight for you.    Reasons to lose weight  Losing weight can help with some health problems, such as high blood pressure, heart disease, diabetes, sleep apnea, and arthritis. You may also feel more energy.  Set your long-term goal  Your goal doesn't even have to be a specific weight. You may decide on a fitness goal (such as being able to walk 10 miles a week), or a health goal (such as lowering your blood pressure). Choose a goal that is measurable and reasonable, so you know when you've reached it. A goal of reaching a BMI of less than 25 is not always reasonable (or possible).   Make an action plan  Habits dont change overnight. Setting your goals too high can leave you feeling discouraged if you cant reach them. Be realistic. Choose one or two small changes you can make now. Set an action plan for how you are going to make these changes. When you can stick to this plan, keep making a few more small changes. Taking small steps will help you stay on the path to success.  Track your progress  Write down your goals. Then, keep a daily record of your  progress. Write down what you eat and how active you are. This record lets you look back on how much youve done. It may also help when youre feeling frustrated. Reward yourself for success. Even if you dont reach every goal, give yourself credit for what you do get done.  Get support  Encouragement from others can help make losing weight easier. Ask your family members and friends for support. They may even want to join you. Also look to your healthcare provider, registered dietitian, and  for help. Your local hospital can give you more information about nutrition, exercise, and weight loss.  Date Last Reviewed: 1/31/2016 © 2000-2017 [employer]. [address] [place] [address], [place] [address]. All rights reserved. This information is not intended as a substitute for professional medical care. Always follow your healthcare professional's instructions.        Weight Management: Healthy Eating  Food is your bodys fuel. You cant live without it. The key is to give your body enough nutrients and energy without eating too much. Reading food labels can help you make healthy choices. Also, learn new eating habits to manage your weight.     All the values on the label are based on one serving. The serving size is the average portion. Remember to multiply the values on the label by the number of servings you eat.   Eat less fat  A gram of fat has almost 2.5 times the calories of a gram of protein or carbohydrates. Try to balance your food choices so that only 20% to 35% of your calories comes from total fat. This means an average of 2½ to 3½ grams of fat for each 100 calories you eat.  Eat more fiber  High-fiber foods are digested more slowly than low-fiber foods, so you feel full longer. Try to get at least 25 grams of fiber each day for a 2000 calorie diet. Foods high in fiber include:  · Vegetables and fruits  · Whole-grain or bran breads, pastas, and cereals  · Legumes (beans) and  peas  As you begin to eat more fiber, be sure to drink plenty of water to keep your digestive system working smoothly.  Tips  Do's and don'ts include:   · Dont skip meals. This often leads to overeating later on. Its best to spread your eating throughout the day.  · Eat a variety of foods, not just a few favorites.  · If you find yourself eating when youre not hungry, ask yourself why. Many of us eat when were bored, stressed, or just to be polite. Listen to your body. If youre not hungry, get busy doing something else instead of eating.  · Eat slower, shooting for 20 to 30 minutes for each meal. It takes 20 minutes for your stomach to tell your brain that its full. Slow eaters tend to eat less and are still satisfied, while fast eaters may tend to be overeaters.   · Pay attention to what you eat. Dont read or watch TV during your meal.  Date Last Reviewed: 1/31/2016 © 2000-2017 [employer]. [address] [place] [address], [place] [address]. All rights reserved. This information is not intended as a substitute for professional medical care. Always follow your healthcare professional's instructions.        Weight Management: Overcoming Your Barriers    You may have many reasons why youre not ready to lose weight. You may not feel you have the time or the skills. You may be afraid of losing weight and gaining it back again. Well, you can lose weight. And you can keep the weight off, if you make changes slowly and stick with them. Remember that you may never find the perfect time to lose weight. Decide that the right time to be healthier is now.  Common barriers  Barrier 1: I dont want to deny myself.  Barrier Buster: You dont have to! Moderation is the key:  · Watch portion sizes and know when you're eating more than one serving.  · Plan to ask for a doggy bag when you eat out.  · Have just one.  · Choose lower-fat and lower-calorie versions of your favorites.  · Use a small plate instead of a  normal-sized plate.   Barrier 2: I lost weight before but I gained it right back.  Barrier Buster: Make this time different:  · List what worked and didnt work last time and what you can try this time.  · Choose changes that you are willing to stick with.  · Work exercise into your weight-loss plan.  · Be realistic about what is possible. Your plan has to fit into your life in a balanced way that works for you.   Barrier 3: I dont have the time to be active.  Barrier Buster: It takes just a few minutes a day!  · Be active with a pet or the kids.  · Block off activity time in your schedule.  · Borrow some time that you usually spend watching TV.  · You are too important not to take time to exercise--it is your life!   Feel good about yourself  Do you eat more because you feel bad about yourself, then feel even worse as you gain weight? This is a vicious cycle. Breaking this cycle is not easy. You may need group support or counseling. Always remember that you are a valuable person, no matter what size or shape you are.  Do you have a health problem? If so, dont use it as an excuse for not losing weight. Ask your healthcare provider or dietitian about methods to lose weight that are safe for you. For example, even if you have severe arthritis, it may be easier for you to exercise in a pool. Get advice from a .    Date Last Reviewed: 2/2/2016  © 1049-2317 [employer]. [address] [place] [address], [place] [address]. All rights reserved. This information is not intended as a substitute for professional medical care. Always follow your healthcare professional's instructions.        Weight Management: Take It Off and Keep It Off    Its easy to be motivated when you first start. The key is to stay motivated all along the way and to have realistic and achievable goals. There are things you can do to keep yourself on the path to success.  Dont focus on daily weight gains and losses. Instead,  weigh yourself no more than once a week at the same time of day. Weighing yourself each day will probably only frustrate you.    Stay motivated  Here are suggestions to keep you motivated:   · Remind yourself of your goals. Post them near the refrigerator or desk where you work.  · Make daily entries in your diary or journal about your activity and eating. A visual reminder of success, like a gold star, can help keep you going.  · Every week, take time to look back on how much youve accomplished, and the changes you may have made.  · Try taking a nutrition class. It can help you learn new shopping, cooking, and eating skills, and also meet new people. You might try a low-fat cooking or yoga class.  · Dont be hard on yourself or give up if you slip. Be patient. Learn from your mistakes and adjust your plan if you need to. Then get right back to it.  · Be realistic about your goals. Talk with a dietitian or your healthcare provider about what goals are reasonable for you.   Believe that you can do it  How you think about yourself is just as important as what you do. If you dont think you can succeed, chances are you wont. Believe that you can stick to your plan and meet your goals:  · If you dont meet a goal, dont use it as an excuse to give up on your whole plan. Adjust your goal and try again.  · Try to understand your own attitude about food.  Are you subject to emotional eating?  · Learn how to accept compliments. Even if you get embarrassed, just say thank you.  · Make a list of the things that others like about you and that you like about yourself. Add something new from time to time. Keep this list to look at when you need a lift.  Resources  · The [employer] on Fitness, Sports & Nutritionwww.fitness.gov  · Academy of Nutrition and Dieteticswww.eatright.org  · Healthfinderwww.healthfinder.gov  Date Last Reviewed: 2/4/2016  © 9553-8729 [employer]. [address] [place] [address]  [place] [address]. All rights reserved. This information is not intended as a substitute for professional medical care. Always follow your healthcare professional's instructions.

## 2020-10-20 NOTE — PROGRESS NOTES
[facility]  Department of Physiatry  [facility]   [address] [place] [address]           Full Name: Nathan Carlos YOB: 1990  Patient ID: [id number]      Visit Date: 10/20/2020 08:53  Age: 29 Years 9 Months Old  Examining Physician: Niurka Elkins M.D.  Referring Physician: Dr.Jeffrey Ayala  History: ue numbness      Chief Complaint   Patient presents with    Hand Pain     left hand pain, numbness       HPI: This is a 29 y.o.  female being seen in clinic today for evaluation of chronic left hand achy pain with numbness/tingling into her fingertips.  She has tried bracing without significant relief.  With increased hand usage, her symptoms can worsen.     History obtained from patient    Past family, medical, social, and surgical history reviewed in chart    Review of Systems:     General- denies lethargy, weight change, fever, chills  Head/neck- denies swallowing difficulties  ENT- denies hearing changes  Cardiovascular-denies chest pain  Pulmonary- denies shortness of breath  GI- denies constipation or bowel incontinence  - denies bladder incontinence  Skin- denies wounds or rashes  Musculoskeletal- denies weakness, +pain  Neurologic- +numbness and tingling  Psychiatric- denies depressive or psychotic features, denies anxiety  Lymphatic-denies swelling  Endocrine- denies hypoglycemic symptoms/DM history  All other pertinent systems negative     Physical Examination:  General: Well developed, well nourished female, NAD, obese habitus  HEENT:NCAT EOMI bilaterally   Pulmonary:Normal respirations    Spinal Examination: CERVICAL  Active ROM is within normal limits.  Inspection: No deformity of spinal alignment.  Palpation: No vertebral tenderness to percussion.      Musculoskeletal Tests:  Phalen: +On left  Elbow compression (ulnar): neg  Tinels at wrist: neg    Bilateral Upper and Lower Extremities:  Pulses are 2+ at radial  bilaterally.  Shoulder/Elbow/Wrist/Hand ROM wnl  Hip/Knee/Ankle ROM   Bilateral Extremities show normal capillary refill.  No signs of cyanosis, rubor, edema, skin changes, or dysvascular changes of appendages.  Nails appear intact.    Neurological Exam:  Cranial Nerves:  II-XII grossly intact    Manual Muscle Testing: (Motor 5=normal)  5/5 strength bilateral upper extremities    No focal atrophy is noted of either upper extremity.    Bilateral Reflexes:hypo at bic tri br  Parra's response is absent bilaterally.    Sensation: tested to light touch  - intact in arms except dec at left 1st-3rd digit  Gait: Narrow base and good arm swing.      Entire procedure explained to patient prior to proceeding.  Verbal consent obtained      Sensory NCS      Nerve / Sites Rec. Site Onset Lat Peak Lat NP Amp PP Amp Segments Distance Velocity     ms ms µV µV  mm m/s   L Median - Digit II (Antidromic)      Wrist Dig II 3.07 3.75 28.2 33.0 Wrist - Dig  46   R Median - Digit II (Antidromic)      Wrist Dig II 2.97 3.70 26.4 15.3 Wrist - Dig  47   L Ulnar - Digit V (Antidromic)      Wrist Dig V 2.40 3.13 26.3 28.4 Wrist - Dig V 140 58   R Ulnar - Digit V (Antidromic)      Wrist Dig V 2.50 3.23 16.0 21.7 Wrist - Dig V 140 56   L Radial - Anatomical snuff box (Forearm)      Forearm Wrist 1.25 2.14 13.0 12.4 Forearm - Wrist 100 80   R Radial - Anatomical snuff box (Forearm)      Forearm Wrist 1.51 1.93 13.0 20.5 Forearm - Wrist 100 66       Combined Sensory Index      Nerve / Sites Rec. Site Peak Lat NP Amp PP Amp Segments Peak Diff     ms µV µV  ms   L Median - CSI      Median Thumb 3.02 31.6 42.3 Median - Radial 1.04      Radial Thumb 1.98 21.7 20.0 Median - Ulnar       CSI     CSI 1.04   R Median - CSI      Median Thumb 3.02 10.8 17.1 Median - Radial 0.83      Radial Thumb 2.19 8.2 9.0 Median - Ulnar 0.36      Median Ring 3.70 24.9 26.4 Median palm - Ulnar palm       Ulnar Ring 3.33 11.5 15.1        CSI     CSI 1.20        Motor NCS      Nerve / Sites Muscle Latency Amplitude Amp % Duration Segments Distance Lat Diff Velocity     ms mV % ms  mm ms m/s   L Median - APB      Wrist APB 3.18 9.4 100 6.35 Wrist - APB 80        Elbow APB 7.29 6.0 63.6 6.41 Elbow - Wrist 220 4.11 53   R Median - APB      Wrist APB 3.28 8.1 100 5.36 Wrist - APB 80        Elbow APB 6.98 6.6 81.4 5.52 Elbow - Wrist 190 3.70 51   L Ulnar - ADM      Wrist ADM 2.66 11.0 100 5.31 Wrist - ADM 80        B.Elbow ADM 5.78 11.5 105 5.47 B.Elbow - Wrist 190 3.13 61      A.Elbow ADM 7.81 11.1 101 5.57 A.Elbow - B.Elbow 140 2.03 69   R Ulnar - ADM      Wrist ADM 2.71 9.5 100 6.51 Wrist - ADM 80        B.Elbow ADM 5.94 8.8 92.8 6.93 B.Elbow - Wrist 200 3.23 62      A.Elbow ADM 7.76 8.5 89.6 6.98 A.Elbow - B.Elbow 120 1.82 66                                               INTERPRETATION  -Bilateral median motor nerve conduction study showed normal latency, amplitude, and conduction velocity  -Bilateral median sensory nerve conduction study showed normal peak latency and amplitude  -Bilateral ulnar motor nerve conduction study showed normal latency, amplitude, and conduction velocity  -Bilateral ulnar sensory nerve conduction study showed normal peak latency and amplitude  -Bilateral radial sensory nerve conduction study showed normal peak latency and amplitude  -Bilateral combined sensory index showed a significant latency difference of 1.04 and 1.2 msec    IMPRESSION  1. ABNORMAL study  2. There is electrodiagnostic evidence of a MILD demyelinating median neuropathy (Carpal tunnel syndrome) across BILATERAL wrists-worse on the LEFT    PLAN  1. Follow up with referring provider: Dr. Andrea Ayala/Dr Bernard  2. Handouts on CTS provided.  Consider injections or OT for body mechanics, nerve desensitization, CTR if not responding  3. This study is good for one year. If symptoms worsen or do not improve, please re-consult.    Niurka Elkins M.D.  Physical Medicine and  Rehab

## 2020-10-20 NOTE — LETTER
October 20, 2020      Andrea Ayala MD  [address] [facility] [address]           [facility]  [address] [place] [address]  Phone: [phone]  Fax: [phone]          Patient: Nathan Carlos   MR Number: [id number]   YOB: 1990   Date of Visit: 10/20/2020       Dear Dr. Andrea Ayala:    Thank you for referring Nathan Carlos to me for evaluation. Attached you will find relevant portions of my assessment and plan of care.    If you have questions, please do not hesitate to call me. I look forward to following Nathan Carlos along with you.    Sincerely,    Niurka Elkins MD    Enclosure  CC:  No Recipients    If you would like to receive this communication electronically, please contact [email or web address] or [phone] to request more information on [employer] Link access.    For providers and/or their staff who would like to refer a patient to [facility], please contact us through our one-stop-shop provider referral line, [facility], at [phone].    If you feel you have received this communication in error or would no longer like to receive these types of communications, please e-mail [email or web address]

## 2020-10-22 ENCOUNTER — PATIENT MESSAGE (OUTPATIENT)
Dept: PAIN MEDICINE | Facility: CLINIC | Age: 30
End: 2020-10-22

## 2020-10-22 RX ORDER — TIZANIDINE 4 MG/1
TABLET ORAL
Qty: 60 TABLET | Refills: 0 | Status: SHIPPED | OUTPATIENT
Start: 2020-10-22 | End: 2020-11-16

## 2020-11-10 ENCOUNTER — OFFICE VISIT (OUTPATIENT)
Dept: UROLOGY | Facility: CLINIC | Age: 30
End: 2020-11-10
Payer: MEDICAID

## 2020-11-10 VITALS
SYSTOLIC BLOOD PRESSURE: 116 MMHG | WEIGHT: 256.88 LBS | HEIGHT: 64 IN | DIASTOLIC BLOOD PRESSURE: 74 MMHG | BODY MASS INDEX: 43.86 KG/M2

## 2020-11-10 DIAGNOSIS — N39.41 URGE INCONTINENCE: Primary | ICD-10-CM

## 2020-11-10 DIAGNOSIS — N20.0 NEPHROLITHIASIS: ICD-10-CM

## 2020-11-10 PROBLEM — R39.198 DIFFICULTY URINATING: Status: ACTIVE | Noted: 2020-11-10

## 2020-11-10 LAB
BILIRUB SERPL-MCNC: NORMAL MG/DL
BLOOD URINE, POC: NORMAL
CLARITY, POC UA: CLEAR
COLOR, POC UA: YELLOW
GLUCOSE UR QL STRIP: 250
KETONES UR QL STRIP: NORMAL
LEUKOCYTE ESTERASE URINE, POC: NORMAL
NITRITE, POC UA: NORMAL
PH, POC UA: 5
POC RESIDUAL URINE VOLUME: 22 ML (ref 0–100)
PROTEIN, POC: NORMAL
SPECIFIC GRAVITY, POC UA: 1.02
UROBILINOGEN, POC UA: NORMAL

## 2020-11-10 PROCEDURE — 99214 OFFICE O/P EST MOD 30 MIN: CPT | Mod: S$PBB,,, | Performed by: UROLOGY

## 2020-11-10 PROCEDURE — 51798 US URINE CAPACITY MEASURE: CPT | Mod: PBBFAC | Performed by: UROLOGY

## 2020-11-10 PROCEDURE — 99214 PR OFFICE/OUTPT VISIT, EST, LEVL IV, 30-39 MIN: ICD-10-PCS | Mod: S$PBB,,, | Performed by: UROLOGY

## 2020-11-10 PROCEDURE — 99999 PR PBB SHADOW E&M-EST. PATIENT-LVL IV: CPT | Mod: PBBFAC,,, | Performed by: UROLOGY

## 2020-11-10 PROCEDURE — 99214 OFFICE O/P EST MOD 30 MIN: CPT | Mod: PBBFAC,25 | Performed by: UROLOGY

## 2020-11-10 PROCEDURE — 81002 URINALYSIS NONAUTO W/O SCOPE: CPT | Mod: PBBFAC | Performed by: UROLOGY

## 2020-11-10 PROCEDURE — 99999 PR PBB SHADOW E&M-EST. PATIENT-LVL IV: ICD-10-PCS | Mod: PBBFAC,,, | Performed by: UROLOGY

## 2020-11-10 RX ORDER — TOLTERODINE 4 MG/1
4 CAPSULE, EXTENDED RELEASE ORAL DAILY
Qty: 30 CAPSULE | Refills: 11 | Status: SHIPPED | OUTPATIENT
Start: 2020-11-10 | End: 2020-11-23

## 2020-11-10 NOTE — LETTER
November 10, 2020      Qasim Paez, NP  [address] [facility] [address]           [facility]  [address] [place] [address]  Phone: [phone]  Fax: [phone]          Patient: Nathan Carlos   MR Number: [id number]   YOB: 1990   Date of Visit: 11/10/2020       Dear Qasim Paez:    Thank you for referring Nathan Carlos to me for evaluation. Attached you will find relevant portions of my assessment and plan of care.    If you have questions, please do not hesitate to call me. I look forward to following Nathan Carlos along with you.    Sincerely,    Rian Velásquez MD    Enclosure  CC:  No Recipients    If you would like to receive this communication electronically, please contact [email or web address] or [phone] to request more information on [employer] Link access.    For providers and/or their staff who would like to refer a patient to [facility], please contact us through our one-stop-shop provider referral line, [facility], at [phone].    If you feel you have received this communication in error or would no longer like to receive these types of communications, please e-mail [email or web address]          negative/2017

## 2020-11-10 NOTE — PROGRESS NOTES
Chief Complaint:   Encounter Diagnoses   Name Primary?    Urge incontinence Yes    Nephrolithiasis        HPI:  29-year-old female who states that since she was diagnosed with COVID in September of this year she has had increasing difficulty with urgency and also urge incontinence.  She feels like she has increased number of voids, in that she has to push to hold back to void.  She has tried azo cranberry supplements, with no assistance.  She states that prior to September she is really having no issues.  She states she does get upright 0-1 time per evening, going about every 30 minutes during the daytime.  Which she has a strong urge to void, she states that she has a good pressure behind it, she does not have to strain.  But she is going so frequently.  She is now doing some urge incontinence.  No gross hematuria, she has never been a smoker.  She is wearing a panty liner for safety.  She states that it has slowly improved, but not significant.  Appears to be worse during the daytime the evening now.  She has tried no antibiotics for this, she has been diagnosed with insulin resistant diabetes.  There attempted to control dietary wise at this point.  He has had no other urological gynecological history, except for PCOS.  She has altered female organs, no deliveries.  Constipation which is currently going to GI for next week.  No dysuria, this does not feel like UTI.  No family history of urological cancers or stones.  She does have a history of stone passage couple years ago.  This does not feel like a stone.    Allergies:  Amitriptyline and Amlodipine    Medications:  has a current medication list which includes the following prescription(s): albuterol, blood sugar diagnostic, blood-glucose meter, butalbital-acetaminophen-caffeine -40 mg, diclofenac, empagliflozin, irbesartan, lancets, metformin, metoprolol succinate, mupirocin calcium 2%, norgestimate-ethinyl estradiol, promethazine-dextromethorphan,  sitagliptin, spironolactone, tizanidine, trazodone, and valacyclovir.    Review of Systems:  General: No fever, chills, fatigability, or weight loss.  Skin: No rashes, itching, or changes in color or texture of skin.  Chest: Denies PENALOZA, cyanosis, wheezing, cough, and sputum production.  Abdomen: Appetite fine. No weight loss. Denies diarrhea, abdominal pain, hematemesis, or blood in stool.  Musculoskeletal: No joint stiffness or swelling. Denies back pain.  : As above.  All other review of systems negative.    PMH:   has a past medical history of Carpal tunnel syndrome, GERD (gastroesophageal reflux disease), Herpes simplex virus (HSV) infection, High serum testosterone (9/7/2017), Hyperlipidemia, Hypertension, Insulin resistance syndrome (9/7/2017), Lumbar facet arthropathy (8/14/2019), Morbid obesity, and Ovarian cyst.    PSH:   has a past surgical history that includes Bunionectomy (Bilateral); Ankle surgery (Left); Colonoscopy (06/12/2018); Lumbar fusion; Injection of anesthetic agent around medial branch nerves innervating lumbar facet joint (Bilateral, 8/14/2019); Injection of anesthetic agent into sacroiliac joint (Right, 2/5/2020); and Injection of anesthetic agent around medial branch nerves innervating lumbar facet joint (Bilateral, 7/16/2020).    FamHx: family history includes Diabetes in her maternal aunt, maternal uncle, and sister; Fibroids in her mother; Hypertension in her brother, maternal aunt, maternal grandfather, maternal grandmother, and mother; Leukemia in her maternal grandfather; No Known Problems in her father.    SocHx:  reports that she has never smoked. She has never used smokeless tobacco. She reports current alcohol use. She reports that she does not use drugs.      Physical Exam:  Vitals:    11/10/20 0929   BP: 116/74     General: A&Ox3, no apparent distress, no deformities  Neck: No masses, normal ROM  Lungs: normal inspiration, no use of accessory muscles  Heart: normal pulse, no  arrhythmias  Abdomen: Soft, NT, ND, no masses, no hernias, no hepatosplenomegaly  Skin: The skin is warm and dry. No jaundice.  Ext: No c/c/e.    Labs/Studies:    glucose 11/20  pvr 22 ml 11/20    Impression/Plan:     1.  Urgency- while the glucose is slightly elevated, we discussed keeping this reduced so as to diminish the diuretic affects.  As obviously this will increase her urgency.  In the meantime we will attempt to assist with some tolterodine 4 mg daily.  I have instructed her that this can cause increased dry mouth, dry eyes, constipation and possible urinary retention.  If she has any issues she will contact us and stop the medication.  Otherwise I will see her in 1 month with a postvoid residual and proceed as appropriate from there.  For any other abnormalities, possible imaging for stone may be warranted.    2.  Nephrolithiasis- currently no indication of recurrent stone, will continue to follow closely though.

## 2020-11-11 ENCOUNTER — PATIENT MESSAGE (OUTPATIENT)
Dept: UROLOGY | Facility: CLINIC | Age: 30
End: 2020-11-11

## 2020-11-12 ENCOUNTER — PATIENT MESSAGE (OUTPATIENT)
Dept: PAIN MEDICINE | Facility: CLINIC | Age: 30
End: 2020-11-12

## 2020-11-12 ENCOUNTER — PATIENT MESSAGE (OUTPATIENT)
Dept: UROLOGY | Facility: CLINIC | Age: 30
End: 2020-11-12

## 2020-11-16 ENCOUNTER — PATIENT MESSAGE (OUTPATIENT)
Dept: PAIN MEDICINE | Facility: HOSPITAL | Age: 30
End: 2020-11-16

## 2020-11-16 ENCOUNTER — OFFICE VISIT (OUTPATIENT)
Dept: PAIN MEDICINE | Facility: CLINIC | Age: 30
End: 2020-11-16
Attending: FAMILY MEDICINE
Payer: MEDICAID

## 2020-11-16 VITALS
SYSTOLIC BLOOD PRESSURE: 115 MMHG | HEART RATE: 80 BPM | DIASTOLIC BLOOD PRESSURE: 75 MMHG | HEIGHT: 64 IN | BODY MASS INDEX: 44.68 KG/M2 | WEIGHT: 261.69 LBS

## 2020-11-16 DIAGNOSIS — M53.3 SACROILIAC JOINT PAIN: ICD-10-CM

## 2020-11-16 DIAGNOSIS — M79.18 MYOFASCIAL MUSCLE PAIN: ICD-10-CM

## 2020-11-16 DIAGNOSIS — M47.816 LUMBAR SPONDYLOSIS: ICD-10-CM

## 2020-11-16 DIAGNOSIS — M47.816 LUMBAR FACET ARTHROPATHY: Primary | ICD-10-CM

## 2020-11-16 PROCEDURE — 99214 OFFICE O/P EST MOD 30 MIN: CPT | Mod: PBBFAC | Performed by: ANESTHESIOLOGY

## 2020-11-16 PROCEDURE — 99213 PR OFFICE/OUTPT VISIT, EST, LEVL III, 20-29 MIN: ICD-10-PCS | Mod: S$PBB,,, | Performed by: ANESTHESIOLOGY

## 2020-11-16 PROCEDURE — 99999 PR PBB SHADOW E&M-EST. PATIENT-LVL IV: CPT | Mod: PBBFAC,,, | Performed by: ANESTHESIOLOGY

## 2020-11-16 PROCEDURE — 99999 PR PBB SHADOW E&M-EST. PATIENT-LVL IV: ICD-10-PCS | Mod: PBBFAC,,, | Performed by: ANESTHESIOLOGY

## 2020-11-16 PROCEDURE — 99213 OFFICE O/P EST LOW 20 MIN: CPT | Mod: S$PBB,,, | Performed by: ANESTHESIOLOGY

## 2020-11-16 RX ORDER — TIZANIDINE HYDROCHLORIDE 6 MG/1
6 CAPSULE, GELATIN COATED ORAL 3 TIMES DAILY PRN
Qty: 90 CAPSULE | Refills: 0 | Status: SHIPPED | OUTPATIENT
Start: 2020-11-16 | End: 2020-11-16

## 2020-11-16 RX ORDER — TIZANIDINE 4 MG/1
4 TABLET ORAL 3 TIMES DAILY PRN
Qty: 90 TABLET | Refills: 0 | Status: SHIPPED | OUTPATIENT
Start: 2020-11-16 | End: 2020-12-16

## 2020-11-16 NOTE — H&P (VIEW-ONLY)
Chief Pain Complaint:  Lumbar Back Pain    History of Present Illness:   Nathan Carlos is a 29 y.o. female  who is presenting with a chief complaint of Low-back Pain. The patient began experiencing this problem insidiously, and the pain has been gradually worsening. The pain is described as throbbing, cramping, aching and heavy and is located in the bilateral lumbar spine. Pain is intermittent and lasts hours. The  pain is nonradiating. The patient rates her pain a 8 out of ten and interferes with activities of daily living a 7 out of ten. Pain is exacerbated by extension of the lumbar spine, and is improved by rest. Patient reports no prior trauma, no prior spinal surgery     - pertinent negatives: No fever, No chills, No weight loss, No bladder dysfunction, No bowel dysfunction, No saddle anesthesia  - pertinent positives: none    - medications, other therapies tried (physical therapy, injections):     >> NSAIDs, Tylenol, Norco, zanaflex and flexeril (no relief), topamax (no relief)    >> Has previously undergone Physical Therapy    >> Has previously undergone spinal injection/s   - Left SI joint injection 1/2018 with 100% relief for 6 months   - Right L3, L4, L5 MBB with local on 8/15/18 with 90% pain relief   - left SIJ + left GT bursa injection with local on 11/8/18 with good relief of bursitis but only 10% of SIJ pain   - left L3-5 MBB with local on 5/16/19 with 90% pain relief   - bilateral L3-5 MBB on 8/14/19 with limited relief   - right SIJ injection on 2/5/20 with 80% pain relief   - bilateral L3-5 MBB on 7/16/2020 with 100% relief of lumbar back pain      Imaging / Labs / Studies (reviewed on 11/16/2020):    Results for orders placed during the hospital encounter of 11/22/17   MRI Lumbar Spine Without Contrast    Narrative Technique: Standard noncontrast multiplanar multisequence imaging of the lumbar spine was performed.  Findings: There is anatomic spinal alignment.  Disc interspaces are of  normal height and signal intensity.  Vertebral marrow signal pattern and architecture are normal.  Distal cord is unremarkable with conus medullaris terminating at L1-L2.  Paravertebral soft tissues are symmetric and normal in appearance.  T12-L1: No disc protrusion, neuroforaminal narrowing, or central canal stenosis.  L1-L2: No disc protrusion, neuroforaminal narrowing, or central canal stenosis.  L2-L3: No disc protrusion, neuroforaminal narrowing, or central canal stenosis.  L3-L4: No disc protrusion, neuroforaminal narrowing, or central canal stenosis.  L4-L5: No disc protrusion, neuroforaminal narrowing, or central canal stenosis.  L5-S1: Mild bilateral facet hypertrophy. No disc protrusion, neuroforaminal narrowing, or central canal stenosis.    Impression  Negative MRI of the lumbar spine.     Results for orders placed during the hospital encounter of 01/04/18   X-Ray Lumbar Complete With Flex And Ext    Narrative Comparison: None  Technique: AP, lateral, lateral flexion, lateral extension, bilateral oblique, and lumbosacral coned down views were obtained of the lumbar spine  Findings: There is mild scoliosis of the lower thoracic and upper lumbar spine.  Vertebral body heights are well-maintained.  No spondylolisthesis demonstrated.  No change in spinal alignment with flexion or extension to suggest instability. Intervertebral disk spaces are well preserved.  No pars defects visualized.  Posterior elements appear grossly intact. No acute fractures or subluxations are demonstrated.  The remaining visualized osseous and soft tissue structures demonstrate no appreciable abnormality.    Impression As above.  No acute findings.         Review of Systems:  CONSTITUTIONAL: patient denies any fever, chills, or weight loss  SKIN: patient denies any rash or itching  RESPIRATORY: patient denies having any shortness of breath  GASTROINTESTINAL: patient denies having any diarrhea, constipation, or bowel  "incontinence  GENITOURINARY: patient denies having any abnormal bladder function    MUSCULOSKELETAL:  - patient complains of the above noted pain/s (see chief pain complaint)    NEUROLOGICAL:   - pain as above  - strength in Lower extremities is intact, BILATERALLY  - sensation in Lower extremities is intact, BILATERALLY  - patient denies any loss of bowel or bladder control      PSYCHIATRIC: patient denies any change in mood    Other:  All other systems reviewed and are negative        Physical Exam:  Last Physical Exam:   Vitals:  /75   Pulse 80   Ht 5' 4" (1.626 m)   Wt 118.7 kg (261 lb 11 oz)   BMI 44.92 kg/m²   (reviewed on 11/16/2020)    General: alert and oriented, in no apparent distress, obese.  Gait: normal gait.  Skin: no rashes, no discoloration, no obvious lesions  HEENT: normocephalic, atraumatic. Pupils equal and round.  Cardiovascular: no significant peripheral edema present.  Respiratory: without use of accessory muscles of respiration.    Musculoskeletal - Lumbar Spine:  - Pain on flexion of lumbar spine: Present   - Pain on extension of lumbar spine: Present  - Lumbar facet loading: Present   - TTP over the lumbar facet joints: Present Right L5-S1   - TTP over the para lumbar muscles   - TTP over the SI joints: Absent   - TTP over GT bursa: absent  - TTP over piriformis: absent  - Straight Leg Raise: Negative  - JULISSA: Negative    Neuro - Extremities:  - BUE Strength:R/L: D: 5/5; B: 5/5; T: 5/5; WF: 5/5; WE: 5/5; IO: 5/5  - BLE Strength: R/L: HF: 5/5, HE: 5/5, KF: 5/5; KE: 5/5; FE: 5/5; FF: 5/5  - Extremity Reflexes: Brisk and symmetric throughout  - Sensory: Sensation to light touch intact bilaterally      Psych:  Mood and affect is appropriate          Assessment:  Ntahan Carlos is a 29 y.o. year old female who is presenting with   Encounter Diagnoses   Name Primary?    Lumbar spondylosis     Lumbar facet arthropathy Yes    Sacroiliac joint pain     Myofascial muscle pain  "       Plan:  1. Interventional: Schedule patient for Right L3, 4,5 MBB RFA.    - S/p bilateral L3-5 MBB on 7/16/2020 with 100% relief of lumbar back pain.  - S/p Right SIJ injection on 2/5/20 with 80% pain relief.     2. Pharmacologic:   - Discontinue Tizanidne 4 mg PO BID PRN. Start Baclofen 10 mg Po BID PRN.  - Try Salon Pas 4% lidocaine patches for pain topically.  - Patient is no longer on Zonegran as this did not help.     3. Rehabilitative: Internal referal to PT with dry needling. TENS UNIT.     4. Diagnostic: None for now.    5. Follow up: Post injection.

## 2020-11-16 NOTE — PROGRESS NOTES
Chief Pain Complaint:  Lumbar Back Pain    History of Present Illness:   Nathan Carlos is a 29 y.o. female  who is presenting with a chief complaint of Low-back Pain. The patient began experiencing this problem insidiously, and the pain has been gradually worsening. The pain is described as throbbing, cramping, aching and heavy and is located in the bilateral lumbar spine. Pain is intermittent and lasts hours. The  pain is nonradiating. The patient rates her pain a 8 out of ten and interferes with activities of daily living a 7 out of ten. Pain is exacerbated by extension of the lumbar spine, and is improved by rest. Patient reports no prior trauma, no prior spinal surgery     - pertinent negatives: No fever, No chills, No weight loss, No bladder dysfunction, No bowel dysfunction, No saddle anesthesia  - pertinent positives: none    - medications, other therapies tried (physical therapy, injections):     >> NSAIDs, Tylenol, Norco, zanaflex and flexeril (no relief), topamax (no relief)    >> Has previously undergone Physical Therapy    >> Has previously undergone spinal injection/s   - Left SI joint injection 1/2018 with 100% relief for 6 months   - Right L3, L4, L5 MBB with local on 8/15/18 with 90% pain relief   - left SIJ + left GT bursa injection with local on 11/8/18 with good relief of bursitis but only 10% of SIJ pain   - left L3-5 MBB with local on 5/16/19 with 90% pain relief   - bilateral L3-5 MBB on 8/14/19 with limited relief   - right SIJ injection on 2/5/20 with 80% pain relief   - bilateral L3-5 MBB on 7/16/2020 with 100% relief of lumbar back pain      Imaging / Labs / Studies (reviewed on 11/16/2020):    Results for orders placed during the hospital encounter of 11/22/17   MRI Lumbar Spine Without Contrast    Narrative Technique: Standard noncontrast multiplanar multisequence imaging of the lumbar spine was performed.  Findings: There is anatomic spinal alignment.  Disc interspaces are of  normal height and signal intensity.  Vertebral marrow signal pattern and architecture are normal.  Distal cord is unremarkable with conus medullaris terminating at L1-L2.  Paravertebral soft tissues are symmetric and normal in appearance.  T12-L1: No disc protrusion, neuroforaminal narrowing, or central canal stenosis.  L1-L2: No disc protrusion, neuroforaminal narrowing, or central canal stenosis.  L2-L3: No disc protrusion, neuroforaminal narrowing, or central canal stenosis.  L3-L4: No disc protrusion, neuroforaminal narrowing, or central canal stenosis.  L4-L5: No disc protrusion, neuroforaminal narrowing, or central canal stenosis.  L5-S1: Mild bilateral facet hypertrophy. No disc protrusion, neuroforaminal narrowing, or central canal stenosis.    Impression  Negative MRI of the lumbar spine.     Results for orders placed during the hospital encounter of 01/04/18   X-Ray Lumbar Complete With Flex And Ext    Narrative Comparison: None  Technique: AP, lateral, lateral flexion, lateral extension, bilateral oblique, and lumbosacral coned down views were obtained of the lumbar spine  Findings: There is mild scoliosis of the lower thoracic and upper lumbar spine.  Vertebral body heights are well-maintained.  No spondylolisthesis demonstrated.  No change in spinal alignment with flexion or extension to suggest instability. Intervertebral disk spaces are well preserved.  No pars defects visualized.  Posterior elements appear grossly intact. No acute fractures or subluxations are demonstrated.  The remaining visualized osseous and soft tissue structures demonstrate no appreciable abnormality.    Impression As above.  No acute findings.         Review of Systems:  CONSTITUTIONAL: patient denies any fever, chills, or weight loss  SKIN: patient denies any rash or itching  RESPIRATORY: patient denies having any shortness of breath  GASTROINTESTINAL: patient denies having any diarrhea, constipation, or bowel  "incontinence  GENITOURINARY: patient denies having any abnormal bladder function    MUSCULOSKELETAL:  - patient complains of the above noted pain/s (see chief pain complaint)    NEUROLOGICAL:   - pain as above  - strength in Lower extremities is intact, BILATERALLY  - sensation in Lower extremities is intact, BILATERALLY  - patient denies any loss of bowel or bladder control      PSYCHIATRIC: patient denies any change in mood    Other:  All other systems reviewed and are negative        Physical Exam:  Last Physical Exam:   Vitals:  /75   Pulse 80   Ht 5' 4" (1.626 m)   Wt 118.7 kg (261 lb 11 oz)   BMI 44.92 kg/m²   (reviewed on 11/16/2020)    General: alert and oriented, in no apparent distress, obese.  Gait: normal gait.  Skin: no rashes, no discoloration, no obvious lesions  HEENT: normocephalic, atraumatic. Pupils equal and round.  Cardiovascular: no significant peripheral edema present.  Respiratory: without use of accessory muscles of respiration.    Musculoskeletal - Lumbar Spine:  - Pain on flexion of lumbar spine: Present   - Pain on extension of lumbar spine: Present  - Lumbar facet loading: Present   - TTP over the lumbar facet joints: Present Right L5-S1   - TTP over the para lumbar muscles   - TTP over the SI joints: Absent   - TTP over GT bursa: absent  - TTP over piriformis: absent  - Straight Leg Raise: Negative  - JULISSA: Negative    Neuro - Extremities:  - BUE Strength:R/L: D: 5/5; B: 5/5; T: 5/5; WF: 5/5; WE: 5/5; IO: 5/5  - BLE Strength: R/L: HF: 5/5, HE: 5/5, KF: 5/5; KE: 5/5; FE: 5/5; FF: 5/5  - Extremity Reflexes: Brisk and symmetric throughout  - Sensory: Sensation to light touch intact bilaterally      Psych:  Mood and affect is appropriate          Assessment:  Nathan Carlos is a 29 y.o. year old female who is presenting with   Encounter Diagnoses   Name Primary?    Lumbar spondylosis     Lumbar facet arthropathy Yes    Sacroiliac joint pain     Myofascial muscle pain  "       Plan:  1. Interventional: Schedule patient for Right L3, 4,5 MBB RFA.    - S/p bilateral L3-5 MBB on 7/16/2020 with 100% relief of lumbar back pain.  - S/p Right SIJ injection on 2/5/20 with 80% pain relief.     2. Pharmacologic:   - Discontinue Tizanidne 4 mg PO BID PRN. Start Baclofen 10 mg Po BID PRN.  - Try Salon Pas 4% lidocaine patches for pain topically.  - Patient is no longer on Zonegran as this did not help.     3. Rehabilitative: Internal referal to PT with dry needling. TENS UNIT.     4. Diagnostic: None for now.    5. Follow up: Post injection.

## 2020-11-17 ENCOUNTER — OFFICE VISIT (OUTPATIENT)
Dept: ORTHOPEDICS | Facility: CLINIC | Age: 30
End: 2020-11-17
Payer: MEDICAID

## 2020-11-17 VITALS
SYSTOLIC BLOOD PRESSURE: 124 MMHG | WEIGHT: 261 LBS | HEART RATE: 76 BPM | DIASTOLIC BLOOD PRESSURE: 82 MMHG | HEIGHT: 61 IN | BODY MASS INDEX: 49.28 KG/M2

## 2020-11-17 DIAGNOSIS — G56.02 CARPAL TUNNEL SYNDROME OF LEFT WRIST: Primary | ICD-10-CM

## 2020-11-17 PROCEDURE — 99213 PR OFFICE/OUTPT VISIT, EST, LEVL III, 20-29 MIN: ICD-10-PCS | Mod: S$PBB,,, | Performed by: ORTHOPAEDIC SURGERY

## 2020-11-17 PROCEDURE — 99999 PR PBB SHADOW E&M-EST. PATIENT-LVL IV: ICD-10-PCS | Mod: PBBFAC,,, | Performed by: ORTHOPAEDIC SURGERY

## 2020-11-17 PROCEDURE — 99999 PR PBB SHADOW E&M-EST. PATIENT-LVL IV: CPT | Mod: PBBFAC,,, | Performed by: ORTHOPAEDIC SURGERY

## 2020-11-17 PROCEDURE — 99214 OFFICE O/P EST MOD 30 MIN: CPT | Mod: PBBFAC | Performed by: ORTHOPAEDIC SURGERY

## 2020-11-17 PROCEDURE — 99213 OFFICE O/P EST LOW 20 MIN: CPT | Mod: S$PBB,,, | Performed by: ORTHOPAEDIC SURGERY

## 2020-11-17 NOTE — PROGRESS NOTES
Subjective:     Patient ID: Nathan Carlos is a 29 y.o. female.    Chief Complaint: Pain and Numbness of the Left Hand and Pain and Numbness of the Right Hand    The patient is a 29-year-old female with bilateral carpal tunnel syndrome left greater than right documented by nerve conduction studies.  She has tried splinting without improvement the numbness is constant rather than intermittent.  She wishes to have a left carpal tunnel release.      Past Medical History:   Diagnosis Date    Carpal tunnel syndrome     GERD (gastroesophageal reflux disease)     Herpes simplex virus (HSV) infection     High serum testosterone 9/7/2017    Hyperlipidemia     Hypertension     Insulin resistance syndrome 9/7/2017    Lumbar facet arthropathy 8/14/2019    Morbid obesity     Ovarian cyst     bilateral      Past Surgical History:   Procedure Laterality Date    ANKLE SURGERY Left     BUNIONECTOMY Bilateral     COLONOSCOPY  06/12/2018    INJECTION OF ANESTHETIC AGENT AROUND MEDIAL BRANCH NERVES INNERVATING LUMBAR FACET JOINT Bilateral 8/14/2019    Procedure: Bilateral L3-5 MBB;  Surgeon: Yo Kirkland MD;  Location: [facility];  Service: Pain Management;  Laterality: Bilateral;    INJECTION OF ANESTHETIC AGENT AROUND MEDIAL BRANCH NERVES INNERVATING LUMBAR FACET JOINT Bilateral 7/16/2020    Procedure: Bilateral L3-5 MBB;  Surgeon: Yo Kirkland MD;  Location: [facility];  Service: Pain Management;  Laterality: Bilateral;    INJECTION OF ANESTHETIC AGENT INTO SACROILIAC JOINT Right 2/5/2020    Procedure: Right SIJ Injection with local;  Surgeon: Yo Kirkland MD;  Location: [facility];  Service: Pain Management;  Laterality: Right;    LUMBAR FUSION       Family History   Problem Relation Age of Onset    Hypertension Mother     Fibroids Mother     No Known Problems Father     Diabetes Sister     Hypertension Brother     Hypertension Maternal Aunt     Diabetes Maternal Aunt     Diabetes Maternal  Uncle     Hypertension Maternal Grandmother     Leukemia Maternal Grandfather     Hypertension Maternal Grandfather     Stroke Neg Hx      Social History     Socioeconomic History    Marital status: Single     Spouse name: Not on file    Number of children: 0    Years of education: Not on file    Highest education level: Not on file   Occupational History     Employer: [id number]   Social Needs    Financial resource strain: Somewhat hard    Food insecurity     Worry: Never true     Inability: Never true    Transportation needs     Medical: No     Non-medical: No   Tobacco Use    Smoking status: Never Smoker    Smokeless tobacco: Never Used   Substance and Sexual Activity    Alcohol use: Yes     Frequency: Never     Drinks per session: Patient refused     Binge frequency: Never     Comment: special occasions    Drug use: No    Sexual activity: Yes     Partners: Male     Birth control/protection: Condom, Pill   Lifestyle    Physical activity     Days per week: 1 day     Minutes per session: 0 min    Stress: Very much   Relationships    Social connections     Talks on phone: Three times a week     Gets together: More than three times a week     Attends Anabaptism service: Not on file     Active member of club or organization: No     Attends meetings of clubs or organizations: Never     Relationship status: Never    Other Topics Concern    Not on file   Social History Narrative    Not on file     Medication List with Changes/Refills   Current Medications    ALBUTEROL (PROVENTIL/VENTOLIN HFA) 90 MCG/ACTUATION INHALER    Inhale 2 puffs into the lungs every 4 to 6 hours as needed for Wheezing.    BLOOD SUGAR DIAGNOSTIC STRP    To check BG 2 times daily, to use with insurance preferred meter    BLOOD-GLUCOSE METER KIT    To check BG 2 times daily, to use with insurance preferred meter    BUTALBITAL-ACETAMINOPHEN-CAFFEINE -40 MG (FIORICET, ESGIC) -40 MG PER TABLET    Take 1  tablet by mouth every 4 to 6 hours as needed for Headaches.    DICLOFENAC (VOLTAREN) 50 MG EC TABLET    Take 1 tablet (50 mg total) by mouth 2 (two) times daily.    EMPAGLIFLOZIN (JARDIANCE) 10 MG TABLET    Take 1 tablet (10 mg total) by mouth once daily.    IRBESARTAN (AVAPRO) 75 MG TABLET    Take 1 tablet (75 mg total) by mouth once daily.    LANCETS MISC    To check BG 2 times daily, to use with insurance preferred meter    METFORMIN (GLUCOPHAGE-XR) 500 MG ER 24HR TABLET    Take 1 tablet (500 mg total) by mouth daily with dinner or evening meal.    METOPROLOL SUCCINATE (TOPROL-XL) 50 MG 24 HR TABLET    Take 1 tablet by mouth once daily    MUPIROCIN CALCIUM 2% (BACTROBAN) 2 % CREAM    Apply to affected area 3 times daily    NORGESTIMATE-ETHINYL ESTRADIOL (ORTHO-CYCLEN) 0.25-35 MG-MCG PER TABLET    Patient to take only the active pills.    PROMETHAZINE-DEXTROMETHORPHAN (PROMETHAZINE-DM) 6.25-15 MG/5 ML SYRP    Take 5 mLs by mouth every 4 to 6 hours as needed.    SITAGLIPTIN (JANUVIA) 25 MG TAB    Take 1 tablet (25 mg total) by mouth once daily.    SPIRONOLACTONE (ALDACTONE) 25 MG TABLET    Take 2 tablets by mouth once daily    TIZANIDINE (ZANAFLEX) 4 MG TABLET    Take 1 tablet (4 mg total) by mouth 3 (three) times daily as needed.    TOLTERODINE (DETROL LA) 4 MG 24 HR CAPSULE    Take 1 capsule (4 mg total) by mouth once daily.    TRAZODONE (DESYREL) 50 MG TABLET    Take 1 tablet (50 mg total) by mouth nightly as needed for Insomnia.    VALACYCLOVIR (VALTREX) 500 MG TABLET    TAKE 1 TABLET BY MOUTH TWICE DAILY FOR 3 DAYS     Review of patient's allergies indicates:   Allergen Reactions    Amitriptyline      xerostomia    Amlodipine      DRY MOUTH     Review of Systems   Constitution: Negative for malaise/fatigue.   HENT: Negative for hearing loss.    Eyes: Negative for double vision and visual disturbance.   Cardiovascular: Negative for chest pain.   Respiratory: Negative for shortness of breath.    Endocrine:  Negative for cold intolerance.   Hematologic/Lymphatic: Does not bruise/bleed easily.   Skin: Negative for poor wound healing and suspicious lesions.   Musculoskeletal: Positive for back pain. Negative for gout, joint pain and joint swelling.   Gastrointestinal: Positive for heartburn. Negative for nausea and vomiting.   Genitourinary: Positive for flank pain. Negative for dysuria.   Neurological: Positive for numbness, paresthesias and sensory change.   Psychiatric/Behavioral: Negative for depression, memory loss and substance abuse. The patient is not nervous/anxious.    Allergic/Immunologic: Negative for persistent infections.       Objective:   Body mass index is 49.32 kg/m².  Vitals:    11/17/20 1021   BP: 124/82   Pulse: 76                General    Constitutional: She is oriented to person, place, and time. She appears well-developed and well-nourished. No distress.   HENT:   Head: Normocephalic.   Mouth/Throat: Oropharynx is clear and moist.   Eyes: EOM are normal.   Neck: Normal range of motion.   Cardiovascular: Normal rate.    Pulmonary/Chest: Effort normal.   Abdominal: Soft.   Neurological: She is alert and oriented to person, place, and time. No cranial nerve deficit.   Psychiatric: She has a normal mood and affect.             Right Hand/Wrist Exam     Inspection   Scars: Wrist - absent Hand -  absent  Effusion: Wrist - absent Hand -  absent    Pain   Wrist - The patient exhibits pain of the flexor/pronator group.    Tests   Phalens sign: positive  Tinel's sign (median nerve): positive  Carpal Tunnel Compression Test: positive    Atrophy   Thenar:  negative  Intrinsic:  negative    Other     Neuorologic Exam    Median Distribution: abnormal  Ulnar Distribution: normal  Radial Distribution: normal    Comments:  The patient has a positive Tinel and positive Phalen sign.  There is no thenar atrophy noted.      Left Hand/Wrist Exam     Inspection   Scars: Wrist - absent Hand -  absent  Effusion: Wrist -  absent Hand -  absent    Pain   Wrist - The patient exhibits pain of the flexor/pronator group.    Tests   Phalens sign: positive  Tinel's sign (median nerve): positive  Carpal Tunnel Compression Test: positive    Atrophy  Thenar:  Negative  Intrinsic: negative    Other     Sensory Exam  Median Distribution: abnormal  Ulnar Distribution: normal  Radial Distribution: normal    Comments:  The patient has a positive Tinel and positive Phalen sign.  There is no thenar atrophy noted.          Vascular Exam       Capillary Refill  Right Hand: normal capillary refill  Left Hand: normal capillary refill         radiographs were not obtained today  Assessment:     Encounter Diagnosis   Name Primary?    Carpal tunnel syndrome of left wrist Yes    bilateral carpal tunnel syndrome    Plan:     The patient wishes to have a left carpal tunnel release.  She was counseled regarding the surgery.  Risk complications and alternatives were discussed including the risk of infection, anesthetic risk, injury to nerves and vessels, loss of motion, and possible need for additional surgeries were discussed.  She seems to understand and agree to that surgery.  All questions were answered.                Disclaimer: This note was prepared using a voice recognition system and is likely to have sound alike errors within the text.

## 2020-11-19 ENCOUNTER — TELEPHONE (OUTPATIENT)
Dept: FAMILY MEDICINE | Facility: CLINIC | Age: 30
End: 2020-11-19

## 2020-11-20 ENCOUNTER — TELEPHONE (OUTPATIENT)
Dept: ORTHOPEDICS | Facility: CLINIC | Age: 30
End: 2020-11-20

## 2020-11-20 NOTE — TELEPHONE ENCOUNTER
Notified pt that Sx would have to be cancelled due to a1c too high. Informed her to get in touch with her PCP to see what her next steps are, and once her a1c is below 8, we could discuss rescheduling her Sx. Pt expressed verbal understanding. Postop appointments cancelled, and Sx book updated. OR notified to cancel Sx, AL, LPN.

## 2020-11-23 ENCOUNTER — OFFICE VISIT (OUTPATIENT)
Dept: FAMILY MEDICINE | Facility: CLINIC | Age: 30
End: 2020-11-23
Payer: MEDICAID

## 2020-11-23 ENCOUNTER — TELEPHONE (OUTPATIENT)
Dept: FAMILY MEDICINE | Facility: CLINIC | Age: 30
End: 2020-11-23

## 2020-11-23 ENCOUNTER — PATIENT MESSAGE (OUTPATIENT)
Dept: FAMILY MEDICINE | Facility: CLINIC | Age: 30
End: 2020-11-23

## 2020-11-23 DIAGNOSIS — Z76.0 MEDICATION REFILL: ICD-10-CM

## 2020-11-23 DIAGNOSIS — E66.01 MORBID OBESITY WITH BMI OF 45.0-49.9, ADULT: ICD-10-CM

## 2020-11-23 DIAGNOSIS — G47.00 INSOMNIA, UNSPECIFIED TYPE: ICD-10-CM

## 2020-11-23 DIAGNOSIS — N89.8 VAGINAL IRRITATION: ICD-10-CM

## 2020-11-23 DIAGNOSIS — E11.65 UNCONTROLLED TYPE 2 DIABETES MELLITUS WITH HYPERGLYCEMIA: Primary | ICD-10-CM

## 2020-11-23 DIAGNOSIS — I10 ESSENTIAL HYPERTENSION: ICD-10-CM

## 2020-11-23 DIAGNOSIS — E11.9 TYPE 2 DIABETES MELLITUS WITHOUT COMPLICATION, WITHOUT LONG-TERM CURRENT USE OF INSULIN: ICD-10-CM

## 2020-11-23 PROCEDURE — 99214 OFFICE O/P EST MOD 30 MIN: CPT | Mod: 95,,, | Performed by: REGISTERED NURSE

## 2020-11-23 PROCEDURE — 99214 PR OFFICE/OUTPT VISIT, EST, LEVL IV, 30-39 MIN: ICD-10-PCS | Mod: 95,,, | Performed by: REGISTERED NURSE

## 2020-11-23 RX ORDER — FLUCONAZOLE 150 MG/1
150 TABLET ORAL DAILY
Qty: 1 TABLET | Refills: 0 | Status: SHIPPED | OUTPATIENT
Start: 2020-11-23 | End: 2020-11-24

## 2020-11-23 RX ORDER — VALACYCLOVIR HYDROCHLORIDE 500 MG/1
TABLET, FILM COATED ORAL
Qty: 6 TABLET | Refills: 1 | Status: SHIPPED | OUTPATIENT
Start: 2020-11-23 | End: 2020-12-31

## 2020-11-23 RX ORDER — GLIMEPIRIDE 2 MG/1
2 TABLET ORAL
Qty: 30 TABLET | Refills: 6 | Status: SHIPPED | OUTPATIENT
Start: 2020-11-23 | End: 2021-06-14

## 2020-11-23 RX ORDER — METFORMIN HYDROCHLORIDE 750 MG/1
750 TABLET, EXTENDED RELEASE ORAL
Qty: 90 TABLET | Refills: 1 | Status: SHIPPED | OUTPATIENT
Start: 2020-11-23 | End: 2021-06-28 | Stop reason: SDUPTHER

## 2020-11-23 RX ORDER — TEMAZEPAM 15 MG/1
15 CAPSULE ORAL NIGHTLY PRN
Qty: 30 CAPSULE | Refills: 2 | Status: SHIPPED | OUTPATIENT
Start: 2020-11-23 | End: 2021-09-12

## 2020-11-23 NOTE — PROGRESS NOTES
"PRIMARY CARE TELEMEDICINE NOTE      Patient location:  Home  Visit type: Virtual visit with synchronous audio and video  Each patient to whom he or she provides medical services by telemedicine is:  (1) informed of the relationship between the physician and patient and the respective role of any other health care provider with respect to management of the patient  (2) notified that he or she may decline to receive medical services by telemedicine and may withdraw from such care at any time        SUBJECTIVE     Patient ID: Nathan Carlos is a 29 y.o. female.      CHIEF COMPLAINT:  Review lab, f/u of chronic issues      HPI      Nathan reports home  to 190 with PP around 209.  She admits not taking metformin on regular basis, "forgets to take".  Insurance did not cover Jardiance.  Taking Januvia daily as ordered.  Taking HTN medication as ordered --- ARB and BB.  Reports decreased appetite but "I eat lots of rice".  Eats 1 to 2 times daily.  No exercise.  Reports increased urination and thirst.  Denies fatigue, neuropathy or vision problems.    Reports yeast infection w/ vaginal irritation.  Needs Valtrex refilled.    Trazodone not helping.      Lab Results   Component Value Date    HGBA1C 10.2 (H) 11/16/2020         Review of patient's allergies indicates:   Allergen Reactions    Amitriptyline      xerostomia    Amlodipine      DRY MOUTH         Patient Active Problem List    Diagnosis Date Noted    Morbid obesity with BMI of 40.0-44.9, adult 10/20/2020    Carpal tunnel syndrome 09/25/2020    Paresthesias in left hand 09/25/2020    Type 2 diabetes mellitus without complication, without long-term current use of insulin 08/12/2020    Lumbar facet arthropathy 08/14/2019    Chronic bilateral low back pain with left-sided sciatica 05/16/2019    Nephrolithiasis 10/12/2018    HSV-2 (herpes simplex virus 2) infection 09/11/2017    High serum testosterone 09/07/2017    Hypercholesteremia 08/09/2017 "    Essential hypertension 08/03/2017    Gastroesophageal reflux disease 08/03/2017    Polycystic ovaries 11/10/2015           Current Outpatient Medications:     blood sugar diagnostic Strp, To check BG 2 times daily, to use with insurance preferred meter, Disp: 100 strip, Rfl: 2    blood-glucose meter kit, To check BG 2 times daily, to use with insurance preferred meter, Disp: 1 each, Rfl: 0    empagliflozin (JARDIANCE) 10 mg tablet, Take 1 tablet (10 mg total) by mouth once daily. (Patient not taking: Reported on 11/17/2020), Disp: 30 tablet, Rfl: 6    irbesartan (AVAPRO) 75 MG tablet, Take 1 tablet (75 mg total) by mouth once daily., Disp: 30 tablet, Rfl: 6    lancets Misc, To check BG 2 times daily, to use with insurance preferred meter, Disp: 100 each, Rfl: 2    metFORMIN (GLUCOPHAGE-XR) 500 MG ER 24hr tablet, Take 1 tablet (500 mg total) by mouth daily with dinner or evening meal., Disp: 90 tablet, Rfl: 1    metoprolol succinate (TOPROL-XL) 50 MG 24 hr tablet, Take 1 tablet by mouth once daily, Disp: 90 tablet, Rfl: 0    SITagliptin (JANUVIA) 25 MG Tab, Take 1 tablet (25 mg total) by mouth once daily., Disp: 90 tablet, Rfl: 1    spironolactone (ALDACTONE) 25 MG tablet, Take 2 tablets by mouth once daily, Disp: 180 tablet, Rfl: 0    tiZANidine (ZANAFLEX) 4 MG tablet, Take 1 tablet (4 mg total) by mouth 3 (three) times daily as needed., Disp: 90 tablet, Rfl: 0    traZODone (DESYREL) 50 MG tablet, Take 1 tablet (50 mg total) by mouth nightly as needed for Insomnia., Disp: 30 tablet, Rfl: 6      Past Medical History:   Diagnosis Date    Carpal tunnel syndrome     GERD (gastroesophageal reflux disease)     Herpes simplex virus (HSV) infection     High serum testosterone 9/7/2017    Hyperlipidemia     Hypertension     Insulin resistance syndrome 9/7/2017    Lumbar facet arthropathy 8/14/2019    Morbid obesity     Ovarian cyst     bilateral          Past Surgical History:   Procedure  Laterality Date    ANKLE SURGERY Left     BUNIONECTOMY Bilateral     COLONOSCOPY  06/12/2018    INJECTION OF ANESTHETIC AGENT AROUND MEDIAL BRANCH NERVES INNERVATING LUMBAR FACET JOINT Bilateral 8/14/2019    Procedure: Bilateral L3-5 MBB;  Surgeon: Yo Kirkland MD;  Location: [facility];  Service: Pain Management;  Laterality: Bilateral;    INJECTION OF ANESTHETIC AGENT AROUND MEDIAL BRANCH NERVES INNERVATING LUMBAR FACET JOINT Bilateral 7/16/2020    Procedure: Bilateral L3-5 MBB;  Surgeon: Yo Kirkland MD;  Location: [facility];  Service: Pain Management;  Laterality: Bilateral;    INJECTION OF ANESTHETIC AGENT INTO SACROILIAC JOINT Right 2/5/2020    Procedure: Right SIJ Injection with local;  Surgeon: Yo Kirkland MD;  Location: [facility];  Service: Pain Management;  Laterality: Right;    LUMBAR FUSION           Family History   Problem Relation Age of Onset    Hypertension Mother     Fibroids Mother     No Known Problems Father     Diabetes Sister     Hypertension Brother     Hypertension Maternal Aunt     Diabetes Maternal Aunt     Diabetes Maternal Uncle     Hypertension Maternal Grandmother     Leukemia Maternal Grandfather     Hypertension Maternal Grandfather     Stroke Neg Hx          Social History     Tobacco Use    Smoking status: Never Smoker    Smokeless tobacco: Never Used   Substance Use Topics    Alcohol use: Yes     Frequency: Never     Drinks per session: Patient refused     Binge frequency: Never     Comment: special occasions    Drug use: No         REVIEW OF SYSTEMS  Review of Systems       OBJECTIVE:  Physical Exam:  Constitutional:  In NAD, pleasant, cooperative.  Appears well-developed and well-nourished.   Respiratory:  Unlabored, in no resp distress.  Neurological: Alert and oriented to person, place, and time.   Psychiatric: Normal mood and affect, behavior is normal. Judgment and thought content normal.       Complete PE deferred due to video  visit      ASSESSMENT       1. Uncontrolled type 2 diabetes mellitus with hyperglycemia    2. Essential hypertension    3. Vaginal irritation    4. Insomnia, unspecified type    5. Morbid obesity with BMI of 45.0-49.9, adult    6. Medication refill           PLAN    Uncontrolled type 2 diabetes mellitus with hyperglycemia --- uncontrolled, non-compliance w/ medication and diet.  Take metformin and Januvia as ordered.  Started on Amaryl and Invokana, directions given.  -     canagliflozin (INVOKANA) 100 mg Tab tablet; Take 1 tablet (100 mg total) by mouth once daily.  Dispense: 30 tablet; Refill: 6  -     glimepiride (AMARYL) 2 MG tablet; Take 1 tablet (2 mg total) by mouth before breakfast.  Dispense: 30 tablet; Refill: 6    Essential hypertension --- on medication as ordered, does not monitor at home.    Vaginal irritation ---- infection triggers & prevention discussed.  -     fluconazole (DIFLUCAN) 150 MG Tab; Take 1 tablet (150 mg total) by mouth once daily. for 1 day  Dispense: 1 tablet; Refill: 0    Insomnia, unspecified type ---- uncontrolled, trazodone not effective.  -     temazepam (RESTORIL) 15 mg Cap; Take 1 capsule (15 mg total) by mouth nightly as needed.  Dispense: 30 capsule; Refill: 2    Morbid obesity with BMI of 45.0-49.9, adult -- healthy diet, reg exercise, weight reduction.    Medication refill  -     valACYclovir (VALTREX) 500 MG tablet; TAKE 1 TABLET BY MOUTH TWICE DAILY FOR 3 DAYS  Dispense: 6 tablet; Refill: 1        Follow-up:  Lab due in 3 months.  Return prn.          Total time spent with patient was 30 minutes to include discussion, counseling and chart review.  All patient questions were answered at appointment.

## 2020-11-23 NOTE — TELEPHONE ENCOUNTER
Yes, she was told to check daily in morning fasting and 2 to 3 hours post-meal.  Keep log b/c she will need to report her readings to me at her next appt

## 2020-11-23 NOTE — TELEPHONE ENCOUNTER
I was not planning on ordering insulin.  I thought about sending in RX for once a week Trulicity but didn't due to insurance coverage.  I ordered 2 new oral pills for her to take with the metformin and Januvia.  I am also going to increase her metformin dose, sending rx now.

## 2020-11-23 NOTE — TELEPHONE ENCOUNTER
Called pt and got pt scheduled. Pt wanted to know if she needs to check and record her sugars everyday. Please advise.

## 2020-12-01 ENCOUNTER — TELEPHONE (OUTPATIENT)
Dept: GASTROENTEROLOGY | Facility: CLINIC | Age: 30
End: 2020-12-01

## 2020-12-01 ENCOUNTER — PATIENT OUTREACH (OUTPATIENT)
Dept: ADMINISTRATIVE | Facility: OTHER | Age: 30
End: 2020-12-01

## 2020-12-01 NOTE — TELEPHONE ENCOUNTER
Phoned patient to r/s as Dr. Marks will not be in clinic tomorrow.  Rescheduled her to 12/10/20.  Patient agreed so we rescheduled her to that date.

## 2020-12-04 NOTE — PRE-PROCEDURE INSTRUCTIONS
Spoke with patient regarding procedure scheduled on 12/8    Arrival time 1320    Has patient been sick with fever or on antibiotics within the last 7 days? No    Does the patient have any open wounds, sores or rashes? No    Has patient received a vaccination within the last 7 days? No    Has the patient stopped all medications as directed? Na. Hold any other vitamins, supplements and other NSAIDS. Denies blood thinners. Hold DM medications AM of procedure.    Does patient have a pacemaker and or defibrillator? no    Does the patient have a ride to and from procedure and someone reliable to remain with patient? Kavon Velarde    Is the patient diabetic? yes    Does the patient have sleep apnea? Or use O2 at home? No and no     Is the patient receiving sedation? yes    Is the patient instructed to remain NPO beginning at midnight the night before their procedure? yes    Procedure location confirmed with patient? Yes    Covid- Denies signs/symptoms. Instructed to notify PAT/MD if any changes.

## 2020-12-04 NOTE — PRE-PROCEDURE INSTRUCTIONS
Ok to proceed with procedure with no steroid. Attempted to PAT patient for procedure on 12.8 with Dr. lou . No answer. LVM with return phone number. No return call at this time.

## 2020-12-07 ENCOUNTER — OFFICE VISIT (OUTPATIENT)
Dept: FAMILY MEDICINE | Facility: CLINIC | Age: 30
End: 2020-12-07
Payer: MEDICAID

## 2020-12-07 DIAGNOSIS — E11.9 TYPE 2 DIABETES MELLITUS WITHOUT COMPLICATION, WITHOUT LONG-TERM CURRENT USE OF INSULIN: Primary | ICD-10-CM

## 2020-12-07 PROCEDURE — 99213 PR OFFICE/OUTPT VISIT, EST, LEVL III, 20-29 MIN: ICD-10-PCS | Mod: 95,,, | Performed by: REGISTERED NURSE

## 2020-12-07 PROCEDURE — 99213 OFFICE O/P EST LOW 20 MIN: CPT | Mod: 95,,, | Performed by: REGISTERED NURSE

## 2020-12-07 NOTE — PROGRESS NOTES
"PRIMARY CARE TELEMEDICINE NOTE      Patient location:  Home  Visit type: Virtual visit with synchronous audio and video  Each patient to whom he or she provides medical services by telemedicine is:  (1) informed of the relationship between the physician and patient and the respective role of any other health care provider with respect to management of the patient  (2) notified that he or she may decline to receive medical services by telemedicine and may withdraw from such care at any time        SUBJECTIVE     Patient ID: Nathan Carlos is a 29 y.o. female.      CHIEF COMPLAINT:  DIABETES FOLLOW-UP      HPI    Nathan is seen today for 2 week f/u DM-2.  Feeling much better.  Urination slowed down.  States "I feel a change".  Reports FBS in AM ranging 112 to 157.  Afternoon 120s.  Sleeping okay, having back issues.  Scheduled tomorrow for her nerve in back to be burned ([facility]).  GI appt 12/10 --- issues w/ stomach pain and constipation.  Reports AM headaches.        Review of patient's allergies indicates:   Allergen Reactions    Amitriptyline      xerostomia    Amlodipine      DRY MOUTH         Patient Active Problem List    Diagnosis Date Noted    Morbid obesity with BMI of 40.0-44.9, adult 10/20/2020    Carpal tunnel syndrome 09/25/2020    Paresthesias in left hand 09/25/2020    Type 2 diabetes mellitus without complication, without long-term current use of insulin 08/12/2020    Lumbar facet arthropathy 08/14/2019    Chronic bilateral low back pain with left-sided sciatica 05/16/2019    Nephrolithiasis 10/12/2018    HSV-2 (herpes simplex virus 2) infection 09/11/2017    High serum testosterone 09/07/2017    Hypercholesteremia 08/09/2017    Essential hypertension 08/03/2017    Gastroesophageal reflux disease 08/03/2017    Polycystic ovaries 11/10/2015           Current Outpatient Medications:     blood sugar diagnostic Strp, To check BG 2 times daily, to use with insurance preferred meter, " Disp: 100 strip, Rfl: 2    blood-glucose meter kit, To check BG 2 times daily, to use with insurance preferred meter, Disp: 1 each, Rfl: 0    canagliflozin (INVOKANA) 100 mg Tab tablet, Take 1 tablet (100 mg total) by mouth once daily., Disp: 30 tablet, Rfl: 6    glimepiride (AMARYL) 2 MG tablet, Take 1 tablet (2 mg total) by mouth before breakfast., Disp: 30 tablet, Rfl: 6    irbesartan (AVAPRO) 75 MG tablet, Take 1 tablet (75 mg total) by mouth once daily., Disp: 30 tablet, Rfl: 6    lancets Misc, To check BG 2 times daily, to use with insurance preferred meter, Disp: 100 each, Rfl: 2    metFORMIN (GLUCOPHAGE-XR) 750 MG ER 24hr tablet, Take 1 tablet (750 mg total) by mouth daily with dinner or evening meal., Disp: 90 tablet, Rfl: 1    metoprolol succinate (TOPROL-XL) 50 MG 24 hr tablet, Take 1 tablet by mouth once daily, Disp: 90 tablet, Rfl: 0    SITagliptin (JANUVIA) 25 MG Tab, Take 1 tablet (25 mg total) by mouth once daily., Disp: 90 tablet, Rfl: 1    spironolactone (ALDACTONE) 25 MG tablet, Take 2 tablets by mouth once daily, Disp: 180 tablet, Rfl: 0    temazepam (RESTORIL) 15 mg Cap, Take 1 capsule (15 mg total) by mouth nightly as needed., Disp: 30 capsule, Rfl: 2    tiZANidine (ZANAFLEX) 4 MG tablet, Take 1 tablet (4 mg total) by mouth 3 (three) times daily as needed., Disp: 90 tablet, Rfl: 0    valACYclovir (VALTREX) 500 MG tablet, TAKE 1 TABLET BY MOUTH TWICE DAILY FOR 3 DAYS, Disp: 6 tablet, Rfl: 1         Past Medical History:   Diagnosis Date    Carpal tunnel syndrome     GERD (gastroesophageal reflux disease)     Herpes simplex virus (HSV) infection     High serum testosterone 9/7/2017    Hyperlipidemia     Hypertension     Insulin resistance syndrome 9/7/2017    Lumbar facet arthropathy 8/14/2019    Morbid obesity     Ovarian cyst     bilateral          Past Surgical History:   Procedure Laterality Date    ANKLE SURGERY Left     BUNIONECTOMY Bilateral     COLONOSCOPY   06/12/2018    INJECTION OF ANESTHETIC AGENT AROUND MEDIAL BRANCH NERVES INNERVATING LUMBAR FACET JOINT Bilateral 8/14/2019    Procedure: Bilateral L3-5 MBB;  Surgeon: Yo Kirkland MD;  Location: [facility];  Service: Pain Management;  Laterality: Bilateral;    INJECTION OF ANESTHETIC AGENT AROUND MEDIAL BRANCH NERVES INNERVATING LUMBAR FACET JOINT Bilateral 7/16/2020    Procedure: Bilateral L3-5 MBB;  Surgeon: Yo Kirkland MD;  Location: [facility];  Service: Pain Management;  Laterality: Bilateral;    INJECTION OF ANESTHETIC AGENT INTO SACROILIAC JOINT Right 2/5/2020    Procedure: Right SIJ Injection with local;  Surgeon: Yo Kirkladn MD;  Location: [facility];  Service: Pain Management;  Laterality: Right;    LUMBAR FUSION           Family History   Problem Relation Age of Onset    Hypertension Mother     Fibroids Mother     No Known Problems Father     Diabetes Sister     Hypertension Brother     Hypertension Maternal Aunt     Diabetes Maternal Aunt     Diabetes Maternal Uncle     Hypertension Maternal Grandmother     Leukemia Maternal Grandfather     Hypertension Maternal Grandfather     Stroke Neg Hx          Social History     Tobacco Use    Smoking status: Never Smoker    Smokeless tobacco: Never Used   Substance Use Topics    Alcohol use: Yes     Frequency: Never     Drinks per session: Patient refused     Binge frequency: Never     Comment: special occasions    Drug use: No         REVIEW OF SYSTEMS  Review of Systems   Constitutional: Positive for fatigue.   Respiratory: Negative.    Cardiovascular: Negative.  Negative for chest pain.   Gastrointestinal: Positive for constipation and nausea (w/ headache). Negative for abdominal distention, abdominal pain and vomiting.   Endocrine: Negative for polydipsia, polyphagia and polyuria.   Genitourinary: Negative.  Negative for enuresis, flank pain and frequency.   Skin: Negative for pallor.   Neurological: Positive for headaches.  Negative for dizziness, tremors, seizures, speech difficulty, weakness and light-headedness.   Psychiatric/Behavioral: Positive for sleep disturbance. Negative for confusion. The patient is not nervous/anxious.           OBJECTIVE:  Physical Exam:  Constitutional:  In NAD, pleasant, cooperative.  Appears well-developed and well-nourished.   Respiratory:  Unlabored, in no resp distress.  Neurological: Alert and oriented to person, place, and time.   Psychiatric: Normal mood and affect, behavior is normal. Judgment and thought content normal.       Complete PE deferred due to video visit      ASSESSMENT       1. Type 2 diabetes mellitus without complication, without long-term current use of insulin           PLAN    Type 2 diabetes mellitus without complication, without long-term current use of insulin  · Doing better, sugars gradually improving.  · Continue medication as ordered.  · Healthy diet and lifestyle changes.          Follow-up:  Lab due around 2/16/2021.  Return prn.          Total time spent with patient was 20 minutes to include discussion, counseling and chart review.  All patient questions were answered at appointment.

## 2020-12-08 ENCOUNTER — HOSPITAL ENCOUNTER (OUTPATIENT)
Facility: HOSPITAL | Age: 30
Discharge: HOME | End: 2020-12-08
Attending: ANESTHESIOLOGY | Admitting: ANESTHESIOLOGY
Payer: MEDICAID

## 2020-12-08 VITALS
SYSTOLIC BLOOD PRESSURE: 127 MMHG | RESPIRATION RATE: 16 BRPM | DIASTOLIC BLOOD PRESSURE: 78 MMHG | BODY MASS INDEX: 42.65 KG/M2 | WEIGHT: 249.81 LBS | HEART RATE: 75 BPM | OXYGEN SATURATION: 100 % | HEIGHT: 64 IN | TEMPERATURE: 98 F

## 2020-12-08 DIAGNOSIS — M54.59 LUMBAR FACET JOINT PAIN: ICD-10-CM

## 2020-12-08 LAB
B-HCG UR QL: NEGATIVE
CTP QC/QA: YES
POCT GLUCOSE: 86 MG/DL (ref 70–110)

## 2020-12-08 PROCEDURE — 63600175 PHARM REV CODE 636 W HCPCS: Performed by: ANESTHESIOLOGY

## 2020-12-08 PROCEDURE — 64636 DESTROY L/S FACET JNT ADDL: CPT | Mod: RT,,, | Performed by: ANESTHESIOLOGY

## 2020-12-08 PROCEDURE — 25000003 PHARM REV CODE 250: Performed by: ANESTHESIOLOGY

## 2020-12-08 PROCEDURE — 64635 DESTROY LUMB/SAC FACET JNT: CPT | Performed by: ANESTHESIOLOGY

## 2020-12-08 PROCEDURE — 64635 DESTROY LUMB/SAC FACET JNT: CPT | Mod: RT,,, | Performed by: ANESTHESIOLOGY

## 2020-12-08 PROCEDURE — 64636 DESTROY L/S FACET JNT ADDL: CPT | Performed by: ANESTHESIOLOGY

## 2020-12-08 PROCEDURE — 99152 MOD SED SAME PHYS/QHP 5/>YRS: CPT | Performed by: ANESTHESIOLOGY

## 2020-12-08 PROCEDURE — 81025 URINE PREGNANCY TEST: CPT | Performed by: ANESTHESIOLOGY

## 2020-12-08 PROCEDURE — 64635 PR DESTROY LUMB/SAC FACET JNT: ICD-10-PCS | Mod: RT,,, | Performed by: ANESTHESIOLOGY

## 2020-12-08 PROCEDURE — 64636 PR DESTROY L/S FACET JNT ADDL: ICD-10-PCS | Mod: RT,,, | Performed by: ANESTHESIOLOGY

## 2020-12-08 RX ORDER — FENTANYL CITRATE 50 UG/ML
INJECTION, SOLUTION INTRAMUSCULAR; INTRAVENOUS
Status: DISCONTINUED | OUTPATIENT
Start: 2020-12-08 | End: 2020-12-08 | Stop reason: HOSPADM

## 2020-12-08 RX ORDER — BUPIVACAINE HYDROCHLORIDE 5 MG/ML
INJECTION, SOLUTION EPIDURAL; INTRACAUDAL
Status: DISCONTINUED | OUTPATIENT
Start: 2020-12-08 | End: 2020-12-08 | Stop reason: HOSPADM

## 2020-12-08 RX ORDER — LIDOCAINE HYDROCHLORIDE 20 MG/ML
INJECTION, SOLUTION EPIDURAL; INFILTRATION; INTRACAUDAL; PERINEURAL
Status: DISCONTINUED | OUTPATIENT
Start: 2020-12-08 | End: 2020-12-08 | Stop reason: HOSPADM

## 2020-12-08 RX ORDER — MIDAZOLAM HYDROCHLORIDE 1 MG/ML
INJECTION, SOLUTION INTRAMUSCULAR; INTRAVENOUS
Status: DISCONTINUED | OUTPATIENT
Start: 2020-12-08 | End: 2020-12-08 | Stop reason: HOSPADM

## 2020-12-08 RX ORDER — HYDROCODONE BITARTRATE AND ACETAMINOPHEN 10; 325 MG/1; MG/1
1 TABLET ORAL EVERY 6 HOURS PRN
Qty: 15 TABLET | Refills: 0 | Status: ON HOLD | OUTPATIENT
Start: 2020-12-08 | End: 2021-01-21

## 2020-12-08 NOTE — DISCHARGE SUMMARY
[facility]  Discharge Note       Description of Procedure: Right L3, 4,5  Lumbar Radiofrequency Ablation under Fluoroscopic Guidance    Procedure Date: 12/8/2020    Admit Date: 12/8/2020  Discharge Date: 12/8/2020     Attending Physician: Yo Kirkland   Discharge Provider: Yo Kirkland    Preoperative Diagnosis: Lumbar spondylosis, Lumbar facet joint pain      Discharged Condition: Stable    Hospital Course: Patient was admitted for an outpatient procedure. The procedure was tolerated well with no complications.    Final Diagnoses: Same as principal problem.    Disposition: Home, self-care.    Follow up/Patient Instructions:  Follow-up in clinic in 2-3 weeks.    Medications: No medications were prescribed today. The patient was advised to resume normal medication regimen without change.  Specific information was provided regarding restarting any anticoagulant/s.    Discharge Procedure Orders (must include Diet, Follow-up, Activity):  Light activity for the remainder of the day, resume normal activity tomorrow. Resume normal diet. Follow-up in clinic in 2-3 weeks.

## 2020-12-08 NOTE — PLAN OF CARE
Pt discharged home, awake, alert, oriented x's 4,  denies any pain, no apparent distress noted. All questions and concerns addressed and answered, pt verbalizes understanding of discharge process, pt meets discharge criteria and is being discharged to car via wheelchair.

## 2020-12-08 NOTE — OP NOTE
PROCEDURE: Lumbar medial branch radiofrequency (RF) ablation under fluoroscopic guidance     SIDE: Right      LEVEL:   Sacral ala (Corresponding to the L5 dorsal ramus),   L5 transverse process (Corresponding to the L4 medial branch),   L4 transverse process (Corresponding to the L3 medial branch)     PROCEDURE DATE: 12/08/2020     PREOPERATIVE DIAGNOSIS: Lumbar spondylosis  POSTOPERATIVE DIAGNOSIS: Lumbar spondylosis     PROVIDER: Yo Kirkland MD  Assistant(s): None     ANESTHESIA: Local, IV Sedation    >> 2 mg of VERSED    >> 200 mcg of FENTANYL      INDICATION: The patient has low back pain without radiculopathy symptoms unresponsive to conservative treatments. Fluoroscopy was used to optimize visualization of needle placement and to maximize safety.         PROCEDURE DESCRIPTION / TECHNIQUE:   The patient was seen and identified in the preoperative area. Risks, benefits, complications, and alternatives were discussed with the patient. The patient agreed to proceed with the procedure and signed the consent. The site and side of the procedure was identified and marked. An iv was started.     The patient was taken to the procedural suite. The patient was positioned in prone orientation on procedure table and a pillow was placed under the abdomen to reduce lumbar lordosis. A time out was performed prior to any intervention. The procedure, site, side, and allergies were stated and agreed to by all present. The lumbosacral area was widely prepped with ChloraPrep. The procedural site was draped in usual sterile fashion. Vital signs were closely monitored throughout this procedure. Conscious sedation was used for this procedure to decrease patient anxiety.     Using AP fluoroscopy, the relevant end plate was squared.  The fluoroscope was obliqued to the ipsilateral side at an angle approaching 20 degrees.  The junction of the relevant vetebral transverse process (TP) and the superior articular process (SAP) was  identified and marked. Superficial tissues were localized with 2% PF Lidocaine at each level. 100 mm 17-gauge radiofrequency introducer needles were advanced using a perpendicular approach until osseus contact was made 1-2 mm caudal to the TP-SAP interface along the pedicle. Lateral fluoroscopic imaging was captured to ensure that the needle tips were recessed from the intervertebral foramen. After satisfactory cannula positioning was realized, the needle stylets were removed, RF probes with a 4 mm active tip were inserted, and motor testing was then performed.      Nerves were tested sequentially. Motor testing was performed at 2 Hz up to 1.5 volts with elicitation of mulitifidus muscle contraction and with no radicular pain, paresthesias, or distal muscle contraction beyond the buttocks produced during motor testing. Following testing, RF electrodes were removed and 1 mL of 2% PF Lidocaine was injected through each cannula following negative aspiration. The RF electrodes were then replaced and each targeted medial branch was sequentially subjected to thermal coagulation at 80 degrees Celsius for 2 minutes and 30 seconds. Following the burn, RF electrodes were removed, 1 mL of a solution containing 2 mL of 0.25% Bupivacaine and 1 mL of Depo-Medrol (40 mg/mL) was injected, stylets were replaced, and each cannua was removed intact.     Description of Findings: Not applicable     Prosthetic devices, grafts, tissues, or devices implanted: None     Specimen Removed: No     Estimated Blood Loss: minimal     COMPLICATIONS: None     DISPOSITION / PLANS: The patient was transferred to the recovery area in a stable condition for observation. The patient was reexamined prior to discharge. There was no evidence of acute neurologic injury following the procedure.  Patient was discharged from the recovery room after meeting discharge criteria. Home discharge instructions were given to the patient by the staff.

## 2020-12-08 NOTE — DISCHARGE INSTRUCTIONS

## 2020-12-10 ENCOUNTER — OFFICE VISIT (OUTPATIENT)
Dept: GASTROENTEROLOGY | Facility: CLINIC | Age: 30
End: 2020-12-10
Payer: MEDICAID

## 2020-12-10 ENCOUNTER — PATIENT MESSAGE (OUTPATIENT)
Dept: GASTROENTEROLOGY | Facility: CLINIC | Age: 30
End: 2020-12-10

## 2020-12-10 VITALS
WEIGHT: 249.81 LBS | SYSTOLIC BLOOD PRESSURE: 120 MMHG | DIASTOLIC BLOOD PRESSURE: 78 MMHG | HEART RATE: 80 BPM | BODY MASS INDEX: 42.65 KG/M2 | HEIGHT: 64 IN

## 2020-12-10 DIAGNOSIS — E66.01 CLASS 3 SEVERE OBESITY WITH BODY MASS INDEX (BMI) OF 40.0 TO 44.9 IN ADULT, UNSPECIFIED OBESITY TYPE, UNSPECIFIED WHETHER SERIOUS COMORBIDITY PRESENT: ICD-10-CM

## 2020-12-10 DIAGNOSIS — K21.9 GASTROESOPHAGEAL REFLUX DISEASE, UNSPECIFIED WHETHER ESOPHAGITIS PRESENT: Primary | ICD-10-CM

## 2020-12-10 PROCEDURE — 99214 OFFICE O/P EST MOD 30 MIN: CPT | Mod: PBBFAC | Performed by: INTERNAL MEDICINE

## 2020-12-10 PROCEDURE — 99204 PR OFFICE/OUTPT VISIT, NEW, LEVL IV, 45-59 MIN: ICD-10-PCS | Mod: S$PBB,,, | Performed by: INTERNAL MEDICINE

## 2020-12-10 PROCEDURE — 99999 PR PBB SHADOW E&M-EST. PATIENT-LVL IV: CPT | Mod: PBBFAC,,, | Performed by: INTERNAL MEDICINE

## 2020-12-10 PROCEDURE — 99999 PR PBB SHADOW E&M-EST. PATIENT-LVL IV: ICD-10-PCS | Mod: PBBFAC,,, | Performed by: INTERNAL MEDICINE

## 2020-12-10 PROCEDURE — 99204 OFFICE O/P NEW MOD 45 MIN: CPT | Mod: S$PBB,,, | Performed by: INTERNAL MEDICINE

## 2020-12-10 RX ORDER — PANTOPRAZOLE SODIUM 40 MG/1
40 TABLET, DELAYED RELEASE ORAL DAILY
Qty: 60 TABLET | Refills: 0 | Status: SHIPPED | OUTPATIENT
Start: 2020-12-10 | End: 2021-02-22

## 2020-12-10 NOTE — PROGRESS NOTES
Clinic Consult:  [facility] Consultation Note    Reason for Consult:  There were no encounter diagnoses.    PCP: Qasim Paez   No address on file    HPI:  Ms. Carlos is a 29 y.o. female here for evaluation of abdominal pain.  She described epigastric pain that is sharp episodic sudden onset and gradual resolution non-radiating, at it's worse is a 10/10, lately has been frequent almost daily lasting from a few hours to all day.   Worse with eating spicy foods. Associated with heartburn, nausea. EGD in 2018 showed gastritis and had H.pylori that was treated and eradication was confirmed on stool antigen testign on Feb 2020. Was treated with Dexilant and it helped her symptoms but due to insurance not covering. She tried OTC antacids but no relief.  Avoid NSAIDs.  She has chronic constipation and was given Linzess recently that helped. Colonoscopy was performed in 2018.  Denied odynophagia, dysphagia, emesis, other abdominal pain, diarrhea, overt bleeding, change in appetite, weight loss, skin rash, pruritis, jaundice.    GI history:  Endoscopy: 2018  Colonoscopy: 2018 with a subcentimeter polyp that was benign.   Family history: Maternal great grandmother had colon cancer    ROS:  CONSTITUTIONAL: Denies weight change,  fatigue, fevers, chills, night sweats.  EYES: No changes in vision.   ENT: No oral lesions or sore throat.  HEMATOLOGICAL/Lymph: Denies bleeding tendency, bruising tendency. No swellings or enlarged lymph nodes.  CARDIOVASCULAR: Denies chest pain, shortness of breath, orthopnea and edema.  RESPIRATORY: Denies cough, hemoptysis, dyspnea, and wheezing.  GI: See HPI.  : Denies dysuria and hematuria  MUSCULOSKELETAL: Denies joint pain or swelling, back pain and muscle pain.  SKIN: Denies rashes.  NEUROLOGIC: Denies headaches, seizures and numbness.  PSYCHIATRIC: Denies depression or anxiety.  ENDOCRINE: Denies heat or cold intolerance and excessive thirst or urination.    Medical  History:   Past Medical History:   Diagnosis Date    Carpal tunnel syndrome     GERD (gastroesophageal reflux disease)     Herpes simplex virus (HSV) infection     High serum testosterone 9/7/2017    Hyperlipidemia     Hypertension     Insulin resistance syndrome 9/7/2017    Lumbar facet arthropathy 8/14/2019    Morbid obesity     Ovarian cyst     bilateral        Surgical History:  Past Surgical History:   Procedure Laterality Date    ANKLE SURGERY Left     BUNIONECTOMY Bilateral     COLONOSCOPY  06/12/2018    INJECTION OF ANESTHETIC AGENT AROUND MEDIAL BRANCH NERVES INNERVATING LUMBAR FACET JOINT Bilateral 8/14/2019    Procedure: Bilateral L3-5 MBB;  Surgeon: Yo Kirkland MD;  Location: [facility];  Service: Pain Management;  Laterality: Bilateral;    INJECTION OF ANESTHETIC AGENT AROUND MEDIAL BRANCH NERVES INNERVATING LUMBAR FACET JOINT Bilateral 7/16/2020    Procedure: Bilateral L3-5 MBB;  Surgeon: Yo Kirkland MD;  Location: [facility];  Service: Pain Management;  Laterality: Bilateral;    INJECTION OF ANESTHETIC AGENT INTO SACROILIAC JOINT Right 2/5/2020    Procedure: Right SIJ Injection with local;  Surgeon: Yo Kirkland MD;  Location: [facility];  Service: Pain Management;  Laterality: Right;    LUMBAR FUSION         Family History:   Family History   Problem Relation Age of Onset    Hypertension Mother     Fibroids Mother     No Known Problems Father     Diabetes Sister     Hypertension Brother     Hypertension Maternal Aunt     Diabetes Maternal Aunt     Diabetes Maternal Uncle     Hypertension Maternal Grandmother     Leukemia Maternal Grandfather     Hypertension Maternal Grandfather     Stroke Neg Hx        Social History:   Social History     Tobacco Use    Smoking status: Never Smoker    Smokeless tobacco: Never Used   Substance Use Topics    Alcohol use: Yes     Frequency: Never     Drinks per session: Patient refused     Binge frequency: Never     Comment:  "special occasions    Drug use: No       Allergies: Reviewed    Home Medications:   Medication List with Changes/Refills   Current Medications    BLOOD SUGAR DIAGNOSTIC STRP    To check BG 2 times daily, to use with insurance preferred meter    BLOOD-GLUCOSE METER KIT    To check BG 2 times daily, to use with insurance preferred meter    CANAGLIFLOZIN (INVOKANA) 100 MG TAB TABLET    Take 1 tablet (100 mg total) by mouth once daily.    GLIMEPIRIDE (AMARYL) 2 MG TABLET    Take 1 tablet (2 mg total) by mouth before breakfast.    HYDROCODONE-ACETAMINOPHEN (NORCO)  MG PER TABLET    Take 1 tablet by mouth every 6 (six) hours as needed. HYDROCODONE-ACETAMINOPHEN (Norco)  MG ORAL TAB - 1 tab po q 6 hrs prn pain    IRBESARTAN (AVAPRO) 75 MG TABLET    Take 1 tablet (75 mg total) by mouth once daily.    LANCETS MISC    To check BG 2 times daily, to use with insurance preferred meter    METFORMIN (GLUCOPHAGE-XR) 750 MG ER 24HR TABLET    Take 1 tablet (750 mg total) by mouth daily with dinner or evening meal.    METOPROLOL SUCCINATE (TOPROL-XL) 50 MG 24 HR TABLET    Take 1 tablet by mouth once daily    SITAGLIPTIN (JANUVIA) 25 MG TAB    Take 1 tablet (25 mg total) by mouth once daily.    SPIRONOLACTONE (ALDACTONE) 25 MG TABLET    Take 2 tablets by mouth once daily    TEMAZEPAM (RESTORIL) 15 MG CAP    Take 1 capsule (15 mg total) by mouth nightly as needed.    TIZANIDINE (ZANAFLEX) 4 MG TABLET    Take 1 tablet (4 mg total) by mouth 3 (three) times daily as needed.    VALACYCLOVIR (VALTREX) 500 MG TABLET    TAKE 1 TABLET BY MOUTH TWICE DAILY FOR 3 DAYS         Physical Exam:  Vital Signs:  /78   Pulse 80   Ht 5' 4" (1.626 m)   Wt 113.3 kg (249 lb 12.5 oz)   BMI 42.87 kg/m²   Body mass index is 42.87 kg/m².      GENERAL: No acute distress, A&Ox3  EYES: Anicteric, no pallor noted.  ENT: OP clear  NECK: Supple, no masses, no thyromegally.  CHEST: Equal breath sounds bilaterally, no wheezing.  CARDIOVASCULAR: " Regular rate and rhythm. Murmurs, rubs and gallops absent.  ABDOMEN: soft, non-tender, non-distended, normal bowel sounds, no hepatosplenomegaly   EXTREMITIES: No clubbing, cyanosis or edema.  SKIN: Without lesion or erythema.  LYMPH: No cervical, axillary lymphadenopathy palpable.   NEUROLOGICAL: Grossly normal, no asterixis present.    Labs: Pertinent labs reviewed.    Assessment and Plan:  Gastroesophageal reflux disease, unspecified whether esophagitis present    Protonix 40 mg po daily and if no improvement in two weeks will schedule EGD otherwise follow up in clinic in 2 months. Risks and benefits of EGD were discussed including risk of bleeding and perforation <1%.     Will also refer for bariatric surgery for BMI of 42.87.      No follow-ups on file.        Thank you so much for allowing me to participate in the care of Nathan Marks MD  Gastroenterology

## 2020-12-10 NOTE — PATIENT INSTRUCTIONS
Take Protonix 40 mg by mouth daily 30 minutes before lunch consistently and if you have no improvement in 2 weeks call the office to schedule an endoscopy.  If your symptoms improve follow up in the office in two months.    Schedule an appointment with surgery to talk about weight loss options.

## 2020-12-22 ENCOUNTER — PATIENT OUTREACH (OUTPATIENT)
Dept: ADMINISTRATIVE | Facility: HOSPITAL | Age: 30
End: 2020-12-22

## 2020-12-22 NOTE — PROGRESS NOTES
Working A1C Report.  Last a1c 10.2 on Nov 16. Pt had virtual visit 12/07 and note indicates pt will have lab done in Feb.

## 2020-12-28 ENCOUNTER — PATIENT MESSAGE (OUTPATIENT)
Dept: PAIN MEDICINE | Facility: CLINIC | Age: 30
End: 2020-12-28

## 2020-12-28 ENCOUNTER — PATIENT MESSAGE (OUTPATIENT)
Dept: FAMILY MEDICINE | Facility: CLINIC | Age: 30
End: 2020-12-28

## 2020-12-28 RX ORDER — FLUCONAZOLE 150 MG/1
TABLET ORAL
Qty: 2 TABLET | Refills: 0 | Status: SHIPPED | OUTPATIENT
Start: 2020-12-28 | End: 2021-02-10

## 2021-01-04 ENCOUNTER — PATIENT OUTREACH (OUTPATIENT)
Dept: ADMINISTRATIVE | Facility: OTHER | Age: 31
End: 2021-01-04

## 2021-01-05 ENCOUNTER — OFFICE VISIT (OUTPATIENT)
Dept: PAIN MEDICINE | Facility: CLINIC | Age: 31
End: 2021-01-05
Payer: MEDICAID

## 2021-01-05 VITALS
BODY MASS INDEX: 43.66 KG/M2 | SYSTOLIC BLOOD PRESSURE: 144 MMHG | DIASTOLIC BLOOD PRESSURE: 90 MMHG | HEART RATE: 77 BPM | WEIGHT: 255.75 LBS | HEIGHT: 64 IN

## 2021-01-05 DIAGNOSIS — M53.3 SACROILIAC JOINT PAIN: ICD-10-CM

## 2021-01-05 DIAGNOSIS — M79.18 MYOFASCIAL MUSCLE PAIN: ICD-10-CM

## 2021-01-05 DIAGNOSIS — M47.816 LUMBAR FACET ARTHROPATHY: ICD-10-CM

## 2021-01-05 DIAGNOSIS — M47.816 LUMBAR SPONDYLOSIS: Primary | ICD-10-CM

## 2021-01-05 PROCEDURE — 99214 OFFICE O/P EST MOD 30 MIN: CPT | Mod: PBBFAC | Performed by: PHYSICIAN ASSISTANT

## 2021-01-05 PROCEDURE — 99214 PR OFFICE/OUTPT VISIT, EST, LEVL IV, 30-39 MIN: ICD-10-PCS | Mod: S$PBB,,, | Performed by: PHYSICIAN ASSISTANT

## 2021-01-05 PROCEDURE — 99999 PR PBB SHADOW E&M-EST. PATIENT-LVL IV: CPT | Mod: PBBFAC,,, | Performed by: PHYSICIAN ASSISTANT

## 2021-01-05 PROCEDURE — 99214 OFFICE O/P EST MOD 30 MIN: CPT | Mod: S$PBB,,, | Performed by: PHYSICIAN ASSISTANT

## 2021-01-05 PROCEDURE — 99999 PR PBB SHADOW E&M-EST. PATIENT-LVL IV: ICD-10-PCS | Mod: PBBFAC,,, | Performed by: PHYSICIAN ASSISTANT

## 2021-01-05 RX ORDER — TIZANIDINE 4 MG/1
TABLET ORAL
Qty: 60 TABLET | Refills: 0 | Status: SHIPPED | OUTPATIENT
Start: 2021-01-05 | End: 2021-02-18 | Stop reason: SDUPTHER

## 2021-01-21 ENCOUNTER — HOSPITAL ENCOUNTER (OUTPATIENT)
Facility: HOSPITAL | Age: 31
Discharge: HOME | End: 2021-01-21
Attending: PHYSICAL MEDICINE & REHABILITATION | Admitting: PHYSICAL MEDICINE & REHABILITATION
Payer: MEDICAID

## 2021-01-21 VITALS
BODY MASS INDEX: 43.17 KG/M2 | WEIGHT: 252.88 LBS | TEMPERATURE: 98 F | RESPIRATION RATE: 18 BRPM | OXYGEN SATURATION: 96 % | HEART RATE: 81 BPM | SYSTOLIC BLOOD PRESSURE: 119 MMHG | HEIGHT: 64 IN | DIASTOLIC BLOOD PRESSURE: 71 MMHG

## 2021-01-21 DIAGNOSIS — M54.59 LUMBAR FACET JOINT PAIN: Primary | ICD-10-CM

## 2021-01-21 DIAGNOSIS — M47.816 SPONDYLOSIS WITHOUT MYELOPATHY OR RADICULOPATHY, LUMBAR REGION: ICD-10-CM

## 2021-01-21 LAB
B-HCG UR QL: NEGATIVE
CTP QC/QA: YES
POCT GLUCOSE: 134 MG/DL (ref 70–110)

## 2021-01-21 PROCEDURE — 63600175 PHARM REV CODE 636 W HCPCS: Performed by: PHYSICAL MEDICINE & REHABILITATION

## 2021-01-21 PROCEDURE — 81025 URINE PREGNANCY TEST: CPT | Performed by: PHYSICAL MEDICINE & REHABILITATION

## 2021-01-21 PROCEDURE — 82962 GLUCOSE BLOOD TEST: CPT | Performed by: PHYSICAL MEDICINE & REHABILITATION

## 2021-01-21 PROCEDURE — 64635 PR DESTROY LUMB/SAC FACET JNT: ICD-10-PCS | Mod: LT,,, | Performed by: PHYSICAL MEDICINE & REHABILITATION

## 2021-01-21 PROCEDURE — 64636 DESTROY L/S FACET JNT ADDL: CPT | Performed by: PHYSICAL MEDICINE & REHABILITATION

## 2021-01-21 PROCEDURE — 64636 DESTROY L/S FACET JNT ADDL: CPT | Mod: LT,,, | Performed by: PHYSICAL MEDICINE & REHABILITATION

## 2021-01-21 PROCEDURE — 64635 DESTROY LUMB/SAC FACET JNT: CPT | Mod: LT,,, | Performed by: PHYSICAL MEDICINE & REHABILITATION

## 2021-01-21 PROCEDURE — 64635 DESTROY LUMB/SAC FACET JNT: CPT | Performed by: PHYSICAL MEDICINE & REHABILITATION

## 2021-01-21 PROCEDURE — 99152 MOD SED SAME PHYS/QHP 5/>YRS: CPT | Performed by: PHYSICAL MEDICINE & REHABILITATION

## 2021-01-21 PROCEDURE — 25000003 PHARM REV CODE 250: Performed by: PHYSICAL MEDICINE & REHABILITATION

## 2021-01-21 PROCEDURE — 64636 PR DESTROY L/S FACET JNT ADDL: ICD-10-PCS | Mod: LT,,, | Performed by: PHYSICAL MEDICINE & REHABILITATION

## 2021-01-21 RX ORDER — FENTANYL CITRATE 50 UG/ML
INJECTION, SOLUTION INTRAMUSCULAR; INTRAVENOUS
Status: DISCONTINUED | OUTPATIENT
Start: 2021-01-21 | End: 2021-01-21 | Stop reason: HOSPADM

## 2021-01-21 RX ORDER — ONDANSETRON 2 MG/ML
4 INJECTION INTRAMUSCULAR; INTRAVENOUS ONCE AS NEEDED
Status: DISCONTINUED | OUTPATIENT
Start: 2021-01-21 | End: 2021-01-21 | Stop reason: HOSPADM

## 2021-01-21 RX ORDER — HYDROCODONE BITARTRATE AND ACETAMINOPHEN 10; 325 MG/1; MG/1
1 TABLET ORAL EVERY 12 HOURS PRN
Qty: 10 TABLET | Refills: 0 | Status: SHIPPED | OUTPATIENT
Start: 2021-01-21 | End: 2021-01-26

## 2021-01-21 RX ORDER — MIDAZOLAM HYDROCHLORIDE 1 MG/ML
INJECTION, SOLUTION INTRAMUSCULAR; INTRAVENOUS
Status: DISCONTINUED | OUTPATIENT
Start: 2021-01-21 | End: 2021-01-21 | Stop reason: HOSPADM

## 2021-01-21 RX ORDER — BUPIVACAINE HYDROCHLORIDE 2.5 MG/ML
INJECTION, SOLUTION EPIDURAL; INFILTRATION; INTRACAUDAL
Status: DISCONTINUED | OUTPATIENT
Start: 2021-01-21 | End: 2021-01-21 | Stop reason: HOSPADM

## 2021-01-25 ENCOUNTER — PATIENT MESSAGE (OUTPATIENT)
Dept: FAMILY MEDICINE | Facility: CLINIC | Age: 31
End: 2021-01-25

## 2021-01-27 ENCOUNTER — PATIENT MESSAGE (OUTPATIENT)
Dept: FAMILY MEDICINE | Facility: CLINIC | Age: 31
End: 2021-01-27

## 2021-01-28 ENCOUNTER — PATIENT MESSAGE (OUTPATIENT)
Dept: GASTROENTEROLOGY | Facility: CLINIC | Age: 31
End: 2021-01-28

## 2021-02-01 ENCOUNTER — PATIENT MESSAGE (OUTPATIENT)
Dept: FAMILY MEDICINE | Facility: CLINIC | Age: 31
End: 2021-02-01

## 2021-02-04 ENCOUNTER — PATIENT MESSAGE (OUTPATIENT)
Dept: PAIN MEDICINE | Facility: CLINIC | Age: 31
End: 2021-02-04

## 2021-02-17 ENCOUNTER — PATIENT OUTREACH (OUTPATIENT)
Dept: ADMINISTRATIVE | Facility: OTHER | Age: 31
End: 2021-02-17

## 2021-02-17 DIAGNOSIS — E11.9 TYPE 2 DIABETES MELLITUS WITHOUT COMPLICATION, WITHOUT LONG-TERM CURRENT USE OF INSULIN: Primary | ICD-10-CM

## 2021-02-18 ENCOUNTER — OFFICE VISIT (OUTPATIENT)
Dept: PAIN MEDICINE | Facility: CLINIC | Age: 31
End: 2021-02-18
Payer: MEDICAID

## 2021-02-18 VITALS — WEIGHT: 252.88 LBS | BODY MASS INDEX: 43.17 KG/M2 | HEIGHT: 64 IN | RESPIRATION RATE: 17 BRPM

## 2021-02-18 DIAGNOSIS — M53.3 SACROILIAC JOINT PAIN: ICD-10-CM

## 2021-02-18 DIAGNOSIS — M54.59 LUMBAR FACET JOINT PAIN: ICD-10-CM

## 2021-02-18 DIAGNOSIS — M79.18 MYOFASCIAL MUSCLE PAIN: ICD-10-CM

## 2021-02-18 DIAGNOSIS — M47.816 LUMBAR SPONDYLOSIS: Primary | ICD-10-CM

## 2021-02-18 PROCEDURE — 99214 OFFICE O/P EST MOD 30 MIN: CPT | Mod: 95,,, | Performed by: PHYSICIAN ASSISTANT

## 2021-02-18 PROCEDURE — 99214 PR OFFICE/OUTPT VISIT, EST, LEVL IV, 30-39 MIN: ICD-10-PCS | Mod: 95,,, | Performed by: PHYSICIAN ASSISTANT

## 2021-02-18 RX ORDER — TIZANIDINE 4 MG/1
TABLET ORAL
Qty: 90 TABLET | Refills: 2 | Status: SHIPPED | OUTPATIENT
Start: 2021-02-18 | End: 2021-05-20 | Stop reason: SDUPTHER

## 2021-02-22 ENCOUNTER — PATIENT MESSAGE (OUTPATIENT)
Dept: FAMILY MEDICINE | Facility: CLINIC | Age: 31
End: 2021-02-22

## 2021-02-22 ENCOUNTER — OFFICE VISIT (OUTPATIENT)
Dept: FAMILY MEDICINE | Facility: CLINIC | Age: 31
End: 2021-02-22
Payer: MEDICAID

## 2021-02-22 ENCOUNTER — HOSPITAL ENCOUNTER (OUTPATIENT)
Dept: RADIOLOGY | Facility: HOSPITAL | Age: 31
Discharge: HOME | End: 2021-02-22
Attending: REGISTERED NURSE
Payer: MEDICAID

## 2021-02-22 ENCOUNTER — TELEPHONE (OUTPATIENT)
Dept: FAMILY MEDICINE | Facility: CLINIC | Age: 31
End: 2021-02-22

## 2021-02-22 VITALS
OXYGEN SATURATION: 98 % | DIASTOLIC BLOOD PRESSURE: 84 MMHG | WEIGHT: 252 LBS | SYSTOLIC BLOOD PRESSURE: 122 MMHG | HEIGHT: 64 IN | HEART RATE: 75 BPM | TEMPERATURE: 98 F | BODY MASS INDEX: 43.02 KG/M2 | RESPIRATION RATE: 15 BRPM

## 2021-02-22 DIAGNOSIS — E78.00 HYPERCHOLESTEREMIA: ICD-10-CM

## 2021-02-22 DIAGNOSIS — I10 ESSENTIAL HYPERTENSION: ICD-10-CM

## 2021-02-22 DIAGNOSIS — B00.9 HSV-2 (HERPES SIMPLEX VIRUS 2) INFECTION: ICD-10-CM

## 2021-02-22 DIAGNOSIS — E66.01 MORBID OBESITY WITH BMI OF 40.0-44.9, ADULT: ICD-10-CM

## 2021-02-22 DIAGNOSIS — K21.9 GASTROESOPHAGEAL REFLUX DISEASE, UNSPECIFIED WHETHER ESOPHAGITIS PRESENT: ICD-10-CM

## 2021-02-22 DIAGNOSIS — R79.89 HIGH SERUM TESTOSTERONE: ICD-10-CM

## 2021-02-22 DIAGNOSIS — E11.9 TYPE 2 DIABETES MELLITUS WITHOUT COMPLICATION, WITHOUT LONG-TERM CURRENT USE OF INSULIN: ICD-10-CM

## 2021-02-22 DIAGNOSIS — K59.09 CHRONIC CONSTIPATION: ICD-10-CM

## 2021-02-22 DIAGNOSIS — Z00.00 PREVENTATIVE HEALTH CARE: Primary | ICD-10-CM

## 2021-02-22 PROCEDURE — 82043 UR ALBUMIN QUANTITATIVE: CPT

## 2021-02-22 PROCEDURE — 74019 XR ABDOMEN FLAT AND ERECT: ICD-10-PCS | Mod: 26,,, | Performed by: RADIOLOGY

## 2021-02-22 PROCEDURE — 99214 OFFICE O/P EST MOD 30 MIN: CPT | Mod: PBBFAC,25,PO | Performed by: REGISTERED NURSE

## 2021-02-22 PROCEDURE — 99395 PR PREVENTIVE VISIT,EST,18-39: ICD-10-PCS | Mod: 25,S$PBB,, | Performed by: REGISTERED NURSE

## 2021-02-22 PROCEDURE — 74019 RADEX ABDOMEN 2 VIEWS: CPT | Mod: 26,,, | Performed by: RADIOLOGY

## 2021-02-22 PROCEDURE — 99395 PREV VISIT EST AGE 18-39: CPT | Mod: 25,S$PBB,, | Performed by: REGISTERED NURSE

## 2021-02-22 PROCEDURE — 99999 PR PBB SHADOW E&M-EST. PATIENT-LVL IV: ICD-10-PCS | Mod: PBBFAC,,, | Performed by: REGISTERED NURSE

## 2021-02-22 PROCEDURE — 74019 RADEX ABDOMEN 2 VIEWS: CPT | Mod: TC,FY,PO

## 2021-02-22 PROCEDURE — 99999 PR PBB SHADOW E&M-EST. PATIENT-LVL IV: CPT | Mod: PBBFAC,,, | Performed by: REGISTERED NURSE

## 2021-02-22 PROCEDURE — 90686 IIV4 VACC NO PRSV 0.5 ML IM: CPT | Mod: PBBFAC,PO

## 2021-02-22 PROCEDURE — 82570 ASSAY OF URINE CREATININE: CPT

## 2021-02-22 RX ORDER — VALACYCLOVIR HYDROCHLORIDE 500 MG/1
TABLET, FILM COATED ORAL
Qty: 6 TABLET | Refills: 0 | Status: CANCELLED | OUTPATIENT
Start: 2021-02-22

## 2021-02-22 RX ORDER — FLUCONAZOLE 150 MG/1
TABLET ORAL
Qty: 2 TABLET | Refills: 0 | Status: SHIPPED | OUTPATIENT
Start: 2021-02-22 | End: 2021-03-19

## 2021-02-22 RX ORDER — CALC/MAG/B COMPLEX/D3/HERB 61
15 TABLET ORAL DAILY
Qty: 90 CAPSULE | Refills: 1 | Status: SHIPPED | OUTPATIENT
Start: 2021-02-22 | End: 2021-11-16

## 2021-02-22 RX ORDER — FAMOTIDINE 40 MG/1
40 TABLET, FILM COATED ORAL NIGHTLY PRN
Qty: 90 TABLET | Refills: 1 | Status: SHIPPED | OUTPATIENT
Start: 2021-02-22 | End: 2021-10-22

## 2021-02-23 ENCOUNTER — PATIENT MESSAGE (OUTPATIENT)
Dept: FAMILY MEDICINE | Facility: CLINIC | Age: 31
End: 2021-02-23

## 2021-02-24 LAB
ALBUMIN/CREAT UR: 10.2 UG/MG (ref 0–30)
CREAT UR-MCNC: 108 MG/DL (ref 15–325)
MICROALBUMIN UR DL<=1MG/L-MCNC: 11 UG/ML

## 2021-03-01 ENCOUNTER — PATIENT MESSAGE (OUTPATIENT)
Dept: ORTHOPEDICS | Facility: CLINIC | Age: 31
End: 2021-03-01

## 2021-03-01 ENCOUNTER — PATIENT MESSAGE (OUTPATIENT)
Dept: FAMILY MEDICINE | Facility: CLINIC | Age: 31
End: 2021-03-01

## 2021-03-02 ENCOUNTER — PATIENT MESSAGE (OUTPATIENT)
Dept: ORTHOPEDICS | Facility: CLINIC | Age: 31
End: 2021-03-02

## 2021-03-02 ENCOUNTER — PATIENT MESSAGE (OUTPATIENT)
Dept: FAMILY MEDICINE | Facility: CLINIC | Age: 31
End: 2021-03-02

## 2021-03-03 ENCOUNTER — PATIENT MESSAGE (OUTPATIENT)
Dept: ORTHOPEDICS | Facility: CLINIC | Age: 31
End: 2021-03-03

## 2021-03-03 DIAGNOSIS — G56.02 CARPAL TUNNEL SYNDROME OF LEFT WRIST: Primary | ICD-10-CM

## 2021-03-03 DIAGNOSIS — Z76.0 MEDICATION REFILL: ICD-10-CM

## 2021-03-03 RX ORDER — VALACYCLOVIR HYDROCHLORIDE 500 MG/1
TABLET, FILM COATED ORAL
Qty: 6 TABLET | Refills: 3 | Status: SHIPPED | OUTPATIENT
Start: 2021-03-03 | End: 2021-07-07

## 2021-03-09 ENCOUNTER — TELEPHONE (OUTPATIENT)
Dept: PREADMISSION TESTING | Facility: HOSPITAL | Age: 31
End: 2021-03-09

## 2021-03-09 DIAGNOSIS — Z01.818 PRE-OP TESTING: Primary | ICD-10-CM

## 2021-03-16 ENCOUNTER — HOSPITAL ENCOUNTER (OUTPATIENT)
Dept: CARDIOLOGY | Facility: HOSPITAL | Age: 31
Discharge: HOME | End: 2021-03-16
Attending: ORTHOPAEDIC SURGERY
Payer: MEDICAID

## 2021-03-16 DIAGNOSIS — Z01.818 PRE-OP TESTING: ICD-10-CM

## 2021-03-16 PROCEDURE — 93005 ELECTROCARDIOGRAM TRACING: CPT

## 2021-03-16 PROCEDURE — 93010 ELECTROCARDIOGRAM REPORT: CPT | Mod: ,,, | Performed by: INTERNAL MEDICINE

## 2021-03-16 PROCEDURE — 93010 EKG 12-LEAD: ICD-10-PCS | Mod: ,,, | Performed by: INTERNAL MEDICINE

## 2021-03-18 ENCOUNTER — PATIENT MESSAGE (OUTPATIENT)
Dept: SURGERY | Facility: HOSPITAL | Age: 31
End: 2021-03-18

## 2021-03-18 ENCOUNTER — ANESTHESIA EVENT (OUTPATIENT)
Dept: SURGERY | Facility: HOSPITAL | Age: 31
End: 2021-03-18
Payer: MEDICAID

## 2021-03-18 ENCOUNTER — TELEPHONE (OUTPATIENT)
Dept: PREADMISSION TESTING | Facility: HOSPITAL | Age: 31
End: 2021-03-18

## 2021-03-19 ENCOUNTER — HOSPITAL ENCOUNTER (OUTPATIENT)
Facility: HOSPITAL | Age: 31
Discharge: HOME | End: 2021-03-19
Attending: ORTHOPAEDIC SURGERY | Admitting: ORTHOPAEDIC SURGERY
Payer: MEDICAID

## 2021-03-19 ENCOUNTER — ANESTHESIA (OUTPATIENT)
Dept: SURGERY | Facility: HOSPITAL | Age: 31
End: 2021-03-19
Payer: MEDICAID

## 2021-03-19 VITALS
DIASTOLIC BLOOD PRESSURE: 80 MMHG | WEIGHT: 254.81 LBS | SYSTOLIC BLOOD PRESSURE: 132 MMHG | TEMPERATURE: 98 F | HEIGHT: 64 IN | RESPIRATION RATE: 24 BRPM | HEART RATE: 79 BPM | BODY MASS INDEX: 43.5 KG/M2 | OXYGEN SATURATION: 99 %

## 2021-03-19 DIAGNOSIS — G56.02 LEFT CARPAL TUNNEL SYNDROME: Primary | ICD-10-CM

## 2021-03-19 LAB
B-HCG UR QL: NEGATIVE
CTP QC/QA: YES
POCT GLUCOSE: 108 MG/DL (ref 70–110)

## 2021-03-19 PROCEDURE — 71000033 HC RECOVERY, INTIAL HOUR: Performed by: ORTHOPAEDIC SURGERY

## 2021-03-19 PROCEDURE — D9220A PRA ANESTHESIA: ICD-10-PCS | Mod: ANES,,, | Performed by: ANESTHESIOLOGY

## 2021-03-19 PROCEDURE — 01810 ANES PX NRV MUSC F/ARM WRST: CPT | Performed by: ORTHOPAEDIC SURGERY

## 2021-03-19 PROCEDURE — 64721 CARPAL TUNNEL SURGERY: CPT | Mod: LT,,, | Performed by: ORTHOPAEDIC SURGERY

## 2021-03-19 PROCEDURE — 25000003 PHARM REV CODE 250: Performed by: NURSE ANESTHETIST, CERTIFIED REGISTERED

## 2021-03-19 PROCEDURE — 63600175 PHARM REV CODE 636 W HCPCS: Performed by: ANESTHESIOLOGY

## 2021-03-19 PROCEDURE — 63600175 PHARM REV CODE 636 W HCPCS: Performed by: NURSE ANESTHETIST, CERTIFIED REGISTERED

## 2021-03-19 PROCEDURE — 82962 GLUCOSE BLOOD TEST: CPT | Performed by: ORTHOPAEDIC SURGERY

## 2021-03-19 PROCEDURE — 25000003 PHARM REV CODE 250: Performed by: ANESTHESIOLOGY

## 2021-03-19 PROCEDURE — D9220A PRA ANESTHESIA: Mod: ANES,,, | Performed by: ANESTHESIOLOGY

## 2021-03-19 PROCEDURE — 36000706: Performed by: ORTHOPAEDIC SURGERY

## 2021-03-19 PROCEDURE — 64721 PR REVISE MEDIAN N/CARPAL TUNNEL SURG: ICD-10-PCS | Mod: LT,,, | Performed by: ORTHOPAEDIC SURGERY

## 2021-03-19 PROCEDURE — 81025 URINE PREGNANCY TEST: CPT | Performed by: ORTHOPAEDIC SURGERY

## 2021-03-19 PROCEDURE — 37000008 HC ANESTHESIA 1ST 15 MINUTES: Performed by: ORTHOPAEDIC SURGERY

## 2021-03-19 PROCEDURE — 71000015 HC POSTOP RECOV 1ST HR: Performed by: ORTHOPAEDIC SURGERY

## 2021-03-19 PROCEDURE — 25000003 PHARM REV CODE 250: Performed by: ORTHOPAEDIC SURGERY

## 2021-03-19 PROCEDURE — D9220A PRA ANESTHESIA: Mod: CRNA,,, | Performed by: NURSE ANESTHETIST, CERTIFIED REGISTERED

## 2021-03-19 PROCEDURE — 37000009 HC ANESTHESIA EA ADD 15 MINS: Performed by: ORTHOPAEDIC SURGERY

## 2021-03-19 PROCEDURE — 36000707: Performed by: ORTHOPAEDIC SURGERY

## 2021-03-19 PROCEDURE — 63600175 PHARM REV CODE 636 W HCPCS: Performed by: PHYSICIAN ASSISTANT

## 2021-03-19 PROCEDURE — D9220A PRA ANESTHESIA: ICD-10-PCS | Mod: CRNA,,, | Performed by: NURSE ANESTHETIST, CERTIFIED REGISTERED

## 2021-03-19 RX ORDER — MEPERIDINE HYDROCHLORIDE 25 MG/ML
12.5 INJECTION INTRAMUSCULAR; INTRAVENOUS; SUBCUTANEOUS ONCE
Status: DISCONTINUED | OUTPATIENT
Start: 2021-03-19 | End: 2021-03-19 | Stop reason: HOSPADM

## 2021-03-19 RX ORDER — LIDOCAINE HYDROCHLORIDE 20 MG/ML
INJECTION, SOLUTION EPIDURAL; INFILTRATION; INTRACAUDAL; PERINEURAL
Status: DISCONTINUED | OUTPATIENT
Start: 2021-03-19 | End: 2021-03-19 | Stop reason: HOSPADM

## 2021-03-19 RX ORDER — CHLORHEXIDINE GLUCONATE ORAL RINSE 1.2 MG/ML
10 SOLUTION DENTAL 2 TIMES DAILY
Status: DISCONTINUED | OUTPATIENT
Start: 2021-03-19 | End: 2021-03-19 | Stop reason: HOSPADM

## 2021-03-19 RX ORDER — ALBUTEROL SULFATE 0.83 MG/ML
2.5 SOLUTION RESPIRATORY (INHALATION) EVERY 4 HOURS PRN
Status: DISCONTINUED | OUTPATIENT
Start: 2021-03-19 | End: 2021-03-19 | Stop reason: HOSPADM

## 2021-03-19 RX ORDER — HYDROCODONE BITARTRATE AND ACETAMINOPHEN 5; 325 MG/1; MG/1
1 TABLET ORAL EVERY 6 HOURS PRN
Qty: 15 TABLET | Refills: 0 | Status: SHIPPED | OUTPATIENT
Start: 2021-03-19 | End: 2021-05-03

## 2021-03-19 RX ORDER — PROPOFOL 10 MG/ML
VIAL (ML) INTRAVENOUS
Status: DISCONTINUED | OUTPATIENT
Start: 2021-03-19 | End: 2021-03-19

## 2021-03-19 RX ORDER — CEFAZOLIN SODIUM 2 G/50ML
2 SOLUTION INTRAVENOUS
Status: COMPLETED | OUTPATIENT
Start: 2021-03-19 | End: 2021-03-19

## 2021-03-19 RX ORDER — HYDROCODONE BITARTRATE AND ACETAMINOPHEN 5; 325 MG/1; MG/1
1 TABLET ORAL
Status: DISCONTINUED | OUTPATIENT
Start: 2021-03-19 | End: 2021-03-19 | Stop reason: HOSPADM

## 2021-03-19 RX ORDER — LIDOCAINE HYDROCHLORIDE 20 MG/ML
INJECTION, SOLUTION EPIDURAL; INFILTRATION; INTRACAUDAL; PERINEURAL
Status: DISCONTINUED | OUTPATIENT
Start: 2021-03-19 | End: 2021-03-19

## 2021-03-19 RX ORDER — SODIUM CHLORIDE, SODIUM LACTATE, POTASSIUM CHLORIDE, CALCIUM CHLORIDE 600; 310; 30; 20 MG/100ML; MG/100ML; MG/100ML; MG/100ML
INJECTION, SOLUTION INTRAVENOUS CONTINUOUS
Status: DISCONTINUED | OUTPATIENT
Start: 2021-03-19 | End: 2021-05-03

## 2021-03-19 RX ORDER — MIDAZOLAM HYDROCHLORIDE 1 MG/ML
INJECTION INTRAMUSCULAR; INTRAVENOUS
Status: DISCONTINUED | OUTPATIENT
Start: 2021-03-19 | End: 2021-03-19

## 2021-03-19 RX ORDER — DIPHENHYDRAMINE HYDROCHLORIDE 50 MG/ML
25 INJECTION INTRAMUSCULAR; INTRAVENOUS EVERY 6 HOURS PRN
Status: DISCONTINUED | OUTPATIENT
Start: 2021-03-19 | End: 2021-03-19 | Stop reason: HOSPADM

## 2021-03-19 RX ORDER — FENTANYL CITRATE 50 UG/ML
INJECTION, SOLUTION INTRAMUSCULAR; INTRAVENOUS
Status: DISCONTINUED | OUTPATIENT
Start: 2021-03-19 | End: 2021-03-19

## 2021-03-19 RX ORDER — SODIUM CHLORIDE, SODIUM LACTATE, POTASSIUM CHLORIDE, CALCIUM CHLORIDE 600; 310; 30; 20 MG/100ML; MG/100ML; MG/100ML; MG/100ML
INJECTION, SOLUTION INTRAVENOUS
Status: DISCONTINUED | OUTPATIENT
Start: 2021-03-19 | End: 2021-05-03

## 2021-03-19 RX ORDER — ONDANSETRON 2 MG/ML
4 INJECTION INTRAMUSCULAR; INTRAVENOUS ONCE AS NEEDED
Status: DISCONTINUED | OUTPATIENT
Start: 2021-03-19 | End: 2021-03-19 | Stop reason: HOSPADM

## 2021-03-19 RX ORDER — HYDROCODONE BITARTRATE AND ACETAMINOPHEN 5; 325 MG/1; MG/1
1 TABLET ORAL EVERY 4 HOURS PRN
Status: DISCONTINUED | OUTPATIENT
Start: 2021-03-19 | End: 2021-03-19 | Stop reason: HOSPADM

## 2021-03-19 RX ORDER — ONDANSETRON 2 MG/ML
INJECTION INTRAMUSCULAR; INTRAVENOUS
Status: DISCONTINUED | OUTPATIENT
Start: 2021-03-19 | End: 2021-03-19

## 2021-03-19 RX ORDER — FENTANYL CITRATE 50 UG/ML
25 INJECTION, SOLUTION INTRAMUSCULAR; INTRAVENOUS EVERY 5 MIN PRN
Status: DISCONTINUED | OUTPATIENT
Start: 2021-03-19 | End: 2021-03-19 | Stop reason: HOSPADM

## 2021-03-19 RX ADMIN — PROPOFOL 40 MG: 10 INJECTION, EMULSION INTRAVENOUS at 12:03

## 2021-03-19 RX ADMIN — ONDANSETRON 4 MG: 2 INJECTION, SOLUTION INTRAMUSCULAR; INTRAVENOUS at 12:03

## 2021-03-19 RX ADMIN — SODIUM CHLORIDE, SODIUM LACTATE, POTASSIUM CHLORIDE, AND CALCIUM CHLORIDE: .6; .31; .03; .02 INJECTION, SOLUTION INTRAVENOUS at 09:03

## 2021-03-19 RX ADMIN — CEFAZOLIN SODIUM 2 G: 2 SOLUTION INTRAVENOUS at 11:03

## 2021-03-19 RX ADMIN — HYDROCODONE BITARTRATE AND ACETAMINOPHEN 1 TABLET: 5; 325 TABLET ORAL at 12:03

## 2021-03-19 RX ADMIN — PROPOFOL 20 MG: 10 INJECTION, EMULSION INTRAVENOUS at 12:03

## 2021-03-19 RX ADMIN — PROPOFOL 50 MG: 10 INJECTION, EMULSION INTRAVENOUS at 11:03

## 2021-03-19 RX ADMIN — FENTANYL CITRATE 50 MCG: 50 INJECTION, SOLUTION INTRAMUSCULAR; INTRAVENOUS at 11:03

## 2021-03-19 RX ADMIN — MIDAZOLAM HYDROCHLORIDE 2 MG: 1 INJECTION INTRAMUSCULAR; INTRAVENOUS at 11:03

## 2021-03-19 RX ADMIN — PROPOFOL 30 MG: 10 INJECTION, EMULSION INTRAVENOUS at 12:03

## 2021-03-19 RX ADMIN — FENTANYL CITRATE 25 MCG: 50 INJECTION, SOLUTION INTRAMUSCULAR; INTRAVENOUS at 12:03

## 2021-03-19 RX ADMIN — FENTANYL CITRATE 50 MCG: 50 INJECTION, SOLUTION INTRAMUSCULAR; INTRAVENOUS at 12:03

## 2021-03-19 RX ADMIN — LIDOCAINE HYDROCHLORIDE 60 MG: 20 INJECTION, SOLUTION EPIDURAL; INFILTRATION; INTRACAUDAL; PERINEURAL at 11:03

## 2021-03-22 LAB — POCT GLUCOSE: 109 MG/DL (ref 70–110)

## 2021-03-23 ENCOUNTER — OFFICE VISIT (OUTPATIENT)
Dept: ORTHOPEDICS | Facility: CLINIC | Age: 31
End: 2021-03-23
Payer: MEDICAID

## 2021-03-23 VITALS
HEART RATE: 72 BPM | SYSTOLIC BLOOD PRESSURE: 119 MMHG | DIASTOLIC BLOOD PRESSURE: 78 MMHG | WEIGHT: 254 LBS | HEIGHT: 64 IN | BODY MASS INDEX: 43.36 KG/M2

## 2021-03-23 DIAGNOSIS — M25.532 LEFT WRIST PAIN: ICD-10-CM

## 2021-03-23 DIAGNOSIS — Z09 POSTOP CHECK: ICD-10-CM

## 2021-03-23 DIAGNOSIS — G56.02 CARPAL TUNNEL SYNDROME OF LEFT WRIST: Primary | ICD-10-CM

## 2021-03-23 PROCEDURE — 99999 PR PBB SHADOW E&M-EST. PATIENT-LVL IV: ICD-10-PCS | Mod: PBBFAC,,, | Performed by: PHYSICIAN ASSISTANT

## 2021-03-23 PROCEDURE — 99214 OFFICE O/P EST MOD 30 MIN: CPT | Mod: PBBFAC | Performed by: PHYSICIAN ASSISTANT

## 2021-03-23 PROCEDURE — 99999 PR PBB SHADOW E&M-EST. PATIENT-LVL IV: CPT | Mod: PBBFAC,,, | Performed by: PHYSICIAN ASSISTANT

## 2021-03-23 PROCEDURE — 99024 PR POST-OP FOLLOW-UP VISIT: ICD-10-PCS | Mod: ,,, | Performed by: PHYSICIAN ASSISTANT

## 2021-03-23 PROCEDURE — 99024 POSTOP FOLLOW-UP VISIT: CPT | Mod: ,,, | Performed by: PHYSICIAN ASSISTANT

## 2021-03-30 ENCOUNTER — OFFICE VISIT (OUTPATIENT)
Dept: ORTHOPEDICS | Facility: CLINIC | Age: 31
End: 2021-03-30
Payer: MEDICAID

## 2021-03-30 VITALS
WEIGHT: 254 LBS | HEART RATE: 86 BPM | SYSTOLIC BLOOD PRESSURE: 140 MMHG | DIASTOLIC BLOOD PRESSURE: 105 MMHG | BODY MASS INDEX: 43.36 KG/M2 | HEIGHT: 64 IN

## 2021-03-30 DIAGNOSIS — G56.02 CARPAL TUNNEL SYNDROME OF LEFT WRIST: Primary | ICD-10-CM

## 2021-03-30 PROCEDURE — 99999 PR PBB SHADOW E&M-EST. PATIENT-LVL IV: CPT | Mod: PBBFAC,,, | Performed by: ORTHOPAEDIC SURGERY

## 2021-03-30 PROCEDURE — 99024 POSTOP FOLLOW-UP VISIT: CPT | Mod: ,,, | Performed by: ORTHOPAEDIC SURGERY

## 2021-03-30 PROCEDURE — 99024 PR POST-OP FOLLOW-UP VISIT: ICD-10-PCS | Mod: ,,, | Performed by: ORTHOPAEDIC SURGERY

## 2021-03-30 PROCEDURE — 99999 PR PBB SHADOW E&M-EST. PATIENT-LVL IV: ICD-10-PCS | Mod: PBBFAC,,, | Performed by: ORTHOPAEDIC SURGERY

## 2021-03-30 PROCEDURE — 99214 OFFICE O/P EST MOD 30 MIN: CPT | Mod: PBBFAC | Performed by: ORTHOPAEDIC SURGERY

## 2021-04-12 ENCOUNTER — PATIENT MESSAGE (OUTPATIENT)
Dept: PAIN MEDICINE | Facility: CLINIC | Age: 31
End: 2021-04-12

## 2021-04-12 ENCOUNTER — PATIENT MESSAGE (OUTPATIENT)
Dept: FAMILY MEDICINE | Facility: CLINIC | Age: 31
End: 2021-04-12

## 2021-04-23 ENCOUNTER — PATIENT MESSAGE (OUTPATIENT)
Dept: FAMILY MEDICINE | Facility: CLINIC | Age: 31
End: 2021-04-23

## 2021-04-26 ENCOUNTER — PATIENT MESSAGE (OUTPATIENT)
Dept: FAMILY MEDICINE | Facility: CLINIC | Age: 31
End: 2021-04-26

## 2021-04-28 ENCOUNTER — PATIENT MESSAGE (OUTPATIENT)
Dept: FAMILY MEDICINE | Facility: CLINIC | Age: 31
End: 2021-04-28

## 2021-04-28 RX ORDER — ONDANSETRON 4 MG/1
4 TABLET, ORALLY DISINTEGRATING ORAL
Qty: 20 TABLET | Refills: 0 | Status: SHIPPED | OUTPATIENT
Start: 2021-04-28 | End: 2021-05-03

## 2021-04-29 ENCOUNTER — PATIENT MESSAGE (OUTPATIENT)
Dept: RESEARCH | Facility: HOSPITAL | Age: 31
End: 2021-04-29

## 2021-05-03 ENCOUNTER — PATIENT MESSAGE (OUTPATIENT)
Dept: PAIN MEDICINE | Facility: CLINIC | Age: 31
End: 2021-05-03

## 2021-05-03 ENCOUNTER — OFFICE VISIT (OUTPATIENT)
Dept: FAMILY MEDICINE | Facility: CLINIC | Age: 31
End: 2021-05-03
Payer: MEDICAID

## 2021-05-03 VITALS
HEIGHT: 64 IN | TEMPERATURE: 98 F | BODY MASS INDEX: 43.47 KG/M2 | SYSTOLIC BLOOD PRESSURE: 160 MMHG | OXYGEN SATURATION: 96 % | HEART RATE: 81 BPM | WEIGHT: 254.63 LBS | DIASTOLIC BLOOD PRESSURE: 88 MMHG | RESPIRATION RATE: 18 BRPM

## 2021-05-03 DIAGNOSIS — R51.9 HEADACHE, UNSPECIFIED HEADACHE TYPE: ICD-10-CM

## 2021-05-03 DIAGNOSIS — R11.0 NAUSEA: ICD-10-CM

## 2021-05-03 DIAGNOSIS — E11.9 TYPE 2 DIABETES MELLITUS WITHOUT COMPLICATION, WITHOUT LONG-TERM CURRENT USE OF INSULIN: ICD-10-CM

## 2021-05-03 DIAGNOSIS — N92.6 MENSTRUAL CHANGES: ICD-10-CM

## 2021-05-03 DIAGNOSIS — I10 ESSENTIAL HYPERTENSION: Primary | ICD-10-CM

## 2021-05-03 LAB
B-HCG UR QL: NEGATIVE
CTP QC/QA: YES

## 2021-05-03 PROCEDURE — 96372 THER/PROPH/DIAG INJ SC/IM: CPT | Mod: PBBFAC,PO

## 2021-05-03 PROCEDURE — 99214 OFFICE O/P EST MOD 30 MIN: CPT | Mod: 25,S$PBB,, | Performed by: REGISTERED NURSE

## 2021-05-03 PROCEDURE — 99999 PR PBB SHADOW E&M-EST. PATIENT-LVL V: ICD-10-PCS | Mod: PBBFAC,,, | Performed by: REGISTERED NURSE

## 2021-05-03 PROCEDURE — 99215 OFFICE O/P EST HI 40 MIN: CPT | Mod: PBBFAC,PO,25 | Performed by: REGISTERED NURSE

## 2021-05-03 PROCEDURE — 81025 URINE PREGNANCY TEST: CPT | Mod: PBBFAC,PO | Performed by: REGISTERED NURSE

## 2021-05-03 PROCEDURE — 99999 PR PBB SHADOW E&M-EST. PATIENT-LVL V: CPT | Mod: PBBFAC,,, | Performed by: REGISTERED NURSE

## 2021-05-03 PROCEDURE — 99214 PR OFFICE/OUTPT VISIT, EST, LEVL IV, 30-39 MIN: ICD-10-PCS | Mod: 25,S$PBB,, | Performed by: REGISTERED NURSE

## 2021-05-03 RX ORDER — PROMETHAZINE HYDROCHLORIDE 25 MG/1
25 TABLET ORAL EVERY 6 HOURS PRN
Qty: 30 TABLET | Refills: 1 | Status: SHIPPED | OUTPATIENT
Start: 2021-05-03 | End: 2021-12-13

## 2021-05-03 RX ORDER — KETOROLAC TROMETHAMINE 30 MG/ML
60 INJECTION, SOLUTION INTRAMUSCULAR; INTRAVENOUS
Status: COMPLETED | OUTPATIENT
Start: 2021-05-03 | End: 2021-05-03

## 2021-05-03 RX ORDER — RIZATRIPTAN BENZOATE 5 MG/1
5 TABLET ORAL
Qty: 12 TABLET | Refills: 0 | Status: SHIPPED | OUTPATIENT
Start: 2021-05-03 | End: 2021-11-11

## 2021-05-03 RX ORDER — IRBESARTAN 150 MG/1
150 TABLET ORAL DAILY
Qty: 90 TABLET | Refills: 1 | Status: SHIPPED | OUTPATIENT
Start: 2021-05-03 | End: 2021-10-31

## 2021-05-03 RX ADMIN — KETOROLAC TROMETHAMINE 60 MG: 30 INJECTION, SOLUTION INTRAMUSCULAR; INTRAVENOUS at 12:05

## 2021-05-04 ENCOUNTER — OFFICE VISIT (OUTPATIENT)
Dept: PAIN MEDICINE | Facility: CLINIC | Age: 31
End: 2021-05-04
Payer: MEDICAID

## 2021-05-04 VITALS — WEIGHT: 254 LBS | RESPIRATION RATE: 17 BRPM | BODY MASS INDEX: 43.36 KG/M2 | HEIGHT: 64 IN

## 2021-05-04 DIAGNOSIS — M79.605 LEFT LEG PAIN: ICD-10-CM

## 2021-05-04 DIAGNOSIS — M79.18 LEFT BUTTOCK PAIN: Primary | ICD-10-CM

## 2021-05-04 DIAGNOSIS — M47.818 ARTHROPATHY OF LEFT SACROILIAC JOINT: ICD-10-CM

## 2021-05-04 DIAGNOSIS — M70.62 GREATER TROCHANTERIC BURSITIS OF LEFT HIP: ICD-10-CM

## 2021-05-04 PROCEDURE — 99214 PR OFFICE/OUTPT VISIT, EST, LEVL IV, 30-39 MIN: ICD-10-PCS | Mod: 95,,, | Performed by: PHYSICIAN ASSISTANT

## 2021-05-04 PROCEDURE — 99214 OFFICE O/P EST MOD 30 MIN: CPT | Mod: 95,,, | Performed by: PHYSICIAN ASSISTANT

## 2021-05-04 RX ORDER — METHYLPREDNISOLONE 4 MG/1
TABLET ORAL
Qty: 1 PACKAGE | Refills: 0 | Status: SHIPPED | OUTPATIENT
Start: 2021-05-04 | End: 2021-05-25

## 2021-05-10 ENCOUNTER — OFFICE VISIT (OUTPATIENT)
Dept: FAMILY MEDICINE | Facility: CLINIC | Age: 31
End: 2021-05-10
Payer: MEDICAID

## 2021-05-10 DIAGNOSIS — E11.9 TYPE 2 DIABETES MELLITUS WITHOUT COMPLICATION, WITHOUT LONG-TERM CURRENT USE OF INSULIN: Primary | ICD-10-CM

## 2021-05-10 DIAGNOSIS — N94.6 DYSMENORRHEA: ICD-10-CM

## 2021-05-10 DIAGNOSIS — E11.9 TYPE 2 DIABETES MELLITUS WITHOUT COMPLICATION, WITHOUT LONG-TERM CURRENT USE OF INSULIN: ICD-10-CM

## 2021-05-10 DIAGNOSIS — R13.10 DYSPHAGIA, UNSPECIFIED TYPE: ICD-10-CM

## 2021-05-10 PROCEDURE — 99213 OFFICE O/P EST LOW 20 MIN: CPT | Mod: 95,,, | Performed by: REGISTERED NURSE

## 2021-05-10 PROCEDURE — 99213 PR OFFICE/OUTPT VISIT, EST, LEVL III, 20-29 MIN: ICD-10-PCS | Mod: 95,,, | Performed by: REGISTERED NURSE

## 2021-05-10 RX ORDER — SITAGLIPTIN 25 MG/1
TABLET, FILM COATED ORAL
Qty: 90 TABLET | Refills: 0 | Status: SHIPPED | OUTPATIENT
Start: 2021-05-10 | End: 2021-08-03

## 2021-05-10 RX ORDER — NAPROXEN SODIUM 550 MG/1
550 TABLET ORAL 2 TIMES DAILY PRN
Qty: 60 TABLET | Refills: 0 | Status: SHIPPED | OUTPATIENT
Start: 2021-05-10 | End: 2021-08-04

## 2021-05-11 ENCOUNTER — PATIENT MESSAGE (OUTPATIENT)
Dept: FAMILY MEDICINE | Facility: CLINIC | Age: 31
End: 2021-05-11

## 2021-05-17 ENCOUNTER — PATIENT MESSAGE (OUTPATIENT)
Dept: PAIN MEDICINE | Facility: CLINIC | Age: 31
End: 2021-05-17

## 2021-05-17 ENCOUNTER — TELEPHONE (OUTPATIENT)
Dept: PAIN MEDICINE | Facility: CLINIC | Age: 31
End: 2021-05-17

## 2021-05-17 ENCOUNTER — PATIENT MESSAGE (OUTPATIENT)
Dept: FAMILY MEDICINE | Facility: CLINIC | Age: 31
End: 2021-05-17

## 2021-05-17 DIAGNOSIS — M47.818 ARTHROPATHY OF LEFT SACROILIAC JOINT: ICD-10-CM

## 2021-05-17 DIAGNOSIS — M79.18 LEFT BUTTOCK PAIN: Primary | ICD-10-CM

## 2021-05-17 DIAGNOSIS — M70.62 GREATER TROCHANTERIC BURSITIS, LEFT: ICD-10-CM

## 2021-05-19 ENCOUNTER — PATIENT MESSAGE (OUTPATIENT)
Dept: PAIN MEDICINE | Facility: CLINIC | Age: 31
End: 2021-05-19

## 2021-05-20 ENCOUNTER — PATIENT MESSAGE (OUTPATIENT)
Dept: PAIN MEDICINE | Facility: CLINIC | Age: 31
End: 2021-05-20

## 2021-05-20 ENCOUNTER — PATIENT MESSAGE (OUTPATIENT)
Dept: FAMILY MEDICINE | Facility: CLINIC | Age: 31
End: 2021-05-20

## 2021-05-20 RX ORDER — TIZANIDINE 4 MG/1
2-4 TABLET ORAL 3 TIMES DAILY PRN
Qty: 90 TABLET | Refills: 1 | Status: SHIPPED | OUTPATIENT
Start: 2021-05-20 | End: 2021-06-19

## 2021-05-21 ENCOUNTER — PATIENT MESSAGE (OUTPATIENT)
Dept: FAMILY MEDICINE | Facility: CLINIC | Age: 31
End: 2021-05-21

## 2021-05-22 ENCOUNTER — HOSPITAL ENCOUNTER (EMERGENCY)
Facility: HOSPITAL | Age: 31
Discharge: HOME | End: 2021-05-22
Attending: EMERGENCY MEDICINE
Payer: MEDICAID

## 2021-05-22 VITALS
HEART RATE: 100 BPM | BODY MASS INDEX: 43.86 KG/M2 | RESPIRATION RATE: 20 BRPM | OXYGEN SATURATION: 96 % | SYSTOLIC BLOOD PRESSURE: 135 MMHG | DIASTOLIC BLOOD PRESSURE: 85 MMHG | WEIGHT: 255.5 LBS | TEMPERATURE: 98 F

## 2021-05-22 DIAGNOSIS — J30.9 ALLERGIC RHINITIS, UNSPECIFIED SEASONALITY, UNSPECIFIED TRIGGER: ICD-10-CM

## 2021-05-22 DIAGNOSIS — J01.00 ACUTE MAXILLARY SINUSITIS, RECURRENCE NOT SPECIFIED: Primary | ICD-10-CM

## 2021-05-22 PROCEDURE — 99284 EMERGENCY DEPT VISIT MOD MDM: CPT | Mod: 25,ER

## 2021-05-22 PROCEDURE — 63600175 PHARM REV CODE 636 W HCPCS: Mod: ER | Performed by: EMERGENCY MEDICINE

## 2021-05-22 PROCEDURE — 96372 THER/PROPH/DIAG INJ SC/IM: CPT | Mod: ER

## 2021-05-22 RX ORDER — DEXAMETHASONE SODIUM PHOSPHATE 4 MG/ML
8 INJECTION, SOLUTION INTRA-ARTICULAR; INTRALESIONAL; INTRAMUSCULAR; INTRAVENOUS; SOFT TISSUE
Status: COMPLETED | OUTPATIENT
Start: 2021-05-22 | End: 2021-05-22

## 2021-05-22 RX ADMIN — DEXAMETHASONE SODIUM PHOSPHATE 8 MG: 4 INJECTION INTRA-ARTICULAR; INTRALESIONAL; INTRAMUSCULAR; INTRAVENOUS; SOFT TISSUE at 04:05

## 2021-05-24 ENCOUNTER — PATIENT MESSAGE (OUTPATIENT)
Dept: FAMILY MEDICINE | Facility: CLINIC | Age: 31
End: 2021-05-24

## 2021-05-24 ENCOUNTER — LAB VISIT (OUTPATIENT)
Dept: LAB | Facility: HOSPITAL | Age: 31
End: 2021-05-24
Attending: REGISTERED NURSE
Payer: MEDICAID

## 2021-05-24 DIAGNOSIS — E11.9 TYPE 2 DIABETES MELLITUS WITHOUT COMPLICATION, WITHOUT LONG-TERM CURRENT USE OF INSULIN: ICD-10-CM

## 2021-05-24 LAB
ALBUMIN SERPL BCP-MCNC: 3.2 G/DL (ref 3.5–5.2)
ALP SERPL-CCNC: 47 U/L (ref 55–135)
ALT SERPL W/O P-5'-P-CCNC: 22 U/L (ref 10–44)
ANION GAP SERPL CALC-SCNC: 9 MMOL/L (ref 8–16)
AST SERPL-CCNC: 17 U/L (ref 10–40)
BILIRUB SERPL-MCNC: 0.3 MG/DL (ref 0.1–1)
BUN SERPL-MCNC: 17 MG/DL (ref 6–20)
CALCIUM SERPL-MCNC: 8.8 MG/DL (ref 8.7–10.5)
CHLORIDE SERPL-SCNC: 107 MMOL/L (ref 95–110)
CO2 SERPL-SCNC: 26 MMOL/L (ref 23–29)
CREAT SERPL-MCNC: 0.7 MG/DL (ref 0.5–1.4)
EST. GFR  (AFRICAN AMERICAN): >60 ML/MIN/1.73 M^2
EST. GFR  (NON AFRICAN AMERICAN): >60 ML/MIN/1.73 M^2
ESTIMATED AVG GLUCOSE: 140 MG/DL (ref 68–131)
GLUCOSE SERPL-MCNC: 80 MG/DL (ref 70–110)
HBA1C MFR BLD: 6.5 % (ref 4–5.6)
POTASSIUM SERPL-SCNC: 3.7 MMOL/L (ref 3.5–5.1)
PROT SERPL-MCNC: 6.5 G/DL (ref 6–8.4)
SODIUM SERPL-SCNC: 142 MMOL/L (ref 136–145)

## 2021-05-24 PROCEDURE — 80053 COMPREHEN METABOLIC PANEL: CPT | Performed by: REGISTERED NURSE

## 2021-05-24 PROCEDURE — 36415 COLL VENOUS BLD VENIPUNCTURE: CPT | Performed by: REGISTERED NURSE

## 2021-05-24 PROCEDURE — 83036 HEMOGLOBIN GLYCOSYLATED A1C: CPT | Performed by: REGISTERED NURSE

## 2021-05-24 RX ORDER — AMOXICILLIN AND CLAVULANATE POTASSIUM 875; 125 MG/1; MG/1
1 TABLET, FILM COATED ORAL 2 TIMES DAILY
Qty: 20 TABLET | Refills: 0 | Status: SHIPPED | OUTPATIENT
Start: 2021-05-24 | End: 2021-06-03

## 2021-05-25 ENCOUNTER — OFFICE VISIT (OUTPATIENT)
Dept: OBSTETRICS AND GYNECOLOGY | Facility: CLINIC | Age: 31
End: 2021-05-25
Payer: MEDICAID

## 2021-05-25 ENCOUNTER — TELEPHONE (OUTPATIENT)
Dept: PRIMARY CARE CLINIC | Facility: CLINIC | Age: 31
End: 2021-05-25

## 2021-05-25 ENCOUNTER — HOSPITAL ENCOUNTER (OUTPATIENT)
Dept: RADIOLOGY | Facility: HOSPITAL | Age: 31
Discharge: HOME | End: 2021-05-25
Attending: REGISTERED NURSE
Payer: MEDICAID

## 2021-05-25 VITALS
SYSTOLIC BLOOD PRESSURE: 128 MMHG | DIASTOLIC BLOOD PRESSURE: 80 MMHG | BODY MASS INDEX: 44.11 KG/M2 | WEIGHT: 258.38 LBS | HEIGHT: 64 IN

## 2021-05-25 DIAGNOSIS — E28.2 PCOS (POLYCYSTIC OVARIAN SYNDROME): ICD-10-CM

## 2021-05-25 DIAGNOSIS — R13.10 DYSPHAGIA, UNSPECIFIED TYPE: ICD-10-CM

## 2021-05-25 DIAGNOSIS — N94.6 DYSMENORRHEA: Primary | ICD-10-CM

## 2021-05-25 PROCEDURE — 76536 US EXAM OF HEAD AND NECK: CPT | Mod: 26,,, | Performed by: RADIOLOGY

## 2021-05-25 PROCEDURE — 99214 OFFICE O/P EST MOD 30 MIN: CPT | Mod: PBBFAC,25 | Performed by: NURSE PRACTITIONER

## 2021-05-25 PROCEDURE — 99999 PR PBB SHADOW E&M-EST. PATIENT-LVL IV: CPT | Mod: PBBFAC,,, | Performed by: NURSE PRACTITIONER

## 2021-05-25 PROCEDURE — 99999 PR PBB SHADOW E&M-EST. PATIENT-LVL IV: ICD-10-PCS | Mod: PBBFAC,,, | Performed by: NURSE PRACTITIONER

## 2021-05-25 PROCEDURE — 99213 OFFICE O/P EST LOW 20 MIN: CPT | Mod: S$PBB,,, | Performed by: NURSE PRACTITIONER

## 2021-05-25 PROCEDURE — 76536 US EXAM OF HEAD AND NECK: CPT | Mod: TC

## 2021-05-25 PROCEDURE — 99213 PR OFFICE/OUTPT VISIT, EST, LEVL III, 20-29 MIN: ICD-10-PCS | Mod: S$PBB,,, | Performed by: NURSE PRACTITIONER

## 2021-05-25 PROCEDURE — 76536 US SOFT TISSUE HEAD NECK THYROID: ICD-10-PCS | Mod: 26,,, | Performed by: RADIOLOGY

## 2021-05-25 RX ORDER — NORGESTIMATE AND ETHINYL ESTRADIOL 7DAYSX3 LO
1 KIT ORAL DAILY
COMMUNITY
End: 2021-05-25

## 2021-05-25 RX ORDER — LEVONORGESTREL AND ETHINYL ESTRADIOL 90; 20 UG/1; UG/1
1 TABLET ORAL DAILY
Qty: 28 TABLET | Refills: 11 | Status: SHIPPED | OUTPATIENT
Start: 2021-05-25 | End: 2021-11-16

## 2021-05-28 ENCOUNTER — PATIENT MESSAGE (OUTPATIENT)
Dept: FAMILY MEDICINE | Facility: CLINIC | Age: 31
End: 2021-05-28

## 2021-05-28 ENCOUNTER — TELEPHONE (OUTPATIENT)
Dept: PAIN MEDICINE | Facility: CLINIC | Age: 31
End: 2021-05-28

## 2021-06-17 ENCOUNTER — HOSPITAL ENCOUNTER (OUTPATIENT)
Facility: HOSPITAL | Age: 31
Discharge: HOME | End: 2021-06-17
Attending: ANESTHESIOLOGY | Admitting: ANESTHESIOLOGY
Payer: MEDICAID

## 2021-06-17 DIAGNOSIS — M53.3 SACROILIAC DYSFUNCTION: ICD-10-CM

## 2021-06-17 DIAGNOSIS — M47.818 ARTHROPATHY OF LEFT SACROILIAC JOINT: ICD-10-CM

## 2021-06-17 DIAGNOSIS — M53.3 SACROILIAC JOINT DYSFUNCTION: Primary | ICD-10-CM

## 2021-06-17 DIAGNOSIS — M70.62 GREATER TROCHANTERIC BURSITIS, LEFT: ICD-10-CM

## 2021-06-17 LAB
B-HCG UR QL: NEGATIVE
CTP QC/QA: YES
POCT GLUCOSE: 138 MG/DL (ref 70–110)

## 2021-06-17 PROCEDURE — 27096 INJECT SACROILIAC JOINT: CPT | Performed by: ANESTHESIOLOGY

## 2021-06-17 PROCEDURE — 20610 DRAIN/INJ JOINT/BURSA W/O US: CPT | Mod: 59,LT,, | Performed by: ANESTHESIOLOGY

## 2021-06-17 PROCEDURE — 81025 URINE PREGNANCY TEST: CPT | Performed by: ANESTHESIOLOGY

## 2021-06-17 PROCEDURE — 82962 GLUCOSE BLOOD TEST: CPT | Performed by: ANESTHESIOLOGY

## 2021-06-17 PROCEDURE — 25500020 PHARM REV CODE 255: Performed by: ANESTHESIOLOGY

## 2021-06-17 PROCEDURE — 27096 PR INJECTION,SACROILIAC JOINT: ICD-10-PCS | Mod: LT,,, | Performed by: ANESTHESIOLOGY

## 2021-06-17 PROCEDURE — 20610 PR DRAIN/INJECT LARGE JOINT/BURSA: ICD-10-PCS | Mod: 59,LT,, | Performed by: ANESTHESIOLOGY

## 2021-06-17 PROCEDURE — 27096 INJECT SACROILIAC JOINT: CPT | Mod: LT,,, | Performed by: ANESTHESIOLOGY

## 2021-06-17 PROCEDURE — 63600175 PHARM REV CODE 636 W HCPCS: Performed by: ANESTHESIOLOGY

## 2021-06-17 PROCEDURE — 20610 DRAIN/INJ JOINT/BURSA W/O US: CPT | Performed by: ANESTHESIOLOGY

## 2021-06-17 PROCEDURE — 25000003 PHARM REV CODE 250: Performed by: ANESTHESIOLOGY

## 2021-06-17 RX ORDER — FENTANYL CITRATE 50 UG/ML
INJECTION, SOLUTION INTRAMUSCULAR; INTRAVENOUS
Status: DISCONTINUED | OUTPATIENT
Start: 2021-06-17 | End: 2021-06-17 | Stop reason: HOSPADM

## 2021-06-17 RX ORDER — INDOMETHACIN 25 MG/1
CAPSULE ORAL
Status: DISCONTINUED | OUTPATIENT
Start: 2021-06-17 | End: 2021-06-17 | Stop reason: HOSPADM

## 2021-06-17 RX ORDER — MIDAZOLAM HYDROCHLORIDE 1 MG/ML
INJECTION, SOLUTION INTRAMUSCULAR; INTRAVENOUS
Status: DISCONTINUED | OUTPATIENT
Start: 2021-06-17 | End: 2021-06-17 | Stop reason: HOSPADM

## 2021-06-17 RX ORDER — LIDOCAINE HYDROCHLORIDE 20 MG/ML
INJECTION, SOLUTION EPIDURAL; INFILTRATION; INTRACAUDAL; PERINEURAL
Status: DISCONTINUED | OUTPATIENT
Start: 2021-06-17 | End: 2021-06-17 | Stop reason: HOSPADM

## 2021-06-17 RX ORDER — METHYLPREDNISOLONE ACETATE 40 MG/ML
INJECTION, SUSPENSION INTRA-ARTICULAR; INTRALESIONAL; INTRAMUSCULAR; SOFT TISSUE
Status: DISCONTINUED | OUTPATIENT
Start: 2021-06-17 | End: 2021-06-17 | Stop reason: HOSPADM

## 2021-06-18 ENCOUNTER — PATIENT MESSAGE (OUTPATIENT)
Dept: PAIN MEDICINE | Facility: CLINIC | Age: 31
End: 2021-06-18

## 2021-06-18 VITALS
RESPIRATION RATE: 16 BRPM | HEART RATE: 74 BPM | DIASTOLIC BLOOD PRESSURE: 53 MMHG | HEIGHT: 64 IN | TEMPERATURE: 98 F | OXYGEN SATURATION: 97 % | WEIGHT: 261.94 LBS | SYSTOLIC BLOOD PRESSURE: 93 MMHG | BODY MASS INDEX: 44.72 KG/M2

## 2021-06-22 ENCOUNTER — PATIENT MESSAGE (OUTPATIENT)
Dept: PAIN MEDICINE | Facility: CLINIC | Age: 31
End: 2021-06-22

## 2021-06-24 DIAGNOSIS — K21.9 GASTROESOPHAGEAL REFLUX DISEASE, UNSPECIFIED WHETHER ESOPHAGITIS PRESENT: ICD-10-CM

## 2021-06-24 RX ORDER — CALC/MAG/B COMPLEX/D3/HERB 61
15 TABLET ORAL DAILY
Qty: 90 CAPSULE | Refills: 1 | Status: CANCELLED | OUTPATIENT
Start: 2021-06-24

## 2021-06-28 ENCOUNTER — PATIENT MESSAGE (OUTPATIENT)
Dept: FAMILY MEDICINE | Facility: CLINIC | Age: 31
End: 2021-06-28

## 2021-06-28 DIAGNOSIS — E11.9 TYPE 2 DIABETES MELLITUS WITHOUT COMPLICATION, WITHOUT LONG-TERM CURRENT USE OF INSULIN: ICD-10-CM

## 2021-06-29 RX ORDER — METFORMIN HYDROCHLORIDE 750 MG/1
750 TABLET, EXTENDED RELEASE ORAL
Qty: 90 TABLET | Refills: 2 | Status: SHIPPED | OUTPATIENT
Start: 2021-06-29 | End: 2021-11-16 | Stop reason: SDUPTHER

## 2021-07-07 ENCOUNTER — OFFICE VISIT (OUTPATIENT)
Dept: PAIN MEDICINE | Facility: CLINIC | Age: 31
End: 2021-07-07
Payer: MEDICAID

## 2021-07-07 VITALS — RESPIRATION RATE: 17 BRPM | WEIGHT: 262.38 LBS | BODY MASS INDEX: 44.79 KG/M2 | HEIGHT: 64 IN

## 2021-07-07 DIAGNOSIS — M79.18 LEFT BUTTOCK PAIN: ICD-10-CM

## 2021-07-07 DIAGNOSIS — M53.3 SACROILIAC JOINT DYSFUNCTION: ICD-10-CM

## 2021-07-07 DIAGNOSIS — M70.62 GREATER TROCHANTERIC BURSITIS, LEFT: Primary | ICD-10-CM

## 2021-07-07 DIAGNOSIS — M47.818 ARTHROPATHY OF LEFT SACROILIAC JOINT: ICD-10-CM

## 2021-07-07 PROCEDURE — 99214 PR OFFICE/OUTPT VISIT, EST, LEVL IV, 30-39 MIN: ICD-10-PCS | Mod: 95,,, | Performed by: PHYSICIAN ASSISTANT

## 2021-07-07 PROCEDURE — 99214 OFFICE O/P EST MOD 30 MIN: CPT | Mod: 95,,, | Performed by: PHYSICIAN ASSISTANT

## 2021-07-07 RX ORDER — DICLOFENAC SODIUM 10 MG/G
2 GEL TOPICAL 4 TIMES DAILY PRN
Qty: 5 TUBE | Refills: 0 | Status: SHIPPED | OUTPATIENT
Start: 2021-07-07 | End: 2021-09-27

## 2021-07-07 RX ORDER — LIDOCAINE AND PRILOCAINE 25; 25 MG/G; MG/G
CREAM TOPICAL
Qty: 60 G | Refills: 0 | OUTPATIENT
Start: 2021-07-07 | End: 2021-09-12

## 2021-07-08 ENCOUNTER — PATIENT MESSAGE (OUTPATIENT)
Dept: FAMILY MEDICINE | Facility: CLINIC | Age: 31
End: 2021-07-08

## 2021-07-26 ENCOUNTER — PATIENT MESSAGE (OUTPATIENT)
Dept: PAIN MEDICINE | Facility: CLINIC | Age: 31
End: 2021-07-26

## 2021-07-26 RX ORDER — TIZANIDINE 4 MG/1
2-4 TABLET ORAL 2 TIMES DAILY PRN
Qty: 60 TABLET | Refills: 0 | Status: SHIPPED | OUTPATIENT
Start: 2021-07-26 | End: 2021-08-23 | Stop reason: SDUPTHER

## 2021-07-30 ENCOUNTER — PATIENT MESSAGE (OUTPATIENT)
Dept: PAIN MEDICINE | Facility: CLINIC | Age: 31
End: 2021-07-30

## 2021-08-02 ENCOUNTER — PATIENT MESSAGE (OUTPATIENT)
Dept: PAIN MEDICINE | Facility: CLINIC | Age: 31
End: 2021-08-02

## 2021-08-04 ENCOUNTER — OFFICE VISIT (OUTPATIENT)
Dept: PAIN MEDICINE | Facility: CLINIC | Age: 31
End: 2021-08-04
Payer: MEDICAID

## 2021-08-04 ENCOUNTER — TELEPHONE (OUTPATIENT)
Dept: PAIN MEDICINE | Facility: CLINIC | Age: 31
End: 2021-08-04

## 2021-08-04 ENCOUNTER — PATIENT MESSAGE (OUTPATIENT)
Dept: FAMILY MEDICINE | Facility: CLINIC | Age: 31
End: 2021-08-04

## 2021-08-04 DIAGNOSIS — M47.816 LUMBAR FACET ARTHROPATHY: ICD-10-CM

## 2021-08-04 DIAGNOSIS — M54.16 LUMBAR RADICULOPATHY: ICD-10-CM

## 2021-08-04 DIAGNOSIS — M47.816 LUMBAR SPONDYLOSIS: Primary | ICD-10-CM

## 2021-08-04 DIAGNOSIS — M54.9 DORSALGIA, UNSPECIFIED: ICD-10-CM

## 2021-08-04 PROCEDURE — 99214 OFFICE O/P EST MOD 30 MIN: CPT | Mod: 95,,, | Performed by: ANESTHESIOLOGY

## 2021-08-04 PROCEDURE — 99214 PR OFFICE/OUTPT VISIT, EST, LEVL IV, 30-39 MIN: ICD-10-PCS | Mod: 95,,, | Performed by: ANESTHESIOLOGY

## 2021-08-04 RX ORDER — GABAPENTIN 300 MG/1
CAPSULE ORAL
Qty: 90 CAPSULE | Refills: 0 | Status: SHIPPED | OUTPATIENT
Start: 2021-08-04 | End: 2021-11-11

## 2021-08-05 DIAGNOSIS — K21.9 GASTROESOPHAGEAL REFLUX DISEASE, UNSPECIFIED WHETHER ESOPHAGITIS PRESENT: Primary | ICD-10-CM

## 2021-08-10 ENCOUNTER — HOSPITAL ENCOUNTER (OUTPATIENT)
Dept: RADIOLOGY | Facility: HOSPITAL | Age: 31
Discharge: HOME | End: 2021-08-10
Attending: NURSE PRACTITIONER
Payer: MEDICAID

## 2021-08-10 DIAGNOSIS — R94.6 NONSPECIFIC ABNORMAL RESULTS OF THYROID FUNCTION STUDY: Primary | ICD-10-CM

## 2021-08-10 DIAGNOSIS — R94.6 NONSPECIFIC ABNORMAL RESULTS OF THYROID FUNCTION STUDY: ICD-10-CM

## 2021-08-10 PROCEDURE — 76536 US EXAM OF HEAD AND NECK: CPT | Mod: TC,PO

## 2021-08-10 PROCEDURE — 76536 US EXAM OF HEAD AND NECK: CPT | Mod: 26,,, | Performed by: RADIOLOGY

## 2021-08-10 PROCEDURE — 76536 US THYROID: ICD-10-PCS | Mod: 26,,, | Performed by: RADIOLOGY

## 2021-08-16 ENCOUNTER — PATIENT MESSAGE (OUTPATIENT)
Dept: PAIN MEDICINE | Facility: CLINIC | Age: 31
End: 2021-08-16

## 2021-08-17 ENCOUNTER — PATIENT MESSAGE (OUTPATIENT)
Dept: PAIN MEDICINE | Facility: CLINIC | Age: 31
End: 2021-08-17

## 2021-08-17 DIAGNOSIS — M47.816 LUMBAR FACET ARTHROPATHY: Primary | ICD-10-CM

## 2021-08-17 DIAGNOSIS — M53.3 SACROILIAC JOINT DYSFUNCTION: ICD-10-CM

## 2021-08-20 ENCOUNTER — PATIENT MESSAGE (OUTPATIENT)
Dept: PAIN MEDICINE | Facility: CLINIC | Age: 31
End: 2021-08-20

## 2021-08-23 ENCOUNTER — PATIENT MESSAGE (OUTPATIENT)
Dept: PAIN MEDICINE | Facility: CLINIC | Age: 31
End: 2021-08-23

## 2021-08-23 RX ORDER — TIZANIDINE 4 MG/1
2-4 TABLET ORAL 2 TIMES DAILY PRN
Qty: 60 TABLET | Refills: 1 | Status: SHIPPED | OUTPATIENT
Start: 2021-08-23 | End: 2021-10-14

## 2021-08-24 ENCOUNTER — PATIENT MESSAGE (OUTPATIENT)
Dept: ADMINISTRATIVE | Facility: OTHER | Age: 31
End: 2021-08-24

## 2021-08-24 ENCOUNTER — CLINICAL SUPPORT (OUTPATIENT)
Dept: REHABILITATION | Facility: HOSPITAL | Age: 31
End: 2021-08-24
Attending: ANESTHESIOLOGY
Payer: MEDICAID

## 2021-08-24 DIAGNOSIS — M62.81 PROXIMAL MUSCLE WEAKNESS: ICD-10-CM

## 2021-08-24 DIAGNOSIS — M47.816 LUMBAR FACET ARTHROPATHY: ICD-10-CM

## 2021-08-24 DIAGNOSIS — M53.3 SACROILIAC JOINT DYSFUNCTION: ICD-10-CM

## 2021-08-24 DIAGNOSIS — Z74.09 DECREASED FUNCTIONAL MOBILITY AND ENDURANCE: ICD-10-CM

## 2021-08-24 PROCEDURE — 97162 PT EVAL MOD COMPLEX 30 MIN: CPT

## 2021-08-24 PROCEDURE — 97110 THERAPEUTIC EXERCISES: CPT

## 2021-08-26 PROBLEM — M62.81 PROXIMAL MUSCLE WEAKNESS: Status: ACTIVE | Noted: 2021-08-26

## 2021-08-26 PROBLEM — Z74.09 DECREASED FUNCTIONAL MOBILITY AND ENDURANCE: Status: ACTIVE | Noted: 2021-08-26

## 2021-09-06 ENCOUNTER — PATIENT MESSAGE (OUTPATIENT)
Dept: ORTHOPEDICS | Facility: CLINIC | Age: 31
End: 2021-09-06

## 2021-09-07 ENCOUNTER — TELEPHONE (OUTPATIENT)
Dept: ORTHOPEDICS | Facility: CLINIC | Age: 31
End: 2021-09-07

## 2021-09-12 ENCOUNTER — HOSPITAL ENCOUNTER (EMERGENCY)
Facility: HOSPITAL | Age: 31
Discharge: HOME | End: 2021-09-12
Attending: EMERGENCY MEDICINE
Payer: MEDICAID

## 2021-09-12 VITALS
TEMPERATURE: 98 F | WEIGHT: 270.19 LBS | HEART RATE: 78 BPM | BODY MASS INDEX: 46.38 KG/M2 | OXYGEN SATURATION: 98 % | RESPIRATION RATE: 20 BRPM | DIASTOLIC BLOOD PRESSURE: 99 MMHG | SYSTOLIC BLOOD PRESSURE: 137 MMHG

## 2021-09-12 DIAGNOSIS — M79.672 LEFT FOOT PAIN: ICD-10-CM

## 2021-09-12 PROCEDURE — 99284 EMERGENCY DEPT VISIT MOD MDM: CPT | Mod: 25,ER

## 2021-09-12 PROCEDURE — 63600175 PHARM REV CODE 636 W HCPCS: Mod: ER | Performed by: NURSE PRACTITIONER

## 2021-09-12 PROCEDURE — 96372 THER/PROPH/DIAG INJ SC/IM: CPT | Mod: ER

## 2021-09-12 RX ORDER — TRAMADOL HYDROCHLORIDE 50 MG/1
50 TABLET ORAL EVERY 6 HOURS PRN
Qty: 12 TABLET | Refills: 0 | Status: SHIPPED | OUTPATIENT
Start: 2021-09-12 | End: 2021-11-11

## 2021-09-12 RX ORDER — KETOROLAC TROMETHAMINE 30 MG/ML
60 INJECTION, SOLUTION INTRAMUSCULAR; INTRAVENOUS
Status: COMPLETED | OUTPATIENT
Start: 2021-09-12 | End: 2021-09-12

## 2021-09-12 RX ADMIN — KETOROLAC TROMETHAMINE 60 MG: 30 INJECTION, SOLUTION INTRAMUSCULAR at 01:09

## 2021-09-16 ENCOUNTER — CLINICAL SUPPORT (OUTPATIENT)
Dept: REHABILITATION | Facility: HOSPITAL | Age: 31
End: 2021-09-16
Payer: MEDICAID

## 2021-09-16 DIAGNOSIS — M62.81 PROXIMAL MUSCLE WEAKNESS: ICD-10-CM

## 2021-09-16 DIAGNOSIS — Z74.09 DECREASED FUNCTIONAL MOBILITY AND ENDURANCE: ICD-10-CM

## 2021-09-16 PROCEDURE — 97110 THERAPEUTIC EXERCISES: CPT | Mod: CQ

## 2021-09-17 ENCOUNTER — PATIENT MESSAGE (OUTPATIENT)
Dept: FAMILY MEDICINE | Facility: CLINIC | Age: 31
End: 2021-09-17

## 2021-09-21 ENCOUNTER — CLINICAL SUPPORT (OUTPATIENT)
Dept: REHABILITATION | Facility: HOSPITAL | Age: 31
End: 2021-09-21
Payer: MEDICAID

## 2021-09-21 ENCOUNTER — DOCUMENTATION ONLY (OUTPATIENT)
Dept: REHABILITATION | Facility: HOSPITAL | Age: 31
End: 2021-09-21

## 2021-09-21 DIAGNOSIS — Z74.09 DECREASED FUNCTIONAL MOBILITY AND ENDURANCE: ICD-10-CM

## 2021-09-21 DIAGNOSIS — M62.81 PROXIMAL MUSCLE WEAKNESS: ICD-10-CM

## 2021-09-21 PROCEDURE — 97110 THERAPEUTIC EXERCISES: CPT | Mod: CQ

## 2021-09-24 ENCOUNTER — CLINICAL SUPPORT (OUTPATIENT)
Dept: REHABILITATION | Facility: HOSPITAL | Age: 31
End: 2021-09-24
Payer: MEDICAID

## 2021-09-24 DIAGNOSIS — Z74.09 DECREASED FUNCTIONAL MOBILITY AND ENDURANCE: ICD-10-CM

## 2021-09-24 DIAGNOSIS — M62.81 PROXIMAL MUSCLE WEAKNESS: ICD-10-CM

## 2021-09-24 PROCEDURE — 97110 THERAPEUTIC EXERCISES: CPT

## 2021-09-27 ENCOUNTER — PATIENT MESSAGE (OUTPATIENT)
Dept: FAMILY MEDICINE | Facility: CLINIC | Age: 31
End: 2021-09-27

## 2021-09-27 ENCOUNTER — OFFICE VISIT (OUTPATIENT)
Dept: FAMILY MEDICINE | Facility: CLINIC | Age: 31
End: 2021-09-27
Payer: MEDICAID

## 2021-09-27 VITALS
BODY MASS INDEX: 45.83 KG/M2 | DIASTOLIC BLOOD PRESSURE: 82 MMHG | WEIGHT: 268.44 LBS | OXYGEN SATURATION: 98 % | SYSTOLIC BLOOD PRESSURE: 121 MMHG | HEART RATE: 82 BPM | TEMPERATURE: 97 F | HEIGHT: 64 IN

## 2021-09-27 DIAGNOSIS — S90.32XA CONTUSION OF LEFT FOOT, INITIAL ENCOUNTER: Primary | ICD-10-CM

## 2021-09-27 PROCEDURE — 99999 PR PBB SHADOW E&M-EST. PATIENT-LVL V: CPT | Mod: PBBFAC,,, | Performed by: REGISTERED NURSE

## 2021-09-27 PROCEDURE — 99213 PR OFFICE/OUTPT VISIT, EST, LEVL III, 20-29 MIN: ICD-10-PCS | Mod: S$PBB,,, | Performed by: REGISTERED NURSE

## 2021-09-27 PROCEDURE — 99215 OFFICE O/P EST HI 40 MIN: CPT | Mod: PBBFAC,PO | Performed by: REGISTERED NURSE

## 2021-09-27 PROCEDURE — 99213 OFFICE O/P EST LOW 20 MIN: CPT | Mod: S$PBB,,, | Performed by: REGISTERED NURSE

## 2021-09-27 PROCEDURE — 99999 PR PBB SHADOW E&M-EST. PATIENT-LVL V: ICD-10-PCS | Mod: PBBFAC,,, | Performed by: REGISTERED NURSE

## 2021-09-27 RX ORDER — ATORVASTATIN CALCIUM 20 MG/1
20 TABLET, FILM COATED ORAL NIGHTLY
COMMUNITY
Start: 2021-07-30 | End: 2021-11-16 | Stop reason: SDUPTHER

## 2021-09-27 RX ORDER — MIRTAZAPINE 15 MG/1
15 TABLET, FILM COATED ORAL NIGHTLY
COMMUNITY
Start: 2021-09-10 | End: 2022-07-25 | Stop reason: ALTCHOICE

## 2021-09-27 RX ORDER — DICLOFENAC SODIUM 16.05 MG/ML
5 SOLUTION TOPICAL 3 TIMES DAILY PRN
Qty: 150 ML | Refills: 1 | Status: SHIPPED | OUTPATIENT
Start: 2021-09-27 | End: 2021-11-11

## 2021-09-27 RX ORDER — ERGOCALCIFEROL 1.25 MG/1
CAPSULE ORAL
COMMUNITY
Start: 2021-09-17

## 2021-09-28 ENCOUNTER — CLINICAL SUPPORT (OUTPATIENT)
Dept: REHABILITATION | Facility: HOSPITAL | Age: 31
End: 2021-09-28
Payer: MEDICAID

## 2021-09-28 DIAGNOSIS — M62.81 PROXIMAL MUSCLE WEAKNESS: ICD-10-CM

## 2021-09-28 DIAGNOSIS — Z74.09 DECREASED FUNCTIONAL MOBILITY AND ENDURANCE: ICD-10-CM

## 2021-09-28 PROCEDURE — 97110 THERAPEUTIC EXERCISES: CPT

## 2021-09-30 ENCOUNTER — CLINICAL SUPPORT (OUTPATIENT)
Dept: REHABILITATION | Facility: HOSPITAL | Age: 31
End: 2021-09-30
Payer: MEDICAID

## 2021-09-30 DIAGNOSIS — M62.81 PROXIMAL MUSCLE WEAKNESS: ICD-10-CM

## 2021-09-30 DIAGNOSIS — Z74.09 DECREASED FUNCTIONAL MOBILITY AND ENDURANCE: ICD-10-CM

## 2021-09-30 PROCEDURE — 97110 THERAPEUTIC EXERCISES: CPT

## 2021-10-04 ENCOUNTER — CLINICAL SUPPORT (OUTPATIENT)
Dept: REHABILITATION | Facility: HOSPITAL | Age: 31
End: 2021-10-04
Payer: MEDICAID

## 2021-10-04 DIAGNOSIS — M62.81 PROXIMAL MUSCLE WEAKNESS: ICD-10-CM

## 2021-10-04 DIAGNOSIS — Z74.09 DECREASED FUNCTIONAL MOBILITY AND ENDURANCE: ICD-10-CM

## 2021-10-04 PROCEDURE — 97110 THERAPEUTIC EXERCISES: CPT | Mod: CQ

## 2021-10-06 ENCOUNTER — CLINICAL SUPPORT (OUTPATIENT)
Dept: REHABILITATION | Facility: HOSPITAL | Age: 31
End: 2021-10-06
Payer: MEDICAID

## 2021-10-06 ENCOUNTER — OFFICE VISIT (OUTPATIENT)
Dept: ORTHOPEDICS | Facility: CLINIC | Age: 31
End: 2021-10-06
Payer: MEDICAID

## 2021-10-06 VITALS
DIASTOLIC BLOOD PRESSURE: 86 MMHG | HEART RATE: 83 BPM | BODY MASS INDEX: 45.81 KG/M2 | SYSTOLIC BLOOD PRESSURE: 131 MMHG | HEIGHT: 64 IN | WEIGHT: 268.31 LBS

## 2021-10-06 DIAGNOSIS — Z74.09 DECREASED FUNCTIONAL MOBILITY AND ENDURANCE: ICD-10-CM

## 2021-10-06 DIAGNOSIS — M62.81 PROXIMAL MUSCLE WEAKNESS: ICD-10-CM

## 2021-10-06 DIAGNOSIS — G56.01 CARPAL TUNNEL SYNDROME OF RIGHT WRIST: Primary | ICD-10-CM

## 2021-10-06 PROCEDURE — 99214 OFFICE O/P EST MOD 30 MIN: CPT | Mod: PBBFAC | Performed by: ORTHOPAEDIC SURGERY

## 2021-10-06 PROCEDURE — 99999 PR PBB SHADOW E&M-EST. PATIENT-LVL IV: CPT | Mod: PBBFAC,,, | Performed by: ORTHOPAEDIC SURGERY

## 2021-10-06 PROCEDURE — 99999 PR PBB SHADOW E&M-EST. PATIENT-LVL IV: ICD-10-PCS | Mod: PBBFAC,,, | Performed by: ORTHOPAEDIC SURGERY

## 2021-10-06 PROCEDURE — 97110 THERAPEUTIC EXERCISES: CPT

## 2021-10-06 PROCEDURE — 99213 PR OFFICE/OUTPT VISIT, EST, LEVL III, 20-29 MIN: ICD-10-PCS | Mod: S$PBB,,, | Performed by: ORTHOPAEDIC SURGERY

## 2021-10-06 PROCEDURE — 99213 OFFICE O/P EST LOW 20 MIN: CPT | Mod: S$PBB,,, | Performed by: ORTHOPAEDIC SURGERY

## 2021-10-07 ENCOUNTER — TELEPHONE (OUTPATIENT)
Dept: ORTHOPEDICS | Facility: CLINIC | Age: 31
End: 2021-10-07

## 2021-10-07 ENCOUNTER — PATIENT MESSAGE (OUTPATIENT)
Dept: ADMINISTRATIVE | Facility: OTHER | Age: 31
End: 2021-10-07

## 2021-10-07 DIAGNOSIS — Z01.818 PREOP TESTING: Primary | ICD-10-CM

## 2021-10-07 DIAGNOSIS — G56.01 CARPAL TUNNEL SYNDROME OF RIGHT WRIST: Primary | ICD-10-CM

## 2021-10-11 ENCOUNTER — CLINICAL SUPPORT (OUTPATIENT)
Dept: REHABILITATION | Facility: HOSPITAL | Age: 31
End: 2021-10-11
Payer: MEDICAID

## 2021-10-11 DIAGNOSIS — Z74.09 DECREASED FUNCTIONAL MOBILITY AND ENDURANCE: ICD-10-CM

## 2021-10-11 DIAGNOSIS — M62.81 PROXIMAL MUSCLE WEAKNESS: ICD-10-CM

## 2021-10-11 PROCEDURE — 97110 THERAPEUTIC EXERCISES: CPT

## 2021-10-13 ENCOUNTER — CLINICAL SUPPORT (OUTPATIENT)
Dept: REHABILITATION | Facility: HOSPITAL | Age: 31
End: 2021-10-13
Payer: MEDICAID

## 2021-10-13 DIAGNOSIS — Z74.09 DECREASED FUNCTIONAL MOBILITY AND ENDURANCE: ICD-10-CM

## 2021-10-13 DIAGNOSIS — M62.81 PROXIMAL MUSCLE WEAKNESS: ICD-10-CM

## 2021-10-13 PROCEDURE — 97140 MANUAL THERAPY 1/> REGIONS: CPT

## 2021-10-13 PROCEDURE — 97110 THERAPEUTIC EXERCISES: CPT

## 2021-10-18 ENCOUNTER — CLINICAL SUPPORT (OUTPATIENT)
Dept: REHABILITATION | Facility: HOSPITAL | Age: 31
End: 2021-10-18
Payer: MEDICAID

## 2021-10-18 DIAGNOSIS — Z74.09 DECREASED FUNCTIONAL MOBILITY AND ENDURANCE: ICD-10-CM

## 2021-10-18 DIAGNOSIS — M62.81 PROXIMAL MUSCLE WEAKNESS: ICD-10-CM

## 2021-10-18 PROCEDURE — 97110 THERAPEUTIC EXERCISES: CPT

## 2021-10-18 PROCEDURE — 97140 MANUAL THERAPY 1/> REGIONS: CPT

## 2021-10-25 ENCOUNTER — CLINICAL SUPPORT (OUTPATIENT)
Dept: REHABILITATION | Facility: HOSPITAL | Age: 31
End: 2021-10-25
Payer: MEDICAID

## 2021-10-25 DIAGNOSIS — M62.81 PROXIMAL MUSCLE WEAKNESS: ICD-10-CM

## 2021-10-25 DIAGNOSIS — Z74.09 DECREASED FUNCTIONAL MOBILITY AND ENDURANCE: ICD-10-CM

## 2021-10-25 PROCEDURE — 97110 THERAPEUTIC EXERCISES: CPT

## 2021-10-27 ENCOUNTER — CLINICAL SUPPORT (OUTPATIENT)
Dept: REHABILITATION | Facility: HOSPITAL | Age: 31
End: 2021-10-27
Payer: MEDICAID

## 2021-10-27 ENCOUNTER — PATIENT MESSAGE (OUTPATIENT)
Dept: FAMILY MEDICINE | Facility: CLINIC | Age: 31
End: 2021-10-27
Payer: MEDICAID

## 2021-10-27 ENCOUNTER — PATIENT MESSAGE (OUTPATIENT)
Dept: PAIN MEDICINE | Facility: CLINIC | Age: 31
End: 2021-10-27
Payer: MEDICAID

## 2021-10-27 DIAGNOSIS — M62.81 PROXIMAL MUSCLE WEAKNESS: ICD-10-CM

## 2021-10-27 DIAGNOSIS — Z74.09 DECREASED FUNCTIONAL MOBILITY AND ENDURANCE: ICD-10-CM

## 2021-10-27 PROCEDURE — 97110 THERAPEUTIC EXERCISES: CPT

## 2021-10-27 PROCEDURE — 97140 MANUAL THERAPY 1/> REGIONS: CPT

## 2021-11-01 ENCOUNTER — OFFICE VISIT (OUTPATIENT)
Dept: GASTROENTEROLOGY | Facility: CLINIC | Age: 31
End: 2021-11-01
Payer: MEDICAID

## 2021-11-01 ENCOUNTER — TELEPHONE (OUTPATIENT)
Dept: GASTROENTEROLOGY | Facility: CLINIC | Age: 31
End: 2021-11-01

## 2021-11-01 ENCOUNTER — LAB VISIT (OUTPATIENT)
Dept: PRIMARY CARE CLINIC | Facility: CLINIC | Age: 31
End: 2021-11-01
Payer: MEDICAID

## 2021-11-01 ENCOUNTER — HOSPITAL ENCOUNTER (OUTPATIENT)
Dept: RADIOLOGY | Facility: HOSPITAL | Age: 31
Discharge: HOME | End: 2021-11-01
Attending: ORTHOPAEDIC SURGERY
Payer: MEDICAID

## 2021-11-01 ENCOUNTER — TELEPHONE (OUTPATIENT)
Dept: PREADMISSION TESTING | Facility: HOSPITAL | Age: 31
End: 2021-11-01
Payer: MEDICAID

## 2021-11-01 ENCOUNTER — HOSPITAL ENCOUNTER (OUTPATIENT)
Dept: CARDIOLOGY | Facility: HOSPITAL | Age: 31
Discharge: HOME | End: 2021-11-01
Attending: ORTHOPAEDIC SURGERY
Payer: MEDICAID

## 2021-11-01 VITALS
HEIGHT: 64 IN | WEIGHT: 272.69 LBS | DIASTOLIC BLOOD PRESSURE: 78 MMHG | BODY MASS INDEX: 46.55 KG/M2 | SYSTOLIC BLOOD PRESSURE: 124 MMHG

## 2021-11-01 DIAGNOSIS — Z01.818 PREOP TESTING: ICD-10-CM

## 2021-11-01 DIAGNOSIS — E11.9 TYPE 2 DIABETES MELLITUS WITHOUT COMPLICATION, WITHOUT LONG-TERM CURRENT USE OF INSULIN: ICD-10-CM

## 2021-11-01 DIAGNOSIS — R10.9 ABDOMINAL PAIN, UNSPECIFIED ABDOMINAL LOCATION: Primary | ICD-10-CM

## 2021-11-01 DIAGNOSIS — Z01.818 PRE-OP TESTING: Primary | ICD-10-CM

## 2021-11-01 DIAGNOSIS — Z01.818 PRE-OP TESTING: ICD-10-CM

## 2021-11-01 DIAGNOSIS — K21.9 GASTROESOPHAGEAL REFLUX DISEASE, UNSPECIFIED WHETHER ESOPHAGITIS PRESENT: ICD-10-CM

## 2021-11-01 PROCEDURE — 99999 PR PBB SHADOW E&M-EST. PATIENT-LVL IV: CPT | Mod: PBBFAC,,, | Performed by: INTERNAL MEDICINE

## 2021-11-01 PROCEDURE — 71046 X-RAY EXAM CHEST 2 VIEWS: CPT | Mod: TC

## 2021-11-01 PROCEDURE — 99214 OFFICE O/P EST MOD 30 MIN: CPT | Mod: PBBFAC,25 | Performed by: INTERNAL MEDICINE

## 2021-11-01 PROCEDURE — 71046 XR CHEST PA AND LATERAL PRE-OP: ICD-10-PCS | Mod: 26,,, | Performed by: RADIOLOGY

## 2021-11-01 PROCEDURE — 99214 OFFICE O/P EST MOD 30 MIN: CPT | Mod: S$PBB,,, | Performed by: INTERNAL MEDICINE

## 2021-11-01 PROCEDURE — U0003 INFECTIOUS AGENT DETECTION BY NUCLEIC ACID (DNA OR RNA); SEVERE ACUTE RESPIRATORY SYNDROME CORONAVIRUS 2 (SARS-COV-2) (CORONAVIRUS DISEASE [COVID-19]), AMPLIFIED PROBE TECHNIQUE, MAKING USE OF HIGH THROUGHPUT TECHNOLOGIES AS DESCRIBED BY CMS-2020-01-R: HCPCS | Performed by: ORTHOPAEDIC SURGERY

## 2021-11-01 PROCEDURE — 99214 PR OFFICE/OUTPT VISIT, EST, LEVL IV, 30-39 MIN: ICD-10-PCS | Mod: S$PBB,,, | Performed by: INTERNAL MEDICINE

## 2021-11-01 PROCEDURE — 93005 ELECTROCARDIOGRAM TRACING: CPT

## 2021-11-01 PROCEDURE — 93010 ELECTROCARDIOGRAM REPORT: CPT | Mod: ,,, | Performed by: INTERNAL MEDICINE

## 2021-11-01 PROCEDURE — U0005 INFEC AGEN DETEC AMPLI PROBE: HCPCS | Performed by: ORTHOPAEDIC SURGERY

## 2021-11-01 PROCEDURE — 71046 X-RAY EXAM CHEST 2 VIEWS: CPT | Mod: 26,,, | Performed by: RADIOLOGY

## 2021-11-01 PROCEDURE — 93010 EKG 12-LEAD: ICD-10-PCS | Mod: ,,, | Performed by: INTERNAL MEDICINE

## 2021-11-01 PROCEDURE — 99999 PR PBB SHADOW E&M-EST. PATIENT-LVL IV: ICD-10-PCS | Mod: PBBFAC,,, | Performed by: INTERNAL MEDICINE

## 2021-11-01 RX ORDER — PANTOPRAZOLE SODIUM 20 MG/1
20 TABLET, DELAYED RELEASE ORAL DAILY
Qty: 30 TABLET | Refills: 11 | Status: SHIPPED | OUTPATIENT
Start: 2021-11-01 | End: 2021-12-29

## 2021-11-02 LAB
SARS-COV-2 RNA RESP QL NAA+PROBE: NOT DETECTED
SARS-COV-2- CYCLE NUMBER: NORMAL

## 2021-11-03 ENCOUNTER — ANESTHESIA EVENT (OUTPATIENT)
Dept: SURGERY | Facility: HOSPITAL | Age: 31
End: 2021-11-03
Payer: MEDICAID

## 2021-11-03 ENCOUNTER — TELEPHONE (OUTPATIENT)
Dept: PREADMISSION TESTING | Facility: HOSPITAL | Age: 31
End: 2021-11-03
Payer: MEDICAID

## 2021-11-04 ENCOUNTER — ANESTHESIA (OUTPATIENT)
Dept: SURGERY | Facility: HOSPITAL | Age: 31
End: 2021-11-04
Payer: MEDICAID

## 2021-11-04 ENCOUNTER — HOSPITAL ENCOUNTER (OUTPATIENT)
Facility: HOSPITAL | Age: 31
Discharge: HOME | End: 2021-11-04
Attending: ORTHOPAEDIC SURGERY | Admitting: ORTHOPAEDIC SURGERY
Payer: MEDICAID

## 2021-11-04 VITALS
BODY MASS INDEX: 47.4 KG/M2 | TEMPERATURE: 98 F | HEART RATE: 102 BPM | HEIGHT: 63 IN | OXYGEN SATURATION: 96 % | WEIGHT: 267.5 LBS | RESPIRATION RATE: 18 BRPM | DIASTOLIC BLOOD PRESSURE: 76 MMHG | SYSTOLIC BLOOD PRESSURE: 129 MMHG

## 2021-11-04 DIAGNOSIS — G56.00 CARPAL TUNNEL SYNDROME: Primary | ICD-10-CM

## 2021-11-04 DIAGNOSIS — G56.00 CARPAL TUNNEL SYNDROME, UNSPECIFIED LATERALITY: ICD-10-CM

## 2021-11-04 LAB
B-HCG UR QL: NEGATIVE
CTP QC/QA: YES
POCT GLUCOSE: 146 MG/DL (ref 70–110)
POCT GLUCOSE: 170 MG/DL (ref 70–110)

## 2021-11-04 PROCEDURE — 71000033 HC RECOVERY, INTIAL HOUR: Performed by: ORTHOPAEDIC SURGERY

## 2021-11-04 PROCEDURE — 25000003 PHARM REV CODE 250: Performed by: NURSE ANESTHETIST, CERTIFIED REGISTERED

## 2021-11-04 PROCEDURE — 63600175 PHARM REV CODE 636 W HCPCS: Performed by: PHYSICIAN ASSISTANT

## 2021-11-04 PROCEDURE — D9220A PRA ANESTHESIA: Mod: CRNA,,, | Performed by: NURSE ANESTHETIST, CERTIFIED REGISTERED

## 2021-11-04 PROCEDURE — 64721 PR REVISE MEDIAN N/CARPAL TUNNEL SURG: ICD-10-PCS | Mod: RT,,, | Performed by: ORTHOPAEDIC SURGERY

## 2021-11-04 PROCEDURE — 25000003 PHARM REV CODE 250: Performed by: ORTHOPAEDIC SURGERY

## 2021-11-04 PROCEDURE — D9220A PRA ANESTHESIA: ICD-10-PCS | Mod: CRNA,,, | Performed by: NURSE ANESTHETIST, CERTIFIED REGISTERED

## 2021-11-04 PROCEDURE — 01810 ANES PX NRV MUSC F/ARM WRST: CPT | Performed by: ORTHOPAEDIC SURGERY

## 2021-11-04 PROCEDURE — 71000015 HC POSTOP RECOV 1ST HR: Performed by: ORTHOPAEDIC SURGERY

## 2021-11-04 PROCEDURE — 63600175 PHARM REV CODE 636 W HCPCS: Performed by: ANESTHESIOLOGY

## 2021-11-04 PROCEDURE — 36000707: Performed by: ORTHOPAEDIC SURGERY

## 2021-11-04 PROCEDURE — 82962 GLUCOSE BLOOD TEST: CPT | Performed by: ORTHOPAEDIC SURGERY

## 2021-11-04 PROCEDURE — 36000706: Performed by: ORTHOPAEDIC SURGERY

## 2021-11-04 PROCEDURE — 64721 CARPAL TUNNEL SURGERY: CPT | Mod: RT,,, | Performed by: ORTHOPAEDIC SURGERY

## 2021-11-04 PROCEDURE — 37000009 HC ANESTHESIA EA ADD 15 MINS: Performed by: ORTHOPAEDIC SURGERY

## 2021-11-04 PROCEDURE — 81025 URINE PREGNANCY TEST: CPT | Performed by: ORTHOPAEDIC SURGERY

## 2021-11-04 PROCEDURE — 63600175 PHARM REV CODE 636 W HCPCS: Performed by: NURSE ANESTHETIST, CERTIFIED REGISTERED

## 2021-11-04 PROCEDURE — 37000008 HC ANESTHESIA 1ST 15 MINUTES: Performed by: ORTHOPAEDIC SURGERY

## 2021-11-04 PROCEDURE — D9220A PRA ANESTHESIA: ICD-10-PCS | Mod: ANES,,, | Performed by: ANESTHESIOLOGY

## 2021-11-04 PROCEDURE — D9220A PRA ANESTHESIA: Mod: ANES,,, | Performed by: ANESTHESIOLOGY

## 2021-11-04 RX ORDER — CHLORHEXIDINE GLUCONATE ORAL RINSE 1.2 MG/ML
10 SOLUTION DENTAL 2 TIMES DAILY
Status: DISCONTINUED | OUTPATIENT
Start: 2021-11-04 | End: 2021-11-04 | Stop reason: HOSPADM

## 2021-11-04 RX ORDER — HYDROCODONE BITARTRATE AND ACETAMINOPHEN 5; 325 MG/1; MG/1
1 TABLET ORAL EVERY 4 HOURS PRN
Status: DISCONTINUED | OUTPATIENT
Start: 2021-11-04 | End: 2021-11-04 | Stop reason: HOSPADM

## 2021-11-04 RX ORDER — MEPERIDINE HYDROCHLORIDE 25 MG/ML
12.5 INJECTION INTRAMUSCULAR; INTRAVENOUS; SUBCUTANEOUS ONCE
Status: DISCONTINUED | OUTPATIENT
Start: 2021-11-04 | End: 2021-11-04 | Stop reason: HOSPADM

## 2021-11-04 RX ORDER — PROPOFOL 10 MG/ML
VIAL (ML) INTRAVENOUS
Status: DISCONTINUED | OUTPATIENT
Start: 2021-11-04 | End: 2021-11-04

## 2021-11-04 RX ORDER — MIDAZOLAM HYDROCHLORIDE 1 MG/ML
INJECTION INTRAMUSCULAR; INTRAVENOUS
Status: DISCONTINUED | OUTPATIENT
Start: 2021-11-04 | End: 2021-11-04

## 2021-11-04 RX ORDER — DIPHENHYDRAMINE HYDROCHLORIDE 50 MG/ML
25 INJECTION INTRAMUSCULAR; INTRAVENOUS EVERY 6 HOURS PRN
Status: DISCONTINUED | OUTPATIENT
Start: 2021-11-04 | End: 2021-11-04 | Stop reason: HOSPADM

## 2021-11-04 RX ORDER — SODIUM CHLORIDE, SODIUM LACTATE, POTASSIUM CHLORIDE, CALCIUM CHLORIDE 600; 310; 30; 20 MG/100ML; MG/100ML; MG/100ML; MG/100ML
INJECTION, SOLUTION INTRAVENOUS CONTINUOUS
Status: DISCONTINUED | OUTPATIENT
Start: 2021-11-04 | End: 2021-11-04 | Stop reason: HOSPADM

## 2021-11-04 RX ORDER — FENTANYL CITRATE 50 UG/ML
INJECTION, SOLUTION INTRAMUSCULAR; INTRAVENOUS
Status: DISCONTINUED | OUTPATIENT
Start: 2021-11-04 | End: 2021-11-04

## 2021-11-04 RX ORDER — HYDROCODONE BITARTRATE AND ACETAMINOPHEN 5; 325 MG/1; MG/1
1 TABLET ORAL EVERY 6 HOURS PRN
Qty: 15 TABLET | Refills: 0 | Status: SHIPPED | OUTPATIENT
Start: 2021-11-04 | End: 2021-11-11

## 2021-11-04 RX ORDER — SODIUM CHLORIDE, SODIUM LACTATE, POTASSIUM CHLORIDE, CALCIUM CHLORIDE 600; 310; 30; 20 MG/100ML; MG/100ML; MG/100ML; MG/100ML
INJECTION, SOLUTION INTRAVENOUS
Status: DISCONTINUED | OUTPATIENT
Start: 2021-11-04 | End: 2021-11-04 | Stop reason: HOSPADM

## 2021-11-04 RX ORDER — ONDANSETRON 2 MG/ML
4 INJECTION INTRAMUSCULAR; INTRAVENOUS ONCE AS NEEDED
Status: DISCONTINUED | OUTPATIENT
Start: 2021-11-04 | End: 2021-11-04 | Stop reason: HOSPADM

## 2021-11-04 RX ORDER — LIDOCAINE HYDROCHLORIDE 20 MG/ML
INJECTION, SOLUTION EPIDURAL; INFILTRATION; INTRACAUDAL; PERINEURAL
Status: DISCONTINUED | OUTPATIENT
Start: 2021-11-04 | End: 2021-11-04 | Stop reason: HOSPADM

## 2021-11-04 RX ORDER — LIDOCAINE HYDROCHLORIDE 20 MG/ML
INJECTION, SOLUTION EPIDURAL; INFILTRATION; INTRACAUDAL; PERINEURAL
Status: DISCONTINUED | OUTPATIENT
Start: 2021-11-04 | End: 2021-11-04

## 2021-11-04 RX ORDER — ALBUTEROL SULFATE 0.83 MG/ML
2.5 SOLUTION RESPIRATORY (INHALATION) EVERY 4 HOURS PRN
Status: DISCONTINUED | OUTPATIENT
Start: 2021-11-04 | End: 2021-11-04 | Stop reason: HOSPADM

## 2021-11-04 RX ORDER — FENTANYL CITRATE 50 UG/ML
25 INJECTION, SOLUTION INTRAMUSCULAR; INTRAVENOUS EVERY 5 MIN PRN
Status: COMPLETED | OUTPATIENT
Start: 2021-11-04 | End: 2021-11-04

## 2021-11-04 RX ORDER — LIDOCAINE HYDROCHLORIDE 20 MG/ML
INJECTION, SOLUTION INFILTRATION; PERINEURAL
Status: DISCONTINUED
Start: 2021-11-04 | End: 2021-11-04 | Stop reason: HOSPADM

## 2021-11-04 RX ORDER — ONDANSETRON 2 MG/ML
INJECTION INTRAMUSCULAR; INTRAVENOUS
Status: DISCONTINUED | OUTPATIENT
Start: 2021-11-04 | End: 2021-11-04

## 2021-11-04 RX ORDER — CEFAZOLIN SODIUM 2 G/50ML
2 SOLUTION INTRAVENOUS
Status: COMPLETED | OUTPATIENT
Start: 2021-11-04 | End: 2021-11-04

## 2021-11-04 RX ADMIN — PROPOFOL 20 MG: 10 INJECTION, EMULSION INTRAVENOUS at 09:11

## 2021-11-04 RX ADMIN — FENTANYL CITRATE 50 MCG: 50 INJECTION, SOLUTION INTRAMUSCULAR; INTRAVENOUS at 09:11

## 2021-11-04 RX ADMIN — LIDOCAINE HYDROCHLORIDE 80 MG: 20 INJECTION, SOLUTION EPIDURAL; INFILTRATION; INTRACAUDAL; PERINEURAL at 09:11

## 2021-11-04 RX ADMIN — HYDROCODONE BITARTRATE AND ACETAMINOPHEN 1 TABLET: 5; 325 TABLET ORAL at 10:11

## 2021-11-04 RX ADMIN — PROPOFOL 100 MG: 10 INJECTION, EMULSION INTRAVENOUS at 09:11

## 2021-11-04 RX ADMIN — PROPOFOL 10 MG: 10 INJECTION, EMULSION INTRAVENOUS at 09:11

## 2021-11-04 RX ADMIN — FENTANYL CITRATE 25 MCG: 50 INJECTION INTRAMUSCULAR; INTRAVENOUS at 10:11

## 2021-11-04 RX ADMIN — MIDAZOLAM HYDROCHLORIDE 2 MG: 1 INJECTION INTRAMUSCULAR; INTRAVENOUS at 09:11

## 2021-11-04 RX ADMIN — CEFAZOLIN SODIUM 3 G: 2 SOLUTION INTRAVENOUS at 09:11

## 2021-11-04 RX ADMIN — ONDANSETRON 4 MG: 2 INJECTION, SOLUTION INTRAMUSCULAR; INTRAVENOUS at 09:11

## 2021-11-04 RX ADMIN — SODIUM CHLORIDE, SODIUM LACTATE, POTASSIUM CHLORIDE, AND CALCIUM CHLORIDE: .6; .31; .03; .02 INJECTION, SOLUTION INTRAVENOUS at 07:11

## 2021-11-08 ENCOUNTER — TELEPHONE (OUTPATIENT)
Dept: FAMILY MEDICINE | Facility: CLINIC | Age: 31
End: 2021-11-08
Payer: MEDICAID

## 2021-11-11 ENCOUNTER — OFFICE VISIT (OUTPATIENT)
Dept: ORTHOPEDICS | Facility: CLINIC | Age: 31
End: 2021-11-11
Payer: MEDICAID

## 2021-11-11 VITALS
HEART RATE: 91 BPM | DIASTOLIC BLOOD PRESSURE: 71 MMHG | WEIGHT: 267.44 LBS | TEMPERATURE: 98 F | SYSTOLIC BLOOD PRESSURE: 112 MMHG | BODY MASS INDEX: 47.39 KG/M2 | HEIGHT: 63 IN

## 2021-11-11 DIAGNOSIS — G56.01 CARPAL TUNNEL SYNDROME OF RIGHT WRIST: Primary | ICD-10-CM

## 2021-11-11 PROCEDURE — 99999 PR PBB SHADOW E&M-EST. PATIENT-LVL IV: ICD-10-PCS | Mod: PBBFAC,,, | Performed by: PHYSICIAN ASSISTANT

## 2021-11-11 PROCEDURE — 99214 OFFICE O/P EST MOD 30 MIN: CPT | Mod: PBBFAC | Performed by: PHYSICIAN ASSISTANT

## 2021-11-11 PROCEDURE — 99024 PR POST-OP FOLLOW-UP VISIT: ICD-10-PCS | Mod: ,,, | Performed by: PHYSICIAN ASSISTANT

## 2021-11-11 PROCEDURE — 99024 POSTOP FOLLOW-UP VISIT: CPT | Mod: ,,, | Performed by: PHYSICIAN ASSISTANT

## 2021-11-11 PROCEDURE — 99999 PR PBB SHADOW E&M-EST. PATIENT-LVL IV: CPT | Mod: PBBFAC,,, | Performed by: PHYSICIAN ASSISTANT

## 2021-11-11 RX ORDER — HYDROCODONE BITARTRATE AND ACETAMINOPHEN 5; 325 MG/1; MG/1
1 TABLET ORAL EVERY 8 HOURS PRN
Qty: 15 TABLET | Refills: 0 | Status: SHIPPED | OUTPATIENT
Start: 2021-11-11 | End: 2022-02-16

## 2021-11-15 ENCOUNTER — PATIENT MESSAGE (OUTPATIENT)
Dept: PAIN MEDICINE | Facility: CLINIC | Age: 31
End: 2021-11-15
Payer: MEDICAID

## 2021-11-16 ENCOUNTER — OFFICE VISIT (OUTPATIENT)
Dept: FAMILY MEDICINE | Facility: CLINIC | Age: 31
End: 2021-11-16
Payer: MEDICAID

## 2021-11-16 VITALS
DIASTOLIC BLOOD PRESSURE: 80 MMHG | RESPIRATION RATE: 18 BRPM | BODY MASS INDEX: 47.52 KG/M2 | HEIGHT: 63 IN | SYSTOLIC BLOOD PRESSURE: 120 MMHG | HEART RATE: 99 BPM | TEMPERATURE: 98 F | WEIGHT: 268.19 LBS | OXYGEN SATURATION: 95 %

## 2021-11-16 DIAGNOSIS — E78.00 ELEVATED LDL CHOLESTEROL LEVEL: ICD-10-CM

## 2021-11-16 DIAGNOSIS — E11.9 TYPE 2 DIABETES MELLITUS WITHOUT COMPLICATION, WITHOUT LONG-TERM CURRENT USE OF INSULIN: Primary | ICD-10-CM

## 2021-11-16 DIAGNOSIS — B37.31 RECURRENT CANDIDIASIS OF VAGINA: ICD-10-CM

## 2021-11-16 LAB — GLUCOSE SERPL-MCNC: 103 MG/DL (ref 70–110)

## 2021-11-16 PROCEDURE — 99214 OFFICE O/P EST MOD 30 MIN: CPT | Mod: 25,S$PBB,, | Performed by: REGISTERED NURSE

## 2021-11-16 PROCEDURE — 82962 GLUCOSE BLOOD TEST: CPT | Mod: PBBFAC,PO | Performed by: REGISTERED NURSE

## 2021-11-16 PROCEDURE — 99215 OFFICE O/P EST HI 40 MIN: CPT | Mod: PBBFAC,PO | Performed by: REGISTERED NURSE

## 2021-11-16 PROCEDURE — 99214 PR OFFICE/OUTPT VISIT, EST, LEVL IV, 30-39 MIN: ICD-10-PCS | Mod: 25,S$PBB,, | Performed by: REGISTERED NURSE

## 2021-11-16 PROCEDURE — 90471 IMMUNIZATION ADMIN: CPT | Mod: PBBFAC,PO

## 2021-11-16 PROCEDURE — 99999 PR PBB SHADOW E&M-EST. PATIENT-LVL V: CPT | Mod: PBBFAC,,, | Performed by: REGISTERED NURSE

## 2021-11-16 PROCEDURE — 99999 PR PBB SHADOW E&M-EST. PATIENT-LVL V: ICD-10-PCS | Mod: PBBFAC,,, | Performed by: REGISTERED NURSE

## 2021-11-16 RX ORDER — METFORMIN HYDROCHLORIDE 750 MG/1
750 TABLET, EXTENDED RELEASE ORAL 2 TIMES DAILY WITH MEALS
Qty: 180 TABLET | Refills: 1 | Status: SHIPPED | OUTPATIENT
Start: 2021-11-16 | End: 2022-03-22

## 2021-11-16 RX ORDER — ATORVASTATIN CALCIUM 40 MG/1
40 TABLET, FILM COATED ORAL NIGHTLY
Qty: 90 TABLET | Refills: 1 | Status: SHIPPED | OUTPATIENT
Start: 2021-11-16 | End: 2022-05-11 | Stop reason: SDUPTHER

## 2021-11-16 RX ORDER — TIZANIDINE 4 MG/1
TABLET ORAL
Qty: 60 TABLET | Refills: 0 | Status: SHIPPED | OUTPATIENT
Start: 2021-11-16 | End: 2021-12-15

## 2021-11-16 RX ORDER — FLUCONAZOLE 150 MG/1
TABLET ORAL
Qty: 2 TABLET | Refills: 0 | Status: SHIPPED | OUTPATIENT
Start: 2021-11-16 | End: 2022-01-02

## 2021-11-18 ENCOUNTER — OFFICE VISIT (OUTPATIENT)
Dept: ORTHOPEDICS | Facility: CLINIC | Age: 31
End: 2021-11-18
Payer: MEDICAID

## 2021-11-18 VITALS
BODY MASS INDEX: 47.5 KG/M2 | HEART RATE: 89 BPM | HEIGHT: 63 IN | SYSTOLIC BLOOD PRESSURE: 140 MMHG | DIASTOLIC BLOOD PRESSURE: 93 MMHG | WEIGHT: 268.06 LBS | TEMPERATURE: 97 F

## 2021-11-18 DIAGNOSIS — G56.02 CARPAL TUNNEL SYNDROME OF LEFT WRIST: ICD-10-CM

## 2021-11-18 DIAGNOSIS — G56.01 CARPAL TUNNEL SYNDROME OF RIGHT WRIST: Primary | ICD-10-CM

## 2021-11-18 PROCEDURE — 99214 OFFICE O/P EST MOD 30 MIN: CPT | Mod: PBBFAC | Performed by: PHYSICIAN ASSISTANT

## 2021-11-18 PROCEDURE — 99024 PR POST-OP FOLLOW-UP VISIT: ICD-10-PCS | Mod: ,,, | Performed by: PHYSICIAN ASSISTANT

## 2021-11-18 PROCEDURE — 99999 PR PBB SHADOW E&M-EST. PATIENT-LVL IV: ICD-10-PCS | Mod: PBBFAC,,, | Performed by: PHYSICIAN ASSISTANT

## 2021-11-18 PROCEDURE — 99999 PR PBB SHADOW E&M-EST. PATIENT-LVL IV: CPT | Mod: PBBFAC,,, | Performed by: PHYSICIAN ASSISTANT

## 2021-11-18 PROCEDURE — 99024 POSTOP FOLLOW-UP VISIT: CPT | Mod: ,,, | Performed by: PHYSICIAN ASSISTANT

## 2021-11-23 ENCOUNTER — TELEPHONE (OUTPATIENT)
Dept: PODIATRY | Facility: CLINIC | Age: 31
End: 2021-11-23
Payer: MEDICAID

## 2021-11-29 ENCOUNTER — PATIENT OUTREACH (OUTPATIENT)
Dept: ADMINISTRATIVE | Facility: OTHER | Age: 31
End: 2021-11-29
Payer: MEDICAID

## 2021-11-29 ENCOUNTER — LAB VISIT (OUTPATIENT)
Dept: PRIMARY CARE CLINIC | Facility: CLINIC | Age: 31
End: 2021-11-29
Payer: MEDICAID

## 2021-11-29 DIAGNOSIS — Z01.818 PRE-OP TESTING: ICD-10-CM

## 2021-11-29 PROCEDURE — U0005 INFEC AGEN DETEC AMPLI PROBE: HCPCS | Performed by: INTERNAL MEDICINE

## 2021-11-29 PROCEDURE — U0003 INFECTIOUS AGENT DETECTION BY NUCLEIC ACID (DNA OR RNA); SEVERE ACUTE RESPIRATORY SYNDROME CORONAVIRUS 2 (SARS-COV-2) (CORONAVIRUS DISEASE [COVID-19]), AMPLIFIED PROBE TECHNIQUE, MAKING USE OF HIGH THROUGHPUT TECHNOLOGIES AS DESCRIBED BY CMS-2020-01-R: HCPCS | Performed by: INTERNAL MEDICINE

## 2021-11-30 LAB — SARS-COV-2 RNA RESP QL NAA+PROBE: NOT DETECTED

## 2021-12-01 ENCOUNTER — OFFICE VISIT (OUTPATIENT)
Dept: PODIATRY | Facility: CLINIC | Age: 31
End: 2021-12-01
Payer: MEDICAID

## 2021-12-01 VITALS — WEIGHT: 268.06 LBS | HEIGHT: 63 IN | BODY MASS INDEX: 47.5 KG/M2

## 2021-12-01 DIAGNOSIS — E11.9 TYPE 2 DIABETES MELLITUS WITHOUT COMPLICATION, WITHOUT LONG-TERM CURRENT USE OF INSULIN: ICD-10-CM

## 2021-12-01 DIAGNOSIS — M72.2 PLANTAR FASCIITIS OF LEFT FOOT: Primary | ICD-10-CM

## 2021-12-01 DIAGNOSIS — E66.01 TYPE 2 DIABETES MELLITUS WITH MORBID OBESITY: ICD-10-CM

## 2021-12-01 DIAGNOSIS — E11.69 TYPE 2 DIABETES MELLITUS WITH MORBID OBESITY: ICD-10-CM

## 2021-12-01 PROCEDURE — 99204 PR OFFICE/OUTPT VISIT, NEW, LEVL IV, 45-59 MIN: ICD-10-PCS | Mod: S$PBB,,, | Performed by: PODIATRIST

## 2021-12-01 PROCEDURE — 99204 OFFICE O/P NEW MOD 45 MIN: CPT | Mod: S$PBB,,, | Performed by: PODIATRIST

## 2021-12-01 PROCEDURE — 99999 PR PBB SHADOW E&M-EST. PATIENT-LVL IV: ICD-10-PCS | Mod: PBBFAC,,, | Performed by: PODIATRIST

## 2021-12-01 PROCEDURE — 99214 OFFICE O/P EST MOD 30 MIN: CPT | Mod: PBBFAC | Performed by: PODIATRIST

## 2021-12-01 PROCEDURE — 99999 PR PBB SHADOW E&M-EST. PATIENT-LVL IV: CPT | Mod: PBBFAC,,, | Performed by: PODIATRIST

## 2021-12-02 ENCOUNTER — HOSPITAL ENCOUNTER (OUTPATIENT)
Facility: HOSPITAL | Age: 31
Discharge: HOME | End: 2021-12-02
Attending: INTERNAL MEDICINE | Admitting: INTERNAL MEDICINE
Payer: MEDICAID

## 2021-12-02 ENCOUNTER — ANESTHESIA (OUTPATIENT)
Dept: ENDOSCOPY | Facility: HOSPITAL | Age: 31
End: 2021-12-02
Payer: MEDICAID

## 2021-12-02 ENCOUNTER — ANESTHESIA EVENT (OUTPATIENT)
Dept: ENDOSCOPY | Facility: HOSPITAL | Age: 31
End: 2021-12-02
Payer: MEDICAID

## 2021-12-02 DIAGNOSIS — R10.9 ABDOMINAL PAIN, UNSPECIFIED ABDOMINAL LOCATION: Primary | ICD-10-CM

## 2021-12-02 LAB
B-HCG UR QL: NEGATIVE
CTP QC/QA: YES
GLUCOSE SERPL-MCNC: 183 MG/DL (ref 70–110)
POCT GLUCOSE: 183 MG/DL (ref 70–110)

## 2021-12-02 PROCEDURE — 81025 URINE PREGNANCY TEST: CPT | Performed by: INTERNAL MEDICINE

## 2021-12-02 PROCEDURE — 88342 IMHCHEM/IMCYTCHM 1ST ANTB: CPT | Mod: 26,,, | Performed by: STUDENT IN AN ORGANIZED HEALTH CARE EDUCATION/TRAINING PROGRAM

## 2021-12-02 PROCEDURE — 88342 CHG IMMUNOCYTOCHEMISTRY: ICD-10-PCS | Mod: 26,,, | Performed by: STUDENT IN AN ORGANIZED HEALTH CARE EDUCATION/TRAINING PROGRAM

## 2021-12-02 PROCEDURE — 43239 EGD BIOPSY SINGLE/MULTIPLE: CPT | Mod: ,,, | Performed by: INTERNAL MEDICINE

## 2021-12-02 PROCEDURE — 63600175 PHARM REV CODE 636 W HCPCS: Performed by: NURSE ANESTHETIST, CERTIFIED REGISTERED

## 2021-12-02 PROCEDURE — 00813 ANES UPR LWR GI NDSC PX: CPT | Performed by: INTERNAL MEDICINE

## 2021-12-02 PROCEDURE — 88342 IMHCHEM/IMCYTCHM 1ST ANTB: CPT | Performed by: STUDENT IN AN ORGANIZED HEALTH CARE EDUCATION/TRAINING PROGRAM

## 2021-12-02 PROCEDURE — 43239 PR EGD, FLEX, W/BIOPSY, SGL/MULTI: ICD-10-PCS | Mod: ,,, | Performed by: INTERNAL MEDICINE

## 2021-12-02 PROCEDURE — 88305 TISSUE EXAM BY PATHOLOGIST: ICD-10-PCS | Mod: 26,,, | Performed by: STUDENT IN AN ORGANIZED HEALTH CARE EDUCATION/TRAINING PROGRAM

## 2021-12-02 PROCEDURE — 43239 EGD BIOPSY SINGLE/MULTIPLE: CPT | Performed by: INTERNAL MEDICINE

## 2021-12-02 PROCEDURE — 37000009 HC ANESTHESIA EA ADD 15 MINS: Performed by: INTERNAL MEDICINE

## 2021-12-02 PROCEDURE — 25000003 PHARM REV CODE 250: Performed by: NURSE ANESTHETIST, CERTIFIED REGISTERED

## 2021-12-02 PROCEDURE — 37000008 HC ANESTHESIA 1ST 15 MINUTES: Performed by: INTERNAL MEDICINE

## 2021-12-02 PROCEDURE — 88305 TISSUE EXAM BY PATHOLOGIST: CPT | Mod: 26,,, | Performed by: STUDENT IN AN ORGANIZED HEALTH CARE EDUCATION/TRAINING PROGRAM

## 2021-12-02 PROCEDURE — 88305 TISSUE EXAM BY PATHOLOGIST: CPT | Performed by: STUDENT IN AN ORGANIZED HEALTH CARE EDUCATION/TRAINING PROGRAM

## 2021-12-02 PROCEDURE — 27201012 HC FORCEPS, HOT/COLD, DISP: Performed by: INTERNAL MEDICINE

## 2021-12-02 RX ORDER — PROPOFOL 10 MG/ML
VIAL (ML) INTRAVENOUS
Status: DISCONTINUED | OUTPATIENT
Start: 2021-12-02 | End: 2021-12-02

## 2021-12-02 RX ORDER — LIDOCAINE HYDROCHLORIDE 10 MG/ML
INJECTION, SOLUTION EPIDURAL; INFILTRATION; INTRACAUDAL; PERINEURAL
Status: DISCONTINUED | OUTPATIENT
Start: 2021-12-02 | End: 2021-12-02

## 2021-12-02 RX ADMIN — PROPOFOL 50 MG: 10 INJECTION, EMULSION INTRAVENOUS at 08:12

## 2021-12-02 RX ADMIN — PROPOFOL 100 MG: 10 INJECTION, EMULSION INTRAVENOUS at 08:12

## 2021-12-02 RX ADMIN — SODIUM CHLORIDE, POTASSIUM CHLORIDE, SODIUM LACTATE AND CALCIUM CHLORIDE: 600; 310; 30; 20 INJECTION, SOLUTION INTRAVENOUS at 08:12

## 2021-12-02 RX ADMIN — LIDOCAINE HYDROCHLORIDE 50 MG: 10 INJECTION, SOLUTION EPIDURAL; INFILTRATION; INTRACAUDAL; PERINEURAL at 08:12

## 2021-12-03 ENCOUNTER — CLINICAL SUPPORT (OUTPATIENT)
Dept: REHABILITATION | Facility: HOSPITAL | Age: 31
End: 2021-12-03
Payer: MEDICAID

## 2021-12-03 VITALS
TEMPERATURE: 97 F | SYSTOLIC BLOOD PRESSURE: 122 MMHG | DIASTOLIC BLOOD PRESSURE: 78 MMHG | HEIGHT: 64 IN | HEART RATE: 74 BPM | RESPIRATION RATE: 18 BRPM | WEIGHT: 268 LBS | OXYGEN SATURATION: 95 % | BODY MASS INDEX: 45.75 KG/M2

## 2021-12-03 DIAGNOSIS — M25.60 DECREASED RANGE OF MOTION: ICD-10-CM

## 2021-12-03 DIAGNOSIS — T78.40XA HYPERSENSITIVITY, INITIAL ENCOUNTER: ICD-10-CM

## 2021-12-03 DIAGNOSIS — Z78.9 SELF-CARE DEFICIT: ICD-10-CM

## 2021-12-03 DIAGNOSIS — L90.5 SCAR TISSUE: ICD-10-CM

## 2021-12-03 DIAGNOSIS — R53.1 DECREASED STRENGTH: ICD-10-CM

## 2021-12-03 DIAGNOSIS — G56.02 CARPAL TUNNEL SYNDROME OF LEFT WRIST: ICD-10-CM

## 2021-12-03 DIAGNOSIS — G56.01 CARPAL TUNNEL SYNDROME OF RIGHT WRIST: ICD-10-CM

## 2021-12-03 PROCEDURE — 97110 THERAPEUTIC EXERCISES: CPT | Performed by: OCCUPATIONAL THERAPIST

## 2021-12-03 PROCEDURE — 97530 THERAPEUTIC ACTIVITIES: CPT | Performed by: OCCUPATIONAL THERAPIST

## 2021-12-03 PROCEDURE — 97166 OT EVAL MOD COMPLEX 45 MIN: CPT | Performed by: OCCUPATIONAL THERAPIST

## 2021-12-09 ENCOUNTER — PATIENT MESSAGE (OUTPATIENT)
Dept: GASTROENTEROLOGY | Facility: CLINIC | Age: 31
End: 2021-12-09
Payer: MEDICAID

## 2021-12-09 LAB
LEFT EYE DM RETINOPATHY: NEGATIVE
RIGHT EYE DM RETINOPATHY: NEGATIVE

## 2021-12-10 ENCOUNTER — CLINICAL SUPPORT (OUTPATIENT)
Dept: REHABILITATION | Facility: HOSPITAL | Age: 31
End: 2021-12-10
Payer: MEDICAID

## 2021-12-10 DIAGNOSIS — M25.60 DECREASED RANGE OF MOTION: ICD-10-CM

## 2021-12-10 DIAGNOSIS — L90.5 SCAR TISSUE: ICD-10-CM

## 2021-12-10 DIAGNOSIS — R53.1 DECREASED STRENGTH: ICD-10-CM

## 2021-12-10 DIAGNOSIS — T78.40XD HYPERSENSITIVITY, SUBSEQUENT ENCOUNTER: ICD-10-CM

## 2021-12-10 DIAGNOSIS — Z78.9 SELF-CARE DEFICIT: ICD-10-CM

## 2021-12-10 PROCEDURE — 97530 THERAPEUTIC ACTIVITIES: CPT | Performed by: OCCUPATIONAL THERAPIST

## 2021-12-13 LAB
FINAL PATHOLOGIC DIAGNOSIS: NORMAL
GROSS: NORMAL
Lab: NORMAL
MICROSCOPIC EXAM: NORMAL

## 2021-12-15 ENCOUNTER — PATIENT MESSAGE (OUTPATIENT)
Dept: PAIN MEDICINE | Facility: CLINIC | Age: 31
End: 2021-12-15
Payer: MEDICAID

## 2021-12-15 ENCOUNTER — LAB VISIT (OUTPATIENT)
Dept: LAB | Facility: HOSPITAL | Age: 31
End: 2021-12-15
Attending: ANESTHESIOLOGY
Payer: MEDICAID

## 2021-12-15 ENCOUNTER — CLINICAL SUPPORT (OUTPATIENT)
Dept: REHABILITATION | Facility: HOSPITAL | Age: 31
End: 2021-12-15
Payer: MEDICAID

## 2021-12-15 DIAGNOSIS — R53.1 DECREASED STRENGTH: ICD-10-CM

## 2021-12-15 DIAGNOSIS — T78.40XD HYPERSENSITIVITY, SUBSEQUENT ENCOUNTER: ICD-10-CM

## 2021-12-15 DIAGNOSIS — Z78.9 SELF-CARE DEFICIT: ICD-10-CM

## 2021-12-15 DIAGNOSIS — M54.16 LUMBAR RADICULOPATHY: ICD-10-CM

## 2021-12-15 DIAGNOSIS — L90.5 SCAR TISSUE: ICD-10-CM

## 2021-12-15 DIAGNOSIS — M54.16 LUMBAR RADICULOPATHY: Primary | ICD-10-CM

## 2021-12-15 DIAGNOSIS — M25.60 DECREASED RANGE OF MOTION: ICD-10-CM

## 2021-12-15 DIAGNOSIS — E11.9 TYPE 2 DIABETES MELLITUS WITHOUT COMPLICATION, WITHOUT LONG-TERM CURRENT USE OF INSULIN: ICD-10-CM

## 2021-12-15 DIAGNOSIS — M54.9 DORSALGIA, UNSPECIFIED: ICD-10-CM

## 2021-12-15 LAB
CREAT SERPL-MCNC: 0.8 MG/DL (ref 0.5–1.4)
EST. GFR  (AFRICAN AMERICAN): >60 ML/MIN/1.73 M^2
EST. GFR  (NON AFRICAN AMERICAN): >60 ML/MIN/1.73 M^2

## 2021-12-15 PROCEDURE — 36415 COLL VENOUS BLD VENIPUNCTURE: CPT | Mod: PO | Performed by: ANESTHESIOLOGY

## 2021-12-15 PROCEDURE — 97530 THERAPEUTIC ACTIVITIES: CPT | Performed by: OCCUPATIONAL THERAPIST

## 2021-12-15 PROCEDURE — 82565 ASSAY OF CREATININE: CPT | Mod: PO | Performed by: ANESTHESIOLOGY

## 2021-12-15 PROCEDURE — 83036 HEMOGLOBIN GLYCOSYLATED A1C: CPT | Performed by: REGISTERED NURSE

## 2021-12-15 RX ORDER — TIZANIDINE 4 MG/1
TABLET ORAL
Qty: 60 TABLET | Refills: 1 | Status: SHIPPED | OUTPATIENT
Start: 2021-12-15 | End: 2022-01-10 | Stop reason: SDUPTHER

## 2021-12-16 ENCOUNTER — PATIENT MESSAGE (OUTPATIENT)
Dept: ADMINISTRATIVE | Facility: OTHER | Age: 31
End: 2021-12-16
Payer: MEDICAID

## 2021-12-16 ENCOUNTER — HOSPITAL ENCOUNTER (OUTPATIENT)
Dept: RADIOLOGY | Facility: HOSPITAL | Age: 31
Discharge: HOME | End: 2021-12-16
Attending: ANESTHESIOLOGY
Payer: MEDICAID

## 2021-12-16 ENCOUNTER — PATIENT OUTREACH (OUTPATIENT)
Dept: ADMINISTRATIVE | Facility: HOSPITAL | Age: 31
End: 2021-12-16
Payer: MEDICAID

## 2021-12-16 DIAGNOSIS — M54.9 DORSALGIA, UNSPECIFIED: ICD-10-CM

## 2021-12-16 LAB
ESTIMATED AVG GLUCOSE: 203 MG/DL (ref 68–131)
HBA1C MFR BLD: 8.7 % (ref 4–5.6)

## 2021-12-16 PROCEDURE — 72158 MRI LUMBAR SPINE W/O & W/DYE: CPT | Mod: 26,,, | Performed by: RADIOLOGY

## 2021-12-16 PROCEDURE — 72158 MRI LUMBAR SPINE W/O & W/DYE: CPT | Mod: TC,PO

## 2021-12-16 PROCEDURE — 72158 MRI LUMBAR SPINE W WO CONTRAST: ICD-10-PCS | Mod: 26,,, | Performed by: RADIOLOGY

## 2021-12-16 PROCEDURE — 25500020 PHARM REV CODE 255: Mod: PO | Performed by: ANESTHESIOLOGY

## 2021-12-16 PROCEDURE — A9585 GADOBUTROL INJECTION: HCPCS | Mod: PO | Performed by: ANESTHESIOLOGY

## 2021-12-16 RX ORDER — GADOBUTROL 604.72 MG/ML
10 INJECTION INTRAVENOUS
Status: COMPLETED | OUTPATIENT
Start: 2021-12-16 | End: 2021-12-16

## 2021-12-16 RX ADMIN — GADOBUTROL 10 ML: 604.72 INJECTION INTRAVENOUS at 04:12

## 2021-12-17 ENCOUNTER — PATIENT MESSAGE (OUTPATIENT)
Dept: PAIN MEDICINE | Facility: CLINIC | Age: 31
End: 2021-12-17
Payer: MEDICAID

## 2021-12-22 ENCOUNTER — CLINICAL SUPPORT (OUTPATIENT)
Dept: REHABILITATION | Facility: HOSPITAL | Age: 31
End: 2021-12-22
Payer: MEDICAID

## 2021-12-22 DIAGNOSIS — R53.1 DECREASED STRENGTH: ICD-10-CM

## 2021-12-22 DIAGNOSIS — T78.40XD HYPERSENSITIVITY, SUBSEQUENT ENCOUNTER: ICD-10-CM

## 2021-12-22 DIAGNOSIS — Z78.9 SELF-CARE DEFICIT: ICD-10-CM

## 2021-12-22 DIAGNOSIS — L90.5 SCAR TISSUE: ICD-10-CM

## 2021-12-22 DIAGNOSIS — M25.60 DECREASED RANGE OF MOTION: ICD-10-CM

## 2021-12-22 PROCEDURE — 97530 THERAPEUTIC ACTIVITIES: CPT | Performed by: OCCUPATIONAL THERAPIST

## 2021-12-29 ENCOUNTER — PATIENT MESSAGE (OUTPATIENT)
Dept: GASTROENTEROLOGY | Facility: CLINIC | Age: 31
End: 2021-12-29

## 2021-12-29 ENCOUNTER — OFFICE VISIT (OUTPATIENT)
Dept: GASTROENTEROLOGY | Facility: CLINIC | Age: 31
End: 2021-12-29
Payer: MEDICAID

## 2021-12-29 ENCOUNTER — TELEPHONE (OUTPATIENT)
Dept: GASTROENTEROLOGY | Facility: CLINIC | Age: 31
End: 2021-12-29

## 2021-12-29 ENCOUNTER — HOSPITAL ENCOUNTER (OUTPATIENT)
Dept: RADIOLOGY | Facility: HOSPITAL | Age: 31
Discharge: HOME | End: 2021-12-29
Attending: NURSE PRACTITIONER
Payer: MEDICAID

## 2021-12-29 ENCOUNTER — PATIENT MESSAGE (OUTPATIENT)
Dept: GASTROENTEROLOGY | Facility: CLINIC | Age: 31
End: 2021-12-29
Payer: MEDICAID

## 2021-12-29 VITALS
HEIGHT: 64 IN | DIASTOLIC BLOOD PRESSURE: 84 MMHG | WEIGHT: 280 LBS | BODY MASS INDEX: 47.8 KG/M2 | SYSTOLIC BLOOD PRESSURE: 120 MMHG | RESPIRATION RATE: 20 BRPM | HEART RATE: 80 BPM

## 2021-12-29 DIAGNOSIS — K59.04 CHRONIC IDIOPATHIC CONSTIPATION: Primary | ICD-10-CM

## 2021-12-29 DIAGNOSIS — R11.0 NAUSEA: ICD-10-CM

## 2021-12-29 DIAGNOSIS — K59.04 CHRONIC IDIOPATHIC CONSTIPATION: ICD-10-CM

## 2021-12-29 DIAGNOSIS — R10.13 EPIGASTRIC PAIN: ICD-10-CM

## 2021-12-29 PROCEDURE — 74019 XR ABDOMEN FLAT AND ERECT: ICD-10-PCS | Mod: 26,,, | Performed by: RADIOLOGY

## 2021-12-29 PROCEDURE — 4010F PR ACE/ARB THEARPY RXD/TAKEN: ICD-10-PCS | Mod: CPTII,,, | Performed by: NURSE PRACTITIONER

## 2021-12-29 PROCEDURE — 1160F PR REVIEW ALL MEDS BY PRESCRIBER/CLIN PHARMACIST DOCUMENTED: ICD-10-PCS | Mod: CPTII,,, | Performed by: NURSE PRACTITIONER

## 2021-12-29 PROCEDURE — 99214 PR OFFICE/OUTPT VISIT, EST, LEVL IV, 30-39 MIN: ICD-10-PCS | Mod: S$PBB,,, | Performed by: NURSE PRACTITIONER

## 2021-12-29 PROCEDURE — 3074F SYST BP LT 130 MM HG: CPT | Mod: CPTII,,, | Performed by: NURSE PRACTITIONER

## 2021-12-29 PROCEDURE — 3061F PR NEG MICROALBUMINURIA RESULT DOCUMENTED/REVIEW: ICD-10-PCS | Mod: CPTII,,, | Performed by: NURSE PRACTITIONER

## 2021-12-29 PROCEDURE — 3079F DIAST BP 80-89 MM HG: CPT | Mod: CPTII,,, | Performed by: NURSE PRACTITIONER

## 2021-12-29 PROCEDURE — 99214 OFFICE O/P EST MOD 30 MIN: CPT | Mod: S$PBB,,, | Performed by: NURSE PRACTITIONER

## 2021-12-29 PROCEDURE — 3066F PR DOCUMENTATION OF TREATMENT FOR NEPHROPATHY: ICD-10-PCS | Mod: CPTII,,, | Performed by: NURSE PRACTITIONER

## 2021-12-29 PROCEDURE — 3079F PR MOST RECENT DIASTOLIC BLOOD PRESSURE 80-89 MM HG: ICD-10-PCS | Mod: CPTII,,, | Performed by: NURSE PRACTITIONER

## 2021-12-29 PROCEDURE — 99999 PR PBB SHADOW E&M-EST. PATIENT-LVL V: CPT | Mod: PBBFAC,,, | Performed by: NURSE PRACTITIONER

## 2021-12-29 PROCEDURE — 3052F PR MOST RECENT HEMOGLOBIN A1C LEVEL 8.0 - < 9.0%: ICD-10-PCS | Mod: CPTII,,, | Performed by: NURSE PRACTITIONER

## 2021-12-29 PROCEDURE — 3074F PR MOST RECENT SYSTOLIC BLOOD PRESSURE < 130 MM HG: ICD-10-PCS | Mod: CPTII,,, | Performed by: NURSE PRACTITIONER

## 2021-12-29 PROCEDURE — 3008F BODY MASS INDEX DOCD: CPT | Mod: CPTII,,, | Performed by: NURSE PRACTITIONER

## 2021-12-29 PROCEDURE — 4010F ACE/ARB THERAPY RXD/TAKEN: CPT | Mod: CPTII,,, | Performed by: NURSE PRACTITIONER

## 2021-12-29 PROCEDURE — 3008F PR BODY MASS INDEX (BMI) DOCUMENTED: ICD-10-PCS | Mod: CPTII,,, | Performed by: NURSE PRACTITIONER

## 2021-12-29 PROCEDURE — 74019 RADEX ABDOMEN 2 VIEWS: CPT | Mod: 26,,, | Performed by: RADIOLOGY

## 2021-12-29 PROCEDURE — 3066F NEPHROPATHY DOC TX: CPT | Mod: CPTII,,, | Performed by: NURSE PRACTITIONER

## 2021-12-29 PROCEDURE — 99999 PR PBB SHADOW E&M-EST. PATIENT-LVL V: ICD-10-PCS | Mod: PBBFAC,,, | Performed by: NURSE PRACTITIONER

## 2021-12-29 PROCEDURE — 1159F MED LIST DOCD IN RCRD: CPT | Mod: CPTII,,, | Performed by: NURSE PRACTITIONER

## 2021-12-29 PROCEDURE — 3061F NEG MICROALBUMINURIA REV: CPT | Mod: CPTII,,, | Performed by: NURSE PRACTITIONER

## 2021-12-29 PROCEDURE — 1159F PR MEDICATION LIST DOCUMENTED IN MEDICAL RECORD: ICD-10-PCS | Mod: CPTII,,, | Performed by: NURSE PRACTITIONER

## 2021-12-29 PROCEDURE — 99215 OFFICE O/P EST HI 40 MIN: CPT | Mod: PBBFAC | Performed by: NURSE PRACTITIONER

## 2021-12-29 PROCEDURE — 1160F RVW MEDS BY RX/DR IN RCRD: CPT | Mod: CPTII,,, | Performed by: NURSE PRACTITIONER

## 2021-12-29 PROCEDURE — 3052F HG A1C>EQUAL 8.0%<EQUAL 9.0%: CPT | Mod: CPTII,,, | Performed by: NURSE PRACTITIONER

## 2021-12-29 PROCEDURE — 74019 RADEX ABDOMEN 2 VIEWS: CPT | Mod: TC

## 2021-12-29 RX ORDER — PANTOPRAZOLE SODIUM 40 MG/1
40 TABLET, DELAYED RELEASE ORAL DAILY
Qty: 30 TABLET | Refills: 11 | Status: SHIPPED | OUTPATIENT
Start: 2021-12-29 | End: 2022-02-16

## 2021-12-29 RX ORDER — ONDANSETRON 4 MG/1
4 TABLET, ORALLY DISINTEGRATING ORAL EVERY 6 HOURS PRN
Qty: 20 TABLET | Refills: 0 | Status: SHIPPED | OUTPATIENT
Start: 2021-12-29 | End: 2022-03-22

## 2022-01-03 ENCOUNTER — CLINICAL SUPPORT (OUTPATIENT)
Dept: REHABILITATION | Facility: HOSPITAL | Age: 32
End: 2022-01-03
Payer: MEDICAID

## 2022-01-03 DIAGNOSIS — R53.1 DECREASED STRENGTH: ICD-10-CM

## 2022-01-03 DIAGNOSIS — M25.60 DECREASED RANGE OF MOTION: ICD-10-CM

## 2022-01-03 DIAGNOSIS — L90.5 SCAR TISSUE: ICD-10-CM

## 2022-01-03 DIAGNOSIS — Z78.9 SELF-CARE DEFICIT: ICD-10-CM

## 2022-01-03 DIAGNOSIS — T78.40XD HYPERSENSITIVITY, SUBSEQUENT ENCOUNTER: ICD-10-CM

## 2022-01-03 PROCEDURE — 97530 THERAPEUTIC ACTIVITIES: CPT | Performed by: OCCUPATIONAL THERAPIST

## 2022-01-03 NOTE — PROGRESS NOTES
OCCUPATIONAL THERAPY DAILY NOTE    Name: Nathan Carlos  Clinic Number: [id number]    Therapy Diagnosis:   Encounter Diagnoses   Name Primary?    Hypersensitivity, subsequent encounter     Scar tissue     Decreased range of motion     Decreased strength     Self-care deficit      Physician: Sarwat Billy PA-C    Visit Date: 1/3/2022  Physician Orders: Evaluation and treatment   Medical Diagnosis:   G56.01 (ICD-10-CM) - Carpal tunnel syndrome of right wrist   G56.02 (ICD-10-CM) - Carpal tunnel syndrome of left wrist         Surgical Procedure and Date: bilateral hands carpal tunnel release right hand:/11/4/ 2021    Left hand March 2021  Evaluation Date: 12/3/2021  Insurance Authorization Period Expiration: 12/10/2021 - 3/10/2022  Plan of Care Certification Period: 12/3/2021 to 2/3/2022.  Date of Return to MD: January 1,2021  FOTO: 1/3  Progress note due: 1/3/2021     Visit # / Visits authorized: 4 / 12  One for evaluation  Time In:0830  Time Out: 0915  Total  Time: 45 minutes     Precautions:  Standard/diabetis      SUBJECTIVE     Today, pt reports: that she has no pain only tenderness over the incision.    He/She was compliant with home exercise program.  Response to previous treatment: increased wrist range of motion and decreased pain  Functional change: self care    Pre-Treatment Pain: 0/10  Post-Treatment Pain: 0/10  Location: incision with deep pressure      CMS Impairment/Limitation/Restriction for FOTO wrist Survey    Therapist reviewed FOTO scores for Nathan Carlos on 1/3/2022.   FOTO documents entered into [employer] - see Media section.    Limitation Score: 50%        TREATMENT   Nathan received the following supervised modalities after being cleared for contradictions for 5 minutes: during review of exercises and evaluation.  -right wrist to increase tissue extensibility and decrease pain.     Nathan received therapeutic exercises for 10 minutes including: issued yellow  theraputty  -tendon glides 10x  -hook fist stretch  6/10 second holds  -web space stretch 4/15 second holds    WRIST PROM EXERCISES: 4/15 second holds     --Wrist flexion/extension  --Wrist ulnar/radial deviation     ISOTONIC EXERCISES: 3 sets 10 repetitions 2#     --wrist flexion/extension  --wrist ulnar/radial deviation  -FOREARM SUPINATION/PRONATION    Nathan received the following manual therapy techniques: Soft tissue Mobilization and scar incisional massage were applied to the: wrist for 5 minutes, including:  STM of right forearm flexors scar incisional massage  Desensitization of the incision  Cupping over incision  Issued gel strip with coban wrap and review of precautions for night usage to soften scar tissue. Not today       Nathan participated in dynamic functional therapeutic activities to improve functional performance for 25  minutes, including:    Exercise 1/3/2022   Chip clip for 1st web space tightness 1-2 minutes  Not today    Blue foam squeezes for lumbricals 20x   Hand master yellow 20x   Rubber band (2) for digit adduction/abduction 20x   clothespin yellow for key and 1 and 2 point pinch  red 20x each       theraputty exercises for  2 minutes   Weight dowel exercises 2# 3 minutes    digi flexion red 30x   Power web - green 30x       Home Exercises Provided and Patient Education Provided     Education/Self-Care provided: (0 minutes) not today except for scar massage   Patient educated on biomechanical justification for therapeutic exercise and importance of compliance with HEP in order to improve overall impairments and QOL    Patient educated on postural awareness during hand exercises   Patient educated on proper ergonomics while mowing the yard.    Written Home Exercises Provided: Patient instructed to cont prior HEP.  Exercises were reviewed and Nathan was able to demonstrate them prior to the end of the session.  Nathan demonstrated good  understanding of the education provided.      See EMR under Patient Instructions for exercises provided 12/3/2021.    ASSESSMENT   Pt tolerated manual therapy well with reports of decreased pain and tension in musculature following intervention. Pt tolerated exercise well with reports of increased fatigue but no increased pain. Pt demonstrated good understanding of exercises and required minimal cueing to maintain proper form.  Patient has mild intrinsic muscle tightness,flexor muscle tightness and tightness in the 1st web space. Her incision is  with deep pressure. She has. Increased FOTO scores.    Nathan Is progressing well towards her goals.   Pt prognosis is Good.     Pt will continue to benefit from skilled outpatient occupational therapy to address the deficits listed in the problem list box on initial evaluation, provide pt/family education and to maximize pt's level of independence in the home and community environment.     Pt's spiritual, cultural and educational needs considered and pt agreeable to plan of care and goals.     Anticipated barriers to occupational therapy: compliance with home exercise program.    GOALS:     Short Term Goals:  4 weeks                                                                  Update:  12/22/2021   1. Pain: Pt will demonstrate improved pain by reports of less than or equal to 4/10 worst pain on the verbal rating scale in order to progress toward maximal functional ability and improve QOL.  Met 12/22/2021   2. Mobility: Patient will improve AROM to 50% of stated goals, listed in objective measures above, in order to progress towards independence with functional activities.  Met 12/22/2021   3. Strength: Patient will improve strength to 50% of stated goals, listed in objective measures above, in order to progress towards independence with functional activities.  progressing    4. Self Care: Patient will demonstrate improved self care skills listed in objective measure above,in order to progress  towards independence with functional activities.  Met 12/22/2021   5. HEP: Patient will demonstrate independence with HEP in order to progress toward functional independence.        Long Term Goals:  8 weeks                                                                              Updated  12/22/2021   1. Pain: Pt will demonstrate improved pain by reports of less than or equal to 1/10 worst pain on the verbal rating scale in order to progress toward maximal functional ability and improve QOL.   Progressing   2. Function: Patient will demonstrate improved function as indicated by a functional limitation score of less than or equal to 33 out of 100 on FOTO.     3. Mobility: Patient will improve AROM to stated goals, listed in objective measures above, in order to return to maximal functional potential and improve quality of life. progressing    4. Strength: Patient will improve strength to stated goals, listed in objective measures above, in order to improve functional independence and quality of life.     5. Self Care: Patient will demonstrate increased self care skills to an independent level or modified independent level with adaptive equipment as needed. Progressing   6. Patient will return to normal ADL's, IADL's, community involvement, recreational activities, and work-related activities with less than or equal to 0/10 pain and maximal function.           PLAN   Continue Plan of Care (POC) and progress per patient tolerance.    GAURAV Quispe, ELIZABETH

## 2022-01-07 ENCOUNTER — PATIENT MESSAGE (OUTPATIENT)
Dept: GASTROENTEROLOGY | Facility: CLINIC | Age: 32
End: 2022-01-07
Payer: MEDICAID

## 2022-01-07 ENCOUNTER — TELEPHONE (OUTPATIENT)
Dept: PAIN MEDICINE | Facility: CLINIC | Age: 32
End: 2022-01-07
Payer: MEDICAID

## 2022-01-07 RX ORDER — DICYCLOMINE HYDROCHLORIDE 20 MG/1
20 TABLET ORAL 3 TIMES DAILY PRN
Qty: 90 TABLET | Refills: 0 | Status: SHIPPED | OUTPATIENT
Start: 2022-01-07 | End: 2022-01-10

## 2022-01-09 ENCOUNTER — PATIENT OUTREACH (OUTPATIENT)
Dept: ADMINISTRATIVE | Facility: OTHER | Age: 32
End: 2022-01-09
Payer: MEDICAID

## 2022-01-10 ENCOUNTER — PATIENT MESSAGE (OUTPATIENT)
Dept: GASTROENTEROLOGY | Facility: CLINIC | Age: 32
End: 2022-01-10
Payer: MEDICAID

## 2022-01-10 ENCOUNTER — PATIENT MESSAGE (OUTPATIENT)
Dept: FAMILY MEDICINE | Facility: CLINIC | Age: 32
End: 2022-01-10
Payer: MEDICAID

## 2022-01-10 ENCOUNTER — CLINICAL SUPPORT (OUTPATIENT)
Dept: REHABILITATION | Facility: HOSPITAL | Age: 32
End: 2022-01-10
Payer: MEDICAID

## 2022-01-10 ENCOUNTER — HOSPITAL ENCOUNTER (OUTPATIENT)
Dept: RADIOLOGY | Facility: HOSPITAL | Age: 32
Discharge: HOME | End: 2022-01-10
Attending: NURSE PRACTITIONER
Payer: MEDICAID

## 2022-01-10 ENCOUNTER — OFFICE VISIT (OUTPATIENT)
Dept: PAIN MEDICINE | Facility: CLINIC | Age: 32
End: 2022-01-10
Payer: MEDICAID

## 2022-01-10 VITALS
HEART RATE: 77 BPM | BODY MASS INDEX: 48.03 KG/M2 | SYSTOLIC BLOOD PRESSURE: 143 MMHG | DIASTOLIC BLOOD PRESSURE: 102 MMHG | WEIGHT: 281.31 LBS | HEIGHT: 64 IN

## 2022-01-10 DIAGNOSIS — Z78.9 SELF-CARE DEFICIT: ICD-10-CM

## 2022-01-10 DIAGNOSIS — M53.3 SACROILIAC JOINT DYSFUNCTION: ICD-10-CM

## 2022-01-10 DIAGNOSIS — R53.1 DECREASED STRENGTH: ICD-10-CM

## 2022-01-10 DIAGNOSIS — M25.60 DECREASED RANGE OF MOTION: ICD-10-CM

## 2022-01-10 DIAGNOSIS — R11.0 NAUSEA: ICD-10-CM

## 2022-01-10 DIAGNOSIS — L90.5 SCAR TISSUE: ICD-10-CM

## 2022-01-10 DIAGNOSIS — R10.13 EPIGASTRIC PAIN: ICD-10-CM

## 2022-01-10 DIAGNOSIS — M54.16 LUMBAR RADICULOPATHY: Primary | ICD-10-CM

## 2022-01-10 DIAGNOSIS — T78.40XD HYPERSENSITIVITY, SUBSEQUENT ENCOUNTER: ICD-10-CM

## 2022-01-10 DIAGNOSIS — M47.816 LUMBAR FACET ARTHROPATHY: ICD-10-CM

## 2022-01-10 PROCEDURE — 99214 OFFICE O/P EST MOD 30 MIN: CPT | Mod: S$PBB,,, | Performed by: ANESTHESIOLOGY

## 2022-01-10 PROCEDURE — 99999 PR PBB SHADOW E&M-EST. PATIENT-LVL II: CPT | Mod: PBBFAC,,, | Performed by: ANESTHESIOLOGY

## 2022-01-10 PROCEDURE — 3080F DIAST BP >= 90 MM HG: CPT | Mod: CPTII,,, | Performed by: ANESTHESIOLOGY

## 2022-01-10 PROCEDURE — 3077F SYST BP >= 140 MM HG: CPT | Mod: CPTII,,, | Performed by: ANESTHESIOLOGY

## 2022-01-10 PROCEDURE — 3080F PR MOST RECENT DIASTOLIC BLOOD PRESSURE >= 90 MM HG: ICD-10-PCS | Mod: CPTII,,, | Performed by: ANESTHESIOLOGY

## 2022-01-10 PROCEDURE — 3077F PR MOST RECENT SYSTOLIC BLOOD PRESSURE >= 140 MM HG: ICD-10-PCS | Mod: CPTII,,, | Performed by: ANESTHESIOLOGY

## 2022-01-10 PROCEDURE — 76705 US ABDOMEN LIMITED: ICD-10-PCS | Mod: 26,,, | Performed by: RADIOLOGY

## 2022-01-10 PROCEDURE — 97530 THERAPEUTIC ACTIVITIES: CPT | Performed by: OCCUPATIONAL THERAPIST

## 2022-01-10 PROCEDURE — 3008F PR BODY MASS INDEX (BMI) DOCUMENTED: ICD-10-PCS | Mod: CPTII,,, | Performed by: ANESTHESIOLOGY

## 2022-01-10 PROCEDURE — 76705 ECHO EXAM OF ABDOMEN: CPT | Mod: 26,,, | Performed by: RADIOLOGY

## 2022-01-10 PROCEDURE — 3008F BODY MASS INDEX DOCD: CPT | Mod: CPTII,,, | Performed by: ANESTHESIOLOGY

## 2022-01-10 PROCEDURE — 99212 OFFICE O/P EST SF 10 MIN: CPT | Mod: PBBFAC,25 | Performed by: ANESTHESIOLOGY

## 2022-01-10 PROCEDURE — 76705 ECHO EXAM OF ABDOMEN: CPT | Mod: TC

## 2022-01-10 PROCEDURE — 99999 PR PBB SHADOW E&M-EST. PATIENT-LVL II: ICD-10-PCS | Mod: PBBFAC,,, | Performed by: ANESTHESIOLOGY

## 2022-01-10 PROCEDURE — 99214 PR OFFICE/OUTPT VISIT, EST, LEVL IV, 30-39 MIN: ICD-10-PCS | Mod: S$PBB,,, | Performed by: ANESTHESIOLOGY

## 2022-01-10 RX ORDER — TIZANIDINE 4 MG/1
4 TABLET ORAL EVERY 8 HOURS
Qty: 90 TABLET | Refills: 1 | Status: SHIPPED | OUTPATIENT
Start: 2022-01-10 | End: 2022-03-18 | Stop reason: SDUPTHER

## 2022-01-10 RX ORDER — DICYCLOMINE HYDROCHLORIDE 20 MG/1
20 TABLET ORAL 3 TIMES DAILY PRN
Qty: 90 TABLET | Refills: 0 | Status: SHIPPED | OUTPATIENT
Start: 2022-01-10 | End: 2022-04-18

## 2022-01-10 NOTE — PROGRESS NOTES
Chief Pain Complaint:  Lumbar Back Pain      History of Present Illness:     Nathan Carlos is a 31 y.o. female  who is presenting with a chief complaint of Lumbar Back Pain. The patient began experiencing this problem insidiously, and the pain has been gradually worsening over the past 3 month(s). The pain is described as throbbing, cramping, burning and electrical and is located in the left lumbar spine. Pain is intermittent and lasts hours. The pain radiates to  left leg. The patient rates her pain a 8 out of ten and interferes with activities of daily living a 8 out of ten. Pain is exacerbated by flexion of the lumbar spine, and is improved by rest. Patient reports no prior trauma, no prior spinal surgery     Nathan Carlos is a 31 y.o. female  who is presenting with a chief complaint of Low-back Pain. The patient began experiencing this problem insidiously, and the pain has been gradually worsening. The pain is described as throbbing, cramping, aching and heavy and is located in the bilateral lumbar spine. Pain is intermittent and lasts hours. The  pain is nonradiating. The patient rates her pain a 8 out of ten and interferes with activities of daily living a 7 out of ten. Pain is exacerbated by extension of the lumbar spine, and is improved by rest. Patient reports no prior trauma, no prior spinal surgery     - pertinent negatives: No fever, No chills, No weight loss, No bladder dysfunction, No bowel dysfunction, No saddle anesthesia  - pertinent positives: none    - medications, other therapies tried (physical therapy, injections):     >> NSAIDs, Tylenol, Norco, zanaflex and flexeril (no relief), topamax (no relief)    >> Has previously undergone Physical Therapy    >> Has previously undergone spinal injection/s   - Left SI joint injection 1/2018 with 100% relief for 6 months   - Right L3, L4, L5 MBB with local on 8/15/18 with 90% pain relief   - left SIJ + left GT bursa injection with local on  11/8/18 with good relief of bursitis but only 10% of SIJ pain   - left L3-5 MBB with local on 5/16/19 with 90% pain relief   - bilateral L3-5 MBB on 8/14/19 with limited relief   - right SIJ injection on 2/5/20 with 80% pain relief   - bilateral L3-5 MBB on 7/16/2020 with 100% relief of lumbar back pain   - right L3-5 RFA on 12/08/2020 with 100% pain relief   - left L3-5 RFA on 1/21/21 with at least 50% pain relief   - left SIJ + left GT bursa injection on 6/17/21 with at least 40% pain relief         Imaging / Labs / Studies (reviewed on 1/10/2022):    Results for orders placed during the hospital encounter of 11/22/17   MRI Lumbar Spine Without Contrast    Narrative Technique: Standard noncontrast multiplanar multisequence imaging of the lumbar spine was performed.  Findings: There is anatomic spinal alignment.  Disc interspaces are of normal height and signal intensity.  Vertebral marrow signal pattern and architecture are normal.  Distal cord is unremarkable with conus medullaris terminating at L1-L2.  Paravertebral soft tissues are symmetric and normal in appearance.  T12-L1: No disc protrusion, neuroforaminal narrowing, or central canal stenosis.  L1-L2: No disc protrusion, neuroforaminal narrowing, or central canal stenosis.  L2-L3: No disc protrusion, neuroforaminal narrowing, or central canal stenosis.  L3-L4: No disc protrusion, neuroforaminal narrowing, or central canal stenosis.  L4-L5: No disc protrusion, neuroforaminal narrowing, or central canal stenosis.  L5-S1: Mild bilateral facet hypertrophy. No disc protrusion, neuroforaminal narrowing, or central canal stenosis.    Impression  Negative MRI of the lumbar spine.     Results for orders placed during the hospital encounter of 01/04/18   X-Ray Lumbar Complete With Flex And Ext    Narrative Comparison: None  Technique: AP, lateral, lateral flexion, lateral extension, bilateral oblique, and lumbosacral coned down views were obtained of the lumbar  "spine  Findings: There is mild scoliosis of the lower thoracic and upper lumbar spine.  Vertebral body heights are well-maintained.  No spondylolisthesis demonstrated.  No change in spinal alignment with flexion or extension to suggest instability. Intervertebral disk spaces are well preserved.  No pars defects visualized.  Posterior elements appear grossly intact. No acute fractures or subluxations are demonstrated.  The remaining visualized osseous and soft tissue structures demonstrate no appreciable abnormality.    Impression As above.  No acute findings.         Review of Systems:  CONSTITUTIONAL: patient denies any fever, chills, or weight loss  SKIN: patient denies any rash or itching  RESPIRATORY: patient denies having any shortness of breath  GASTROINTESTINAL: patient denies having any diarrhea, constipation, or bowel incontinence  GENITOURINARY: patient denies having any abnormal bladder function    MUSCULOSKELETAL:  - patient complains of the above noted pain/s (see chief pain complaint)    NEUROLOGICAL:   - pain as above  - strength in Lower extremities is intact, BILATERALLY  - sensation in Lower extremities is intact, BILATERALLY  - patient denies any loss of bowel or bladder control      PSYCHIATRIC: patient denies any change in mood    Other:  All other systems reviewed and are negative        Physical Exam:  Telemedicine Exam  Vitals:    01/10/22 0849   BP: (!) 143/102   Pulse: 77   Weight: 127.6 kg (281 lb 4.9 oz)   Height: 5' 4" (1.626 m)   PainSc:   8   PainLoc: Leg    Body mass index is 48.29 kg/m².   (reviewed on 1/10/2022)    GENERAL: Well appearing, in no acute distress, alert and oriented x3.  obese  PSYCH:  Mood and affect appropriate.  SKIN: Skin color, texture, turgor normal, no rashes or lesions.  HEAD/FACE:  Normocephalic, atraumatic. Cranial nerves grossly intact.  PULM:  No difficulty breathing  Neuro/MSK:  BACK: Palpation over the lumbar paraspinous muscles causes some pain. Some " pain with flexion, extension, or lateral flexion - L>R.  Pain over buttock - TTP, pain over left SIJ - less so than over left GTB.   EXTREMITIES: Peripheral joint active ROM is full and pain free without obvious instability or laxity in all four extremities. No deformities, edema, or skin discoloration.   MUSCULOSKELETAL: No atrophy or tone abnormalities are noted.  NEURO:  Able to toe walk and heel walk without difficulty  GAIT: normal.        Physical Exam: last in clinic visit:  General: alert and oriented, in no apparent distress, obese.  Gait: normal gait.  Skin: no rashes, no discoloration, no obvious lesions  HEENT: normocephalic, atraumatic. Pupils equal and round.  Cardiovascular: no significant peripheral edema present.  Respiratory: without use of accessory muscles of respiration.    Musculoskeletal - Lumbar Spine:  - Pain on flexion of lumbar spine: Present   - Pain on extension of lumbar spine: Present  - Lumbar facet loading: Present on left, improved  On right   - TTP over the lumbar facet joints: Present on left at L5-S1   - TTP over the para lumbar muscles on left   - TTP over the SI joints: Absent   - TTP over GT bursa: absent  - TTP over piriformis: absent  - Straight Leg Raise: Negative  - JULISSA: Negative    Neuro - Extremities:  - BUE Strength:R/L: D: 5/5; B: 5/5; T: 5/5; WF: 5/5; WE: 5/5; IO: 5/5  - BLE Strength: R/L: HF: 5/5, HE: 5/5, KF: 5/5; KE: 5/4; FE: 5/5; FF: 5/5  - Extremity Reflexes: Brisk and symmetric throughout  - Sensory: Sensation to light touch intact bilaterally      Psych:  Mood and affect is appropriate          Assessment:  Nathan Carlos is a 31 y.o. year old female who is presenting with   Encounter Diagnoses   Name Primary?    Lumbar facet arthropathy     Lumbar radiculopathy Yes    Sacroiliac joint dysfunction        Plan:  1. Interventional: Consider Left L4-5, L5-S1 TFEIS.  - S/p left SIJ + left GT bursa injection on 6/17/21 with 40% pain relief.   - S/p left  L3-5 RFA on 1/21/21 with at least 50% pain relief.   - S/p right L3-5 RFA on 12/08/2020 with 100% pain relief.   - S/p bilateral L3-5 MBB on 7/16/2020 with 100% relief of lumbar back pain.  - S/p Right SIJ injection on 2/5/20 with 80% pain relief.     2. Pharmacologic:   - Continue Gabapentin 300 mg Po QHs with up titration.   - Continue diclofenac 1% gel to apply 2g topically up to 4 times per day.  - Continue lidocaine 2.5%-prilocaine 2.5% topical cream Q6H PRN topically; can alternate with Voltaren gel - for GTB pain.    - Continue Tizanidne 4 mg TID PRN.  She can take up to QID PRN while pain Increased.  - Anticoagulation use: None.     3. Rehabilitative: Encouraged regular exercise. Continue exercises and activities as tolerated at home. Consider restarting physical therapy after Covid since this helped in the past.     4. Diagnostic: Updated lumbar MRI reviewed. Will order Lower ext EMG.     5. Follow up: Post lower EMG.

## 2022-01-10 NOTE — PROGRESS NOTES
OCCUPATIONAL THERAPY PROGRESS NOTE    Name: Nathan Carlos  Clinic Number: [id number]    Therapy Diagnosis:   Encounter Diagnoses   Name Primary?    Hypersensitivity, subsequent encounter     Scar tissue     Decreased range of motion     Decreased strength     Self-care deficit      Physician: Sarwat Billy PA-C    Visit Date: 1/10/2022  Physician Orders: Evaluation and treatment   Medical Diagnosis:   G56.01 (ICD-10-CM) - Carpal tunnel syndrome of right wrist   G56.02 (ICD-10-CM) - Carpal tunnel syndrome of left wrist         Surgical Procedure and Date: bilateral hands carpal tunnel release right hand:/11/4/ 2021    Left hand March 2021  Evaluation Date: 12/3/2021  Insurance Authorization Period Expiration: 12/10/2021 - 3/10/2022  Plan of Care Certification Period: 12/3/2021 to 2/3/2022.  Date of Return to MD: January 1,2021  FOTO: 2/3  Progress note due: 2/10/2022     Visit # / Visits authorized: 5 / 12  One for evaluation  Time In:0750  Time Out: 0835  Total  Time: 45 minutes     Precautions:  Standard/diabetis      SUBJECTIVE     Today, pt reports: that she has no pain only tenderness with pressure over the incision.    He/She was compliant with home exercise program.  Response to previous treatment: increased wrist range of motion and  strength  Functional change: self care    Pre-Treatment Pain: 0/10  Post-Treatment Pain: 0/10  Location: incision with deep pressure      CMS Impairment/Limitation/Restriction for FOTO wrist Survey    Therapist reviewed FOTO scores for Nathan Carlos on 1/10/2022.   FOTO documents entered into [employer] - see Media section.    Limitation Score: 51%        TREATMENT     UPDATED: 1/10/2022    Wrist Ext/Flex: 50/50   To 65/65  Wrist RD/UD: 25/35     To 26/40                                          Supination/Pronation: within normal limits /within normal limits   Elbow extension/flexion: within normal limits /within normal limits      Manual Muscle  Test:  Muscle   Strength  Wrist flexion/extension/ulnar/radial deviation: 5/5  Forearm rotation: 5/5         Strength: (LYNNE Dynamometer in lbs.) Average 3 trials, Position II  Right: 15#  To 43#  Left: 25#           Nathan received the following supervised modalities after being cleared for contradictions for 5 minutes: during review of exercises and evaluation.  -right wrist to increase tissue extensibility and decrease pain.     Nathan received therapeutic exercises for 17 minutes including: issued yellow theraputty  -tendon glides 10x  -hook fist stretch  6/10 second holds    WRIST PROM EXERCISES: 4/15 second holds     --Wrist flexion/extension  --Wrist ulnar/radial deviation     ISOTONIC EXERCISES: 3 sets 10 repetitions 2#     --wrist flexion/extension  --wrist ulnar/radial deviation  -FOREARM SUPINATION/PRONATION    -BRYAN ABDUCTION rubber band - 20x    Nathan received the following manual therapy techniques: Soft tissue Mobilization and scar incisional massage were applied to the: wrist for 8 minutes, including:  STM of right forearm flexors scar incisional massage  Cupping over incision      Nathan participated in dynamic functional therapeutic activities to improve functional performance for 15  minutes, including:    Exercise 1/10/2022   Chip clip for 1st web space tightness 1-2 minutes  Not today    Blue foam squeezes for lumbricals 20x   Hand master yellow 20x   Rubber band (2) for digit adduction/abduction 20x   clothespin yellow for key and 1 and 2 point pinch  red 20x each not today        theraputty exercises for  2 minutes   Weight dowel exercises 2# 3 minutes  not today    digi flexion red 30x   Power web - green 30x       Home Exercises Provided and Patient Education Provided     Education/Self-Care provided: (0 minutes) not today except for scar massage   Patient educated on biomechanical justification for therapeutic exercise and importance of compliance with HEP in order to  improve overall impairments and QOL    Patient educated on postural awareness during hand exercises   Patient educated on proper ergonomics while mowing the yard.    Written Home Exercises Provided: Patient instructed to cont prior HEP.  Exercises were reviewed and Nathan was able to demonstrate them prior to the end of the session.  Nathan demonstrated good  understanding of the education provided.     See EMR under Patient Instructions for exercises provided 12/3/2021.    ASSESSMENT   Pt tolerated manual therapy well with reports of decreased pain and tension in musculature following intervention. Pt tolerated exercise well with reports of increased fatigue but no increased pain. Pt demonstrated good understanding of exercises and required minimal cueing to maintain proper form.  Patient demonstrates increased wrist range of motion and increased  strength. Patient continues to have tenderness over the incision with deep pressure.    Nathan Is progressing well towards her goals.   Pt prognosis is Good.     Pt will continue to benefit from skilled outpatient occupational therapy to address the deficits listed in the problem list box on initial evaluation, provide pt/family education and to maximize pt's level of independence in the home and community environment.     Pt's spiritual, cultural and educational needs considered and pt agreeable to plan of care and goals.     Anticipated barriers to occupational therapy: compliance with home exercise program.    GOALS:     Short Term Goals:  4 weeks                                                                  Updated:  1/10/2022   1. Pain: Pt will demonstrate improved pain by reports of less than or equal to 4/10 worst pain on the verbal rating scale in order to progress toward maximal functional ability and improve QOL.  Met 12/22/2021   2. Mobility: Patient will improve AROM to 50% of stated goals, listed in objective measures above, in order to progress  towards independence with functional activities.  Met 12/22/2021   3. Strength: Patient will improve strength to 50% of stated goals, listed in objective measures above, in order to progress towards independence with functional activities.  Met 1/10/2022   4. Self Care: Patient will demonstrate improved self care skills listed in objective measure above,in order to progress towards independence with functional activities.  Met 12/22/2021   5. HEP: Patient will demonstrate independence with HEP in order to progress toward functional independence.        Long Term Goals:  8 weeks                                                                              Updated  12/22/2021   1. Pain: Pt will demonstrate improved pain by reports of less than or equal to 1/10 worst pain on the verbal rating scale in order to progress toward maximal functional ability and improve QOL.   Progressing   2. Function: Patient will demonstrate improved function as indicated by a functional limitation score of less than or equal to 33 out of 100 on FOTO.     3. Mobility: Patient will improve AROM to stated goals, listed in objective measures above, in order to return to maximal functional potential and improve quality of life. progressing    4. Strength: Patient will improve strength to stated goals, listed in objective measures above, in order to improve functional independence and quality of life.     5. Self Care: Patient will demonstrate increased self care skills to an independent level or modified independent level with adaptive equipment as needed. Progressing   6. Patient will return to normal ADL's, IADL's, community involvement, recreational activities, and work-related activities with less than or equal to 0/10 pain and maximal function.           PLAN   Continue Plan of Care (POC) and progress per patient tolerance.    GAURAV Quispe, ELIZABETH

## 2022-01-11 ENCOUNTER — PATIENT MESSAGE (OUTPATIENT)
Dept: FAMILY MEDICINE | Facility: CLINIC | Age: 32
End: 2022-01-11
Payer: MEDICAID

## 2022-01-12 ENCOUNTER — PATIENT MESSAGE (OUTPATIENT)
Dept: FAMILY MEDICINE | Facility: CLINIC | Age: 32
End: 2022-01-12
Payer: MEDICAID

## 2022-01-13 DIAGNOSIS — E11.9 TYPE 2 DIABETES MELLITUS WITHOUT COMPLICATION, WITHOUT LONG-TERM CURRENT USE OF INSULIN: Primary | ICD-10-CM

## 2022-01-13 NOTE — TELEPHONE ENCOUNTER
Spoke with pt who understood A1c request. F/u scheduled. Pt said she sent a message to you ab her concerns with coming off of diabetes medication / getting a new one. Please advise

## 2022-01-16 ENCOUNTER — PATIENT MESSAGE (OUTPATIENT)
Dept: FAMILY MEDICINE | Facility: CLINIC | Age: 32
End: 2022-01-16
Payer: MEDICAID

## 2022-01-18 ENCOUNTER — TELEPHONE (OUTPATIENT)
Dept: ORTHOPEDICS | Facility: CLINIC | Age: 32
End: 2022-01-18
Payer: MEDICAID

## 2022-01-27 ENCOUNTER — OFFICE VISIT (OUTPATIENT)
Dept: PHYSICAL MEDICINE AND REHAB | Facility: CLINIC | Age: 32
End: 2022-01-27
Payer: MEDICAID

## 2022-01-27 VITALS
HEIGHT: 64 IN | DIASTOLIC BLOOD PRESSURE: 101 MMHG | RESPIRATION RATE: 14 BRPM | HEART RATE: 79 BPM | WEIGHT: 281 LBS | BODY MASS INDEX: 47.97 KG/M2 | SYSTOLIC BLOOD PRESSURE: 149 MMHG

## 2022-01-27 DIAGNOSIS — M54.16 LUMBAR RADICULOPATHY: ICD-10-CM

## 2022-01-27 PROCEDURE — 3077F PR MOST RECENT SYSTOLIC BLOOD PRESSURE >= 140 MM HG: ICD-10-PCS | Mod: CPTII,,, | Performed by: PHYSICAL MEDICINE & REHABILITATION

## 2022-01-27 PROCEDURE — 95908 PR NERVE CONDUCTION STUDY; 3-4 STUDIES: ICD-10-PCS | Mod: 26,S$PBB,, | Performed by: PHYSICAL MEDICINE & REHABILITATION

## 2022-01-27 PROCEDURE — 99999 PR PBB SHADOW E&M-EST. PATIENT-LVL III: ICD-10-PCS | Mod: PBBFAC,,, | Performed by: PHYSICAL MEDICINE & REHABILITATION

## 2022-01-27 PROCEDURE — 95886 MUSC TEST DONE W/N TEST COMP: CPT | Mod: PBBFAC | Performed by: PHYSICAL MEDICINE & REHABILITATION

## 2022-01-27 PROCEDURE — 3008F PR BODY MASS INDEX (BMI) DOCUMENTED: ICD-10-PCS | Mod: CPTII,,, | Performed by: PHYSICAL MEDICINE & REHABILITATION

## 2022-01-27 PROCEDURE — 95908 NRV CNDJ TST 3-4 STUDIES: CPT | Mod: PBBFAC | Performed by: PHYSICAL MEDICINE & REHABILITATION

## 2022-01-27 PROCEDURE — 1160F PR REVIEW ALL MEDS BY PRESCRIBER/CLIN PHARMACIST DOCUMENTED: ICD-10-PCS | Mod: CPTII,,, | Performed by: PHYSICAL MEDICINE & REHABILITATION

## 2022-01-27 PROCEDURE — 99214 OFFICE O/P EST MOD 30 MIN: CPT | Mod: 25,S$PBB,, | Performed by: PHYSICAL MEDICINE & REHABILITATION

## 2022-01-27 PROCEDURE — 3008F BODY MASS INDEX DOCD: CPT | Mod: CPTII,,, | Performed by: PHYSICAL MEDICINE & REHABILITATION

## 2022-01-27 PROCEDURE — 95886 MUSC TEST DONE W/N TEST COMP: CPT | Mod: 26,S$PBB,, | Performed by: PHYSICAL MEDICINE & REHABILITATION

## 2022-01-27 PROCEDURE — 95908 NRV CNDJ TST 3-4 STUDIES: CPT | Mod: 26,S$PBB,, | Performed by: PHYSICAL MEDICINE & REHABILITATION

## 2022-01-27 PROCEDURE — 3077F SYST BP >= 140 MM HG: CPT | Mod: CPTII,,, | Performed by: PHYSICAL MEDICINE & REHABILITATION

## 2022-01-27 PROCEDURE — 95886 PR EMG COMPLETE, W/ NERVE CONDUCTION STUDIES, 5+ MUSCLES: ICD-10-PCS | Mod: 26,S$PBB,, | Performed by: PHYSICAL MEDICINE & REHABILITATION

## 2022-01-27 PROCEDURE — 3080F DIAST BP >= 90 MM HG: CPT | Mod: CPTII,,, | Performed by: PHYSICAL MEDICINE & REHABILITATION

## 2022-01-27 PROCEDURE — 99999 PR PBB SHADOW E&M-EST. PATIENT-LVL III: CPT | Mod: PBBFAC,,, | Performed by: PHYSICAL MEDICINE & REHABILITATION

## 2022-01-27 PROCEDURE — 1159F MED LIST DOCD IN RCRD: CPT | Mod: CPTII,,, | Performed by: PHYSICAL MEDICINE & REHABILITATION

## 2022-01-27 PROCEDURE — 1159F PR MEDICATION LIST DOCUMENTED IN MEDICAL RECORD: ICD-10-PCS | Mod: CPTII,,, | Performed by: PHYSICAL MEDICINE & REHABILITATION

## 2022-01-27 PROCEDURE — 1160F RVW MEDS BY RX/DR IN RCRD: CPT | Mod: CPTII,,, | Performed by: PHYSICAL MEDICINE & REHABILITATION

## 2022-01-27 PROCEDURE — 99213 OFFICE O/P EST LOW 20 MIN: CPT | Mod: PBBFAC,25 | Performed by: PHYSICAL MEDICINE & REHABILITATION

## 2022-01-27 PROCEDURE — 99214 PR OFFICE/OUTPT VISIT, EST, LEVL IV, 30-39 MIN: ICD-10-PCS | Mod: 25,S$PBB,, | Performed by: PHYSICAL MEDICINE & REHABILITATION

## 2022-01-27 PROCEDURE — 3080F PR MOST RECENT DIASTOLIC BLOOD PRESSURE >= 90 MM HG: ICD-10-PCS | Mod: CPTII,,, | Performed by: PHYSICAL MEDICINE & REHABILITATION

## 2022-01-27 NOTE — PATIENT INSTRUCTIONS
"Patient Education       Radiculopathy   The Basics   Written by the doctors and editors at [facility]   What is radiculopathy? -- "Radiculopathy" is the medical term for the pain, weakness, numbness, or tingling that happens when nerves coming from the spinal cord get pinched or damaged. Radiculopathy can affect different parts of the body, depending on which nerve or group of nerves is affected. People sometimes refer to radiculopathy as having a "pinched nerve."  Here are 2 common examples of radiculopathy:  · Cervical radiculopathy - People with this type of radiculopathy have pain, weakness, numbness, or tingling down one or both arms. The condition happens when one or more of the nerves that go from the spine to the arm get pinched or damaged.  · Lumbosacral radiculopathy - People with this type of radiculopathy have pain, weakness, numbness, or tingling in the buttocks or down the leg. The condition happens when one or more of the nerves that go from the spine to the foot and leg get pinched or damaged. People sometimes refer to the symptoms of this type of radiculopathy as "sciatica."  What causes radiculopathy? -- Radiculopathy is usually caused by a problem with the back. To understand radiculopathy, it's helpful to first learn a little about the back and spine.  The back is made up of (figure 1):  · Vertebrae - A stack of bones that sit on top of one another like a stack of coins. Each of these bones has a hole in the center. When stacked, the bones form a hollow tube that protects the spinal cord.  · Spinal cord and nerves - The spinal cord is the highway of nerves that connects the brain to the rest of the body. It runs through the hole in the center of the vertebrae. Nerves branch out from the spinal cord and pass in between the vertebrae. From there they connect to the arms, the legs, and the organs. (This is why problems in the back can cause leg pain or bladder problems.)  · Discs - Rubbery discs sit " "in between each of the vertebrae to add cushion and allow movement. The discs have a tough outer shell and jelly-like center.  · Muscles, tendons, and ligaments - Together the muscles, tendons, and ligaments are called the "soft tissues" of the back. These soft tissues support the back and help hold it together.  Radiculopathy can happen when changes in the back cause a nerve to get pinched or damaged. This can happen if:  · The vertebrae form bumps called bone spurs, which press on nearby nerves. (People with a condition called "spinal stenosis" often have this problem.)  · The discs between the vertebrae break open and bulge out, causing them to press on or irritate nearby nerves. (A disc that breaks open and bulges is called a "herniated disc.")   · Other medical conditions, such as diabetes, infection, inflammation, or a tumor injure the nerves near the spinal cord.  Should I see a doctor or nurse? -- If you have new pain, weakness, numbness, or tingling that seems to spread out to your arms or legs from your spine, see your doctor or nurse.  Will I need tests? -- Maybe. Doctors can tell a lot about a person's radiculopathy based on which parts of the body are affected and how. Because of that, you might not need any tests, especially if you have had symptoms only for a short time. Still, if your doctor is concerned about nerve damage, they might order one or more of these tests:  · Imaging tests - Imaging tests, such as X-rays, MRIs, or CT scans, create pictures of the inside of the body. These imaging tests can show problems with the back like those described above.  · Electromyography (also called "EMG" or nerve conduction study) - During this test, a technician or doctor checks how well electrical pulses travel across nerves to the part of your body that has symptoms. The test helps show whether the nerves controlling that body part are working right.  How is radiculopathy treated? -- Many people with " radiculopathy do not need formal treatment. In some cases, the radiculopathy goes away as the back and nerves heal. In other cases, people find ways to cope with their symptoms.  When people do get treatment, the treatments can include:  · Pain medicines that you can get without a prescription (If these do not work, stronger prescription pain medicines are available.)  · Medicines to relax the muscles (called muscle relaxants)  · Avoiding activities that make the pain worse  · Injections of medicines that numb the back or reduce swelling  · Physical therapy to learn special exercises and stretches  · Surgery to repair the problem causing symptoms   All topics are updated as new evidence becomes available and our peer review process is complete.  This topic retrieved from [employer] on: Sep 21, 2021.  Topic 69310 Version 7.0  Release: 29.4.2 - C29.263  © 2021 UpToDate, Inc. and/or its affiliates. All rights reserved.  figure 1: Anatomy of the back     Low back pain can be caused by problems with the muscles, ligaments, discs, bones (vertebrae), or nerves. Often, back pain is caused by strains or sprains involving the muscles or ligaments. These problems cannot always be seen on imaging tests, such as MRI or CT scans.  Graphic 63990 Version 5.0    Consumer Information Use and Disclaimer   This information is not specific medical advice and does not replace information you receive from your health care provider. This is only a brief summary of general information. It does NOT include all information about conditions, illnesses, injuries, tests, procedures, treatments, therapies, discharge instructions or life-style choices that may apply to you. You must talk with your health care provider for complete information about your health and treatment options. This information should not be used to decide whether or not to accept your health care provider's advice, instructions or recommendations. Only your health care  provider has the knowledge and training to provide advice that is right for you. The use of this information is governed by the [employer] End User License Agreement, available at https://www.[employer]/en/solutions/[employer]/about/jesús.The use of [employer] content is governed by the [employer] Terms of Use. ©2021 UpToDate, Inc. All rights reserved.  Copyright   © 2021 UpToDate, Inc. and/or its affiliates. All rights reserved.

## 2022-01-27 NOTE — PROGRESS NOTES
[facility]   [address] [place] [address]  Phone: [phone]        Full Name: Nathan murdock YOB: 1990  Patient ID: [id number]      Visit Date: 1/27/2022 10:23  Age: 31 Years 1 Months Old  Examining Physician: Niurka Elkins M.D.  Referring Physician:   Reason for Referral: le pain    Chief Complaint   Patient presents with    Back Pain     Into legs       HPI: This is a 31 y.o.  female being seen in clinic today for evaluation of chronic achy low back pain with radiation into her left leg to the knee. Her symptoms are worse with increased activity or at rest.  Position change provides some relief.       History obtained from patient    Past family, medical, social, and surgical history reviewed in chart    Review of Systems:     General- denies lethargy, weight change, fever, chills  Head/neck- denies swallowing difficulties  ENT- denies hearing changes  Cardiovascular-denies chest pain  Pulmonary- denies shortness of breath  GI- denies constipation or bowel incontinence  - denies bladder incontinence  Skin- denies wounds or rashes  Musculoskeletal- denies weakness, +pain  Neurologic- +numbness and tingling  Psychiatric- denies depressive or psychotic features, denies anxiety  Lymphatic-denies swelling  Endocrine- denies hypoglycemic symptoms/DM history  All other pertinent systems negative     Physical Examination:  General: Well developed, well nourished female, NAD, obese habitus  HEENT:NCAT EOMI bilaterally   Pulmonary:Normal respirations    Spinal Examination: LUMBAR  Active ROM is within normal limits.  Inspection: No deformity of spinal alignment.    Bilateral Upper and Lower Extremities:  Pulses are 2+ at radial bilaterally.  Shoulder/Elbow/Wrist/Hand ROM wnl  Hip/Knee/Ankle ROM wnl except mildly limited in ankle ROM due to past surgical intervention at med ankle  Bilateral Extremities show normal capillary refill.  No signs of cyanosis, rubor,  edema, skin changes, or dysvascular changes of appendages.  Nails appear intact.    Neurological Exam:  Cranial Nerves:  II-XII grossly intact    Manual Muscle Testing: (Motor 5=normal)  5/5 strength bilateral lower extremities    No focal atrophy is noted of either lower extremity.    Bilateral Reflexes:hypo at bic tri br  Clonus neg bilaterally at ankles   Sensation: tested to light touch  - intact in legs except dec on left in L5 and S1 dist  Gait: Narrow base and good arm swing.      Entire procedure explained to patient prior to proceeding.  Verbal consent obtained         SNC      Nerve / Sites Rec. Site Onset Lat Peak Lat Amp Segments Distance Velocity     ms ms µV  mm m/s   L Sural - Ankle (Calf)      Calf Ankle 3.0 3.6 11.1 Calf - Ankle 140 46   L Superficial peroneal - Ankle      1  2.2 2.7 10.9 1 - G1 140 64       MNC      Nerve / Sites Muscle Latency Amplitude Duration Rel Amp Segments Distance Lat Diff Velocity     ms mV ms %  mm ms m/s   L Peroneal - EDB      Ankle EDB 3.4 6.9 5.2 100 Ankle - EDB 80        Fib head EDB 9.2 6.6 5.5 96.2 Fib head - Ankle 290 5.7 51      Pop fossa EDB 10.4 6.7 5.3 101 Pop fossa - Fib head 70 1.2 56         Pop fossa - Ankle  7.0    L Tibial - AH      Ankle AH 4.8 11.4 3.9 100 Ankle - AH 80        Pop fossa AH 12.9 9.8 4.4 86.3 Pop fossa - Ankle 370 8.1 46       EMG         EMG Summary Table     Spontaneous MUAP Recruitment   Muscle IA Fib PSW Fasc Other Dur. Dur Amp Dur Polys Pattern Effort   L. Rectus femoris N None None None .   N N N N Max   L. Biceps femoris (short head) Incr None None None .   N N N sl red Max   L. Peroneus longus Incr None None None .   N N N sl red Max   L. Tibialis anterior N None None None .   N N N N Max   L. Gastrocnemius (Medial head) Incr None 2+ None .   N N N sl red Max   L. Extensor digitorum brevis Incr None None None .   N N N sl red Max                           INTERPRETATION  -Left superficial peroneal sensory nerve conduction study  showed normal peak latency and amplitude  -Left sural sensory nerve  conduction study showed normal peak latency and amplitude  -Left peroneal motor nerve conduction study showed normal latency, amplitude, and conduction velocity  -Left tibial motor nerve conduction study showed normal latency, amplitude, and conduction velocity  -Needle EMG examination performed to above mentioned muscles     IMPRESSION  1. ABNORMAL study  2. There is electrodiagnostic evidence of an acute on chronic radiculopathy of the LEFT S1 nerve root and a subacute on chronic radiculopathy of the LEFT L5 nerve root    PLAN  1. Follow up with referring provider: Dr. Yo Kirkland   2. Handouts on lumbar radic, back care provided  3. This study is good for one year. If symptoms worsen or do not improve, please re-consult.    Niurka Elkins M.D.  Physical Medicine and Rehab

## 2022-01-31 ENCOUNTER — OFFICE VISIT (OUTPATIENT)
Dept: PAIN MEDICINE | Facility: CLINIC | Age: 32
End: 2022-01-31
Payer: MEDICAID

## 2022-01-31 DIAGNOSIS — M54.16 LUMBAR RADICULOPATHY: Primary | ICD-10-CM

## 2022-01-31 DIAGNOSIS — M47.816 LUMBAR SPONDYLOSIS: ICD-10-CM

## 2022-01-31 PROCEDURE — 99214 OFFICE O/P EST MOD 30 MIN: CPT | Mod: 95,,, | Performed by: ANESTHESIOLOGY

## 2022-01-31 PROCEDURE — 1160F RVW MEDS BY RX/DR IN RCRD: CPT | Mod: CPTII,95,, | Performed by: ANESTHESIOLOGY

## 2022-01-31 PROCEDURE — 1160F PR REVIEW ALL MEDS BY PRESCRIBER/CLIN PHARMACIST DOCUMENTED: ICD-10-PCS | Mod: CPTII,95,, | Performed by: ANESTHESIOLOGY

## 2022-01-31 PROCEDURE — 1159F PR MEDICATION LIST DOCUMENTED IN MEDICAL RECORD: ICD-10-PCS | Mod: CPTII,95,, | Performed by: ANESTHESIOLOGY

## 2022-01-31 PROCEDURE — 99214 PR OFFICE/OUTPT VISIT, EST, LEVL IV, 30-39 MIN: ICD-10-PCS | Mod: 95,,, | Performed by: ANESTHESIOLOGY

## 2022-01-31 PROCEDURE — 1159F MED LIST DOCD IN RCRD: CPT | Mod: CPTII,95,, | Performed by: ANESTHESIOLOGY

## 2022-01-31 NOTE — PROGRESS NOTES
The patient location is: Home  The chief complaint leading to consultation is: Lumbar Back Pain     Visit type: audiovisual    Face to Face time with patient: 10  20 minutes of total time spent on the encounter, which includes face to face time and non-face to face time preparing to see the patient (eg, review of tests), Obtaining and/or reviewing separately obtained history, Documenting clinical information in the electronic or other health record, Independently interpreting results (not separately reported) and communicating results to the patient/family/caregiver, or Care coordination (not separately reported).         Each patient to whom he or she provides medical services by telemedicine is:  (1) informed of the relationship between the physician and patient and the respective role of any other health care provider with respect to management of the patient; and (2) notified that he or she may decline to receive medical services by telemedicine and may withdraw from such care at any time.    Notes:       Chief Pain Complaint:  Lumbar Back Pain      History of Present Illness:     Nathan Carlos is a 31 y.o. female  who is presenting with a chief complaint of Lumbar Back Pain. The patient began experiencing this problem insidiously, and the pain has been gradually worsening over the past 3 month(s). The pain is described as throbbing, cramping, burning and electrical and is located in the left lumbar spine. Pain is intermittent and lasts hours. The pain radiates to  left leg. The patient rates her pain a 8 out of ten and interferes with activities of daily living a 8 out of ten. Pain is exacerbated by flexion of the lumbar spine, and is improved by rest. Patient reports no prior trauma, no prior spinal surgery     Nathan Carlos is a 31 y.o. female  who is presenting with a chief complaint of Low-back Pain. The patient began experiencing this problem insidiously, and the pain has been gradually  worsening. The pain is described as throbbing, cramping, aching and heavy and is located in the bilateral lumbar spine. Pain is intermittent and lasts hours. The  pain is nonradiating. The patient rates her pain a 8 out of ten and interferes with activities of daily living a 7 out of ten. Pain is exacerbated by extension of the lumbar spine, and is improved by rest. Patient reports no prior trauma, no prior spinal surgery     - pertinent negatives: No fever, No chills, No weight loss, No bladder dysfunction, No bowel dysfunction, No saddle anesthesia  - pertinent positives: none    - medications, other therapies tried (physical therapy, injections):     >> NSAIDs, Tylenol, Norco, zanaflex and flexeril (no relief), topamax (no relief)    >> Has previously undergone Physical Therapy    >> Has previously undergone spinal injection/s   - Left SI joint injection 1/2018 with 100% relief for 6 months   - Right L3, L4, L5 MBB with local on 8/15/18 with 90% pain relief   - left SIJ + left GT bursa injection with local on 11/8/18 with good relief of bursitis but only 10% of SIJ pain   - left L3-5 MBB with local on 5/16/19 with 90% pain relief   - bilateral L3-5 MBB on 8/14/19 with limited relief   - right SIJ injection on 2/5/20 with 80% pain relief   - bilateral L3-5 MBB on 7/16/2020 with 100% relief of lumbar back pain   - right L3-5 RFA on 12/08/2020 with 100% pain relief   - left L3-5 RFA on 1/21/21 with at least 50% pain relief   - left SIJ + left GT bursa injection on 6/17/21 with at least 40% pain relief         Imaging / Labs / Studies (reviewed on 1/31/2022):    Results for orders placed during the hospital encounter of 11/22/17   MRI Lumbar Spine Without Contrast    Narrative Technique: Standard noncontrast multiplanar multisequence imaging of the lumbar spine was performed.  Findings: There is anatomic spinal alignment.  Disc interspaces are of normal height and signal intensity.  Vertebral marrow signal pattern  and architecture are normal.  Distal cord is unremarkable with conus medullaris terminating at L1-L2.  Paravertebral soft tissues are symmetric and normal in appearance.  T12-L1: No disc protrusion, neuroforaminal narrowing, or central canal stenosis.  L1-L2: No disc protrusion, neuroforaminal narrowing, or central canal stenosis.  L2-L3: No disc protrusion, neuroforaminal narrowing, or central canal stenosis.  L3-L4: No disc protrusion, neuroforaminal narrowing, or central canal stenosis.  L4-L5: No disc protrusion, neuroforaminal narrowing, or central canal stenosis.  L5-S1: Mild bilateral facet hypertrophy. No disc protrusion, neuroforaminal narrowing, or central canal stenosis.    Impression  Negative MRI of the lumbar spine.     Results for orders placed during the hospital encounter of 01/04/18   X-Ray Lumbar Complete With Flex And Ext    Narrative Comparison: None  Technique: AP, lateral, lateral flexion, lateral extension, bilateral oblique, and lumbosacral coned down views were obtained of the lumbar spine  Findings: There is mild scoliosis of the lower thoracic and upper lumbar spine.  Vertebral body heights are well-maintained.  No spondylolisthesis demonstrated.  No change in spinal alignment with flexion or extension to suggest instability. Intervertebral disk spaces are well preserved.  No pars defects visualized.  Posterior elements appear grossly intact. No acute fractures or subluxations are demonstrated.  The remaining visualized osseous and soft tissue structures demonstrate no appreciable abnormality.    Impression As above.  No acute findings.         Review of Systems:  CONSTITUTIONAL: patient denies any fever, chills, or weight loss  SKIN: patient denies any rash or itching  RESPIRATORY: patient denies having any shortness of breath  GASTROINTESTINAL: patient denies having any diarrhea, constipation, or bowel incontinence  GENITOURINARY: patient denies having any abnormal bladder  function    MUSCULOSKELETAL:  - patient complains of the above noted pain/s (see chief pain complaint)    NEUROLOGICAL:   - pain as above  - strength in Lower extremities is intact, BILATERALLY  - sensation in Lower extremities is intact, BILATERALLY  - patient denies any loss of bowel or bladder control      PSYCHIATRIC: patient denies any change in mood    Other:  All other systems reviewed and are negative        Physical Exam:  Telemedicine Exam  There were no vitals filed for this visit. There is no height or weight on file to calculate BMI.   (reviewed on 1/31/2022)    GENERAL: Well appearing, in no acute distress, alert and oriented x3.  obese  PSYCH:  Mood and affect appropriate.  SKIN: Skin color, texture, turgor normal, no rashes or lesions.  HEAD/FACE:  Normocephalic, atraumatic. Cranial nerves grossly intact.  PULM:  No difficulty breathing  Neuro/MSK:  BACK: Palpation over the lumbar paraspinous muscles causes some pain. Some pain with flexion, extension, or lateral flexion - L>R.  Pain over buttock - TTP, pain over left SIJ - less so than over left GTB.   EXTREMITIES: Peripheral joint active ROM is full and pain free without obvious instability or laxity in all four extremities. No deformities, edema, or skin discoloration.   MUSCULOSKELETAL: No atrophy or tone abnormalities are noted.  NEURO:  Able to toe walk and heel walk without difficulty  GAIT: normal.        Physical Exam: last in clinic visit:  General: alert and oriented, in no apparent distress, obese.  Gait: normal gait.  Skin: no rashes, no discoloration, no obvious lesions  HEENT: normocephalic, atraumatic. Pupils equal and round.  Cardiovascular: no significant peripheral edema present.  Respiratory: without use of accessory muscles of respiration.    Musculoskeletal - Lumbar Spine:  - Pain on flexion of lumbar spine: Present   - Pain on extension of lumbar spine: Present  - Lumbar facet loading: Present on left, improved  On right   -  TTP over the lumbar facet joints: Present on left at L5-S1   - TTP over the para lumbar muscles on left   - TTP over the SI joints: Absent   - TTP over GT bursa: absent  - TTP over piriformis: absent  - Straight Leg Raise: Negative  - JULISSA: Negative    Neuro - Extremities:  - BUE Strength:R/L: D: 5/5; B: 5/5; T: 5/5; WF: 5/5; WE: 5/5; IO: 5/5  - BLE Strength: R/L: HF: 5/5, HE: 5/5, KF: 5/5; KE: 5/4; FE: 5/5; FF: 5/5  - Extremity Reflexes: Brisk and symmetric throughout  - Sensory: Sensation to light touch intact bilaterally      Psych:  Mood and affect is appropriate          Assessment:  Nathan Carlos is a 31 y.o. year old female who is presenting with   Encounter Diagnoses   Name Primary?    Lumbar radiculopathy Yes    Lumbar spondylosis        Plan:  1. Interventional: Proceed with L5-S1, S1 TFESI.  - S/p left SIJ + left GT bursa injection on 6/17/21 with 40% pain relief.   - S/p left L3-5 RFA on 1/21/21 with at least 50% pain relief.   - S/p right L3-5 RFA on 12/08/2020 with 100% pain relief.   - S/p bilateral L3-5 MBB on 7/16/2020 with 100% relief of lumbar back pain.  - S/p Right SIJ injection on 2/5/20 with 80% pain relief.     2. Pharmacologic:   - Continue Gabapentin 300 mg Po QHs with up titration.   - Continue diclofenac 1% gel to apply 2g topically up to 4 times per day.  - Continue lidocaine 2.5%-prilocaine 2.5% topical cream Q6H PRN topically; can alternate with Voltaren gel - for GTB pain.    - Continue Tizanidne 4 mg TID PRN.  She can take up to QID PRN while pain Increased.  - Anticoagulation use: None.     3. Rehabilitative: Encouraged regular exercise. Continue exercises and activities as tolerated at home. Consider restarting physical therapy after Covid since this helped in the past.     4. Diagnostic: Updated lumbar MRI reviewed. Will order Lower ext EMG shows electrodiagnostic evidence of an acute on chronic radiculopathy of the LEFT S1 nerve root and a subacute on chronic  radiculopathy of the LEFT L5 nerve root.     5. Follow up: Post Injection.

## 2022-02-01 ENCOUNTER — PATIENT MESSAGE (OUTPATIENT)
Dept: PAIN MEDICINE | Facility: CLINIC | Age: 32
End: 2022-02-01
Payer: MEDICAID

## 2022-02-02 ENCOUNTER — PATIENT MESSAGE (OUTPATIENT)
Dept: ADMINISTRATIVE | Facility: OTHER | Age: 32
End: 2022-02-02
Payer: MEDICAID

## 2022-02-16 ENCOUNTER — LAB VISIT (OUTPATIENT)
Dept: LAB | Facility: HOSPITAL | Age: 32
End: 2022-02-16
Attending: REGISTERED NURSE
Payer: MEDICAID

## 2022-02-16 ENCOUNTER — OFFICE VISIT (OUTPATIENT)
Dept: FAMILY MEDICINE | Facility: CLINIC | Age: 32
End: 2022-02-16
Payer: MEDICAID

## 2022-02-16 ENCOUNTER — PATIENT MESSAGE (OUTPATIENT)
Dept: FAMILY MEDICINE | Facility: CLINIC | Age: 32
End: 2022-02-16

## 2022-02-16 VITALS
DIASTOLIC BLOOD PRESSURE: 76 MMHG | WEIGHT: 273.69 LBS | BODY MASS INDEX: 46.72 KG/M2 | HEIGHT: 64 IN | OXYGEN SATURATION: 98 % | SYSTOLIC BLOOD PRESSURE: 135 MMHG | TEMPERATURE: 98 F | HEART RATE: 82 BPM

## 2022-02-16 DIAGNOSIS — E11.9 TYPE 2 DIABETES MELLITUS WITHOUT COMPLICATION, WITHOUT LONG-TERM CURRENT USE OF INSULIN: ICD-10-CM

## 2022-02-16 DIAGNOSIS — E78.00 HYPERCHOLESTEREMIA: Primary | ICD-10-CM

## 2022-02-16 DIAGNOSIS — E66.01 MORBID OBESITY WITH BMI OF 45.0-49.9, ADULT: ICD-10-CM

## 2022-02-16 LAB
ESTIMATED AVG GLUCOSE: 240 MG/DL (ref 68–131)
HBA1C MFR BLD: 10 % (ref 4–5.6)

## 2022-02-16 PROCEDURE — 99999 PR PBB SHADOW E&M-EST. PATIENT-LVL V: ICD-10-PCS | Mod: PBBFAC,,, | Performed by: REGISTERED NURSE

## 2022-02-16 PROCEDURE — 3078F PR MOST RECENT DIASTOLIC BLOOD PRESSURE < 80 MM HG: ICD-10-PCS | Mod: CPTII,,, | Performed by: REGISTERED NURSE

## 2022-02-16 PROCEDURE — 99213 OFFICE O/P EST LOW 20 MIN: CPT | Mod: S$PBB,,, | Performed by: REGISTERED NURSE

## 2022-02-16 PROCEDURE — 4010F PR ACE/ARB THEARPY RXD/TAKEN: ICD-10-PCS | Mod: CPTII,,, | Performed by: REGISTERED NURSE

## 2022-02-16 PROCEDURE — 3075F PR MOST RECENT SYSTOLIC BLOOD PRESS GE 130-139MM HG: ICD-10-PCS | Mod: CPTII,,, | Performed by: REGISTERED NURSE

## 2022-02-16 PROCEDURE — 4010F ACE/ARB THERAPY RXD/TAKEN: CPT | Mod: CPTII,,, | Performed by: REGISTERED NURSE

## 2022-02-16 PROCEDURE — 3008F PR BODY MASS INDEX (BMI) DOCUMENTED: ICD-10-PCS | Mod: CPTII,,, | Performed by: REGISTERED NURSE

## 2022-02-16 PROCEDURE — 99213 PR OFFICE/OUTPT VISIT, EST, LEVL III, 20-29 MIN: ICD-10-PCS | Mod: S$PBB,,, | Performed by: REGISTERED NURSE

## 2022-02-16 PROCEDURE — 3078F DIAST BP <80 MM HG: CPT | Mod: CPTII,,, | Performed by: REGISTERED NURSE

## 2022-02-16 PROCEDURE — 1159F PR MEDICATION LIST DOCUMENTED IN MEDICAL RECORD: ICD-10-PCS | Mod: CPTII,,, | Performed by: REGISTERED NURSE

## 2022-02-16 PROCEDURE — 3052F HG A1C>EQUAL 8.0%<EQUAL 9.0%: CPT | Mod: CPTII,,, | Performed by: REGISTERED NURSE

## 2022-02-16 PROCEDURE — 3008F BODY MASS INDEX DOCD: CPT | Mod: CPTII,,, | Performed by: REGISTERED NURSE

## 2022-02-16 PROCEDURE — 3052F PR MOST RECENT HEMOGLOBIN A1C LEVEL 8.0 - < 9.0%: ICD-10-PCS | Mod: CPTII,,, | Performed by: REGISTERED NURSE

## 2022-02-16 PROCEDURE — 3075F SYST BP GE 130 - 139MM HG: CPT | Mod: CPTII,,, | Performed by: REGISTERED NURSE

## 2022-02-16 PROCEDURE — 1159F MED LIST DOCD IN RCRD: CPT | Mod: CPTII,,, | Performed by: REGISTERED NURSE

## 2022-02-16 PROCEDURE — 99215 OFFICE O/P EST HI 40 MIN: CPT | Mod: PBBFAC,PO | Performed by: REGISTERED NURSE

## 2022-02-16 PROCEDURE — 83036 HEMOGLOBIN GLYCOSYLATED A1C: CPT | Performed by: REGISTERED NURSE

## 2022-02-16 PROCEDURE — 99999 PR PBB SHADOW E&M-EST. PATIENT-LVL V: CPT | Mod: PBBFAC,,, | Performed by: REGISTERED NURSE

## 2022-02-16 PROCEDURE — 36415 COLL VENOUS BLD VENIPUNCTURE: CPT | Mod: PO | Performed by: REGISTERED NURSE

## 2022-02-16 RX ORDER — FLASH GLUCOSE SCANNING READER
1 EACH MISCELLANEOUS
Qty: 2 EACH | Refills: 2 | Status: ON HOLD | OUTPATIENT
Start: 2022-02-16 | End: 2022-03-08 | Stop reason: HOSPADM

## 2022-02-16 RX ORDER — FLASH GLUCOSE SENSOR
1 KIT MISCELLANEOUS
Qty: 2 KIT | Refills: 2 | Status: ON HOLD | OUTPATIENT
Start: 2022-02-16 | End: 2022-03-08 | Stop reason: HOSPADM

## 2022-02-16 NOTE — PROGRESS NOTES
"Subjective:       Patient ID: Nathan Carlos is a 31 y.o. female.      Chief Complaint   Patient presents with    Follow-up       HPI    Nathan here today for lipid recheck.    Has not made much stride in changing her diet & lifestyle.  Reports not eating a lot of refined sugars, she is more of a carb-eater.  Does not exercise due to chronic back and nerve issues.  She hopes to be more active after her nerve block is done, scheduled for 3/8/2022.  Trying not to eat after 8 pm.  Denies increased polydipsia and phagia.  Increased polyuria.  Does not check her sugars as "I don't like to stick myself".  Requests rx for CGM.        Lab Results   Component Value Date    CHOL 196 02/22/2021    CHOL 210 (H) 02/07/2019    CHOL 206 (H) 08/08/2017     Lab Results   Component Value Date    HDL 48 02/22/2021    HDL 46 02/07/2019    HDL 47 08/08/2017     Lab Results   Component Value Date    LDLCALC 121.4 02/22/2021    LDLCALC 141.6 02/07/2019    LDLCALC 137.2 08/08/2017     Lab Results   Component Value Date    TRIG 133 02/22/2021    TRIG 112 02/07/2019    TRIG 109 08/08/2017           Lab Results   Component Value Date    HGBA1C 8.7 (H) 12/15/2021           Wt Readings from Last 3 Encounters:   02/16/22 1113 124.2 kg (273 lb 11.2 oz)   01/27/22 1025 127.5 kg (281 lb)   01/10/22 0849 127.6 kg (281 lb 4.9 oz)           Review of Systems   Constitutional: Positive for unexpected weight change (some loss). Negative for activity change and appetite change.   Respiratory: Negative.    Cardiovascular: Negative.    Endocrine: Positive for polyuria. Negative for polydipsia and polyphagia.   Musculoskeletal: Positive for back pain.   Neurological: Negative for weakness, light-headedness and headaches.         Review of patient's allergies indicates:   Allergen Reactions    Amitriptyline      xerostomia    Amlodipine      DRY MOUTH    Tramadol Itching         Patient Active Problem List   Diagnosis    Essential hypertension "    Gastroesophageal reflux disease    Hypercholesteremia    High serum testosterone    HSV-2 (herpes simplex virus 2) infection    Nephrolithiasis    Chronic bilateral low back pain with left-sided sciatica    Polycystic ovaries    Lumbar facet arthropathy    Type 2 diabetes mellitus without complication, without long-term current use of insulin    Carpal tunnel syndrome    Paresthesias in left hand    Morbid obesity with BMI of 40.0-44.9, adult    Lumbar facet joint pain    Spondylosis without myelopathy or radiculopathy, lumbar region    Left carpal tunnel syndrome    Sacroiliac joint dysfunction    Decreased functional mobility and endurance    Proximal muscle weakness    Hypersensitivity    Scar tissue    Decreased range of motion    Decreased strength    Self-care deficit           Current Outpatient Medications:     atorvastatin (LIPITOR) 40 MG tablet, Take 1 tablet (40 mg total) by mouth nightly., Disp: 90 tablet, Rfl: 1    blood sugar diagnostic Strp, To check BG 2 times daily, to use with insurance preferred meter, Disp: 100 strip, Rfl: 2    blood-glucose meter kit, To check BG 2 times daily, to use with insurance preferred meter, Disp: 1 each, Rfl: 0    dicyclomine (BENTYL) 20 mg tablet, Take 1 tablet (20 mg total) by mouth 3 (three) times daily as needed (abdominal pain)., Disp: 90 tablet, Rfl: 0    fluconazole (DIFLUCAN) 150 MG Tab, TAKE 1 TABLET BY MOUTH NOW THEN  TAKE  1  TABLET  BY  MOUTH  IN  3  DAYS  IF  NEEDED, Disp: 2 tablet, Rfl: 0    glimepiride (AMARYL) 2 MG tablet, TAKE 1 TABLET BY MOUTH ONCE DAILY BEFORE BREAKFAST, Disp: 90 tablet, Rfl: 1    irbesartan (AVAPRO) 150 MG tablet, Take 1 tablet by mouth once daily, Disp: 90 tablet, Rfl: 1    JANUVIA 25 mg Tab, Take 1 tablet by mouth once daily, Disp: 90 tablet, Rfl: 1    lancets Misc, To check BG 2 times daily, to use with insurance preferred meter, Disp: 100 each, Rfl: 2    linaCLOtide (LINZESS) 145 mcg Cap  capsule, Take 1 capsule (145 mcg total) by mouth before breakfast., Disp: 90 capsule, Rfl: 3    metFORMIN (GLUCOPHAGE-XR) 750 MG ER 24hr tablet, Take 1 tablet (750 mg total) by mouth 2 (two) times daily with meals., Disp: 180 tablet, Rfl: 1    metoprolol succinate (TOPROL-XL) 50 MG 24 hr tablet, Take 1 tablet by mouth once daily, Disp: 90 tablet, Rfl: 3    mirtazapine (REMERON) 15 MG tablet, Take 15 mg by mouth nightly., Disp: , Rfl:     ondansetron (ZOFRAN-ODT) 4 MG TbDL, Take 1 tablet (4 mg total) by mouth every 6 (six) hours as needed (nausea and vomiting)., Disp: 20 tablet, Rfl: 0    promethazine (PHENERGAN) 25 MG tablet, TAKE 1 TABLET BY MOUTH EVERY 6 HOURS AS NEEDED FOR NAUSEA, Disp: 30 tablet, Rfl: 0    spironolactone (ALDACTONE) 25 MG tablet, Take 2 tablets by mouth once daily, Disp: 180 tablet, Rfl: 3    tiZANidine (ZANAFLEX) 4 MG tablet, Take 1 tablet (4 mg total) by mouth every 8 (eight) hours., Disp: 90 tablet, Rfl: 1    valACYclovir (VALTREX) 500 MG tablet, TAKE 1 TABLET BY MOUTH TWICE DAILY FOR 3 DAYS, Disp: 6 tablet, Rfl: 0    ergocalciferol (ERGOCALCIFEROL) 50,000 unit Cap, TAKE 1 CAPSULE BY MOUTH ONCE A WEEK FOR 28 DAYS, Disp: , Rfl:     flash glucose scanning reader (FREESTYLE JORGE 14 DAY READER) Misc, 1 Device by Misc.(Non-Drug; Combo Route) route every 14 (fourteen) days., Disp: 2 each, Rfl: 2    flash glucose sensor (FREESTYLE JORGE 14 DAY SENSOR) Kit, 1 Device by Misc.(Non-Drug; Combo Route) route every 14 (fourteen) days., Disp: 2 kit, Rfl: 2    HYDROcodone-acetaminophen (NORCO) 5-325 mg per tablet, Take 1 tablet by mouth every 8 (eight) hours as needed for Pain. (Patient not taking: No sig reported), Disp: 15 tablet, Rfl: 0    pantoprazole (PROTONIX) 40 MG tablet, Take 1 tablet (40 mg total) by mouth once daily., Disp: 30 tablet, Rfl: 11        Past medical, surgical, family and social histories have been reviewed today.      Objective:     Vitals:    02/16/22 1113   BP: 135/76  "  Pulse: 82   Temp: 97.8 °F (36.6 °C)   SpO2: 98%   Weight: 124.2 kg (273 lb 11.2 oz)   Height: 5' 4" (1.626 m)   PainSc:   6         Physical Exam  Constitutional:       General: She is not in acute distress.  HENT:      Head: Normocephalic and atraumatic.   Neurological:      Mental Status: She is alert and oriented to person, place, and time.   Psychiatric:         Mood and Affect: Mood normal.         Behavior: Behavior normal.         Thought Content: Thought content normal.         Judgment: Judgment normal.           Assessment       ICD-10-CM ICD-9-CM    1. Hypercholesteremia  E78.00 272.0 Lipid Panel      Glucose, Fasting   2. Type 2 diabetes mellitus without complication, without long-term current use of insulin  E11.9 250.00 Lipid Panel      Glucose, Fasting      flash glucose sensor (FREESTYLE JORGE 14 DAY SENSOR) Kit      flash glucose scanning reader (FREESTYLE JORGE 14 DAY READER) Misc  CGM ordered per pt request, she has been advised insurance may not cover.   3. Morbid obesity with BMI of 45.0-49.9, adult  E66.01 278.01 Lipid Panel    Z68.42 V85.42 Glucose, Fasting  Healthy diet, exercise, weight reduction as needed.        Follow-up     Fasting lab today.  Plans to get her A1c on/around 3/15/2022, see me after for discussion.  Return to clinic as needed.      VANCE Wagner  [facility]   "

## 2022-02-17 ENCOUNTER — PATIENT MESSAGE (OUTPATIENT)
Dept: FAMILY MEDICINE | Facility: CLINIC | Age: 32
End: 2022-02-17
Payer: MEDICAID

## 2022-02-17 ENCOUNTER — TELEPHONE (OUTPATIENT)
Dept: FAMILY MEDICINE | Facility: CLINIC | Age: 32
End: 2022-02-17
Payer: MEDICAID

## 2022-02-17 DIAGNOSIS — E11.9 TYPE 2 DIABETES MELLITUS WITHOUT COMPLICATION, WITHOUT LONG-TERM CURRENT USE OF INSULIN: Primary | ICD-10-CM

## 2022-02-17 DIAGNOSIS — R63.8 UNABLE TO LOSE WEIGHT: ICD-10-CM

## 2022-02-17 DIAGNOSIS — E11.65 UNCONTROLLED TYPE 2 DIABETES MELLITUS WITH HYPERGLYCEMIA: ICD-10-CM

## 2022-02-17 DIAGNOSIS — R73.09 HEMOGLOBIN A1C GREATER THAN 9%, INDICATING POOR DIABETIC CONTROL: ICD-10-CM

## 2022-02-17 DIAGNOSIS — E66.01 MORBID OBESITY WITH BMI OF 45.0-49.9, ADULT: ICD-10-CM

## 2022-02-17 RX ORDER — GLIMEPIRIDE 4 MG/1
4 TABLET ORAL
Qty: 90 TABLET | Refills: 1 | Status: SHIPPED | OUTPATIENT
Start: 2022-02-17 | End: 2022-07-25 | Stop reason: SDUPTHER

## 2022-02-17 NOTE — TELEPHONE ENCOUNTER
Pt states that she would like to get put on something to help lower her A1c. Pt states that she has also beginning to have the headaches again, what would you recommend. Please advise

## 2022-02-17 NOTE — TELEPHONE ENCOUNTER
She has an upcoming appt w/ me in March.  Have her get her fasting lipid and fructosamine level in March about 1 week before seeing me.  She may want to get this scheduled to make sure it goes right.  Does not need the fasting glucose level done since just had the A1c.  A1c not due for 3 months now that it was done so that is why I am ordering the fructosamine level to check her sugars for the previous few weeks prior.    Amaryl --- increase to 4 mg daily in AM  Januvia --- increase to 50 mg daily  Metformin --- no change    New rx sent to pharmacy.

## 2022-02-17 NOTE — TELEPHONE ENCOUNTER
Got the A1c back today but was not ordered ---- she was to get done next month.  Not sure why this was done and not the labs I ordered at her appt ---- fasting lipid and glucose.  Please check w/ patient on getting the correct labs done.  Thanks.

## 2022-02-18 ENCOUNTER — PATIENT MESSAGE (OUTPATIENT)
Dept: GASTROENTEROLOGY | Facility: CLINIC | Age: 32
End: 2022-02-18
Payer: MEDICAID

## 2022-02-28 ENCOUNTER — LAB VISIT (OUTPATIENT)
Dept: LAB | Facility: HOSPITAL | Age: 32
End: 2022-02-28
Attending: REGISTERED NURSE
Payer: MEDICAID

## 2022-02-28 DIAGNOSIS — E78.00 HYPERCHOLESTEREMIA: ICD-10-CM

## 2022-02-28 DIAGNOSIS — E66.01 MORBID OBESITY WITH BMI OF 45.0-49.9, ADULT: ICD-10-CM

## 2022-02-28 DIAGNOSIS — E11.9 TYPE 2 DIABETES MELLITUS WITHOUT COMPLICATION, WITHOUT LONG-TERM CURRENT USE OF INSULIN: ICD-10-CM

## 2022-02-28 LAB
CHOLEST SERPL-MCNC: 125 MG/DL (ref 120–199)
CHOLEST/HDLC SERPL: 3.9 {RATIO} (ref 2–5)
GLUCOSE SERPL-MCNC: 172 MG/DL (ref 70–110)
HDLC SERPL-MCNC: 32 MG/DL (ref 40–75)
HDLC SERPL: 25.6 % (ref 20–50)
LDLC SERPL CALC-MCNC: 73.8 MG/DL (ref 63–159)
NONHDLC SERPL-MCNC: 93 MG/DL
TRIGL SERPL-MCNC: 96 MG/DL (ref 30–150)

## 2022-02-28 PROCEDURE — 36415 COLL VENOUS BLD VENIPUNCTURE: CPT | Mod: PO | Performed by: REGISTERED NURSE

## 2022-02-28 PROCEDURE — 80061 LIPID PANEL: CPT | Performed by: REGISTERED NURSE

## 2022-02-28 PROCEDURE — 82947 ASSAY GLUCOSE BLOOD QUANT: CPT | Mod: PO | Performed by: REGISTERED NURSE

## 2022-02-28 PROCEDURE — 82985 ASSAY OF GLYCATED PROTEIN: CPT | Performed by: REGISTERED NURSE

## 2022-03-03 ENCOUNTER — PATIENT MESSAGE (OUTPATIENT)
Dept: FAMILY MEDICINE | Facility: CLINIC | Age: 32
End: 2022-03-03
Payer: MEDICAID

## 2022-03-04 ENCOUNTER — PATIENT MESSAGE (OUTPATIENT)
Dept: FAMILY MEDICINE | Facility: CLINIC | Age: 32
End: 2022-03-04
Payer: MEDICAID

## 2022-03-04 LAB — FRUCTOSAMINE SERPL-SCNC: 375 UMOL /L (ref 151–300)

## 2022-03-04 RX ORDER — CLOTRIMAZOLE AND BETAMETHASONE DIPROPIONATE 10; .64 MG/G; MG/G
CREAM TOPICAL 2 TIMES DAILY
Qty: 45 G | Refills: 1 | Status: SHIPPED | OUTPATIENT
Start: 2022-03-04 | End: 2022-03-22 | Stop reason: SDUPTHER

## 2022-03-07 ENCOUNTER — TELEPHONE (OUTPATIENT)
Dept: PAIN MEDICINE | Facility: CLINIC | Age: 32
End: 2022-03-07
Payer: MEDICAID

## 2022-03-07 NOTE — PRE-PROCEDURE INSTRUCTIONS
Spoke with patient regarding procedure scheduled on 3.8    Arrival time 0950    Has patient been sick with fever or on antibiotics within the last 7 days? No    Does the patient have any open wounds, sores or rashes? No    Does the patient have any recent fractures? no    Has patient received a vaccination within the last 7 days? No    Received the COVID vaccination? yes    Has the patient stopped all medications as directed? Hold dm meds am of procedure    Does patient have a pacemaker and or defibrillator? no    Does the patient have a ride to and from procedure and someone reliable to remain with patient?  [place]     Is the patient diabetic? yes    Does the patient have sleep apnea? Or use O2 at home? No and no     Is the patient receiving sedation? yes    Is the patient instructed to remain NPO beginning at midnight the night before their procedure? yes    Procedure location confirmed with patient? Yes    Covid- Denies signs/symptoms. Instructed to notify PAT/MD if any changes.

## 2022-03-07 NOTE — TELEPHONE ENCOUNTER
----- Message from Daniela Farfan sent at 3/7/2022  1:38 PM CST -----  Good afternoon,    This patient was denied for her procedure on tomorrow being that the codes are not covered by her insurance. I contacted the patient and was able to get her approved for financial assistance so she is good to proceed on tomorrow. Please let me know if their are any questions or concerns.    Thanks     Daniela Farfan (Ki)

## 2022-03-08 ENCOUNTER — HOSPITAL ENCOUNTER (OUTPATIENT)
Facility: HOSPITAL | Age: 32
Discharge: HOME | End: 2022-03-08
Attending: ANESTHESIOLOGY | Admitting: ANESTHESIOLOGY
Payer: MEDICAID

## 2022-03-08 VITALS
DIASTOLIC BLOOD PRESSURE: 77 MMHG | WEIGHT: 269.94 LBS | HEART RATE: 85 BPM | TEMPERATURE: 98 F | SYSTOLIC BLOOD PRESSURE: 130 MMHG | HEIGHT: 64 IN | BODY MASS INDEX: 46.08 KG/M2 | OXYGEN SATURATION: 94 % | RESPIRATION RATE: 16 BRPM

## 2022-03-08 DIAGNOSIS — M54.16 LUMBAR RADICULOPATHY: Primary | ICD-10-CM

## 2022-03-08 LAB
B-HCG UR QL: NEGATIVE
CTP QC/QA: YES

## 2022-03-08 PROCEDURE — 25500020 PHARM REV CODE 255: Performed by: ANESTHESIOLOGY

## 2022-03-08 PROCEDURE — 64483 NJX AA&/STRD TFRM EPI L/S 1: CPT | Mod: LT,,, | Performed by: ANESTHESIOLOGY

## 2022-03-08 PROCEDURE — 63600175 PHARM REV CODE 636 W HCPCS: Performed by: ANESTHESIOLOGY

## 2022-03-08 PROCEDURE — 82962 GLUCOSE BLOOD TEST: CPT | Performed by: ANESTHESIOLOGY

## 2022-03-08 PROCEDURE — 64483 NJX AA&/STRD TFRM EPI L/S 1: CPT | Performed by: ANESTHESIOLOGY

## 2022-03-08 PROCEDURE — 64484 NJX AA&/STRD TFRM EPI L/S EA: CPT | Performed by: ANESTHESIOLOGY

## 2022-03-08 PROCEDURE — 81025 URINE PREGNANCY TEST: CPT | Performed by: ANESTHESIOLOGY

## 2022-03-08 PROCEDURE — 25000003 PHARM REV CODE 250: Performed by: ANESTHESIOLOGY

## 2022-03-08 PROCEDURE — 64483 PR EPIDURAL INJ, ANES/STEROID, TRANSFORAMINAL, LUMB/SACR, SNGL LEVL: ICD-10-PCS | Mod: LT,,, | Performed by: ANESTHESIOLOGY

## 2022-03-08 RX ORDER — MIDAZOLAM HYDROCHLORIDE 1 MG/ML
INJECTION, SOLUTION INTRAMUSCULAR; INTRAVENOUS
Status: DISCONTINUED | OUTPATIENT
Start: 2022-03-08 | End: 2022-03-08 | Stop reason: HOSPADM

## 2022-03-08 RX ORDER — DEXAMETHASONE SODIUM PHOSPHATE 10 MG/ML
INJECTION INTRAMUSCULAR; INTRAVENOUS
Status: DISCONTINUED | OUTPATIENT
Start: 2022-03-08 | End: 2022-03-08 | Stop reason: HOSPADM

## 2022-03-08 RX ORDER — FENTANYL CITRATE 50 UG/ML
INJECTION, SOLUTION INTRAMUSCULAR; INTRAVENOUS
Status: DISCONTINUED | OUTPATIENT
Start: 2022-03-08 | End: 2022-03-08 | Stop reason: HOSPADM

## 2022-03-08 RX ORDER — LIDOCAINE HYDROCHLORIDE 10 MG/ML
INJECTION, SOLUTION EPIDURAL; INFILTRATION; INTRACAUDAL; PERINEURAL
Status: DISCONTINUED | OUTPATIENT
Start: 2022-03-08 | End: 2022-03-08 | Stop reason: HOSPADM

## 2022-03-08 RX ORDER — HYDROCODONE BITARTRATE AND ACETAMINOPHEN 5; 325 MG/1; MG/1
1 TABLET ORAL EVERY 6 HOURS PRN
Qty: 12 TABLET | Refills: 0 | Status: SHIPPED | OUTPATIENT
Start: 2022-03-08 | End: 2022-03-22 | Stop reason: SDUPTHER

## 2022-03-08 NOTE — DISCHARGE INSTRUCTIONS

## 2022-03-08 NOTE — PLAN OF CARE
Patient recovered to baseline. Instructions given. All questions answered. All bandaids remain clean,dry, intact. Discharged to designated  at this time.

## 2022-03-08 NOTE — OP NOTE
"Procedure: Lumbar Transforaminal Epidural Steroid Injection under Fluoroscopic Guidance (supraneural approach)    Level: L5/S1 S1    Side: Left    PROCEDURE DATE: 3/8/2022    Pre-operative Diagnosis: Lumbar Radiculopathy  Post-operative Diagnosis: Lumbar Radiculopathy    Provider: Yo Kirkland MD  Assistant(s): None    Anesthesia: Local, IV Sedation    >> 2 mg of VERSED    >> 100 mcg of FENTANYL     Indication: Low back pain with radiculopathy consistent with distribution of targeted nerve. Symptoms unresponsive to conservative treatments. Fluoroscopy was used to optimize visualization of needle placement and to maximize safety.     Procedure Description / Technique:  The patient was seen and identified in the preoperative area. Risks, benefits, complications, and alternatives were discussed with the patient. The patient agreed to proceed with the procedure and signed the consent. The site and side of the procedure was identified and marked. An IV was started. The patient was taken to the procedural suite.    The patient was positioned in prone orientation on procedure table and a pillow was placed under the abdomen to reduce lumbar lordosis. A time out was performed prior to any intervention. The procedure, site, side, and allergies were stated and agreed to by all present. The lumbosacral area was widely prepped with ChloraPrep. The procedural site was draped in usual sterile fashion. Vital signs were closely monitored throughout this procedure. Conscious sedation was used for this procedure to decrease patient anxiety.    The target area was visualized under fluoroscopy. The cephalocaudal angle of the fluoroscope was adjusted as to align the vertebral end plates. The fluoroscopic arm was rotated ipsilaterally to an angle of approximately 30 degrees until the "jessika dog" outline came into view and the tip of the inferior superior articular process pointed towards the midline, 6:00 position of the above pedicle. A " "22 gauge 5 inch spinal needle was directed towards the "chin" of the "jessika dog" (adjacent to the pars interarticularis and inferior to the pedicle). The needle was advanced until OS was met at the inferior border of the pedicle / pars interface. The needle was adjusted so that it would pass inferior to the osseous border. The fluoroscope was then placed in the lateral position and the needle was slowly advanced until it rested in the posterior 1/3rd of the vertebral foramen. AP fluoroscopy was checked and the needle tip rested at the 6:00 position under the pedicle. No paresthesia was elicited during needle placement. With the needle tip in its final position, gentle aspiration was negative for blood and CSF. Omnipaque 240 (1 to 2 mL) was injected under live fluoroscopy. Microbore tubing was used for injection. There was no pain or paresthesia on injection. The contrast clearly delineated the targeted nerve root on AP fluoroscopy. No vascular uptake was seen. A solution containing 2 mL of 1% PF Lidocaine and 2 mL of Dexamethasone (10 mg/mL) was mixed and 2 mL was injected slowly at each level targeted. There was minimal resistance on injection. No pain or paresthesia was elicited on injection. The stylet was replaced and the needle was withdrawn intact. This procedure was performed for each of the above indicated levels.     The Left S1 area was visualized under fluoroscopy. The cephalocaudal angle of the fluoroscope was adjusted as to align the sacral end plate. The fluoroscopic arm was tilted and rotated cephalad and ipsilateral to bring the S1 dorsal foramen into view. A 22 gauge 5  inch spinal needle was directed towards the base of the foramen and advanced until OS was met. The needle was directed cephalad and the fluoroscope was positioned in lateral view. The needle tip was advanced past the dorsal sacral border.  The fluoroscope was then positioned into AP orientation, gentle aspiration was negative for " blood and CSF. Omnipaque 240 (1 to 2 mL) was injected under live fluoroscopy. Microbore tubing was used for injection. There was no pain or paresthesia on injection. The contrast clearly delineated the targeted nerve root on AP fluoroscopy. No vascular uptake was seen. A solution containing 2 mL of 1% PF Lidocaine and 2 mL of Dexamethasone (10 mg/mL) was mixed and 2 mL was injected slowly at each level targeted. There was minimal resistance on injection. No pain or paresthesia was elicited on injection. The stylet was replaced and the needle was withdrawn intact. This procedure was performed for each of the above indicated levels    Description of Findings: Not applicable    Prosthetic devices, grafts, tissues, or devices implanted: None    Specimen Removed: No    Estimated Blood Loss: minimal    COMPLICATIONS: None    DISPOSITION / PLANS: The patient was transferred to the recovery area in a stable condition for observation. The patient was reexamined prior to discharge. There was no evidence of acute neurologic injury following the procedure.  Patient was discharged from the recovery room after meeting discharge criteria. Home discharge instructions were given to the patient by the staff.

## 2022-03-08 NOTE — DISCHARGE SUMMARY
[facility]  Discharge Note       Description of Procedure: Lumbar Transforaminal Epidural Steroid Injection under Fluoroscopic Guidance    Procedure Date: 3/8/2022    Admit Date: 3/8/2022  Discharge Date: 3/8/2022     Attending Physician: Yo Kirkland   Discharge Provider: Yo Kirkland    Preoperative Diagnosis: Lumbar Radiculopathy     Postoperative Diagnosis: as above, same as preoperative diagnosis    Discharged Condition: Stable    Hospital Course: Patient was admitted for an outpatient procedure. The procedure was tolerated well with no complications.    Final Diagnoses: Same as principal problem.    Disposition: Home, self-care.    Follow up/Patient Instructions:  Follow-up in clinic in 2-3 weeks.    Medications: No medications were prescribed today. The patient was advised to resume normal medication regimen without change.  Specific information was provided regarding restarting any anticoagulant/s.    Discharge Procedure Orders (must include Diet, Follow-up, Activity):  Light activity for the remainder of the day, resume normal activity tomorrow. Resume normal diet. Follow-up in clinic in 2-3 weeks.

## 2022-03-08 NOTE — H&P
HPI  Patient presenting for Procedure(s) (LRB):  BLOCK, SPINAL NERVE ROOT, LUMBAR, SELECTIVE, TRANSFORAMINAL APPROACH Left L5-S1, S1 RN IV sedation (Left)     Patient on Anti-coagulation No    No health changes since previous encounter    Past Medical History:   Diagnosis Date    Carpal tunnel syndrome     Diabetes mellitus     GERD (gastroesophageal reflux disease)     Herpes simplex virus (HSV) infection     High serum testosterone 9/7/2017    Hyperlipidemia     Hypertension     Insulin resistance syndrome 9/7/2017    Lumbar facet arthropathy 8/14/2019    Morbid obesity     Ovarian cyst     bilateral      Past Surgical History:   Procedure Laterality Date    ANKLE SURGERY Left     BUNIONECTOMY Bilateral     CARPAL TUNNEL RELEASE Left 3/19/2021    Procedure: RELEASE, CARPAL TUNNEL;  Surgeon: Juan F Bernard MD;  Location: [facility] OR;  Service: Orthopedics;  Laterality: Left;    CARPAL TUNNEL RELEASE Right 11/4/2021    Procedure: RELEASE, CARPAL TUNNEL;  Surgeon: Juan F Bernard MD;  Location: [facility];  Service: Orthopedics;  Laterality: Right;    COLONOSCOPY  06/12/2018    ESOPHAGOGASTRODUODENOSCOPY      ESOPHAGOGASTRODUODENOSCOPY N/A 12/2/2021    Procedure: EGD (ESOPHAGOGASTRODUODENOSCOPY);  Surgeon: Pili Eagle MD;  Location: [facility];  Service: Endoscopy;  Laterality: N/A;    INJECTION OF ANESTHETIC AGENT AROUND MEDIAL BRANCH NERVES INNERVATING LUMBAR FACET JOINT Bilateral 8/14/2019    Procedure: Bilateral L3-5 MBB;  Surgeon: Yo Kirkland MD;  Location: [facility];  Service: Pain Management;  Laterality: Bilateral;    INJECTION OF ANESTHETIC AGENT AROUND MEDIAL BRANCH NERVES INNERVATING LUMBAR FACET JOINT Bilateral 7/16/2020    Procedure: Bilateral L3-5 MBB;  Surgeon: Yo Kirkland MD;  Location: [facility];  Service: Pain Management;  Laterality: Bilateral;    INJECTION OF ANESTHETIC AGENT INTO SACROILIAC JOINT Right 2/5/2020    Procedure: Right SIJ Injection with  local;  Surgeon: Yo Kirkland MD;  Location: [facility];  Service: Pain Management;  Laterality: Right;    INJECTION OF ANESTHETIC AGENT INTO SACROILIAC JOINT Left 6/17/2021    Procedure: Left GT bursa + left SIJ injection;  Surgeon: Yo Kirkland MD;  Location: [facility];  Service: Pain Management;  Laterality: Left;    INJECTION OF JOINT Left 6/17/2021    Procedure: Left GT bursa + left SIJ injection;  Surgeon: Yo Kirkland MD;  Location: [facility];  Service: Pain Management;  Laterality: Left;    LUMBAR FUSION      RADIOFREQUENCY THERMOCOAGULATION Right 12/8/2020    Procedure: RADIOFREQUENCY THERMAL COAGULATION Right L3, 4,5 MBB RFA with RN IV sedation// NO STEROID;  Surgeon: Yo Kirkland MD;  Location: [facility];  Service: Pain Management;  Laterality: Right;    RADIOFREQUENCY THERMOCOAGULATION Left 1/21/2021    Procedure: Left L3-5 Lumbar RFA;  Surgeon: Rian Chaney MD;  [facility];  Service: Pain Management;  Laterality: Left;     Review of patient's allergies indicates:   Allergen Reactions    Amitriptyline      xerostomia    Amlodipine      DRY MOUTH    Tramadol Itching        No current facility-administered medications on file prior to encounter.     Current Outpatient Medications on File Prior to Encounter   Medication Sig Dispense Refill    irbesartan (AVAPRO) 150 MG tablet Take 1 tablet by mouth once daily 90 tablet 1    metFORMIN (GLUCOPHAGE-XR) 750 MG ER 24hr tablet Take 1 tablet (750 mg total) by mouth 2 (two) times daily with meals. 180 tablet 1    metoprolol succinate (TOPROL-XL) 50 MG 24 hr tablet Take 1 tablet by mouth once daily 90 tablet 3    spironolactone (ALDACTONE) 25 MG tablet Take 2 tablets by mouth once daily 180 tablet 3    atorvastatin (LIPITOR) 40 MG tablet Take 1 tablet (40 mg total) by mouth nightly. 90 tablet 1    blood sugar diagnostic Strp To check BG 2 times daily, to use with insurance preferred meter 100 strip 2    blood-glucose  "meter kit To check BG 2 times daily, to use with insurance preferred meter 1 each 0    dicyclomine (BENTYL) 20 mg tablet Take 1 tablet (20 mg total) by mouth 3 (three) times daily as needed (abdominal pain). 90 tablet 0    ergocalciferol (ERGOCALCIFEROL) 50,000 unit Cap TAKE 1 CAPSULE BY MOUTH ONCE A WEEK FOR 28 DAYS      lancets Misc To check BG 2 times daily, to use with insurance preferred meter 100 each 2    linaCLOtide (LINZESS) 145 mcg Cap capsule Take 1 capsule (145 mcg total) by mouth before breakfast. 90 capsule 3    mirtazapine (REMERON) 15 MG tablet Take 15 mg by mouth nightly.      ondansetron (ZOFRAN-ODT) 4 MG TbDL Take 1 tablet (4 mg total) by mouth every 6 (six) hours as needed (nausea and vomiting). 20 tablet 0    promethazine (PHENERGAN) 25 MG tablet TAKE 1 TABLET BY MOUTH EVERY 6 HOURS AS NEEDED FOR NAUSEA 30 tablet 0    tiZANidine (ZANAFLEX) 4 MG tablet Take 1 tablet (4 mg total) by mouth every 8 (eight) hours. 90 tablet 1        PMHx, PSHx, Allergies, Medications reviewed in epic    ROS negative except pain complaints in HPI    OBJECTIVE:    BP (!) 164/88 (BP Location: Right arm, Patient Position: Sitting)   Pulse 81   Temp 97.9 °F (36.6 °C) (Temporal)   Resp 18   Ht 5' 4" (1.626 m)   Wt 122.4 kg (269 lb 15.3 oz)   SpO2 96%   Breastfeeding No   BMI 46.34 kg/m²     PHYSICAL EXAMINATION:    GENERAL: Well appearing, in no acute distress, alert and oriented x3.  PSYCH:  Mood and affect appropriate.  SKIN: Skin color, texture, turgor normal, no rashes or lesions which will impact the procedure.  CV: RRR with palpation of the radial artery.  PULM: No evidence of respiratory difficulty, symmetric chest rise. Clear to auscultation.  NEURO: Cranial nerves grossly intact.    Plan:    Proceed with procedure as planned Procedure(s) (LRB):  BLOCK, SPINAL NERVE ROOT, LUMBAR, SELECTIVE, TRANSFORAMINAL APPROACH Left L5-S1, S1 RN IV sedation (Left)    Yo Kirkland MD  03/08/2022            "

## 2022-03-09 LAB — POCT GLUCOSE: 173 MG/DL (ref 70–110)

## 2022-03-10 ENCOUNTER — PATIENT MESSAGE (OUTPATIENT)
Dept: FAMILY MEDICINE | Facility: CLINIC | Age: 32
End: 2022-03-10
Payer: MEDICAID

## 2022-03-11 ENCOUNTER — LAB VISIT (OUTPATIENT)
Dept: LAB | Facility: HOSPITAL | Age: 32
End: 2022-03-11
Attending: REGISTERED NURSE
Payer: MEDICAID

## 2022-03-11 DIAGNOSIS — E11.9 TYPE 2 DIABETES MELLITUS WITHOUT COMPLICATION, WITHOUT LONG-TERM CURRENT USE OF INSULIN: ICD-10-CM

## 2022-03-11 PROCEDURE — 36415 COLL VENOUS BLD VENIPUNCTURE: CPT | Mod: PO | Performed by: REGISTERED NURSE

## 2022-03-11 PROCEDURE — 83036 HEMOGLOBIN GLYCOSYLATED A1C: CPT | Performed by: REGISTERED NURSE

## 2022-03-12 LAB
ESTIMATED AVG GLUCOSE: 260 MG/DL (ref 68–131)
HBA1C MFR BLD: 10.7 % (ref 4–5.6)

## 2022-03-16 ENCOUNTER — PATIENT MESSAGE (OUTPATIENT)
Dept: FAMILY MEDICINE | Facility: CLINIC | Age: 32
End: 2022-03-16
Payer: MEDICAID

## 2022-03-18 ENCOUNTER — HOSPITAL ENCOUNTER (EMERGENCY)
Facility: HOSPITAL | Age: 32
Discharge: HOME | End: 2022-03-18
Attending: EMERGENCY MEDICINE
Payer: MEDICAID

## 2022-03-18 ENCOUNTER — PATIENT MESSAGE (OUTPATIENT)
Dept: PAIN MEDICINE | Facility: CLINIC | Age: 32
End: 2022-03-18
Payer: MEDICAID

## 2022-03-18 VITALS
DIASTOLIC BLOOD PRESSURE: 89 MMHG | RESPIRATION RATE: 19 BRPM | TEMPERATURE: 98 F | HEART RATE: 94 BPM | HEIGHT: 64 IN | SYSTOLIC BLOOD PRESSURE: 130 MMHG | OXYGEN SATURATION: 100 % | WEIGHT: 269.94 LBS | BODY MASS INDEX: 46.08 KG/M2

## 2022-03-18 DIAGNOSIS — D72.829 LEUKOCYTOSIS, UNSPECIFIED TYPE: ICD-10-CM

## 2022-03-18 DIAGNOSIS — R73.9 HYPERGLYCEMIA: Primary | ICD-10-CM

## 2022-03-18 PROBLEM — Z78.9 SELF-CARE DEFICIT: Status: RESOLVED | Noted: 2021-12-03 | Resolved: 2022-03-18

## 2022-03-18 PROBLEM — L90.5 SCAR TISSUE: Status: RESOLVED | Noted: 2021-12-03 | Resolved: 2022-03-18

## 2022-03-18 PROBLEM — M62.81 PROXIMAL MUSCLE WEAKNESS: Status: RESOLVED | Noted: 2021-08-26 | Resolved: 2022-03-18

## 2022-03-18 PROBLEM — Z74.09 DECREASED FUNCTIONAL MOBILITY AND ENDURANCE: Status: RESOLVED | Noted: 2021-08-26 | Resolved: 2022-03-18

## 2022-03-18 PROBLEM — M25.60 DECREASED RANGE OF MOTION: Status: RESOLVED | Noted: 2021-12-03 | Resolved: 2022-03-18

## 2022-03-18 PROBLEM — T78.40XA HYPERSENSITIVITY: Status: RESOLVED | Noted: 2021-12-03 | Resolved: 2022-03-18

## 2022-03-18 PROBLEM — R53.1 DECREASED STRENGTH: Status: RESOLVED | Noted: 2021-12-03 | Resolved: 2022-03-18

## 2022-03-18 LAB
ALBUMIN SERPL BCP-MCNC: 3.9 G/DL (ref 3.5–5.2)
ALP SERPL-CCNC: 88 U/L (ref 55–135)
ALT SERPL W/O P-5'-P-CCNC: 48 U/L (ref 10–44)
ANION GAP SERPL CALC-SCNC: 14 MMOL/L (ref 8–16)
AST SERPL-CCNC: 30 U/L (ref 10–40)
BACTERIA #/AREA URNS AUTO: ABNORMAL /HPF
BASOPHILS # BLD AUTO: 0.06 K/UL (ref 0–0.2)
BASOPHILS NFR BLD: 0.3 % (ref 0–1.9)
BILIRUB SERPL-MCNC: 0.3 MG/DL (ref 0.1–1)
BILIRUB UR QL STRIP: NEGATIVE
BNP SERPL-MCNC: <10 PG/ML (ref 0–99)
BUN SERPL-MCNC: 14 MG/DL (ref 6–20)
CALCIUM SERPL-MCNC: 9.8 MG/DL (ref 8.7–10.5)
CHLORIDE SERPL-SCNC: 103 MMOL/L (ref 95–110)
CLARITY UR REFRACT.AUTO: CLEAR
CO2 SERPL-SCNC: 22 MMOL/L (ref 23–29)
COLOR UR AUTO: YELLOW
CREAT SERPL-MCNC: 0.7 MG/DL (ref 0.5–1.4)
DIFFERENTIAL METHOD: ABNORMAL
EOSINOPHIL # BLD AUTO: 0.1 K/UL (ref 0–0.5)
EOSINOPHIL NFR BLD: 0.6 % (ref 0–8)
ERYTHROCYTE [DISTWIDTH] IN BLOOD BY AUTOMATED COUNT: 14 % (ref 11.5–14.5)
EST. GFR  (AFRICAN AMERICAN): >60 ML/MIN/1.73 M^2
EST. GFR  (NON AFRICAN AMERICAN): >60 ML/MIN/1.73 M^2
GLUCOSE SERPL-MCNC: 146 MG/DL (ref 70–110)
GLUCOSE UR QL STRIP: ABNORMAL
HCT VFR BLD AUTO: 45.3 % (ref 37–48.5)
HGB BLD-MCNC: 15.3 G/DL (ref 12–16)
HGB UR QL STRIP: ABNORMAL
HYALINE CASTS UR QL AUTO: 0 /LPF
IMM GRANULOCYTES # BLD AUTO: 0.1 K/UL (ref 0–0.04)
IMM GRANULOCYTES NFR BLD AUTO: 0.6 % (ref 0–0.5)
KETONES UR QL STRIP: ABNORMAL
LEUKOCYTE ESTERASE UR QL STRIP: NEGATIVE
LYMPHOCYTES # BLD AUTO: 6.2 K/UL (ref 1–4.8)
LYMPHOCYTES NFR BLD: 35.9 % (ref 18–48)
MCH RBC QN AUTO: 27.2 PG (ref 27–31)
MCHC RBC AUTO-ENTMCNC: 33.8 G/DL (ref 32–36)
MCV RBC AUTO: 81 FL (ref 82–98)
MICROSCOPIC COMMENT: ABNORMAL
MONOCYTES # BLD AUTO: 1.3 K/UL (ref 0.3–1)
MONOCYTES NFR BLD: 7.4 % (ref 4–15)
NEUTROPHILS # BLD AUTO: 9.5 K/UL (ref 1.8–7.7)
NEUTROPHILS NFR BLD: 55.2 % (ref 38–73)
NITRITE UR QL STRIP: NEGATIVE
NRBC BLD-RTO: 0 /100 WBC
PH UR STRIP: 6 [PH] (ref 5–8)
PLATELET # BLD AUTO: 319 K/UL (ref 150–450)
PMV BLD AUTO: 11.6 FL (ref 9.2–12.9)
POTASSIUM SERPL-SCNC: 4.1 MMOL/L (ref 3.5–5.1)
PROT SERPL-MCNC: 7.5 G/DL (ref 6–8.4)
PROT UR QL STRIP: ABNORMAL
RBC # BLD AUTO: 5.63 M/UL (ref 4–5.4)
RBC #/AREA URNS AUTO: 7 /HPF (ref 0–4)
SODIUM SERPL-SCNC: 139 MMOL/L (ref 136–145)
SP GR UR STRIP: >=1.03 (ref 1–1.03)
TROPONIN I SERPL DL<=0.01 NG/ML-MCNC: <0.006 NG/ML (ref 0–0.03)
URN SPEC COLLECT METH UR: ABNORMAL
UROBILINOGEN UR STRIP-ACNC: NEGATIVE EU/DL
WBC # BLD AUTO: 17.2 K/UL (ref 3.9–12.7)
WBC #/AREA URNS AUTO: 0 /HPF (ref 0–5)

## 2022-03-18 PROCEDURE — 93005 ELECTROCARDIOGRAM TRACING: CPT | Mod: ER

## 2022-03-18 PROCEDURE — 99285 EMERGENCY DEPT VISIT HI MDM: CPT | Mod: 25,ER

## 2022-03-18 PROCEDURE — 96361 HYDRATE IV INFUSION ADD-ON: CPT | Mod: ER

## 2022-03-18 PROCEDURE — 93010 ELECTROCARDIOGRAM REPORT: CPT | Mod: ,,, | Performed by: INTERNAL MEDICINE

## 2022-03-18 PROCEDURE — 96375 TX/PRO/DX INJ NEW DRUG ADDON: CPT | Mod: ER

## 2022-03-18 PROCEDURE — 96365 THER/PROPH/DIAG IV INF INIT: CPT | Mod: ER

## 2022-03-18 PROCEDURE — 85025 COMPLETE CBC W/AUTO DIFF WBC: CPT | Mod: ER | Performed by: EMERGENCY MEDICINE

## 2022-03-18 PROCEDURE — 81000 URINALYSIS NONAUTO W/SCOPE: CPT | Mod: ER | Performed by: EMERGENCY MEDICINE

## 2022-03-18 PROCEDURE — 25000003 PHARM REV CODE 250: Mod: ER | Performed by: EMERGENCY MEDICINE

## 2022-03-18 PROCEDURE — 82962 GLUCOSE BLOOD TEST: CPT | Mod: ER

## 2022-03-18 PROCEDURE — 83880 ASSAY OF NATRIURETIC PEPTIDE: CPT | Mod: ER | Performed by: EMERGENCY MEDICINE

## 2022-03-18 PROCEDURE — 84484 ASSAY OF TROPONIN QUANT: CPT | Mod: ER | Performed by: EMERGENCY MEDICINE

## 2022-03-18 PROCEDURE — 96376 TX/PRO/DX INJ SAME DRUG ADON: CPT | Mod: ER

## 2022-03-18 PROCEDURE — 93010 EKG 12-LEAD: ICD-10-PCS | Mod: ,,, | Performed by: INTERNAL MEDICINE

## 2022-03-18 PROCEDURE — 80053 COMPREHEN METABOLIC PANEL: CPT | Mod: ER | Performed by: EMERGENCY MEDICINE

## 2022-03-18 PROCEDURE — 63600175 PHARM REV CODE 636 W HCPCS: Mod: ER | Performed by: EMERGENCY MEDICINE

## 2022-03-18 RX ORDER — TIZANIDINE 4 MG/1
4 TABLET ORAL EVERY 8 HOURS
Qty: 90 TABLET | Refills: 1 | Status: SHIPPED | OUTPATIENT
Start: 2022-03-18 | End: 2022-05-23 | Stop reason: SDUPTHER

## 2022-03-18 RX ORDER — ONDANSETRON 2 MG/ML
4 INJECTION INTRAMUSCULAR; INTRAVENOUS ONCE
Status: COMPLETED | OUTPATIENT
Start: 2022-03-18 | End: 2022-03-18

## 2022-03-18 RX ORDER — SODIUM CHLORIDE 9 MG/ML
1000 INJECTION, SOLUTION INTRAVENOUS ONCE
Status: COMPLETED | OUTPATIENT
Start: 2022-03-18 | End: 2022-03-18

## 2022-03-18 RX ORDER — SODIUM CHLORIDE 9 MG/ML
100 INJECTION, SOLUTION INTRAVENOUS CONTINUOUS
Status: DISCONTINUED | OUTPATIENT
Start: 2022-03-18 | End: 2022-03-18

## 2022-03-18 RX ADMIN — ONDANSETRON 4 MG: 2 INJECTION INTRAMUSCULAR; INTRAVENOUS at 09:03

## 2022-03-18 RX ADMIN — PROMETHAZINE HYDROCHLORIDE 12.5 MG: 25 INJECTION INTRAMUSCULAR; INTRAVENOUS at 11:03

## 2022-03-18 RX ADMIN — ONDANSETRON 4 MG: 2 INJECTION INTRAMUSCULAR; INTRAVENOUS at 10:03

## 2022-03-18 RX ADMIN — SODIUM CHLORIDE 1000 ML: 0.9 INJECTION, SOLUTION INTRAVENOUS at 09:03

## 2022-03-18 NOTE — PROGRESS NOTES
Subjective:       Patient ID: Nathan Carlos is a 31 y.o. female.      Chief Complaint   Patient presents with    Follow-up       HPI    Nathan is here for ER and diabetes follow-up.    Recent [facility] ER visit on 3/18/2022:    Chief Complaint   Patient presents with    Hyperglycemia       Pt present to ED with concerns of her blood sugar being elevated, pt reports a CBG of 286, pt reports feeling fatigue, dry mouth and nausea       The history is provided by the patient.     General Illness   The current episode started just prior to arrival. The problem occurs rarely. The problem has been unchanged. The pain is at a severity of 0/10. Nothing relieves the symptoms. Nothing aggravates the symptoms. Pertinent negatives include no fever, no decreased vision, no double vision, no eye itching, no photophobia, no abdominal pain, no constipation, no diarrhea, no nausea, no vomiting, no congestion, no ear discharge, no ear pain, no headaches, no hearing loss, no mouth sores, no rhinorrhea, no sore throat, no stridor, no swollen glands, no muscle aches, no neck pain, no neck stiffness, no cough, no shortness of breath, no URI, no wheezing, no rash, no discharge, no pain and no eye redness. She has received no recent medical care.       X-Ray Chest PA And Lateral    Narrative  EXAMINATION:  XR CHEST PA AND LATERAL    CLINICAL HISTORY:  Hyperglycemia;    TECHNIQUE:  PA and lateral views of the chest were performed.    COMPARISON:  None    FINDINGS:  Lungs are clear.  No acute osseous injury.  Prominent cardiac silhouette    Impression  No acute process seen      Admission on 03/18/2022, Discharged on 03/18/2022   Component Date Value Ref Range Status    WBC 03/18/2022 17.20 (A) 3.90 - 12.70 K/uL Final    RBC 03/18/2022 5.63 (A) 4.00 - 5.40 M/uL Final    Hemoglobin 03/18/2022 15.3  12.0 - 16.0 g/dL Final    Hematocrit 03/18/2022 45.3  37.0 - 48.5 % Final    MCV 03/18/2022 81 (A) 82 - 98 fL Final    MCH  03/18/2022 27.2  27.0 - 31.0 pg Final    MCHC 03/18/2022 33.8  32.0 - 36.0 g/dL Final    RDW 03/18/2022 14.0  11.5 - 14.5 % Final    Platelets 03/18/2022 319  150 - 450 K/uL Final    MPV 03/18/2022 11.6  9.2 - 12.9 fL Final    Immature Granulocytes 03/18/2022 0.6 (A) 0.0 - 0.5 % Final    Gran # (ANC) 03/18/2022 9.5 (A) 1.8 - 7.7 K/uL Final    Immature Grans (Abs) 03/18/2022 0.10 (A) 0.00 - 0.04 K/uL Final    Comment: Mild elevation in immature granulocytes is non specific and   can be seen in a variety of conditions including stress response,   acute inflammation, trauma and pregnancy. Correlation with other   laboratory and clinical findings is essential.      Lymph # 03/18/2022 6.2 (A) 1.0 - 4.8 K/uL Final    Mono # 03/18/2022 1.3 (A) 0.3 - 1.0 K/uL Final    Eos # 03/18/2022 0.1  0.0 - 0.5 K/uL Final    Baso # 03/18/2022 0.06  0.00 - 0.20 K/uL Final    nRBC 03/18/2022 0  0 /100 WBC Final    Gran % 03/18/2022 55.2  38.0 - 73.0 % Final    Lymph % 03/18/2022 35.9  18.0 - 48.0 % Final    Mono % 03/18/2022 7.4  4.0 - 15.0 % Final    Eosinophil % 03/18/2022 0.6  0.0 - 8.0 % Final    Basophil % 03/18/2022 0.3  0.0 - 1.9 % Final    Differential Method 03/18/2022 Automated   Final    Sodium 03/18/2022 139  136 - 145 mmol/L Final    Potassium 03/18/2022 4.1  3.5 - 5.1 mmol/L Final    Chloride 03/18/2022 103  95 - 110 mmol/L Final    CO2 03/18/2022 22 (A) 23 - 29 mmol/L Final    Glucose 03/18/2022 146 (A) 70 - 110 mg/dL Final    BUN 03/18/2022 14  6 - 20 mg/dL Final    Creatinine 03/18/2022 0.7  0.5 - 1.4 mg/dL Final    Calcium 03/18/2022 9.8  8.7 - 10.5 mg/dL Final    Total Protein 03/18/2022 7.5  6.0 - 8.4 g/dL Final    Albumin 03/18/2022 3.9  3.5 - 5.2 g/dL Final    Total Bilirubin 03/18/2022 0.3  0.1 - 1.0 mg/dL Final    Comment: For infants and newborns, interpretation of results should be based  on gestational age, weight and in agreement with clinical  observations.    Premature Infant  recommended reference ranges:  Up to 24 hours.............<8.0 mg/dL  Up to 48 hours............<12.0 mg/dL  3-5 days..................<15.0 mg/dL  6-29 days.................<15.0 mg/dL      Alkaline Phosphatase 03/18/2022 88  55 - 135 U/L Final    AST 03/18/2022 30  10 - 40 U/L Final    ALT 03/18/2022 48 (A) 10 - 44 U/L Final    Anion Gap 03/18/2022 14  8 - 16 mmol/L Final    eGFR if African American 03/18/2022 >60.0  >60 mL/min/1.73 m^2 Final    eGFR if non African American 03/18/2022 >60.0  >60 mL/min/1.73 m^2 Final    Comment: Calculation used to obtain the estimated glomerular filtration  rate (eGFR) is the CKD-EPI equation.       Specimen UA 03/18/2022 Urine, Clean Catch   Final    Color, UA 03/18/2022 Yellow  Yellow, Straw, Haley Final    Appearance, UA 03/18/2022 Clear  Clear Final    pH, UA 03/18/2022 6.0  5.0 - 8.0 Final    Specific Gravity, UA 03/18/2022 >=1.030 (A) 1.005 - 1.030 Final    Protein, UA 03/18/2022 2+ (A) Negative Final    Comment: Recommend a 24 hour urine protein or a urine   protein/creatinine ratio if globulin induced proteinuria is  clinically suspected.      Glucose, UA 03/18/2022 2+ (A) Negative Final    Ketones, UA 03/18/2022 1+ (A) Negative Final    Bilirubin (UA) 03/18/2022 Negative  Negative Final    Occult Blood UA 03/18/2022 2+ (A) Negative Final    Nitrite, UA 03/18/2022 Negative  Negative Final    Urobilinogen, UA 03/18/2022 Negative  <2.0 EU/dL Final    Leukocytes, UA 03/18/2022 Negative  Negative Final    Troponin I 03/18/2022 <0.006  0.000 - 0.026 ng/mL Final    BNP 03/18/2022 <10  0 - 99 pg/mL Final    Values of less than 100 pg/ml are consistent with non-CHF populations.    RBC, UA 03/18/2022 7 (A) 0 - 4 /hpf Final    WBC, UA 03/18/2022 0  0 - 5 /hpf Final    Bacteria 03/18/2022 None  None-Occ /hpf Final    Hyaline Casts, UA 03/18/2022 0  0-1/lpf /lpf Final    POCT Glucose 03/18/2022 145 (A) 70 - 110 mg/dL Final         Medications   ondansetron  injection 4 mg (4 mg Intravenous Given 3/18/22 2125)   0.9%  NaCl infusion (0 mLs Intravenous Stopped 3/18/22 2255)   ondansetron injection 4 mg (4 mg Intravenous Given 3/18/22 2204)   promethazine (PHENERGAN) 12.5 mg in dextrose 5 % 50 mL IVPB (0 mg Intravenous Stopped 3/18/22 2334)         11:21 PM - Re-evaluation: The patient is resting comfortably and is in no acute distress. She states that her symptoms have improved after treatment within ER.  Pt declines any acute pain in back/lower extremities, no fever, no LOC, no HA, no vision/speech changes.  Pt states she has recently received an epidural steroid injection.  Denies any pain/fever. Discussed test results, shared treatment plan, specific conditions for return, and importance of follow up with patient and family.  Advised close f/u c pcp and to return to ER if symptoms persist or worsen.   She understands and agrees with the plan as discussed. Answered  her questions at this time. She has remained hemodynamically stable throughout the ED course and is appropriate for discharge home.      Regarding HYPERGLYCEMIA, I advised patient that symptoms of an elevated blood glucose include fatigue, frequent urination, blurry vision, and feeling thirsty.  I advised patient that hyperglycemia can occur when one eats too much food, is ill, is under a lot of stress,  or does not take medications for diabetes as prescribed.  I reiterated that healthy eating is an important part of controlling blood glucose level.  I recommended that the patient: eat a variety of foods every day from all the food groups; eat meals at regular times every day and do not skip meals; choose high fiber carbohydrates (such as whole grains, legumes, and fruit) more often than less healthy carbohydrates (like white bread and sweets); and drink water and sugar-free beverages rather than sweetened beverages. I instructed patient to see care in the ED or with primary care provider if they experience:  severe abdominal pain;  pain that spreads to the back; have increased vomiting; have trouble staying awake or focusing; are shaking or sweating; have blurred or double vision; have a fruity, sweet smell to breath; experience breathing  that is deep and labored, or rapid and shallow; or have a heartbeat that is fast and weak.     Pre-hypertension/Hypertension: The pt has been informed that they may have pre-hypertension or hypertension based on a blood pressure reading in the ED. I recommend that the pt call the PCP listed on their discharge instructions or a physician of their choice this week to arrange f/u for further evaluation of possible pre-hypertension or hypertension.         Some stomach cramps but thought it was due to cycle last week.  No menses since pain started, does have irregular periods.  Noted to have blood in urine at ER, h/o kidney stones with last episode being ~ 2018.  ALT elevated but had been taking Norco for pain, does have h/o fatty liver syndrome per US (see chart for US report dated 1/10/2022).  Told her WBC elevated due to recent steroids.  Treated nausea in ER, helped.  Still nauseated, Phenergan and Zofran not helping at home.  Some back pain reported, slowly improving.    Drinking lots of water.  Reports dry mouth but no urinary frequency.  Not able to quench thirst.  Watching diet, smaller portions.  Decreased appetite.  Cutting out bread  Minimal intake of rice, using air fryer.  On diabetes medication as ordered.  Last eye exam 12/2021.  Denies leg/foot/toe neuropathy s/s.    FBS in AM --- 140 to 170      Diabetes Medications             glimepiride (AMARYL) 4 MG tablet Take 1 tablet (4 mg total) by mouth daily with breakfast.    metFORMIN (GLUCOPHAGE-XR) 750 MG ER 24hr tablet Take 1 tablet (750 mg total) by mouth 2 (two) times daily with meals.    SITagliptin (JANUVIA) 50 MG Tab Take 1 tablet (50 mg total) by mouth once daily.            Lab Results   Component Value Date     HGBA1C 10.7 (H) 03/11/2022         Wt Readings from Last 3 Encounters:   03/22/22 0802 121.6 kg (267 lb 15.5 oz)   03/18/22 2037 122.4 kg (269 lb 15.3 oz)   03/08/22 0929 122.4 kg (269 lb 15.3 oz)           Review of Systems   Constitutional: Positive for appetite change, fatigue and unexpected weight change (gradual minimal loss). Negative for activity change, chills, diaphoresis and fever.   HENT: Negative.    Eyes: Negative for photophobia and visual disturbance.   Respiratory: Negative.    Cardiovascular: Negative.    Gastrointestinal: Positive for abdominal pain, nausea and vomiting. Negative for abdominal distention, blood in stool, constipation and diarrhea.   Endocrine: Positive for polydipsia and polyuria. Negative for polyphagia.   Genitourinary: Positive for hematuria. Negative for dysuria, flank pain and pelvic pain.   Musculoskeletal: Positive for back pain (grad improving).   Skin: Negative for rash and wound.   Neurological: Positive for headaches. Negative for tremors, syncope, light-headedness and numbness.   Psychiatric/Behavioral: Positive for sleep disturbance. Negative for agitation, decreased concentration and dysphoric mood. The patient is not nervous/anxious.          Review of patient's allergies indicates:   Allergen Reactions    Amitriptyline Xerostomia    Amlodipine Dry mouth    Tramadol Itching         Patient Active Problem List   Diagnosis    Essential hypertension    Gastroesophageal reflux disease    Hypercholesteremia    High serum testosterone    HSV-2 (herpes simplex virus 2) infection    Nephrolithiasis    Chronic bilateral low back pain with left-sided sciatica    Polycystic ovaries    Lumbar facet arthropathy    Type 2 diabetes mellitus without complication, without long-term current use of insulin    Carpal tunnel syndrome    Paresthesias in left hand    Morbid obesity with BMI of 40.0-44.9, adult    Lumbar facet joint pain    Spondylosis without myelopathy  or radiculopathy, lumbar region    Left carpal tunnel syndrome    Sacroiliac joint dysfunction    Hyperglycemia    Leukocytosis           Current Outpatient Medications:     atorvastatin (LIPITOR) 40 MG tablet, Take 1 tablet (40 mg total) by mouth nightly., Disp: 90 tablet, Rfl: 1    blood sugar diagnostic Strp, To check BG 2 times daily, to use with insurance preferred meter, Disp: 100 strip, Rfl: 2    blood-glucose meter kit, To check BG 2 times daily, to use with insurance preferred meter, Disp: 1 each, Rfl: 0    clotrimazole-betamethasone 1-0.05% (LOTRISONE) cream, Apply topically 2 (two) times daily., Disp: 45 g, Rfl: 1    dicyclomine (BENTYL) 20 mg tablet, Take 1 tablet (20 mg total) by mouth 3 (three) times daily as needed (abdominal pain)., Disp: 90 tablet, Rfl: 0    ergocalciferol (ERGOCALCIFEROL) 50,000 unit Cap, TAKE 1 CAPSULE BY MOUTH ONCE A WEEK FOR 28 DAYS, Disp: , Rfl:     fluconazole (DIFLUCAN) 150 MG Tab, TAKE 1 TABLET BY MOUTH NOW THEN TAKE 1 TABLET BY MOUTH IN 3 DAYS IF NEEDED, Disp: 2 tablet, Rfl: 0    glimepiride (AMARYL) 4 MG tablet, Take 1 tablet (4 mg total) by mouth daily with breakfast., Disp: 90 tablet, Rfl: 1    HYDROcodone-acetaminophen (NORCO) 5-325 mg per tablet, Take 1 tablet by mouth every 6 (six) hours as needed for Pain., Disp: 12 tablet, Rfl: 0    irbesartan (AVAPRO) 150 MG tablet, Take 1 tablet by mouth once daily, Disp: 90 tablet, Rfl: 1    lancets Misc, To check BG 2 times daily, to use with insurance preferred meter, Disp: 100 each, Rfl: 2    linaCLOtide (LINZESS) 145 mcg Cap capsule, Take 1 capsule (145 mcg total) by mouth before breakfast., Disp: 90 capsule, Rfl: 3    metFORMIN (GLUCOPHAGE-XR) 750 MG ER 24hr tablet, Take 1 tablet (750 mg total) by mouth 2 (two) times daily with meals., Disp: 180 tablet, Rfl: 1    metoprolol succinate (TOPROL-XL) 50 MG 24 hr tablet, Take 1 tablet by mouth once daily, Disp: 90 tablet, Rfl: 3    mirtazapine (REMERON) 15 MG  "tablet, Take 15 mg by mouth nightly., Disp: , Rfl:     ondansetron (ZOFRAN-ODT) 4 MG TbDL, Take 1 tablet (4 mg total) by mouth every 6 (six) hours as needed (nausea and vomiting)., Disp: 20 tablet, Rfl: 0    promethazine (PHENERGAN) 25 MG tablet, TAKE 1 TABLET BY MOUTH EVERY 6 HOURS AS NEEDED FOR NAUSEA, Disp: 30 tablet, Rfl: 0    SITagliptin (JANUVIA) 50 MG Tab, Take 1 tablet (50 mg total) by mouth once daily., Disp: 90 tablet, Rfl: 1    spironolactone (ALDACTONE) 25 MG tablet, Take 2 tablets by mouth once daily, Disp: 180 tablet, Rfl: 3    tiZANidine (ZANAFLEX) 4 MG tablet, Take 1 tablet (4 mg total) by mouth every 8 (eight) hours., Disp: 90 tablet, Rfl: 1    valACYclovir (VALTREX) 500 MG tablet, TAKE 1 TABLET BY MOUTH TWICE DAILY FOR 3 DAYS, Disp: 6 tablet, Rfl: 0        Past medical, surgical, family and social histories have been reviewed today.      Objective:     Vitals:    03/22/22 0802   BP: 130/82   Pulse: 80   Temp: 97.1 °F (36.2 °C)   TempSrc: Temporal   SpO2: 97%   Weight: 121.6 kg (267 lb 15.5 oz)   Height: 5' 4" (1.626 m)   PainSc:   6   PainLoc: Back         Physical Exam  Vitals reviewed.   Constitutional:       General: She is not in acute distress.  HENT:      Head: Normocephalic and atraumatic.   Eyes:      Pupils: Pupils are equal, round, and reactive to light.   Neck:      Vascular: No carotid bruit.   Cardiovascular:      Rate and Rhythm: Normal rate and regular rhythm.      Pulses: Normal pulses.      Heart sounds: Normal heart sounds.   Pulmonary:      Effort: Pulmonary effort is normal.      Breath sounds: Normal breath sounds.   Abdominal:      General: Abdomen is protuberant. Bowel sounds are normal.      Palpations: Abdomen is soft.      Tenderness: There is generalized abdominal tenderness. There is no right CVA tenderness, left CVA tenderness or guarding.   Musculoskeletal:         General: Normal range of motion.      Cervical back: Normal range of motion and neck supple. No " "rigidity.      Right lower leg: No edema.      Left lower leg: No edema.   Skin:     Capillary Refill: Capillary refill takes less than 2 seconds.   Neurological:      Mental Status: She is alert and oriented to person, place, and time.      Sensory: No sensory deficit.      Motor: No weakness.      Coordination: Coordination normal.      Gait: Gait normal.   Psychiatric:         Mood and Affect: Mood normal.         Behavior: Behavior normal.         Thought Content: Thought content normal.         Judgment: Judgment normal.           Assessment     1. Uncontrolled type 2 diabetes mellitus with hyperglycemia  - metFORMIN (GLUCOPHAGE-XR) 500 MG ER 24hr tablet; Take 2 tablet (500 mg total) by mouth 2 (two) times daily with meals.  Dispense: 360 tablet; Refill: 1  - SITagliptin (JANUVIA) 100 MG Tab; Take 1 tablet (100 mg total) by mouth once daily.  Dispense: 90 tablet; Refill: 1  - semaglutide (OZEMPIC) 0.25 mg or 0.5 mg(2 mg/1.5 mL) pen injector; Inject 0.25 mg into the skin every 7 days.  Dispense: 2 pen; Refill: 1  - insulin detemir U-100 (LEVEMIR FLEXTOUCH U-100 INSULN) 100 unit/mL (3 mL) InPn pen; Inject 15 Units into the skin every evening.  Dispense: 13.5 mL; Refill: 1  - CBC Auto Differential; Future  - Comprehensive Metabolic Panel; Future  - Hemoglobin A1C; Future  - MICROALBUMIN / CREATININE RATIO URINE  - pen needle, diabetic (BD ULTRA-FINE GAYLA PEN NEEDLE) 32 gauge x 5/32" Ndle; 1 Units by Misc.(Non-Drug; Combo Route) route once daily.  Dispense: 100 each; Refill: 6  - MyChart Patient Entered Glucose  - MyChart Patient Entered Dietary  Uncontrolled on current regimen.  Increased metformin to 2 tabs bid to max out dosage -- take w/ food to minimize GI upset.  Continue Amaryl, no changes.  Increased Januvia from 50 to 100 mg daily.  Added Ozempic and basal PM insulin.  My-Chart elo to log in home BS and dietary intake.  Check lab today, repeat A1c in 3 mo.    2. Hemoglobin A1C greater than 9%, indicating " poor diabetic control  - metFORMIN (GLUCOPHAGE-XR) 500 MG ER 24hr tablet; Take 2 tablet (500 mg total) by mouth 2 (two) times daily with meals.  Disp: 360 tablet; Refill: 1  - SITagliptin (JANUVIA) 100 MG Tab; Take 1 tablet (100 mg total) by mouth once daily.  Dispense: 90 tablet; Refill: 1  - semaglutide (OZEMPIC) 0.25 mg or 0.5 mg(2 mg/1.5 mL) pen injector; Inject 0.25 mg into the skin every 7 days.  Dispense: 2 pen; Refill: 1  - insulin detemir U-100 (LEVEMIR FLEXTOUCH U-100 INSULN) 100 unit/mL (3 mL) InPn pen; Inject 15 Units into the skin every evening.  Dispense: 13.5 mL; Refill: 1  - CBC Auto Differential; Future  - Comprehensive Metabolic Panel; Future  - Hemoglobin A1C; Future  SEE # 1    3. Essential hypertension  - CBC Auto Differential; Future  - Comprehensive Metabolic Panel; Future  Stable, controlled on current medication.  DASH diet, exercise.  Reduce weight.  Home bp checks with log.    4. Hypercholesteremia  Low HDL < 40  Healthy diet and lifestyle changes.      Component      Latest Ref Rng & Units 2/28/2022   Cholesterol      120 - 199 mg/dL 125   Triglycerides      30 - 150 mg/dL 96   HDL      40 - 75 mg/dL 32 (L)   LDL Cholesterol External      63.0 - 159.0 mg/dL 73.8   HDL/Cholesterol Ratio      20.0 - 50.0 % 25.6   Total Cholesterol/HDL Ratio      2.0 - 5.0 3.9   Non-HDL Cholesterol      mg/dL 93       5. Renal stone  - US Retroperitoneal Complete; Future  Recent ER visit due to abd pain with NV.  US pending.  Last episode ~ 2018.    6. Hematuria, unspecified type  - US Retroperitoneal Complete; Future  SEE # 5    7. Nausea and vomiting, intractability of vomiting not specified, unspecified vomiting type  - doxylamine-pyridoxine, vit B6, (DICLEGIS) 10-10 mg TbEC; Take 1-2 tablets by mouth every evening. FOR NAUSEA/VOMITING  Dispense: 30 tablet; Refill: 0  - Comprehensive Metabolic Panel; Future  Trial of Diclegis as both Phenergan and Zofran have not been effective.  Check lytes.    8.  Leukocytosis, unspecified type  - CBC Auto Differential; Future  - Comprehensive Metabolic Panel; Future  WBC noted to be elev at recent ER visit, thought to be due to recent steroid use.  Check lab.    9. Elevated ALT measurement  - Comprehensive Metabolic Panel; Future  ALT elevated at recent ER visit --- Tylenol intake vs fatty liver.  Check lab.    10. Fatty infiltration of liver  Elevated ALT, obesity, c/o stomach pain.  SEE # 9    11. High serum testosterone  - Testosterone; Future  Check lab, on spironolactone as ordered.    12. Morbid obesity with BMI of 40.0-44.9, adult --- healthy diet, reg ex, weight loss.         Follow-up     Further treatment plan pending lab.  A1C due in 3 months.  Return to clinic as needed.      VANCE Wagner  [facility]       45 minutes of total time spent on the encounter, which includes face to face time and non-face to face time preparing to see the patient (eg, review of tests), obtaining and/or reviewing separately obtained history, and documenting clinical information in the electronic or other health record.  Also includes independent interpretation of results (not separately reported) and communicating results to the patient/family/caregiver, with care coordination (not separately reported).

## 2022-03-19 ENCOUNTER — DOCUMENTATION ONLY (OUTPATIENT)
Dept: REHABILITATION | Facility: HOSPITAL | Age: 32
End: 2022-03-19
Payer: MEDICAID

## 2022-03-19 LAB — POCT GLUCOSE: 145 MG/DL (ref 70–110)

## 2022-03-19 NOTE — ED PROVIDER NOTES
Encounter Date: 3/18/2022       History     Chief Complaint   Patient presents with    Hyperglycemia     Pt present to ED with concerns of her blood sugar being elevated, pt reports a CBG of 286, pt reports feeling fatigue, dry mouth and nausea      The history is provided by the patient.   General Illness   The current episode started just prior to arrival. The problem occurs rarely. The problem has been unchanged. The pain is at a severity of 0/10. Nothing relieves the symptoms. Nothing aggravates the symptoms. Pertinent negatives include no fever, no decreased vision, no double vision, no eye itching, no photophobia, no abdominal pain, no constipation, no diarrhea, no nausea, no vomiting, no congestion, no ear discharge, no ear pain, no headaches, no hearing loss, no mouth sores, no rhinorrhea, no sore throat, no stridor, no swollen glands, no muscle aches, no neck pain, no neck stiffness, no cough, no shortness of breath, no URI, no wheezing, no rash, no discharge, no pain and no eye redness. She has received no recent medical care.     Review of patient's allergies indicates:   Allergen Reactions    Amitriptyline      xerostomia    Amlodipine      DRY MOUTH    Tramadol Itching     Past Medical History:   Diagnosis Date    Carpal tunnel syndrome     Diabetes mellitus     GERD (gastroesophageal reflux disease)     Herpes simplex virus (HSV) infection     High serum testosterone 9/7/2017    Hyperlipidemia     Hypertension     Insulin resistance syndrome 9/7/2017    Lumbar facet arthropathy 8/14/2019    Morbid obesity     Ovarian cyst     bilateral      Past Surgical History:   Procedure Laterality Date    ANKLE SURGERY Left     BUNIONECTOMY Bilateral     CARPAL TUNNEL RELEASE Left 3/19/2021    Procedure: RELEASE, CARPAL TUNNEL;  Surgeon: Juan F Bernard MD;  Location: [facility];  Service: Orthopedics;  Laterality: Left;    CARPAL TUNNEL RELEASE Right 11/4/2021    Procedure: RELEASE, CARPAL  TUNNEL;  Surgeon: Juan F Bernard MD;  Location: [facility] OR;  Service: Orthopedics;  Laterality: Right;    COLONOSCOPY  06/12/2018    ESOPHAGOGASTRODUODENOSCOPY      ESOPHAGOGASTRODUODENOSCOPY N/A 12/2/2021    Procedure: EGD (ESOPHAGOGASTRODUODENOSCOPY);  Surgeon: Pili Eagle MD;  Location: [facility] ENDO;  Service: Endoscopy;  Laterality: N/A;    INJECTION OF ANESTHETIC AGENT AROUND MEDIAL BRANCH NERVES INNERVATING LUMBAR FACET JOINT Bilateral 8/14/2019    Procedure: Bilateral L3-5 MBB;  Surgeon: Yo Kirkland MD;  Location: [facility] PAIN MGT;  Service: Pain Management;  Laterality: Bilateral;    INJECTION OF ANESTHETIC AGENT AROUND MEDIAL BRANCH NERVES INNERVATING LUMBAR FACET JOINT Bilateral 7/16/2020    Procedure: Bilateral L3-5 MBB;  Surgeon: Yo Kirkland MD;  Location: [facility] PAIN MGT;  Service: Pain Management;  Laterality: Bilateral;    INJECTION OF ANESTHETIC AGENT INTO SACROILIAC JOINT Right 2/5/2020    Procedure: Right SIJ Injection with local;  Surgeon: Yo Kirkland MD;  Location: [facility] PAIN MGT;  Service: Pain Management;  Laterality: Right;    INJECTION OF ANESTHETIC AGENT INTO SACROILIAC JOINT Left 6/17/2021    Procedure: Left GT bursa + left SIJ injection;  Surgeon: Yo Kirkland MD;  Location: [facility] PAIN [facility]GT;  Service: Pain Management;  Laterality: Left;    INJECTION OF JOINT Left 6/17/2021    Procedure: Left GT bursa + left SIJ injection;  Surgeon: Yo Kirkland MD;  Location: [facility] PAIN [facility]GT;  Service: Pain Management;  Laterality: Left;    LUMBAR FUSION      RADIOFREQUENCY THERMOCOAGULATION Right 12/8/2020    Procedure: RADIOFREQUENCY THERMAL COAGULATION Right L3, 4,5 MBB RFA with RN IV sedation// NO STEROID;  Surgeon: Yo Kirkland MD;  Location: [facility] PAIN [facility]GT;  Service: Pain Management;  Laterality: Right;    RADIOFREQUENCY THERMOCOAGULATION Left 1/21/2021    Procedure: Left L3-5 Lumbar RFA;  Surgeon: Rian Chaney MD;  Location: [facility] PAIN [facility]GT;  Service: Pain Management;  Laterality: Left;     SELECTIVE INJECTION OF ANESTHETIC AGENT AROUND LUMBAR SPINAL NERVE ROOT BY TRANSFORAMINAL APPROACH Left 3/8/2022    Procedure: BLOCK, SPINAL NERVE ROOT, LUMBAR, SELECTIVE, TRANSFORAMINAL APPROACH Left L5-S1, S1 RN IV sedation;  Surgeon: Yo Kirkland MD;  Location: [facility];  Service: Pain Management;  Laterality: Left;     Family History   Problem Relation Age of Onset    Hypertension Mother     Fibroids Mother     No Known Problems Father     Diabetes Sister     Hypertension Brother     Hypertension Maternal Aunt     Diabetes Maternal Aunt     Diabetes Maternal Uncle     Hypertension Maternal Grandmother     Leukemia Maternal Grandfather     Hypertension Maternal Grandfather     Stroke Neg Hx      Social History     Tobacco Use    Smoking status: Never Smoker    Smokeless tobacco: Never Used   Substance Use Topics    Alcohol use: Yes     Comment: special occasions    Drug use: No     Review of Systems   Constitutional: Negative for fever.   HENT: Negative for congestion, ear discharge, ear pain, hearing loss, mouth sores, rhinorrhea and sore throat.    Eyes: Negative for double vision, photophobia, pain, discharge, redness and itching.   Respiratory: Negative for cough, shortness of breath, wheezing and stridor.    Cardiovascular: Negative for chest pain.   Gastrointestinal: Negative for abdominal pain, constipation, diarrhea, nausea and vomiting.   Genitourinary: Negative for dysuria.   Musculoskeletal: Negative for back pain and neck pain.   Skin: Negative for rash.   Neurological: Negative for weakness and headaches.   Hematological: Does not bruise/bleed easily.   All other systems reviewed and are negative.      Physical Exam     Initial Vitals [03/18/22 2037]   BP Pulse Resp Temp SpO2   (!) 170/93 103 20 98.1 °F (36.7 °C) 96 %      MAP       --         Physical Exam    Nursing note and vitals reviewed.  Constitutional: She appears well-developed and well-nourished.   HENT:   Head:  Normocephalic and atraumatic.   Mouth/Throat: No oropharyngeal exudate.   Eyes: Conjunctivae and EOM are normal. Pupils are equal, round, and reactive to light.   Neck: Neck supple. No thyromegaly present.   Normal range of motion.  Cardiovascular: Normal rate, regular rhythm, normal heart sounds and intact distal pulses. Exam reveals no gallop and no friction rub.    No murmur heard.  Pulmonary/Chest: Breath sounds normal. No respiratory distress. She has no wheezes. She has no rhonchi. She exhibits no tenderness.   Abdominal: Abdomen is soft. Bowel sounds are normal. She exhibits no distension. There is no abdominal tenderness. There is no rebound and no guarding.   Musculoskeletal:         General: No tenderness or edema. Normal range of motion.      Right shoulder: Normal.      Left shoulder: Normal.      Right upper arm: Normal.      Left upper arm: Normal.      Right elbow: Normal.      Left elbow: Normal.      Right forearm: Normal.      Left forearm: Normal.      Right wrist: Normal. Normal pulse.      Left wrist: Normal. Normal pulse.      Right hand: Normal. Normal capillary refill. Normal pulse.      Left hand: Normal. Normal capillary refill. Normal pulse.      Cervical back: Normal, normal range of motion and neck supple.      Thoracic back: Normal.      Lumbar back: Normal.      Right hip: Normal.      Left hip: Normal.      Right upper leg: Normal.      Left upper leg: Normal.      Right knee: Normal.      Left knee: Normal.      Right lower leg: Normal.      Left lower leg: Normal.      Right ankle: Normal. Normal pulse.      Right Achilles Tendon: Normal.      Left ankle: Normal. Normal pulse.      Left Achilles Tendon: Normal.      Right foot: Normal. Normal capillary refill. Normal pulse.      Left foot: Normal. Normal capillary refill. Normal pulse.     Lymphadenopathy:     She has no cervical adenopathy.   Neurological: She is alert and oriented to person, place, and time. She has normal  strength. No cranial nerve deficit or sensory deficit. GCS score is 15. GCS eye subscore is 4. GCS verbal subscore is 5. GCS motor subscore is 6.   Skin: Skin is warm and dry. Capillary refill takes less than 2 seconds. No rash noted.   Psychiatric: She has a normal mood and affect. Her behavior is normal. Judgment and thought content normal.         ED Course   Procedures  Labs Reviewed   CBC W/ AUTO DIFFERENTIAL - Abnormal; Notable for the following components:       Result Value    WBC 17.20 (*)     RBC 5.63 (*)     MCV 81 (*)     Immature Granulocytes 0.6 (*)     Gran # (ANC) 9.5 (*)     Immature Grans (Abs) 0.10 (*)     Lymph # 6.2 (*)     Mono # 1.3 (*)     All other components within normal limits   COMPREHENSIVE METABOLIC PANEL - Abnormal; Notable for the following components:    CO2 22 (*)     Glucose 146 (*)     ALT 48 (*)     All other components within normal limits   URINALYSIS, REFLEX TO URINE CULTURE - Abnormal; Notable for the following components:    Specific Gravity, UA >=1.030 (*)     Protein, UA 2+ (*)     Glucose, UA 2+ (*)     Ketones, UA 1+ (*)     Occult Blood UA 2+ (*)     All other components within normal limits    Narrative:     Specimen Source->Urine   URINALYSIS MICROSCOPIC - Abnormal; Notable for the following components:    RBC, UA 7 (*)     All other components within normal limits    Narrative:     Specimen Source->Urine   TROPONIN I   B-TYPE NATRIURETIC PEPTIDE   POCT GLUCOSE MONITORING CONTINUOUS     EKG Readings: (Independently Interpreted)   Initial Reading: No STEMI. Rhythm: Normal Sinus Rhythm. Heart Rate: 95. Ectopy: No Ectopy. Conduction: Normal. ST Segments: Normal ST Segments. T Waves: Normal. Axis: Normal. Clinical Impression: Normal Sinus Rhythm       Imaging Results          X-Ray Chest PA And Lateral (Final result)  Result time 03/18/22 21:44:17    Final result by Roslyn Peña MD (03/18/22 21:44:17)                 Impression:      No acute process  "seen      Electronically signed by: Casey Roslyn  Date:    03/18/2022  Time:    21:44             Narrative:    EXAMINATION:  XR CHEST PA AND LATERAL    CLINICAL HISTORY:  Hyperglycemia;    TECHNIQUE:  PA and lateral views of the chest were performed.    COMPARISON:  None    FINDINGS:  Lungs are clear.  No acute osseous injury.  Prominent cardiac silhouette                                 Medications   ondansetron injection 4 mg (4 mg Intravenous Given 3/18/22 2125)   0.9%  NaCl infusion (0 mLs Intravenous Stopped 3/18/22 2255)   ondansetron injection 4 mg (4 mg Intravenous Given 3/18/22 2204)   promethazine (PHENERGAN) 12.5 mg in dextrose 5 % 50 mL IVPB (0 mg Intravenous Stopped 3/18/22 2334)                       Vitals:    03/18/22 2037 03/18/22 2104 03/18/22 2107 03/18/22 2110   BP: (!) 170/93 137/73 (!) 156/94 (!) 143/89   Pulse: 103 98 97 103   Resp: 20      Temp: 98.1 °F (36.7 °C)      TempSrc: Oral      SpO2: 96%      Weight: 122.4 kg (269 lb 15.3 oz)      Height: 5' 4" (1.626 m)       03/18/22 2112 03/18/22 2124 03/18/22 2248 03/18/22 2302   BP: (!) 143/89 (!) 136/92 (!) 155/84 (!) 154/81   Pulse: 103 109 95 96   Resp: 20  (!) 28 (!) 26   Temp:       TempSrc:       SpO2: 100% 100% 100% 100%   Weight:       Height:        03/18/22 2332 03/18/22 2348   BP: (!) 149/105 130/89   Pulse: 99 94   Resp: (!) 21 19   Temp:     TempSrc:     SpO2: 100% 100%   Weight:     Height:         Results for orders placed or performed during the hospital encounter of 03/18/22   CBC auto differential   Result Value Ref Range    WBC 17.20 (H) 3.90 - 12.70 K/uL    RBC 5.63 (H) 4.00 - 5.40 M/uL    Hemoglobin 15.3 12.0 - 16.0 g/dL    Hematocrit 45.3 37.0 - 48.5 %    MCV 81 (L) 82 - 98 fL    MCH 27.2 27.0 - 31.0 pg    MCHC 33.8 32.0 - 36.0 g/dL    RDW 14.0 11.5 - 14.5 %    Platelets 319 150 - 450 K/uL    MPV 11.6 9.2 - 12.9 fL    Immature Granulocytes 0.6 (H) 0.0 - 0.5 %    Gran # (ANC) 9.5 (H) 1.8 - 7.7 K/uL    Immature Grans (Abs) " 0.10 (H) 0.00 - 0.04 K/uL    Lymph # 6.2 (H) 1.0 - 4.8 K/uL    Mono # 1.3 (H) 0.3 - 1.0 K/uL    Eos # 0.1 0.0 - 0.5 K/uL    Baso # 0.06 0.00 - 0.20 K/uL    nRBC 0 0 /100 WBC    Gran % 55.2 38.0 - 73.0 %    Lymph % 35.9 18.0 - 48.0 %    Mono % 7.4 4.0 - 15.0 %    Eosinophil % 0.6 0.0 - 8.0 %    Basophil % 0.3 0.0 - 1.9 %    Differential Method Automated    Comprehensive metabolic panel   Result Value Ref Range    Sodium 139 136 - 145 mmol/L    Potassium 4.1 3.5 - 5.1 mmol/L    Chloride 103 95 - 110 mmol/L    CO2 22 (L) 23 - 29 mmol/L    Glucose 146 (H) 70 - 110 mg/dL    BUN 14 6 - 20 mg/dL    Creatinine 0.7 0.5 - 1.4 mg/dL    Calcium 9.8 8.7 - 10.5 mg/dL    Total Protein 7.5 6.0 - 8.4 g/dL    Albumin 3.9 3.5 - 5.2 g/dL    Total Bilirubin 0.3 0.1 - 1.0 mg/dL    Alkaline Phosphatase 88 55 - 135 U/L    AST 30 10 - 40 U/L    ALT 48 (H) 10 - 44 U/L    Anion Gap 14 8 - 16 mmol/L    eGFR if African American >60.0 >60 mL/min/1.73 m^2    eGFR if non African American >60.0 >60 mL/min/1.73 m^2   Urinalysis, Reflex to Urine Culture Urine, Clean Catch    Specimen: Urine   Result Value Ref Range    Specimen UA Urine, Clean Catch     Color, UA Yellow Yellow, Straw, Haley    Appearance, UA Clear Clear    pH, UA 6.0 5.0 - 8.0    Specific Gravity, UA >=1.030 (A) 1.005 - 1.030    Protein, UA 2+ (A) Negative    Glucose, UA 2+ (A) Negative    Ketones, UA 1+ (A) Negative    Bilirubin (UA) Negative Negative    Occult Blood UA 2+ (A) Negative    Nitrite, UA Negative Negative    Urobilinogen, UA Negative <2.0 EU/dL    Leukocytes, UA Negative Negative   Troponin I   Result Value Ref Range    Troponin I <0.006 0.000 - 0.026 ng/mL   Brain natriuretic peptide   Result Value Ref Range    BNP <10 0 - 99 pg/mL   Urinalysis Microscopic   Result Value Ref Range    RBC, UA 7 (H) 0 - 4 /hpf    WBC, UA 0 0 - 5 /hpf    Bacteria None None-Occ /hpf    Hyaline Casts, UA 0 0-1/lpf /lpf    Microscopic Comment SEE COMMENT          Imaging Results           X-Ray Chest PA And Lateral (Final result)  Result time 03/18/22 21:44:17    Final result by Roslyn Peña MD (03/18/22 21:44:17)                 Impression:      No acute process seen      Electronically signed by: Casey Mcgowan  Date:    03/18/2022  Time:    21:44             Narrative:    EXAMINATION:  XR CHEST PA AND LATERAL    CLINICAL HISTORY:  Hyperglycemia;    TECHNIQUE:  PA and lateral views of the chest were performed.    COMPARISON:  None    FINDINGS:  Lungs are clear.  No acute osseous injury.  Prominent cardiac silhouette                                Medications   ondansetron injection 4 mg (4 mg Intravenous Given 3/18/22 2125)   0.9%  NaCl infusion (0 mLs Intravenous Stopped 3/18/22 2255)   ondansetron injection 4 mg (4 mg Intravenous Given 3/18/22 2204)   promethazine (PHENERGAN) 12.5 mg in dextrose 5 % 50 mL IVPB (0 mg Intravenous Stopped 3/18/22 2334)       11:21 PM - Re-evaluation: The patient is resting comfortably and is in no acute distress. She states that her symptoms have improved after treatment within ER.  Pt declines any acute pain in back/lower extremities, no fever, no LOC, no HA, no vision/speech changes.  Pt states she has recently received an epidural steroid injection.  Denies any pain/fever. Discussed test results, shared treatment plan, specific conditions for return, and importance of follow up with patient and family.  Advised close f/u c pcp and to return to ER if symptoms persist or worsen.   She understands and agrees with the plan as discussed. Answered  her questions at this time. She has remained hemodynamically stable throughout the ED course and is appropriate for discharge home.     Regarding HYPERGLYCEMIA, I advised patient that symptoms of an elevated blood glucose include fatigue, frequent urination, blurry vision, and feeling thirsty.  I advised patient that hyperglycemia can occur when one eats too much food, is ill, is under a lot of stress,  or does not take  medications for diabetes as prescribed.  I reiterated that healthy eating is an important part of controlling blood glucose level.  I recommended that the patient: eat a variety of foods every day from all the food groups; eat meals at regular times every day and do not skip meals; choose high fiber carbohydrates (such as whole grains, legumes, and fruit) more often than less healthy carbohydrates (like white bread and sweets); and drink water and sugar-free beverages rather than sweetened beverages. I instructed patient to see care in the ED or with primary care provider if they experience: severe abdominal pain;  pain that spreads to the back; have increased vomiting; have trouble staying awake or focusing; are shaking or sweating; have blurred or double vision; have a fruity, sweet smell to breath; experience breathing  that is deep and labored, or rapid and shallow; or have a heartbeat that is fast and weak.      Pre-hypertension/Hypertension: The pt has been informed that they may have pre-hypertension or hypertension based on a blood pressure reading in the ED. I recommend that the pt call the PCP listed on their discharge instructions or a physician of their choice this week to arrange f/u for further evaluation of possible pre-hypertension or hypertension.     Nathan Carlos was given a handout which discussed their disease process, precautions, and instructions for follow-up and therapy.     Follow-up Information     [facility]. Schedule an appointment as soon as possible for a visit in 1 week.    Why: or your physician  Contact information:  [address]  [place] [address]  [phone]             [facility] - Internal Medicine. Schedule an appointment as soon as possible for a visit in 1 week.    Specialty: Internal Medicine  Contact information:  [address]  [facility] [address] [phone]  Additional information:  Please park in surface lot and check in at  main registration.           [facility] Emergency Dept.    Specialty: Emergency Medicine  Why: As needed, If symptoms worsen  Contact information:  [address]  [facility] [address] [phone]                          Medication List      ASK your doctor about these medications    atorvastatin 40 MG tablet  Commonly known as: LIPITOR  Take 1 tablet (40 mg total) by mouth nightly.     blood sugar diagnostic Strp  To check BG 2 times daily, to use with insurance preferred meter     blood-glucose meter kit  To check BG 2 times daily, to use with insurance preferred meter     clotrimazole-betamethasone 1-0.05% cream  Commonly known as: LOTRISONE  Apply topically 2 (two) times daily.     dicyclomine 20 mg tablet  Commonly known as: BENTYL  Take 1 tablet (20 mg total) by mouth 3 (three) times daily as needed (abdominal pain).     ergocalciferol 50,000 unit Cap  Commonly known as: ERGOCALCIFEROL     fluconazole 150 MG Tab  Commonly known as: DIFLUCAN  TAKE 1 TABLET BY MOUTH NOW THEN TAKE 1 TABLET BY MOUTH IN 3 DAYS IF NEEDED     glimepiride 4 MG tablet  Commonly known as: AMARYL  Take 1 tablet (4 mg total) by mouth daily with breakfast.     HYDROcodone-acetaminophen 5-325 mg per tablet  Commonly known as: NORCO  Take 1 tablet by mouth every 6 (six) hours as needed for Pain.     irbesartan 150 MG tablet  Commonly known as: AVAPRO  Take 1 tablet by mouth once daily     lancets Misc  To check BG 2 times daily, to use with insurance preferred meter     linaCLOtide 145 mcg Cap capsule  Commonly known as: LINZESS  Take 1 capsule (145 mcg total) by mouth before breakfast.     metFORMIN 750 MG ER 24hr tablet  Commonly known as: GLUCOPHAGE-XR  Take 1 tablet (750 mg total) by mouth 2 (two) times daily with meals.     metoprolol succinate 50 MG 24 hr tablet  Commonly known as: TOPROL-XL  Take 1 tablet by mouth once daily     mirtazapine 15 MG tablet  Commonly known as: REMERON     ondansetron 4 MG Tbdl  Commonly known  as: ZOFRAN-ODT  Take 1 tablet (4 mg total) by mouth every 6 (six) hours as needed (nausea and vomiting).     promethazine 25 MG tablet  Commonly known as: PHENERGAN  TAKE 1 TABLET BY MOUTH EVERY 6 HOURS AS NEEDED FOR NAUSEA     SITagliptin 50 MG Tab  Commonly known as: JANUVIA  Take 1 tablet (50 mg total) by mouth once daily.     spironolactone 25 MG tablet  Commonly known as: ALDACTONE  Take 2 tablets by mouth once daily     tiZANidine 4 MG tablet  Commonly known as: ZANAFLEX  Take 1 tablet (4 mg total) by mouth every 8 (eight) hours.     valACYclovir 500 MG tablet  Commonly known as: VALTREX  TAKE 1 TABLET BY MOUTH TWICE DAILY FOR 3 DAYS           Current Discharge Medication List            ED Diagnosis  1. Hyperglycemia    2. Leukocytosis, unspecified type             Clinical Impression:   Final diagnoses:  [R73.9] Hyperglycemia (Primary)  [D72.829] Leukocytosis, unspecified type          ED Disposition Condition    Discharge Stable        ED Prescriptions     None        Follow-up Information     Follow up With Specialties Details Why Contact Info Additional Information    [facility]  Schedule an appointment as soon as possible for a visit in 1 week or your physician [address] [place] [id number]  [phone]       [facility] Internal Medicine Internal Medicine Schedule an appointment as soon as possible for a visit in 1 week  [address] [address] [id number] [phone] Please park in surface lot and check in at main registration.    [facility] Emergency Dept Emergency Medicine  As needed, If symptoms worsen [address]  [facility] [id number] [phone]            Ryan Cunningham Jr., MD  03/19/22 0117

## 2022-03-19 NOTE — DISCHARGE INSTRUCTIONS
Regarding HYPERGLYCEMIA, I advised patient that symptoms of an elevated blood glucose include fatigue, frequent urination, blurry vision, and feeling thirsty.  I advised patient that hyperglycemia can occur when one eats too much food, is ill, is under a lot of stress,  or does not take medications for diabetes as prescribed.  I reiterated that healthy eating is an important part of controlling blood glucose level.  I recommended that the patient: eat a variety of foods every day from all the food groups; eat meals at regular times every day and do not skip meals; choose high fiber carbohydrates (such as whole grains, legumes, and fruit) more often than less healthy carbohydrates (like white bread and sweets); and drink water and sugar-free beverages rather than sweetened beverages. I instructed patient to see care in the ED or with primary care provider if they experience: severe abdominal pain;  pain that spreads to the back; have increased vomiting; have trouble staying awake or focusing; are shaking or sweating; have blurred or double vision; have a fruity, sweet smell to breath; experience breathing  that is deep and labored, or rapid and shallow; or have a heartbeat that is fast and weak.

## 2022-03-20 NOTE — PROGRESS NOTES
[facility]  Occupational therapy  Discharge Note    Name: Nathan Carlos  Clinic Number: [id number]  Therapy Diagnosis:        Encounter Diagnoses   Name Primary?    Hypersensitivity, subsequent encounter      Scar tissue      Decreased range of motion      Decreased strength      Self-care deficit        Physician: Sarwat Billy PA-C     Visit Date: 1/10/2022  Physician Orders: Evaluation and treatment   Medical Diagnosis:   G56.01 (ICD-10-CM) - Carpal tunnel syndrome of right wrist   G56.02 (ICD-10-CM) - Carpal tunnel syndrome of left wrist         Surgical Procedure and Date: bilateral hands carpal tunnel release right hand:/11/4/ 2021    Left hand March 2021  Evaluation Date: 12/3/2021      Date of Last visit: 1/10/22  Total Visits Received: 6    ASSESSMENT      Unable to reassess as patient did not return for treatment.    Discharge reason: Patient has not attended therapy since 1/10/2022.    Discharge FOTO Score: NA    GOALS:     Short Term Goals:  4 weeks                                                                  Updated:  1/10/2022   1. Pain: Pt will demonstrate improved pain by reports of less than or equal to 4/10 worst pain on the verbal rating scale in order to progress toward maximal functional ability and improve QOL.  Met 12/22/2021   2. Mobility: Patient will improve AROM to 50% of stated goals, listed in objective measures above, in order to progress towards independence with functional activities.  Met 12/22/2021   3. Strength: Patient will improve strength to 50% of stated goals, listed in objective measures above, in order to progress towards independence with functional activities.  Met 1/10/2022   4. Self Care: Patient will demonstrate improved self care skills listed in objective measure above,in order to progress towards independence with functional activities.  Met 12/22/2021   5. HEP: Patient will demonstrate independence with HEP in order to  progress toward functional independence.        Long Term Goals:  8 weeks                                                                              Updated  12/22/2021   1. Pain: Pt will demonstrate improved pain by reports of less than or equal to 1/10 worst pain on the verbal rating scale in order to progress toward maximal functional ability and improve QOL.   Progressing   2. Function: Patient will demonstrate improved function as indicated by a functional limitation score of less than or equal to 33 out of 100 on FOTO.     3. Mobility: Patient will improve AROM to stated goals, listed in objective measures above, in order to return to maximal functional potential and improve quality of life. progressing    4. Strength: Patient will improve strength to stated goals, listed in objective measures above, in order to improve functional independence and quality of life.     5. Self Care: Patient will demonstrate increased self care skills to an independent level or modified independent level with adaptive equipment as needed. Progressing   6. Patient will return to normal ADL's, IADL's, community involvement, recreational activities, and work-related activities with less than or equal to 0/10 pain and maximal function.                  PLAN   This patient is discharged from Occupational Therapy      GAURAV Quispe

## 2022-03-22 ENCOUNTER — OFFICE VISIT (OUTPATIENT)
Dept: FAMILY MEDICINE | Facility: CLINIC | Age: 32
End: 2022-03-22
Payer: MEDICAID

## 2022-03-22 ENCOUNTER — PATIENT MESSAGE (OUTPATIENT)
Dept: FAMILY MEDICINE | Facility: CLINIC | Age: 32
End: 2022-03-22

## 2022-03-22 ENCOUNTER — PATIENT MESSAGE (OUTPATIENT)
Dept: FAMILY MEDICINE | Facility: CLINIC | Age: 32
End: 2022-03-22
Payer: MEDICAID

## 2022-03-22 ENCOUNTER — LAB VISIT (OUTPATIENT)
Dept: LAB | Facility: HOSPITAL | Age: 32
End: 2022-03-22
Attending: REGISTERED NURSE
Payer: MEDICAID

## 2022-03-22 VITALS
BODY MASS INDEX: 45.75 KG/M2 | TEMPERATURE: 97 F | HEART RATE: 80 BPM | WEIGHT: 268 LBS | DIASTOLIC BLOOD PRESSURE: 82 MMHG | OXYGEN SATURATION: 97 % | SYSTOLIC BLOOD PRESSURE: 130 MMHG | HEIGHT: 64 IN

## 2022-03-22 DIAGNOSIS — R11.2 NAUSEA AND VOMITING, INTRACTABILITY OF VOMITING NOT SPECIFIED, UNSPECIFIED VOMITING TYPE: ICD-10-CM

## 2022-03-22 DIAGNOSIS — R79.89 HIGH SERUM TESTOSTERONE: ICD-10-CM

## 2022-03-22 DIAGNOSIS — R74.01 ELEVATED ALT MEASUREMENT: ICD-10-CM

## 2022-03-22 DIAGNOSIS — I10 ESSENTIAL HYPERTENSION: ICD-10-CM

## 2022-03-22 DIAGNOSIS — D72.829 LEUKOCYTOSIS, UNSPECIFIED TYPE: ICD-10-CM

## 2022-03-22 DIAGNOSIS — N20.0 RENAL STONE: ICD-10-CM

## 2022-03-22 DIAGNOSIS — E66.01 MORBID OBESITY WITH BMI OF 40.0-44.9, ADULT: ICD-10-CM

## 2022-03-22 DIAGNOSIS — E78.00 HYPERCHOLESTEREMIA: ICD-10-CM

## 2022-03-22 DIAGNOSIS — R73.09 HEMOGLOBIN A1C GREATER THAN 9%, INDICATING POOR DIABETIC CONTROL: ICD-10-CM

## 2022-03-22 DIAGNOSIS — E11.65 UNCONTROLLED TYPE 2 DIABETES MELLITUS WITH HYPERGLYCEMIA: Primary | ICD-10-CM

## 2022-03-22 DIAGNOSIS — R31.9 HEMATURIA, UNSPECIFIED TYPE: ICD-10-CM

## 2022-03-22 DIAGNOSIS — K76.0 FATTY INFILTRATION OF LIVER: ICD-10-CM

## 2022-03-22 DIAGNOSIS — E11.65 UNCONTROLLED TYPE 2 DIABETES MELLITUS WITH HYPERGLYCEMIA: ICD-10-CM

## 2022-03-22 LAB
ALBUMIN SERPL BCP-MCNC: 3.8 G/DL (ref 3.5–5.2)
ALBUMIN/CREAT UR: 166.7 UG/MG (ref 0–30)
ALP SERPL-CCNC: 79 U/L (ref 55–135)
ALT SERPL W/O P-5'-P-CCNC: 54 U/L (ref 10–44)
ANION GAP SERPL CALC-SCNC: 14 MMOL/L (ref 8–16)
AST SERPL-CCNC: 43 U/L (ref 10–40)
BASOPHILS # BLD AUTO: 0.04 K/UL (ref 0–0.2)
BASOPHILS NFR BLD: 0.3 % (ref 0–1.9)
BILIRUB SERPL-MCNC: 0.2 MG/DL (ref 0.1–1)
BUN SERPL-MCNC: 13 MG/DL (ref 6–20)
CALCIUM SERPL-MCNC: 10 MG/DL (ref 8.7–10.5)
CHLORIDE SERPL-SCNC: 102 MMOL/L (ref 95–110)
CO2 SERPL-SCNC: 24 MMOL/L (ref 23–29)
CREAT SERPL-MCNC: 0.7 MG/DL (ref 0.5–1.4)
CREAT UR-MCNC: 132 MG/DL (ref 15–325)
DIFFERENTIAL METHOD: ABNORMAL
EOSINOPHIL # BLD AUTO: 0.1 K/UL (ref 0–0.5)
EOSINOPHIL NFR BLD: 0.9 % (ref 0–8)
ERYTHROCYTE [DISTWIDTH] IN BLOOD BY AUTOMATED COUNT: 14.9 % (ref 11.5–14.5)
EST. GFR  (AFRICAN AMERICAN): >60 ML/MIN/1.73 M^2
EST. GFR  (NON AFRICAN AMERICAN): >60 ML/MIN/1.73 M^2
GLUCOSE SERPL-MCNC: 189 MG/DL (ref 70–110)
HCT VFR BLD AUTO: 45 % (ref 37–48.5)
HGB BLD-MCNC: 14.4 G/DL (ref 12–16)
IMM GRANULOCYTES # BLD AUTO: 0.07 K/UL (ref 0–0.04)
IMM GRANULOCYTES NFR BLD AUTO: 0.5 % (ref 0–0.5)
LYMPHOCYTES # BLD AUTO: 4.7 K/UL (ref 1–4.8)
LYMPHOCYTES NFR BLD: 33.2 % (ref 18–48)
MCH RBC QN AUTO: 27.1 PG (ref 27–31)
MCHC RBC AUTO-ENTMCNC: 32 G/DL (ref 32–36)
MCV RBC AUTO: 85 FL (ref 82–98)
MICROALBUMIN UR DL<=1MG/L-MCNC: 220 UG/ML
MONOCYTES # BLD AUTO: 1 K/UL (ref 0.3–1)
MONOCYTES NFR BLD: 6.8 % (ref 4–15)
NEUTROPHILS # BLD AUTO: 8.2 K/UL (ref 1.8–7.7)
NEUTROPHILS NFR BLD: 58.3 % (ref 38–73)
NRBC BLD-RTO: 0 /100 WBC
PLATELET # BLD AUTO: 309 K/UL (ref 150–450)
PMV BLD AUTO: 11.6 FL (ref 9.2–12.9)
POTASSIUM SERPL-SCNC: 4.1 MMOL/L (ref 3.5–5.1)
PROT SERPL-MCNC: 7.1 G/DL (ref 6–8.4)
RBC # BLD AUTO: 5.31 M/UL (ref 4–5.4)
SODIUM SERPL-SCNC: 140 MMOL/L (ref 136–145)
TESTOST SERPL-MCNC: 38 NG/DL (ref 5–73)
WBC # BLD AUTO: 14.1 K/UL (ref 3.9–12.7)

## 2022-03-22 PROCEDURE — 80053 COMPREHEN METABOLIC PANEL: CPT | Performed by: REGISTERED NURSE

## 2022-03-22 PROCEDURE — 99215 OFFICE O/P EST HI 40 MIN: CPT | Mod: S$PBB,,, | Performed by: REGISTERED NURSE

## 2022-03-22 PROCEDURE — 99214 OFFICE O/P EST MOD 30 MIN: CPT | Mod: PBBFAC,PO | Performed by: REGISTERED NURSE

## 2022-03-22 PROCEDURE — 99215 PR OFFICE/OUTPT VISIT, EST, LEVL V, 40-54 MIN: ICD-10-PCS | Mod: S$PBB,,, | Performed by: REGISTERED NURSE

## 2022-03-22 PROCEDURE — 99999 PR PBB SHADOW E&M-EST. PATIENT-LVL IV: ICD-10-PCS | Mod: PBBFAC,,, | Performed by: REGISTERED NURSE

## 2022-03-22 PROCEDURE — 1159F PR MEDICATION LIST DOCUMENTED IN MEDICAL RECORD: ICD-10-PCS | Mod: CPTII,,, | Performed by: REGISTERED NURSE

## 2022-03-22 PROCEDURE — 4010F ACE/ARB THERAPY RXD/TAKEN: CPT | Mod: CPTII,,, | Performed by: REGISTERED NURSE

## 2022-03-22 PROCEDURE — 99999 PR PBB SHADOW E&M-EST. PATIENT-LVL IV: CPT | Mod: PBBFAC,,, | Performed by: REGISTERED NURSE

## 2022-03-22 PROCEDURE — 82570 ASSAY OF URINE CREATININE: CPT | Performed by: REGISTERED NURSE

## 2022-03-22 PROCEDURE — 3066F PR DOCUMENTATION OF TREATMENT FOR NEPHROPATHY: ICD-10-PCS | Mod: CPTII,,, | Performed by: REGISTERED NURSE

## 2022-03-22 PROCEDURE — 3060F POS MICROALBUMINURIA REV: CPT | Mod: CPTII,,, | Performed by: REGISTERED NURSE

## 2022-03-22 PROCEDURE — 3066F NEPHROPATHY DOC TX: CPT | Mod: CPTII,,, | Performed by: REGISTERED NURSE

## 2022-03-22 PROCEDURE — 3079F DIAST BP 80-89 MM HG: CPT | Mod: CPTII,,, | Performed by: REGISTERED NURSE

## 2022-03-22 PROCEDURE — 84403 ASSAY OF TOTAL TESTOSTERONE: CPT | Performed by: REGISTERED NURSE

## 2022-03-22 PROCEDURE — 3008F BODY MASS INDEX DOCD: CPT | Mod: CPTII,,, | Performed by: REGISTERED NURSE

## 2022-03-22 PROCEDURE — 3075F PR MOST RECENT SYSTOLIC BLOOD PRESS GE 130-139MM HG: ICD-10-PCS | Mod: CPTII,,, | Performed by: REGISTERED NURSE

## 2022-03-22 PROCEDURE — 3079F PR MOST RECENT DIASTOLIC BLOOD PRESSURE 80-89 MM HG: ICD-10-PCS | Mod: CPTII,,, | Performed by: REGISTERED NURSE

## 2022-03-22 PROCEDURE — 3046F PR MOST RECENT HEMOGLOBIN A1C LEVEL > 9.0%: ICD-10-PCS | Mod: CPTII,,, | Performed by: REGISTERED NURSE

## 2022-03-22 PROCEDURE — 3046F HEMOGLOBIN A1C LEVEL >9.0%: CPT | Mod: CPTII,,, | Performed by: REGISTERED NURSE

## 2022-03-22 PROCEDURE — 3060F PR POS MICROALBUMINURIA RESULT DOCUMENTED/REVIEW: ICD-10-PCS | Mod: CPTII,,, | Performed by: REGISTERED NURSE

## 2022-03-22 PROCEDURE — 82043 UR ALBUMIN QUANTITATIVE: CPT | Performed by: REGISTERED NURSE

## 2022-03-22 PROCEDURE — 4010F PR ACE/ARB THEARPY RXD/TAKEN: ICD-10-PCS | Mod: CPTII,,, | Performed by: REGISTERED NURSE

## 2022-03-22 PROCEDURE — 36415 COLL VENOUS BLD VENIPUNCTURE: CPT | Mod: PO | Performed by: REGISTERED NURSE

## 2022-03-22 PROCEDURE — 3075F SYST BP GE 130 - 139MM HG: CPT | Mod: CPTII,,, | Performed by: REGISTERED NURSE

## 2022-03-22 PROCEDURE — 85025 COMPLETE CBC W/AUTO DIFF WBC: CPT | Performed by: REGISTERED NURSE

## 2022-03-22 PROCEDURE — 3008F PR BODY MASS INDEX (BMI) DOCUMENTED: ICD-10-PCS | Mod: CPTII,,, | Performed by: REGISTERED NURSE

## 2022-03-22 PROCEDURE — 1159F MED LIST DOCD IN RCRD: CPT | Mod: CPTII,,, | Performed by: REGISTERED NURSE

## 2022-03-22 RX ORDER — PEN NEEDLE, DIABETIC 30 GX3/16"
1 NEEDLE, DISPOSABLE MISCELLANEOUS DAILY
Qty: 100 EACH | Refills: 6 | Status: SHIPPED | OUTPATIENT
Start: 2022-03-22 | End: 2023-07-12

## 2022-03-22 RX ORDER — SEMAGLUTIDE 1.34 MG/ML
0.25 INJECTION, SOLUTION SUBCUTANEOUS
Qty: 2 PEN | Refills: 1 | Status: SHIPPED | OUTPATIENT
Start: 2022-03-22 | End: 2022-07-05 | Stop reason: SDUPTHER

## 2022-03-22 RX ORDER — INSULIN DETEMIR 100 [IU]/ML
15 INJECTION, SOLUTION SUBCUTANEOUS NIGHTLY
Qty: 13.5 ML | Refills: 1 | Status: SHIPPED | OUTPATIENT
Start: 2022-03-22 | End: 2022-04-13

## 2022-03-22 RX ORDER — DOXYLAMINE SUCCINATE AND PYRIDOXINE HYDROCHLORIDE, DELAYED RELEASE TABLETS 10 MG/10 MG 10; 10 MG/1; MG/1
1-2 TABLET, DELAYED RELEASE ORAL NIGHTLY
Qty: 30 TABLET | Refills: 0 | Status: SHIPPED | OUTPATIENT
Start: 2022-03-22 | End: 2022-04-26

## 2022-03-22 RX ORDER — METFORMIN HYDROCHLORIDE 500 MG/1
500 TABLET, EXTENDED RELEASE ORAL 2 TIMES DAILY WITH MEALS
Qty: 180 TABLET | Refills: 1 | Status: SHIPPED | OUTPATIENT
Start: 2022-03-22 | End: 2022-03-22

## 2022-03-22 RX ORDER — METFORMIN HYDROCHLORIDE 500 MG/1
1000 TABLET, EXTENDED RELEASE ORAL 2 TIMES DAILY WITH MEALS
Qty: 360 TABLET | Refills: 1 | Status: SHIPPED | OUTPATIENT
Start: 2022-03-22 | End: 2022-07-25 | Stop reason: SDUPTHER

## 2022-03-22 NOTE — PATIENT INSTRUCTIONS
GOOD FRUIT  Any berries --- blue, straw, black, rasp, etc.  Citrus (except p-apple)      SUGARY FRUIT  Watermelon  Pineapple  Grapes  Bananas      CHECK OUT ON WEB ----- GLYCEMIC INDEX      DIABETES MED:  Continue glimepiride as you are taking now  Ozempic once a week  Levemir insulin 15 units at bedtime  Increased Januvia from 50 to 100 mg daily, take in morning  Increased metformin to 2 of the 500 mg tab twice a day with food    A1c due in 3 months      Orders ---- Renal ultrasound to check kidneys & for stone, labwork

## 2022-03-25 ENCOUNTER — PATIENT MESSAGE (OUTPATIENT)
Dept: FAMILY MEDICINE | Facility: CLINIC | Age: 32
End: 2022-03-25
Payer: MEDICAID

## 2022-03-25 ENCOUNTER — HOSPITAL ENCOUNTER (OUTPATIENT)
Dept: RADIOLOGY | Facility: HOSPITAL | Age: 32
Discharge: HOME | End: 2022-03-25
Attending: REGISTERED NURSE
Payer: MEDICAID

## 2022-03-25 DIAGNOSIS — N20.0 RENAL STONE: ICD-10-CM

## 2022-03-25 DIAGNOSIS — R31.9 HEMATURIA, UNSPECIFIED TYPE: ICD-10-CM

## 2022-03-25 PROCEDURE — 76770 US EXAM ABDO BACK WALL COMP: CPT | Mod: TC,PO

## 2022-03-25 PROCEDURE — 76770 US RETROPERITONEAL COMPLETE: ICD-10-PCS | Mod: 26,,, | Performed by: RADIOLOGY

## 2022-03-25 PROCEDURE — 76770 US EXAM ABDO BACK WALL COMP: CPT | Mod: 26,,, | Performed by: RADIOLOGY

## 2022-03-27 ENCOUNTER — PATIENT MESSAGE (OUTPATIENT)
Dept: FAMILY MEDICINE | Facility: CLINIC | Age: 32
End: 2022-03-27
Payer: MEDICAID

## 2022-03-28 ENCOUNTER — PATIENT MESSAGE (OUTPATIENT)
Dept: FAMILY MEDICINE | Facility: CLINIC | Age: 32
End: 2022-03-28
Payer: MEDICAID

## 2022-03-30 ENCOUNTER — OFFICE VISIT (OUTPATIENT)
Dept: PAIN MEDICINE | Facility: CLINIC | Age: 32
End: 2022-03-30
Payer: MEDICAID

## 2022-03-30 DIAGNOSIS — M54.16 LUMBAR RADICULOPATHY: Primary | ICD-10-CM

## 2022-03-30 DIAGNOSIS — M47.816 LUMBAR SPONDYLOSIS: ICD-10-CM

## 2022-03-30 DIAGNOSIS — M47.816 LUMBAR FACET ARTHROPATHY: ICD-10-CM

## 2022-03-30 PROCEDURE — 3060F POS MICROALBUMINURIA REV: CPT | Mod: CPTII,95,, | Performed by: ANESTHESIOLOGY

## 2022-03-30 PROCEDURE — 1160F RVW MEDS BY RX/DR IN RCRD: CPT | Mod: CPTII,95,, | Performed by: ANESTHESIOLOGY

## 2022-03-30 PROCEDURE — 1159F MED LIST DOCD IN RCRD: CPT | Mod: CPTII,95,, | Performed by: ANESTHESIOLOGY

## 2022-03-30 PROCEDURE — 3066F NEPHROPATHY DOC TX: CPT | Mod: CPTII,95,, | Performed by: ANESTHESIOLOGY

## 2022-03-30 PROCEDURE — 3060F PR POS MICROALBUMINURIA RESULT DOCUMENTED/REVIEW: ICD-10-PCS | Mod: CPTII,95,, | Performed by: ANESTHESIOLOGY

## 2022-03-30 PROCEDURE — 4010F ACE/ARB THERAPY RXD/TAKEN: CPT | Mod: CPTII,95,, | Performed by: ANESTHESIOLOGY

## 2022-03-30 PROCEDURE — 3046F HEMOGLOBIN A1C LEVEL >9.0%: CPT | Mod: CPTII,95,, | Performed by: ANESTHESIOLOGY

## 2022-03-30 PROCEDURE — 4010F PR ACE/ARB THEARPY RXD/TAKEN: ICD-10-PCS | Mod: CPTII,95,, | Performed by: ANESTHESIOLOGY

## 2022-03-30 PROCEDURE — 1160F PR REVIEW ALL MEDS BY PRESCRIBER/CLIN PHARMACIST DOCUMENTED: ICD-10-PCS | Mod: CPTII,95,, | Performed by: ANESTHESIOLOGY

## 2022-03-30 PROCEDURE — 3066F PR DOCUMENTATION OF TREATMENT FOR NEPHROPATHY: ICD-10-PCS | Mod: CPTII,95,, | Performed by: ANESTHESIOLOGY

## 2022-03-30 PROCEDURE — 3046F PR MOST RECENT HEMOGLOBIN A1C LEVEL > 9.0%: ICD-10-PCS | Mod: CPTII,95,, | Performed by: ANESTHESIOLOGY

## 2022-03-30 PROCEDURE — 99213 PR OFFICE/OUTPT VISIT, EST, LEVL III, 20-29 MIN: ICD-10-PCS | Mod: 95,,, | Performed by: ANESTHESIOLOGY

## 2022-03-30 PROCEDURE — 1159F PR MEDICATION LIST DOCUMENTED IN MEDICAL RECORD: ICD-10-PCS | Mod: CPTII,95,, | Performed by: ANESTHESIOLOGY

## 2022-03-30 PROCEDURE — 99213 OFFICE O/P EST LOW 20 MIN: CPT | Mod: 95,,, | Performed by: ANESTHESIOLOGY

## 2022-03-30 NOTE — PROGRESS NOTES
The patient location is: Home  The chief complaint leading to consultation is: Lumbar Back Pain     Visit type: audiovisual    Face to Face time with patient: 10  20 minutes of total time spent on the encounter, which includes face to face time and non-face to face time preparing to see the patient (eg, review of tests), Obtaining and/or reviewing separately obtained history, Documenting clinical information in the electronic or other health record, Independently interpreting results (not separately reported) and communicating results to the patient/family/caregiver, or Care coordination (not separately reported).         Each patient to whom he or she provides medical services by telemedicine is:  (1) informed of the relationship between the physician and patient and the respective role of any other health care provider with respect to management of the patient; and (2) notified that he or she may decline to receive medical services by telemedicine and may withdraw from such care at any time.    Notes:       Chief Pain Complaint:  Lumbar Back Pain      History of Present Illness:     Nathan Carlos is a 31 y.o. female  who is presenting with a chief complaint of Lumbar Back Pain. The patient began experiencing this problem insidiously, and the pain has been gradually worsening over the past 3 month(s). The pain is described as throbbing, cramping, burning and electrical and is located in the left lumbar spine. Pain is intermittent and lasts hours. The pain radiates to  left leg. The patient rates her pain a 8 out of ten and interferes with activities of daily living a 8 out of ten. Pain is exacerbated by flexion of the lumbar spine, and is improved by rest. Patient reports no prior trauma, no prior spinal surgery     Nathan Carlos is a 31 y.o. female  who is presenting with a chief complaint of Low-back Pain. The patient began experiencing this problem insidiously, and the pain has been gradually  worsening. The pain is described as throbbing, cramping, aching and heavy and is located in the bilateral lumbar spine. Pain is intermittent and lasts hours. The  pain is nonradiating. The patient rates her pain a 8 out of ten and interferes with activities of daily living a 7 out of ten. Pain is exacerbated by extension of the lumbar spine, and is improved by rest. Patient reports no prior trauma, no prior spinal surgery     - pertinent negatives: No fever, No chills, No weight loss, No bladder dysfunction, No bowel dysfunction, No saddle anesthesia  - pertinent positives: none    - medications, other therapies tried (physical therapy, injections):     >> NSAIDs, Tylenol, Norco, zanaflex and flexeril (no relief), topamax (no relief)    >> Has previously undergone Physical Therapy    >> Has previously undergone spinal injection/s   - Left SI joint injection 1/2018 with 100% relief for 6 months   - Right L3, L4, L5 MBB with local on 8/15/18 with 90% pain relief   - left SIJ + left GT bursa injection with local on 11/8/18 with good relief of bursitis but only 10% of SIJ pain   - left L3-5 MBB with local on 5/16/19 with 90% pain relief   - bilateral L3-5 MBB on 8/14/19 with limited relief   - right SIJ injection on 2/5/20 with 80% pain relief   - bilateral L3-5 MBB on 7/16/2020 with 100% relief of lumbar back pain   - right L3-5 RFA on 12/08/2020 with 100% pain relief   - left L3-5 RFA on 1/21/21 with at least 50% pain relief   - left SIJ + left GT bursa injection on 6/17/21 with at least 40% pain relief    - L5-S1, S1 TFESI on 3/8/22 with 60% Pain relief       Imaging / Labs / Studies (reviewed on 3/30/2022):    Results for orders placed during the hospital encounter of 11/22/17   MRI Lumbar Spine Without Contrast    Narrative Technique: Standard noncontrast multiplanar multisequence imaging of the lumbar spine was performed.  Findings: There is anatomic spinal alignment.  Disc interspaces are of normal height and  signal intensity.  Vertebral marrow signal pattern and architecture are normal.  Distal cord is unremarkable with conus medullaris terminating at L1-L2.  Paravertebral soft tissues are symmetric and normal in appearance.  T12-L1: No disc protrusion, neuroforaminal narrowing, or central canal stenosis.  L1-L2: No disc protrusion, neuroforaminal narrowing, or central canal stenosis.  L2-L3: No disc protrusion, neuroforaminal narrowing, or central canal stenosis.  L3-L4: No disc protrusion, neuroforaminal narrowing, or central canal stenosis.  L4-L5: No disc protrusion, neuroforaminal narrowing, or central canal stenosis.  L5-S1: Mild bilateral facet hypertrophy. No disc protrusion, neuroforaminal narrowing, or central canal stenosis.    Impression  Negative MRI of the lumbar spine.     Results for orders placed during the hospital encounter of 01/04/18   X-Ray Lumbar Complete With Flex And Ext    Narrative Comparison: None  Technique: AP, lateral, lateral flexion, lateral extension, bilateral oblique, and lumbosacral coned down views were obtained of the lumbar spine  Findings: There is mild scoliosis of the lower thoracic and upper lumbar spine.  Vertebral body heights are well-maintained.  No spondylolisthesis demonstrated.  No change in spinal alignment with flexion or extension to suggest instability. Intervertebral disk spaces are well preserved.  No pars defects visualized.  Posterior elements appear grossly intact. No acute fractures or subluxations are demonstrated.  The remaining visualized osseous and soft tissue structures demonstrate no appreciable abnormality.    Impression As above.  No acute findings.         Review of Systems:  CONSTITUTIONAL: patient denies any fever, chills, or weight loss  SKIN: patient denies any rash or itching  RESPIRATORY: patient denies having any shortness of breath  GASTROINTESTINAL: patient denies having any diarrhea, constipation, or bowel incontinence  GENITOURINARY:  patient denies having any abnormal bladder function    MUSCULOSKELETAL:  - patient complains of the above noted pain/s (see chief pain complaint)    NEUROLOGICAL:   - pain as above  - strength in Lower extremities is intact, BILATERALLY  - sensation in Lower extremities is intact, BILATERALLY  - patient denies any loss of bowel or bladder control      PSYCHIATRIC: patient denies any change in mood    Other:  All other systems reviewed and are negative        Physical Exam:  Telemedicine Exam  There were no vitals filed for this visit. There is no height or weight on file to calculate BMI.   (reviewed on 3/30/2022)    GENERAL: Well appearing, in no acute distress, alert and oriented x3.  obese  PSYCH:  Mood and affect appropriate.  SKIN: Skin color, texture, turgor normal, no rashes or lesions.  HEAD/FACE:  Normocephalic, atraumatic. Cranial nerves grossly intact.  PULM:  No difficulty breathing  Neuro/MSK:  BACK: Palpation over the lumbar paraspinous muscles causes some pain. Some pain with flexion, extension, or lateral flexion - L>R.  Pain over buttock - TTP, pain over left SIJ - less so than over left GTB.   EXTREMITIES: Peripheral joint active ROM is full and pain free without obvious instability or laxity in all four extremities. No deformities, edema, or skin discoloration.   MUSCULOSKELETAL: No atrophy or tone abnormalities are noted.  NEURO:  Able to toe walk and heel walk without difficulty  GAIT: normal.        Physical Exam: last in clinic visit:  General: alert and oriented, in no apparent distress, obese.  Gait: normal gait.  Skin: no rashes, no discoloration, no obvious lesions  HEENT: normocephalic, atraumatic. Pupils equal and round.  Cardiovascular: no significant peripheral edema present.  Respiratory: without use of accessory muscles of respiration.    Musculoskeletal - Lumbar Spine:  - Pain on flexion of lumbar spine: Present   - Pain on extension of lumbar spine: Present  - Lumbar facet loading:  Present on left, improved  On right   - TTP over the lumbar facet joints: Present on left at L5-S1   - TTP over the para lumbar muscles on left   - TTP over the SI joints: Absent   - TTP over GT bursa: absent  - TTP over piriformis: absent  - Straight Leg Raise: Negative  - JULISSA: Negative    Neuro - Extremities:  - BUE Strength:R/L: D: 5/5; B: 5/5; T: 5/5; WF: 5/5; WE: 5/5; IO: 5/5  - BLE Strength: R/L: HF: 5/5, HE: 5/5, KF: 5/5; KE: 5/4; FE: 5/5; FF: 5/5  - Extremity Reflexes: Brisk and symmetric throughout  - Sensory: Sensation to light touch intact bilaterally      Psych:  Mood and affect is appropriate          Assessment:  Nathan Carlos is a 31 y.o. year old female who is presenting with   Encounter Diagnoses   Name Primary?    Lumbar radiculopathy Yes    Lumbar spondylosis     Lumbar facet arthropathy        Plan:  1. Interventional: None for now. Consider lumbar RFA.   - S/p  L5-S1, S1 TFESI on 3/8/22 with 60% Pain relief.   - S/p left SIJ + left GT bursa injection on 6/17/21 with 40% pain relief.   - S/p left L3-5 RFA on 1/21/21 with at least 50% pain relief.   - S/p right L3-5 RFA on 12/08/2020 with 100% pain relief.   - S/p bilateral L3-5 MBB on 7/16/2020 with 100% relief of lumbar back pain.  - S/p Right SIJ injection on 2/5/20 with 80% pain relief.     2. Pharmacologic:   - Continue Gabapentin 300 mg Po QHs with up titration.   - Continue diclofenac 1% gel to apply 2g topically up to 4 times per day.  - Continue lidocaine 2.5%-prilocaine 2.5% topical cream Q6H PRN topically; can alternate with Voltaren gel - for GTB pain.    - Continue Tizanidne 4 mg TID PRN.  She can take up to QID PRN while pain Increased.  - Anticoagulation use: None.     3. Rehabilitative: Encouraged regular exercise. Continue exercises and activities as tolerated at home. Consider restarting physical therapy after Covid since this helped in the past.     4. Diagnostic: Updated lumbar MRI reviewed. Will order Lower ext  EMG shows electrodiagnostic evidence of an acute on chronic radiculopathy of the LEFT S1 nerve root and a subacute on chronic radiculopathy of the LEFT L5 nerve root.     5. Follow up: PRN.

## 2022-03-31 ENCOUNTER — PATIENT MESSAGE (OUTPATIENT)
Dept: FAMILY MEDICINE | Facility: CLINIC | Age: 32
End: 2022-03-31
Payer: MEDICAID

## 2022-04-04 ENCOUNTER — PATIENT MESSAGE (OUTPATIENT)
Dept: FAMILY MEDICINE | Facility: CLINIC | Age: 32
End: 2022-04-04
Payer: MEDICAID

## 2022-04-06 ENCOUNTER — PATIENT MESSAGE (OUTPATIENT)
Dept: FAMILY MEDICINE | Facility: CLINIC | Age: 32
End: 2022-04-06
Payer: MEDICAID

## 2022-04-06 DIAGNOSIS — R21 SKIN RASH: Primary | ICD-10-CM

## 2022-04-07 ENCOUNTER — TELEPHONE (OUTPATIENT)
Dept: DERMATOLOGY | Facility: CLINIC | Age: 32
End: 2022-04-07
Payer: MEDICAID

## 2022-04-07 NOTE — TELEPHONE ENCOUNTER
Spoke with pt, pt is scheduled.    ----- Message from Estephanie Valencia sent at 4/7/2022 10:52 AM CDT -----  Contact: Nathan Evans is needing a call back regarding getting scheduled for the rash on her arm. Please call her back at [phone]

## 2022-04-11 ENCOUNTER — PATIENT MESSAGE (OUTPATIENT)
Dept: PAIN MEDICINE | Facility: CLINIC | Age: 32
End: 2022-04-11
Payer: MEDICAID

## 2022-04-13 ENCOUNTER — DOCUMENTATION ONLY (OUTPATIENT)
Dept: FAMILY MEDICINE | Facility: CLINIC | Age: 32
End: 2022-04-13
Payer: MEDICAID

## 2022-04-13 DIAGNOSIS — E11.65 UNCONTROLLED TYPE 2 DIABETES MELLITUS WITH HYPERGLYCEMIA: ICD-10-CM

## 2022-04-13 DIAGNOSIS — I10 ESSENTIAL HYPERTENSION: ICD-10-CM

## 2022-04-13 DIAGNOSIS — K59.09 CHRONIC CONSTIPATION: ICD-10-CM

## 2022-04-13 DIAGNOSIS — R73.09 HEMOGLOBIN A1C GREATER THAN 9%, INDICATING POOR DIABETIC CONTROL: ICD-10-CM

## 2022-04-13 RX ORDER — INSULIN DETEMIR 100 [IU]/ML
18 INJECTION, SOLUTION SUBCUTANEOUS NIGHTLY
Qty: 13.5 ML | Refills: 1
Start: 2022-04-13 | End: 2022-04-27

## 2022-04-13 RX ORDER — METOPROLOL SUCCINATE 50 MG/1
50 TABLET, EXTENDED RELEASE ORAL DAILY
Qty: 90 TABLET | Refills: 1 | Status: SHIPPED | OUTPATIENT
Start: 2022-04-13 | End: 2022-09-08

## 2022-04-13 NOTE — PROGRESS NOTES
Phoned pt today in response to a My-Chart message:  Taking Ozempic daily x 7 days    She reports that she is only doing Ozempic once a week, on Sunday.  Doing Levemir nightly as ordered 15 units.    FBS ~ 160  2 hr PP ~ 130    Needs refill on Linzess and metoprolol.    Plan:  1.  RF sent to pharmacy per pt request  2.  Advised her to increase Lantus to 18 units HS  3.  Glucose checks, keep log, notify me in several days of new numbers.

## 2022-04-14 ENCOUNTER — OFFICE VISIT (OUTPATIENT)
Dept: PAIN MEDICINE | Facility: CLINIC | Age: 32
End: 2022-04-14
Payer: MEDICAID

## 2022-04-14 VITALS
DIASTOLIC BLOOD PRESSURE: 85 MMHG | WEIGHT: 266.13 LBS | HEART RATE: 75 BPM | BODY MASS INDEX: 45.68 KG/M2 | SYSTOLIC BLOOD PRESSURE: 122 MMHG

## 2022-04-14 DIAGNOSIS — M53.3 SACROILIAC JOINT DYSFUNCTION: Primary | ICD-10-CM

## 2022-04-14 DIAGNOSIS — M47.816 LUMBAR FACET ARTHROPATHY: ICD-10-CM

## 2022-04-14 DIAGNOSIS — M70.62 GREATER TROCHANTERIC BURSITIS, LEFT: ICD-10-CM

## 2022-04-14 DIAGNOSIS — M47.816 LUMBAR SPONDYLOSIS: ICD-10-CM

## 2022-04-14 PROCEDURE — 3074F PR MOST RECENT SYSTOLIC BLOOD PRESSURE < 130 MM HG: ICD-10-PCS | Mod: CPTII,,, | Performed by: ANESTHESIOLOGY

## 2022-04-14 PROCEDURE — 3079F PR MOST RECENT DIASTOLIC BLOOD PRESSURE 80-89 MM HG: ICD-10-PCS | Mod: CPTII,,, | Performed by: ANESTHESIOLOGY

## 2022-04-14 PROCEDURE — 3066F PR DOCUMENTATION OF TREATMENT FOR NEPHROPATHY: ICD-10-PCS | Mod: CPTII,,, | Performed by: ANESTHESIOLOGY

## 2022-04-14 PROCEDURE — 3060F POS MICROALBUMINURIA REV: CPT | Mod: CPTII,,, | Performed by: ANESTHESIOLOGY

## 2022-04-14 PROCEDURE — 99213 OFFICE O/P EST LOW 20 MIN: CPT | Mod: PBBFAC | Performed by: ANESTHESIOLOGY

## 2022-04-14 PROCEDURE — 99214 OFFICE O/P EST MOD 30 MIN: CPT | Mod: S$PBB,,, | Performed by: ANESTHESIOLOGY

## 2022-04-14 PROCEDURE — 3060F PR POS MICROALBUMINURIA RESULT DOCUMENTED/REVIEW: ICD-10-PCS | Mod: CPTII,,, | Performed by: ANESTHESIOLOGY

## 2022-04-14 PROCEDURE — 4010F ACE/ARB THERAPY RXD/TAKEN: CPT | Mod: CPTII,,, | Performed by: ANESTHESIOLOGY

## 2022-04-14 PROCEDURE — 99999 PR PBB SHADOW E&M-EST. PATIENT-LVL III: ICD-10-PCS | Mod: PBBFAC,,, | Performed by: ANESTHESIOLOGY

## 2022-04-14 PROCEDURE — 3046F HEMOGLOBIN A1C LEVEL >9.0%: CPT | Mod: CPTII,,, | Performed by: ANESTHESIOLOGY

## 2022-04-14 PROCEDURE — 3008F BODY MASS INDEX DOCD: CPT | Mod: CPTII,,, | Performed by: ANESTHESIOLOGY

## 2022-04-14 PROCEDURE — 3079F DIAST BP 80-89 MM HG: CPT | Mod: CPTII,,, | Performed by: ANESTHESIOLOGY

## 2022-04-14 PROCEDURE — 3008F PR BODY MASS INDEX (BMI) DOCUMENTED: ICD-10-PCS | Mod: CPTII,,, | Performed by: ANESTHESIOLOGY

## 2022-04-14 PROCEDURE — 3074F SYST BP LT 130 MM HG: CPT | Mod: CPTII,,, | Performed by: ANESTHESIOLOGY

## 2022-04-14 PROCEDURE — 99214 PR OFFICE/OUTPT VISIT, EST, LEVL IV, 30-39 MIN: ICD-10-PCS | Mod: S$PBB,,, | Performed by: ANESTHESIOLOGY

## 2022-04-14 PROCEDURE — 4010F PR ACE/ARB THEARPY RXD/TAKEN: ICD-10-PCS | Mod: CPTII,,, | Performed by: ANESTHESIOLOGY

## 2022-04-14 PROCEDURE — 3066F NEPHROPATHY DOC TX: CPT | Mod: CPTII,,, | Performed by: ANESTHESIOLOGY

## 2022-04-14 PROCEDURE — 3046F PR MOST RECENT HEMOGLOBIN A1C LEVEL > 9.0%: ICD-10-PCS | Mod: CPTII,,, | Performed by: ANESTHESIOLOGY

## 2022-04-14 PROCEDURE — 99999 PR PBB SHADOW E&M-EST. PATIENT-LVL III: CPT | Mod: PBBFAC,,, | Performed by: ANESTHESIOLOGY

## 2022-04-14 RX ORDER — CLOTRIMAZOLE AND BETAMETHASONE DIPROPIONATE 10; .64 MG/G; MG/G
CREAM TOPICAL 2 TIMES DAILY
COMMUNITY
Start: 2022-04-07 | End: 2022-08-09 | Stop reason: SDUPTHER

## 2022-04-14 NOTE — PROGRESS NOTES
Chief Pain Complaint:  Lumbar Back Pain      History of Present Illness:     Nathan Carlos is a 31 y.o. female  who is presenting with a chief complaint of Lumbar Back Pain. The patient began experiencing this problem insidiously, and the pain has been gradually worsening over the past 3 month(s). The pain is described as throbbing, cramping, burning and electrical and is located in the left lumbar spine. Pain is intermittent and lasts hours. The pain radiates to  left leg. The patient rates her pain a 8 out of ten and interferes with activities of daily living a 8 out of ten. Pain is exacerbated by flexion of the lumbar spine, and is improved by rest. Patient reports no prior trauma, no prior spinal surgery     Nathan Carlos is a 31 y.o. female  who is presenting with a chief complaint of Low-back Pain. The patient began experiencing this problem insidiously, and the pain has been gradually worsening. The pain is described as throbbing, cramping, aching and heavy and is located in the bilateral lumbar spine. Pain is intermittent and lasts hours. The  pain is nonradiating. The patient rates her pain a 8 out of ten and interferes with activities of daily living a 7 out of ten. Pain is exacerbated by extension of the lumbar spine, and is improved by rest. Patient reports no prior trauma, no prior spinal surgery     - pertinent negatives: No fever, No chills, No weight loss, No bladder dysfunction, No bowel dysfunction, No saddle anesthesia  - pertinent positives: none    - medications, other therapies tried (physical therapy, injections):     >> NSAIDs, Tylenol, Norco, zanaflex and flexeril (no relief), topamax (no relief)    >> Has previously undergone Physical Therapy    >> Has previously undergone spinal injection/s   - Left SI joint injection 1/2018 with 100% relief for 6 months   - Right L3, L4, L5 MBB with local on 8/15/18 with 90% pain relief   - left SIJ + left GT bursa injection with local on  11/8/18 with good relief of bursitis but only 10% of SIJ pain   - left L3-5 MBB with local on 5/16/19 with 90% pain relief   - bilateral L3-5 MBB on 8/14/19 with limited relief   - right SIJ injection on 2/5/20 with 80% pain relief   - bilateral L3-5 MBB on 7/16/2020 with 100% relief of lumbar back pain   - right L3-5 RFA on 12/08/2020 with 100% pain relief   - left L3-5 RFA on 1/21/21 with at least 50% pain relief   - left SIJ + left GT bursa injection on 6/17/21 with at least 40% pain relief    - L5-S1, S1 TFESI on 3/8/22 with 60% Pain relief       Imaging / Labs / Studies (reviewed on 4/14/2022):    Results for orders placed during the hospital encounter of 11/22/17   MRI Lumbar Spine Without Contrast    Narrative Technique: Standard noncontrast multiplanar multisequence imaging of the lumbar spine was performed.  Findings: There is anatomic spinal alignment.  Disc interspaces are of normal height and signal intensity.  Vertebral marrow signal pattern and architecture are normal.  Distal cord is unremarkable with conus medullaris terminating at L1-L2.  Paravertebral soft tissues are symmetric and normal in appearance.  T12-L1: No disc protrusion, neuroforaminal narrowing, or central canal stenosis.  L1-L2: No disc protrusion, neuroforaminal narrowing, or central canal stenosis.  L2-L3: No disc protrusion, neuroforaminal narrowing, or central canal stenosis.  L3-L4: No disc protrusion, neuroforaminal narrowing, or central canal stenosis.  L4-L5: No disc protrusion, neuroforaminal narrowing, or central canal stenosis.  L5-S1: Mild bilateral facet hypertrophy. No disc protrusion, neuroforaminal narrowing, or central canal stenosis.    Impression  Negative MRI of the lumbar spine.     Results for orders placed during the hospital encounter of 01/04/18   X-Ray Lumbar Complete With Flex And Ext    Narrative Comparison: None  Technique: AP, lateral, lateral flexion, lateral extension, bilateral oblique, and lumbosacral  coned down views were obtained of the lumbar spine  Findings: There is mild scoliosis of the lower thoracic and upper lumbar spine.  Vertebral body heights are well-maintained.  No spondylolisthesis demonstrated.  No change in spinal alignment with flexion or extension to suggest instability. Intervertebral disk spaces are well preserved.  No pars defects visualized.  Posterior elements appear grossly intact. No acute fractures or subluxations are demonstrated.  The remaining visualized osseous and soft tissue structures demonstrate no appreciable abnormality.    Impression As above.  No acute findings.         Review of Systems:  CONSTITUTIONAL: patient denies any fever, chills, or weight loss  SKIN: patient denies any rash or itching  RESPIRATORY: patient denies having any shortness of breath  GASTROINTESTINAL: patient denies having any diarrhea, constipation, or bowel incontinence  GENITOURINARY: patient denies having any abnormal bladder function    MUSCULOSKELETAL:  - patient complains of the above noted pain/s (see chief pain complaint)    NEUROLOGICAL:   - pain as above  - strength in Lower extremities is intact, BILATERALLY  - sensation in Lower extremities is intact, BILATERALLY  - patient denies any loss of bowel or bladder control      PSYCHIATRIC: patient denies any change in mood    Other:  All other systems reviewed and are negative        Physical Exam:  Telemedicine Exam  Vitals:    04/14/22 0850   BP: 122/85   Pulse: 75   Weight: 120.7 kg (266 lb 1.5 oz)   PainSc:   8   PainLoc: Back    Body mass index is 45.68 kg/m².   (reviewed on 4/14/2022)    GENERAL: Well appearing, in no acute distress, alert and oriented x3.  obese  PSYCH:  Mood and affect appropriate.  SKIN: Skin color, texture, turgor normal, no rashes or lesions.  HEAD/FACE:  Normocephalic, atraumatic. Cranial nerves grossly intact.  PULM:  No difficulty breathing  Neuro/MSK:  BACK: Palpation over the lumbar paraspinous muscles causes some  pain. Some pain with flexion, extension, or lateral flexion - L>R.  Pain over buttock - TTP, pain over left SIJ - less so than over left GTB.   EXTREMITIES: Peripheral joint active ROM is full and pain free without obvious instability or laxity in all four extremities. No deformities, edema, or skin discoloration.   MUSCULOSKELETAL: No atrophy or tone abnormalities are noted.  NEURO:  Able to toe walk and heel walk without difficulty  GAIT: normal.        Physical Exam: last in clinic visit:  General: alert and oriented, in no apparent distress, obese.  Gait: normal gait.  Skin: no rashes, no discoloration, no obvious lesions  HEENT: normocephalic, atraumatic. Pupils equal and round.  Cardiovascular: no significant peripheral edema present.  Respiratory: without use of accessory muscles of respiration.    Musculoskeletal - Lumbar Spine:  - Pain on flexion of lumbar spine: Present   - Pain on extension of lumbar spine: Present  - Lumbar facet loading: Present on left, improved  On right   - TTP over the lumbar facet joints: Present on left at L5-S1   - TTP over the para lumbar muscles on left   - TTP over the SI joints: Present   - TTP over GT bursa: Present   - TTP over piriformis: absent  - Straight Leg Raise: Negative  - JULISSA: Negative    Neuro - Extremities:  - BUE Strength:R/L: D: 5/5; B: 5/5; T: 5/5; WF: 5/5; WE: 5/5; IO: 5/5  - BLE Strength: R/L: HF: 5/5, HE: 5/5, KF: 5/5; KE: 5/4; FE: 5/5; FF: 5/5  - Extremity Reflexes: Brisk and symmetric throughout  - Sensory: Sensation to light touch intact bilaterally      Psych:  Mood and affect is appropriate          Assessment:  Nathan Carlos is a 31 y.o. year old female who is presenting with   Encounter Diagnoses   Name Primary?    Lumbar spondylosis     Lumbar facet arthropathy     Sacroiliac joint dysfunction Yes    Greater trochanteric bursitis, left        Plan:  1. Interventional: Schedule patient for bilateral SI and GTB. Consider lumbar RFA.   -  S/p  L5-S1, S1 TFESI on 3/8/22 with 60% Pain relief.   - S/p left SIJ + left GT bursa injection on 6/17/21 with 40% pain relief.   - S/p left L3-5 RFA on 1/21/21 with at least 50% pain relief.   - S/p right L3-5 RFA on 12/08/2020 with 100% pain relief.   - S/p bilateral L3-5 MBB on 7/16/2020 with 100% relief of lumbar back pain.  - S/p Right SIJ injection on 2/5/20 with 80% pain relief.     2. Pharmacologic:   - Continue Gabapentin 300 mg Po QHs with up titration.   - Continue diclofenac 1% gel to apply 2g topically up to 4 times per day.  - Continue lidocaine 2.5%-prilocaine 2.5% topical cream Q6H PRN topically; can alternate with Voltaren gel - for GTB pain.    - Continue Tizanidne 4 mg TID PRN.  She can take up to QID PRN while pain Increased.  - Anticoagulation use: None.     3. Rehabilitative: Encouraged regular exercise. Continue exercises and activities as tolerated at home. Consider restarting physical therapy after Covid since this helped in the past.     4. Diagnostic: Updated lumbar MRI reviewed. Will order Lower ext EMG shows electrodiagnostic evidence of an acute on chronic radiculopathy of the LEFT S1 nerve root and a subacute on chronic radiculopathy of the LEFT L5 nerve root.     5. Follow up: Post injection.

## 2022-04-21 ENCOUNTER — PATIENT MESSAGE (OUTPATIENT)
Dept: FAMILY MEDICINE | Facility: CLINIC | Age: 32
End: 2022-04-21
Payer: MEDICAID

## 2022-04-25 ENCOUNTER — PATIENT MESSAGE (OUTPATIENT)
Dept: FAMILY MEDICINE | Facility: CLINIC | Age: 32
End: 2022-04-25
Payer: MEDICAID

## 2022-04-27 ENCOUNTER — OFFICE VISIT (OUTPATIENT)
Dept: DERMATOLOGY | Facility: CLINIC | Age: 32
End: 2022-04-27
Payer: MEDICAID

## 2022-04-27 DIAGNOSIS — E11.65 UNCONTROLLED TYPE 2 DIABETES MELLITUS WITH HYPERGLYCEMIA: ICD-10-CM

## 2022-04-27 DIAGNOSIS — L30.9 DERMATITIS: ICD-10-CM

## 2022-04-27 DIAGNOSIS — R73.09 HEMOGLOBIN A1C GREATER THAN 9%, INDICATING POOR DIABETIC CONTROL: ICD-10-CM

## 2022-04-27 PROCEDURE — 1159F MED LIST DOCD IN RCRD: CPT | Mod: CPTII,,, | Performed by: STUDENT IN AN ORGANIZED HEALTH CARE EDUCATION/TRAINING PROGRAM

## 2022-04-27 PROCEDURE — 3060F POS MICROALBUMINURIA REV: CPT | Mod: CPTII,,, | Performed by: STUDENT IN AN ORGANIZED HEALTH CARE EDUCATION/TRAINING PROGRAM

## 2022-04-27 PROCEDURE — 3066F PR DOCUMENTATION OF TREATMENT FOR NEPHROPATHY: ICD-10-PCS | Mod: CPTII,,, | Performed by: STUDENT IN AN ORGANIZED HEALTH CARE EDUCATION/TRAINING PROGRAM

## 2022-04-27 PROCEDURE — 99203 OFFICE O/P NEW LOW 30 MIN: CPT | Mod: S$PBB,,, | Performed by: STUDENT IN AN ORGANIZED HEALTH CARE EDUCATION/TRAINING PROGRAM

## 2022-04-27 PROCEDURE — 3046F PR MOST RECENT HEMOGLOBIN A1C LEVEL > 9.0%: ICD-10-PCS | Mod: CPTII,,, | Performed by: STUDENT IN AN ORGANIZED HEALTH CARE EDUCATION/TRAINING PROGRAM

## 2022-04-27 PROCEDURE — 99999 PR PBB SHADOW E&M-EST. PATIENT-LVL IV: ICD-10-PCS | Mod: PBBFAC,,, | Performed by: STUDENT IN AN ORGANIZED HEALTH CARE EDUCATION/TRAINING PROGRAM

## 2022-04-27 PROCEDURE — 99203 PR OFFICE/OUTPT VISIT, NEW, LEVL III, 30-44 MIN: ICD-10-PCS | Mod: S$PBB,,, | Performed by: STUDENT IN AN ORGANIZED HEALTH CARE EDUCATION/TRAINING PROGRAM

## 2022-04-27 PROCEDURE — 4010F PR ACE/ARB THEARPY RXD/TAKEN: ICD-10-PCS | Mod: CPTII,,, | Performed by: STUDENT IN AN ORGANIZED HEALTH CARE EDUCATION/TRAINING PROGRAM

## 2022-04-27 PROCEDURE — 1159F PR MEDICATION LIST DOCUMENTED IN MEDICAL RECORD: ICD-10-PCS | Mod: CPTII,,, | Performed by: STUDENT IN AN ORGANIZED HEALTH CARE EDUCATION/TRAINING PROGRAM

## 2022-04-27 PROCEDURE — 3066F NEPHROPATHY DOC TX: CPT | Mod: CPTII,,, | Performed by: STUDENT IN AN ORGANIZED HEALTH CARE EDUCATION/TRAINING PROGRAM

## 2022-04-27 PROCEDURE — 1160F RVW MEDS BY RX/DR IN RCRD: CPT | Mod: CPTII,,, | Performed by: STUDENT IN AN ORGANIZED HEALTH CARE EDUCATION/TRAINING PROGRAM

## 2022-04-27 PROCEDURE — 4010F ACE/ARB THERAPY RXD/TAKEN: CPT | Mod: CPTII,,, | Performed by: STUDENT IN AN ORGANIZED HEALTH CARE EDUCATION/TRAINING PROGRAM

## 2022-04-27 PROCEDURE — 3060F PR POS MICROALBUMINURIA RESULT DOCUMENTED/REVIEW: ICD-10-PCS | Mod: CPTII,,, | Performed by: STUDENT IN AN ORGANIZED HEALTH CARE EDUCATION/TRAINING PROGRAM

## 2022-04-27 PROCEDURE — 99214 OFFICE O/P EST MOD 30 MIN: CPT | Mod: PBBFAC | Performed by: STUDENT IN AN ORGANIZED HEALTH CARE EDUCATION/TRAINING PROGRAM

## 2022-04-27 PROCEDURE — 99999 PR PBB SHADOW E&M-EST. PATIENT-LVL IV: CPT | Mod: PBBFAC,,, | Performed by: STUDENT IN AN ORGANIZED HEALTH CARE EDUCATION/TRAINING PROGRAM

## 2022-04-27 PROCEDURE — 1160F PR REVIEW ALL MEDS BY PRESCRIBER/CLIN PHARMACIST DOCUMENTED: ICD-10-PCS | Mod: CPTII,,, | Performed by: STUDENT IN AN ORGANIZED HEALTH CARE EDUCATION/TRAINING PROGRAM

## 2022-04-27 PROCEDURE — 3046F HEMOGLOBIN A1C LEVEL >9.0%: CPT | Mod: CPTII,,, | Performed by: STUDENT IN AN ORGANIZED HEALTH CARE EDUCATION/TRAINING PROGRAM

## 2022-04-27 RX ORDER — INSULIN DETEMIR 100 [IU]/ML
20 INJECTION, SOLUTION SUBCUTANEOUS NIGHTLY
Qty: 18 ML | Refills: 1 | Status: SHIPPED | OUTPATIENT
Start: 2022-04-27 | End: 2022-10-10

## 2022-04-27 RX ORDER — HYDROCORTISONE 25 MG/G
OINTMENT TOPICAL 2 TIMES DAILY
Qty: 30 G | Refills: 1 | Status: SHIPPED | OUTPATIENT
Start: 2022-04-27 | End: 2022-06-30

## 2022-04-27 NOTE — PROGRESS NOTES
Patient Information  Name: Nathan Carlos  : 1990  MRN: [id number]     Referring Physician:  Dr. Paez   Primary Care Physician:   Primary Doctor Desi   Date of Visit: 2022      Subjective:       Nathan Carlos is a 31 y.o. female who presents for   Chief Complaint   Patient presents with    breakout     Breakout under right arm      HPI  Patient with new complaint of lesion(s)  Location: right axilla  Duration: 1 month  Symptoms: painful, spreading  Relieving factors/Previous treatments: lotrisone    Patient was last seen:Visit date not found     Prior notes by myself reviewed.   Clinical documentation obtained by nursing staff reviewed.    Review of Systems   Skin: Positive for itching and rash.        Objective:    Physical Exam   Constitutional: She appears well-developed and well-nourished. No distress.   Neurological: She is alert and oriented to person, place, and time. She is not disoriented.   Psychiatric: She has a normal mood and affect.   Skin:   Areas Examined (abnormalities noted in diagram):   Chest / Axilla Inspection Performed             Diagram Legend     Erythematous scaling macule/papule c/w actinic keratosis       Vascular papule c/w angioma      Pigmented verrucoid papule/plaque c/w seborrheic keratosis      Yellow umbilicated papule c/w sebaceous hyperplasia      Irregularly shaped tan macule c/w lentigo     1-2 mm smooth white papules consistent with Milia      Movable subcutaneous cyst with punctum c/w epidermal inclusion cyst      Subcutaneous movable cyst c/w pilar cyst      Firm pink to brown papule c/w dermatofibroma      Pedunculated fleshy papule(s) c/w skin tag(s)      Evenly pigmented macule c/w junctional nevus     Mildly variegated pigmented, slightly irregular-bordered macule c/w mildly atypical nevus      Flesh colored to evenly pigmented papule c/w intradermal nevus       Pink pearly papule/plaque c/w basal cell carcinoma      Erythematous  hyperkeratotic cursted plaque c/w SCC      Surgical scar with no sign of skin cancer recurrence      Open and closed comedones      Inflammatory papules and pustules      Verrucoid papule consistent consistent with wart     Erythematous eczematous patches and plaques     Dystrophic onycholytic nail with subungual debris c/w onychomycosis     Umbilicated papule    Erythematous-base heme-crusted tan verrucoid plaque consistent with inflamed seborrheic keratosis     Erythematous Silvery Scaling Plaque c/w Psoriasis     See annotation      No images are attached to the encounter or orders placed in the encounter.    [] Data reviewed  [] Independent review of test  [] Management discussed with another provider    Assessment / Plan:        Dermatitis, suspect contact dermatitis with post-inflammatory hypopigmentation  -     Rx hydrocortisone 2.5% ointment  - Recommend holding off on deodorants for 2 weeks. Recommend to wash with non-soap, just lukewarm water           LOS NUMBER AND COMPLEXITY OF PROBLEMS    COMPLEXITY OF DATA RISK TOTAL TIME (m)   74715  [id number] [] 1 self-limited or minor problem [x] Minimal to none [] No treatment recommended or patient to monitor 15-29  10-19   97215  29709 Low  [] 2 or > self limited or minor problems  [] 1 stable chronic illness  [x] 1 acute, uncomplicated illness or injury Limited (2)  [] Prior external notes from each unique source  [] Review result of each unique test  [] Order each unique test []  Low  OTC medications, minor skin biopsy 30-44 20-29   84124  57690 Moderate  []  1 or > chronic illness with progression, exacerbation or SE of treatment  []  2 or more stable chronic illnesses  []  1 acute illness with systemic symptoms  []  1 acute complicated injury  []  1 undiagnosed new problem with uncertain prognosis Moderate (1/3 below)  []  3 or more data items        *Now includes assessment requiring independent historian  []  Independent interpretation of a test  []  Discuss  management/test with another provider Moderate  [x]  Prescription drug mgmt  []  Minor surgery with risk discussed  []  Mgmt limited by social determinates 45-59  30-39   63600  69819 High  []  1 or more chronic illness with severe exacerbation, progression or SE of treatment  []  1 acute or chronic illness/injury that poses a threat to life or bodily function Extensive (2/3 below)  []  3 or more data items        *Now includes assessment requiring independent historian.  []  Independent interpretation of a test  []  Discuss management/test with another provider High  []  Major surgery with risk discussed  []  Drug therapy requiring intensive monitoring for toxicity  []  Hospitalization  []  Decision for DNR 60-74  40-54      No follow-ups on file.    Lisseth Coronado MD, FAAD  [facility]

## 2022-04-29 NOTE — PRE-PROCEDURE INSTRUCTIONS
Attempted to PAT patient for procedure on   5.5 with Dr. Chaney. No answer. M with return phone number. No return call at this time.

## 2022-04-29 NOTE — PRE-PROCEDURE INSTRUCTIONS
Spoke with patient regarding procedure scheduled on 5.5    Arrival time 1050    Has patient been sick with fever or on antibiotics within the last 7 days? No    Does the patient have any open wounds, sores or rashes? No    Does the patient have any recent fractures? no    Has patient received a vaccination within the last 7 days? No    Received the COVID vaccination? yes    Has the patient stopped all medications as directed? Hold dm meds am of procedure    Does patient have a pacemaker and or defibrillator? no    Does the patient have a ride to and from procedure and someone reliable to remain with patient? [place]    Is the patient diabetic? yes    Does the patient have sleep apnea? Or use O2 at home? No and no     Is the patient receiving sedation? yes    Is the patient instructed to remain NPO beginning at midnight the night before their procedure? yes    Procedure location confirmed with patient? Yes    Covid- Denies signs/symptoms. Instructed to notify PAT/MD if any changes.

## 2022-05-02 ENCOUNTER — PATIENT MESSAGE (OUTPATIENT)
Dept: FAMILY MEDICINE | Facility: CLINIC | Age: 32
End: 2022-05-02
Payer: MEDICAID

## 2022-05-03 ENCOUNTER — PATIENT MESSAGE (OUTPATIENT)
Dept: ADMINISTRATIVE | Facility: OTHER | Age: 32
End: 2022-05-03
Payer: MEDICAID

## 2022-05-05 ENCOUNTER — HOSPITAL ENCOUNTER (OUTPATIENT)
Facility: HOSPITAL | Age: 32
Discharge: HOME | End: 2022-05-05
Attending: ANESTHESIOLOGY | Admitting: ANESTHESIOLOGY
Payer: MEDICAID

## 2022-05-05 VITALS
DIASTOLIC BLOOD PRESSURE: 84 MMHG | SYSTOLIC BLOOD PRESSURE: 143 MMHG | HEIGHT: 64 IN | OXYGEN SATURATION: 99 % | BODY MASS INDEX: 44.47 KG/M2 | HEART RATE: 80 BPM | TEMPERATURE: 98 F | WEIGHT: 260.5 LBS | RESPIRATION RATE: 18 BRPM

## 2022-05-05 DIAGNOSIS — M53.3 SACROILIAC JOINT DYSFUNCTION: Primary | ICD-10-CM

## 2022-05-05 LAB — POCT GLUCOSE: 137 MG/DL (ref 70–110)

## 2022-05-05 PROCEDURE — 27096 INJECT SACROILIAC JOINT: CPT | Mod: 50 | Performed by: ANESTHESIOLOGY

## 2022-05-05 PROCEDURE — 25500020 PHARM REV CODE 255: Performed by: ANESTHESIOLOGY

## 2022-05-05 PROCEDURE — 27096 PR INJECTION,SACROILIAC JOINT: ICD-10-PCS | Mod: 50,,, | Performed by: ANESTHESIOLOGY

## 2022-05-05 PROCEDURE — 20610 DRAIN/INJ JOINT/BURSA W/O US: CPT | Mod: 50,59,, | Performed by: ANESTHESIOLOGY

## 2022-05-05 PROCEDURE — 25000003 PHARM REV CODE 250: Performed by: ANESTHESIOLOGY

## 2022-05-05 PROCEDURE — 20610 DRAIN/INJ JOINT/BURSA W/O US: CPT | Mod: 50,59 | Performed by: ANESTHESIOLOGY

## 2022-05-05 PROCEDURE — 27096 INJECT SACROILIAC JOINT: CPT | Mod: 50,,, | Performed by: ANESTHESIOLOGY

## 2022-05-05 PROCEDURE — 63600175 PHARM REV CODE 636 W HCPCS: Performed by: ANESTHESIOLOGY

## 2022-05-05 PROCEDURE — 20610 PR DRAIN/INJECT LARGE JOINT/BURSA: ICD-10-PCS | Mod: 50,59,, | Performed by: ANESTHESIOLOGY

## 2022-05-05 PROCEDURE — 27096 INJECT SACROILIAC JOINT: CPT | Mod: RT

## 2022-05-05 RX ORDER — LIDOCAINE HYDROCHLORIDE 20 MG/ML
INJECTION, SOLUTION EPIDURAL; INFILTRATION; INTRACAUDAL; PERINEURAL
Status: DISCONTINUED | OUTPATIENT
Start: 2022-05-05 | End: 2022-05-05 | Stop reason: HOSPADM

## 2022-05-05 RX ORDER — HYDROCODONE BITARTRATE AND ACETAMINOPHEN 5; 325 MG/1; MG/1
1 TABLET ORAL EVERY 6 HOURS PRN
Qty: 15 TABLET | Refills: 0 | Status: SHIPPED | OUTPATIENT
Start: 2022-05-05 | End: 2022-06-04

## 2022-05-05 RX ORDER — METHYLPREDNISOLONE ACETATE 40 MG/ML
INJECTION, SUSPENSION INTRA-ARTICULAR; INTRALESIONAL; INTRAMUSCULAR; SOFT TISSUE
Status: DISCONTINUED | OUTPATIENT
Start: 2022-05-05 | End: 2022-05-05 | Stop reason: HOSPADM

## 2022-05-05 RX ORDER — FENTANYL CITRATE 50 UG/ML
INJECTION, SOLUTION INTRAMUSCULAR; INTRAVENOUS
Status: DISCONTINUED | OUTPATIENT
Start: 2022-05-05 | End: 2022-05-05 | Stop reason: HOSPADM

## 2022-05-05 RX ORDER — MIDAZOLAM HYDROCHLORIDE 1 MG/ML
INJECTION, SOLUTION INTRAMUSCULAR; INTRAVENOUS
Status: DISCONTINUED | OUTPATIENT
Start: 2022-05-05 | End: 2022-05-05 | Stop reason: HOSPADM

## 2022-05-05 RX ORDER — INDOMETHACIN 25 MG/1
CAPSULE ORAL
Status: DISCONTINUED | OUTPATIENT
Start: 2022-05-05 | End: 2022-05-05 | Stop reason: HOSPADM

## 2022-05-05 NOTE — DISCHARGE SUMMARY
[facility]  Discharge Note       Description of Procedure: Sacroiliac Joint and Greater Trochanteric Bursa Injection under Fluoroscopic Guidance    Procedure Date: 5/5/2022    Admit Date: 5/5/2022  Discharge Date: 5/5/2022     Attending Physician: Yo Kirkland   Discharge Provider: Yo Kirkland    Preoperative Diagnosis: Sacroiliitis and Greater Trochanteric Bursitis     Postoperative Diagnosis: as above, same as preoperative diagnosis    Discharged Condition: Stable    Hospital Course: Patient was admitted for an outpatient procedure. The procedure was tolerated well with no complications.    Final Diagnoses: Same as principal problem.    Disposition: Home, self-care.    Follow up/Patient Instructions:  Follow-up in clinic in 2-3 weeks.    Medications: No medications were prescribed today. The patient was advised to resume normal medication regimen without change.  Specific information was provided regarding restarting any anticoagulant/s.    Discharge Procedure Orders (must include Diet, Follow-up, Activity):  Light activity for the remainder of the day, resume normal activity tomorrow. Resume normal diet. Follow-up in clinic in 2-3 weeks.

## 2022-05-05 NOTE — DISCHARGE INSTRUCTIONS

## 2022-05-05 NOTE — OP NOTE
PROCEDURE: Sacroiliac joint and Greater trochanteric bursa injection under fluoroscopic guidance       SIDE: bilateral Sacroiliac injection and bilateral greater trochanteric bursa injection      PROCEDURE DATE: 5/5/2022    PREOPERATIVE DIAGNOSIS: Sacroiliitis, greater trochanteric bursitis  POSTOPERATIVE DIAGNOSIS: Sacroiliitis, greater trochanteric bursitis    PROVIDER: Yo Kirkland MD  Assistant(s): none    Anesthesia: Local, IV Sedation     >> 2 mg of VERSED    >> 100 mcg of FENTANYL     INDICATION: The patient has a history of pain due to sacroiliitis unresponsive to conservative treatments. Fluoroscopy was used to optimize visualization of needle placement and to maximize safety.       PROCEDURE DESCRIPTION: The patient was seen and identified in the preoperative area. Risks, benefits, complications, and alternatives were discussed with the patient. The patient agreed to proceed with the procedure and signed the consent. The site and side of the procedure was identified and marked.     The patient was taken to the procedural suite. The patient was positioned in prone orientation on procedure table. A time out was performed prior to any intervention. The procedure, site, side, and allergies were stated and agreed to by all present. The lumbosacral area extending to the skin overlying the femoral greater trochanter was widely prepped with ChloraPrep. The procedural site was draped in usual sterile fashion. Vital signs were closely monitored throughout this procedure.     The fluoroscopic camera was placed in contralateral oblique view and was adjusted until the anterior and posterior joint margins of the targeted sacroiliac joint aligned in linear array. The lower pole of the joint was identified, marked, and localized with 1% Lidocaine. A 25 gauge 3.5 inch spinal needle was introduced and advanced to the joint under fluoroscopic guidance. The joint space was entered and after negative aspiration, 2 mL of  injectate was posited into the joint space. The needle was then withdrawn outside of the joint space and after negative aspiration 1 mL of solution was injected outside of the joint space. The injectant solution used was comprised of 7 mL of 1% PF Lidocaine and 3 mL of Methylprednisolone (40 mg/mL). This techniques was performed for the above noted joint/s.    Following Sacroiliac Joint injection, the stylet was replaced and the needle was withdrawn intact. The RIGHT and LEFT greater trochanter was then identified with fluoroscopy. The targeted site of entry was localized with 1% PF Lidocaine. The above noted spinal needle was advanced to the lateral border of the greater trochanter until the osseus interface was met.  After negative aspiration, 2 mL of the above noted solution was injected. No pain or paresthesia was noted on injection. Following injection, the stylet was replaced and the needle was withdrawn intact. This technique was used for the above noted greater trochanteric bursa / bursae. The skin was cleaned and bandages were applied.    Description of Findings: Not applicable    Prosthetic devices, grafts, tissues, or devices implanted: None    Specimen Removed: No    Estimated Blood Loss: minimal    COMPLICATIONS: None    DISPOSITION / PLANS: The patient was transferred to the recovery area in a stable condition for observation. The patient was reexamined prior to discharge. There was no evidence of acute neurologic injury following the procedure.  Patient was discharged from the recovery room after meeting discharge criteria. Home discharge instructions were given to the patient by the staff.

## 2022-05-05 NOTE — H&P
HPI  Patient presenting for Procedure(s) (LRB):  Bilateral BLOCK, SACROILIAC JOINT and Bilateral GTB RN IV sedation (Bilateral)     Patient on Anti-coagulation No    No health changes since previous encounter    Past Medical History:   Diagnosis Date    Carpal tunnel syndrome     Diabetes mellitus     GERD (gastroesophageal reflux disease)     Herpes simplex virus (HSV) infection     High serum testosterone 9/7/2017    Hyperlipidemia     Hypertension     Insulin resistance syndrome 9/7/2017    Lumbar facet arthropathy 8/14/2019    Morbid obesity     Ovarian cyst     bilateral      Past Surgical History:   Procedure Laterality Date    ANKLE SURGERY Left     BUNIONECTOMY Bilateral     CARPAL TUNNEL RELEASE Left 3/19/2021    Procedure: RELEASE, CARPAL TUNNEL;  Surgeon: Juan F Bernard MD;  Location: [facility];  Service: Orthopedics;  Laterality: Left;    CARPAL TUNNEL RELEASE Right 11/4/2021    Procedure: RELEASE, CARPAL TUNNEL;  Surgeon: Juan F Bernard MD;  Location: [facility];  Service: Orthopedics;  Laterality: Right;    COLONOSCOPY  06/12/2018    ESOPHAGOGASTRODUODENOSCOPY      ESOPHAGOGASTRODUODENOSCOPY N/A 12/2/2021    Procedure: EGD (ESOPHAGOGASTRODUODENOSCOPY);  Surgeon: Pili Eagle MD;  [facility];  Service: Endoscopy;  Laterality: N/A;    INJECTION OF ANESTHETIC AGENT AROUND MEDIAL BRANCH NERVES INNERVATING LUMBAR FACET JOINT Bilateral 8/14/2019    Procedure: Bilateral L3-5 MBB;  Surgeon: Yo Kirkland MD;  Location: [facility];  Service: Pain Management;  Laterality: Bilateral;    INJECTION OF ANESTHETIC AGENT AROUND MEDIAL BRANCH NERVES INNERVATING LUMBAR FACET JOINT Bilateral 7/16/2020    Procedure: Bilateral L3-5 MBB;  Surgeon: Yo Kirkland MD;  Location: [facility];  Service: Pain Management;  Laterality: Bilateral;    INJECTION OF ANESTHETIC AGENT INTO SACROILIAC JOINT Right 2/5/2020    Procedure: Right SIJ Injection with local;  Surgeon: Yo Kirkland  MD;  Location: [facility];  Service: Pain Management;  Laterality: Right;    INJECTION OF ANESTHETIC AGENT INTO SACROILIAC JOINT Left 6/17/2021    Procedure: Left GT bursa + left SIJ injection;  Surgeon: Yo Kirkland MD;  Location: [facility];  Service: Pain Management;  Laterality: Left;    INJECTION OF JOINT Left 6/17/2021    Procedure: Left GT bursa + left SIJ injection;  Surgeon: Yo Kirkland MD;  Location: [facility];  Service: Pain Management;  Laterality: Left;    LUMBAR FUSION      RADIOFREQUENCY THERMOCOAGULATION Right 12/8/2020    Procedure: RADIOFREQUENCY THERMAL COAGULATION Right L3, 4,5 MBB RFA with RN IV sedation// NO STEROID;  Surgeon: Yo Kirkland MD;  Location: [facility];  Service: Pain Management;  Laterality: Right;    RADIOFREQUENCY THERMOCOAGULATION Left 1/21/2021    Procedure: Left L3-5 Lumbar RFA;  Surgeon: Rian Chaney MD;  Location: [facility];  Service: Pain Management;  Laterality: Left;    SELECTIVE INJECTION OF ANESTHETIC AGENT AROUND LUMBAR SPINAL NERVE ROOT BY TRANSFORAMINAL APPROACH Left 3/8/2022    Procedure: BLOCK, SPINAL NERVE ROOT, LUMBAR, SELECTIVE, TRANSFORAMINAL APPROACH Left L5-S1, S1 RN IV sedation;  Surgeon: Yo Kirkland MD;  Location: [facility];  Service: Pain Management;  Laterality: Left;     Review of patient's allergies indicates:   Allergen Reactions    Amitriptyline      xerostomia    Amlodipine      DRY MOUTH    Tramadol Itching        No current facility-administered medications on file prior to encounter.     Current Outpatient Medications on File Prior to Encounter   Medication Sig Dispense Refill    metFORMIN (GLUCOPHAGE-XR) 500 MG ER 24hr tablet Take 2 tablets (1,000 mg total) by mouth 2 (two) times daily with meals. 360 tablet 1    metoprolol succinate (TOPROL-XL) 50 MG 24 hr tablet Take 1 tablet (50 mg total) by mouth once daily. 90 tablet 1    spironolactone (ALDACTONE) 25 MG tablet Take 2 tablets by mouth once daily 180  "tablet 3    atorvastatin (LIPITOR) 40 MG tablet Take 1 tablet (40 mg total) by mouth nightly. 90 tablet 1    blood sugar diagnostic Strp To check BG 2 times daily, to use with insurance preferred meter 100 strip 2    blood-glucose meter kit To check BG 2 times daily, to use with insurance preferred meter 1 each 0    clotrimazole-betamethasone 1-0.05% (LOTRISONE) cream Apply topically 2 (two) times daily.      ergocalciferol (ERGOCALCIFEROL) 50,000 unit Cap TAKE 1 CAPSULE BY MOUTH ONCE A WEEK FOR 28 DAYS      glimepiride (AMARYL) 4 MG tablet Take 1 tablet (4 mg total) by mouth daily with breakfast. 90 tablet 1    lancets Misc To check BG 2 times daily, to use with insurance preferred meter 100 each 2    linaCLOtide (LINZESS) 145 mcg Cap capsule Take 1 capsule (145 mcg total) by mouth before breakfast. 90 capsule 1    mirtazapine (REMERON) 15 MG tablet Take 15 mg by mouth nightly.      pen needle, diabetic (BD ULTRA-FINE GAYLA PEN NEEDLE) 32 gauge x 5/32" Ndle 1 Units by Misc.(Non-Drug; Combo Route) route once daily. 100 each 6    promethazine (PHENERGAN) 25 MG tablet TAKE 1 TABLET BY MOUTH EVERY 6 HOURS AS NEEDED FOR NAUSEA 30 tablet 0    semaglutide (OZEMPIC) 0.25 mg or 0.5 mg(2 mg/1.5 mL) pen injector Inject 0.25 mg into the skin every 7 days. 2 pen 1    SITagliptin (JANUVIA) 100 MG Tab Take 1 tablet (100 mg total) by mouth once daily. 90 tablet 1    tiZANidine (ZANAFLEX) 4 MG tablet Take 1 tablet (4 mg total) by mouth every 8 (eight) hours. 90 tablet 1    valACYclovir (VALTREX) 500 MG tablet TAKE 1 TABLET BY MOUTH TWICE DAILY FOR 3 DAYS 6 tablet 6        PMHx, PSHx, Allergies, Medications reviewed in epic    ROS negative except pain complaints in HPI    OBJECTIVE:    BP (!) 141/82 (BP Location: Right arm, Patient Position: Sitting)   Pulse 99   Temp 97.7 °F (36.5 °C) (Temporal)   Resp 18   Ht 5' 4" (1.626 m)   Wt 118.2 kg (260 lb 7.6 oz)   SpO2 95%   BMI 44.71 kg/m²     PHYSICAL " EXAMINATION:    GENERAL: Well appearing, in no acute distress, alert and oriented x3.  PSYCH:  Mood and affect appropriate.  SKIN: Skin color, texture, turgor normal, no rashes or lesions which will impact the procedure.  CV: RRR with palpation of the radial artery.  PULM: No evidence of respiratory difficulty, symmetric chest rise. Clear to auscultation.  NEURO: Cranial nerves grossly intact.    Plan:    Proceed with procedure as planned Procedure(s) (LRB):  Bilateral BLOCK, SACROILIAC JOINT and Bilateral GTB RN IV sedation (Bilateral)    Yo Kirkland MD  05/05/2022

## 2022-05-11 DIAGNOSIS — E11.9 TYPE 2 DIABETES MELLITUS WITHOUT COMPLICATION, WITHOUT LONG-TERM CURRENT USE OF INSULIN: ICD-10-CM

## 2022-05-11 DIAGNOSIS — E78.00 ELEVATED LDL CHOLESTEROL LEVEL: ICD-10-CM

## 2022-05-11 RX ORDER — ATORVASTATIN CALCIUM 40 MG/1
40 TABLET, FILM COATED ORAL NIGHTLY
Qty: 90 TABLET | Refills: 1 | Status: SHIPPED | OUTPATIENT
Start: 2022-05-11 | End: 2022-11-11

## 2022-05-17 ENCOUNTER — PATIENT MESSAGE (OUTPATIENT)
Dept: FAMILY MEDICINE | Facility: CLINIC | Age: 32
End: 2022-05-17
Payer: MEDICAID

## 2022-05-17 RX ORDER — FLUCONAZOLE 150 MG/1
150 TABLET ORAL DAILY
Qty: 1 TABLET | Refills: 0 | Status: SHIPPED | OUTPATIENT
Start: 2022-05-17 | End: 2022-05-18

## 2022-05-23 ENCOUNTER — PATIENT MESSAGE (OUTPATIENT)
Dept: PAIN MEDICINE | Facility: CLINIC | Age: 32
End: 2022-05-23
Payer: MEDICAID

## 2022-05-23 RX ORDER — TIZANIDINE 4 MG/1
4 TABLET ORAL EVERY 8 HOURS
Qty: 90 TABLET | Refills: 1 | Status: SHIPPED | OUTPATIENT
Start: 2022-05-23 | End: 2022-07-22 | Stop reason: SDUPTHER

## 2022-06-01 ENCOUNTER — PATIENT MESSAGE (OUTPATIENT)
Dept: PAIN MEDICINE | Facility: CLINIC | Age: 32
End: 2022-06-01
Payer: MEDICAID

## 2022-06-02 ENCOUNTER — TELEPHONE (OUTPATIENT)
Dept: PAIN MEDICINE | Facility: CLINIC | Age: 32
End: 2022-06-02

## 2022-06-02 ENCOUNTER — OFFICE VISIT (OUTPATIENT)
Dept: PAIN MEDICINE | Facility: CLINIC | Age: 32
End: 2022-06-02
Payer: MEDICAID

## 2022-06-02 DIAGNOSIS — M54.16 LUMBAR RADICULOPATHY: Primary | ICD-10-CM

## 2022-06-02 PROCEDURE — 99214 OFFICE O/P EST MOD 30 MIN: CPT | Mod: 95,,, | Performed by: PHYSICIAN ASSISTANT

## 2022-06-02 PROCEDURE — 1160F PR REVIEW ALL MEDS BY PRESCRIBER/CLIN PHARMACIST DOCUMENTED: ICD-10-PCS | Mod: CPTII,95,, | Performed by: PHYSICIAN ASSISTANT

## 2022-06-02 PROCEDURE — 3066F PR DOCUMENTATION OF TREATMENT FOR NEPHROPATHY: ICD-10-PCS | Mod: CPTII,95,, | Performed by: PHYSICIAN ASSISTANT

## 2022-06-02 PROCEDURE — 4010F PR ACE/ARB THEARPY RXD/TAKEN: ICD-10-PCS | Mod: CPTII,95,, | Performed by: PHYSICIAN ASSISTANT

## 2022-06-02 PROCEDURE — 3060F PR POS MICROALBUMINURIA RESULT DOCUMENTED/REVIEW: ICD-10-PCS | Mod: CPTII,95,, | Performed by: PHYSICIAN ASSISTANT

## 2022-06-02 PROCEDURE — 3046F PR MOST RECENT HEMOGLOBIN A1C LEVEL > 9.0%: ICD-10-PCS | Mod: CPTII,95,, | Performed by: PHYSICIAN ASSISTANT

## 2022-06-02 PROCEDURE — 3046F HEMOGLOBIN A1C LEVEL >9.0%: CPT | Mod: CPTII,95,, | Performed by: PHYSICIAN ASSISTANT

## 2022-06-02 PROCEDURE — 99214 PR OFFICE/OUTPT VISIT, EST, LEVL IV, 30-39 MIN: ICD-10-PCS | Mod: 95,,, | Performed by: PHYSICIAN ASSISTANT

## 2022-06-02 PROCEDURE — 3060F POS MICROALBUMINURIA REV: CPT | Mod: CPTII,95,, | Performed by: PHYSICIAN ASSISTANT

## 2022-06-02 PROCEDURE — 1159F PR MEDICATION LIST DOCUMENTED IN MEDICAL RECORD: ICD-10-PCS | Mod: CPTII,95,, | Performed by: PHYSICIAN ASSISTANT

## 2022-06-02 PROCEDURE — 1159F MED LIST DOCD IN RCRD: CPT | Mod: CPTII,95,, | Performed by: PHYSICIAN ASSISTANT

## 2022-06-02 PROCEDURE — 1160F RVW MEDS BY RX/DR IN RCRD: CPT | Mod: CPTII,95,, | Performed by: PHYSICIAN ASSISTANT

## 2022-06-02 PROCEDURE — 3066F NEPHROPATHY DOC TX: CPT | Mod: CPTII,95,, | Performed by: PHYSICIAN ASSISTANT

## 2022-06-02 PROCEDURE — 4010F ACE/ARB THERAPY RXD/TAKEN: CPT | Mod: CPTII,95,, | Performed by: PHYSICIAN ASSISTANT

## 2022-06-02 NOTE — PROGRESS NOTES
Chief Pain Complaint:  Lumbar Back Pain    The patient location is: home  The chief complaint leading to consultation is: chronic pain     Visit type: audiovisual    Face to Face time with patient: 10-15 minutes  20 minutes of total time spent on the encounter, which includes face to face time and non-face to face time preparing to see the patient (eg, review of tests), Obtaining and/or reviewing separately obtained history, Documenting clinical information in the electronic or other health record, Independently interpreting results (not separately reported) and communicating results to the patient/family/caregiver, or Care coordination (not separately reported).     Each patient to whom he or she provides medical services by telemedicine is:  (1) informed of the relationship between the physician and patient and the respective role of any other health care provider with respect to management of the patient; and (2) notified that he or she may decline to receive medical services by telemedicine and may withdraw from such care at any time.      History of Present Illness:     Interval History (6/2/2022): Nathan Carlos presents today for follow-up visit.  she underwent bilateral SIJ + GTB injection on 5/5/22.  The patient reports that she is/was better following the procedure.  she reports 60% pain relief.  The changes lasted 4 weeks so far.  The changes have continued through this visit.  Reports back and buttock pain improved, but reports continued pain along her left leg. Reports pain along anterior, lateral, and posterior aspect of left leg, radiating down to foot at times. Patient reports pain as 6/10 today. Reports that previous epidural did not offer much relief.    IMPRESSION  1. ABNORMAL study  2. There is electrodiagnostic evidence of an acute on chronic radiculopathy of the LEFT S1 nerve root and a subacute on chronic radiculopathy of the LEFT L5 nerve root     Interval HPI 04/14/2022  Chrissjudahtricia  Nancy Carlos is a 31 y.o. female  who is presenting with a chief complaint of Lumbar Back Pain. The patient began experiencing this problem insidiously, and the pain has been gradually worsening over the past 3 month(s). The pain is described as throbbing, cramping, burning and electrical and is located in the left lumbar spine. Pain is intermittent and lasts hours. The pain radiates to  left leg. The patient rates her pain a 8 out of ten and interferes with activities of daily living a 8 out of ten. Pain is exacerbated by flexion of the lumbar spine, and is improved by rest. Patient reports no prior trauma, no prior spinal surgery     Interval [facility] 03/31/2022  Nathan Carlos is a 31 y.o. female  who is presenting with a chief complaint of Low-back Pain. The patient began experiencing this problem insidiously, and the pain has been gradually worsening. The pain is described as throbbing, cramping, aching and heavy and is located in the bilateral lumbar spine. Pain is intermittent and lasts hours. The  pain is nonradiating. The patient rates her pain a 8 out of ten and interferes with activities of daily living a 7 out of ten. Pain is exacerbated by extension of the lumbar spine, and is improved by rest. Patient reports no prior trauma, no prior spinal surgery     - pertinent negatives: No fever, No chills, No weight loss, No bladder dysfunction, No bowel dysfunction, No saddle anesthesia  - pertinent positives: none    - medications, other therapies tried (physical therapy, injections):     >> NSAIDs, Tylenol, Norco, zanaflex and flexeril (no relief), topamax (no relief)    >> Has previously undergone Physical Therapy    >> Has previously undergone spinal injection/s   - Left SI joint injection 1/2018 with 100% relief for 6 months   - Right L3, L4, L5 MBB with local on 8/15/18 with 90% pain relief   - left SIJ + left GT bursa injection with local on 11/8/18 with good relief of bursitis but only 10% of SIJ  pain   - left L3-5 MBB with local on 5/16/19 with 90% pain relief   - bilateral L3-5 MBB on 8/14/19 with limited relief   - right SIJ injection on 2/5/20 with 80% pain relief   - bilateral L3-5 MBB on 7/16/2020 with 100% relief of lumbar back pain   - right L3-5 RFA on 12/08/2020 with 100% pain relief   - left L3-5 RFA on 1/21/21 with at least 50% pain relief   - left SIJ + left GT bursa injection on 6/17/21 with at least 40% pain relief    - L5-S1, S1 TFESI on 3/8/22 with 60% Pain relief    -bilateral SIJ + GTB injection on 5/5/22 with 60% relief    Imaging / Labs / Studies (reviewed on 6/2/2022):  MRI lumbar spine 12/16/2021    FINDINGS:  Detail limited by poor signal noise ratio.  Prominent edema within the subcutaneous soft tissues of the lower back.  Stable anatomic alignment, unchanged since 11/22/2017.  The vertebral body heights are maintained.  Mild disc desiccation at L4-L5 is noted.  No significant disc space narrowing.  The distal tip of the conus medullaris terminates near the L2 level.  There are no significant areas of disc bulge or protrusion.  Minimal grade 1 retrolisthesis of L4 on L5 is noted following the administration of intravenous contrast, no abnormal areas of enhancement are noted.     L1-L2: No spinal canal or neural foraminal encroachment.     L2-L3: No spinal canal or neural foraminal encroachment.     L3-L4: No spinal canal or neural foraminal encroachment.     L4-L5: Mild facet arthropathy.  No spinal canal or neural foraminal encroachment.     L5-S1: Mild bilateral facet arthropathy.  No spinal canal or neural foraminal encroachment.     Impression:     Mild lower lumbar spine degenerative changes as above.    Results for orders placed during the hospital encounter of 11/22/17   MRI Lumbar Spine Without Contrast    Narrative Technique: Standard noncontrast multiplanar multisequence imaging of the lumbar spine was performed.  Findings: There is anatomic spinal alignment.  Disc  interspaces are of normal height and signal intensity.  Vertebral marrow signal pattern and architecture are normal.  Distal cord is unremarkable with conus medullaris terminating at L1-L2.  Paravertebral soft tissues are symmetric and normal in appearance.  T12-L1: No disc protrusion, neuroforaminal narrowing, or central canal stenosis.  L1-L2: No disc protrusion, neuroforaminal narrowing, or central canal stenosis.  L2-L3: No disc protrusion, neuroforaminal narrowing, or central canal stenosis.  L3-L4: No disc protrusion, neuroforaminal narrowing, or central canal stenosis.  L4-L5: No disc protrusion, neuroforaminal narrowing, or central canal stenosis.  L5-S1: Mild bilateral facet hypertrophy. No disc protrusion, neuroforaminal narrowing, or central canal stenosis.    Impression  Negative MRI of the lumbar spine.     Results for orders placed during the hospital encounter of 01/04/18   X-Ray Lumbar Complete With Flex And Ext    Narrative Comparison: None  Technique: AP, lateral, lateral flexion, lateral extension, bilateral oblique, and lumbosacral coned down views were obtained of the lumbar spine  Findings: There is mild scoliosis of the lower thoracic and upper lumbar spine.  Vertebral body heights are well-maintained.  No spondylolisthesis demonstrated.  No change in spinal alignment with flexion or extension to suggest instability. Intervertebral disk spaces are well preserved.  No pars defects visualized.  Posterior elements appear grossly intact. No acute fractures or subluxations are demonstrated.  The remaining visualized osseous and soft tissue structures demonstrate no appreciable abnormality.    Impression As above.  No acute findings.         Review of Systems:  CONSTITUTIONAL: patient denies any fever, chills, or weight loss  SKIN: patient denies any rash or itching  RESPIRATORY: patient denies having any shortness of breath  GASTROINTESTINAL: patient denies having any diarrhea, constipation, or  bowel incontinence  GENITOURINARY: patient denies having any abnormal bladder function    MUSCULOSKELETAL:  - patient complains of the above noted pain/s (see chief pain complaint)    NEUROLOGICAL:   - pain as above  - strength in Lower extremities is intact, BILATERALLY  - sensation in Lower extremities is intact, BILATERALLY  - patient denies any loss of bowel or bladder control      PSYCHIATRIC: patient denies any change in mood    Other:  All other systems reviewed and are negative        Physical Exam:  Telemedicine Exam  There were no vitals filed for this visit. There is no height or weight on file to calculate BMI.   (reviewed on 6/2/2022)    GENERAL: Well appearing, in no acute distress, alert and oriented x3.  obese  PSYCH:  Mood and affect appropriate.  SKIN: Skin color, texture, turgor normal, no rashes or lesions.  HEAD/FACE:  Normocephalic, atraumatic. Cranial nerves grossly intact.  PULM:  No difficulty breathing  Neuro/MSK:  BACK: Palpation over the lumbar paraspinous muscles causes some pain. Some pain with flexion, extension, or lateral flexion - L>R.    EXTREMITIES: Peripheral joint active ROM is full and pain free without obvious instability or laxity in all four extremities. No deformities, edema, or skin discoloration.   MUSCULOSKELETAL: No atrophy or tone abnormalities are noted.  NEURO:  Able to toe walk and heel walk without difficulty  GAIT: normal.        Physical Exam: last in clinic visit:  General: alert and oriented, in no apparent distress, obese.  Gait: normal gait.  Skin: no rashes, no discoloration, no obvious lesions  HEENT: normocephalic, atraumatic. Pupils equal and round.  Cardiovascular: no significant peripheral edema present.  Respiratory: without use of accessory muscles of respiration.    Musculoskeletal - Lumbar Spine:  - Pain on flexion of lumbar spine: Present   - Pain on extension of lumbar spine: Present  - Lumbar facet loading: Present on left, improved  On right   -  TTP over the lumbar facet joints: Present on left at L5-S1   - TTP over the para lumbar muscles on left   - TTP over the SI joints: Present   - TTP over GT bursa: Present   - TTP over piriformis: absent  - Straight Leg Raise: Negative  - JULISSA: Negative    Neuro - Extremities:  - BUE Strength:R/L: D: 5/5; B: 5/5; T: 5/5; WF: 5/5; WE: 5/5; IO: 5/5  - BLE Strength: R/L: HF: 5/5, HE: 5/5, KF: 5/5; KE: 5/4; FE: 5/5; FF: 5/5  - Extremity Reflexes: Brisk and symmetric throughout  - Sensory: Sensation to light touch intact bilaterally      Psych:  Mood and affect is appropriate          Assessment:  Nathan Carlos is a 31 y.o. year old female who is presenting with   Encounter Diagnosis   Name Primary?    Lumbar radiculopathy Yes       Plan:  1. Interventional: Schedule patient for Left L4/5 +L5/S1 TF BRUNILDA  -s/p bilateral SI and GTB with 60% relief  - S/p  L5-S1, S1 TFESI on 3/8/22 with 60% Pain relief.   - S/p left SIJ + left GT bursa injection on 6/17/21 with 40% pain relief.   - S/p left L3-5 RFA on 1/21/21 with at least 50% pain relief.   - S/p right L3-5 RFA on 12/08/2020 with 100% pain relief.   - S/p bilateral L3-5 MBB on 7/16/2020 with 100% relief of lumbar back pain.  - S/p Right SIJ injection on 2/5/20 with 80% pain relief.     2. Pharmacologic:   - Continue Gabapentin 300 mg Po QHs with up titration.   - Continue diclofenac 1% gel to apply 2g topically up to 4 times per day.  - Continue lidocaine 2.5%-prilocaine 2.5% topical cream Q6H PRN topically; can alternate with Voltaren gel - for GTB pain.    - Continue Tizanidne 4 mg TID PRN.  She can take up to QID PRN while pain Increased.  - Anticoagulation use: None.     3. Rehabilitative: Encouraged regular exercise. Continue exercises and activities as tolerated at home. Consider restarting physical therapy after Covid since this helped in the past.     4. Diagnostic: Updated lumbar MRI reviewed. Lower ext EMG shows electrodiagnostic evidence of an acute on  chronic radiculopathy of the LEFT S1 nerve root and a subacute on chronic radiculopathy of the LEFT L5 nerve root.     5. Follow up: Post injection.      Darlin Iverson PA-C  Interventional Pain Medicine

## 2022-06-02 NOTE — TELEPHONE ENCOUNTER
Pt procedure was scheduled with Dr. Rojas  on 7/13/2022. Went over pre procedure instructions and sent instructions to [facility]. The pt does have to hold diabetic medication morning of procedure. Follow up was made with Mague Molina PA-C .Patient verbalized understanding

## 2022-06-08 ENCOUNTER — PATIENT MESSAGE (OUTPATIENT)
Dept: PODIATRY | Facility: CLINIC | Age: 32
End: 2022-06-08
Payer: MEDICAID

## 2022-06-20 ENCOUNTER — PATIENT OUTREACH (OUTPATIENT)
Dept: ADMINISTRATIVE | Facility: HOSPITAL | Age: 32
End: 2022-06-20
Payer: MEDICAID

## 2022-06-20 NOTE — PROGRESS NOTES
Working Dm Report:       Pt overdue for A1c. Linked to upcoming June lap appt. Current on all other DM labs.

## 2022-06-23 ENCOUNTER — OFFICE VISIT (OUTPATIENT)
Dept: PODIATRY | Facility: CLINIC | Age: 32
End: 2022-06-23
Payer: MEDICAID

## 2022-06-23 ENCOUNTER — LAB VISIT (OUTPATIENT)
Dept: LAB | Facility: HOSPITAL | Age: 32
End: 2022-06-23
Attending: REGISTERED NURSE
Payer: MEDICAID

## 2022-06-23 ENCOUNTER — PATIENT MESSAGE (OUTPATIENT)
Dept: FAMILY MEDICINE | Facility: CLINIC | Age: 32
End: 2022-06-23
Payer: MEDICAID

## 2022-06-23 DIAGNOSIS — E11.65 UNCONTROLLED TYPE 2 DIABETES MELLITUS WITH HYPERGLYCEMIA: ICD-10-CM

## 2022-06-23 DIAGNOSIS — R73.09 HEMOGLOBIN A1C GREATER THAN 9%, INDICATING POOR DIABETIC CONTROL: ICD-10-CM

## 2022-06-23 DIAGNOSIS — E11.69 TYPE 2 DIABETES MELLITUS WITH MORBID OBESITY: ICD-10-CM

## 2022-06-23 DIAGNOSIS — E66.01 TYPE 2 DIABETES MELLITUS WITH MORBID OBESITY: ICD-10-CM

## 2022-06-23 DIAGNOSIS — M72.2 PLANTAR FASCIITIS OF LEFT FOOT: Primary | ICD-10-CM

## 2022-06-23 LAB
ESTIMATED AVG GLUCOSE: 186 MG/DL (ref 68–131)
HBA1C MFR BLD: 8.1 % (ref 4–5.6)

## 2022-06-23 PROCEDURE — 99213 PR OFFICE/OUTPT VISIT, EST, LEVL III, 20-29 MIN: ICD-10-PCS | Mod: 25,S$PBB,, | Performed by: PODIATRIST

## 2022-06-23 PROCEDURE — 3060F PR POS MICROALBUMINURIA RESULT DOCUMENTED/REVIEW: ICD-10-PCS | Mod: CPTII,,, | Performed by: PODIATRIST

## 2022-06-23 PROCEDURE — 1160F PR REVIEW ALL MEDS BY PRESCRIBER/CLIN PHARMACIST DOCUMENTED: ICD-10-PCS | Mod: CPTII,,, | Performed by: PODIATRIST

## 2022-06-23 PROCEDURE — 20550 NJX 1 TENDON SHEATH/LIGAMENT: CPT | Mod: S$PBB,LT,, | Performed by: PODIATRIST

## 2022-06-23 PROCEDURE — 1159F PR MEDICATION LIST DOCUMENTED IN MEDICAL RECORD: ICD-10-PCS | Mod: CPTII,,, | Performed by: PODIATRIST

## 2022-06-23 PROCEDURE — 20550 PR INJECT TENDON SHEATH/LIGAMENT: ICD-10-PCS | Mod: S$PBB,LT,, | Performed by: PODIATRIST

## 2022-06-23 PROCEDURE — 36415 COLL VENOUS BLD VENIPUNCTURE: CPT | Performed by: REGISTERED NURSE

## 2022-06-23 PROCEDURE — 3046F PR MOST RECENT HEMOGLOBIN A1C LEVEL > 9.0%: ICD-10-PCS | Mod: CPTII,,, | Performed by: PODIATRIST

## 2022-06-23 PROCEDURE — 99213 OFFICE O/P EST LOW 20 MIN: CPT | Mod: PBBFAC | Performed by: PODIATRIST

## 2022-06-23 PROCEDURE — 3066F NEPHROPATHY DOC TX: CPT | Mod: CPTII,,, | Performed by: PODIATRIST

## 2022-06-23 PROCEDURE — 4010F ACE/ARB THERAPY RXD/TAKEN: CPT | Mod: CPTII,,, | Performed by: PODIATRIST

## 2022-06-23 PROCEDURE — 1160F RVW MEDS BY RX/DR IN RCRD: CPT | Mod: CPTII,,, | Performed by: PODIATRIST

## 2022-06-23 PROCEDURE — 99999 PR PBB SHADOW E&M-EST. PATIENT-LVL III: CPT | Mod: PBBFAC,,, | Performed by: PODIATRIST

## 2022-06-23 PROCEDURE — 99213 OFFICE O/P EST LOW 20 MIN: CPT | Mod: 25,S$PBB,, | Performed by: PODIATRIST

## 2022-06-23 PROCEDURE — 99999 PR PBB SHADOW E&M-EST. PATIENT-LVL III: ICD-10-PCS | Mod: PBBFAC,,, | Performed by: PODIATRIST

## 2022-06-23 PROCEDURE — 3060F POS MICROALBUMINURIA REV: CPT | Mod: CPTII,,, | Performed by: PODIATRIST

## 2022-06-23 PROCEDURE — 3046F HEMOGLOBIN A1C LEVEL >9.0%: CPT | Mod: CPTII,,, | Performed by: PODIATRIST

## 2022-06-23 PROCEDURE — 4010F PR ACE/ARB THEARPY RXD/TAKEN: ICD-10-PCS | Mod: CPTII,,, | Performed by: PODIATRIST

## 2022-06-23 PROCEDURE — 20550 NJX 1 TENDON SHEATH/LIGAMENT: CPT | Mod: LT,PBBFAC | Performed by: PODIATRIST

## 2022-06-23 PROCEDURE — 3066F PR DOCUMENTATION OF TREATMENT FOR NEPHROPATHY: ICD-10-PCS | Mod: CPTII,,, | Performed by: PODIATRIST

## 2022-06-23 PROCEDURE — 83036 HEMOGLOBIN GLYCOSYLATED A1C: CPT | Performed by: REGISTERED NURSE

## 2022-06-23 PROCEDURE — 1159F MED LIST DOCD IN RCRD: CPT | Mod: CPTII,,, | Performed by: PODIATRIST

## 2022-06-23 RX ORDER — FLUCONAZOLE 150 MG/1
150 TABLET ORAL DAILY
Qty: 1 TABLET | Refills: 0 | Status: SHIPPED | OUTPATIENT
Start: 2022-06-23 | End: 2022-06-24

## 2022-06-23 RX ORDER — DEXAMETHASONE SODIUM PHOSPHATE 4 MG/ML
4 INJECTION, SOLUTION INTRA-ARTICULAR; INTRALESIONAL; INTRAMUSCULAR; INTRAVENOUS; SOFT TISSUE
Status: DISCONTINUED | OUTPATIENT
Start: 2022-06-23 | End: 2022-06-23 | Stop reason: HOSPADM

## 2022-06-23 RX ORDER — TRIAMCINOLONE ACETONIDE 40 MG/ML
40 INJECTION, SUSPENSION INTRA-ARTICULAR; INTRAMUSCULAR
Status: DISCONTINUED | OUTPATIENT
Start: 2022-06-23 | End: 2022-06-23 | Stop reason: HOSPADM

## 2022-06-23 RX ADMIN — TRIAMCINOLONE ACETONIDE 40 MG: 200 INJECTION, SUSPENSION INTRA-ARTICULAR; INTRAMUSCULAR at 09:06

## 2022-06-23 RX ADMIN — DEXAMETHASONE SODIUM PHOSPHATE 4 MG: 4 INJECTION INTRA-ARTICULAR; INTRALESIONAL; INTRAMUSCULAR; INTRAVENOUS; SOFT TISSUE at 09:06

## 2022-06-23 NOTE — PROGRESS NOTES
Subjective:       Patient ID: Nathan Carlos is a 31 y.o. female.    Chief Complaint: Plantar Fasciitis (Patient complains of 7/10 pain to left plantar heel. She states the exercises are not decreasing in pain. )      HPI: Nathan Carlos presents to clinic today to follow-up on plantar fasciitis to the left foot.  States attempted to utilize orthotics and performing stretching exercises.  States some mild to minimal improvement.  Continues to state post static dyskinesia with 7/10 pain. States walking and standing causes and/or exacerbates the symptoms. Patient has had no corticosteroid injection(s) prior. Patient's Primary Care Provider is Qasim Paez NP.     Review of patient's allergies indicates:   Allergen Reactions    Amitriptyline      xerostomia    Amlodipine      DRY MOUTH    Tramadol Itching       Past Medical History:   Diagnosis Date    Carpal tunnel syndrome     Diabetes mellitus     GERD (gastroesophageal reflux disease)     Herpes simplex virus (HSV) infection     High serum testosterone 9/7/2017    Hyperlipidemia     Hypertension     Insulin resistance syndrome 9/7/2017    Lumbar facet arthropathy 8/14/2019    Morbid obesity     Ovarian cyst     bilateral        Family History   Problem Relation Age of Onset    Hypertension Mother     Fibroids Mother     No Known Problems Father     Diabetes Sister     Hypertension Brother     Hypertension Maternal Aunt     Diabetes Maternal Aunt     Diabetes Maternal Uncle     Hypertension Maternal Grandmother     Leukemia Maternal Grandfather     Hypertension Maternal Grandfather     Stroke Neg Hx        Social History     Socioeconomic History    Marital status:     Number of children: 0   Occupational History     Employer: [id number]   Tobacco Use    Smoking status: Never Smoker    Smokeless tobacco: Never Used   Substance and Sexual Activity    Alcohol use: Yes     Comment: special occasions     Drug use: No    Sexual activity: Yes     Partners: Male     Birth control/protection: Condom, Pill     Social Determinants of Health     Financial Resource Strain: Low Risk     Difficulty of Paying Living Expenses: Not hard at all   Food Insecurity: No Food Insecurity    Worried About Running Out of Food in the Last Year: Never true    Ran Out of Food in the Last Year: Never true   Transportation Needs: No Transportation Needs    Lack of Transportation (Medical): No    Lack of Transportation (Non-Medical): No   Physical Activity: Inactive    Days of Exercise per Week: 0 days    Minutes of Exercise per Session: 0 min   Stress: Stress Concern Present    Feeling of Stress : To some extent   Social Connections: Unknown    Frequency of Communication with Friends and Family: More than three times a week    Frequency of Social Gatherings with Friends and Family: Once a week    Active Member of Clubs or Organizations: No    Attends Club or Organization Meetings: Never    Marital Status:    Housing Stability: Low Risk     Unable to Pay for Housing in the Last Year: No    Number of Places Lived in the Last Year: 1    Unstable Housing in the Last Year: No       Past Surgical History:   Procedure Laterality Date    ANKLE SURGERY Left     BUNIONECTOMY Bilateral     CARPAL TUNNEL RELEASE Left 3/19/2021    Procedure: RELEASE, CARPAL TUNNEL;  Surgeon: Juan F Bernard MD;  Location: [facility];  Service: Orthopedics;  Laterality: Left;    CARPAL TUNNEL RELEASE Right 11/4/2021    Procedure: RELEASE, CARPAL TUNNEL;  Surgeon: Juan F Bernard MD;  Location: [facility];  Service: Orthopedics;  Laterality: Right;    COLONOSCOPY  06/12/2018    ESOPHAGOGASTRODUODENOSCOPY      ESOPHAGOGASTRODUODENOSCOPY N/A 12/2/2021    Procedure: EGD (ESOPHAGOGASTRODUODENOSCOPY);  Surgeon: Pili Eagle MD;  Location: [facility];  Service: Endoscopy;  Laterality: N/A;    INJECTION OF ANESTHETIC AGENT AROUND  MEDIAL BRANCH NERVES INNERVATING LUMBAR FACET JOINT Bilateral 8/14/2019    Procedure: Bilateral L3-5 MBB;  Surgeon: Yo Kirkland MD;  Location: [facility];  Service: Pain Management;  Laterality: Bilateral;    INJECTION OF ANESTHETIC AGENT AROUND MEDIAL BRANCH NERVES INNERVATING LUMBAR FACET JOINT Bilateral 7/16/2020    Procedure: Bilateral L3-5 MBB;  Surgeon: Yo Kirkland MD;  Location: [facility];  Service: Pain Management;  Laterality: Bilateral;    INJECTION OF ANESTHETIC AGENT INTO SACROILIAC JOINT Right 2/5/2020    Procedure: Right SIJ Injection with local;  Surgeon: Yo Kirkland MD;  Location: [facility];  Service: Pain Management;  Laterality: Right;    INJECTION OF ANESTHETIC AGENT INTO SACROILIAC JOINT Left 6/17/2021    Procedure: Left GT bursa + left SIJ injection;  Surgeon: Yo Kirkland MD;  Location: [facility];  Service: Pain Management;  Laterality: Left;    INJECTION OF ANESTHETIC AGENT INTO SACROILIAC JOINT Bilateral 5/5/2022    Procedure: Bilateral BLOCK, SACROILIAC JOINT and Bilateral GTB RN IV sedation;  Surgeon: Yo Kirkland MD;  Location: [facility];  Service: Pain Management;  Laterality: Bilateral;    INJECTION OF JOINT Left 6/17/2021    Procedure: Left GT bursa + left SIJ injection;  Surgeon: Yo Kirkland MD;  Location: [facility];  Service: Pain Management;  Laterality: Left;    LUMBAR FUSION      RADIOFREQUENCY THERMOCOAGULATION Right 12/8/2020    Procedure: RADIOFREQUENCY THERMAL COAGULATION Right L3, 4,5 MBB RFA with RN IV sedation// NO STEROID;  Surgeon: Yo Kirkland MD;  Location: [facility];  Service: Pain Management;  Laterality: Right;    RADIOFREQUENCY THERMOCOAGULATION Left 1/21/2021    Procedure: Left L3-5 Lumbar RFA;  Surgeon: Rian Chaney MD;  Location: [facility];  Service: Pain Management;  Laterality: Left;    SELECTIVE INJECTION OF ANESTHETIC AGENT AROUND LUMBAR SPINAL NERVE ROOT BY TRANSFORAMINAL APPROACH Left 3/8/2022    Procedure:  BLOCK, SPINAL NERVE ROOT, LUMBAR, SELECTIVE, TRANSFORAMINAL APPROACH Left L5-S1, S1 RN IV sedation;  Surgeon: Yo Kirkland MD;  Location: [facility];  Service: Pain Management;  Laterality: Left;       Review of Systems      Objective:   There were no vitals taken for this visit.    FL Fluoro for Pain Management  See OP Notes for results.     IMPRESSION: See OP Notes for results.     This procedure was auto-finalized by: Virtual Radiologist      Physical Exam  LOWER EXTREMITY PHYSICAL EXAMINATION    VASCULAR: The right DP pulse is 2/4 and the left DP is 2/4. The right PT pulse is 2/4 and the left PT pulse is 2/4. Proximal to distal, warm to warm. No dependent rubor or elevation palor is noted. Capillary refill time is less than 3 seconds. Hair growth is appreciated to the dorsal foot and digits.    NEUROLOGY: Proprioception is intact, bilateral. Sensation to light touch is intact. Negative Tinel's Sign and negative Valleix Sign. No neurological sensations with compression of the area of Ji's Nerve in the area of the Abductor Hallucis muscle belly.    ORTHOPEDIC:  Moderate tenderness to palpation of the area around the plantar medial calcaneal tubercle on the left foot. Pains are characterized as sharp and stabbing-like with direct palpation of the area. There is also mild pain to palpation of the immediate plantar aspect of the heel, and no pains to the lateral band of the fascia. No edema is noted. No fullness is noted. No pains or defects are noted within the plantar fascia at the arch. No plantar fibromas are noted. No defects are noted within the ligament. Dorsiflexion of the MTPJs with simultaneous palpation of the fascia at the arch, does worsen and exacerbate the pains. No pains with medial to lateral compression of the heel. Equinus contracture is noted. Antalgic gait pattern is noted.     DERMATOLOGY: No ecchymosis is noted.  Skin is supple, dry and intact. Skin is supple.  No hyperkeratosis noted.  No calluses.  No open wounds or ulcerations are noted.  No palpable plantar fibromas noted.    Assessment:     1. Plantar fasciitis of left foot    2. Type 2 diabetes mellitus with morbid obesity          Plan:     Plantar fasciitis of left foot  -     Tendon Sheath    Type 2 diabetes mellitus with morbid obesity        Thorough discussion is had with the patient today concerning the diagnosis, its etiology, and the treatment algorithm at present.    Continue to recommend the  importance of stretching to the posterior muscle groups of the gastrocnemius and the soleus.  A stretching sheet was provided to the patient in conjunction with a Thera-Band.  I do recommend patient perform stretching exercises 4-6 times per day and holding the stretches for approximately 15-30 seconds apiece.  We discussed importance of stretching as relates to lengthening the posterior muscle group which can decrease drain on the posterior aspect of the heel as well as the plantar aspect of the heel.  This will also decrease pain associated with post static dyskinesia.  Teach back mechanism was performed with the patient demonstrating the stretching exercises.    Did discussed possible physical therapy versus steroid injection into the affected foot.  Patient relates interest in steroid injection at this time.  Did discuss risk of steroid use as relates to increased anxiety, insomnia, post injection steroid flares, elevated blood sugars, swelling.  Patient lytes understanding.  Recommend use of anti-inflammatories with icing to decrease risk of a post-injection steroid flare.    Patient is counseled and reminded concerning the importance of good nutrition and healthy eating habits, which may include blood sugar control, to prevent and/or help podiatric foot and ankle complications.      Future Appointments   Date Time Provider Department Center   8/4/2022  9:40 AM Darlin Iverson PA-C [facility]

## 2022-06-23 NOTE — PROCEDURES
Tendon Sheath    Date/Time: 6/23/2022 9:45 AM  Performed by: Sandy Cohn DPM  Authorized by: Sandy Cohn DPM     Consent Done?:  Yes (Verbal)  Indications:  Pain  Site marked: the procedure site was marked    Timeout: prior to procedure the correct patient, procedure, and site was verified    Prep: patient was prepped and draped in usual sterile fashion      Local anesthesia used?: Yes    Local anesthetic: 0.75% Marcaine plain.  Anesthetic total (ml):  1    Location:  Foot  Foot joint: Left plantar fascia.  Ultrasonic guidance for needle placement?: No    Needle size:  25 G  Approach:  Medial  Medications:  40 mg triamcinolone acetonide 40 mg/mL; 4 mg dexamethasone 4 mg/mL  Patient tolerance:  Patient tolerated the procedure well with no immediate complications

## 2022-06-24 ENCOUNTER — PATIENT MESSAGE (OUTPATIENT)
Dept: FAMILY MEDICINE | Facility: CLINIC | Age: 32
End: 2022-06-24
Payer: MEDICAID

## 2022-06-27 ENCOUNTER — PATIENT OUTREACH (OUTPATIENT)
Dept: ADMINISTRATIVE | Facility: HOSPITAL | Age: 32
End: 2022-06-27
Payer: MEDICAID

## 2022-06-27 DIAGNOSIS — Z12.4 PAP SMEAR FOR CERVICAL CANCER SCREENING: Primary | ICD-10-CM

## 2022-06-27 NOTE — PROGRESS NOTES
Working Cervical Cancer Report; chart searched, lab [facility]; no records found. I called pt to schedule overdue pap smear. Pt would like to schedule a morning appointment. I sent pt information to OB scheduling for assistance. I placed OB Referral.

## 2022-06-29 ENCOUNTER — TELEPHONE (OUTPATIENT)
Dept: OBSTETRICS AND GYNECOLOGY | Facility: CLINIC | Age: 32
End: 2022-06-29
Payer: MEDICAID

## 2022-06-29 NOTE — TELEPHONE ENCOUNTER
----- [facility] sent at 6/27/2022 10:13 AM CDT -----  Good Morning,    I need assistance with scheduling pt a morning appointment for pap smear.  OB referral has been placed. Pt stated any day of the week is fine.    Thanks.

## 2022-06-30 ENCOUNTER — TELEPHONE (OUTPATIENT)
Dept: FAMILY MEDICINE | Facility: CLINIC | Age: 32
End: 2022-06-30

## 2022-06-30 ENCOUNTER — PATIENT MESSAGE (OUTPATIENT)
Dept: FAMILY MEDICINE | Facility: CLINIC | Age: 32
End: 2022-06-30

## 2022-06-30 NOTE — TELEPHONE ENCOUNTER
----- Message from Clare Asif sent at 6/30/2022  8:26 AM CDT -----  Contact: [phone]  Pt is scheduled to be seen today for 920 but she was unable to secure transportation thorough insurance. She has medicaid and would like a call to  or see if she can still be seen today as a virtual. [place]ordo advise.

## 2022-07-01 ENCOUNTER — TELEPHONE (OUTPATIENT)
Dept: PAIN MEDICINE | Facility: CLINIC | Age: 32
End: 2022-07-01
Payer: MEDICAID

## 2022-07-01 NOTE — TELEPHONE ENCOUNTER
Contacted patient to reschedule procedure as there has been a change in Dr. Rojas' schedule.  Procedure was moved to July 14th however, patient will reach out via [employer] to confirm if she can make that date

## 2022-07-05 ENCOUNTER — PATIENT MESSAGE (OUTPATIENT)
Dept: FAMILY MEDICINE | Facility: CLINIC | Age: 32
End: 2022-07-05
Payer: MEDICAID

## 2022-07-05 DIAGNOSIS — R73.09 HEMOGLOBIN A1C GREATER THAN 9%, INDICATING POOR DIABETIC CONTROL: ICD-10-CM

## 2022-07-05 DIAGNOSIS — E11.65 UNCONTROLLED TYPE 2 DIABETES MELLITUS WITH HYPERGLYCEMIA: ICD-10-CM

## 2022-07-05 RX ORDER — SEMAGLUTIDE 1.34 MG/ML
0.25 INJECTION, SOLUTION SUBCUTANEOUS
Qty: 2 PEN | Refills: 1 | Status: SHIPPED | OUTPATIENT
Start: 2022-07-05 | End: 2022-08-17 | Stop reason: SDUPTHER

## 2022-07-05 NOTE — TELEPHONE ENCOUNTER
Spoke with pt in regards to appointment change pt wanted to get an earlier appointment but none was available pt is okay wit the change in appointment     Jannette Pickens MA

## 2022-07-11 ENCOUNTER — PATIENT MESSAGE (OUTPATIENT)
Dept: FAMILY MEDICINE | Facility: CLINIC | Age: 32
End: 2022-07-11
Payer: MEDICAID

## 2022-07-12 ENCOUNTER — TELEPHONE (OUTPATIENT)
Dept: PAIN MEDICINE | Facility: CLINIC | Age: 32
End: 2022-07-12
Payer: MEDICAID

## 2022-07-12 DIAGNOSIS — M54.16 LUMBAR RADICULOPATHY: Primary | ICD-10-CM

## 2022-07-12 NOTE — PRE-PROCEDURE INSTRUCTIONS
Spoke with patient regarding procedure scheduled on 7.14    Arrival time 1020    Has patient been sick with fever or on antibiotics within the last 7 days? No    Does the patient have any open wounds, sores or rashes? No    Does the patient have any recent fractures? no    Has patient received a vaccination within the last 7 days? No    Received the COVID vaccination? yes    Has the patient stopped all medications as directed? Hold dm meds am of procedure    Does patient have a pacemaker and or defibrillator? no    Does the patient have a ride to and from procedure and someone reliable to remain with patient? Mother     Is the patient diabetic? yes    Does the patient have sleep apnea? Or use O2 at home? No and no     Is the patient receiving sedation? yes    Is the patient instructed to remain NPO beginning at midnight the night before their procedure? yes    Procedure location confirmed with patient? Yes    Covid- Denies signs/symptoms. Instructed to notify PAT/MD if any changes.

## 2022-07-12 NOTE — TELEPHONE ENCOUNTER
Pt is approved for her injection on 7/14/22. Finicial assistant will cover it per Ms.Kiamethia Medina.    Juan Luis Hammond   Medical

## 2022-07-12 NOTE — PRE-PROCEDURE INSTRUCTIONS
Attempted to PAT patient for procedure on 7.14. with Dr. cardenas. No answer. LVM with return phone number. No return call at this time.

## 2022-07-14 ENCOUNTER — HOSPITAL ENCOUNTER (OUTPATIENT)
Facility: HOSPITAL | Age: 32
Discharge: HOME | End: 2022-07-14
Attending: ANESTHESIOLOGY | Admitting: ANESTHESIOLOGY
Payer: MEDICAID

## 2022-07-14 ENCOUNTER — TELEPHONE (OUTPATIENT)
Dept: PAIN MEDICINE | Facility: CLINIC | Age: 32
End: 2022-07-14
Payer: MEDICAID

## 2022-07-14 ENCOUNTER — PATIENT MESSAGE (OUTPATIENT)
Dept: PAIN MEDICINE | Facility: CLINIC | Age: 32
End: 2022-07-14
Payer: MEDICAID

## 2022-07-14 ENCOUNTER — PATIENT MESSAGE (OUTPATIENT)
Dept: FAMILY MEDICINE | Facility: CLINIC | Age: 32
End: 2022-07-14
Payer: MEDICAID

## 2022-07-14 VITALS
DIASTOLIC BLOOD PRESSURE: 76 MMHG | BODY MASS INDEX: 44.94 KG/M2 | SYSTOLIC BLOOD PRESSURE: 117 MMHG | OXYGEN SATURATION: 96 % | RESPIRATION RATE: 18 BRPM | HEART RATE: 85 BPM | HEIGHT: 64 IN | WEIGHT: 263.25 LBS | TEMPERATURE: 98 F

## 2022-07-14 DIAGNOSIS — M54.16 LUMBAR RADICULOPATHY, CHRONIC: ICD-10-CM

## 2022-07-14 LAB
B-HCG UR QL: NEGATIVE
CTP QC/QA: YES
POCT GLUCOSE: 108 MG/DL (ref 70–110)

## 2022-07-14 PROCEDURE — 64483 PR EPIDURAL INJ, ANES/STEROID, TRANSFORAMINAL, LUMB/SACR, SNGL LEVL: ICD-10-PCS | Mod: LT,,, | Performed by: ANESTHESIOLOGY

## 2022-07-14 PROCEDURE — 64484 PRA INJECT ANES/STEROID FORAMEN LUMBAR/SACRAL W IMG GUIDE ,EA ADD LEVEL: ICD-10-PCS | Mod: LT,,, | Performed by: ANESTHESIOLOGY

## 2022-07-14 PROCEDURE — 64484 NJX AA&/STRD TFRM EPI L/S EA: CPT | Performed by: ANESTHESIOLOGY

## 2022-07-14 PROCEDURE — 64484 NJX AA&/STRD TFRM EPI L/S EA: CPT | Mod: LT,,, | Performed by: ANESTHESIOLOGY

## 2022-07-14 PROCEDURE — 64483 NJX AA&/STRD TFRM EPI L/S 1: CPT | Mod: LT,,, | Performed by: ANESTHESIOLOGY

## 2022-07-14 PROCEDURE — 81025 URINE PREGNANCY TEST: CPT | Performed by: ANESTHESIOLOGY

## 2022-07-14 PROCEDURE — 25000003 PHARM REV CODE 250: Performed by: ANESTHESIOLOGY

## 2022-07-14 PROCEDURE — 64483 NJX AA&/STRD TFRM EPI L/S 1: CPT | Performed by: ANESTHESIOLOGY

## 2022-07-14 PROCEDURE — 82962 GLUCOSE BLOOD TEST: CPT | Performed by: ANESTHESIOLOGY

## 2022-07-14 PROCEDURE — 63600175 PHARM REV CODE 636 W HCPCS: Performed by: ANESTHESIOLOGY

## 2022-07-14 PROCEDURE — 25500020 PHARM REV CODE 255: Performed by: ANESTHESIOLOGY

## 2022-07-14 RX ORDER — FENTANYL CITRATE 50 UG/ML
INJECTION, SOLUTION INTRAMUSCULAR; INTRAVENOUS
Status: DISCONTINUED | OUTPATIENT
Start: 2022-07-14 | End: 2022-07-14 | Stop reason: HOSPADM

## 2022-07-14 RX ORDER — DEXAMETHASONE SODIUM PHOSPHATE 10 MG/ML
INJECTION INTRAMUSCULAR; INTRAVENOUS
Status: DISCONTINUED | OUTPATIENT
Start: 2022-07-14 | End: 2022-07-14 | Stop reason: HOSPADM

## 2022-07-14 RX ORDER — FLUCONAZOLE 150 MG/1
TABLET ORAL
Qty: 2 TABLET | Refills: 0 | Status: SHIPPED | OUTPATIENT
Start: 2022-07-14 | End: 2022-07-22

## 2022-07-14 RX ORDER — INDOMETHACIN 25 MG/1
CAPSULE ORAL
Status: DISCONTINUED | OUTPATIENT
Start: 2022-07-14 | End: 2022-07-14 | Stop reason: HOSPADM

## 2022-07-14 RX ORDER — BUPIVACAINE HYDROCHLORIDE 2.5 MG/ML
INJECTION, SOLUTION EPIDURAL; INFILTRATION; INTRACAUDAL
Status: DISCONTINUED | OUTPATIENT
Start: 2022-07-14 | End: 2022-07-14 | Stop reason: HOSPADM

## 2022-07-14 RX ORDER — MIDAZOLAM HYDROCHLORIDE 1 MG/ML
INJECTION, SOLUTION INTRAMUSCULAR; INTRAVENOUS
Status: DISCONTINUED | OUTPATIENT
Start: 2022-07-14 | End: 2022-07-14 | Stop reason: HOSPADM

## 2022-07-14 NOTE — DISCHARGE SUMMARY
[facility] - Pain Mgmt 1st Fl  Discharge Note  Short Stay    Procedure(s) (LRB):  left L4/5 + L5/S1 TF BRUNILDA RN IV Sedation (Left)    OUTCOME: Patient tolerated treatment/procedure well without complication and is now ready for discharge.    DISPOSITION: Home or Self Care    FINAL DIAGNOSIS:  <principal problem not specified>    FOLLOWUP: In clinic    DISCHARGE INSTRUCTIONS:  No discharge procedures on file.     TIME SPENT ON DISCHARGE: 15 minutes

## 2022-07-14 NOTE — DISCHARGE INSTRUCTIONS

## 2022-07-14 NOTE — OP NOTE
Chrissjudahtricia Carlos  31 y.o. female      Vitals:    07/14/22 1105   BP: 120/70   Pulse: 80   Resp: (!) 22   Temp:      Procedure Date:7/14/22      INFORMED CONSENT: The procedure, risks, benefits and options were discussed with patient. There are no contraindications to the procedure. The patient expressed understanding and agreed to proceed. The personnel performing the procedure was discussed. I verify that I personally obtained consent prior to the start of the procedure and the signed consent can be found on the patient's chart.       Anesthesia:   Conscious sedation provided by M.D    The patient was monitored with continuous pulse oximetry, EKG, and intermittent blood pressure monitors.  The patient was hemodynamically stable throughout the entire process was responsive to voice, and breathing spontaneously.  Supplemental O2 was provided at 2L/min via nasal cannula.  Patient was comfortable for the duration of the procedure. (See nurse documentation and case log for sedation time)    There was a total of 2mg IV Midazolam and 50mcg Fentanyl titrated for the procedure    Pre Procedure diagnosis: Lumbar radiculopathy [M54.16]  Post-Procedure diagnosis: SAME     Complications: None    Specimens: None      DESCRIPTION OF PROCEDURE: The patient was brought to the procedure room. IV access was obtained prior to the procedure. The patient was positioned prone on the fluoroscopy table. Continuous hemodynamic monitoring was initiated including blood pressure, EKG, and pulse oximetry. . The skin was prepped with chlorhexidine and draped in a sterile fashion. Skin anesthesia was achieved using a total of 10mL of lidocaine, 5mL over each respective injection site.     The  L4/5 and L5/S1 transforaminal spaces were identified with fluoroscopy in the  AP, oblique, and lateral views.  A 22 gauge spinal quinke needle was then advanced into the area of the trans foraminal spaces left with confirmation of proper needle  position using AP, oblique, and lateral fluoroscopic views. Once the needle tip was in the area of the transforaminal space, and there was no blood, CSF or paraesthesias,  1.5 mL of Omnipaque 300mg/ml was injected on left for a total of 3mL.  Fluoroscopic imaging in the AP and lateral views revealed a clear outline of the spinal nerve with proximal spread of agent through the neural foramen into the epidural space. A total combination of 2 mL of Bupivicaine 0.25% and 10 mg dexamethasone was injected on each side for a total of 6mL of injected medications with displacement of the contrast dye confirming that the medication went into the area of the transforaminal spaces left. A sterile dressing was applied.   Patient tolerated the procedure well.    Patient was taken back to the recovery room for further observation.     The patient was discharged to home in stable condition

## 2022-07-14 NOTE — H&P
HPI  Patient presenting for Procedure(s) (LRB):  left L4/5 + L5/S1 TF BRUNILDA RN IV Sedation (Left)     Patient on Anti-coagulation No    No health changes since previous encounter    Past Medical History:   Diagnosis Date    Carpal tunnel syndrome     Diabetes mellitus     GERD (gastroesophageal reflux disease)     Herpes simplex virus (HSV) infection     High serum testosterone 9/7/2017    Hyperlipidemia     Hypertension     Insulin resistance syndrome 9/7/2017    Lumbar facet arthropathy 8/14/2019    Morbid obesity     Ovarian cyst     bilateral      Past Surgical History:   Procedure Laterality Date    ANKLE SURGERY Left     BUNIONECTOMY Bilateral     CARPAL TUNNEL RELEASE Left 3/19/2021    Procedure: RELEASE, CARPAL TUNNEL;  Surgeon: Juan F Bernard MD;  Location: [facility];  Service: Orthopedics;  Laterality: Left;    CARPAL TUNNEL RELEASE Right 11/4/2021    Procedure: RELEASE, CARPAL TUNNEL;  Surgeon: Juan F Bernard MD;  Location: [facility];  Service: Orthopedics;  Laterality: Right;    COLONOSCOPY  06/12/2018    ESOPHAGOGASTRODUODENOSCOPY      ESOPHAGOGASTRODUODENOSCOPY N/A 12/2/2021    Procedure: EGD (ESOPHAGOGASTRODUODENOSCOPY);  Surgeon: Pili Eagle MD;  Location: [facility];  Service: Endoscopy;  Laterality: N/A;    INJECTION OF ANESTHETIC AGENT AROUND MEDIAL BRANCH NERVES INNERVATING LUMBAR FACET JOINT Bilateral 8/14/2019    Procedure: Bilateral L3-5 MBB;  Surgeon: Yo Kirkland MD;  Location: [facility];  Service: Pain Management;  Laterality: Bilateral;    INJECTION OF ANESTHETIC AGENT AROUND MEDIAL BRANCH NERVES INNERVATING LUMBAR FACET JOINT Bilateral 7/16/2020    Procedure: Bilateral L3-5 MBB;  Surgeon: Yo Kirkland MD;  Location: [facility];  Service: Pain Management;  Laterality: Bilateral;    INJECTION OF ANESTHETIC AGENT INTO SACROILIAC JOINT Right 2/5/2020    Procedure: Right SIJ Injection with local;  Surgeon: Yo Kirkland MD;  Location: [facility];   Service: Pain Management;  Laterality: Right;    INJECTION OF ANESTHETIC AGENT INTO SACROILIAC JOINT Left 6/17/2021    Procedure: Left GT bursa + left SIJ injection;  Surgeon: Yo Kirkland MD;  Location: [facility];  Service: Pain Management;  Laterality: Left;    INJECTION OF ANESTHETIC AGENT INTO SACROILIAC JOINT Bilateral 5/5/2022    Procedure: Bilateral BLOCK, SACROILIAC JOINT and Bilateral GTB RN IV sedation;  Surgeon: Yo Kirkland MD;  Location: [facility];  Service: Pain Management;  Laterality: Bilateral;    INJECTION OF JOINT Left 6/17/2021    Procedure: Left GT bursa + left SIJ injection;  Surgeon: Yo Kirkland MD;  Location: [facility];  Service: Pain Management;  Laterality: Left;    LUMBAR FUSION      RADIOFREQUENCY THERMOCOAGULATION Right 12/8/2020    Procedure: RADIOFREQUENCY THERMAL COAGULATION Right L3, 4,5 MBB RFA with RN IV sedation// NO STEROID;  Surgeon: Yo Kirkland MD;  Location: [facility];  Service: Pain Management;  Laterality: Right;    RADIOFREQUENCY THERMOCOAGULATION Left 1/21/2021    Procedure: Left L3-5 Lumbar RFA;  Surgeon: Rian Chaney MD;  Location: [facility];  Service: Pain Management;  Laterality: Left;    SELECTIVE INJECTION OF ANESTHETIC AGENT AROUND LUMBAR SPINAL NERVE ROOT BY TRANSFORAMINAL APPROACH Left 3/8/2022    Procedure: BLOCK, SPINAL NERVE ROOT, LUMBAR, SELECTIVE, TRANSFORAMINAL APPROACH Left L5-S1, S1 RN IV sedation;  Surgeon: Yo Kirkland MD;  Location: [facility];  Service: Pain Management;  Laterality: Left;     Review of patient's allergies indicates:   Allergen Reactions    Amitriptyline      xerostomia    Amlodipine      DRY MOUTH    Tramadol Itching        No current facility-administered medications on file prior to encounter.     Current Outpatient Medications on File Prior to Encounter   Medication Sig Dispense Refill    insulin detemir U-100 (LEVEMIR FLEXTOUCH U-100 INSULN) 100 unit/mL (3 mL) InPn pen Inject 20 Units into  "the skin every evening. 18 mL 1    irbesartan (AVAPRO) 150 MG tablet TAKE ONE TABLET BY MOUTH EVERY DAY 90 tablet 0    metFORMIN (GLUCOPHAGE-XR) 500 MG ER 24hr tablet Take 2 tablets (1,000 mg total) by mouth 2 (two) times daily with meals. 360 tablet 1    metoprolol succinate (TOPROL-XL) 50 MG 24 hr tablet Take 1 tablet (50 mg total) by mouth once daily. 90 tablet 1    spironolactone (ALDACTONE) 25 MG tablet Take 2 tablets by mouth once daily 180 tablet 3    atorvastatin (LIPITOR) 40 MG tablet Take 1 tablet (40 mg total) by mouth nightly. 90 tablet 1    blood sugar diagnostic Strp To check BG 2 times daily, to use with insurance preferred meter 100 strip 2    blood-glucose meter kit To check BG 2 times daily, to use with insurance preferred meter 1 each 0    clotrimazole-betamethasone 1-0.05% (LOTRISONE) cream Apply topically 2 (two) times daily.      dicyclomine (BENTYL) 20 mg tablet TAKE ONE TABLET BY MOUTH THREE TIMES A DAY AS NEEDED FOR ABDOMINAL PAIN 90 tablet 11    doxylamine-pyridoxine, vit B6, (DICLEGIS) 10-10 mg TbEC TAKE 1 TO 2 TABLETS BY MOUTH EVERY EVENING FOR NAUSEA AND VOMITING 30 tablet 0    ergocalciferol (ERGOCALCIFEROL) 50,000 unit Cap TAKE 1 CAPSULE BY MOUTH ONCE A WEEK FOR 28 DAYS      glimepiride (AMARYL) 4 MG tablet Take 1 tablet (4 mg total) by mouth daily with breakfast. 90 tablet 1    lancets Misc To check BG 2 times daily, to use with insurance preferred meter 100 each 2    linaCLOtide (LINZESS) 145 mcg Cap capsule Take 1 capsule (145 mcg total) by mouth before breakfast. 90 capsule 1    mirtazapine (REMERON) 15 MG tablet Take 15 mg by mouth nightly.      pen needle, diabetic (BD ULTRA-FINE GAYLA PEN NEEDLE) 32 gauge x 5/32" Ndle 1 Units by Misc.(Non-Drug; Combo Route) route once daily. 100 each 6    promethazine (PHENERGAN) 25 MG tablet TAKE 1 TABLET BY MOUTH EVERY 6 HOURS AS NEEDED FOR NAUSEA 30 tablet 0    SITagliptin (JANUVIA) 100 MG Tab Take 1 tablet (100 mg total) by " "mouth once daily. 90 tablet 1    tiZANidine (ZANAFLEX) 4 MG tablet Take 1 tablet (4 mg total) by mouth every 8 (eight) hours. 90 tablet 1    valACYclovir (VALTREX) 500 MG tablet TAKE 1 TABLET BY MOUTH TWICE DAILY FOR 3 DAYS 6 tablet 6        PMHx, PSHx, Allergies, Medications reviewed in epic    ROS negative except pain complaints in HPI    OBJECTIVE:    /77 (BP Location: Right arm, Patient Position: Sitting)   Pulse 73   Temp 97.8 °F (36.6 °C) (Temporal)   Resp 16   Ht 5' 4" (1.626 m)   Wt 119.4 kg (263 lb 3.7 oz)   LMP 04/23/2022   SpO2 97%   Breastfeeding No   BMI 45.18 kg/m²     PHYSICAL EXAMINATION:    GENERAL: Well appearing, in no acute distress, alert and oriented x3.  PSYCH:  Mood and affect appropriate.  SKIN: Skin color, texture, turgor normal, no rashes or lesions which will impact the procedure.  CV: RRR with palpation of the radial artery.  PULM: No evidence of respiratory difficulty, symmetric chest rise. Clear to auscultation.  NEURO: Cranial nerves grossly intact.    Plan:    Proceed with procedure as planned Procedure(s) (LRB):  left L4/5 + L5/S1 TF BRUNILDA RN IV Sedation (Left)    Eula Rojas MD  07/14/2022            "

## 2022-07-15 ENCOUNTER — PATIENT MESSAGE (OUTPATIENT)
Dept: PAIN MEDICINE | Facility: CLINIC | Age: 32
End: 2022-07-15
Payer: MEDICAID

## 2022-07-15 RX ORDER — METHYLPREDNISOLONE 4 MG/1
4 TABLET ORAL
COMMUNITY
End: 2022-07-18

## 2022-07-18 DIAGNOSIS — M54.16 LUMBAR RADICULOPATHY: Primary | ICD-10-CM

## 2022-07-18 RX ORDER — METHYLPREDNISOLONE 4 MG/1
TABLET ORAL
Qty: 1 EACH | Refills: 0 | Status: SHIPPED | OUTPATIENT
Start: 2022-07-18 | End: 2022-07-22

## 2022-07-22 ENCOUNTER — PATIENT MESSAGE (OUTPATIENT)
Dept: PAIN MEDICINE | Facility: CLINIC | Age: 32
End: 2022-07-22
Payer: MEDICAID

## 2022-07-22 DIAGNOSIS — M54.16 LUMBAR RADICULOPATHY: Primary | ICD-10-CM

## 2022-07-22 PROBLEM — R73.9 HYPERGLYCEMIA: Status: RESOLVED | Noted: 2022-03-18 | Resolved: 2022-07-22

## 2022-07-22 PROBLEM — G56.00 CARPAL TUNNEL SYNDROME: Status: RESOLVED | Noted: 2020-09-25 | Resolved: 2022-07-22

## 2022-07-22 PROBLEM — D72.829 LEUKOCYTOSIS: Status: RESOLVED | Noted: 2022-03-18 | Resolved: 2022-07-22

## 2022-07-22 RX ORDER — TIZANIDINE 4 MG/1
4 TABLET ORAL EVERY 8 HOURS
Qty: 90 TABLET | Refills: 1 | Status: SHIPPED | OUTPATIENT
Start: 2022-07-22 | End: 2022-09-20 | Stop reason: SDUPTHER

## 2022-07-25 ENCOUNTER — PATIENT MESSAGE (OUTPATIENT)
Dept: FAMILY MEDICINE | Facility: CLINIC | Age: 32
End: 2022-07-25

## 2022-07-25 ENCOUNTER — OFFICE VISIT (OUTPATIENT)
Dept: FAMILY MEDICINE | Facility: CLINIC | Age: 32
End: 2022-07-25
Payer: MEDICAID

## 2022-07-25 VITALS
HEART RATE: 86 BPM | TEMPERATURE: 99 F | OXYGEN SATURATION: 96 % | WEIGHT: 264.56 LBS | BODY MASS INDEX: 45.17 KG/M2 | HEIGHT: 64 IN | SYSTOLIC BLOOD PRESSURE: 104 MMHG | DIASTOLIC BLOOD PRESSURE: 80 MMHG

## 2022-07-25 DIAGNOSIS — K59.00 CONSTIPATION, UNSPECIFIED CONSTIPATION TYPE: ICD-10-CM

## 2022-07-25 DIAGNOSIS — R74.8 ELEVATED LIVER ENZYMES: ICD-10-CM

## 2022-07-25 DIAGNOSIS — R79.89 HIGH SERUM TESTOSTERONE: ICD-10-CM

## 2022-07-25 DIAGNOSIS — E11.9 TYPE 2 DIABETES MELLITUS WITHOUT COMPLICATION, WITHOUT LONG-TERM CURRENT USE OF INSULIN: ICD-10-CM

## 2022-07-25 DIAGNOSIS — Z00.00 PREVENTATIVE HEALTH CARE: Primary | ICD-10-CM

## 2022-07-25 DIAGNOSIS — I10 ESSENTIAL HYPERTENSION: ICD-10-CM

## 2022-07-25 DIAGNOSIS — E66.01 MORBID OBESITY WITH BMI OF 40.0-44.9, ADULT: ICD-10-CM

## 2022-07-25 DIAGNOSIS — F51.04 CHRONIC INSOMNIA: ICD-10-CM

## 2022-07-25 DIAGNOSIS — R74.8 LOW SERUM HDL: ICD-10-CM

## 2022-07-25 PROCEDURE — 3074F PR MOST RECENT SYSTOLIC BLOOD PRESSURE < 130 MM HG: ICD-10-PCS | Mod: CPTII,,, | Performed by: REGISTERED NURSE

## 2022-07-25 PROCEDURE — 99395 PR PREVENTIVE VISIT,EST,18-39: ICD-10-PCS | Mod: S$PBB,,, | Performed by: REGISTERED NURSE

## 2022-07-25 PROCEDURE — 99999 PR PBB SHADOW E&M-EST. PATIENT-LVL IV: ICD-10-PCS | Mod: PBBFAC,,, | Performed by: REGISTERED NURSE

## 2022-07-25 PROCEDURE — 3008F BODY MASS INDEX DOCD: CPT | Mod: CPTII,,, | Performed by: REGISTERED NURSE

## 2022-07-25 PROCEDURE — 3052F HG A1C>EQUAL 8.0%<EQUAL 9.0%: CPT | Mod: CPTII,,, | Performed by: REGISTERED NURSE

## 2022-07-25 PROCEDURE — 3061F PR NEG MICROALBUMINURIA RESULT DOCUMENTED/REVIEW: ICD-10-PCS | Mod: CPTII,,, | Performed by: REGISTERED NURSE

## 2022-07-25 PROCEDURE — 4010F PR ACE/ARB THEARPY RXD/TAKEN: ICD-10-PCS | Mod: CPTII,,, | Performed by: REGISTERED NURSE

## 2022-07-25 PROCEDURE — 4010F ACE/ARB THERAPY RXD/TAKEN: CPT | Mod: CPTII,,, | Performed by: REGISTERED NURSE

## 2022-07-25 PROCEDURE — 3066F NEPHROPATHY DOC TX: CPT | Mod: CPTII,,, | Performed by: REGISTERED NURSE

## 2022-07-25 PROCEDURE — 99395 PREV VISIT EST AGE 18-39: CPT | Mod: S$PBB,,, | Performed by: REGISTERED NURSE

## 2022-07-25 PROCEDURE — 3066F PR DOCUMENTATION OF TREATMENT FOR NEPHROPATHY: ICD-10-PCS | Mod: CPTII,,, | Performed by: REGISTERED NURSE

## 2022-07-25 PROCEDURE — 3061F NEG MICROALBUMINURIA REV: CPT | Mod: CPTII,,, | Performed by: REGISTERED NURSE

## 2022-07-25 PROCEDURE — 3079F DIAST BP 80-89 MM HG: CPT | Mod: CPTII,,, | Performed by: REGISTERED NURSE

## 2022-07-25 PROCEDURE — 3008F PR BODY MASS INDEX (BMI) DOCUMENTED: ICD-10-PCS | Mod: CPTII,,, | Performed by: REGISTERED NURSE

## 2022-07-25 PROCEDURE — 3052F PR MOST RECENT HEMOGLOBIN A1C LEVEL 8.0 - < 9.0%: ICD-10-PCS | Mod: CPTII,,, | Performed by: REGISTERED NURSE

## 2022-07-25 PROCEDURE — 99214 OFFICE O/P EST MOD 30 MIN: CPT | Mod: PBBFAC,PO | Performed by: REGISTERED NURSE

## 2022-07-25 PROCEDURE — 99999 PR PBB SHADOW E&M-EST. PATIENT-LVL IV: CPT | Mod: PBBFAC,,, | Performed by: REGISTERED NURSE

## 2022-07-25 PROCEDURE — 3074F SYST BP LT 130 MM HG: CPT | Mod: CPTII,,, | Performed by: REGISTERED NURSE

## 2022-07-25 PROCEDURE — 3079F PR MOST RECENT DIASTOLIC BLOOD PRESSURE 80-89 MM HG: ICD-10-PCS | Mod: CPTII,,, | Performed by: REGISTERED NURSE

## 2022-07-25 RX ORDER — METFORMIN HYDROCHLORIDE 500 MG/1
1000 TABLET, EXTENDED RELEASE ORAL 2 TIMES DAILY WITH MEALS
Qty: 360 TABLET | Refills: 1 | Status: SHIPPED | OUTPATIENT
Start: 2022-07-25 | End: 2023-02-24

## 2022-07-25 RX ORDER — ZOLPIDEM TARTRATE 5 MG/1
5 TABLET ORAL NIGHTLY PRN
Qty: 30 TABLET | Refills: 5 | Status: SHIPPED | OUTPATIENT
Start: 2022-07-25 | End: 2023-01-17 | Stop reason: SDUPTHER

## 2022-07-25 RX ORDER — GLIMEPIRIDE 4 MG/1
4 TABLET ORAL
Qty: 90 TABLET | Refills: 1 | Status: SHIPPED | OUTPATIENT
Start: 2022-07-25 | End: 2023-01-30

## 2022-08-08 RX ORDER — METHYLPREDNISOLONE 4 MG/1
4 TABLET ORAL 2 TIMES DAILY WITH MEALS
Qty: 21 EACH | Refills: 0 | OUTPATIENT
Start: 2022-08-08

## 2022-08-08 NOTE — PROGRESS NOTES
SUBJECTIVE:     Ntahan Carlos is a 31 y.o. Black female here today for:  WELL-WOMAN EXAM      HM/SCREENINGS:   Mammogram:  n/a  o Last done:  None  o No known family history of breast cancer.  o Next due:  At age 42   DEXA:  n/a  o Last done:  None    No results found for: [id number]   Colonoscopy:  2018  o Due:  5 years  o Findings:  - Sigmoid colon polyp  - Colonic mucosa w/ benign lymphoid aggregate  - No dysplasia or malignancy  o Family h/o colon cancer:  [facility]   o Dr. Cl [facility] ([facility])      LMP:   Patient's last menstrual period was  to 22.   Menopause:  No   Menstrual problems:  Slightly irregular in timing, cramps, few small clots, dark red, not heavy  o Duration:  ~ almost 1 week   Contraception:  None    PAP HISTORY:   Result:     2019   History of abnormal pap?  no    STD HISTORY: no    PREGNANCY HISTORY:   :  0    GYN SURGICAL HISTORY:  None        ROS:    GENERAL:  Denies activity changes, fatigue, or gain.  Denies fever, chills, or sweats.  Some modest weight loss reported.  CV:  Denies chest pain, edema or palpitations.  RESPIRATORY:  Denies chest tightness, cough, dyspnea/sob, wheezing or stridor.  GI:  Denies abdominal pain, bloating, or blood in stool.  Reports constipation w/out any NVD, rectal pain or BRB from rectum.  Decreased appetite on medication.  :  Denies dysuria, hematuria, frequency, urgency, or leaking.  Denies vaginal spotting, odor or discharge.  Menses irregular.  Denies genital skin lesions, or post-coital bleeding.  Reports pelvic pain and dyspareunia.  DERM:  Denies skin lesions or wound.  Reports skin rash/irritation and itching.  NEURO:  Negative for headaches, dizziness, memory loss, syncope, numbness, tingling or weakness.  PSYCH:  Denies agitation, depression, stress/anxiety, or panic attacks.  Denies insomnia, memory loss, decreased concentration, or confusion.  Denies any SI, HI or self-harm behaviors.      Review  "of patient's allergies indicates:   Allergen Reactions    Amitriptyline Xerostomia    Amlodipine Xerostomia    Tramadol Itching       Medication Sig Disp    atorvastatin (LIPITOR) 40 MG tablet Take 1 tablet (40 mg total) by mouth nightly. 90 tablet    blood sugar diagnostic Strp To check BG 2 times daily, to use with insurance preferred meter 100 strip    blood-glucose meter kit To check BG 2 times daily, to use with insurance preferred meter 1 each    clotrimazole-betamethasone 1-0.05% (LOTRISONE) cream Apply topically 2 (two) times daily.     dicyclomine (BENTYL) 20 mg tablet TAKE ONE TABLET BY MOUTH THREE TIMES A DAY AS NEEDED FOR ABDOMINAL PAIN 90 tablet    ergocalciferol (ERGOCALCIFEROL) 50,000 unit Cap TAKE 1 CAPSULE BY MOUTH ONCE A WEEK FOR 28 DAYS     glimepiride (AMARYL) 4 MG tablet Take 1 tablet (4 mg total) by mouth daily with breakfast. 90 tablet    insulin detemir U-100 (LEVEMIR FLEXTOUCH U-100 INSULN) 100 unit/mL (3 mL) InPn pen Inject 20 Units into the skin every evening. 18 mL    irbesartan (AVAPRO) 150 MG tablet TAKE ONE TABLET BY MOUTH EVERY DAY 90 tablet    lancets Misc To check BG 2 times daily, to use with insurance preferred meter 100 each    linaCLOtide (LINZESS) 145 mcg Cap capsule Take 1 capsule (145 mcg total) by mouth before breakfast. 90 capsule    metFORMIN (GLUCOPHAGE-XR) 500 MG ER 24hr tablet Take 2 tablets (1,000 mg total) by mouth 2 (two) times daily with meals. 360 tablet    metoprolol succinate (TOPROL-XL) 50 MG 24 hr tablet Take 1 tablet (50 mg total) by mouth once daily. 90 tablet    pen needle, diabetic (BD ULTRA-FINE GAYLA PEN NEEDLE) 32 gauge x 5/32" Ndle 1 Units by Misc.(Non-Drug; Combo Route) route once daily. 100 each    semaglutide (OZEMPIC) 0.25 mg or 0.5 mg(2 mg/1.5 mL) pen injector Inject 0.25 mg into the skin every 7 days. 2 pen    SITagliptin (JANUVIA) 100 MG Tab Take 1 tablet (100 mg total) by mouth once daily. 90 tablet    spironolactone " (ALDACTONE) 25 MG tablet Take 2 tablets by mouth once daily 180 tablet    tiZANidine (ZANAFLEX) 4 MG tablet Take 1 tablet (4 mg total) by mouth every 8 (eight) hours. 90 tablet    valACYclovir (VALTREX) 500 MG tablet TAKE 1 TABLET BY MOUTH TWICE DAILY FOR 3 DAYS 6 tablet    zolpidem (AMBIEN) 5 MG Tab Take 1 tablet (5 mg total) by mouth nightly as needed (for sleep). 30 tablet         Patient Active Problem List   Diagnosis    Essential hypertension    Gastroesophageal reflux disease    Hypercholesteremia    High serum testosterone    HSV-2 (herpes simplex virus 2) infection    Nephrolithiasis    Chronic bilateral low back pain with left-sided sciatica    Polycystic ovaries    Lumbar facet arthropathy    Type 2 diabetes mellitus without complication, without long-term current use of insulin    Paresthesias in left hand    Morbid obesity with BMI of 40.0-44.9, adult    Lumbar facet joint pain    Spondylosis without myelopathy or radiculopathy, lumbar region    Left carpal tunnel syndrome    Sacroiliac joint dysfunction    Lumbar radiculopathy       Past Medical History:   Diagnosis Date    Carpal tunnel syndrome     Diabetes mellitus     GERD (gastroesophageal reflux disease)     Herpes simplex virus (HSV) infection     High serum testosterone 9/7/2017    Hyperlipidemia     Hypertension     Insulin resistance syndrome 9/7/2017    Lumbar facet arthropathy 8/14/2019    Morbid obesity     Ovarian cyst     bilateral        Family History   Problem Relation Age of Onset    Hypertension Mother     Fibroids Mother     Arthritis Mother     No Known Problems Father     Diabetes Sister     Hypertension Brother     Hypertension Maternal Aunt     Diabetes Maternal Aunt     Arthritis Maternal Aunt     Diabetes Maternal Uncle     Hypertension Maternal Grandmother     Leukemia Maternal Grandfather     Hypertension Maternal Grandfather     Stroke Neg Hx        Past Surgical History:    Procedure Laterality Date    ANKLE SURGERY Left     BUNIONECTOMY Bilateral     CARPAL TUNNEL RELEASE Left 03/19/2021    Procedure: RELEASE, CARPAL TUNNEL;  Surgeon: Juan F Bernard MD;  Location: [facility] OR;  Service: Orthopedics;  Laterality: Left;    CARPAL TUNNEL RELEASE Right 11/04/2021    Procedure: RELEASE, CARPAL TUNNEL;  Surgeon: Juan F Bernard MD;  Location: [facility] OR;  Service: Orthopedics;  Laterality: Right;    COLONOSCOPY  06/12/2018    ESOPHAGOGASTRODUODENOSCOPY      ESOPHAGOGASTRODUODENOSCOPY N/A 12/02/2021    Procedure: EGD (ESOPHAGOGASTRODUODENOSCOPY);  Surgeon: Pili Eagle MD;  Location: [facility];  Service: Endoscopy;  Laterality: N/A;    INJECTION OF ANESTHETIC AGENT AROUND MEDIAL BRANCH NERVES INNERVATING LUMBAR FACET JOINT Bilateral 08/14/2019    Procedure: Bilateral L3-5 MBB;  Surgeon: Yo Kirkland MD;  Location: [facility];  Service: Pain Management;  Laterality: Bilateral;    INJECTION OF ANESTHETIC AGENT AROUND MEDIAL BRANCH NERVES INNERVATING LUMBAR FACET JOINT Bilateral 07/16/2020    Procedure: Bilateral L3-5 MBB;  Surgeon: Yo Kirkland MD;  Location: [facility];  Service: Pain Management;  Laterality: Bilateral;    INJECTION OF ANESTHETIC AGENT INTO SACROILIAC JOINT Right 02/05/2020    Procedure: Right SIJ Injection with local;  Surgeon: Yo Kirkland MD;  Location: [facility];  Service: Pain Management;  Laterality: Right;    INJECTION OF ANESTHETIC AGENT INTO SACROILIAC JOINT Left 06/17/2021    Procedure: Left GT bursa + left SIJ injection;  Surgeon: Yo Kirkland MD;  Location: [facility];  Service: Pain Management;  Laterality: Left;    INJECTION OF ANESTHETIC AGENT INTO SACROILIAC JOINT Bilateral 05/05/2022    Procedure: Bilateral BLOCK, SACROILIAC JOINT and Bilateral GTB RN IV sedation;  Surgeon: Yo Kirkland MD;  Location: [facility];  Service: Pain Management;  Laterality: Bilateral;    INJECTION OF JOINT Left 06/17/2021    Procedure:  "Left GT bursa + left SIJ injection;  Surgeon: Yo Kirkland MD;  Location: [facility];  Service: Pain Management;  Laterality: Left;    LUMBAR FUSION      RADIOFREQUENCY THERMOCOAGULATION Right 12/08/2020    Procedure: RADIOFREQUENCY THERMAL COAGULATION Right L3, 4,5 MBB RFA with RN IV sedation// NO STEROID;  Surgeon: Yo Kirkland MD;  Location: [facility];  Service: Pain Management;  Laterality: Right;    RADIOFREQUENCY THERMOCOAGULATION Left 01/21/2021    Procedure: Left L3-5 Lumbar RFA;  Surgeon: Rian Chaney MD;  Location: [facility];  Service: Pain Management;  Laterality: Left;    SELECTIVE INJECTION OF ANESTHETIC AGENT AROUND LUMBAR SPINAL NERVE ROOT BY TRANSFORAMINAL APPROACH Left 03/08/2022    Procedure: BLOCK, SPINAL NERVE ROOT, LUMBAR, SELECTIVE, TRANSFORAMINAL APPROACH Left L5-S1, S1 RN IV sedation;  Surgeon: Yo Kirkland MD;  Location: [facility];  Service: Pain Management;  Laterality: Left;    TRANSFORAMINAL EPIDURAL INJECTION OF STEROID Left 07/14/2022    Procedure: left L4/5 + L5/S1 TF BRUNILDA RN IV Sedation;  Surgeon: Eula Rojas MD;  Location: [facility];  Service: Pain Management;  Laterality: Left;       Social History     Tobacco Use    Smoking status: Never Smoker    Smokeless tobacco: Never Used   Substance Use Topics    Alcohol use: Not Currently     Comment: special occasions    Drug use: No         OBJECTIVE:     Vitals:    08/09/22 0754   BP: 124/72   Pulse: 82   Temp: 97.9 °F (36.6 °C)   TempSrc: Temporal   SpO2: 97%   Weight: 119.4 kg (263 lb 3.7 oz)   Height: 5' 4" (1.626 m)       General Appearance:    Alert, cooperative, no distress, appears stated age   Skin:   Hyperpigmentation and changes consistent with yeast noted under both breasts, under abdominal pannus, bilateral groin and upper inner thigh areas.  Acanthosis noted to neck with mild increased hair growth to chin and jawline.   Breast Exam:    No tenderness, masses, or nipple abnormality   Abdomen:  "    Soft, mild TTP to bilateral and midline pelvic area.  No masses   External Genitalia:    Normal female without lesion   Cervix:      No lesions, discharge, friability.  Mild CMT noted.   Vagina:    Normal without bleeding, erythema, discharge or irritation   Uterus:   Normal   Right Adnexa:   No enlargement, masses or tenderness   Left Adnexa:   No enlargement, masses or tenderness   Rectal:           No masses, tenderness or pain.  Normal muscle tone.       DIAGNOSIS/ASSESSMENT:     1. Well woman exam with routine gynecological exam --- pap collected.    2. Pelvic pain  - US Pelvis Comp with Transvag NON-OB (xpd); Future  - X-Ray Abdomen Flat And Erect; Future    3. Dyspareunia, female  - US Pelvis Comp with Transvag NON-OB (xpd); Future    4. Polycystic ovaries  - US Pelvis Comp with Transvag NON-OB (xpd); Future    5. Cutaneous candidiasis  - clotrimazole-betamethasone 1-0.05% (LOTRISONE) cream; Apply topically 2 (two) times daily.  Dispense: 45 g; Refill: 2    6. Acanthosis nigricans  - TSH; Future  - Hemoglobin A1C; Future    7. High serum testosterone --- on spironolactone as ordered    8. Hirsutism --- see # 7    9. Essential hypertension  - irbesartan (AVAPRO) 150 MG tablet; Take 1 tablet (150 mg total) by mouth once daily.  Dispense: 90 tablet; Refill: 3  - CBC Auto Differential; Future  - Comprehensive Metabolic Panel; Future  - TSH; Future  - Hemoglobin A1C; Future  - Microalbumin/Creatinine Ratio, Urine; Future    10. Type 2 diabetes mellitus without complication, with long-term current use of insulin  - irbesartan (AVAPRO) 150 MG tablet; Take 1 tablet (150 mg total) by mouth once daily.  Dispense: 90 tablet; Refill: 3  - CBC Auto Differential; Future  - Comprehensive Metabolic Panel; Future  - TSH; Future  - Hemoglobin A1C; Future  - Microalbumin/Creatinine Ratio, Urine; Future    11. Constipation, unspecified constipation type  - X-Ray Abdomen Flat And Erect; Future    12. Vitamin D insufficiency  -  Vitamin D; Future    13. Morbid obesity with BMI of 45.0-49.9, adult --- healthy diet/exercise and lifestyle choices    14. At risk for cardiovascular event  - irbesartan (AVAPRO) 150 MG tablet; Take 1 tablet (150 mg total) by mouth once daily.  Dispense: 90 tablet; Refill: 3    15. Cervical cancer screening  --- pap collected    16. Need for pneumococcal vaccination  - (In Office Administered) Pneumococcal Conjugate Vaccine (20 Valent) (IM)        PLAN:     Prevnar-20 today.  To get fasting lab from recent annual wellness exam.  Pap smear collected.  Pelvic US ordered.  Abdominal xray today.    FOLLOW-UP:     RTC as directed or prn.            45 minutes of total time spent on the encounter, which includes face to face time and non-face to face time preparing to see the patient (eg, review of tests), obtaining and/or reviewing separately obtained history, and documenting clinical information in the electronic or other health record.  Also includes independent interpretation of results (not separately reported) and communicating results to the patient/family/caregiver, with care coordination (not separately reported).

## 2022-08-09 ENCOUNTER — PATIENT MESSAGE (OUTPATIENT)
Dept: FAMILY MEDICINE | Facility: CLINIC | Age: 32
End: 2022-08-09

## 2022-08-09 ENCOUNTER — OFFICE VISIT (OUTPATIENT)
Dept: FAMILY MEDICINE | Facility: CLINIC | Age: 32
End: 2022-08-09
Payer: MEDICAID

## 2022-08-09 ENCOUNTER — HOSPITAL ENCOUNTER (OUTPATIENT)
Dept: RADIOLOGY | Facility: HOSPITAL | Age: 32
Discharge: HOME | End: 2022-08-09
Attending: REGISTERED NURSE
Payer: MEDICAID

## 2022-08-09 VITALS
DIASTOLIC BLOOD PRESSURE: 72 MMHG | BODY MASS INDEX: 44.94 KG/M2 | SYSTOLIC BLOOD PRESSURE: 124 MMHG | WEIGHT: 263.25 LBS | HEIGHT: 64 IN | HEART RATE: 82 BPM | TEMPERATURE: 98 F | OXYGEN SATURATION: 97 %

## 2022-08-09 DIAGNOSIS — Z79.4 TYPE 2 DIABETES MELLITUS WITHOUT COMPLICATION, WITH LONG-TERM CURRENT USE OF INSULIN: ICD-10-CM

## 2022-08-09 DIAGNOSIS — R10.2 PELVIC PAIN: ICD-10-CM

## 2022-08-09 DIAGNOSIS — N94.10 DYSPAREUNIA, FEMALE: ICD-10-CM

## 2022-08-09 DIAGNOSIS — E66.01 MORBID OBESITY WITH BMI OF 45.0-49.9, ADULT: ICD-10-CM

## 2022-08-09 DIAGNOSIS — L83 ACANTHOSIS NIGRICANS: ICD-10-CM

## 2022-08-09 DIAGNOSIS — I10 ESSENTIAL HYPERTENSION: ICD-10-CM

## 2022-08-09 DIAGNOSIS — R79.89 HIGH SERUM TESTOSTERONE: ICD-10-CM

## 2022-08-09 DIAGNOSIS — L68.0 HIRSUTISM: ICD-10-CM

## 2022-08-09 DIAGNOSIS — Z12.4 CERVICAL CANCER SCREENING: ICD-10-CM

## 2022-08-09 DIAGNOSIS — E55.9 VITAMIN D INSUFFICIENCY: ICD-10-CM

## 2022-08-09 DIAGNOSIS — E11.9 TYPE 2 DIABETES MELLITUS WITHOUT COMPLICATION, WITH LONG-TERM CURRENT USE OF INSULIN: ICD-10-CM

## 2022-08-09 DIAGNOSIS — Z23 NEED FOR PNEUMOCOCCAL VACCINATION: ICD-10-CM

## 2022-08-09 DIAGNOSIS — E28.2 POLYCYSTIC OVARIES: ICD-10-CM

## 2022-08-09 DIAGNOSIS — Z91.89 AT RISK FOR CARDIOVASCULAR EVENT: ICD-10-CM

## 2022-08-09 DIAGNOSIS — Z01.419 WELL WOMAN EXAM WITH ROUTINE GYNECOLOGICAL EXAM: Primary | ICD-10-CM

## 2022-08-09 DIAGNOSIS — B37.2 CUTANEOUS CANDIDIASIS: ICD-10-CM

## 2022-08-09 DIAGNOSIS — K59.00 CONSTIPATION, UNSPECIFIED CONSTIPATION TYPE: ICD-10-CM

## 2022-08-09 PROCEDURE — 3008F BODY MASS INDEX DOCD: CPT | Mod: CPTII,,, | Performed by: REGISTERED NURSE

## 2022-08-09 PROCEDURE — 3078F PR MOST RECENT DIASTOLIC BLOOD PRESSURE < 80 MM HG: ICD-10-PCS | Mod: CPTII,,, | Performed by: REGISTERED NURSE

## 2022-08-09 PROCEDURE — 3061F PR NEG MICROALBUMINURIA RESULT DOCUMENTED/REVIEW: ICD-10-PCS | Mod: CPTII,,, | Performed by: REGISTERED NURSE

## 2022-08-09 PROCEDURE — 99215 OFFICE O/P EST HI 40 MIN: CPT | Mod: PBBFAC,PO | Performed by: REGISTERED NURSE

## 2022-08-09 PROCEDURE — 3066F NEPHROPATHY DOC TX: CPT | Mod: CPTII,,, | Performed by: REGISTERED NURSE

## 2022-08-09 PROCEDURE — 99999 PR PBB SHADOW E&M-EST. PATIENT-LVL V: ICD-10-PCS | Mod: PBBFAC,,, | Performed by: REGISTERED NURSE

## 2022-08-09 PROCEDURE — 3066F PR DOCUMENTATION OF TREATMENT FOR NEPHROPATHY: ICD-10-PCS | Mod: CPTII,,, | Performed by: REGISTERED NURSE

## 2022-08-09 PROCEDURE — 4010F PR ACE/ARB THEARPY RXD/TAKEN: ICD-10-PCS | Mod: CPTII,,, | Performed by: REGISTERED NURSE

## 2022-08-09 PROCEDURE — 88175 CYTOPATH C/V AUTO FLUID REDO: CPT | Performed by: REGISTERED NURSE

## 2022-08-09 PROCEDURE — 74019 RADEX ABDOMEN 2 VIEWS: CPT | Mod: 26,,, | Performed by: RADIOLOGY

## 2022-08-09 PROCEDURE — 3074F PR MOST RECENT SYSTOLIC BLOOD PRESSURE < 130 MM HG: ICD-10-PCS | Mod: CPTII,,, | Performed by: REGISTERED NURSE

## 2022-08-09 PROCEDURE — 3078F DIAST BP <80 MM HG: CPT | Mod: CPTII,,, | Performed by: REGISTERED NURSE

## 2022-08-09 PROCEDURE — 99215 PR OFFICE/OUTPT VISIT, EST, LEVL V, 40-54 MIN: ICD-10-PCS | Mod: 25,S$PBB,, | Performed by: REGISTERED NURSE

## 2022-08-09 PROCEDURE — 3052F PR MOST RECENT HEMOGLOBIN A1C LEVEL 8.0 - < 9.0%: ICD-10-PCS | Mod: CPTII,,, | Performed by: REGISTERED NURSE

## 2022-08-09 PROCEDURE — 99215 OFFICE O/P EST HI 40 MIN: CPT | Mod: 25,S$PBB,, | Performed by: REGISTERED NURSE

## 2022-08-09 PROCEDURE — 74019 RADEX ABDOMEN 2 VIEWS: CPT | Mod: TC,FY,PO

## 2022-08-09 PROCEDURE — 99999 PR PBB SHADOW E&M-EST. PATIENT-LVL V: CPT | Mod: PBBFAC,,, | Performed by: REGISTERED NURSE

## 2022-08-09 PROCEDURE — 3074F SYST BP LT 130 MM HG: CPT | Mod: CPTII,,, | Performed by: REGISTERED NURSE

## 2022-08-09 PROCEDURE — 90677 PCV20 VACCINE IM: CPT | Mod: PBBFAC,PO

## 2022-08-09 PROCEDURE — 74019 XR ABDOMEN FLAT AND ERECT: ICD-10-PCS | Mod: 26,,, | Performed by: RADIOLOGY

## 2022-08-09 PROCEDURE — 3008F PR BODY MASS INDEX (BMI) DOCUMENTED: ICD-10-PCS | Mod: CPTII,,, | Performed by: REGISTERED NURSE

## 2022-08-09 PROCEDURE — 3061F NEG MICROALBUMINURIA REV: CPT | Mod: CPTII,,, | Performed by: REGISTERED NURSE

## 2022-08-09 PROCEDURE — 3052F HG A1C>EQUAL 8.0%<EQUAL 9.0%: CPT | Mod: CPTII,,, | Performed by: REGISTERED NURSE

## 2022-08-09 PROCEDURE — 4010F ACE/ARB THERAPY RXD/TAKEN: CPT | Mod: CPTII,,, | Performed by: REGISTERED NURSE

## 2022-08-09 RX ORDER — FLASH GLUCOSE SENSOR
1 KIT MISCELLANEOUS
Qty: 2 KIT | Refills: 6 | Status: SHIPPED | OUTPATIENT
Start: 2022-08-09 | End: 2022-08-16

## 2022-08-09 RX ORDER — IRBESARTAN 150 MG/1
150 TABLET ORAL DAILY
Qty: 90 TABLET | Refills: 3 | Status: SHIPPED | OUTPATIENT
Start: 2022-08-09 | End: 2023-07-13

## 2022-08-09 RX ORDER — FLASH GLUCOSE SCANNING READER
1 EACH MISCELLANEOUS
Qty: 2 EACH | Refills: 6 | Status: SHIPPED | OUTPATIENT
Start: 2022-08-09 | End: 2022-08-16

## 2022-08-09 RX ORDER — CLOTRIMAZOLE AND BETAMETHASONE DIPROPIONATE 10; .64 MG/G; MG/G
CREAM TOPICAL 2 TIMES DAILY
Qty: 45 G | Refills: 2 | Status: SHIPPED | OUTPATIENT
Start: 2022-08-09 | End: 2022-10-13

## 2022-08-11 ENCOUNTER — PATIENT OUTREACH (OUTPATIENT)
Dept: ADMINISTRATIVE | Facility: HOSPITAL | Age: 32
End: 2022-08-11
Payer: MEDICAID

## 2022-08-17 ENCOUNTER — TELEPHONE (OUTPATIENT)
Dept: FAMILY MEDICINE | Facility: CLINIC | Age: 32
End: 2022-08-17
Payer: MEDICAID

## 2022-08-17 DIAGNOSIS — E11.9 TYPE 2 DIABETES MELLITUS WITHOUT COMPLICATION, WITHOUT LONG-TERM CURRENT USE OF INSULIN: Primary | ICD-10-CM

## 2022-08-17 DIAGNOSIS — E11.9 TYPE 2 DIABETES MELLITUS WITHOUT COMPLICATION, WITH LONG-TERM CURRENT USE OF INSULIN: ICD-10-CM

## 2022-08-17 DIAGNOSIS — E11.65 UNCONTROLLED TYPE 2 DIABETES MELLITUS WITH HYPERGLYCEMIA: ICD-10-CM

## 2022-08-17 DIAGNOSIS — Z79.4 TYPE 2 DIABETES MELLITUS WITHOUT COMPLICATION, WITH LONG-TERM CURRENT USE OF INSULIN: ICD-10-CM

## 2022-08-17 DIAGNOSIS — R73.09 HEMOGLOBIN A1C GREATER THAN 9%, INDICATING POOR DIABETIC CONTROL: ICD-10-CM

## 2022-08-17 DIAGNOSIS — R11.0 NAUSEA: ICD-10-CM

## 2022-08-17 RX ORDER — BLOOD-GLUCOSE SENSOR
EACH MISCELLANEOUS
Qty: 9 EACH | Refills: 1 | Status: SHIPPED | OUTPATIENT
Start: 2022-08-17

## 2022-08-17 RX ORDER — BLOOD-GLUCOSE TRANSMITTER
EACH MISCELLANEOUS
Qty: 1 EACH | Refills: 1 | Status: SHIPPED | OUTPATIENT
Start: 2022-08-17

## 2022-08-17 RX ORDER — SEMAGLUTIDE 1.34 MG/ML
0.5 INJECTION, SOLUTION SUBCUTANEOUS
Qty: 3 PEN | Refills: 1 | Status: SHIPPED | OUTPATIENT
Start: 2022-08-17 | End: 2023-08-01 | Stop reason: SDUPTHER

## 2022-08-17 RX ORDER — ONDANSETRON 4 MG/1
4-8 TABLET, ORALLY DISINTEGRATING ORAL
Qty: 30 TABLET | Refills: 0 | Status: SHIPPED | OUTPATIENT
Start: 2022-08-17 | End: 2022-09-09 | Stop reason: SDUPTHER

## 2022-08-17 RX ORDER — BLOOD-GLUCOSE,RECEIVER,CONT
EACH MISCELLANEOUS
Qty: 1 EACH | Refills: 0 | Status: SHIPPED | OUTPATIENT
Start: 2022-08-17

## 2022-08-17 NOTE — TELEPHONE ENCOUNTER
07:20 am --- phoned pt to discuss the followin. Insurance did not cover her [employer].  Sending in new rx for the Dexcom to see if covered.  2. Eye exam -- last done 2021 at [facility].  Will request report.  3. Labs reviewed:  1. A1c has gone up slightly from 8.1 to 8.3 % --- continue metformin, Januvia, Amaryl and Levemir, no dose changes.  Ozempic to be increased from 0.25 to 0.5 mg once a week.  2. CBC good  3. TSH okay  4. Liver enzymes better  5. Vitamin-D low at 15 --- she just started taking her Vitamin D supplement this morning (50,000 IU once a week), will monitor.  4. Requests refill on her nausea medication.  Zofran to be sent to pharmacy.  5. Stomach feeling better, has tx her constipation with Fleets and 1/2 bottle of Mag-Citrate, not all on same day.  Miralax has not helped.  Does have Linzess ordered but does not take regularly.  Reviewed and discussed her xray report on abdomen.  Advised to take Linzess regularly as ordered, as she does admit that it works better when she takes regularly.

## 2022-08-18 ENCOUNTER — OFFICE VISIT (OUTPATIENT)
Dept: PAIN MEDICINE | Facility: CLINIC | Age: 32
End: 2022-08-18
Payer: MEDICAID

## 2022-08-18 DIAGNOSIS — M47.816 LUMBAR SPONDYLOSIS: Primary | ICD-10-CM

## 2022-08-18 DIAGNOSIS — M53.3 SACROILIAC JOINT DYSFUNCTION: ICD-10-CM

## 2022-08-18 PROCEDURE — 3066F PR DOCUMENTATION OF TREATMENT FOR NEPHROPATHY: ICD-10-PCS | Mod: CPTII,95,, | Performed by: PHYSICIAN ASSISTANT

## 2022-08-18 PROCEDURE — 1160F PR REVIEW ALL MEDS BY PRESCRIBER/CLIN PHARMACIST DOCUMENTED: ICD-10-PCS | Mod: CPTII,95,, | Performed by: PHYSICIAN ASSISTANT

## 2022-08-18 PROCEDURE — 3052F PR MOST RECENT HEMOGLOBIN A1C LEVEL 8.0 - < 9.0%: ICD-10-PCS | Mod: CPTII,95,, | Performed by: PHYSICIAN ASSISTANT

## 2022-08-18 PROCEDURE — 3061F PR NEG MICROALBUMINURIA RESULT DOCUMENTED/REVIEW: ICD-10-PCS | Mod: CPTII,95,, | Performed by: PHYSICIAN ASSISTANT

## 2022-08-18 PROCEDURE — 4010F ACE/ARB THERAPY RXD/TAKEN: CPT | Mod: CPTII,95,, | Performed by: PHYSICIAN ASSISTANT

## 2022-08-18 PROCEDURE — 3061F NEG MICROALBUMINURIA REV: CPT | Mod: CPTII,95,, | Performed by: PHYSICIAN ASSISTANT

## 2022-08-18 PROCEDURE — 99214 OFFICE O/P EST MOD 30 MIN: CPT | Mod: 95,,, | Performed by: PHYSICIAN ASSISTANT

## 2022-08-18 PROCEDURE — 3066F NEPHROPATHY DOC TX: CPT | Mod: CPTII,95,, | Performed by: PHYSICIAN ASSISTANT

## 2022-08-18 PROCEDURE — 99214 PR OFFICE/OUTPT VISIT, EST, LEVL IV, 30-39 MIN: ICD-10-PCS | Mod: 95,,, | Performed by: PHYSICIAN ASSISTANT

## 2022-08-18 PROCEDURE — 4010F PR ACE/ARB THEARPY RXD/TAKEN: ICD-10-PCS | Mod: CPTII,95,, | Performed by: PHYSICIAN ASSISTANT

## 2022-08-18 PROCEDURE — 3052F HG A1C>EQUAL 8.0%<EQUAL 9.0%: CPT | Mod: CPTII,95,, | Performed by: PHYSICIAN ASSISTANT

## 2022-08-18 PROCEDURE — 1159F MED LIST DOCD IN RCRD: CPT | Mod: CPTII,95,, | Performed by: PHYSICIAN ASSISTANT

## 2022-08-18 PROCEDURE — 1160F RVW MEDS BY RX/DR IN RCRD: CPT | Mod: CPTII,95,, | Performed by: PHYSICIAN ASSISTANT

## 2022-08-18 PROCEDURE — 1159F PR MEDICATION LIST DOCUMENTED IN MEDICAL RECORD: ICD-10-PCS | Mod: CPTII,95,, | Performed by: PHYSICIAN ASSISTANT

## 2022-08-18 NOTE — PROGRESS NOTES
Chief Pain Complaint:  Lumbar Back Pain    The patient location is: home  The chief complaint leading to consultation is: chronic pain     Visit type: audiovisual    Face to Face time with patient: 10-15 minutes  20 minutes of total time spent on the encounter, which includes face to face time and non-face to face time preparing to see the patient (eg, review of tests), Obtaining and/or reviewing separately obtained history, Documenting clinical information in the electronic or other health record, Independently interpreting results (not separately reported) and communicating results to the patient/family/caregiver, or Care coordination (not separately reported).     Each patient to whom he or she provides medical services by telemedicine is:  (1) informed of the relationship between the physician and patient and the respective role of any other health care provider with respect to management of the patient; and (2) notified that he or she may decline to receive medical services by telemedicine and may withdraw from such care at any time.      History of Present Illness:   Interval History (8/18/2022): Nathan Carlos presents today for follow-up visit.  she underwent Left L4/5 +L5/S1 TF BRUNILDA on 7/14/22.  The patient reports that she is/was better following the procedure.  she reports 40% pain relief.  The changes lasted 4 weeks so far.  The changes have continued through this visit.  Patient reports pain as 6/10 today, 10/10 on her worst days. Reports she is still experiencing pain but is able to stand for somewhat longer periods of time. Continues to report pain in left buttock down posterior thigh, but does admit she is now having pain in the right lumbar region now as well. She reports she takes tizanidine three times daily on bad days, and this seems to help.      Interval History (6/2/2022): Nathan Carlos presents today for follow-up visit.  she underwent bilateral SIJ + GTB injection on 5/5/22.   The patient reports that she is/was better following the procedure.  she reports 60% pain relief.  The changes lasted 4 weeks so far.  The changes have continued through this visit.  Reports back and buttock pain improved, but reports continued pain along her left leg. Reports pain along anterior, lateral, and posterior aspect of left leg, radiating down to foot at times. Patient reports pain as 6/10 today. Reports that previous epidural did not offer much relief.    IMPRESSION  1. ABNORMAL study  2. There is electrodiagnostic evidence of an acute on chronic radiculopathy of the LEFT S1 nerve root and a subacute on chronic radiculopathy of the LEFT L5 nerve root     Interval HPI 04/14/2022  Nathan Carlos is a 31 y.o. female  who is presenting with a chief complaint of Lumbar Back Pain. The patient began experiencing this problem insidiously, and the pain has been gradually worsening over the past 3 month(s). The pain is described as throbbing, cramping, burning and electrical and is located in the left lumbar spine. Pain is intermittent and lasts hours. The pain radiates to  left leg. The patient rates her pain a 8 out of ten and interferes with activities of daily living a 8 out of ten. Pain is exacerbated by flexion of the lumbar spine, and is improved by rest. Patient reports no prior trauma, no prior spinal surgery     Interval HPI 03/31/2022  Nathan Carlos is a 31 y.o. female  who is presenting with a chief complaint of Low-back Pain. The patient began experiencing this problem insidiously, and the pain has been gradually worsening. The pain is described as throbbing, cramping, aching and heavy and is located in the bilateral lumbar spine. Pain is intermittent and lasts hours. The  pain is nonradiating. The patient rates her pain a 8 out of ten and interferes with activities of daily living a 7 out of ten. Pain is exacerbated by extension of the lumbar spine, and is improved by rest. Patient  reports no prior trauma, no prior spinal surgery     - pertinent negatives: No fever, No chills, No weight loss, No bladder dysfunction, No bowel dysfunction, No saddle anesthesia  - pertinent positives: none    - medications, other therapies tried (physical therapy, injections):     >> NSAIDs, Tylenol, Norco, zanaflex and flexeril (no relief), topamax (no relief)    >> Has previously undergone Physical Therapy    >> Has previously undergone spinal injection/s   - Left SI joint injection 1/2018 with 100% relief for 6 months   - Right L3, L4, L5 MBB with local on 8/15/18 with 90% pain relief   - left SIJ + left GT bursa injection with local on 11/8/18 with good relief of bursitis but only 10% of SIJ pain   - left L3-5 MBB with local on 5/16/19 with 90% pain relief   - bilateral L3-5 MBB on 8/14/19 with limited relief   - right SIJ injection on 2/5/20 with 80% pain relief   - bilateral L3-5 MBB on 7/16/2020 with 100% relief of lumbar back pain   - right L3-5 RFA on 12/08/2020 with 100% pain relief   - left L3-5 RFA on 1/21/21 with at least 50% pain relief   - left SIJ + left GT bursa injection on 6/17/21 with at least 40% pain relief    - L5-S1, S1 TFESI on 3/8/22 with 60% Pain relief    -bilateral SIJ + GTB injection on 5/5/22 with 60% relief    Imaging / Labs / Studies (reviewed on 8/18/2022):  MRI lumbar spine 12/16/2021    FINDINGS:  Detail limited by poor signal noise ratio.  Prominent edema within the subcutaneous soft tissues of the lower back.  Stable anatomic alignment, unchanged since 11/22/2017.  The vertebral body heights are maintained.  Mild disc desiccation at L4-L5 is noted.  No significant disc space narrowing.  The distal tip of the conus medullaris terminates near the L2 level.  There are no significant areas of disc bulge or protrusion.  Minimal grade 1 retrolisthesis of L4 on L5 is noted following the administration of intravenous contrast, no abnormal areas of enhancement are noted.     L1-L2: No  spinal canal or neural foraminal encroachment.     L2-L3: No spinal canal or neural foraminal encroachment.     L3-L4: No spinal canal or neural foraminal encroachment.     L4-L5: Mild facet arthropathy.  No spinal canal or neural foraminal encroachment.     L5-S1: Mild bilateral facet arthropathy.  No spinal canal or neural foraminal encroachment.     Impression:     Mild lower lumbar spine degenerative changes as above.    Results for orders placed during the hospital encounter of 11/22/17   MRI Lumbar Spine Without Contrast    Narrative Technique: Standard noncontrast multiplanar multisequence imaging of the lumbar spine was performed.  Findings: There is anatomic spinal alignment.  Disc interspaces are of normal height and signal intensity.  Vertebral marrow signal pattern and architecture are normal.  Distal cord is unremarkable with conus medullaris terminating at L1-L2.  Paravertebral soft tissues are symmetric and normal in appearance.  T12-L1: No disc protrusion, neuroforaminal narrowing, or central canal stenosis.  L1-L2: No disc protrusion, neuroforaminal narrowing, or central canal stenosis.  L2-L3: No disc protrusion, neuroforaminal narrowing, or central canal stenosis.  L3-L4: No disc protrusion, neuroforaminal narrowing, or central canal stenosis.  L4-L5: No disc protrusion, neuroforaminal narrowing, or central canal stenosis.  L5-S1: Mild bilateral facet hypertrophy. No disc protrusion, neuroforaminal narrowing, or central canal stenosis.    Impression  Negative MRI of the lumbar spine.     Results for orders placed during the hospital encounter of 01/04/18   X-Ray Lumbar Complete With Flex And Ext    Narrative Comparison: None  Technique: AP, lateral, lateral flexion, lateral extension, bilateral oblique, and lumbosacral coned down views were obtained of the lumbar spine  Findings: There is mild scoliosis of the lower thoracic and upper lumbar spine.  Vertebral body heights are well-maintained.  No  spondylolisthesis demonstrated.  No change in spinal alignment with flexion or extension to suggest instability. Intervertebral disk spaces are well preserved.  No pars defects visualized.  Posterior elements appear grossly intact. No acute fractures or subluxations are demonstrated.  The remaining visualized osseous and soft tissue structures demonstrate no appreciable abnormality.    Impression As above.  No acute findings.         Review of Systems:  CONSTITUTIONAL: patient denies any fever, chills, or weight loss  SKIN: patient denies any rash or itching  RESPIRATORY: patient denies having any shortness of breath  GASTROINTESTINAL: patient denies having any diarrhea, constipation, or bowel incontinence  GENITOURINARY: patient denies having any abnormal bladder function    MUSCULOSKELETAL:  - patient complains of the above noted pain/s (see chief pain complaint)    NEUROLOGICAL:   - pain as above  - strength in Lower extremities is intact, BILATERALLY  - sensation in Lower extremities is intact, BILATERALLY  - patient denies any loss of bowel or bladder control      PSYCHIATRIC: patient denies any change in mood    Other:  All other systems reviewed and are negative        Physical Exam:  Telemedicine Exam  There were no vitals filed for this visit. There is no height or weight on file to calculate BMI.   (reviewed on 8/18/2022)    GENERAL: Well appearing, in no acute distress, alert and oriented x3.  obese  PSYCH:  Mood and affect appropriate.  SKIN: Skin color, texture, turgor normal, no rashes or lesions.  HEAD/FACE:  Normocephalic, atraumatic. Cranial nerves grossly intact.  PULM:  No difficulty breathing  Neuro/MSK:  BACK: Palpation over the lumbar paraspinous muscles causes some pain. Some pain with flexion, extension, or lateral flexion - L>R.    EXTREMITIES: Peripheral joint active ROM is full and pain free without obvious instability or laxity in all four extremities. No deformities, edema, or skin  discoloration.   MUSCULOSKELETAL: No atrophy or tone abnormalities are noted.  NEURO:  Able to toe walk and heel walk without difficulty  GAIT: normal.        Physical Exam: last in clinic visit:  General: alert and oriented, in no apparent distress, obese.  Gait: normal gait.  Skin: no rashes, no discoloration, no obvious lesions  HEENT: normocephalic, atraumatic. Pupils equal and round.  Cardiovascular: no significant peripheral edema present.  Respiratory: without use of accessory muscles of respiration.    Musculoskeletal - Lumbar Spine:  - Pain on flexion of lumbar spine: Present   - Pain on extension of lumbar spine: Present  - Lumbar facet loading: Present on left, improved  On right   - TTP over the lumbar facet joints: Present on left at L5-S1   - TTP over the para lumbar muscles on left   - TTP over the SI joints: Present   - TTP over GT bursa: Present   - TTP over piriformis: absent  - Straight Leg Raise: Negative  - JULISSA: Negative    Neuro - Extremities:  - BUE Strength:R/L: D: 5/5; B: 5/5; T: 5/5; WF: 5/5; WE: 5/5; IO: 5/5  - BLE Strength: R/L: HF: 5/5, HE: 5/5, KF: 5/5; KE: 5/4; FE: 5/5; FF: 5/5  - Extremity Reflexes: Brisk and symmetric throughout  - Sensory: Sensation to light touch intact bilaterally      Psych:  Mood and affect is appropriate          Assessment:  Nathan Carlos is a 31 y.o. year old female who is presenting with   Encounter Diagnoses   Name Primary?    Lumbar spondylosis Yes    Sacroiliac joint dysfunction        Plan:  1. Interventional: None at this time. Consider L5/S1 IL BRUNILDA vs bilateral SIJ + GT bursa injection  -s/p Left L4/5 +L5/S1 TF BRUNILDA with 40% relief  -s/p bilateral SI and GTB with 60% relief  - S/p  L5-S1, S1 TFESI on 3/8/22 with 60% Pain relief.   - S/p left SIJ + left GT bursa injection on 6/17/21 with 40% pain relief.   - S/p left L3-5 RFA on 1/21/21 with at least 50% pain relief.   - S/p right L3-5 RFA on 12/08/2020 with 100% pain relief.   - S/p  bilateral L3-5 MBB on 7/16/2020 with 100% relief of lumbar back pain.  - S/p Right SIJ injection on 2/5/20 with 80% pain relief.     2. Pharmacologic:   - Continue Gabapentin 300 mg Po QHs with up titration.   - Continue diclofenac 1% gel to apply 2g topically up to 4 times per day.  - Continue lidocaine 2.5%-prilocaine 2.5% topical cream Q6H PRN topically; can alternate with Voltaren gel - for GTB pain.    - Continue Tizanidne 4 mg TID PRN.  She can take up to QID PRN while pain Increased.  - Anticoagulation use: None.     3. Rehabilitative: Encouraged regular exercise. Continue exercises and activities as tolerated at home. Consider restarting physical therapy since this helped in the past.     4. Diagnostic: Updated lumbar MRI reviewed. Lower ext EMG shows electrodiagnostic evidence of an acute on chronic radiculopathy of the LEFT S1 nerve root and a subacute on chronic radiculopathy of the LEFT L5 nerve root.     5. Follow up: 8 weeks or PRN    Darlin Iverson PA-C  Interventional Pain Medicine

## 2022-08-19 ENCOUNTER — HOSPITAL ENCOUNTER (OUTPATIENT)
Dept: RADIOLOGY | Facility: HOSPITAL | Age: 32
Discharge: HOME | End: 2022-08-19
Attending: REGISTERED NURSE
Payer: MEDICAID

## 2022-08-19 ENCOUNTER — PATIENT MESSAGE (OUTPATIENT)
Dept: PAIN MEDICINE | Facility: CLINIC | Age: 32
End: 2022-08-19
Payer: MEDICAID

## 2022-08-19 DIAGNOSIS — R10.2 PELVIC PAIN: ICD-10-CM

## 2022-08-19 DIAGNOSIS — E28.2 POLYCYSTIC OVARIES: ICD-10-CM

## 2022-08-19 DIAGNOSIS — N94.10 DYSPAREUNIA, FEMALE: ICD-10-CM

## 2022-08-19 PROCEDURE — 76830 TRANSVAGINAL US NON-OB: CPT | Mod: TC

## 2022-08-19 PROCEDURE — 76830 TRANSVAGINAL US NON-OB: CPT | Mod: 26,,, | Performed by: RADIOLOGY

## 2022-08-19 PROCEDURE — 76856 US EXAM PELVIC COMPLETE: CPT | Mod: 26,,, | Performed by: RADIOLOGY

## 2022-08-19 PROCEDURE — 76856 US PELVIS COMP WITH TRANSVAG NON-OB (XPD): ICD-10-PCS | Mod: 26,,, | Performed by: RADIOLOGY

## 2022-08-19 PROCEDURE — 76830 US PELVIS COMP WITH TRANSVAG NON-OB (XPD): ICD-10-PCS | Mod: 26,,, | Performed by: RADIOLOGY

## 2022-08-22 ENCOUNTER — PATIENT MESSAGE (OUTPATIENT)
Dept: FAMILY MEDICINE | Facility: CLINIC | Age: 32
End: 2022-08-22
Payer: MEDICAID

## 2022-08-25 ENCOUNTER — PATIENT MESSAGE (OUTPATIENT)
Dept: FAMILY MEDICINE | Facility: CLINIC | Age: 32
End: 2022-08-25
Payer: MEDICAID

## 2022-08-25 DIAGNOSIS — E11.9 TYPE 2 DIABETES MELLITUS WITHOUT COMPLICATION, WITH LONG-TERM CURRENT USE OF INSULIN: Primary | ICD-10-CM

## 2022-08-25 DIAGNOSIS — Z79.4 TYPE 2 DIABETES MELLITUS WITHOUT COMPLICATION, WITH LONG-TERM CURRENT USE OF INSULIN: Primary | ICD-10-CM

## 2022-08-25 DIAGNOSIS — E55.9 VITAMIN D INSUFFICIENCY: Primary | ICD-10-CM

## 2022-08-25 NOTE — PROGRESS NOTES
I called [facility] 1x to request pt eye exam, I was informed by staff that they have been backed up & working through requests now and will fax eye exam. I will f/u in 1 week if no records received.

## 2022-08-26 ENCOUNTER — PATIENT OUTREACH (OUTPATIENT)
Dept: ADMINISTRATIVE | Facility: HOSPITAL | Age: 32
End: 2022-08-26
Payer: MEDICAID

## 2022-09-01 NOTE — PROGRESS NOTES
I called [facility] 2x to request pt eye exam, I provided staff with fax number to send records, she states she will try her best and get record. I will f/u in 1 week.

## 2022-09-06 ENCOUNTER — PATIENT OUTREACH (OUTPATIENT)
Dept: ADMINISTRATIVE | Facility: HOSPITAL | Age: 32
End: 2022-09-06
Payer: MEDICAID

## 2022-09-06 NOTE — LETTER
AUTHORIZATION FOR RELEASE OF   CONFIDENTIAL INFORMATION      We are seeing Nathan Carlos, date of birth 1990, in the clinic at [facility]. Qasim Paez NP is the patient's PCP. Nathan Carlos has an outstanding lab/procedure at the time we reviewed her chart. In order to help keep her health information updated, she has authorized us to request the following medical record(s):        (  )  MAMMOGRAM                                      ( X )  COLONOSCOPY      (  )  PAP SMEAR                                          (  )  OUTSIDE LAB RESULTS     (  )  DEXA SCAN                                          (  )  EYE EXAM            (  )  FOOT EXAM                                          (  )  ENTIRE RECORD     (  )  OUTSIDE IMMUNIZATIONS                 (  )  _______________         Please fax records to [facility], Andrea D Cothern, NP, [phone]     If you have any questions, please contact Milo Rodney           Patient Name: Nathan Carlos  : 1990  Patient Phone #: [phone]

## 2022-09-07 ENCOUNTER — PATIENT MESSAGE (OUTPATIENT)
Dept: PODIATRY | Facility: CLINIC | Age: 32
End: 2022-09-07
Payer: MEDICAID

## 2022-09-08 NOTE — PROGRESS NOTES
Found 2008 Colonoscopy in media. Received duplicate copy from Dr. Bernardo Ramos office. Linked colonoscopy to  from media.

## 2022-09-09 ENCOUNTER — PATIENT MESSAGE (OUTPATIENT)
Dept: FAMILY MEDICINE | Facility: CLINIC | Age: 32
End: 2022-09-09
Payer: MEDICAID

## 2022-09-09 DIAGNOSIS — R11.0 NAUSEA: ICD-10-CM

## 2022-09-09 NOTE — TELEPHONE ENCOUNTER
Last visit : 08/09/2022    Last filled: 08/17/2022    Pt states she takes 2 in the am and 2 in the pm     KRISHNA Bhatia

## 2022-09-11 RX ORDER — ONDANSETRON 4 MG/1
4-8 TABLET, ORALLY DISINTEGRATING ORAL
Qty: 30 TABLET | Refills: 0 | Status: SHIPPED | OUTPATIENT
Start: 2022-09-11 | End: 2022-10-29

## 2022-09-12 ENCOUNTER — PATIENT MESSAGE (OUTPATIENT)
Dept: PAIN MEDICINE | Facility: CLINIC | Age: 32
End: 2022-09-12
Payer: MEDICAID

## 2022-09-15 ENCOUNTER — OFFICE VISIT (OUTPATIENT)
Dept: PODIATRY | Facility: CLINIC | Age: 32
End: 2022-09-15
Payer: MEDICAID

## 2022-09-15 ENCOUNTER — PATIENT MESSAGE (OUTPATIENT)
Dept: PAIN MEDICINE | Facility: CLINIC | Age: 32
End: 2022-09-15
Payer: MEDICAID

## 2022-09-15 DIAGNOSIS — E11.69 TYPE 2 DIABETES MELLITUS WITH MORBID OBESITY: ICD-10-CM

## 2022-09-15 DIAGNOSIS — M72.2 PLANTAR FASCIITIS OF LEFT FOOT: Primary | ICD-10-CM

## 2022-09-15 DIAGNOSIS — E66.01 TYPE 2 DIABETES MELLITUS WITH MORBID OBESITY: ICD-10-CM

## 2022-09-15 PROCEDURE — 20550 NJX 1 TENDON SHEATH/LIGAMENT: CPT | Mod: S$PBB,LT,, | Performed by: PODIATRIST

## 2022-09-15 PROCEDURE — 3066F PR DOCUMENTATION OF TREATMENT FOR NEPHROPATHY: ICD-10-PCS | Mod: CPTII,,, | Performed by: PODIATRIST

## 2022-09-15 PROCEDURE — 3061F PR NEG MICROALBUMINURIA RESULT DOCUMENTED/REVIEW: ICD-10-PCS | Mod: CPTII,,, | Performed by: PODIATRIST

## 2022-09-15 PROCEDURE — 1159F MED LIST DOCD IN RCRD: CPT | Mod: CPTII,,, | Performed by: PODIATRIST

## 2022-09-15 PROCEDURE — 3061F NEG MICROALBUMINURIA REV: CPT | Mod: CPTII,,, | Performed by: PODIATRIST

## 2022-09-15 PROCEDURE — 1160F PR REVIEW ALL MEDS BY PRESCRIBER/CLIN PHARMACIST DOCUMENTED: ICD-10-PCS | Mod: CPTII,,, | Performed by: PODIATRIST

## 2022-09-15 PROCEDURE — 4010F ACE/ARB THERAPY RXD/TAKEN: CPT | Mod: CPTII,,, | Performed by: PODIATRIST

## 2022-09-15 PROCEDURE — 99999 PR PBB SHADOW E&M-EST. PATIENT-LVL III: CPT | Mod: PBBFAC,,, | Performed by: PODIATRIST

## 2022-09-15 PROCEDURE — 99214 PR OFFICE/OUTPT VISIT, EST, LEVL IV, 30-39 MIN: ICD-10-PCS | Mod: 25,S$PBB,, | Performed by: PODIATRIST

## 2022-09-15 PROCEDURE — 20550 PR INJECT TENDON SHEATH/LIGAMENT: ICD-10-PCS | Mod: S$PBB,LT,, | Performed by: PODIATRIST

## 2022-09-15 PROCEDURE — 4010F PR ACE/ARB THEARPY RXD/TAKEN: ICD-10-PCS | Mod: CPTII,,, | Performed by: PODIATRIST

## 2022-09-15 PROCEDURE — 3066F NEPHROPATHY DOC TX: CPT | Mod: CPTII,,, | Performed by: PODIATRIST

## 2022-09-15 PROCEDURE — 1160F RVW MEDS BY RX/DR IN RCRD: CPT | Mod: CPTII,,, | Performed by: PODIATRIST

## 2022-09-15 PROCEDURE — 3052F HG A1C>EQUAL 8.0%<EQUAL 9.0%: CPT | Mod: CPTII,,, | Performed by: PODIATRIST

## 2022-09-15 PROCEDURE — 99999 PR PBB SHADOW E&M-EST. PATIENT-LVL III: ICD-10-PCS | Mod: PBBFAC,,, | Performed by: PODIATRIST

## 2022-09-15 PROCEDURE — 99213 OFFICE O/P EST LOW 20 MIN: CPT | Mod: PBBFAC,25 | Performed by: PODIATRIST

## 2022-09-15 PROCEDURE — 20550 NJX 1 TENDON SHEATH/LIGAMENT: CPT | Mod: PBBFAC,LT | Performed by: PODIATRIST

## 2022-09-15 PROCEDURE — 99214 OFFICE O/P EST MOD 30 MIN: CPT | Mod: 25,S$PBB,, | Performed by: PODIATRIST

## 2022-09-15 PROCEDURE — 1159F PR MEDICATION LIST DOCUMENTED IN MEDICAL RECORD: ICD-10-PCS | Mod: CPTII,,, | Performed by: PODIATRIST

## 2022-09-15 PROCEDURE — 3052F PR MOST RECENT HEMOGLOBIN A1C LEVEL 8.0 - < 9.0%: ICD-10-PCS | Mod: CPTII,,, | Performed by: PODIATRIST

## 2022-09-15 RX ORDER — DEXAMETHASONE SODIUM PHOSPHATE 4 MG/ML
4 INJECTION, SOLUTION INTRA-ARTICULAR; INTRALESIONAL; INTRAMUSCULAR; INTRAVENOUS; SOFT TISSUE
Status: DISCONTINUED | OUTPATIENT
Start: 2022-09-15 | End: 2022-09-15 | Stop reason: HOSPADM

## 2022-09-15 RX ORDER — TRIAMCINOLONE ACETONIDE 40 MG/ML
40 INJECTION, SUSPENSION INTRA-ARTICULAR; INTRAMUSCULAR
Status: DISCONTINUED | OUTPATIENT
Start: 2022-09-15 | End: 2022-09-15 | Stop reason: HOSPADM

## 2022-09-15 RX ADMIN — DEXAMETHASONE SODIUM PHOSPHATE 4 MG: 4 INJECTION INTRA-ARTICULAR; INTRALESIONAL; INTRAMUSCULAR; INTRAVENOUS; SOFT TISSUE at 08:09

## 2022-09-15 RX ADMIN — TRIAMCINOLONE ACETONIDE 40 MG: 200 INJECTION, SUSPENSION INTRA-ARTICULAR; INTRAMUSCULAR at 08:09

## 2022-09-15 NOTE — PROCEDURES
Tendon Sheath    Date/Time: 9/15/2022 8:15 AM  Performed by: Sandy Cohn DPM  Authorized by: Sandy Cohn DPM     Consent Done?:  Yes (Verbal)  Indications:  Pain  Site marked: the procedure site was marked    Timeout: prior to procedure the correct patient, procedure, and site was verified    Prep: patient was prepped and draped in usual sterile fashion      Local anesthesia used?: Yes    Local anesthetic: 0.75% Marcaine plain.  Anesthetic total (ml):  1    Location:  Foot  Foot joint: Plantar fascia.  Ultrasonic guidance for needle placement?: No    Needle size:  25 G  Approach:  Medial  Medications:  40 mg triamcinolone acetonide 40 mg/mL; 4 mg dexamethasone 4 mg/mL  Patient tolerance:  Patient tolerated the procedure well with no immediate complications

## 2022-09-15 NOTE — PROGRESS NOTES
Subjective:       Patient ID: Nathan Carlos is a 31 y.o. female.    Chief Complaint: Plantar Fasciitis (Patient complains of 10/10 pain to left heel. She states there is a burning sensation at times and recently was red and swollen. She denies injury. )      HPI: Nathan Carlos presents to clinic today to follow-up on plantar fasciitis to the left foot.  States attempted to utilize orthotics and performing stretching exercises.  States some mild to minimal improvement.  Continues to state post static dyskinesia with intermittent 10/10 pain.  Does admit that she is walking barefoot or with socks at home.  States walking and standing causes and/or exacerbates the symptoms. Patient has had 1 corticosteroid injection(s) prior. Patient's Primary Care Provider is Qasim Paez NP.     Review of patient's allergies indicates:   Allergen Reactions    Amitriptyline      xerostomia    Amlodipine      DRY MOUTH    Tramadol Itching       Past Medical History:   Diagnosis Date    Carpal tunnel syndrome     Diabetes mellitus     GERD (gastroesophageal reflux disease)     Herpes simplex virus (HSV) infection     High serum testosterone 9/7/2017    Hyperlipidemia     Hypertension     Insulin resistance syndrome 9/7/2017    Lumbar facet arthropathy 8/14/2019    Morbid obesity     Ovarian cyst     bilateral        Family History   Problem Relation Age of Onset    Hypertension Mother     Fibroids Mother     Arthritis Mother     No Known Problems Father     Diabetes Sister     Hypertension Brother     Hypertension Maternal Aunt     Diabetes Maternal Aunt     Arthritis Maternal Aunt     Diabetes Maternal Uncle     Hypertension Maternal Grandmother     Leukemia Maternal Grandfather     Hypertension Maternal Grandfather     Stroke Neg Hx        Social History     Socioeconomic History    Marital status:     Number of children: 0   Occupational History     Employer: [id number]   Tobacco Use    Smoking  status: Never    Smokeless tobacco: Never   Substance and Sexual Activity    Alcohol use: Not Currently     Comment: special occasions    Drug use: No    Sexual activity: Yes     Partners: Male     Birth control/protection: Other-see comments, None     Comment: Birth control pill     Social Determinants of Health     Financial Resource Strain: Low Risk     Difficulty of Paying Living Expenses: Not hard at all   Food Insecurity: No Food Insecurity    Worried About Running Out of Food in the Last Year: Never true    Ran Out of Food in the Last Year: Never true   Transportation Needs: No Transportation Needs    Lack of Transportation (Medical): No    Lack of Transportation (Non-Medical): No   Physical Activity: Inactive    Days of Exercise per Week: 0 days    Minutes of Exercise per Session: 0 min   Stress: Stress Concern Present    Feeling of Stress : Very much   Social Connections: Unknown    Frequency of Communication with Friends and Family: More than three times a week    Frequency of Social Gatherings with Friends and Family: Once a week    Active Member of Clubs or Organizations: No    Attends Club or Organization Meetings: Never    Marital Status:    Housing Stability: Low Risk     Unable to Pay for Housing in the Last Year: No    Number of Places Lived in the Last Year: 1    Unstable Housing in the Last Year: No       Past Surgical History:   Procedure Laterality Date    ANKLE SURGERY Left     BUNIONECTOMY Bilateral     CARPAL TUNNEL RELEASE Left 03/19/2021    Procedure: RELEASE, CARPAL TUNNEL;  Surgeon: Juan F Bernard MD;  Location: [facility] OR;  Service: Orthopedics;  Laterality: Left;    CARPAL TUNNEL RELEASE Right 11/04/2021    Procedure: RELEASE, CARPAL TUNNEL;  Surgeon: Juan F Bernard MD;  Location: [facility] OR;  Service: Orthopedics;  Laterality: Right;    COLONOSCOPY  06/12/2018    ESOPHAGOGASTRODUODENOSCOPY      ESOPHAGOGASTRODUODENOSCOPY N/A 12/02/2021    Procedure: EGD  (ESOPHAGOGASTRODUODENOSCOPY);  Surgeon: Pili Eagle MD;  Location: [facility] ENDO;  Service: Endoscopy;  Laterality: N/A;    INJECTION OF ANESTHETIC AGENT AROUND MEDIAL BRANCH NERVES INNERVATING LUMBAR FACET JOINT Bilateral 08/14/2019    Procedure: Bilateral L3-5 MBB;  Surgeon: Yo Kirkland MD;  Location: [facility];  Service: Pain Management;  Laterality: Bilateral;    INJECTION OF ANESTHETIC AGENT AROUND MEDIAL BRANCH NERVES INNERVATING LUMBAR FACET JOINT Bilateral 07/16/2020    Procedure: Bilateral L3-5 MBB;  Surgeon: Yo Kirkland MD;  Location: [facility];  Service: Pain Management;  Laterality: Bilateral;    INJECTION OF ANESTHETIC AGENT INTO SACROILIAC JOINT Right 02/05/2020    Procedure: Right SIJ Injection with local;  Surgeon: Yo Kirkland MD;  Location: [facility];  Service: Pain Management;  Laterality: Right;    INJECTION OF ANESTHETIC AGENT INTO SACROILIAC JOINT Left 06/17/2021    Procedure: Left GT bursa + left SIJ injection;  Surgeon: Yo Kirkland MD;  Location: [facility];  Service: Pain Management;  Laterality: Left;    INJECTION OF ANESTHETIC AGENT INTO SACROILIAC JOINT Bilateral 05/05/2022    Procedure: Bilateral BLOCK, SACROILIAC JOINT and Bilateral GTB RN IV sedation;  Surgeon: Yo Kirkland MD;  Location: [facility];  Service: Pain Management;  Laterality: Bilateral;    INJECTION OF JOINT Left 06/17/2021    Procedure: Left GT bursa + left SIJ injection;  Surgeon: Yo Kirkland MD;  Location: [facility];  Service: Pain Management;  Laterality: Left;    LUMBAR FUSION      RADIOFREQUENCY THERMOCOAGULATION Right 12/08/2020    Procedure: RADIOFREQUENCY THERMAL COAGULATION Right L3, 4,5 MBB RFA with RN IV sedation// NO STEROID;  Surgeon: Yo Kirkland MD;  Location: [facility];  Service: Pain Management;  Laterality: Right;    RADIOFREQUENCY THERMOCOAGULATION Left 01/21/2021    Procedure: Left L3-5 Lumbar RFA;  Surgeon: Rian Chaney MD;  Location: [facility];  Service: Pain  Management;  Laterality: Left;    SELECTIVE INJECTION OF ANESTHETIC AGENT AROUND LUMBAR SPINAL NERVE ROOT BY TRANSFORAMINAL APPROACH Left 03/08/2022    Procedure: BLOCK, SPINAL NERVE ROOT, LUMBAR, SELECTIVE, TRANSFORAMINAL APPROACH Left L5-S1, S1 RN IV sedation;  Surgeon: Yo Kirkland MD;  Location: [facility];  Service: Pain Management;  Laterality: Left;    TRANSFORAMINAL EPIDURAL INJECTION OF STEROID Left 07/14/2022    Procedure: left L4/5 + L5/S1 TF BRUNILDA RN IV Sedation;  Surgeon: Eula Rojas MD;  Location: [facility];  Service: Pain Management;  Laterality: Left;       Review of Systems      Objective:   There were no vitals taken for this visit.    US Pelvis Comp with Transvag NON-OB (xpd)  Narrative: EXAMINATION:  US PELVIS COMP WITH TRANSVAG NON-OB (XPD)    CLINICAL HISTORY:  Pelvic and perineal pain    TECHNIQUE:  Multiple grayscale and color Doppler images of the pelvis were obtained utilizing the transabdominal and transvaginal technique.  Spectral Doppler evaluation of both ovaries was performed.    COMPARISON:  Pelvic sonogram from 05/24/2018    FINDINGS:  Uterus:    Anteverted measuring 10.5 x 3.3 x 4.1 cm.    Homogeneous endometrial stripe measuring 7.4 mm in thickness.    Normal appearance of the uterine parenchyma.    Right ovary:    Measures 4.8 x 3.1 x 3.3 cm.    Normal sonographic appearance.    Spectral Doppler evaluation demonstrates normal arteriovenous waveforms.    Left ovary:    Measures 3.4 x 3.1 x 2.3 cm.    Normal sonographic appearance.    Spectral Doppler evaluation demonstrates normal arteriovenous waveforms.    Other:    No free fluid  Impression: 1. Negative exam    Electronically signed by: Miguel Mcfarland MD  Date:    08/19/2022  Time:    09:18      Physical Exam  LOWER EXTREMITY PHYSICAL EXAMINATION    VASCULAR: The right DP pulse is 2/4 and the left DP is 2/4. The right PT pulse is 2/4 and the left PT pulse is 2/4. Proximal to distal, warm to warm. No dependent rubor or  elevation palor is noted. Capillary refill time is less than 3 seconds. Hair growth is appreciated to the dorsal foot and digits.    NEUROLOGY: Proprioception is intact, bilateral. Sensation to light touch is intact. Negative Tinel's Sign and negative Valleix Sign. No neurological sensations with compression of the area of Ji's Nerve in the area of the Abductor Hallucis muscle belly.    ORTHOPEDIC:  Moderate tenderness to palpation of the area around the plantar medial calcaneal tubercle on the left foot. Pains are characterized as sharp and stabbing-like with direct palpation of the area. There is also mild pain to palpation of the immediate plantar aspect of the heel, and no pains to the lateral band of the fascia. No edema is noted. No fullness is noted. No pains or defects are noted within the plantar fascia at the arch. No plantar fibromas are noted. No defects are noted within the ligament. Dorsiflexion of the MTPJs with simultaneous palpation of the fascia at the arch, does worsen and exacerbate the pains. No pains with medial to lateral compression of the heel. Equinus contracture is noted. Antalgic gait pattern is noted.     DERMATOLOGY: No ecchymosis is noted.  Skin is supple, dry and intact. Skin is supple.  No hyperkeratosis noted. No calluses.  No open wounds or ulcerations are noted.  No palpable plantar fibromas noted.    Assessment:     1. Plantar fasciitis of left foot    2. Type 2 diabetes mellitus with morbid obesity          Plan:     Plantar fasciitis of left foot  -     Tendon Sheath  -     Ambulatory referral/consult to Physical/Occupational Therapy; Future; Expected date: 09/22/2022    Type 2 diabetes mellitus with morbid obesity      Thorough discussion is had with the patient today concerning the diagnosis, its etiology, and the treatment algorithm at present.    Continue to recommend the  importance of stretching to the posterior muscle groups of the gastrocnemius and the soleus.  A  stretching sheet was provided to the patient in conjunction with a Thera-Band.  I do recommend patient perform stretching exercises 4-6 times per day and holding the stretches for approximately 15-30 seconds apiece.  We discussed importance of stretching as relates to lengthening the posterior muscle group which can decrease drain on the posterior aspect of the heel as well as the plantar aspect of the heel.  This will also decrease pain associated with post static dyskinesia.  Teach back mechanism was performed with the patient demonstrating the stretching exercises.    Did discussed possible physical therapy versus steroid injection into the affected foot.  Patient relates interest in steroid injection at this time.  Did discuss risk of steroid use as relates to increased anxiety, insomnia, post injection steroid flares, elevated blood sugars, swelling.  Patient lytes understanding.  Recommend use of anti-inflammatories with icing to decrease risk of a post-injection steroid flare.    Referral to physical therapy as well.  Referral placed.      Future Appointments   Date Time Provider Department Center   9/16/2022 11:40 AM Darlin Iverson PA-C [facility] IN [facility]   10/20/2022 10:40 AM Darlin Iverson PA-C [facility] INT Counts include 234 beds at the [facility]

## 2022-09-18 ENCOUNTER — PATIENT MESSAGE (OUTPATIENT)
Dept: FAMILY MEDICINE | Facility: CLINIC | Age: 32
End: 2022-09-18
Payer: MEDICAID

## 2022-09-18 DIAGNOSIS — Z76.0 MEDICATION REFILL: ICD-10-CM

## 2022-09-19 RX ORDER — VALACYCLOVIR HYDROCHLORIDE 500 MG/1
TABLET, FILM COATED ORAL
Qty: 6 TABLET | Refills: 6 | OUTPATIENT
Start: 2022-09-19

## 2022-09-20 ENCOUNTER — PATIENT MESSAGE (OUTPATIENT)
Dept: PAIN MEDICINE | Facility: CLINIC | Age: 32
End: 2022-09-20
Payer: MEDICAID

## 2022-09-20 DIAGNOSIS — Z76.0 MEDICATION REFILL: ICD-10-CM

## 2022-09-20 DIAGNOSIS — M54.16 LUMBAR RADICULOPATHY: ICD-10-CM

## 2022-09-20 RX ORDER — VALACYCLOVIR HYDROCHLORIDE 500 MG/1
500 TABLET, FILM COATED ORAL 2 TIMES DAILY
Qty: 6 TABLET | Refills: 6 | Status: SHIPPED | OUTPATIENT
Start: 2022-09-20 | End: 2023-03-06

## 2022-09-20 NOTE — TELEPHONE ENCOUNTER
Good Morning/Afternoon,     Pt is requesting for a refill of: Tizanidine 4 mg  Last filed:7/22/22  Last encounter:8/18/22  Up coming apt: 10/20/22  Pharmacy:  [id number] - [place], [place] - [address] [address]  Is this something you can do?      Max Carter  Medical Assistant

## 2022-09-21 RX ORDER — TIZANIDINE 4 MG/1
4 TABLET ORAL EVERY 8 HOURS
Qty: 90 TABLET | Refills: 1 | Status: SHIPPED | OUTPATIENT
Start: 2022-09-21 | End: 2022-11-14 | Stop reason: SDUPTHER

## 2022-09-26 ENCOUNTER — PATIENT MESSAGE (OUTPATIENT)
Dept: FAMILY MEDICINE | Facility: CLINIC | Age: 32
End: 2022-09-26
Payer: MEDICAID

## 2022-09-26 ENCOUNTER — HOSPITAL ENCOUNTER (EMERGENCY)
Facility: HOSPITAL | Age: 32
Discharge: HOME | End: 2022-09-26
Attending: EMERGENCY MEDICINE
Payer: MEDICAID

## 2022-09-26 ENCOUNTER — TELEPHONE (OUTPATIENT)
Dept: FAMILY MEDICINE | Facility: CLINIC | Age: 32
End: 2022-09-26
Payer: MEDICAID

## 2022-09-26 VITALS
TEMPERATURE: 99 F | SYSTOLIC BLOOD PRESSURE: 168 MMHG | DIASTOLIC BLOOD PRESSURE: 90 MMHG | HEIGHT: 64 IN | BODY MASS INDEX: 45.09 KG/M2 | OXYGEN SATURATION: 99 % | WEIGHT: 264.13 LBS | HEART RATE: 71 BPM | RESPIRATION RATE: 18 BRPM

## 2022-09-26 DIAGNOSIS — R10.31 RLQ ABDOMINAL PAIN: Primary | ICD-10-CM

## 2022-09-26 LAB
ALBUMIN SERPL BCP-MCNC: 3.9 G/DL (ref 3.5–5.2)
ALP SERPL-CCNC: 61 U/L (ref 55–135)
ALT SERPL W/O P-5'-P-CCNC: 56 U/L (ref 10–44)
ANION GAP SERPL CALC-SCNC: 8 MMOL/L (ref 8–16)
AST SERPL-CCNC: 25 U/L (ref 10–40)
B-HCG UR QL: NEGATIVE
BACTERIA #/AREA URNS AUTO: NORMAL /HPF
BASOPHILS # BLD AUTO: 0.08 K/UL (ref 0–0.2)
BASOPHILS NFR BLD: 0.5 % (ref 0–1.9)
BILIRUB SERPL-MCNC: 0.2 MG/DL (ref 0.1–1)
BILIRUB UR QL STRIP: NEGATIVE
BUN SERPL-MCNC: 9 MG/DL (ref 6–20)
CALCIUM SERPL-MCNC: 9.7 MG/DL (ref 8.7–10.5)
CHLORIDE SERPL-SCNC: 104 MMOL/L (ref 95–110)
CLARITY UR REFRACT.AUTO: ABNORMAL
CO2 SERPL-SCNC: 29 MMOL/L (ref 23–29)
COLOR UR AUTO: YELLOW
CREAT SERPL-MCNC: 0.7 MG/DL (ref 0.5–1.4)
DIFFERENTIAL METHOD: ABNORMAL
EOSINOPHIL # BLD AUTO: 0.1 K/UL (ref 0–0.5)
EOSINOPHIL NFR BLD: 0.4 % (ref 0–8)
ERYTHROCYTE [DISTWIDTH] IN BLOOD BY AUTOMATED COUNT: 13.9 % (ref 11.5–14.5)
EST. GFR  (NO RACE VARIABLE): >60 ML/MIN/1.73 M^2
GLUCOSE SERPL-MCNC: 70 MG/DL (ref 70–110)
GLUCOSE UR QL STRIP: NEGATIVE
HCT VFR BLD AUTO: 43.5 % (ref 37–48.5)
HEP C VIRUS HOLD SPECIMEN: NORMAL
HGB BLD-MCNC: 14.4 G/DL (ref 12–16)
HGB UR QL STRIP: ABNORMAL
HYALINE CASTS UR QL AUTO: 0 /LPF
IMM GRANULOCYTES # BLD AUTO: 0.08 K/UL (ref 0–0.04)
IMM GRANULOCYTES NFR BLD AUTO: 0.5 % (ref 0–0.5)
KETONES UR QL STRIP: NEGATIVE
LEUKOCYTE ESTERASE UR QL STRIP: NEGATIVE
LYMPHOCYTES # BLD AUTO: 6.2 K/UL (ref 1–4.8)
LYMPHOCYTES NFR BLD: 42.2 % (ref 18–48)
MCH RBC QN AUTO: 27.4 PG (ref 27–31)
MCHC RBC AUTO-ENTMCNC: 33.1 G/DL (ref 32–36)
MCV RBC AUTO: 83 FL (ref 82–98)
MICROSCOPIC COMMENT: NORMAL
MONOCYTES # BLD AUTO: 1.2 K/UL (ref 0.3–1)
MONOCYTES NFR BLD: 8.4 % (ref 4–15)
NEUTROPHILS # BLD AUTO: 7 K/UL (ref 1.8–7.7)
NEUTROPHILS NFR BLD: 48 % (ref 38–73)
NITRITE UR QL STRIP: NEGATIVE
NRBC BLD-RTO: 0 /100 WBC
PH UR STRIP: 7 [PH] (ref 5–8)
PLATELET # BLD AUTO: 315 K/UL (ref 150–450)
PMV BLD AUTO: 10.8 FL (ref 9.2–12.9)
POTASSIUM SERPL-SCNC: 3.8 MMOL/L (ref 3.5–5.1)
PROT SERPL-MCNC: 7 G/DL (ref 6–8.4)
PROT UR QL STRIP: ABNORMAL
RBC # BLD AUTO: 5.25 M/UL (ref 4–5.4)
RBC #/AREA URNS AUTO: 1 /HPF (ref 0–4)
SODIUM SERPL-SCNC: 141 MMOL/L (ref 136–145)
SP GR UR STRIP: 1.01 (ref 1–1.03)
URN SPEC COLLECT METH UR: ABNORMAL
UROBILINOGEN UR STRIP-ACNC: NEGATIVE EU/DL
WBC # BLD AUTO: 14.61 K/UL (ref 3.9–12.7)
WBC #/AREA URNS AUTO: 2 /HPF (ref 0–5)

## 2022-09-26 PROCEDURE — 80053 COMPREHEN METABOLIC PANEL: CPT | Mod: ER | Performed by: PHYSICIAN ASSISTANT

## 2022-09-26 PROCEDURE — 25500020 PHARM REV CODE 255: Mod: ER | Performed by: PHYSICIAN ASSISTANT

## 2022-09-26 PROCEDURE — 86803 HEPATITIS C AB TEST: CPT | Performed by: EMERGENCY MEDICINE

## 2022-09-26 PROCEDURE — 81000 URINALYSIS NONAUTO W/SCOPE: CPT | Mod: ER | Performed by: PHYSICIAN ASSISTANT

## 2022-09-26 PROCEDURE — 99285 EMERGENCY DEPT VISIT HI MDM: CPT | Mod: 25,ER

## 2022-09-26 PROCEDURE — 81025 URINE PREGNANCY TEST: CPT | Mod: ER | Performed by: PHYSICIAN ASSISTANT

## 2022-09-26 PROCEDURE — 85025 COMPLETE CBC W/AUTO DIFF WBC: CPT | Mod: ER | Performed by: PHYSICIAN ASSISTANT

## 2022-09-26 PROCEDURE — 87389 HIV-1 AG W/HIV-1&-2 AB AG IA: CPT | Performed by: EMERGENCY MEDICINE

## 2022-09-26 RX ADMIN — IOHEXOL 100 ML: 350 INJECTION, SOLUTION INTRAVENOUS at 03:09

## 2022-09-26 NOTE — TELEPHONE ENCOUNTER
----- Message from Ami Torres sent at 9/26/2022 11:59 AM CDT -----  Pt called in re: pt called in re: does she still need to go to the ER today please call pt and advise @ .[phone]      Thanks [facility]

## 2022-09-26 NOTE — TELEPHONE ENCOUNTER
Spoke with pt. Pt stated she's been in pain since the last time she saw you 08/09/2022. Pt stated that she wants to get checked by you again this Friday morning time if possible.    Please Advise

## 2022-09-26 NOTE — TELEPHONE ENCOUNTER
Tried contacting pt to get more info regards to her pelvic pain  no answer left vm for pt to call the clinic back.

## 2022-09-26 NOTE — ED PROVIDER NOTES
History      Chief Complaint   Patient presents with    Pelvic Pain     Pt present to ED with concerns of pelvic pain, onset 1 month ago, denies bleeding       Review of patient's allergies indicates:   Allergen Reactions    Amitriptyline      xerostomia    Amlodipine      DRY MOUTH    Tramadol Itching        HPI   HPI    9/26/2022, 3:54 PM   History obtained from the patient      History of Present Illness: Nathan Carlos is a 31 y.o. female patient who presents to the Emergency Department for rlq pain, acute on chronic.  Denies vag dc, fever, vomiting. Symptoms are moderate in severity.     No further complaints or concerns at this time.           PCP: Qasim Paez NP       Past Medical History:  Past Medical History:   Diagnosis Date    Carpal tunnel syndrome     Diabetes mellitus     GERD (gastroesophageal reflux disease)     Herpes simplex virus (HSV) infection     High serum testosterone 9/7/2017    Hyperlipidemia     Hypertension     Insulin resistance syndrome 9/7/2017    Lumbar facet arthropathy 8/14/2019    Morbid obesity     Ovarian cyst     bilateral          Past Surgical History:  Past Surgical History:   Procedure Laterality Date    ANKLE SURGERY Left     BUNIONECTOMY Bilateral     CARPAL TUNNEL RELEASE Left 03/19/2021    Procedure: RELEASE, CARPAL TUNNEL;  Surgeon: Juan F Bernard MD;  Location: [facility] OR;  Service: Orthopedics;  Laterality: Left;    CARPAL TUNNEL RELEASE Right 11/04/2021    Procedure: RELEASE, CARPAL TUNNEL;  Surgeon: Juan F Bernard MD;  Location: [facility] OR;  Service: Orthopedics;  Laterality: Right;    COLONOSCOPY  06/12/2018    ESOPHAGOGASTRODUODENOSCOPY      ESOPHAGOGASTRODUODENOSCOPY N/A 12/02/2021    Procedure: EGD (ESOPHAGOGASTRODUODENOSCOPY);  Surgeon: Pili Eagle MD;  Location: [facility];  Service: Endoscopy;  Laterality: N/A;    INJECTION OF ANESTHETIC AGENT AROUND MEDIAL BRANCH NERVES INNERVATING LUMBAR FACET JOINT Bilateral 08/14/2019     Procedure: Bilateral L3-5 MBB;  Surgeon: Yo Kirkland MD;  Location: [facility];  Service: Pain Management;  Laterality: Bilateral;    INJECTION OF ANESTHETIC AGENT AROUND MEDIAL BRANCH NERVES INNERVATING LUMBAR FACET JOINT Bilateral 07/16/2020    Procedure: Bilateral L3-5 MBB;  Surgeon: Yo Kirkland MD;  Location: [facility];  Service: Pain Management;  Laterality: Bilateral;    INJECTION OF ANESTHETIC AGENT INTO SACROILIAC JOINT Right 02/05/2020    Procedure: Right SIJ Injection with local;  Surgeon: Yo Kirkland MD;  Location: [facility];  Service: Pain Management;  Laterality: Right;    INJECTION OF ANESTHETIC AGENT INTO SACROILIAC JOINT Left 06/17/2021    Procedure: Left GT bursa + left SIJ injection;  Surgeon: Yo Kirkland MD;  Location: [facility];  Service: Pain Management;  Laterality: Left;    INJECTION OF ANESTHETIC AGENT INTO SACROILIAC JOINT Bilateral 05/05/2022    Procedure: Bilateral BLOCK, SACROILIAC JOINT and Bilateral GTB RN IV sedation;  Surgeon: Yo Kirkland MD;  Location: [facility];  Service: Pain Management;  Laterality: Bilateral;    INJECTION OF JOINT Left 06/17/2021    Procedure: Left GT bursa + left SIJ injection;  Surgeon: Yo Kirkland MD;  Location: [facility];  Service: Pain Management;  Laterality: Left;    LUMBAR FUSION      RADIOFREQUENCY THERMOCOAGULATION Right 12/08/2020    Procedure: RADIOFREQUENCY THERMAL COAGULATION Right L3, 4,5 MBB RFA with RN IV sedation// NO STEROID;  Surgeon: Yo Kirkland MD;  Location: [facility];  Service: Pain Management;  Laterality: Right;    RADIOFREQUENCY THERMOCOAGULATION Left 01/21/2021    Procedure: Left L3-5 Lumbar RFA;  Surgeon: Rian Chaney MD;  Location: [facility];  Service: Pain Management;  Laterality: Left;    SELECTIVE INJECTION OF ANESTHETIC AGENT AROUND LUMBAR SPINAL NERVE ROOT BY TRANSFORAMINAL APPROACH Left 03/08/2022    Procedure: BLOCK, SPINAL NERVE ROOT, LUMBAR, SELECTIVE, TRANSFORAMINAL APPROACH Left  L5-S1, S1 RN IV sedation;  Surgeon: Yo Kirkland MD;  Location: [facility];  Service: Pain Management;  Laterality: Left;    TRANSFORAMINAL EPIDURAL INJECTION OF STEROID Left 07/14/2022    Procedure: left L4/5 + L5/S1 TF BRUNILDA RN IV Sedation;  Surgeon: Eula Rojas MD;  [facility];  Service: Pain Management;  Laterality: Left;           Family History:  Family History   Problem Relation Age of Onset    Hypertension Mother     Fibroids Mother     Arthritis Mother     No Known Problems Father     Diabetes Sister     Hypertension Brother     Hypertension Maternal Aunt     Diabetes Maternal Aunt     Arthritis Maternal Aunt     Diabetes Maternal Uncle     Hypertension Maternal Grandmother     Leukemia Maternal Grandfather     Hypertension Maternal Grandfather     Stroke Neg Hx            Social History:  Social History     Tobacco Use    Smoking status: Never    Smokeless tobacco: Never   Substance and Sexual Activity    Alcohol use: Not Currently     Comment: special occasions    Drug use: No    Sexual activity: Yes     Partners: Male     Birth control/protection: Other-see comments, None     Comment: Birth control pill       ROS     Review of Systems   Constitutional:  Negative for chills and fever.   HENT:  Negative for facial swelling and trouble swallowing.    Eyes:  Negative for discharge, redness and visual disturbance.   Respiratory:  Negative for chest tightness and shortness of breath.    Cardiovascular:  Negative for chest pain and leg swelling.   Gastrointestinal:  Positive for abdominal pain. Negative for diarrhea and vomiting.   Genitourinary:  Negative for decreased urine volume and dysuria.   Musculoskeletal:  Negative for joint swelling and neck stiffness.   Skin:  Negative for rash and wound.   Neurological:  Negative for syncope and facial asymmetry.   All other systems reviewed and are negative.    Physical Exam      Initial Vitals [09/26/22 1318]   BP Pulse Resp Temp SpO2   138/82 78  "16 98.5 °F (36.9 °C) 99 %      MAP       --         Physical Exam  Vital signs and nursing notes reviewed.  Constitutional: Patient is in NAD. Awake and alert. Well-developed and well-nourished.  Head: Atraumatic. Normocephalic.  Eyes: PERRL. EOM intact. Conjunctivae nl. No scleral icterus.  ENT: Mucous membranes are moist. Oropharynx is clear.  Neck: Supple. No JVD. No lymphadenopathy.  No meningismus  Cardiovascular: Regular rate and rhythm. No murmurs, rubs, or gallops. Distal pulses are 2+ and symmetric.  Pulmonary/Chest: No respiratory distress. Clear to auscultation bilaterally. No wheezing, rales, or rhonchi.  Abdominal: Soft. Non-distended. No TTP. No rebound, guarding, or rigidity. Good bowel sounds.  Genitourinary: No CVA tenderness.  Pelvic exam:  No discharge. No cmt. No adnexal ttp  Musculoskeletal: Moves all extremities. No edema.   Skin: Warm and dry.  Neurological: Awake and alert. No acute focal neurological deficits are appreciated.  Psychiatric: Normal affect. Good eye contact. Appropriate in content.      ED Course          Procedures  ED Vital Signs:  Vitals:    09/26/22 1318 09/26/22 1623   BP: 138/82 (!) 168/90   Pulse: 78 71   Resp: 16 18   Temp: 98.5 °F (36.9 °C)    TempSrc: Oral    SpO2: 99% 99%   Weight: 119.8 kg (264 lb 1.8 oz)    Height: 5' 4" (1.626 m)          Results for orders placed or performed during the hospital encounter of 09/26/22   CBC W/ AUTO DIFFERENTIAL   Result Value Ref Range    WBC 14.61 (H) 3.90 - 12.70 K/uL    RBC 5.25 4.00 - 5.40 M/uL    Hemoglobin 14.4 12.0 - 16.0 g/dL    Hematocrit 43.5 37.0 - 48.5 %    MCV 83 82 - 98 fL    MCH 27.4 27.0 - 31.0 pg    MCHC 33.1 32.0 - 36.0 g/dL    RDW 13.9 11.5 - 14.5 %    Platelets 315 150 - 450 K/uL    MPV 10.8 9.2 - 12.9 fL    Immature Granulocytes 0.5 0.0 - 0.5 %    Gran # (ANC) 7.0 1.8 - 7.7 K/uL    Immature Grans (Abs) 0.08 (H) 0.00 - 0.04 K/uL    Lymph # 6.2 (H) 1.0 - 4.8 K/uL    Mono # 1.2 (H) 0.3 - 1.0 K/uL    Eos # 0.1 0.0 " - 0.5 K/uL    Baso # 0.08 0.00 - 0.20 K/uL    nRBC 0 0 /100 WBC    Gran % 48.0 38.0 - 73.0 %    Lymph % 42.2 18.0 - 48.0 %    Mono % 8.4 4.0 - 15.0 %    Eosinophil % 0.4 0.0 - 8.0 %    Basophil % 0.5 0.0 - 1.9 %    Differential Method Automated    Comp. Metabolic Panel   Result Value Ref Range    Sodium 141 136 - 145 mmol/L    Potassium 3.8 3.5 - 5.1 mmol/L    Chloride 104 95 - 110 mmol/L    CO2 29 23 - 29 mmol/L    Glucose 70 70 - 110 mg/dL    BUN 9 6 - 20 mg/dL    Creatinine 0.7 0.5 - 1.4 mg/dL    Calcium 9.7 8.7 - 10.5 mg/dL    Total Protein 7.0 6.0 - 8.4 g/dL    Albumin 3.9 3.5 - 5.2 g/dL    Total Bilirubin 0.2 0.1 - 1.0 mg/dL    Alkaline Phosphatase 61 55 - 135 U/L    AST 25 10 - 40 U/L    ALT 56 (H) 10 - 44 U/L    Anion Gap 8 8 - 16 mmol/L    eGFR >60.0 >60 mL/min/1.73 m^2   Urinalysis, Reflex to Urine Culture Urine, Clean Catch    Specimen: Urine   Result Value Ref Range    Specimen UA Urine, Clean Catch     Color, UA Yellow Yellow, Straw, Haley    Appearance, UA Hazy (A) Clear    pH, UA 7.0 5.0 - 8.0    Specific Gravity, UA 1.015 1.005 - 1.030    Protein, UA 1+ (A) Negative    Glucose, UA Negative Negative    Ketones, UA Negative Negative    Bilirubin (UA) Negative Negative    Occult Blood UA Trace (A) Negative    Nitrite, UA Negative Negative    Urobilinogen, UA Negative <2.0 EU/dL    Leukocytes, UA Negative Negative   Pregnancy, urine rapid   Result Value Ref Range    Preg Test, Ur Negative    Urinalysis Microscopic   Result Value Ref Range    RBC, UA 1 0 - 4 /hpf    WBC, UA 2 0 - 5 /hpf    Bacteria Occasional None-Occ /hpf    Hyaline Casts, UA 0 0-1/lpf /lpf    Microscopic Comment SEE COMMENT      *Note: Due to a large number of results and/or encounters for the requested time period, some results have not been displayed. A complete set of results can be found in Results Review.             Imaging Results:  Imaging Results              CT Abdomen Pelvis With Contrast (Final result)  Result time 09/26/22  15:48:24      Final result by Alber Meza MD (09/26/22 15:48:24)                   Impression:      1. Negative for acute inflammatory process of the abdomen or pelvis.  2. Fatty liver.  3. Otherwise unremarkable exam.  All CT scans at this facility are performed  using dose modulation techniques as appropriate to performed exam including the following:  automated exposure control; adjustment of mA and/or kV according to the patients size (this includes techniques or standardized protocols for targeted exams where dose is matched to indication/reason for exam: i.e. extremities or head);  iterative reconstruction technique.      Electronically signed by: Alber Meza  Date:    09/26/2022  Time:    15:48               Narrative:    EXAMINATION:  CT ABDOMEN PELVIS WITH CONTRAST    CLINICAL HISTORY:  RLQ abdominal pain (Age >= 14y);    TECHNIQUE:  Standard axial imaging performed extending from the lung bases through the pubic symphysis, following administration of intravenous contrast.  Additional 2D  reformatted sagittal and coronal images obtained.    COMPARISON:  None    FINDINGS:  Lung bases are clear.    Fatty infiltration of the liver..  Normal gallbladder.  Portal vein is patent.    Normal spleen.  Normal pancreas.  Normal adrenal glands.  The aorta and IVC are normal.  No retroperitoneal adenopathy.    Lobulated left kidney with normal enhancement.  Normal left ureter.  Lobulated right kidney.  Otherwise right kidney and right ureter are normal.    Normal stomach.  Normal small intestine.  Normal appendix.  Normal colon.  The rectum is normal.    The pelvis demonstrates anteverted normal appearing uterus.  Adnexa normal..    Abdominopelvic wall soft tissues are normal.  No significant bony abnormality.                                         The Emergency Provider reviewed the vital signs and test results, which are outlined above.    ED Discussion             Medication(s) given in the  ER:  Medications   iohexoL (OMNIPAQUE 350) injection 100 mL (100 mLs Intravenous Given 9/26/22 1520)            Follow-up Information       Qasim Paez NP In 2 days.    Specialty: Family Medicine  Contact information:  [address] [id number] [phone]                                    Medication List        ASK your doctor about these medications      atorvastatin 40 MG tablet  Commonly known as: LIPITOR  Take 1 tablet (40 mg total) by mouth nightly.     blood sugar diagnostic Strp  To check BG 2 times daily, to use with insurance preferred meter     blood-glucose meter kit  To check BG 2 times daily, to use with insurance preferred meter     clotrimazole-betamethasone 1-0.05% cream  Commonly known as: LOTRISONE  Apply topically 2 (two) times daily.     DEXCOM G6  Misc  Generic drug: blood-glucose meter,continuous  Use as directed,  to be changed every 365 days.     DEXCOM G6 SENSOR Selin  Generic drug: blood-glucose sensor  Use as directed, change sensor every 10 days     DEXCOM G6 TRANSMITTER Selin  Generic drug: blood-glucose transmitter  Use as directed, change transmitter every 3 months     dicyclomine 20 mg tablet  Commonly known as: BENTYL  TAKE ONE TABLET BY MOUTH THREE TIMES A DAY AS NEEDED FOR ABDOMINAL PAIN     ergocalciferol 50,000 unit Cap  Commonly known as: ERGOCALCIFEROL     glimepiride 4 MG tablet  Commonly known as: AMARYL  Take 1 tablet (4 mg total) by mouth daily with breakfast.     irbesartan 150 MG tablet  Commonly known as: AVAPRO  Take 1 tablet (150 mg total) by mouth once daily.     JANUVIA 100 MG Tab  Generic drug: SITagliptin  TAKE 1 TABLET BY MOUTH EVERY DAY     lancets Misc  To check BG 2 times daily, to use with insurance preferred meter     LEVEMIR FLEXTOUCH U-100 INSULN 100 unit/mL (3 mL) Inpn pen  Generic drug: insulin detemir U-100  Inject 20 Units into the skin every evening.     linaCLOtide 145 mcg Cap capsule  Commonly known as:  "LINZESS  Take 1 capsule (145 mcg total) by mouth before breakfast.     metFORMIN 500 MG ER 24hr tablet  Commonly known as: GLUCOPHAGE-XR  Take 2 tablets (1,000 mg total) by mouth 2 (two) times daily with meals.     metoprolol succinate 50 MG 24 hr tablet  Commonly known as: TOPROL-XL  TAKE ONE TABLET BY MOUTH EVERY DAY     ondansetron 4 MG Tbdl  Commonly known as: ZOFRAN-ODT  Take 1-2 tablets (4-8 mg total) by mouth every 6 to 8 hours as needed (NAUSEA).     OZEMPIC 0.25 mg or 0.5 mg(2 mg/1.5 mL) pen injector  Generic drug: semaglutide  Inject 0.5 mg into the skin every 7 days.     pen needle, diabetic 32 gauge x 5/32" Ndle  Commonly known as: BD ULTRA-FINE GAYLA PEN NEEDLE  1 Units by Misc.(Non-Drug; Combo Route) route once daily.     spironolactone 25 MG tablet  Commonly known as: ALDACTONE  Take 2 tablets by mouth once daily     tiZANidine 4 MG tablet  Commonly known as: ZANAFLEX  Take 1 tablet (4 mg total) by mouth every 8 (eight) hours.     valACYclovir 500 MG tablet  Commonly known as: VALTREX  Take 1 tablet (500 mg total) by mouth 2 (two) times daily. for three days     zolpidem 5 MG Tab  Commonly known as: AMBIEN  Take 1 tablet (5 mg total) by mouth nightly as needed (for sleep).                  Medical Decision Making      Recommended she take otc pain med, fu with pcp or gyn    All findings were reviewed with the patient/family in detail.   All remaining questions and concerns were addressed at that time.  Patient/family has been counseled regarding the need for follow-up as well as the indication to return to the emergency room should new or worrisome developments occur.        MDM                 Clinical Impression:        ICD-10-CM ICD-9-CM   1. RLQ abdominal pain  R10.31 789.03               Nya Scott PA-C  09/26/22 1644    "

## 2022-09-26 NOTE — TELEPHONE ENCOUNTER
Spoke with pt in regards to pt going to [facility] for the pain and pt will do a hosp. F/u with us afterwards     KRISHNA Bhatia

## 2022-09-27 LAB
HCV AB SERPL QL IA: NEGATIVE
HIV 1+2 AB+HIV1 P24 AG SERPL QL IA: POSITIVE

## 2022-09-29 ENCOUNTER — PATIENT MESSAGE (OUTPATIENT)
Dept: FAMILY MEDICINE | Facility: CLINIC | Age: 32
End: 2022-09-29
Payer: MEDICAID

## 2022-10-06 ENCOUNTER — TELEPHONE (OUTPATIENT)
Dept: EMERGENCY MEDICINE | Facility: HOSPITAL | Age: 32
End: 2022-10-06
Payer: MEDICAID

## 2022-10-06 ENCOUNTER — PATIENT MESSAGE (OUTPATIENT)
Dept: FAMILY MEDICINE | Facility: CLINIC | Age: 32
End: 2022-10-06
Payer: MEDICAID

## 2022-10-06 ENCOUNTER — LAB VISIT (OUTPATIENT)
Dept: LAB | Facility: HOSPITAL | Age: 32
End: 2022-10-06
Attending: EMERGENCY MEDICINE
Payer: MEDICAID

## 2022-10-06 DIAGNOSIS — Z21 HIV TEST POSITIVE: ICD-10-CM

## 2022-10-06 DIAGNOSIS — Z21 HIV TEST POSITIVE: Primary | ICD-10-CM

## 2022-10-06 PROCEDURE — 36415 COLL VENOUS BLD VENIPUNCTURE: CPT | Mod: PO | Performed by: EMERGENCY MEDICINE

## 2022-10-06 PROCEDURE — 87536 HIV-1 QUANT&REVRSE TRNSCRPJ: CPT | Performed by: EMERGENCY MEDICINE

## 2022-10-06 NOTE — TELEPHONE ENCOUNTER
Pt called to get medical advising on should she get a colon detox from over the counter and would it interfere with her fatty liver disease pt states that her colon is back up     KRISHNA Bhatia

## 2022-10-08 LAB — HIV1 RNA # PLAS NAA DL=20: NORMAL COPIES/ML

## 2022-10-13 ENCOUNTER — OFFICE VISIT (OUTPATIENT)
Dept: FAMILY MEDICINE | Facility: CLINIC | Age: 32
End: 2022-10-13
Payer: MEDICAID

## 2022-10-13 ENCOUNTER — HOSPITAL ENCOUNTER (OUTPATIENT)
Dept: RADIOLOGY | Facility: HOSPITAL | Age: 32
Discharge: HOME | End: 2022-10-13
Attending: REGISTERED NURSE
Payer: MEDICAID

## 2022-10-13 ENCOUNTER — PATIENT MESSAGE (OUTPATIENT)
Dept: FAMILY MEDICINE | Facility: CLINIC | Age: 32
End: 2022-10-13

## 2022-10-13 VITALS
TEMPERATURE: 98 F | HEART RATE: 86 BPM | SYSTOLIC BLOOD PRESSURE: 114 MMHG | HEIGHT: 64 IN | OXYGEN SATURATION: 96 % | WEIGHT: 256.31 LBS | BODY MASS INDEX: 43.76 KG/M2 | DIASTOLIC BLOOD PRESSURE: 68 MMHG

## 2022-10-13 DIAGNOSIS — K59.00 CONSTIPATION, UNSPECIFIED CONSTIPATION TYPE: ICD-10-CM

## 2022-10-13 DIAGNOSIS — R10.31 RLQ ABDOMINAL PAIN: ICD-10-CM

## 2022-10-13 DIAGNOSIS — D72.829 LEUKOCYTOSIS, UNSPECIFIED TYPE: ICD-10-CM

## 2022-10-13 DIAGNOSIS — K59.00 CONSTIPATION, UNSPECIFIED CONSTIPATION TYPE: Primary | ICD-10-CM

## 2022-10-13 DIAGNOSIS — L30.4 INTERTRIGO: ICD-10-CM

## 2022-10-13 DIAGNOSIS — R31.9 HEMATURIA, UNSPECIFIED TYPE: ICD-10-CM

## 2022-10-13 DIAGNOSIS — R52 PAIN: ICD-10-CM

## 2022-10-13 DIAGNOSIS — Z21 HIV ANTIBODY POSITIVE: ICD-10-CM

## 2022-10-13 LAB
AMORPH CRY UR QL COMP ASSIST: ABNORMAL
BACTERIA #/AREA URNS AUTO: ABNORMAL /HPF
BILIRUB UR QL STRIP: NEGATIVE
CLARITY UR REFRACT.AUTO: ABNORMAL
COLOR UR AUTO: YELLOW
GLUCOSE UR QL STRIP: ABNORMAL
HGB UR QL STRIP: NEGATIVE
HYALINE CASTS UR QL AUTO: 0 /LPF
KETONES UR QL STRIP: ABNORMAL
LEUKOCYTE ESTERASE UR QL STRIP: NEGATIVE
MICROSCOPIC COMMENT: ABNORMAL
NITRITE UR QL STRIP: NEGATIVE
NON-SQ EPI CELLS #/AREA URNS AUTO: 6 /HPF
PH UR STRIP: 6 [PH] (ref 5–8)
PROT UR QL STRIP: ABNORMAL
RBC #/AREA URNS AUTO: 11 /HPF (ref 0–4)
SP GR UR STRIP: 1.03 (ref 1–1.03)
SQUAMOUS #/AREA URNS AUTO: 11 /HPF
URN SPEC COLLECT METH UR: ABNORMAL
WBC #/AREA URNS AUTO: 31 /HPF (ref 0–5)

## 2022-10-13 PROCEDURE — 87086 URINE CULTURE/COLONY COUNT: CPT | Performed by: REGISTERED NURSE

## 2022-10-13 PROCEDURE — 99214 OFFICE O/P EST MOD 30 MIN: CPT | Mod: S$PBB,,, | Performed by: REGISTERED NURSE

## 2022-10-13 PROCEDURE — 74019 RADEX ABDOMEN 2 VIEWS: CPT | Mod: TC,FY,PO

## 2022-10-13 PROCEDURE — 3078F PR MOST RECENT DIASTOLIC BLOOD PRESSURE < 80 MM HG: ICD-10-PCS | Mod: CPTII,,, | Performed by: REGISTERED NURSE

## 2022-10-13 PROCEDURE — 4010F PR ACE/ARB THEARPY RXD/TAKEN: ICD-10-PCS | Mod: CPTII,,, | Performed by: REGISTERED NURSE

## 2022-10-13 PROCEDURE — 99214 PR OFFICE/OUTPT VISIT, EST, LEVL IV, 30-39 MIN: ICD-10-PCS | Mod: S$PBB,,, | Performed by: REGISTERED NURSE

## 2022-10-13 PROCEDURE — 3066F NEPHROPATHY DOC TX: CPT | Mod: CPTII,,, | Performed by: REGISTERED NURSE

## 2022-10-13 PROCEDURE — 3061F PR NEG MICROALBUMINURIA RESULT DOCUMENTED/REVIEW: ICD-10-PCS | Mod: CPTII,,, | Performed by: REGISTERED NURSE

## 2022-10-13 PROCEDURE — 3078F DIAST BP <80 MM HG: CPT | Mod: CPTII,,, | Performed by: REGISTERED NURSE

## 2022-10-13 PROCEDURE — 3061F NEG MICROALBUMINURIA REV: CPT | Mod: CPTII,,, | Performed by: REGISTERED NURSE

## 2022-10-13 PROCEDURE — 3074F SYST BP LT 130 MM HG: CPT | Mod: CPTII,,, | Performed by: REGISTERED NURSE

## 2022-10-13 PROCEDURE — 81001 URINALYSIS AUTO W/SCOPE: CPT | Performed by: REGISTERED NURSE

## 2022-10-13 PROCEDURE — 3066F PR DOCUMENTATION OF TREATMENT FOR NEPHROPATHY: ICD-10-PCS | Mod: CPTII,,, | Performed by: REGISTERED NURSE

## 2022-10-13 PROCEDURE — 99999 PR PBB SHADOW E&M-EST. PATIENT-LVL V: ICD-10-PCS | Mod: PBBFAC,,, | Performed by: REGISTERED NURSE

## 2022-10-13 PROCEDURE — 3074F PR MOST RECENT SYSTOLIC BLOOD PRESSURE < 130 MM HG: ICD-10-PCS | Mod: CPTII,,, | Performed by: REGISTERED NURSE

## 2022-10-13 PROCEDURE — 3052F HG A1C>EQUAL 8.0%<EQUAL 9.0%: CPT | Mod: CPTII,,, | Performed by: REGISTERED NURSE

## 2022-10-13 PROCEDURE — 99215 OFFICE O/P EST HI 40 MIN: CPT | Mod: PBBFAC,PO | Performed by: REGISTERED NURSE

## 2022-10-13 PROCEDURE — 4010F ACE/ARB THERAPY RXD/TAKEN: CPT | Mod: CPTII,,, | Performed by: REGISTERED NURSE

## 2022-10-13 PROCEDURE — 74019 XR ABDOMEN FLAT AND ERECT: ICD-10-PCS | Mod: 26,,, | Performed by: RADIOLOGY

## 2022-10-13 PROCEDURE — 99999 PR PBB SHADOW E&M-EST. PATIENT-LVL V: CPT | Mod: PBBFAC,,, | Performed by: REGISTERED NURSE

## 2022-10-13 PROCEDURE — 90471 IMMUNIZATION ADMIN: CPT | Mod: PBBFAC,PO

## 2022-10-13 PROCEDURE — 3052F PR MOST RECENT HEMOGLOBIN A1C LEVEL 8.0 - < 9.0%: ICD-10-PCS | Mod: CPTII,,, | Performed by: REGISTERED NURSE

## 2022-10-13 PROCEDURE — 74019 RADEX ABDOMEN 2 VIEWS: CPT | Mod: 26,,, | Performed by: RADIOLOGY

## 2022-10-13 RX ORDER — FLUCONAZOLE 200 MG/1
200 TABLET ORAL DAILY
Qty: 3 TABLET | Refills: 0 | Status: SHIPPED | OUTPATIENT
Start: 2022-10-13 | End: 2022-11-28

## 2022-10-13 RX ORDER — CLOTRIMAZOLE AND BETAMETHASONE DIPROPIONATE 10; .64 MG/G; MG/G
CREAM TOPICAL 2 TIMES DAILY
Qty: 45 G | Refills: 2 | Status: SHIPPED | OUTPATIENT
Start: 2022-10-13

## 2022-10-13 RX ORDER — HYDROCODONE BITARTRATE AND ACETAMINOPHEN 5; 325 MG/1; MG/1
1 TABLET ORAL EVERY 6 HOURS PRN
Qty: 10 TABLET | Refills: 0 | Status: SHIPPED | OUTPATIENT
Start: 2022-10-13 | End: 2023-01-23

## 2022-10-13 NOTE — PROGRESS NOTES
Subjective:      Nathan Carlos is a 31 y.o. female here today for:  ER FOLLOW-UP      HPI:    Nathan Carlos is here today for an ER follow-up..  I have reviewed the patient's medical history in detail and updated the computerized patient record.      Facility: [facility]  ER date: 9/26/2022      ER note as below:    Chief Complaint   Patient presents with    Pelvic Pain       Pt present to ED with concerns of pelvic pain, onset 1 month ago, denies bleeding      History of Present Illness: Nathan Carlos is a 31 y.o. female patient who presents to the Emergency Department for rlq pain, acute on chronic.  Denies vag dc, fever, vomiting. Symptoms are moderate in severity.     No further complaints or concerns at this time.       CT Abdomen Pelvis With Contrast    Impression    1. Negative for acute inflammatory process of the abdomen or pelvis.  2. Fatty liver.  3. Otherwise unremarkable exam.    All CT scans at this facility are performed  using dose modulation techniques as appropriate to performed exam including the following:  automated exposure control; adjustment of mA and/or kV according to the patients size (this includes techniques or standardized protocols for targeted exams where dose is matched to indication/reason for exam: i.e. extremities or head);  iterative reconstruction technique.      Electronically signed by: Alber Meza  Date:    09/26/2022  Time:    15:48         Review of Systems   Constitutional: Negative.    Respiratory: Negative.     Cardiovascular: Negative.    Gastrointestinal:  Positive for abdominal distention, abdominal pain, constipation and diarrhea. Negative for anal bleeding, blood in stool, nausea and vomiting.        Has been tx stools with otc enema, Linzess and Miralax.  Lots of water.  Helps at times, minimal relief.   Genitourinary:  Positive for pelvic pain. Negative for decreased urine volume, vaginal bleeding, vaginal discharge and  vaginal pain.   Skin:  Positive for rash (under stomach fold).   Neurological: Negative.         Review of patient's allergies indicates:   Allergen Reactions    Amitriptyline      xerostomia    Amlodipine      DRY MOUTH    Tramadol Itching       Patient Active Problem List   Diagnosis    Essential hypertension    Gastroesophageal reflux disease    Hypercholesteremia    High serum testosterone    HSV-2 (herpes simplex virus 2) infection    Nephrolithiasis    Chronic bilateral low back pain with left-sided sciatica    Polycystic ovaries    Lumbar facet arthropathy    Type 2 diabetes mellitus without complication, without long-term current use of insulin    Paresthesias in left hand    Morbid obesity with BMI of 40.0-44.9, adult    Lumbar facet joint pain    Spondylosis without myelopathy or radiculopathy, lumbar region    Left carpal tunnel syndrome    Sacroiliac joint dysfunction    Lumbar radiculopathy         Current Outpatient Medications:     atorvastatin (LIPITOR) 40 MG tablet, Take 1 tablet (40 mg total) by mouth nightly., Disp: 90 tablet, Rfl: 1    blood sugar diagnostic Strp, To check BG 2 times daily, to use with insurance preferred meter, Disp: 100 strip, Rfl: 2    blood-glucose meter kit, To check BG 2 times daily, to use with insurance preferred meter, Disp: 1 each, Rfl: 0    blood-glucose meter,continuous (DEXCOM G6 ) Misc, Use as directed,  to be changed every 365 days., Disp: 1 each, Rfl: 0    blood-glucose sensor (DEXCOM G6 SENSOR) Selin, Use as directed, change sensor every 10 days, Disp: 9 each, Rfl: 1    blood-glucose transmitter (DEXCOM G6 TRANSMITTER) Selin, Use as directed, change transmitter every 3 months, Disp: 1 each, Rfl: 1    clotrimazole-betamethasone 1-0.05% (LOTRISONE) cream, Apply topically 2 (two) times daily., Disp: 45 g, Rfl: 2    dicyclomine (BENTYL) 20 mg tablet, TAKE ONE TABLET BY MOUTH THREE TIMES A DAY AS NEEDED FOR ABDOMINAL PAIN, Disp: 90 tablet, Rfl: 11     "ergocalciferol (ERGOCALCIFEROL) 50,000 unit Cap, TAKE 1 CAPSULE BY MOUTH ONCE A WEEK FOR 28 DAYS, Disp: , Rfl:     glimepiride (AMARYL) 4 MG tablet, Take 1 tablet (4 mg total) by mouth daily with breakfast., Disp: 90 tablet, Rfl: 1    irbesartan (AVAPRO) 150 MG tablet, Take 1 tablet (150 mg total) by mouth once daily., Disp: 90 tablet, Rfl: 3    JANUVIA 100 mg Tab, TAKE 1 TABLET BY MOUTH EVERY DAY, Disp: 90 tablet, Rfl: 1    lancets Misc, To check BG 2 times daily, to use with insurance preferred meter, Disp: 100 each, Rfl: 2    LEVEMIR FLEXTOUCH U-100 INSULN 100 unit/mL (3 mL) InPn pen, INJECT 20 UNITS EVERY EVENING, Disp: 15 mL, Rfl: 1    linaCLOtide (LINZESS) 145 mcg Cap capsule, Take 1 capsule (145 mcg total) by mouth before breakfast., Disp: 90 capsule, Rfl: 1    metFORMIN (GLUCOPHAGE-XR) 500 MG ER 24hr tablet, Take 2 tablets (1,000 mg total) by mouth 2 (two) times daily with meals., Disp: 360 tablet, Rfl: 1    metoprolol succinate (TOPROL-XL) 50 MG 24 hr tablet, TAKE ONE TABLET BY MOUTH EVERY DAY, Disp: 90 tablet, Rfl: 0    ondansetron (ZOFRAN-ODT) 4 MG TbDL, Take 1-2 tablets (4-8 mg total) by mouth every 6 to 8 hours as needed (NAUSEA)., Disp: 30 tablet, Rfl: 0    pen needle, diabetic (BD ULTRA-FINE GAYLA PEN NEEDLE) 32 gauge x 5/32" Ndle, 1 Units by Misc.(Non-Drug; Combo Route) route once daily., Disp: 100 each, Rfl: 6    semaglutide (OZEMPIC) 0.25 mg or 0.5 mg(2 mg/1.5 mL) pen injector, Inject 0.5 mg into the skin every 7 days., Disp: 3 pen, Rfl: 1    spironolactone (ALDACTONE) 25 MG tablet, Take 2 tablets by mouth once daily, Disp: 180 tablet, Rfl: 3    tiZANidine (ZANAFLEX) 4 MG tablet, Take 1 tablet (4 mg total) by mouth every 8 (eight) hours., Disp: 90 tablet, Rfl: 1    valACYclovir (VALTREX) 500 MG tablet, Take 1 tablet (500 mg total) by mouth 2 (two) times daily. for three days, Disp: 6 tablet, Rfl: 6    zolpidem (AMBIEN) 5 MG Tab, Take 1 tablet (5 mg total) by mouth nightly as needed (for sleep)., " "Disp: 30 tablet, Rfl: 5    Past medical, surgical, family and social histories have been reviewed today.      Objective:     Vitals:    10/13/22 1404   BP: 114/68   Pulse: 86   Temp: 98.3 °F (36.8 °C)   SpO2: 96%   Weight: 116.3 kg (256 lb 4.6 oz)   Height: 5' 4" (1.626 m)   PainSc: 0-No pain         Physical Exam  Vitals reviewed.   Constitutional:       General: She is not in acute distress.  HENT:      Head: Normocephalic and atraumatic.   Eyes:      Extraocular Movements: Extraocular movements intact.      Pupils: Pupils are equal, round, and reactive to light.   Neck:      Vascular: No carotid bruit.   Cardiovascular:      Rate and Rhythm: Normal rate and regular rhythm.      Pulses: Normal pulses.      Heart sounds: Normal heart sounds. No murmur heard.    No gallop.   Pulmonary:      Effort: Pulmonary effort is normal.      Breath sounds: Normal breath sounds.   Abdominal:      Tenderness: There is abdominal tenderness (mild) in the right lower quadrant.   Musculoskeletal:         General: No swelling, tenderness or deformity. Normal range of motion.      Cervical back: Normal range of motion and neck supple. No rigidity. No muscular tenderness.   Skin:     General: Skin is warm and dry.      Capillary Refill: Capillary refill takes less than 2 seconds.      Findings: Rash (under stomach fold -- yeast) present.   Neurological:      General: No focal deficit present.      Mental Status: She is alert and oriented to person, place, and time. Mental status is at baseline.      Cranial Nerves: No cranial nerve deficit.      Sensory: No sensory deficit.      Motor: No weakness.      Coordination: Coordination normal.      Gait: Gait normal.      Deep Tendon Reflexes: Reflexes normal.   Psychiatric:         Mood and Affect: Mood normal.         Behavior: Behavior normal.         Thought Content: Thought content normal.         Judgment: Judgment normal.         Diagnosis:     1. Constipation, unspecified " constipation type  - X-Ray Abdomen Flat And Erect; Future    2. RLQ abdominal pain  - Urinalysis, Reflex to Urine Culture Urine, Clean Catch  - X-Ray Abdomen Flat And Erect; Future  - CBC Auto Differential; Future  - Comprehensive Metabolic Panel; Future    3. Leukocytosis, unspecified type --- recheck from ER lab  - Urinalysis, Reflex to Urine Culture Urine, Clean Catch  - CBC Auto Differential; Future  - Comprehensive Metabolic Panel; Future    4. Hematuria, unspecified type -- recheck from ER lab  - Urinalysis, Reflex to Urine Culture Urine, Clean Catch  - CBC Auto Differential; Future  - Comprehensive Metabolic Panel; Future    5. HIV antibody positive ---- ER lab pending    6. Intertrigo --- yeast rash noted under elly, tx discussed.  - fluconazole (DIFLUCAN) 200 MG Tab; Take 1 tablet (200 mg total) by mouth once daily. for 3 days  Dispense: 3 tablet; Refill: 0  - clotrimazole-betamethasone 1-0.05% (LOTRISONE) cream; Apply topically 2 (two) times daily.  Dispense: 45 g; Refill: 2    7. Pain --- per pt request to use intermittently for pain issues.  Advised her that this is not going to be a recurrent rx, may or may not make constipation worse, take sparingly.  - HYDROcodone-acetaminophen (NORCO) 5-325 mg per tablet; Take 1 tablet by mouth every 6 (six) hours as needed for Pain.  Dispense: 10 tablet; Refill: 0      Follow-up:     Pending lab and XR.  RTC as directed and/or prn.      VANCE Wagner  [facility]         Patient Care Team:  Qasim Paez NP as PCP - General (Family Medicine)  Agnieszka Reid MD (Obstetrics and Gynecology)  SHAHRAM Nicole MD as Consulting Physician (Gastroenterology)  Eula Rojas MD as Consulting Physician (Pain Medicine)  Terrie Chang MD as Consulting Physician (Gastroenterology)        30 minutes of total time spent on the encounter, which includes face to face time and non-face to face time preparing to see the patient  (eg, review of tests), obtaining and/or reviewing separately obtained history, and documenting clinical information in the electronic or other health record.  Also includes independent interpretation of results (not separately reported) and communicating results to the patient/family/caregiver, with care coordination (not separately reported).

## 2022-10-14 ENCOUNTER — PATIENT MESSAGE (OUTPATIENT)
Dept: FAMILY MEDICINE | Facility: CLINIC | Age: 32
End: 2022-10-14
Payer: MEDICAID

## 2022-10-14 DIAGNOSIS — I10 ESSENTIAL HYPERTENSION: ICD-10-CM

## 2022-10-15 LAB
BACTERIA UR CULT: NORMAL
BACTERIA UR CULT: NORMAL

## 2022-10-17 ENCOUNTER — PATIENT MESSAGE (OUTPATIENT)
Dept: FAMILY MEDICINE | Facility: CLINIC | Age: 32
End: 2022-10-17
Payer: MEDICAID

## 2022-10-17 DIAGNOSIS — K76.0 FATTY LIVER: Primary | ICD-10-CM

## 2022-10-17 DIAGNOSIS — R74.01 ELEVATED ALT MEASUREMENT: ICD-10-CM

## 2022-10-17 DIAGNOSIS — K59.00 CONSTIPATION, UNSPECIFIED CONSTIPATION TYPE: ICD-10-CM

## 2022-10-17 RX ORDER — METOPROLOL SUCCINATE 50 MG/1
50 TABLET, EXTENDED RELEASE ORAL DAILY
Qty: 90 TABLET | Refills: 0 | Status: SHIPPED | OUTPATIENT
Start: 2022-10-17 | End: 2023-01-10

## 2022-10-18 ENCOUNTER — CLINICAL SUPPORT (OUTPATIENT)
Dept: REHABILITATION | Facility: HOSPITAL | Age: 32
End: 2022-10-18
Attending: PODIATRIST
Payer: MEDICAID

## 2022-10-18 ENCOUNTER — TELEPHONE (OUTPATIENT)
Dept: HEPATOLOGY | Facility: CLINIC | Age: 32
End: 2022-10-18
Payer: MEDICAID

## 2022-10-18 DIAGNOSIS — M25.60 DECREASED RANGE OF MOTION WITH DECREASED STRENGTH: ICD-10-CM

## 2022-10-18 DIAGNOSIS — M72.2 PLANTAR FASCIITIS OF LEFT FOOT: ICD-10-CM

## 2022-10-18 DIAGNOSIS — R53.1 DECREASED RANGE OF MOTION WITH DECREASED STRENGTH: ICD-10-CM

## 2022-10-18 DIAGNOSIS — M72.2 PLANTAR FASCIITIS, BILATERAL: ICD-10-CM

## 2022-10-18 PROCEDURE — 97110 THERAPEUTIC EXERCISES: CPT

## 2022-10-18 PROCEDURE — 97161 PT EVAL LOW COMPLEX 20 MIN: CPT

## 2022-10-18 NOTE — TELEPHONE ENCOUNTER
----- Message from Jaspreet Villarreal sent at 10/18/2022 12:29 PM CDT -----  Contact: [phone]  Type:  Sooner Apoointment Request    Caller is requesting a sooner appointment.  Caller declined first available appointment listed below.  Caller will not accept being placed on the waitlist and is requesting a message be sent to doctor.  Name of Caller:Nathan   When is the first available appointment?2/2023   Symptoms:referral from Qasim Paez   Would the patient rather a call back or a response via [facility]? Call back   Best Call Back Number:[phone]  Additional Information: the patient stated her pcp told her to call and schedule np refferal for a hepatology. Please give her a call back at [phone]. Thanks r,s I am aware that dept is not accepting medicaid pts.

## 2022-10-18 NOTE — TELEPHONE ENCOUNTER
Spoke with patient and reviewed our scheduled for next available medicaid slot. Patient asked for her referral to be faxed to [facility]. Referral faxed. Answered questions regarding patient referral diagnosis. Patient verbalized understanding to all with no additional concerns.     Patient provided with phone number for [facility].

## 2022-10-19 ENCOUNTER — TELEPHONE (OUTPATIENT)
Dept: HEPATOLOGY | Facility: CLINIC | Age: 32
End: 2022-10-19
Payer: MEDICAID

## 2022-10-19 NOTE — TELEPHONE ENCOUNTER
----- Message from Emma Manley sent at 10/19/2022  9:45 AM CDT -----  Contact: Nathan Evans is regards a called . Please call her at [phone]        Thanks  CF

## 2022-10-19 NOTE — TELEPHONE ENCOUNTER
Spoke with patient and answered all questions regarding her referral to [facility]. Patient had no additional concerns or questions.

## 2022-10-20 ENCOUNTER — OFFICE VISIT (OUTPATIENT)
Dept: PAIN MEDICINE | Facility: CLINIC | Age: 32
End: 2022-10-20
Attending: PODIATRIST
Payer: MEDICAID

## 2022-10-20 VITALS
WEIGHT: 259.69 LBS | HEART RATE: 65 BPM | HEIGHT: 64 IN | DIASTOLIC BLOOD PRESSURE: 99 MMHG | SYSTOLIC BLOOD PRESSURE: 149 MMHG | BODY MASS INDEX: 44.33 KG/M2

## 2022-10-20 DIAGNOSIS — M53.3 SACROILIAC JOINT DYSFUNCTION: ICD-10-CM

## 2022-10-20 DIAGNOSIS — M54.16 LUMBAR RADICULOPATHY: Primary | ICD-10-CM

## 2022-10-20 PROCEDURE — 3061F NEG MICROALBUMINURIA REV: CPT | Mod: CPTII,,, | Performed by: PHYSICIAN ASSISTANT

## 2022-10-20 PROCEDURE — 3008F BODY MASS INDEX DOCD: CPT | Mod: CPTII,,, | Performed by: PHYSICIAN ASSISTANT

## 2022-10-20 PROCEDURE — 3061F PR NEG MICROALBUMINURIA RESULT DOCUMENTED/REVIEW: ICD-10-PCS | Mod: CPTII,,, | Performed by: PHYSICIAN ASSISTANT

## 2022-10-20 PROCEDURE — 1160F PR REVIEW ALL MEDS BY PRESCRIBER/CLIN PHARMACIST DOCUMENTED: ICD-10-PCS | Mod: CPTII,,, | Performed by: PHYSICIAN ASSISTANT

## 2022-10-20 PROCEDURE — 99999 PR PBB SHADOW E&M-EST. PATIENT-LVL V: ICD-10-PCS | Mod: PBBFAC,,, | Performed by: PHYSICIAN ASSISTANT

## 2022-10-20 PROCEDURE — 99215 OFFICE O/P EST HI 40 MIN: CPT | Mod: PBBFAC | Performed by: PHYSICIAN ASSISTANT

## 2022-10-20 PROCEDURE — 99214 PR OFFICE/OUTPT VISIT, EST, LEVL IV, 30-39 MIN: ICD-10-PCS | Mod: S$PBB,,, | Performed by: PHYSICIAN ASSISTANT

## 2022-10-20 PROCEDURE — 3008F PR BODY MASS INDEX (BMI) DOCUMENTED: ICD-10-PCS | Mod: CPTII,,, | Performed by: PHYSICIAN ASSISTANT

## 2022-10-20 PROCEDURE — 3080F DIAST BP >= 90 MM HG: CPT | Mod: CPTII,,, | Performed by: PHYSICIAN ASSISTANT

## 2022-10-20 PROCEDURE — 1160F RVW MEDS BY RX/DR IN RCRD: CPT | Mod: CPTII,,, | Performed by: PHYSICIAN ASSISTANT

## 2022-10-20 PROCEDURE — 3077F PR MOST RECENT SYSTOLIC BLOOD PRESSURE >= 140 MM HG: ICD-10-PCS | Mod: CPTII,,, | Performed by: PHYSICIAN ASSISTANT

## 2022-10-20 PROCEDURE — 1159F MED LIST DOCD IN RCRD: CPT | Mod: CPTII,,, | Performed by: PHYSICIAN ASSISTANT

## 2022-10-20 PROCEDURE — 3077F SYST BP >= 140 MM HG: CPT | Mod: CPTII,,, | Performed by: PHYSICIAN ASSISTANT

## 2022-10-20 PROCEDURE — 3052F PR MOST RECENT HEMOGLOBIN A1C LEVEL 8.0 - < 9.0%: ICD-10-PCS | Mod: CPTII,,, | Performed by: PHYSICIAN ASSISTANT

## 2022-10-20 PROCEDURE — 1159F PR MEDICATION LIST DOCUMENTED IN MEDICAL RECORD: ICD-10-PCS | Mod: CPTII,,, | Performed by: PHYSICIAN ASSISTANT

## 2022-10-20 PROCEDURE — 3052F HG A1C>EQUAL 8.0%<EQUAL 9.0%: CPT | Mod: CPTII,,, | Performed by: PHYSICIAN ASSISTANT

## 2022-10-20 PROCEDURE — 99214 OFFICE O/P EST MOD 30 MIN: CPT | Mod: S$PBB,,, | Performed by: PHYSICIAN ASSISTANT

## 2022-10-20 PROCEDURE — 3066F NEPHROPATHY DOC TX: CPT | Mod: CPTII,,, | Performed by: PHYSICIAN ASSISTANT

## 2022-10-20 PROCEDURE — 4010F ACE/ARB THERAPY RXD/TAKEN: CPT | Mod: CPTII,,, | Performed by: PHYSICIAN ASSISTANT

## 2022-10-20 PROCEDURE — 99999 PR PBB SHADOW E&M-EST. PATIENT-LVL V: CPT | Mod: PBBFAC,,, | Performed by: PHYSICIAN ASSISTANT

## 2022-10-20 PROCEDURE — 4010F PR ACE/ARB THEARPY RXD/TAKEN: ICD-10-PCS | Mod: CPTII,,, | Performed by: PHYSICIAN ASSISTANT

## 2022-10-20 PROCEDURE — 3066F PR DOCUMENTATION OF TREATMENT FOR NEPHROPATHY: ICD-10-PCS | Mod: CPTII,,, | Performed by: PHYSICIAN ASSISTANT

## 2022-10-20 PROCEDURE — 3080F PR MOST RECENT DIASTOLIC BLOOD PRESSURE >= 90 MM HG: ICD-10-PCS | Mod: CPTII,,, | Performed by: PHYSICIAN ASSISTANT

## 2022-10-20 NOTE — PROGRESS NOTES
Chief Pain Complaint:  Lumbar Back Pain      History of Present Illness:     Interval History (10/20/2022):  Nathan Carlos presents today for follow-up visit.  Patient was last seen on 8/18/2022. Last injection Left L4/5 +L5/S1 TF BRUNILDA on 07/14/2021. Patient reports pain as 8/10 today. Reports she has had several episodes during which her left leg gave out. She also reports pain in her lower lumbar spine in a band-like distribution with radiation into her lower extremities left > right. Pain interferes with daily activities.      Interval History (8/18/2022): Nathan Carlos presents today for follow-up visit.  she underwent Left L4/5 +L5/S1 TF BRUNILDA on 7/14/22.  The patient reports that she is/was better following the procedure.  she reports 40% pain relief.  The changes lasted 4 weeks so far.  The changes have continued through this visit.  Patient reports pain as 6/10 today, 10/10 on her worst days. Reports she is still experiencing pain but is able to stand for somewhat longer periods of time. Continues to report pain in left buttock down posterior thigh, but does admit she is now having pain in the right lumbar region now as well. She reports she takes tizanidine three times daily on bad days, and this seems to help.      Interval History (6/2/2022): Nathan Carlos presents today for follow-up visit.  she underwent bilateral SIJ + GTB injection on 5/5/22.  The patient reports that she is/was better following the procedure.  she reports 60% pain relief.  The changes lasted 4 weeks so far.  The changes have continued through this visit.  Reports back and buttock pain improved, but reports continued pain along her left leg. Reports pain along anterior, lateral, and posterior aspect of left leg, radiating down to foot at times. Patient reports pain as 6/10 today. Reports that previous epidural did not offer much relief.    IMPRESSION  1. ABNORMAL study  2. There is electrodiagnostic evidence of an  acute on chronic radiculopathy of the LEFT S1 nerve root and a subacute on chronic radiculopathy of the LEFT L5 nerve root     Interval HPI 04/14/2022  Nathan Carlos is a 31 y.o. female  who is presenting with a chief complaint of Lumbar Back Pain. The patient began experiencing this problem insidiously, and the pain has been gradually worsening over the past 3 month(s). The pain is described as throbbing, cramping, burning and electrical and is located in the left lumbar spine . Pain is intermittent and lasts hours. The pain radiates to  left leg. The patient rates her pain a 8 out of ten and interferes with activities of daily living a 8 out of ten. Pain is exacerbated by flexion of the lumbar spine, and is improved by rest. Patient reports no prior trauma, no prior spinal surgery     Interval HPI 03/31/2022  Nathan Carlos is a 31 y.o. female  who is presenting with a chief complaint of Low-back Pain. The patient began experiencing this problem insidiously, and the pain has been gradually worsening. The pain is described as throbbing, cramping, aching and heavy and is located in the bilateral lumbar spine . Pain is intermittent and lasts hours. The  pain is nonradiating. The patient rates her pain a 8 out of ten and interferes with activities of daily living a 7 out of ten. Pain is exacerbated by extension of the lumbar spine, and is improved by rest. Patient reports no prior trauma, no prior spinal surgery     - pertinent negatives: No fever, No chills, No weight loss, No bladder dysfunction, No bowel dysfunction, No saddle anesthesia  - pertinent positives: none    - medications, other therapies tried (physical therapy, injections):     >> NSAIDs, Tylenol, Norco, zanaflex and flexeril (no relief), topamax (no relief)    >> Has previously undergone Physical Therapy    >> Has previously undergone spinal injection/s   - Left SI joint injection 1/2018 with 100% relief for 6 months   - Right L3, L4,  L5 MBB with local on 8/15/18 with 90% pain relief   - left SIJ + left GT bursa injection with local on 11/8/18 with good relief of bursitis but only 10% of SIJ pain   - left L3-5 MBB with local on 5/16/19 with 90% pain relief   - bilateral L3-5 MBB on 8/14/19 with limited relief   - right SIJ injection on 2/5/20 with 80% pain relief   - bilateral L3-5 MBB on 7/16/2020 with 100% relief of lumbar back pain   - right L3-5 RFA on 12/08/2020 with 100% pain relief   - left L3-5 RFA on 1/21/21 with at least 50% pain relief   - left SIJ + left GT bursa injection on 6/17/21 with at least 40% pain relief    - L5-S1, S1 TFESI on 3/8/22 with 60% Pain relief    -bilateral SIJ + GTB injection on 5/5/22 with 60% relief    Imaging / Labs / Studies (reviewed on 10/20/2022):  MRI lumbar spine 12/16/2021    FINDINGS:  Detail limited by poor signal noise ratio.  Prominent edema within the subcutaneous soft tissues of the lower back.  Stable anatomic alignment, unchanged since 11/22/2017.  The vertebral body heights are maintained.  Mild disc desiccation at L4-L5 is noted.  No significant disc space narrowing.  The distal tip of the conus medullaris terminates near the L2 level.  There are no significant areas of disc bulge or protrusion.  Minimal grade 1 retrolisthesis of L4 on L5 is noted following the administration of intravenous contrast, no abnormal areas of enhancement are noted.     L1-L2: No spinal canal or neural foraminal encroachment.     L2-L3: No spinal canal or neural foraminal encroachment.     L3-L4: No spinal canal or neural foraminal encroachment.     L4-L5: Mild facet arthropathy.  No spinal canal or neural foraminal encroachment.     L5-S1: Mild bilateral facet arthropathy.  No spinal canal or neural foraminal encroachment.     Impression:     Mild lower lumbar spine degenerative changes as above.    Results for orders placed during the hospital encounter of 11/22/17   MRI Lumbar Spine Without Contrast    Narrative  Technique: Standard noncontrast multiplanar multisequence imaging of the lumbar spine was performed.  Findings: There is anatomic spinal alignment.  Disc interspaces are of normal height and signal intensity.  Vertebral marrow signal pattern and architecture are normal.  Distal cord is unremarkable with conus medullaris terminating at L1-L2.  Paravertebral soft tissues are symmetric and normal in appearance.  T12-L1: No disc protrusion, neuroforaminal narrowing, or central canal stenosis.  L1-L2: No disc protrusion, neuroforaminal narrowing, or central canal stenosis.  L2-L3: No disc protrusion, neuroforaminal narrowing, or central canal stenosis.  L3-L4: No disc protrusion, neuroforaminal narrowing, or central canal stenosis.  L4-L5: No disc protrusion, neuroforaminal narrowing, or central canal stenosis.  L5-S1: Mild bilateral facet hypertrophy. No disc protrusion, neuroforaminal narrowing, or central canal stenosis.    Impression  Negative MRI of the lumbar spine.     Results for orders placed during the hospital encounter of 01/04/18   X-Ray Lumbar Complete With Flex And Ext    Narrative Comparison: None  Technique: AP, lateral, lateral flexion, lateral extension, bilateral oblique, and lumbosacral coned down views were obtained of the lumbar spine  Findings: There is mild scoliosis of the lower thoracic and upper lumbar spine.  Vertebral body heights are well-maintained.  No spondylolisthesis demonstrated.  No change in spinal alignment with flexion or extension to suggest instability. Intervertebral disk spaces are well preserved.  No pars defects visualized.  Posterior elements appear grossly intact. No acute fractures or subluxations are demonstrated.  The remaining visualized osseous and soft tissue structures demonstrate no appreciable abnormality.    Impression As above.  No acute findings.         Review of Systems:  CONSTITUTIONAL: patient denies any fever, chills, or weight loss  SKIN: patient denies  any rash or itching  RESPIRATORY: patient denies having any shortness of breath  GASTROINTESTINAL: patient denies having any diarrhea, constipation, or bowel incontinence  GENITOURINARY: patient denies having any abnormal bladder function    MUSCULOSKELETAL:  - patient complains of the above noted pain/s (see chief pain complaint)    NEUROLOGICAL:   - pain as above  - strength in Lower extremities is intact, BILATERALLY  - sensation in Lower extremities is intact, BILATERALLY  - patient denies any loss of bowel or bladder control      PSYCHIATRIC: patient denies any change in mood    Other:  All other systems reviewed and are negative        Physical Exam:  Telemedicine Exam  There were no vitals filed for this visit. There is no height or weight on file to calculate BMI.   (reviewed on 10/20/2022)    GENERAL: Well appearing, in no acute distress, alert and oriented x3.  obese  PSYCH:  Mood and affect appropriate.  SKIN: Skin color, texture, turgor normal, no rashes or lesions.  HEAD/FACE:  Normocephalic, atraumatic. Cranial nerves grossly intact.  PULM:  No difficulty breathing  Neuro/MSK:  BACK: Palpation over the lumbar paraspinous muscles causes some pain. Some pain with flexion, extension, or lateral flexion - L>R.    EXTREMITIES: Peripheral joint active ROM is full and pain free without obvious instability or laxity in all four extremities. No deformities, edema, or skin discoloration.   MUSCULOSKELETAL: No atrophy or tone abnormalities are noted.  NEURO:  Able to toe walk and heel walk without difficulty  GAIT: normal.        Physical Exam: last in clinic visit:  General: alert and oriented, in no apparent distress, obese.  Gait: normal gait.  Skin: no rashes, no discoloration, no obvious lesions  HEENT: normocephalic, atraumatic. Pupils equal and round.  Cardiovascular: no significant peripheral edema present.  Respiratory: without use of accessory muscles of respiration.    Musculoskeletal - Lumbar  Spine:  - Pain on flexion of lumbar spine: Present   - Pain on extension of lumbar spine: Present  - Lumbar facet loading: Present bilaterally  - TTP over the lumbar facet joints: Present on left at L5-S1   - TTP over the para lumbar muscles on left   - TTP over the SI joints: Present bilaterally  - TTP over GT bursa: Present bilaterally  - TTP over piriformis: absent  - Straight Leg Raise: equivocal on right, positive on left  - JULISSA: Positive bilaterally    Neuro - Extremities:  - BUE Strength:R/L: D: 5/5; B: 5/5; T: 5/5; WF: 5/5; WE: 5/5; IO: 5/5  - BLE Strength: R/L: HF: 5/5, HE: 5/5, KF: 5/5; KE: 5/4; FE: 5/5; FF: 5/5  - Extremity Reflexes: Brisk and symmetric throughout  - Sensory: Sensation to light touch intact bilaterally      Psych:  Mood and affect is appropriate          Assessment:  Nathan Carlos is a 31 y.o. year old female who is presenting with   No diagnosis found.      Plan:  1. Interventional: Schedule bilateral SIJ + GT bursa injection to target sacroiliitis and bursitis followed 2 weeks later by L5/S1 IL BRUNILDA to target radicular symptoms    -s/p Left L4/5 +L5/S1 TF BRUNILDA with 40% relief  -s/p bilateral SI and GTB with 60% relief  - S/p  L5-S1, S1 TFESI on 3/8/22 with 60% Pain relief.   - S/p left SIJ + left GT bursa injection on 6/17/21 with 40% pain relief.   - S/p left L3-5 RFA on 1/21/21 with at least 50% pain relief.   - S/p right L3-5 RFA on 12/08/2020 with 100% pain relief.   - S/p bilateral L3-5 MBB on 7/16/2020 with 100% relief of lumbar back pain.  - S/p Right SIJ injection on 2/5/20 with 80% pain relief.     2. Pharmacologic:   - Continue Gabapentin 300 mg Po QHs with up titration.   - Continue diclofenac 1% gel to apply 2g topically up to 4 times per day.  - Continue lidocaine 2.5%-prilocaine 2.5% topical cream Q6H PRN topically; can alternate with Voltaren gel - for GTB pain.    - Continue Tizanidne 4 mg TID PRN.  She can take up to QID PRN while pain Increased.  -  Anticoagulation use: None.     3. Rehabilitative: Encouraged regular exercise. Continue exercises and activities as tolerated at home. Consider restarting physical therapy since this helped in the past.     4. Diagnostic: Updated lumbar MRI reviewed. Lower ext EMG shows electrodiagnostic evidence of an acute on chronic radiculopathy of the LEFT S1 nerve root and a subacute on chronic radiculopathy of the LEFT L5 nerve root.     5. Follow up: 4 weeks after injection    Darlin Iverson PA-C  Interventional Pain Medicine

## 2022-10-21 PROBLEM — R53.1 DECREASED RANGE OF MOTION WITH DECREASED STRENGTH: Status: ACTIVE | Noted: 2022-10-21

## 2022-10-21 PROBLEM — M72.2 PLANTAR FASCIITIS, BILATERAL: Status: ACTIVE | Noted: 2022-10-21

## 2022-10-21 PROBLEM — M25.60 DECREASED RANGE OF MOTION WITH DECREASED STRENGTH: Status: ACTIVE | Noted: 2022-10-21

## 2022-10-21 NOTE — PLAN OF CARE
[facility]   Physical Therapy Initial Evaluation     Date: 10/18/2022     Name: Nathan Carlos  Clinic Number: [id number]    Therapy Diagnosis:   Encounter Diagnoses   Name Primary?    Plantar fasciitis of left foot     Plantar fasciitis, bilateral     Decreased range of motion with decreased strength      Physician: Sandy Cohn DPM   Physician Orders: PT Eval and Treat  Medical Diagnosis from Referral: Plantar Fasciitis   Evaluation Date: 10/18/2022  Authorization Period Expiration: 12/31/22  Plan of Care Expiration: 12/18/2022    Progress Update: 11/18/2022    Visit # / Visits authorized: 1 / 1     FOTO: Visit #1 - Scored : 1 / 3     PRECAUTIONS: Standard Precautions     MD Visit: 01/09/2023    Time In: 1045  Time Out: 1130  Total Appointment Time (timed & untimed codes): 30 minutes    SUBJECTIVE     Date of onset:     History of current condition - Nathan is a 31 y.o. female whom reports plantar fasciitis to the left foot.  States attempted to utilize orthotics and performing stretching exercises.  States some mild to minimal improvement.  Continues to state post static dyskinesia with intermittent 10/10 pain.  Does admit that she is walking barefoot or with socks at home.  States walking and standing causes and/or exacerbates the symptoms. Patient has had 1 corticosteroid injection(s) prio.      Imaging: No updated imaging for this episode of care:     Prior Therapy: N/A  Social History: Pt lives with their family  Living Environment: House   ADLs unable to complete: stairs and walking in community   Gym/Home Equipment: yes   Occupation: Pt is unemployed   Prior Level of Function: Independent with all activities of daily living  Current Level of Function: 75% of prior level of function     Pain:  Current 5 /10, worst 8 /10, best 1 /10   Location: left> right foot   Description: Aching, Throbbing, Grabbing, and Tight  Aggravating Factors: walking   Easing Factors: rest  _______________________________________________________    Medical History:   Past Medical History:   Diagnosis Date    Carpal tunnel syndrome     Diabetes mellitus     GERD (gastroesophageal reflux disease)     Herpes simplex virus (HSV) infection     High serum testosterone 9/7/2017    Hyperlipidemia     Hypertension     Insulin resistance syndrome 9/7/2017    Lumbar facet arthropathy 8/14/2019    Morbid obesity     Ovarian cyst     bilateral        Surgical History:   Nathan Carlos  has a past surgical history that includes Bunionectomy (Bilateral); Ankle surgery (Left); Colonoscopy (06/12/2018); Lumbar fusion; Injection of anesthetic agent around medial branch nerves innervating lumbar facet joint (Bilateral, 08/14/2019); Injection of anesthetic agent into sacroiliac joint (Right, 02/05/2020); Injection of anesthetic agent around medial branch nerves innervating lumbar facet joint (Bilateral, 07/16/2020); Radiofrequency thermocoagulation (Right, 12/08/2020); Radiofrequency thermocoagulation (Left, 01/21/2021); Carpal tunnel release (Left, 03/19/2021); Injection of joint (Left, 06/17/2021); Injection of anesthetic agent into sacroiliac joint (Left, 06/17/2021); Carpal tunnel release (Right, 11/04/2021); Esophagogastroduodenoscopy; Esophagogastroduodenoscopy (N/A, 12/02/2021); Selective injection of anesthetic agent around lumbar spinal nerve root by transforaminal approach (Left, 03/08/2022); Injection of anesthetic agent into sacroiliac joint (Bilateral, 05/05/2022); and Transforaminal epidural injection of steroid (Left, 07/14/2022).    Medications:   Nathan has a current medication list which includes the following prescription(s): atorvastatin, blood sugar diagnostic, blood-glucose meter, dexcom g6 , dexcom g6 sensor, dexcom g6 transmitter, clotrimazole-betamethasone 1-0.05%, dicyclomine, ergocalciferol, glimepiride, hydrocodone-acetaminophen, irbesartan, januvia, lancets, levemir flextouch  u-100 insuln, linaclotide, metformin, metoprolol succinate, ondansetron, pen needle, diabetic, ozempic, spironolactone, tizanidine, valacyclovir, and zolpidem.    Allergies:   Review of patient's allergies indicates:   Allergen Reactions    Amitriptyline      xerostomia    Amlodipine      DRY MOUTH    Tramadol Itching        OBJECTIVE   (x = not tested due to pain and/or inability to obtain test position)    RANGE OF MOTION:      Hip   Range of Motion Right   Left   Goal   Hip Flexion (120) x x 120   Hip Abduction (45) Not tested Not tested  30   Hip Extension (20) Not tested  Not tested  15   Hip internal rotaiton  (45) x x 40   Hip external rotation  (45) x x 40    (*) pain and/or dysfunction    Knee AROM/PROM Right   Left   Goal   Hyper - Zero - Flexion      0-0-120 0-0-120 0-0-120  Initial:     (*) pain and/or dysfunction    Ankle/Foot   Range of Motion Right   Left   Goal   Dorsiflexion (20) 0 0 15   Plantarflexion (50) 45 45 40   Great Toe Extension (70) 20 10 60    (*) pain and/or dysfunction     JOINT MOBILITY:     Joint Motion Tested Right   Left    Goal   Patellar Glides: Superior   Normal B   Patellar Glides: Inferior   Normal B   Patellar Glides: Medical   Normal B   Patellar Tilts   Normal B   Talocural   Normal B   Subtalar   Normal B   Forefoot   Normal B   1st ray   Normal B   Sensation:  Sensation is intact to light touch    Palpation: Increased tone and tenderness noted with palpation to: heel of left foot     Posture:  Pt presents with postural abnormalities which include: forward head, rounded shoulders, and ankle/foot pronation    Gait Analysis: The patient ambulated with the following assistive device: none; the pt presents with the following gait abnormalities: decreased step length, shanel, and arm swing; increased forward flexed posture, trunk sway     Movement Analysis:   Functional Test  Outcome   Double Leg Squat Knee valgus; increase forward trunk lean    Single Leg Step Down x        FUNCTION:     CMS Impairment/Limitation/Restriction for FOTO knee Survey    Therapist reviewed FOTO scores for Nathan Carlos on 10/18/2022.   FOTO documents entered into [employer] - see Media section.    Limitation Score: 58%         TREATMENT   Total Treatment time separate from Evaluation: (20) minutes     Nathan received the treatments listed below:      THERAPEUTIC EXERCISES: to develop strength, endurance, ROM, flexibility, posture and core stabilization for (10)  minutes including: x = performed today    TherEx Performed    Mobility/Chondral: Bike next Home exercise program review all below                   BOLD = new this visit  Plan for Next Visit: address ankle mobility and calf strength        PATIENT EDUCATION AND HOME EXERCISES     Education/Self-Care provided:  (included in treatment) minutes   Patient educated on the impairments noted above and the effects of physical therapy intervention to improve overall condition and QOL.   Patient was educated on all the above exercise prior/during/after for proper posture, positioning, and execution for safe performance with home exercise program.     Written Home Exercises Provided: yes. Prefers: Electronic Copies  Exercises were reviewed and Nathan was able to demonstrate them prior to the end of the session.  Nathan demonstrated good understanding of the education provided. See electronic medical record under Patient Instructions for exercises provided during therapy sessions.    ASSESSMENT     Nathan is a 31 y.o. female referred to outpatient Physical Therapy with a medical diagnosis of plantar fasciitis. Nathan presents with clinical signs and symptoms that support this diagnosis with decreased foot and ankle range of motion , decreased lower extremity strength, impaired joint mobility of talocural, subtalar, and forefoot joints, impaired balance, and impaired functional mobility.    The above impairments will be addressed through manual  "therapy techniques, therapeutic exercises, functional training, and modalities as necessary. Patient was treated and educated on exercises for home program, progression of therapy, and benefits of therapy to achieve full functional mobility. Patient will benefit from skilled physical therapy to meet short and long term goals and return to prior level of function.    Pt prognosis is Excellent.   Pt will benefit from skilled outpatient Physical Therapy to address the deficits stated above and in the chart below, provide pt/family education, and to maximize pt's level of independence.     Plan of care discussed with patient: Yes  Pt's spiritual, cultural and educational needs considered and patient is agreeable to the plan of care and goals as stated below:     Anticipated Barriers for therapy: sedentary lifestyle and chronicity of condition    Medical Necessity is demonstrated by the following:   History  Co-morbidities and personal factors that may impact the plan of care Co-morbidities:   Past Medical History:   Diagnosis Date    Carpal tunnel syndrome     Diabetes mellitus     GERD (gastroesophageal reflux disease)     Herpes simplex virus (HSV) infection     High serum testosterone 9/7/2017    Hyperlipidemia     Hypertension     Insulin resistance syndrome 9/7/2017    Lumbar facet arthropathy 8/14/2019    Morbid obesity     Ovarian cyst     bilateral        Personal Factors:   no deficits     low   Examination  Body Structures and Functions, activity limitations and participation restrictions that may impact the plan of care Body Regions:   lower extremities    Body Systems:    gross symmetry  ROM  strength  gross coordinated movement  balance  gait  transfers  transitions  motor control  motor learning    Participation Restrictions:   See above in "Current Level of Function"     Activity limitations:   Learning and applying knowledge  no deficits    General Tasks and Commands  no deficits    Communication  no " deficits    Mobility  lifting and carrying objects  walking  moving around using equipment (WC)  using transportation (bus, train, plane, car)  driving (bike, car, motorcycle)    Self care  looking after one's health    Domestic Life  shopping  cooking  doing house work (cleaning house, washing dishes, laundry)  assisting others    Interactions/Relationships  no deficits    Life Areas  no deficits    Community and Social Life  community life  recreation and leisure         low   Clinical Presentation stable and uncomplicated low   Decision Making/ Complexity Score: low       GOALS:  SHORT TERM GOALS: 4 weeks, (11/18/22) Progress   Recent signs and systems trend is improving in order to progress towards long term goals.    Patient will be independent with Home Exercise Program  in order to further progress and return to maximal function.    Pain rating at Worst: 5/10 in order to progress towards increased independence with activity.    Patient will be able to correct postural deviations in sitting and standing, to decrease pain and promote postural awareness for injury prevention.       LONG TERM GOALS: 8 weeks, (12/18/22) Progress   Patient will return to normal activities of daily living, recreational, and work related activities with less pain and limitation.     Patient will improve active range of motion to stated goals in order to return to maximal functional potential.     Patient will improve Strength to stated goals of appropriate musculature in order to improve functional independence.     Pain Rating at Best: 1/10 to improve Quality of Life.     Patient will meet predicted functional outcome (FOTO) score: 41% to increase self-worth & perceived functional ability.    Patient will have met/partially met personal goal of: being able to navigate 30 steps with minimal pain (< 2/10 pain scale)           PLAN   Plan of care Certification: 10/18/2022 to 12/18/2022    Outpatient Physical Therapy 2 times weekly for  8 weeks to include any combination of the following interventions: virtual visits, dry needling, modalities, electrical stimulation (IFC, Pre-Mod, Attended with Functional Dry Needling), Gait Training, Manual Therapy, Moist Heat/ Ice, Neuromuscular Re-ed, Patient Education, Self Care, Therapeutic Exercise, Functional Training, and Therapeutic Activites     Thank you for this referral.    Brando Doyle, PT DPT      I CERTIFY THE NEED FOR THESE SERVICES FURNISHED UNDER THIS PLAN OF TREATMENT AND WHILE UNDER MY CARE   Physician's comments:     Physician's Signature: ___________________________________________________

## 2022-10-27 ENCOUNTER — TELEPHONE (OUTPATIENT)
Dept: HEPATOLOGY | Facility: CLINIC | Age: 32
End: 2022-10-27
Payer: MEDICAID

## 2022-10-27 NOTE — TELEPHONE ENCOUNTER
Spoke with patient and informed her that referral to [facility] was re-faxed and that a fax confirmation was received.     Patient verbalized understanding with no further concerns at this time.

## 2022-10-27 NOTE — TELEPHONE ENCOUNTER
----- Message from Winifred Chen MA sent at 10/27/2022 10:48 AM CDT -----  Contact: [phone] Patient    ----- Message -----  From: Isela Noel  Sent: 10/27/2022  10:38 AM CDT  To: [facility] Clinical Support Staff    Pt is requesting a call back from Dione Bertrand RN regarding the referral to [facility]. Pt states she was advised the referral was not received.

## 2022-10-31 ENCOUNTER — TELEPHONE (OUTPATIENT)
Dept: FAMILY MEDICINE | Facility: CLINIC | Age: 32
End: 2022-10-31
Payer: MEDICAID

## 2022-10-31 NOTE — TELEPHONE ENCOUNTER
----- Message from Judith Redd sent at 10/31/2022  8:43 AM CDT -----  Contact: Patient, [phone]  Calling to speak with Dione because [facility] is stating they still have not received the referral for the Hepatologist. The fax number is [phone]. Please fax again. Thanks.

## 2022-11-01 ENCOUNTER — PATIENT MESSAGE (OUTPATIENT)
Dept: FAMILY MEDICINE | Facility: CLINIC | Age: 32
End: 2022-11-01
Payer: MEDICAID

## 2022-11-01 ENCOUNTER — TELEPHONE (OUTPATIENT)
Dept: FAMILY MEDICINE | Facility: CLINIC | Age: 32
End: 2022-11-01
Payer: MEDICAID

## 2022-11-04 ENCOUNTER — LAB VISIT (OUTPATIENT)
Dept: LAB | Facility: HOSPITAL | Age: 32
End: 2022-11-04
Attending: REGISTERED NURSE
Payer: MEDICAID

## 2022-11-04 ENCOUNTER — CLINICAL SUPPORT (OUTPATIENT)
Dept: REHABILITATION | Facility: HOSPITAL | Age: 32
End: 2022-11-04
Payer: MEDICAID

## 2022-11-04 ENCOUNTER — PATIENT MESSAGE (OUTPATIENT)
Dept: ADMINISTRATIVE | Facility: OTHER | Age: 32
End: 2022-11-04
Payer: MEDICAID

## 2022-11-04 DIAGNOSIS — E55.9 VITAMIN D INSUFFICIENCY: ICD-10-CM

## 2022-11-04 DIAGNOSIS — M25.60 DECREASED RANGE OF MOTION WITH DECREASED STRENGTH: ICD-10-CM

## 2022-11-04 DIAGNOSIS — Z79.4 TYPE 2 DIABETES MELLITUS WITHOUT COMPLICATION, WITH LONG-TERM CURRENT USE OF INSULIN: ICD-10-CM

## 2022-11-04 DIAGNOSIS — R53.1 DECREASED RANGE OF MOTION WITH DECREASED STRENGTH: ICD-10-CM

## 2022-11-04 DIAGNOSIS — M72.2 PLANTAR FASCIITIS, BILATERAL: Primary | ICD-10-CM

## 2022-11-04 DIAGNOSIS — E11.9 TYPE 2 DIABETES MELLITUS WITHOUT COMPLICATION, WITH LONG-TERM CURRENT USE OF INSULIN: ICD-10-CM

## 2022-11-04 LAB
25(OH)D3+25(OH)D2 SERPL-MCNC: 32 NG/ML (ref 30–96)
ESTIMATED AVG GLUCOSE: 146 MG/DL (ref 68–131)
HBA1C MFR BLD: 6.7 % (ref 4–5.6)

## 2022-11-04 PROCEDURE — 83036 HEMOGLOBIN GLYCOSYLATED A1C: CPT | Performed by: REGISTERED NURSE

## 2022-11-04 PROCEDURE — 82306 VITAMIN D 25 HYDROXY: CPT | Performed by: REGISTERED NURSE

## 2022-11-04 PROCEDURE — 36415 COLL VENOUS BLD VENIPUNCTURE: CPT | Performed by: REGISTERED NURSE

## 2022-11-04 PROCEDURE — 97110 THERAPEUTIC EXERCISES: CPT | Mod: CQ

## 2022-11-04 NOTE — PROGRESS NOTES
[facility]   Physical Therapy Treatment Note     Name: Nathan Carlos  Clinic Number: [id number]    Therapy Diagnosis:   Encounter Diagnoses   Name Primary?    Plantar fasciitis, bilateral Yes    Decreased range of motion with decreased strength      Physician: Sandy Cohn DPM    Visit Date: 11/4/2022    Physician Orders: PT Eval and Treat  Medical Diagnosis from Referral: Plantar Fasciitis   Evaluation Date: 10/18/2022  Authorization Period Expiration: 12/31/22  Plan of Care Expiration: 12/18/2022                          Progress Update: 11/18/2022                        Visit # / Visits authorized: 1 /12 (+ evaluation)                       FOTO: Visit #1 - Scored : 1 / 3      PRECAUTIONS: Standard Precautio     PTA Visit #: 1/5     Time In: 0700  Time Out: 0745  Total Billable Time: 40 minutes Billing reflects [place] medicaid guidelines, billing all therapy as therapeutic-exercise   SUBJECTIVE     Patient reports: her pain is not too bad today.    She N/A compliant with home exercise program.    Response to previous treatment: felt okay after evaluation     Functional change: standing tolerance limited to 30 minutes    Pain: 3/10     Location: left plantar surface of foot    OBJECTIVE     Objective Measures updated at progress report only unless specified.       TREATMENT     Nathan received the treatments listed below:     MANUAL THERAPY TECHNIQUES were applied for (7) minutes, including:    Manual Intervention Performed Today    Soft Tissue Mobilization x left gastrocnemius , soleus, and foot intrinsics   Joint Mobilizations []     []     []    Functional Dry Needling  []      Plan for Next Visit: Continue as needed     THERAPEUTIC EXERCISES to develop strength, endurance, ROM, flexibility, posture, and core stabilization for (29) minutes including:    Intervention Performed Today    exercise bike  x 5 minutes   Ankle 4 way active range of motion  x 2 x 20 each    Ankle 4 way with band     Ankle circles x X 20 clockwise / counter clockwise    Ankle ABC x X 1   Wedge stretch soleus/gastrocnemius x 3 x 30 seconds each                    Plan for Next Visit:          NEUROMUSCULAR RE-EDUCATION ACTIVITIES to improve Balance, Coordination, Kinesthetic, Sense, Proprioception, and Posture for (4) minutes.  The following were included:    Intervention Performed Today    Unilateral standing     Heel raises     March in place     Toe yoga x 2 x 1 minute each   Toe flexion x 2 minutes                    Plan for Next Visit:          PATIENT EDUCATION AND HOME EXERCISES     Home Exercises Provided and Patient Education Provided     Education provided: (during session) minutes  PURPOSE: Patient educated on the impairments noted above and the effects of physical therapy intervention to improve overall condition and QOL.   EXERCISE: Patient was educated on all the above exercise prior/during/after for proper posture, positioning, and execution for safe performance with home exercise program.   STRENGTH: Patient educated on the importance of improved core and extremity strength in order to improve alignment of the spine and extremities with static positions and dynamic movement.   POSTURE: Patient educated on postural awareness to reduce stress and maintain optimal alignment of the spine with static positions and dynamic movement     Written Home Exercises Provided: yes.  Exercises were reviewed and Nathan was able to demonstrate them prior to the end of the session.  Krerietricia demonstrated good  understanding of the education provided. See EMR under Patient Instructions for exercises provided during therapy sessions.    ASSESSMENT     Patient was able to perform toe yoga with good quality initially. She ambulated with no limp and demonstrated fair range of motion in all directions. She left this session with no complaints.     Nathan is progressing well towards her goals.   Pt prognosis  is Excellent.     Pt will continue to benefit from skilled outpatient physical therapy to address the deficits listed in the problem list box on initial evaluation, provide pt/family education and to maximize pt's level of independence in the home and community environment.     Pt's spiritual, cultural and educational needs considered and pt agreeable to plan of care and goals.     Anticipated Barriers for therapy: sedentary lifestyle and chronicity of condition       GOALS:  SHORT TERM GOALS: 4 weeks, (11/18/22) Progress   Recent signs and systems trend is improving in order to progress towards long term goals.     Patient will be independent with Home Exercise Program  in order to further progress and return to maximal function.     Pain rating at Worst: 5/10 in order to progress towards increased independence with activity.     Patient will be able to correct postural deviations in sitting and standing, to decrease pain and promote postural awareness for injury prevention.         LONG TERM GOALS: 8 weeks, (12/18/22) Progress   Patient will return to normal activities of daily living, recreational, and work related activities with less pain and limitation.      Patient will improve active range of motion to stated goals in order to return to maximal functional potential.      Patient will improve Strength to stated goals of appropriate musculature in order to improve functional independence.      Pain Rating at Best: 1/10 to improve Quality of Life.      Patient will meet predicted functional outcome (FOTO) score: 41% to increase self-worth & perceived functional ability.     Patient will have met/partially met personal goal of: being able to navigate 30 steps with minimal pain (< 2/10 pain scale)            PLAN     Continue Plan of Care (POC) and progress per patient tolerance. See treatment section for details on planned progressions next session.      Frederick Seals, PTA

## 2022-11-10 NOTE — TELEPHONE ENCOUNTER
Bleeding that does not stop/Swelling that gets worse/Pain not relieved by Medications/Fever greater than (need to indicate Fahrenheit or Celsius)/Wound/Surgical Site with redness, or foul smelling discharge or pus/Numbness, tingling, color or temperature change to extremity/Nausea and vomiting that does not stop Patient can come in on Monday for clearance.     Thanks

## 2022-11-14 ENCOUNTER — PATIENT MESSAGE (OUTPATIENT)
Dept: PAIN MEDICINE | Facility: CLINIC | Age: 32
End: 2022-11-14
Payer: MEDICAID

## 2022-11-14 DIAGNOSIS — M54.16 LUMBAR RADICULOPATHY: ICD-10-CM

## 2022-11-14 NOTE — TELEPHONE ENCOUNTER
Good Morning/Afternoon,     Pt is requesting for a refill of: Tizanidine 4 mg  Last filed: 9/20/22  Last encounter: 10/22/22  Up coming apt: 1/9/22  Pharmacy: [facility] [place] [address] [facility]  Is this something you can do?      Max Carter  Medical Assistant

## 2022-11-15 RX ORDER — TIZANIDINE 4 MG/1
4 TABLET ORAL EVERY 8 HOURS
Qty: 90 TABLET | Refills: 1 | Status: SHIPPED | OUTPATIENT
Start: 2022-11-15 | End: 2023-01-09 | Stop reason: SDUPTHER

## 2022-11-16 ENCOUNTER — TELEPHONE (OUTPATIENT)
Dept: PAIN MEDICINE | Facility: CLINIC | Age: 32
End: 2022-11-16
Payer: MEDICAID

## 2022-11-16 NOTE — TELEPHONE ENCOUNTER
Inform pt that I  just Spoke to Ms.Kiamethia Farfan from the .  She stated that the pt is on a  program and the procedure is covered. Pt understood.    Juan Luis Hammond   Medical Assistant

## 2022-11-17 ENCOUNTER — CLINICAL SUPPORT (OUTPATIENT)
Dept: REHABILITATION | Facility: HOSPITAL | Age: 32
End: 2022-11-17
Payer: MEDICAID

## 2022-11-17 DIAGNOSIS — M25.60 DECREASED RANGE OF MOTION WITH DECREASED STRENGTH: ICD-10-CM

## 2022-11-17 DIAGNOSIS — M72.2 PLANTAR FASCIITIS, BILATERAL: Primary | ICD-10-CM

## 2022-11-17 DIAGNOSIS — R53.1 DECREASED RANGE OF MOTION WITH DECREASED STRENGTH: ICD-10-CM

## 2022-11-17 PROCEDURE — 97110 THERAPEUTIC EXERCISES: CPT

## 2022-11-17 NOTE — PROGRESS NOTES
[facility]   Physical Therapy Treatment Note     Name: Nathan Carlos  Clinic Number: [id number]    Therapy Diagnosis:   Encounter Diagnoses   Name Primary?    Plantar fasciitis, bilateral Yes    Decreased range of motion with decreased strength      Physician: Sandy Cohn DPM  Visit Date: 11/17/2022  Physician Orders: PT Eval and Treat  Medical Diagnosis from Referral: Plantar Fasciitis   Evaluation Date: 10/18/2022  Authorization Period Expiration: 12/31/22  Plan of Care Expiration: 12/18/2022                          Progress Update: 11/18/2022                        Visit # / Visits authorized: 2 /12 (+ evaluation)                       FOTO: Visit #1 - Scored : 1 / 3      PRECAUTIONS: Standard Precautio     PTA Visit #: 0/5     Time In: 0830  Time Out: 0915  Total Billable Time: 40 minutes Billing reflects [place] medicaid guidelines, billing all therapy as therapeutic-exercise   SUBJECTIVE     Patient reports: her pain is not too bad today.    She N/A compliant with home exercise program.    Response to previous treatment: felt okay after evaluation     Functional change: standing tolerance limited to 30 minutes    Pain: 3/10     Location: left plantar surface of foot    OBJECTIVE     Objective Measures updated at progress report only unless specified.     TREATMENT     Nathan received the treatments listed below:     MANUAL THERAPY TECHNIQUES were applied for (15) minutes, including:    Manual Intervention Performed Today    Soft Tissue Mobilization x left gastrocnemius , soleus, and foot intrinsics   Joint Mobilizations [x]     []     []    Functional Dry Needling  []      Plan for Next Visit: Continue as needed     THERAPEUTIC EXERCISES to develop strength, endurance, ROM, flexibility, posture, and core stabilization for (10) minutes including:    Intervention Performed Today    exercise bike  x 5 minutes   Ankle 4 way active range of motion   2 x 20 each    Ankle 4 way with band     Ankle circles  X 20 clockwise / counter clockwise    Ankle ABC x X 1   Wedge stretch soleus/gastrocnemius x 3 x 30 seconds each   Sitting calf raises  x 20x 20lb KB               Plan for Next Visit:          NEUROMUSCULAR RE-EDUCATION ACTIVITIES to improve Balance, Coordination, Kinesthetic, Sense, Proprioception, and Posture for (15) minutes.  The following were included:    Intervention Performed Today    Unilateral standing     Heel raises x    March in place     Toe yoga x 2 x 1 minute each   Toe flexion x 2 minutes   Mob Board (even and Odd)  x 1 min x               Plan for Next Visit:          PATIENT EDUCATION AND HOME EXERCISES     Home Exercises Provided and Patient Education Provided     Education provided: (during session) minutes  PURPOSE: Patient educated on the impairments noted above and the effects of physical therapy intervention to improve overall condition and QOL.   EXERCISE: Patient was educated on all the above exercise prior/during/after for proper posture, positioning, and execution for safe performance with home exercise program.   STRENGTH: Patient educated on the importance of improved core and extremity strength in order to improve alignment of the spine and extremities with static positions and dynamic movement.   POSTURE: Patient educated on postural awareness to reduce stress and maintain optimal alignment of the spine with static positions and dynamic movement     Written Home Exercises Provided: yes.  Exercises were reviewed and Nathan was able to demonstrate them prior to the end of the session.  Nathan demonstrated good  understanding of the education provided. See EMR under Patient Instructions for exercises provided during therapy sessions.    ASSESSMENT     Nathan Carlos tolerated PT session well with moderate  complaints of pain or discomfort, secondary to poor motor coordination and decrease muscle strength. Objective findings are  improving with measurements of ROM and functional mobility.  Therapy exercises were reviewed by revisiting exercises given from previous home exercise program while adding no new exercises.  Handouts were not issued during today's visit. Nathan demonstrated good understanding of new exercises and will continue to progress at home until next follow-up.       Nathan is progressing well towards her goals.   Pt prognosis is Excellent.     Pt will continue to benefit from skilled outpatient physical therapy to address the deficits listed in the problem list box on initial evaluation, provide pt/family education and to maximize pt's level of independence in the home and community environment.     Pt's spiritual, cultural and educational needs considered and pt agreeable to plan of care and goals.     Anticipated Barriers for therapy: sedentary lifestyle and chronicity of condition       GOALS:  SHORT TERM GOALS: 4 weeks, (11/18/22) Progress   Recent signs and systems trend is improving in order to progress towards long term goals.     Patient will be independent with Home Exercise Program  in order to further progress and return to maximal function.     Pain rating at Worst: 5/10 in order to progress towards increased independence with activity.     Patient will be able to correct postural deviations in sitting and standing, to decrease pain and promote postural awareness for injury prevention.         LONG TERM GOALS: 8 weeks, (12/18/22) Progress   Patient will return to normal activities of daily living, recreational, and work related activities with less pain and limitation.      Patient will improve active range of motion to stated goals in order to return to maximal functional potential.      Patient will improve Strength to stated goals of appropriate musculature in order to improve functional independence.      Pain Rating at Best: 1/10 to improve Quality of Life.      Patient will meet predicted functional  outcome (FOTO) score: 41% to increase self-worth & perceived functional ability.     Patient will have met/partially met personal goal of: being able to navigate 30 steps with minimal pain (< 2/10 pain scale)            PLAN     Continue Plan of Care (POC) and progress per patient tolerance. See treatment section for details on planned progressions next session.      Brando Doyle, PT DPT

## 2022-11-18 ENCOUNTER — PATIENT MESSAGE (OUTPATIENT)
Dept: FAMILY MEDICINE | Facility: CLINIC | Age: 32
End: 2022-11-18
Payer: MEDICAID

## 2022-11-18 NOTE — PRE-PROCEDURE INSTRUCTIONS
Attempted to reschedule procedure on 11.25. No answer. LVM with return phone number. No return call at this time.

## 2022-11-20 RX ORDER — ERGOCALCIFEROL 1.25 MG/1
CAPSULE ORAL
OUTPATIENT
Start: 2022-11-20

## 2022-11-20 NOTE — TELEPHONE ENCOUNTER
Rec'd refill request for Vitamin-D; however, denied.  See previous message regarding her instructions -- message linked to her lab result.

## 2022-11-21 ENCOUNTER — TELEPHONE (OUTPATIENT)
Dept: PAIN MEDICINE | Facility: CLINIC | Age: 32
End: 2022-11-21
Payer: MEDICAID

## 2022-11-21 NOTE — PRE-PROCEDURE INSTRUCTIONS
Spoke with patient regarding procedure scheduled on 11.23    Arrival time 1200    Has patient been sick with fever or on antibiotics within the last 7 days? No    Does the patient have any open wounds, sores or rashes? No    Does the patient have any recent fractures? no    Has patient received a vaccination within the last 7 days? No    Received the COVID vaccination? yes    Has the patient stopped all medications as directed? Hold dm meds am of procedure     Does patient have a pacemaker and or defibrillator? no    Does the patient have a ride to and from procedure and someone reliable to remain with patient? Friend johnie    Is the patient diabetic? yes    Does the patient have sleep apnea? Or use O2 at home? No and no     Is the patient receiving sedation? yes    Is the patient instructed to remain NPO beginning at midnight the night before their procedure? yes    Procedure location confirmed with patient? Yes    Covid- Denies signs/symptoms. Instructed to notify PAT/MD if any changes.

## 2022-11-21 NOTE — TELEPHONE ENCOUNTER
----- Message from Lupe Betancourt sent at 11/21/2022  8:57 AM CST -----  States she would like to schedule her procedure on Wednesday. Please call pt [phone]. Thank you

## 2022-11-23 ENCOUNTER — HOSPITAL ENCOUNTER (OUTPATIENT)
Facility: HOSPITAL | Age: 32
Discharge: HOME | End: 2022-11-23
Attending: ANESTHESIOLOGY | Admitting: ANESTHESIOLOGY

## 2022-11-23 VITALS
DIASTOLIC BLOOD PRESSURE: 70 MMHG | OXYGEN SATURATION: 100 % | HEIGHT: 64 IN | BODY MASS INDEX: 41.03 KG/M2 | SYSTOLIC BLOOD PRESSURE: 119 MMHG | RESPIRATION RATE: 16 BRPM | TEMPERATURE: 98 F | WEIGHT: 240.31 LBS | HEART RATE: 89 BPM

## 2022-11-23 DIAGNOSIS — M54.16 LUMBAR RADICULOPATHY: ICD-10-CM

## 2022-11-23 LAB
B-HCG UR QL: NEGATIVE
CTP QC/QA: YES

## 2022-11-23 PROCEDURE — 81025 URINE PREGNANCY TEST: CPT | Performed by: ANESTHESIOLOGY

## 2022-11-23 PROCEDURE — 62323 NJX INTERLAMINAR LMBR/SAC: CPT | Mod: ,,, | Performed by: ANESTHESIOLOGY

## 2022-11-23 PROCEDURE — 25000003 PHARM REV CODE 250: Performed by: ANESTHESIOLOGY

## 2022-11-23 PROCEDURE — 62323 PR INJ LUMBAR/SACRAL, W/IMAGING GUIDANCE: ICD-10-PCS | Mod: ,,, | Performed by: ANESTHESIOLOGY

## 2022-11-23 PROCEDURE — 25500020 PHARM REV CODE 255: Performed by: ANESTHESIOLOGY

## 2022-11-23 PROCEDURE — 62323 NJX INTERLAMINAR LMBR/SAC: CPT | Performed by: ANESTHESIOLOGY

## 2022-11-23 PROCEDURE — 63600175 PHARM REV CODE 636 W HCPCS: Performed by: ANESTHESIOLOGY

## 2022-11-23 RX ORDER — METHYLPREDNISOLONE ACETATE 40 MG/ML
INJECTION, SUSPENSION INTRA-ARTICULAR; INTRALESIONAL; INTRAMUSCULAR; SOFT TISSUE
Status: DISCONTINUED | OUTPATIENT
Start: 2022-11-23 | End: 2022-11-23 | Stop reason: HOSPADM

## 2022-11-23 RX ORDER — FENTANYL CITRATE 50 UG/ML
INJECTION, SOLUTION INTRAMUSCULAR; INTRAVENOUS
Status: DISCONTINUED | OUTPATIENT
Start: 2022-11-23 | End: 2022-11-23 | Stop reason: HOSPADM

## 2022-11-23 RX ORDER — MIDAZOLAM HYDROCHLORIDE 1 MG/ML
INJECTION, SOLUTION INTRAMUSCULAR; INTRAVENOUS
Status: DISCONTINUED | OUTPATIENT
Start: 2022-11-23 | End: 2022-11-23 | Stop reason: HOSPADM

## 2022-11-23 RX ORDER — INDOMETHACIN 25 MG/1
CAPSULE ORAL
Status: DISCONTINUED | OUTPATIENT
Start: 2022-11-23 | End: 2022-11-23 | Stop reason: HOSPADM

## 2022-11-23 RX ORDER — BUPIVACAINE HYDROCHLORIDE 2.5 MG/ML
INJECTION, SOLUTION EPIDURAL; INFILTRATION; INTRACAUDAL
Status: DISCONTINUED | OUTPATIENT
Start: 2022-11-23 | End: 2022-11-23 | Stop reason: HOSPADM

## 2022-11-23 NOTE — DISCHARGE SUMMARY
Discharge Note  Short Stay      SUMMARY     Admit Date: 11/23/2022    Attending Physician: Eula Rojas MD        Discharge Physician: Eual Rojas MD        Discharge Date: 11/23/2022 12:00 PM    Procedure(s) (LRB):  Lumbar L5/S1 IL BRUNILDA RN IV Sedation (N/A)    Final Diagnosis: Lumbar radiculopathy [M54.16]    Disposition: Home or self care    Patient Instructions:   Current Discharge Medication List        CONTINUE these medications which have NOT CHANGED    Details   atorvastatin (LIPITOR) 40 MG tablet TAKE ONE TABLET BY MOUTH EVERY NIGHT  Qty: 90 tablet, Refills: 1    Associated Diagnoses: Type 2 diabetes mellitus without complication, without long-term current use of insulin; Elevated LDL cholesterol level      dicyclomine (BENTYL) 20 mg tablet TAKE ONE TABLET BY MOUTH THREE TIMES A DAY AS NEEDED FOR ABDOMINAL PAIN  Qty: 90 tablet, Refills: 11      ergocalciferol (ERGOCALCIFEROL) 50,000 unit Cap TAKE 1 CAPSULE BY MOUTH ONCE A WEEK FOR 28 DAYS      glimepiride (AMARYL) 4 MG tablet Take 1 tablet (4 mg total) by mouth daily with breakfast.  Qty: 90 tablet, Refills: 1    Associated Diagnoses: Type 2 diabetes mellitus without complication, without long-term current use of insulin      irbesartan (AVAPRO) 150 MG tablet Take 1 tablet (150 mg total) by mouth once daily.  Qty: 90 tablet, Refills: 3    Comments: .  Associated Diagnoses: Essential hypertension; At risk for cardiovascular event; Type 2 diabetes mellitus without complication, with long-term current use of insulin      JANUVIA 100 mg Tab TAKE 1 TABLET BY MOUTH EVERY DAY  Qty: 90 tablet, Refills: 1    Associated Diagnoses: Type 2 diabetes mellitus without complication, without long-term current use of insulin      LEVEMIR FLEXTOUCH U-100 INSULN 100 unit/mL (3 mL) InPn pen INJECT 20 UNITS EVERY EVENING  Qty: 15 mL, Refills: 1    Associated Diagnoses: Uncontrolled type 2 diabetes mellitus with hyperglycemia; Hemoglobin A1C greater than 9%, indicating poor  diabetic control      linaCLOtide (LINZESS) 145 mcg Cap capsule Take 1 capsule (145 mcg total) by mouth before breakfast.  Qty: 90 capsule, Refills: 1    Associated Diagnoses: Constipation, unspecified constipation type      metFORMIN (GLUCOPHAGE-XR) 500 MG ER 24hr tablet Take 2 tablets (1,000 mg total) by mouth 2 (two) times daily with meals.  Qty: 360 tablet, Refills: 1    Associated Diagnoses: Type 2 diabetes mellitus without complication, without long-term current use of insulin      metoprolol succinate (TOPROL-XL) 50 MG 24 hr tablet Take 1 tablet (50 mg total) by mouth once daily.  Qty: 90 tablet, Refills: 0    Comments: .  Associated Diagnoses: Essential hypertension      semaglutide (OZEMPIC) 0.25 mg or 0.5 mg(2 mg/1.5 mL) pen injector Inject 0.5 mg into the skin every 7 days.  Qty: 3 pen, Refills: 1    Associated Diagnoses: Hemoglobin A1C greater than 9%, indicating poor diabetic control; Uncontrolled type 2 diabetes mellitus with hyperglycemia      spironolactone (ALDACTONE) 25 MG tablet Take 2 tablets by mouth once daily  Qty: 180 tablet, Refills: 3    Associated Diagnoses: Essential hypertension      tiZANidine (ZANAFLEX) 4 MG tablet Take 1 tablet (4 mg total) by mouth every 8 (eight) hours.  Qty: 90 tablet, Refills: 1    Associated Diagnoses: Lumbar radiculopathy      valACYclovir (VALTREX) 500 MG tablet Take 1 tablet (500 mg total) by mouth 2 (two) times daily. for three days  Qty: 6 tablet, Refills: 6    Associated Diagnoses: Medication refill      zolpidem (AMBIEN) 5 MG Tab Take 1 tablet (5 mg total) by mouth nightly as needed (for sleep).  Qty: 30 tablet, Refills: 5    Associated Diagnoses: Chronic insomnia      blood sugar diagnostic Strp To check BG 2 times daily, to use with insurance preferred meter  Qty: 100 strip, Refills: 2    Associated Diagnoses: Type 2 diabetes mellitus without complication, without long-term current use of insulin      blood-glucose meter kit To check BG 2 times daily, to  "use with insurance preferred meter  Qty: 1 each, Refills: 0    Associated Diagnoses: Type 2 diabetes mellitus without complication, without long-term current use of insulin      blood-glucose meter,continuous (DEXCOM G6 ) Misc Use as directed,  to be changed every 365 days.  Qty: 1 each, Refills: 0      blood-glucose sensor (DEXCOM G6 SENSOR) Selin Use as directed, change sensor every 10 days  Qty: 9 each, Refills: 1      blood-glucose transmitter (DEXCOM G6 TRANSMITTER) Selin Use as directed, change transmitter every 3 months  Qty: 1 each, Refills: 1      clotrimazole-betamethasone 1-0.05% (LOTRISONE) cream Apply topically 2 (two) times daily.  Qty: 45 g, Refills: 2    Associated Diagnoses: Intertrigo      HYDROcodone-acetaminophen (NORCO) 5-325 mg per tablet Take 1 tablet by mouth every 6 (six) hours as needed for Pain.  Qty: 10 tablet, Refills: 0    Comments: Quantity prescribed more than 7 day supply? No  Associated Diagnoses: Pain      lancets Misc To check BG 2 times daily, to use with insurance preferred meter  Qty: 100 each, Refills: 2    Associated Diagnoses: Type 2 diabetes mellitus without complication, without long-term current use of insulin      ondansetron (ZOFRAN-ODT) 4 MG TbDL DISSOLVE 1-2 TABLETS BY MOUTH EVERY SIX TO EIGHT HOURS AS NEEDED  Qty: 30 tablet, Refills: 0    Associated Diagnoses: Nausea      pen needle, diabetic (BD ULTRA-FINE GAYLA PEN NEEDLE) 32 gauge x 5/32" Ndle 1 Units by Misc.(Non-Drug; Combo Route) route once daily.  Qty: 100 each, Refills: 6    Associated Diagnoses: Uncontrolled type 2 diabetes mellitus with hyperglycemia                 Discharge Diagnosis: Lumbar radiculopathy [M54.16]  Condition on Discharge: Stable with no complications to procedure   Diet on Discharge: Same as before.  Activity: as per instruction sheet.  Discharge to: Home with a responsible adult.  Follow up: 2-4 weeks       Please call the office at [phone] if you experience any " weakness or loss of sensation, fever > 101.5, pain uncontrolled with oral medications, persistent nausea/vomiting/or diarrhea, redness or drainage from the incisions, or any other worrisome concerns. If physician on call was not reached or could not communicate with our office for any reason please go to the nearest emergency department

## 2022-11-23 NOTE — H&P
HPI  Patient presenting for Procedure(s) (LRB):  Lumbar L5/S1 IL BRUNILDA RN IV Sedation (N/A)     Patient on Anti-coagulation No    No health changes since previous encounter    Past Medical History:   Diagnosis Date    Carpal tunnel syndrome     Diabetes mellitus     GERD (gastroesophageal reflux disease)     Herpes simplex virus (HSV) infection     High serum testosterone 9/7/2017    Hyperlipidemia     Hypertension     Insulin resistance syndrome 9/7/2017    Lumbar facet arthropathy 8/14/2019    Morbid obesity     Ovarian cyst     bilateral      Past Surgical History:   Procedure Laterality Date    ANKLE SURGERY Left     BUNIONECTOMY Bilateral     CARPAL TUNNEL RELEASE Left 03/19/2021    Procedure: RELEASE, CARPAL TUNNEL;  Surgeon: Juan F Bernard MD;  Location: [facility] OR;  Service: Orthopedics;  Laterality: Left;    CARPAL TUNNEL RELEASE Right 11/04/2021    Procedure: RELEASE, CARPAL TUNNEL;  Surgeon: Juan F Bernard MD;  Location: [facility] OR;  Service: Orthopedics;  Laterality: Right;    COLONOSCOPY  06/12/2018    ESOPHAGOGASTRODUODENOSCOPY      ESOPHAGOGASTRODUODENOSCOPY N/A 12/02/2021    Procedure: EGD (ESOPHAGOGASTRODUODENOSCOPY);  Surgeon: Pili Eagle MD;  Location: [facility];  Service: Endoscopy;  Laterality: N/A;    INJECTION OF ANESTHETIC AGENT AROUND MEDIAL BRANCH NERVES INNERVATING LUMBAR FACET JOINT Bilateral 08/14/2019    Procedure: Bilateral L3-5 MBB;  Surgeon: Yo Kirkland MD;  Location: [facility];  Service: Pain Management;  Laterality: Bilateral;    INJECTION OF ANESTHETIC AGENT AROUND MEDIAL BRANCH NERVES INNERVATING LUMBAR FACET JOINT Bilateral 07/16/2020    Procedure: Bilateral L3-5 MBB;  Surgeon: Yo Kirkland MD;  Location: [facility];  Service: Pain Management;  Laterality: Bilateral;    INJECTION OF ANESTHETIC AGENT INTO SACROILIAC JOINT Right 02/05/2020    Procedure: Right SIJ Injection with local;  Surgeon: Yo Kirkland MD;  Location: [facility];  Service: Pain  Management;  Laterality: Right;    INJECTION OF ANESTHETIC AGENT INTO SACROILIAC JOINT Left 06/17/2021    Procedure: Left GT bursa + left SIJ injection;  Surgeon: Yo Kirkland MD;  Location: [facility];  Service: Pain Management;  Laterality: Left;    INJECTION OF ANESTHETIC AGENT INTO SACROILIAC JOINT Bilateral 05/05/2022    Procedure: Bilateral BLOCK, SACROILIAC JOINT and Bilateral GTB RN IV sedation;  Surgeon: Yo Kirkland MD;  Location: [facility];  Service: Pain Management;  Laterality: Bilateral;    INJECTION OF JOINT Left 06/17/2021    Procedure: Left GT bursa + left SIJ injection;  Surgeon: Yo Kirkland MD;  Location: [facility];  Service: Pain Management;  Laterality: Left;    LUMBAR FUSION      RADIOFREQUENCY THERMOCOAGULATION Right 12/08/2020    Procedure: RADIOFREQUENCY THERMAL COAGULATION Right L3, 4,5 MBB RFA with RN IV sedation// NO STEROID;  Surgeon: Yo Kirkland MD;  Location: [facility];  Service: Pain Management;  Laterality: Right;    RADIOFREQUENCY THERMOCOAGULATION Left 01/21/2021    Procedure: Left L3-5 Lumbar RFA;  Surgeon: Rian Chaney MD;  Location: [facility];  Service: Pain Management;  Laterality: Left;    SELECTIVE INJECTION OF ANESTHETIC AGENT AROUND LUMBAR SPINAL NERVE ROOT BY TRANSFORAMINAL APPROACH Left 03/08/2022    Procedure: BLOCK, SPINAL NERVE ROOT, LUMBAR, SELECTIVE, TRANSFORAMINAL APPROACH Left L5-S1, S1 RN IV sedation;  Surgeon: Yo Kirkland MD;  Location: [facility];  Service: Pain Management;  Laterality: Left;    TRANSFORAMINAL EPIDURAL INJECTION OF STEROID Left 07/14/2022    Procedure: left L4/5 + L5/S1 TF BRUNILDA RN IV Sedation;  Surgeon: Eula Rojas MD;  Location: [facility];  Service: Pain Management;  Laterality: Left;     Review of patient's allergies indicates:   Allergen Reactions    Amitriptyline      xerostomia    Amlodipine      DRY MOUTH    Tramadol Itching        No current facility-administered medications on file prior to encounter.      Current Outpatient Medications on File Prior to Encounter   Medication Sig Dispense Refill    blood sugar diagnostic Strp To check BG 2 times daily, to use with insurance preferred meter 100 strip 2    blood-glucose meter kit To check BG 2 times daily, to use with insurance preferred meter 1 each 0    blood-glucose meter,continuous (DEXCOM G6 ) Misc Use as directed,  to be changed every 365 days. 1 each 0    blood-glucose sensor (DEXCOM G6 SENSOR) Selin Use as directed, change sensor every 10 days 9 each 1    blood-glucose transmitter (DEXCOM G6 TRANSMITTER) Selin Use as directed, change transmitter every 3 months 1 each 1    clotrimazole-betamethasone 1-0.05% (LOTRISONE) cream Apply topically 2 (two) times daily. 45 g 2    dicyclomine (BENTYL) 20 mg tablet TAKE ONE TABLET BY MOUTH THREE TIMES A DAY AS NEEDED FOR ABDOMINAL PAIN 90 tablet 11    ergocalciferol (ERGOCALCIFEROL) 50,000 unit Cap TAKE 1 CAPSULE BY MOUTH ONCE A WEEK FOR 28 DAYS      glimepiride (AMARYL) 4 MG tablet Take 1 tablet (4 mg total) by mouth daily with breakfast. 90 tablet 1    HYDROcodone-acetaminophen (NORCO) 5-325 mg per tablet Take 1 tablet by mouth every 6 (six) hours as needed for Pain. 10 tablet 0    irbesartan (AVAPRO) 150 MG tablet Take 1 tablet (150 mg total) by mouth once daily. 90 tablet 3    JANUVIA 100 mg Tab TAKE 1 TABLET BY MOUTH EVERY DAY 90 tablet 1    lancets Misc To check BG 2 times daily, to use with insurance preferred meter 100 each 2    LEVEMIR FLEXTOUCH U-100 INSULN 100 unit/mL (3 mL) InPn pen INJECT 20 UNITS EVERY EVENING 15 mL 1    linaCLOtide (LINZESS) 145 mcg Cap capsule Take 1 capsule (145 mcg total) by mouth before breakfast. 90 capsule 1    metFORMIN (GLUCOPHAGE-XR) 500 MG ER 24hr tablet Take 2 tablets (1,000 mg total) by mouth 2 (two) times daily with meals. 360 tablet 1    metoprolol succinate (TOPROL-XL) 50 MG 24 hr tablet Take 1 tablet (50 mg total) by mouth once daily. 90 tablet 0    pen  "needle, diabetic (BD ULTRA-FINE GAYLA PEN NEEDLE) 32 gauge x 5/32" Ndle 1 Units by Misc.(Non-Drug; Combo Route) route once daily. 100 each 6    semaglutide (OZEMPIC) 0.25 mg or 0.5 mg(2 mg/1.5 mL) pen injector Inject 0.5 mg into the skin every 7 days. 3 pen 1    spironolactone (ALDACTONE) 25 MG tablet Take 2 tablets by mouth once daily 180 tablet 3    valACYclovir (VALTREX) 500 MG tablet Take 1 tablet (500 mg total) by mouth 2 (two) times daily. for three days 6 tablet 6    zolpidem (AMBIEN) 5 MG Tab Take 1 tablet (5 mg total) by mouth nightly as needed (for sleep). 30 tablet 5        PMHx, PSHx, Allergies, Medications reviewed in epic    ROS negative except pain complaints in HPI    OBJECTIVE:    Ht 5' 4" (1.626 m)   Wt 117.8 kg (259 lb 11.2 oz)   BMI 44.58 kg/m²     PHYSICAL EXAMINATION:    GENERAL: Well appearing, in no acute distress, alert and oriented x3.  PSYCH:  Mood and affect appropriate.  SKIN: Skin color, texture, turgor normal, no rashes or lesions which will impact the procedure.  CV: RRR with palpation of the radial artery.  PULM: No evidence of respiratory difficulty, symmetric chest rise. Clear to auscultation.  NEURO: Cranial nerves grossly intact.    Plan:    Proceed with procedure as planned Procedure(s) (LRB):  Lumbar L5/S1 IL BRUNILDA RN IV Sedation (N/A)    Eula Rojas MD  11/23/2022            "

## 2022-11-23 NOTE — H&P (VIEW-ONLY)
HPI  Patient presenting for Procedure(s) (LRB):  Lumbar L5/S1 IL BRUNILDA RN IV Sedation (N/A)     Patient on Anti-coagulation No    No health changes since previous encounter    Past Medical History:   Diagnosis Date    Carpal tunnel syndrome     Diabetes mellitus     GERD (gastroesophageal reflux disease)     Herpes simplex virus (HSV) infection     High serum testosterone 9/7/2017    Hyperlipidemia     Hypertension     Insulin resistance syndrome 9/7/2017    Lumbar facet arthropathy 8/14/2019    Morbid obesity     Ovarian cyst     bilateral      Past Surgical History:   Procedure Laterality Date    ANKLE SURGERY Left     BUNIONECTOMY Bilateral     CARPAL TUNNEL RELEASE Left 03/19/2021    Procedure: RELEASE, CARPAL TUNNEL;  Surgeon: Juan F Bernard MD;  Location: [facility] OR;  Service: Orthopedics;  Laterality: Left;    CARPAL TUNNEL RELEASE Right 11/04/2021    Procedure: RELEASE, CARPAL TUNNEL;  Surgeon: Juan F Bernard MD;  Location: [facility] OR;  Service: Orthopedics;  Laterality: Right;    COLONOSCOPY  06/12/2018    ESOPHAGOGASTRODUODENOSCOPY      ESOPHAGOGASTRODUODENOSCOPY N/A 12/02/2021    Procedure: EGD (ESOPHAGOGASTRODUODENOSCOPY);  Surgeon: Pili Eagle MD;  Location: [facility];  Service: Endoscopy;  Laterality: N/A;    INJECTION OF ANESTHETIC AGENT AROUND MEDIAL BRANCH NERVES INNERVATING LUMBAR FACET JOINT Bilateral 08/14/2019    Procedure: Bilateral L3-5 MBB;  Surgeon: Yo Kirkland MD;  Location: [facility]IN [facility]GT;  Service: Pain Management;  Laterality: Bilateral;    INJECTION OF ANESTHETIC AGENT AROUND MEDIAL BRANCH NERVES INNERVATING LUMBAR FACET JOINT Bilateral 07/16/2020    Procedure: Bilateral L3-5 MBB;  Surgeon: Yo Kirkland MD;  Location: [facility];  Service: Pain Management;  Laterality: Bilateral;    INJECTION OF ANESTHETIC AGENT INTO SACROILIAC JOINT Right 02/05/2020    Procedure: Right SIJ Injection with local;  Surgeon: Yo Kirkland MD;  Location: [facility];  Service: Pain  Management;  Laterality: Right;    INJECTION OF ANESTHETIC AGENT INTO SACROILIAC JOINT Left 06/17/2021    Procedure: Left GT bursa + left SIJ injection;  Surgeon: Yo Kirkland MD;  Location: [facility]IN [facility];  Service: Pain Management;  Laterality: Left;    INJECTION OF ANESTHETIC AGENT INTO SACROILIAC JOINT Bilateral 05/05/2022    Procedure: Bilateral BLOCK, SACROILIAC JOINT and Bilateral GTB RN IV sedation;  Surgeon: Yo Kirkland MD;  Location: [facility];  Service: Pain Management;  Laterality: Bilateral;    INJECTION OF JOINT Left 06/17/2021    Procedure: Left GT bursa + left SIJ injection;  Surgeon: Yo Kirkland MD;  Location: [facility];  Service: Pain Management;  Laterality: Left;    LUMBAR FUSION      RADIOFREQUENCY THERMOCOAGULATION Right 12/08/2020    Procedure: RADIOFREQUENCY THERMAL COAGULATION Right L3, 4,5 MBB RFA with RN IV sedation// NO STEROID;  Surgeon: Yo Kirkland MD;  Location: [facility];  Service: Pain Management;  Laterality: Right;    RADIOFREQUENCY THERMOCOAGULATION Left 01/21/2021    Procedure: Left L3-5 Lumbar RFA;  Surgeon: Rian hCaney MD;  Location: [facility];  Service: Pain Management;  Laterality: Left;    SELECTIVE INJECTION OF ANESTHETIC AGENT AROUND LUMBAR SPINAL NERVE ROOT BY TRANSFORAMINAL APPROACH Left 03/08/2022    Procedure: BLOCK, SPINAL NERVE ROOT, LUMBAR, SELECTIVE, TRANSFORAMINAL APPROACH Left L5-S1, S1 RN IV sedation;  Surgeon: Yo Kirkland MD;  Location: [facility];  Service: Pain Management;  Laterality: Left;    TRANSFORAMINAL EPIDURAL INJECTION OF STEROID Left 07/14/2022    Procedure: left L4/5 + L5/S1 TF BRUNILDA RN IV Sedation;  Surgeon: Eula Rojas MD;  Location: [facility];  Service: Pain Management;  Laterality: Left;     Review of patient's allergies indicates:   Allergen Reactions    Amitriptyline      xerostomia    Amlodipine      DRY MOUTH    Tramadol Itching        No current facility-administered medications on file prior to encounter.      Current Outpatient Medications on File Prior to Encounter   Medication Sig Dispense Refill    blood sugar diagnostic Strp To check BG 2 times daily, to use with insurance preferred meter 100 strip 2    blood-glucose meter kit To check BG 2 times daily, to use with insurance preferred meter 1 each 0    blood-glucose meter,continuous (DEXCOM G6 ) Misc Use as directed,  to be changed every 365 days. 1 each 0    blood-glucose sensor (DEXCOM G6 SENSOR) Selin Use as directed, change sensor every 10 days 9 each 1    blood-glucose transmitter (DEXCOM G6 TRANSMITTER) Selin Use as directed, change transmitter every 3 months 1 each 1    clotrimazole-betamethasone 1-0.05% (LOTRISONE) cream Apply topically 2 (two) times daily. 45 g 2    dicyclomine (BENTYL) 20 mg tablet TAKE ONE TABLET BY MOUTH THREE TIMES A DAY AS NEEDED FOR ABDOMINAL PAIN 90 tablet 11    ergocalciferol (ERGOCALCIFEROL) 50,000 unit Cap TAKE 1 CAPSULE BY MOUTH ONCE A WEEK FOR 28 DAYS      glimepiride (AMARYL) 4 MG tablet Take 1 tablet (4 mg total) by mouth daily with breakfast. 90 tablet 1    HYDROcodone-acetaminophen (NORCO) 5-325 mg per tablet Take 1 tablet by mouth every 6 (six) hours as needed for Pain. 10 tablet 0    irbesartan (AVAPRO) 150 MG tablet Take 1 tablet (150 mg total) by mouth once daily. 90 tablet 3    JANUVIA 100 mg Tab TAKE 1 TABLET BY MOUTH EVERY DAY 90 tablet 1    lancets Misc To check BG 2 times daily, to use with insurance preferred meter 100 each 2    LEVEMIR FLEXTOUCH U-100 INSULN 100 unit/mL (3 mL) InPn pen INJECT 20 UNITS EVERY EVENING 15 mL 1    linaCLOtide (LINZESS) 145 mcg Cap capsule Take 1 capsule (145 mcg total) by mouth before breakfast. 90 capsule 1    metFORMIN (GLUCOPHAGE-XR) 500 MG ER 24hr tablet Take 2 tablets (1,000 mg total) by mouth 2 (two) times daily with meals. 360 tablet 1    metoprolol succinate (TOPROL-XL) 50 MG 24 hr tablet Take 1 tablet (50 mg total) by mouth once daily. 90 tablet 0    pen  "needle, diabetic (BD ULTRA-FINE GAYLA PEN NEEDLE) 32 gauge x 5/32" Ndle 1 Units by Misc.(Non-Drug; Combo Route) route once daily. 100 each 6    semaglutide (OZEMPIC) 0.25 mg or 0.5 mg(2 mg/1.5 mL) pen injector Inject 0.5 mg into the skin every 7 days. 3 pen 1    spironolactone (ALDACTONE) 25 MG tablet Take 2 tablets by mouth once daily 180 tablet 3    valACYclovir (VALTREX) 500 MG tablet Take 1 tablet (500 mg total) by mouth 2 (two) times daily. for three days 6 tablet 6    zolpidem (AMBIEN) 5 MG Tab Take 1 tablet (5 mg total) by mouth nightly as needed (for sleep). 30 tablet 5        PMHx, PSHx, Allergies, Medications reviewed in epic    ROS negative except pain complaints in HPI    OBJECTIVE:    Ht 5' 4" (1.626 m)   Wt 117.8 kg (259 lb 11.2 oz)   BMI 44.58 kg/m²     PHYSICAL EXAMINATION:    GENERAL: Well appearing, in no acute distress, alert and oriented x3.  PSYCH:  Mood and affect appropriate.  SKIN: Skin color, texture, turgor normal, no rashes or lesions which will impact the procedure.  CV: RRR with palpation of the radial artery.  PULM: No evidence of respiratory difficulty, symmetric chest rise. Clear to auscultation.  NEURO: Cranial nerves grossly intact.    Plan:    Proceed with procedure as planned Procedure(s) (LRB):  Lumbar L5/S1 IL BRUNILDA RN IV Sedation (N/A)    Eula Rojas MD  11/23/2022            "

## 2022-11-23 NOTE — OP NOTE
Lumbar L4-5 Interlaminar Epidural Steroid Injection under Fluoroscopic Guidance.   Time-out taken to identify patient and procedure prior to starting the procedure.     11/23/2022    PROCEDURE: Interlaminar epidural steroid injection under fluoroscopic guidance.     Pre-Op diagnosis: Lumbar radiculopathy [M54.16]    Post-Op diagnosis: Lumbar radiculopathy [M54.16]    PHYSICIAN: Eula Rojas MD    ASSISTANTS: None     ESTIMATED BLOOD LOSS: none.     COMPLICATIONS: none.     SPECIMENS: none    TECHNIQUE: With the patient laying in a prone position, the area was prepped and draped in the usual sterile fashion using ChloraPrep and a fenestrated drape. 1% lidocaine was given using a 27-gauge needle by raising a wheal and going down to the hub of the needle over the L5/S1 interlaminar space.  The interlaminar space was then approached with a 3.5 inch 18-gauge Touhy needle was introduced under fluoroscopic guidance in the AP and Lateral view. Difficulty entering the space after numerous attempts 2/2 ostreophytes. L4/5 IL BRUNILDA approached.  Once the Ligamentum flavum was encountered loss of resistance to saline was used to enter the epidural space. With positive loss of resistance and negative CSF or Blood, 3mL contrast dye Omnipaque (300mg/ml) was injected to confirm placement and there was no vascular runoff. Then 1ml 80mg/ml Depomedrol + 5 mL 0.25% Bupivicaine was injected slowly. Displacement of the radio opaque contrast after injection of the medication confirmed that the medication went into the area of the epidural space.  The patient tolerated the procedure well.     Conscious sedation provided by M.D    The patient was monitored with continuous pulse oximetry, EKG, and intermittent blood pressure monitors.  The patient was hemodynamically stable throughout the entire process was responsive to voice, and breathing spontaneously.  Supplemental O2 was provided at 2L/min via nasal cannula.  Patient was comfortable for  the duration of the procedure. (See nurse documentation and case log for sedation time)    There was a total of 2mg IV Midazolam and 100mcg Fentanyl titrated for the procedure      The patient was monitored after the procedure.   They were given post-procedure and discharge instructions to follow at home.  The patient was discharged in a stable condition.

## 2022-11-23 NOTE — DISCHARGE INSTRUCTIONS

## 2022-11-28 LAB — POCT GLUCOSE: 102 MG/DL (ref 70–110)

## 2022-11-29 ENCOUNTER — DOCUMENTATION ONLY (OUTPATIENT)
Dept: REHABILITATION | Facility: HOSPITAL | Age: 32
End: 2022-11-29
Payer: MEDICAID

## 2022-11-29 NOTE — PROGRESS NOTES
PT/PTA met face to face to discuss pt's treatment plan and progress towards established goals. Pt will be seen by a physical therapist minimally every 6th visit or every 30 days.        Frederick Seals PTA

## 2022-12-02 NOTE — PRE-PROCEDURE INSTRUCTIONS
Attempted to PAT patient for procedure on 12.9 with Dr. cardenas. No answer. LVM with return phone number. No return call at this time.

## 2022-12-02 NOTE — PRE-PROCEDURE INSTRUCTIONS
Spoke with patient regarding procedure scheduled on 12.9    Arrival time 0630    Has patient been sick with fever or on antibiotics within the last 7 days? No    Does the patient have any open wounds, sores or rashes? No    Does the patient have any recent fractures? no    Has patient received a vaccination within the last 7 days? No    Received the COVID vaccination? yes    Has the patient stopped all medications as directed? Hold dm meds am of procedure     Does patient have a pacemaker and or defibrillator? no    Does the patient have a ride to and from procedure and someone reliable to remain with patient?      Is the patient diabetic? yes    Does the patient have sleep apnea? Or use O2 at home? No and no     Is the patient receiving sedation? yes    Is the patient instructed to remain NPO beginning at midnight the night before their procedure? yes    Procedure location confirmed with patient? Yes    Covid- Denies signs/symptoms. Instructed to notify PAT/MD if any changes.

## 2022-12-08 ENCOUNTER — PATIENT OUTREACH (OUTPATIENT)
Dept: ADMINISTRATIVE | Facility: HOSPITAL | Age: 32
End: 2022-12-08
Payer: MEDICAID

## 2022-12-09 ENCOUNTER — HOSPITAL ENCOUNTER (OUTPATIENT)
Facility: HOSPITAL | Age: 32
Discharge: HOME | End: 2022-12-09
Attending: ANESTHESIOLOGY | Admitting: ANESTHESIOLOGY
Payer: MEDICAID

## 2022-12-09 VITALS
HEIGHT: 64 IN | WEIGHT: 246.38 LBS | RESPIRATION RATE: 18 BRPM | SYSTOLIC BLOOD PRESSURE: 99 MMHG | BODY MASS INDEX: 42.06 KG/M2 | HEART RATE: 77 BPM | DIASTOLIC BLOOD PRESSURE: 64 MMHG | TEMPERATURE: 98 F | OXYGEN SATURATION: 99 %

## 2022-12-09 DIAGNOSIS — M70.60 GREATER TROCHANTERIC BURSITIS: ICD-10-CM

## 2022-12-09 LAB
B-HCG UR QL: NEGATIVE
CTP QC/QA: YES
GLUCOSE SERPL-MCNC: 90 MG/DL (ref 70–110)
POCT GLUCOSE: 90 MG/DL (ref 70–110)

## 2022-12-09 PROCEDURE — A9585 GADOBUTROL INJECTION: HCPCS | Performed by: ANESTHESIOLOGY

## 2022-12-09 PROCEDURE — 20610 DRAIN/INJ JOINT/BURSA W/O US: CPT | Mod: 50 | Performed by: ANESTHESIOLOGY

## 2022-12-09 PROCEDURE — 20610 DRAIN/INJ JOINT/BURSA W/O US: CPT | Mod: 50,59,, | Performed by: ANESTHESIOLOGY

## 2022-12-09 PROCEDURE — 99152 MOD SED SAME PHYS/QHP 5/>YRS: CPT | Performed by: ANESTHESIOLOGY

## 2022-12-09 PROCEDURE — 27096 INJECT SACROILIAC JOINT: CPT | Mod: 50,,, | Performed by: ANESTHESIOLOGY

## 2022-12-09 PROCEDURE — 63600175 PHARM REV CODE 636 W HCPCS: Performed by: ANESTHESIOLOGY

## 2022-12-09 PROCEDURE — 27096 PR INJECTION,SACROILIAC JOINT: ICD-10-PCS | Mod: 50,,, | Performed by: ANESTHESIOLOGY

## 2022-12-09 PROCEDURE — 25000003 PHARM REV CODE 250: Performed by: ANESTHESIOLOGY

## 2022-12-09 PROCEDURE — 20610 PR DRAIN/INJECT LARGE JOINT/BURSA: ICD-10-PCS | Mod: 50,59,, | Performed by: ANESTHESIOLOGY

## 2022-12-09 PROCEDURE — 25500020 PHARM REV CODE 255: Performed by: ANESTHESIOLOGY

## 2022-12-09 PROCEDURE — 81025 URINE PREGNANCY TEST: CPT | Performed by: ANESTHESIOLOGY

## 2022-12-09 PROCEDURE — 27096 INJECT SACROILIAC JOINT: CPT | Mod: 50 | Performed by: ANESTHESIOLOGY

## 2022-12-09 RX ORDER — TRIAMCINOLONE ACETONIDE 40 MG/ML
INJECTION, SUSPENSION INTRA-ARTICULAR; INTRAMUSCULAR
Status: DISCONTINUED | OUTPATIENT
Start: 2022-12-09 | End: 2022-12-09 | Stop reason: HOSPADM

## 2022-12-09 RX ORDER — FENTANYL CITRATE 50 UG/ML
INJECTION, SOLUTION INTRAMUSCULAR; INTRAVENOUS
Status: DISCONTINUED | OUTPATIENT
Start: 2022-12-09 | End: 2022-12-09 | Stop reason: HOSPADM

## 2022-12-09 RX ORDER — INDOMETHACIN 25 MG/1
CAPSULE ORAL
Status: DISCONTINUED | OUTPATIENT
Start: 2022-12-09 | End: 2022-12-09 | Stop reason: HOSPADM

## 2022-12-09 RX ORDER — GADOBUTROL 604.72 MG/ML
INJECTION INTRAVENOUS
Status: DISCONTINUED | OUTPATIENT
Start: 2022-12-09 | End: 2022-12-09 | Stop reason: HOSPADM

## 2022-12-09 RX ORDER — BUPIVACAINE HYDROCHLORIDE 2.5 MG/ML
INJECTION, SOLUTION EPIDURAL; INFILTRATION; INTRACAUDAL
Status: DISCONTINUED | OUTPATIENT
Start: 2022-12-09 | End: 2022-12-09 | Stop reason: HOSPADM

## 2022-12-09 RX ORDER — MIDAZOLAM HYDROCHLORIDE 1 MG/ML
INJECTION, SOLUTION INTRAMUSCULAR; INTRAVENOUS
Status: DISCONTINUED | OUTPATIENT
Start: 2022-12-09 | End: 2022-12-09 | Stop reason: HOSPADM

## 2022-12-09 NOTE — OP NOTE
Chrissjudahtrciia Cruz Carlos  31 y.o. female      Vitals:    12/09/22 0734   BP: 121/85   Pulse: 87   Resp: 19   Temp:        Procedure Date: 12/9/22      INFORMED CONSENT: The procedure, risks, benefits and options were discussed with patient. There are no contraindications to the procedure. The patient expressed understanding and agreed to proceed. The personnel performing the procedure was discussed. I verify that I personally obtained consent prior to the start of the procedure and the signed consent can be found on the patient's chart.       Anesthesia:   Conscious sedation provided by M.D    The patient was monitored with continuous pulse oximetry, EKG, and intermittent blood pressure monitors.  The patient was hemodynamically stable throughout the entire process was responsive to voice, and breathing spontaneously.  Supplemental O2 was provided at 2L/min via nasal cannula.  Patient was comfortable for the duration of the procedure. (See nurse documentation and case log for sedation time)    There was a total of 2mg IV Midazolam and 100mcg Fentanyl titrated for the procedure    Pre Procedure diagnosis: Sacroiliac joint dysfunction [M53.3]  Post-Procedure diagnosis: SAME      PROCEDURE:  1) Bilateral greater trochanteric bursa injection    2) Bilateral sacroiliac joint injection                            REASON FOR PROCEDURE:   Sacroiliitis [M46.1]  Greater trochanteric bursitis[M70.61]      MEDICATIONS INJECTED: 1mL 40mg/ml Kenalog and 4mL Bupivacaine 0.25% into each site    LOCAL ANESTHETIC USED: Xylocaine 1% 6ml     ESTIMATED BLOOD LOSS: None.   COMPLICATIONS: None.     TECHNIQUE:   Greater trochanteric bursa injection:  The area overlying the greater trochanteric bursa was identified using fluoroscopy, and the area overlying the skin was prepped and draped in usual sterile fashion. Local Xylocaine was injected by raising a wheel and going down to the periosteum using a 27-gauge hypodermic needle. A 5 inch  22-gauge spinal needle was introduce into the Bilateral greater trochanteric bursa. Negative pressure applied to confirm no intravascular placement. Omnipaque was injected to confirm placement and to confirm that there was no vascular runoff. The medication was then injected slowly.  Displacement of the contrast after injection of the medication confirmed that the medication went into the area of the greater trochanteric bursa    Sacroiliac joint injection:   Laying in the prone position, the patient was prepped and draped in the usual sterile fashion using ChloraPrep and fenestrated drape.  The area was determined under fluoroscopy.  Local Xylocaine was injected by raising a wheel and going down to the periosteum using a 27-gauge hypodermic needle.  The 3.5 inch 22-gauge spinal needle was introduce into the Bilateral sacroiliac joint.  Negative pressure applied to confirm no intravascular placement.  Omnipaque was injected to confirm placement and to confirm that there was no vascular runoff.  The medication was then injected slowly.  The patient tolerated the procedure well.                       The patient was monitored for approximately 30 minutes after the procedure. Patient was given post procedure and discharge instructions to follow at home. We will see the patient back in two weeks or the patient may call to inform of status. The patient was discharged in a stable condition

## 2022-12-09 NOTE — DISCHARGE INSTRUCTIONS

## 2022-12-09 NOTE — DISCHARGE SUMMARY
Discharge Note  Short Stay      SUMMARY     Admit Date: 12/9/2022    Attending Physician: Eula Rojas MD        Discharge Physician: Eula Rojas MD        Discharge Date: 12/9/2022 7:41 AM    Procedure(s) (LRB):  Bilateral GT bursa + bilateral SIJ injection RN IV Sedation (Bilateral)    Final Diagnosis: Sacroiliac joint dysfunction [M53.3]    Disposition: Home or self care    Patient Instructions:   Current Discharge Medication List        CONTINUE these medications which have NOT CHANGED    Details   atorvastatin (LIPITOR) 40 MG tablet TAKE ONE TABLET BY MOUTH EVERY NIGHT  Qty: 90 tablet, Refills: 1    Associated Diagnoses: Type 2 diabetes mellitus without complication, without long-term current use of insulin; Elevated LDL cholesterol level      dicyclomine (BENTYL) 20 mg tablet TAKE ONE TABLET BY MOUTH THREE TIMES A DAY AS NEEDED FOR ABDOMINAL PAIN  Qty: 90 tablet, Refills: 11      ergocalciferol (ERGOCALCIFEROL) 50,000 unit Cap TAKE 1 CAPSULE BY MOUTH ONCE A WEEK FOR 28 DAYS      glimepiride (AMARYL) 4 MG tablet Take 1 tablet (4 mg total) by mouth daily with breakfast.  Qty: 90 tablet, Refills: 1    Associated Diagnoses: Type 2 diabetes mellitus without complication, without long-term current use of insulin      HYDROcodone-acetaminophen (NORCO) 5-325 mg per tablet Take 1 tablet by mouth every 6 (six) hours as needed for Pain.  Qty: 10 tablet, Refills: 0    Comments: Quantity prescribed more than 7 day supply? No  Associated Diagnoses: Pain      irbesartan (AVAPRO) 150 MG tablet Take 1 tablet (150 mg total) by mouth once daily.  Qty: 90 tablet, Refills: 3    Comments: .  Associated Diagnoses: Essential hypertension; At risk for cardiovascular event; Type 2 diabetes mellitus without complication, with long-term current use of insulin      JANUVIA 100 mg Tab TAKE 1 TABLET BY MOUTH EVERY DAY  Qty: 90 tablet, Refills: 1    Associated Diagnoses: Type 2 diabetes mellitus without complication, without long-term  current use of insulin      LEVEMIR FLEXTOUCH U-100 INSULN 100 unit/mL (3 mL) InPn pen INJECT 20 UNITS EVERY EVENING  Qty: 15 mL, Refills: 1    Associated Diagnoses: Uncontrolled type 2 diabetes mellitus with hyperglycemia; Hemoglobin A1C greater than 9%, indicating poor diabetic control      linaCLOtide (LINZESS) 145 mcg Cap capsule Take 1 capsule (145 mcg total) by mouth before breakfast.  Qty: 90 capsule, Refills: 1    Associated Diagnoses: Constipation, unspecified constipation type      metFORMIN (GLUCOPHAGE-XR) 500 MG ER 24hr tablet Take 2 tablets (1,000 mg total) by mouth 2 (two) times daily with meals.  Qty: 360 tablet, Refills: 1    Associated Diagnoses: Type 2 diabetes mellitus without complication, without long-term current use of insulin      metoprolol succinate (TOPROL-XL) 50 MG 24 hr tablet Take 1 tablet (50 mg total) by mouth once daily.  Qty: 90 tablet, Refills: 0    Comments: .  Associated Diagnoses: Essential hypertension      semaglutide (OZEMPIC) 0.25 mg or 0.5 mg(2 mg/1.5 mL) pen injector Inject 0.5 mg into the skin every 7 days.  Qty: 3 pen, Refills: 1    Associated Diagnoses: Hemoglobin A1C greater than 9%, indicating poor diabetic control; Uncontrolled type 2 diabetes mellitus with hyperglycemia      spironolactone (ALDACTONE) 25 MG tablet Take 2 tablets by mouth once daily  Qty: 180 tablet, Refills: 3    Associated Diagnoses: Essential hypertension      tiZANidine (ZANAFLEX) 4 MG tablet Take 1 tablet (4 mg total) by mouth every 8 (eight) hours.  Qty: 90 tablet, Refills: 1    Associated Diagnoses: Lumbar radiculopathy      zolpidem (AMBIEN) 5 MG Tab Take 1 tablet (5 mg total) by mouth nightly as needed (for sleep).  Qty: 30 tablet, Refills: 5    Associated Diagnoses: Chronic insomnia      blood sugar diagnostic Strp To check BG 2 times daily, to use with insurance preferred meter  Qty: 100 strip, Refills: 2    Associated Diagnoses: Type 2 diabetes mellitus without complication, without  "long-term current use of insulin      blood-glucose meter kit To check BG 2 times daily, to use with insurance preferred meter  Qty: 1 each, Refills: 0    Associated Diagnoses: Type 2 diabetes mellitus without complication, without long-term current use of insulin      blood-glucose meter,continuous (DEXCOM G6 ) Misc Use as directed,  to be changed every 365 days.  Qty: 1 each, Refills: 0      blood-glucose sensor (DEXCOM G6 SENSOR) Selin Use as directed, change sensor every 10 days  Qty: 9 each, Refills: 1      blood-glucose transmitter (DEXCOM G6 TRANSMITTER) Selin Use as directed, change transmitter every 3 months  Qty: 1 each, Refills: 1      clotrimazole-betamethasone 1-0.05% (LOTRISONE) cream Apply topically 2 (two) times daily.  Qty: 45 g, Refills: 2    Associated Diagnoses: Intertrigo      fluconazole (DIFLUCAN) 200 MG Tab TAKE ONE TABLET BY MOUTH EVERY DAY FOR 3 DAYS  Qty: 3 tablet, Refills: 0    Associated Diagnoses: Intertrigo      lancets Misc To check BG 2 times daily, to use with insurance preferred meter  Qty: 100 each, Refills: 2    Associated Diagnoses: Type 2 diabetes mellitus without complication, without long-term current use of insulin      ondansetron (ZOFRAN-ODT) 4 MG TbDL DISSOLVE 1-2 TABLETS BY MOUTH EVERY SIX TO EIGHT HOURS AS NEEDED  Qty: 30 tablet, Refills: 0    Associated Diagnoses: Nausea      pen needle, diabetic (BD ULTRA-FINE GAYLA PEN NEEDLE) 32 gauge x 5/32" Ndle 1 Units by Misc.(Non-Drug; Combo Route) route once daily.  Qty: 100 each, Refills: 6    Associated Diagnoses: Uncontrolled type 2 diabetes mellitus with hyperglycemia      valACYclovir (VALTREX) 500 MG tablet Take 1 tablet (500 mg total) by mouth 2 (two) times daily. for three days  Qty: 6 tablet, Refills: 6    Associated Diagnoses: Medication refill                 Discharge Diagnosis: Sacroiliac joint dysfunction [M53.3]  Condition on Discharge: Stable with no complications to procedure   Diet on Discharge: " Same as before.  Activity: as per instruction sheet.  Discharge to: Home with a responsible adult.  Follow up: 2-4 weeks       Please call the office at [phone] if you experience any weakness or loss of sensation, fever > 101.5, pain uncontrolled with oral medications, persistent nausea/vomiting/or diarrhea, redness or drainage from the incisions, or any other worrisome concerns. If physician on call was not reached or could not communicate with our office for any reason please go to the nearest emergency department

## 2022-12-21 ENCOUNTER — PATIENT MESSAGE (OUTPATIENT)
Dept: FAMILY MEDICINE | Facility: CLINIC | Age: 32
End: 2022-12-21
Payer: MEDICAID

## 2022-12-21 NOTE — TELEPHONE ENCOUNTER
Pt states she did do a sample but not sure     Pt also states that her urine has a foul odor     Also states she has pain in the pelvic area as well     Please advise    KRISHNA Bhatia

## 2022-12-27 ENCOUNTER — PATIENT MESSAGE (OUTPATIENT)
Dept: FAMILY MEDICINE | Facility: CLINIC | Age: 32
End: 2022-12-27
Payer: MEDICAID

## 2023-01-09 ENCOUNTER — OFFICE VISIT (OUTPATIENT)
Dept: PAIN MEDICINE | Facility: CLINIC | Age: 33
End: 2023-01-09
Payer: MEDICAID

## 2023-01-09 ENCOUNTER — PATIENT MESSAGE (OUTPATIENT)
Dept: PAIN MEDICINE | Facility: CLINIC | Age: 33
End: 2023-01-09

## 2023-01-09 VITALS
WEIGHT: 255.06 LBS | HEART RATE: 75 BPM | DIASTOLIC BLOOD PRESSURE: 99 MMHG | SYSTOLIC BLOOD PRESSURE: 140 MMHG | RESPIRATION RATE: 17 BRPM | HEIGHT: 64 IN | BODY MASS INDEX: 43.54 KG/M2

## 2023-01-09 DIAGNOSIS — M54.16 LUMBAR RADICULOPATHY: ICD-10-CM

## 2023-01-09 PROCEDURE — 99214 PR OFFICE/OUTPT VISIT, EST, LEVL IV, 30-39 MIN: ICD-10-PCS | Mod: S$PBB,,, | Performed by: PHYSICIAN ASSISTANT

## 2023-01-09 PROCEDURE — 3077F SYST BP >= 140 MM HG: CPT | Mod: CPTII,,, | Performed by: PHYSICIAN ASSISTANT

## 2023-01-09 PROCEDURE — 99999 PR PBB SHADOW E&M-EST. PATIENT-LVL IV: ICD-10-PCS | Mod: PBBFAC,,, | Performed by: PHYSICIAN ASSISTANT

## 2023-01-09 PROCEDURE — 3008F PR BODY MASS INDEX (BMI) DOCUMENTED: ICD-10-PCS | Mod: CPTII,,, | Performed by: PHYSICIAN ASSISTANT

## 2023-01-09 PROCEDURE — 99214 OFFICE O/P EST MOD 30 MIN: CPT | Mod: S$PBB,,, | Performed by: PHYSICIAN ASSISTANT

## 2023-01-09 PROCEDURE — 3008F BODY MASS INDEX DOCD: CPT | Mod: CPTII,,, | Performed by: PHYSICIAN ASSISTANT

## 2023-01-09 PROCEDURE — 99999 PR PBB SHADOW E&M-EST. PATIENT-LVL IV: CPT | Mod: PBBFAC,,, | Performed by: PHYSICIAN ASSISTANT

## 2023-01-09 PROCEDURE — 3077F PR MOST RECENT SYSTOLIC BLOOD PRESSURE >= 140 MM HG: ICD-10-PCS | Mod: CPTII,,, | Performed by: PHYSICIAN ASSISTANT

## 2023-01-09 PROCEDURE — 1160F RVW MEDS BY RX/DR IN RCRD: CPT | Mod: CPTII,,, | Performed by: PHYSICIAN ASSISTANT

## 2023-01-09 PROCEDURE — 99214 OFFICE O/P EST MOD 30 MIN: CPT | Mod: PBBFAC | Performed by: PHYSICIAN ASSISTANT

## 2023-01-09 PROCEDURE — 3080F DIAST BP >= 90 MM HG: CPT | Mod: CPTII,,, | Performed by: PHYSICIAN ASSISTANT

## 2023-01-09 PROCEDURE — 1159F MED LIST DOCD IN RCRD: CPT | Mod: CPTII,,, | Performed by: PHYSICIAN ASSISTANT

## 2023-01-09 PROCEDURE — 3080F PR MOST RECENT DIASTOLIC BLOOD PRESSURE >= 90 MM HG: ICD-10-PCS | Mod: CPTII,,, | Performed by: PHYSICIAN ASSISTANT

## 2023-01-09 PROCEDURE — 1160F PR REVIEW ALL MEDS BY PRESCRIBER/CLIN PHARMACIST DOCUMENTED: ICD-10-PCS | Mod: CPTII,,, | Performed by: PHYSICIAN ASSISTANT

## 2023-01-09 PROCEDURE — 1159F PR MEDICATION LIST DOCUMENTED IN MEDICAL RECORD: ICD-10-PCS | Mod: CPTII,,, | Performed by: PHYSICIAN ASSISTANT

## 2023-01-09 RX ORDER — TIZANIDINE 4 MG/1
4 TABLET ORAL EVERY 8 HOURS
Qty: 90 TABLET | Refills: 2 | Status: SHIPPED | OUTPATIENT
Start: 2023-01-09 | End: 2023-03-13 | Stop reason: SDUPTHER

## 2023-01-09 NOTE — PROGRESS NOTES
Chief Pain Complaint:  Lumbar Back Pain      History of Present Illness:     Interval History (1/9/2023): Nathan Carlos presents today for follow-up visit.  she underwent lumbar L5/S1 IL BRUNILDA on 11/23/2022 followed by bilateral SIJ + GT bursa injection on 12/9/22.  The patient reports that she is/was better following the procedure.  she reports 80-90% pain relief from SIJ injection and 60% relief from BRUNILDA. Reports continued leg pain/burning/tingling, but improved since injection.  Patient reports pain as 10/10 today.      Interval History (10/20/2022):  Nathan Carlos presents today for follow-up visit.  Patient was last seen on 8/18/2022. Last injection Left L4/5 +L5/S1 TF BRUNILDA on 07/14/2021. Patient reports pain as 8/10 today. Reports she has had several episodes during which her left leg gave out. She also reports pain in her lower lumbar spine in a band-like distribution with radiation into her lower extremities left > right. Pain interferes with daily activities.      Interval History (8/18/2022): Nathan Carlos presents today for follow-up visit.  she underwent Left L4/5 +L5/S1 TF BRUNILDA on 7/14/22.  The patient reports that she is/was better following the procedure.  she reports 40% pain relief.  The changes lasted 4 weeks so far.  The changes have continued through this visit.  Patient reports pain as 6/10 today, 10/10 on her worst days. Reports she is still experiencing pain but is able to stand for somewhat longer periods of time. Continues to report pain in left buttock down posterior thigh, but does admit she is now having pain in the right lumbar region now as well. She reports she takes tizanidine three times daily on bad days, and this seems to help.      Interval History (6/2/2022): Nathan Carlos presents today for follow-up visit.  she underwent bilateral SIJ + GTB injection on 5/5/22.  The patient reports that she is/was better following the procedure.  she reports 60% pain  relief.  The changes lasted 4 weeks so far.  The changes have continued through this visit.  Reports back and buttock pain improved, but reports continued pain along her left leg. Reports pain along anterior, lateral, and posterior aspect of left leg, radiating down to foot at times. Patient reports pain as 6/10 today. Reports that previous epidural did not offer much relief.    IMPRESSION  1. ABNORMAL study  2. There is electrodiagnostic evidence of an acute on chronic radiculopathy of the LEFT S1 nerve root and a subacute on chronic radiculopathy of the LEFT L5 nerve root     Interval HPI 04/14/2022  Nathan Carlos is a 32 y.o. female  who is presenting with a chief complaint of Lumbar Back Pain. The patient began experiencing this problem insidiously, and the pain has been gradually worsening over the past 3 month(s). The pain is described as throbbing, cramping, burning and electrical and is located in the left lumbar spine . Pain is intermittent and lasts hours. The pain radiates to  left leg. The patient rates her pain a 8 out of ten and interferes with activities of daily living a 8 out of ten. Pain is exacerbated by flexion of the lumbar spine, and is improved by rest. Patient reports no prior trauma, no prior spinal surgery     Interval HPI 03/31/2022  Nathan Carlos is a 32 y.o. female  who is presenting with a chief complaint of Low-back Pain. The patient began experiencing this problem insidiously, and the pain has been gradually worsening. The pain is described as throbbing, cramping, aching and heavy and is located in the bilateral lumbar spine . Pain is intermittent and lasts hours. The  pain is nonradiating. The patient rates her pain a 8 out of ten and interferes with activities of daily living a 7 out of ten. Pain is exacerbated by extension of the lumbar spine, and is improved by rest. Patient reports no prior trauma, no prior spinal surgery     - pertinent negatives: No fever, No  chills, No weight loss, No bladder dysfunction, No bowel dysfunction, No saddle anesthesia  - pertinent positives: none    - medications, other therapies tried (physical therapy, injections):     >> NSAIDs, Tylenol, Norco, zanaflex and flexeril (no relief), topamax (no relief)    >> Has previously undergone Physical Therapy    >> Has previously undergone spinal injection/s   - Left SI joint injection 1/2018 with 100% relief for 6 months   - Right L3, L4, L5 MBB with local on 8/15/18 with 90% pain relief   - left SIJ + left GT bursa injection with local on 11/8/18 with good relief of bursitis but only 10% of SIJ pain   - left L3-5 MBB with local on 5/16/19 with 90% pain relief   - bilateral L3-5 MBB on 8/14/19 with limited relief   - right SIJ injection on 2/5/20 with 80% pain relief   - bilateral L3-5 MBB on 7/16/2020 with 100% relief of lumbar back pain   - right L3-5 RFA on 12/08/2020 with 100% pain relief   - left L3-5 RFA on 1/21/21 with at least 50% pain relief   - left SIJ + left GT bursa injection on 6/17/21 with at least 40% pain relief    - L5-S1, S1 TFESI on 3/8/22 with 60% Pain relief    -bilateral SIJ + GTB injection on 5/5/22 with 60% relief   -lumbar L5/S1 IL BRUNILDA on 11/23/2022 with 60% relief   -bilateral SIJ + GT bursa injection on 12/9/22 with 80-90% relief    Imaging / Labs / Studies (reviewed on 1/9/2023):  MRI lumbar spine 12/16/2021    FINDINGS:  Detail limited by poor signal noise ratio.  Prominent edema within the subcutaneous soft tissues of the lower back.  Stable anatomic alignment, unchanged since 11/22/2017.  The vertebral body heights are maintained.  Mild disc desiccation at L4-L5 is noted.  No significant disc space narrowing.  The distal tip of the conus medullaris terminates near the L2 level.  There are no significant areas of disc bulge or protrusion.  Minimal grade 1 retrolisthesis of L4 on L5 is noted following the administration of intravenous contrast, no abnormal areas of  enhancement are noted.     L1-L2: No spinal canal or neural foraminal encroachment.     L2-L3: No spinal canal or neural foraminal encroachment.     L3-L4: No spinal canal or neural foraminal encroachment.     L4-L5: Mild facet arthropathy.  No spinal canal or neural foraminal encroachment.     L5-S1: Mild bilateral facet arthropathy.  No spinal canal or neural foraminal encroachment.     Impression:     Mild lower lumbar spine degenerative changes as above.    Results for orders placed during the hospital encounter of 11/22/17   MRI Lumbar Spine Without Contrast    Narrative Technique: Standard noncontrast multiplanar multisequence imaging of the lumbar spine was performed.  Findings: There is anatomic spinal alignment.  Disc interspaces are of normal height and signal intensity.  Vertebral marrow signal pattern and architecture are normal.  Distal cord is unremarkable with conus medullaris terminating at L1-L2.  Paravertebral soft tissues are symmetric and normal in appearance.  T12-L1: No disc protrusion, neuroforaminal narrowing, or central canal stenosis.  L1-L2: No disc protrusion, neuroforaminal narrowing, or central canal stenosis.  L2-L3: No disc protrusion, neuroforaminal narrowing, or central canal stenosis.  L3-L4: No disc protrusion, neuroforaminal narrowing, or central canal stenosis.  L4-L5: No disc protrusion, neuroforaminal narrowing, or central canal stenosis.  L5-S1: Mild bilateral facet hypertrophy. No disc protrusion, neuroforaminal narrowing, or central canal stenosis.    Impression  Negative MRI of the lumbar spine.     Results for orders placed during the hospital encounter of 01/04/18   X-Ray Lumbar Complete With Flex And Ext    Narrative Comparison: None  Technique: AP, lateral, lateral flexion, lateral extension, bilateral oblique, and lumbosacral coned down views were obtained of the lumbar spine  Findings: There is mild scoliosis of the lower thoracic and upper lumbar spine.  Vertebral  "body heights are well-maintained.  No spondylolisthesis demonstrated.  No change in spinal alignment with flexion or extension to suggest instability. Intervertebral disk spaces are well preserved.  No pars defects visualized.  Posterior elements appear grossly intact. No acute fractures or subluxations are demonstrated.  The remaining visualized osseous and soft tissue structures demonstrate no appreciable abnormality.    Impression As above.  No acute findings.         Review of Systems:  CONSTITUTIONAL: patient denies any fever, chills, or weight loss  SKIN: patient denies any rash or itching  RESPIRATORY: patient denies having any shortness of breath  GASTROINTESTINAL: patient denies having any diarrhea, constipation, or bowel incontinence  GENITOURINARY: patient denies having any abnormal bladder function    MUSCULOSKELETAL:  - patient complains of the above noted pain/s (see chief pain complaint)    NEUROLOGICAL:   - pain as above  - strength in Lower extremities is intact, BILATERALLY  - sensation in Lower extremities is intact, BILATERALLY  - patient denies any loss of bowel or bladder control      PSYCHIATRIC: patient denies any change in mood    Other:  All other systems reviewed and are negative        Physical Exam:  Telemedicine Exam  Vitals:    01/09/23 0811   BP: (!) 140/99   Pulse: 75   Resp: 17   Weight: 115.7 kg (255 lb 1.2 oz)   Height: 5' 4" (1.626 m)   PainSc: 10-Worst pain ever    Body mass index is 43.78 kg/m².   (reviewed on 1/9/2023)    GENERAL: Well appearing, in no acute distress, alert and oriented x3.  obese  PSYCH:  Mood and affect appropriate.  SKIN: Skin color, texture, turgor normal, no rashes or lesions.  HEAD/FACE:  Normocephalic, atraumatic. Cranial nerves grossly intact.  PULM:  No difficulty breathing  Neuro/MSK:  BACK: Palpation over the lumbar paraspinous muscles causes some pain. Some pain with flexion, extension, or lateral flexion - L>R.    EXTREMITIES: Peripheral joint " active ROM is full and pain free without obvious instability or laxity in all four extremities. No deformities, edema, or skin discoloration.   MUSCULOSKELETAL: No atrophy or tone abnormalities are noted.  NEURO:  Able to toe walk and heel walk without difficulty  GAIT: normal.        Physical Exam: last in clinic visit:  General: alert and oriented, in no apparent distress, obese.  Gait: normal gait.  Skin: no rashes, no discoloration, no obvious lesions  HEENT: normocephalic, atraumatic. Pupils equal and round.  Cardiovascular: no significant peripheral edema present.  Respiratory: without use of accessory muscles of respiration.    Musculoskeletal - Lumbar Spine:  - Pain on flexion of lumbar spine: Present   - Pain on extension of lumbar spine: Present  - Lumbar facet loading: Present bilaterally  - TTP over the lumbar facet joints: Present on left at L5-S1   - TTP over the para lumbar muscles on left   - TTP over the SI joints: Present bilaterally  - TTP over GT bursa: Present bilaterally  - TTP over piriformis: absent  - Straight Leg Raise: equivocal on right, positive on left  - JULISSA: Positive bilaterally    Neuro - Extremities:  - BUE Strength:R/L: D: 5/5; B: 5/5; T: 5/5; WF: 5/5; WE: 5/5; IO: 5/5  - BLE Strength: R/L: HF: 5/5, HE: 5/5, KF: 5/5; KE: 5/4; FE: 5/5; FF: 5/5  - Extremity Reflexes: Brisk and symmetric throughout  - Sensory: Sensation to light touch intact bilaterally      Psych:  Mood and affect is appropriate          Assessment:  Nathan Carlos is a 32 y.o. year old female who is presenting with   Encounter Diagnosis   Name Primary?    Lumbar radiculopathy          Plan:  1. Interventional: Schedule lumbar L5/S1 IL BRUNILDA with left paramedian approach to target radicular symptoms    -s/p lumbar L5/S1 IL BRUNILDA on 11/23/2022 with 60% relief  -s/p bilateral SIJ + GT bursa injection on 12/9/22 with 80-90% relief    -s/p Left L4/5 +L5/S1 TF BRUNILDA with 40% relief  -s/p bilateral SI and GTB with 60%  relief  - S/p  L5-S1, S1 TFESI on 3/8/22 with 60% Pain relief.   - S/p left SIJ + left GT bursa injection on 6/17/21 with 40% pain relief.   - S/p left L3-5 RFA on 1/21/21 with at least 50% pain relief.   - S/p right L3-5 RFA on 12/08/2020 with 100% pain relief.   - S/p bilateral L3-5 MBB on 7/16/2020 with 100% relief of lumbar back pain.  - S/p Right SIJ injection on 2/5/20 with 80% pain relief.       2. Pharmacologic:   - Continue Gabapentin 300 mg Po QHs with up titration. Discontinued  - Continue diclofenac 1% gel to apply 2g topically up to 4 times per day. Discontinued  - Continue lidocaine 2.5%-prilocaine 2.5% topical cream Q6H PRN topically; can alternate with Voltaren gel - for GTB pain.    - Continue Tizanidne 4 mg TID PRN.  She can take up to QID PRN while pain Increased.  - Anticoagulation use: None.     3. Rehabilitative: Encouraged regular exercise. Continue exercises and activities as tolerated at home. Consider restarting physical therapy since this helped in the past.     4. Diagnostic: Updated lumbar MRI reviewed. Lower ext EMG shows electrodiagnostic evidence of an acute on chronic radiculopathy of the LEFT S1 nerve root and a subacute on chronic radiculopathy of the LEFT L5 nerve root.     5. Follow up: 4 weeks after injection    Other: patient has paperwork for Disability. Discussed our goals of IPM as they pertain limiting time off of work and getting patients back into the work force    Darlin Iverson PA-C  Interventional Pain Medicine

## 2023-01-17 ENCOUNTER — PATIENT MESSAGE (OUTPATIENT)
Dept: FAMILY MEDICINE | Facility: CLINIC | Age: 33
End: 2023-01-17
Payer: MEDICAID

## 2023-01-17 DIAGNOSIS — F51.04 CHRONIC INSOMNIA: ICD-10-CM

## 2023-01-17 RX ORDER — ZOLPIDEM TARTRATE 5 MG/1
5 TABLET ORAL NIGHTLY PRN
Qty: 30 TABLET | Refills: 5 | OUTPATIENT
Start: 2023-01-17

## 2023-01-17 RX ORDER — ZOLPIDEM TARTRATE 5 MG/1
5 TABLET ORAL NIGHTLY PRN
Qty: 30 TABLET | Refills: 5 | Status: SHIPPED | OUTPATIENT
Start: 2023-01-17 | End: 2023-07-14

## 2023-01-20 NOTE — PROGRESS NOTES
Subjective:      Nathan Carlos is a 32 y.o. female, here today for follow-up of:  FOOT PAIN      PCP: Qasim Paez NP     HPI:    Nathan is here for ER visit for left heel/foot pain.  Seen at ER on 1/23/23, tx for cellulitis LT heel.  Reports just starting the antibiotic yesterday,  Was also given Norco to take prn pain.  She has been minimal to no WB due to severe pain.  Skin to heel severely tender, cracked, no drainage.  Does report recently trip to [place], unsure if she caused issue walking on beach or from water.  Does have Podiatry appt scheduled on Thursday 1/26.    Requests allergy testing for shellfish.  Reports angioedema issue last month after eating boiled crawfish.  Tx with Zyrtec and Benedryl, resolved.      Review of Systems   Constitutional:  Negative for chills and fever.   Respiratory: Negative.     Cardiovascular: Negative.    Musculoskeletal:  Positive for arthralgias, gait problem and joint swelling.   Skin:  Positive for wound (LT heel/skin cracked).     Review of patient's allergies indicates:   Allergen Reactions    Amitriptyline      xerostomia    Amlodipine      DRY MOUTH    Tramadol Itching       Patient Active Problem List   Diagnosis    Essential hypertension    Gastroesophageal reflux disease    Hypercholesteremia    High serum testosterone    HSV-2 (herpes simplex virus 2) infection    Nephrolithiasis    Chronic bilateral low back pain with left-sided sciatica    Polycystic ovaries    Lumbar facet arthropathy    Type 2 diabetes mellitus without complication, without long-term current use of insulin    Paresthesias in left hand    Morbid obesity with BMI of 40.0-44.9, adult    Lumbar facet joint pain    Spondylosis without myelopathy or radiculopathy, lumbar region    Left carpal tunnel syndrome    Sacroiliac joint dysfunction    Lumbar radiculopathy    Plantar fasciitis, bilateral    Decreased range of motion with decreased strength    Greater trochanteric bursitis          Current Outpatient Medications:     atorvastatin (LIPITOR) 40 MG tablet, TAKE ONE TABLET BY MOUTH EVERY NIGHT, Disp: 90 tablet, Rfl: 1    blood sugar diagnostic Strp, To check BG 2 times daily, to use with insurance preferred meter, Disp: 100 strip, Rfl: 2    blood-glucose meter kit, To check BG 2 times daily, to use with insurance preferred meter, Disp: 1 each, Rfl: 0    blood-glucose meter,continuous (DEXCOM G6 ) Misc, Use as directed,  to be changed every 365 days., Disp: 1 each, Rfl: 0    blood-glucose sensor (DEXCOM G6 SENSOR) Selin, Use as directed, change sensor every 10 days, Disp: 9 each, Rfl: 1    blood-glucose transmitter (DEXCOM G6 TRANSMITTER) Selin, Use as directed, change transmitter every 3 months, Disp: 1 each, Rfl: 1    clotrimazole-betamethasone 1-0.05% (LOTRISONE) cream, Apply topically 2 (two) times daily., Disp: 45 g, Rfl: 2    dicyclomine (BENTYL) 20 mg tablet, TAKE ONE TABLET BY MOUTH THREE TIMES A DAY AS NEEDED FOR ABDOMINAL PAIN, Disp: 90 tablet, Rfl: 11    ergocalciferol (ERGOCALCIFEROL) 50,000 unit Cap, TAKE 1 CAPSULE BY MOUTH ONCE A WEEK FOR 28 DAYS, Disp: , Rfl:     fluconazole (DIFLUCAN) 200 MG Tab, TAKE ONE TABLET BY MOUTH EVERY DAY FOR 3 DAYS, Disp: 3 tablet, Rfl: 0    glimepiride (AMARYL) 4 MG tablet, Take 1 tablet (4 mg total) by mouth daily with breakfast., Disp: 90 tablet, Rfl: 1    HYDROcodone-acetaminophen (NORCO) 5-325 mg per tablet, Take 1 tablet by mouth every 6 (six) hours as needed for Pain. (Patient not taking: Reported on 1/9/2023), Disp: 10 tablet, Rfl: 0    irbesartan (AVAPRO) 150 MG tablet, Take 1 tablet (150 mg total) by mouth once daily., Disp: 90 tablet, Rfl: 3    JANUVIA 100 mg Tab, TAKE 1 TABLET BY MOUTH EVERY DAY, Disp: 90 tablet, Rfl: 1    lancets Misc, To check BG 2 times daily, to use with insurance preferred meter, Disp: 100 each, Rfl: 2    LEVEMIR FLEXTOUCH U-100 INSULN 100 unit/mL (3 mL) InPn pen, INJECT 20 UNITS EVERY EVENING, Disp:  "15 mL, Rfl: 1    linaCLOtide (LINZESS) 145 mcg Cap capsule, Take 1 capsule (145 mcg total) by mouth before breakfast., Disp: 90 capsule, Rfl: 1    metFORMIN (GLUCOPHAGE-XR) 500 MG ER 24hr tablet, Take 2 tablets (1,000 mg total) by mouth 2 (two) times daily with meals., Disp: 360 tablet, Rfl: 1    metoprolol succinate (TOPROL-XL) 50 MG 24 hr tablet, TAKE 1 TABLET BY MOUTH EVERY DAY, Disp: 90 tablet, Rfl: 0    ondansetron (ZOFRAN-ODT) 4 MG TbDL, DISSOLVE 1-2 TABLETS BY MOUTH EVERY SIX TO EIGHT HOURS AS NEEDED, Disp: 30 tablet, Rfl: 0    pen needle, diabetic (BD ULTRA-FINE GAYLA PEN NEEDLE) 32 gauge x 5/32" Ndle, 1 Units by Misc.(Non-Drug; Combo Route) route once daily., Disp: 100 each, Rfl: 6    semaglutide (OZEMPIC) 0.25 mg or 0.5 mg(2 mg/1.5 mL) pen injector, Inject 0.5 mg into the skin every 7 days., Disp: 3 pen, Rfl: 1    spironolactone (ALDACTONE) 25 MG tablet, Take 2 tablets by mouth once daily, Disp: 180 tablet, Rfl: 3    tiZANidine (ZANAFLEX) 4 MG tablet, Take 1 tablet (4 mg total) by mouth every 8 (eight) hours., Disp: 90 tablet, Rfl: 2    valACYclovir (VALTREX) 500 MG tablet, Take 1 tablet (500 mg total) by mouth 2 (two) times daily. for three days, Disp: 6 tablet, Rfl: 6    zolpidem (AMBIEN) 5 MG Tab, Take 1 tablet (5 mg total) by mouth nightly as needed (for sleep)., Disp: 30 tablet, Rfl: 5      Past medical, surgical, family and social histories have been reviewed today.      Objective:     Vitals:    01/24/23 1304   BP: 124/66   Pulse: 96   Temp: 97.1 °F (36.2 °C)   SpO2: 98%   Weight: 117.4 kg (258 lb 13.1 oz)   Height: 5' 4" (1.626 m)       Physical Exam  Vitals reviewed.   Cardiovascular:      Pulses:           Dorsalis pedis pulses are 2+ on the left side.        Posterior tibial pulses are 2+ on the left side.   Musculoskeletal:      Left foot: Decreased range of motion. No foot drop.        Feet:    Feet:      Left foot:      Skin integrity: Erythema (mild) and fissure present. No ulcer or blister. "   Neurological:      Mental Status: She is alert and oriented to person, place, and time.      Gait: Gait abnormal (walking on toes and ball of foot due to left foot pain).   Psychiatric:         Mood and Affect: Mood normal.         Behavior: Behavior normal.         Thought Content: Thought content normal.         Judgment: Judgment normal.         Diagnosis/Assessment:     1. Cellulitis of foot, left  - cefTRIAXone injection 1 g    2. Foot pain, left  - cefTRIAXone injection 1 g  - acetaminophen-codeine 300-30mg (TYLENOL #3) 300-30 mg Tab; Take 1 tablet by mouth every 4 to 6 hours as needed (pain).  Dispense: 15 tablet; Refill: 0    3. Angioedema, sequela --- lab per pt request  - Crab; Future  - Crawfish; Future  - Lobster; Future  - Shrimp; Future  - Scallop; Future  - Oyster; Future  - RAST Single Allergen Mussel; Future  - Clam; Future       Plan:     See Podiatry as scheduled on 1/26/2023 for recheck and follow-up.  Continue antibiotic for now as ordered per ER.    Follow-Up:     Lab pending.  RTC as directed and/or prn.      VANCE Wagner  [facility]       30 minutes of total time spent on the encounter, which includes face to face time and non-face to face time preparing to see the patient.  This included obtaining and/or reviewing separately obtained history, and documenting clinical information in the electronic or other health record.   Also includes independent interpretation of results (not separately reported) and communicating results to the patient/family/caregiver, with care coordination (not separately reported).

## 2023-01-23 ENCOUNTER — PATIENT MESSAGE (OUTPATIENT)
Dept: PODIATRY | Facility: CLINIC | Age: 33
End: 2023-01-23
Payer: MEDICAID

## 2023-01-23 ENCOUNTER — HOSPITAL ENCOUNTER (EMERGENCY)
Facility: HOSPITAL | Age: 33
Discharge: HOME | End: 2023-01-23
Attending: EMERGENCY MEDICINE
Payer: MEDICAID

## 2023-01-23 VITALS
DIASTOLIC BLOOD PRESSURE: 88 MMHG | OXYGEN SATURATION: 98 % | WEIGHT: 257.94 LBS | RESPIRATION RATE: 20 BRPM | BODY MASS INDEX: 44.27 KG/M2 | SYSTOLIC BLOOD PRESSURE: 155 MMHG | HEART RATE: 94 BPM | TEMPERATURE: 98 F

## 2023-01-23 DIAGNOSIS — M79.672 FOOT PAIN, LEFT: ICD-10-CM

## 2023-01-23 DIAGNOSIS — L03.119 CELLULITIS OF FOOT: Primary | ICD-10-CM

## 2023-01-23 PROCEDURE — 99283 EMERGENCY DEPT VISIT LOW MDM: CPT | Mod: ER

## 2023-01-23 RX ORDER — CLINDAMYCIN HYDROCHLORIDE 150 MG/1
300 CAPSULE ORAL EVERY 8 HOURS
Qty: 42 CAPSULE | Refills: 0 | Status: SHIPPED | OUTPATIENT
Start: 2023-01-23 | End: 2023-01-30

## 2023-01-23 RX ORDER — HYDROCODONE BITARTRATE AND ACETAMINOPHEN 5; 325 MG/1; MG/1
1 TABLET ORAL EVERY 4 HOURS PRN
Qty: 5 TABLET | Refills: 0 | Status: SHIPPED | OUTPATIENT
Start: 2023-01-23 | End: 2023-01-24

## 2023-01-23 NOTE — ED PROVIDER NOTES
History      Chief Complaint   Patient presents with    Foot Pain     Left foot swelling and pain for 2 days, denies injury but thinks something may be in her foot.        Review of patient's allergies indicates:   Allergen Reactions    Amitriptyline      xerostomia    Amlodipine      DRY MOUTH    Tramadol Itching        HPI   HPI    1/23/2023, 2:09 PM   History obtained from the patient      History of Present Illness: Nathan Carlos is a 32 y.o. female patient who presents to the Emergency Department for left heel pain for 2 days.  She already sees Podiatry for plantar fasciitis but she says that the pain is worse than usual.  She recently went on a cruise and walked barefoot on the beach and is concerned that she may have a foreign body in her heel.  She requests medicine for pain and says that she can not take NSAIDs.  Symptoms are moderate in severity.     No further complaints or concerns at this time.     Patient has an appointment with her podiatrist Thursday      PCP: Qasim Paez NP       Past Medical History:  Past Medical History:   Diagnosis Date    Carpal tunnel syndrome     Diabetes mellitus     GERD (gastroesophageal reflux disease)     Herpes simplex virus (HSV) infection     High serum testosterone 9/7/2017    Hyperlipidemia     Hypertension     Insulin resistance syndrome 9/7/2017    Lumbar facet arthropathy 8/14/2019    Morbid obesity     Ovarian cyst     bilateral          Past Surgical History:  Past Surgical History:   Procedure Laterality Date    ANKLE SURGERY Left     BUNIONECTOMY Bilateral     CARPAL TUNNEL RELEASE Left 03/19/2021    Procedure: RELEASE, CARPAL TUNNEL;  Surgeon: Juan F Bernard MD;  Location: [facility] OR;  Service: Orthopedics;  Laterality: Left;    CARPAL TUNNEL RELEASE Right 11/04/2021    Procedure: RELEASE, CARPAL TUNNEL;  Surgeon: Juan F Bernard MD;  Location: [facility] OR;  Service: Orthopedics;  Laterality: Right;    COLONOSCOPY  06/12/2018     EPIDURAL STEROID INJECTION N/A 11/23/2022    Procedure: Lumbar L5/S1 IL BRUNILDA RN IV Sedation;  Surgeon: Eula Rojas MD;  Location: [facility];  Service: Pain Management;  Laterality: N/A;    ESOPHAGOGASTRODUODENOSCOPY      ESOPHAGOGASTRODUODENOSCOPY N/A 12/02/2021    Procedure: EGD (ESOPHAGOGASTRODUODENOSCOPY);  Surgeon: Pili Eagle MD;  Location: [facility];  Service: Endoscopy;  Laterality: N/A;    INJECTION OF ANESTHETIC AGENT AROUND MEDIAL BRANCH NERVES INNERVATING LUMBAR FACET JOINT Bilateral 08/14/2019    Procedure: Bilateral L3-5 MBB;  Surgeon: Yo Kirkland MD;  Location: [facility];  Service: Pain Management;  Laterality: Bilateral;    INJECTION OF ANESTHETIC AGENT AROUND MEDIAL BRANCH NERVES INNERVATING LUMBAR FACET JOINT Bilateral 07/16/2020    Procedure: Bilateral L3-5 MBB;  Surgeon: Yo Kirkland MD;  Location: [facility];  Service: Pain Management;  Laterality: Bilateral;    INJECTION OF ANESTHETIC AGENT INTO SACROILIAC JOINT Right 02/05/2020    Procedure: Right SIJ Injection with local;  Surgeon: Yo Kikrland MD;  Location: [facility];  Service: Pain Management;  Laterality: Right;    INJECTION OF ANESTHETIC AGENT INTO SACROILIAC JOINT Left 06/17/2021    Procedure: Left GT bursa + left SIJ injection;  Surgeon: Yo Kirkland MD;  Location: [facility];  Service: Pain Management;  Laterality: Left;    INJECTION OF ANESTHETIC AGENT INTO SACROILIAC JOINT Bilateral 05/05/2022    Procedure: Bilateral BLOCK, SACROILIAC JOINT and Bilateral GTB RN IV sedation;  Surgeon: Yo Kirkland MD;  Location: [facility];  Service: Pain Management;  Laterality: Bilateral;    INJECTION OF JOINT Left 06/17/2021    Procedure: Left GT bursa + left SIJ injection;  Surgeon: Yo Kirkland MD;  Location: [facility];  Service: Pain Management;  Laterality: Left;    INJECTION OF JOINT Bilateral 12/9/2022    Procedure: Bilateral GT bursa + bilateral SIJ injection RN IV Sedation;  Surgeon: Eula Rojas MD;   Location: [facility];  Service: Pain Management;  Laterality: Bilateral;    LUMBAR FUSION      RADIOFREQUENCY THERMOCOAGULATION Right 12/08/2020    Procedure: RADIOFREQUENCY THERMAL COAGULATION Right L3, 4,5 MBB RFA with RN IV sedation// NO STEROID;  Surgeon: Yo Kirkland MD;  Location: [facility];  Service: Pain Management;  Laterality: Right;    RADIOFREQUENCY THERMOCOAGULATION Left 01/21/2021    Procedure: Left L3-5 Lumbar RFA;  Surgeon: Rian Chaney MD;  Location: [facility];  Service: Pain Management;  Laterality: Left;    SELECTIVE INJECTION OF ANESTHETIC AGENT AROUND LUMBAR SPINAL NERVE ROOT BY TRANSFORAMINAL APPROACH Left 03/08/2022    Procedure: BLOCK, SPINAL NERVE ROOT, LUMBAR, SELECTIVE, TRANSFORAMINAL APPROACH Left L5-S1, S1 RN IV sedation;  Surgeon: Yo Kirkland MD;  Location: [facility];  Service: Pain Management;  Laterality: Left;    TRANSFORAMINAL EPIDURAL INJECTION OF STEROID Left 07/14/2022    Procedure: left L4/5 + L5/S1 TF BRUNILDA RN IV Sedation;  Surgeon: Eula Rojas MD;  Location: [facility];  Service: Pain Management;  Laterality: Left;           Family History:  Family History   Problem Relation Age of Onset    Hypertension Mother     Fibroids Mother     Arthritis Mother     No Known Problems Father     Diabetes Sister     Hypertension Brother     Hypertension Maternal Aunt     Diabetes Maternal Aunt     Arthritis Maternal Aunt     Diabetes Maternal Uncle     Hypertension Maternal Grandmother     Leukemia Maternal Grandfather     Hypertension Maternal Grandfather     Stroke Neg Hx            Social History:  Social History     Tobacco Use    Smoking status: Never    Smokeless tobacco: Never   Substance and Sexual Activity    Alcohol use: Never    Drug use: No    Sexual activity: Yes     Partners: Male     Birth control/protection: Other-see comments, None     Comment: Birth control pill       ROS     Review of Systems   Constitutional:  Negative for chills and fever.    HENT:  Negative for facial swelling and trouble swallowing.    Eyes:  Negative for discharge, redness and visual disturbance.   Respiratory:  Negative for chest tightness and shortness of breath.    Cardiovascular:  Negative for chest pain and leg swelling.   Gastrointestinal:  Negative for diarrhea and vomiting.   Genitourinary:  Negative for decreased urine volume and dysuria.   Musculoskeletal:  Negative for joint swelling and neck stiffness.   Skin:  Negative for rash and wound.   Neurological:  Negative for syncope and facial asymmetry.   All other systems reviewed and are negative.    Physical Exam      Initial Vitals [01/23/23 1113]   BP Pulse Resp Temp SpO2   (!) 155/88 94 20 98 °F (36.7 °C) 98 %      MAP       --         Physical Exam  Vital signs and nursing notes reviewed.  Constitutional: Patient is in NAD. Awake and alert. Well-developed and well-nourished.  Head: Atraumatic. Normocephalic.  Eyes: PERRL. EOM intact. Conjunctivae nl. No scleral icterus.  ENT: Mucous membranes are moist. Oropharynx is clear.  Neck: Supple. No JVD. No lymphadenopathy.  No meningismus  Cardiovascular: Regular rate and rhythm. No murmurs, rubs, or gallops. Distal pulses are 2+ and symmetric.  Pulmonary/Chest: No respiratory distress. Clear to auscultation bilaterally. No wheezing, rales, or rhonchi.  Abdominal: Soft. Non-distended. No TTP. No rebound, guarding, or rigidity. Good bowel sounds.  Genitourinary: No CVA tenderness  Musculoskeletal: Moves all extremities.  Left heel tenderness.  Dry cracked skin.  No obvious puncture wound.  Skin: Warm and dry.  Neurological: Awake and alert. No acute focal neurological deficits are appreciated.  Psychiatric: Normal affect. Good eye contact. Appropriate in content.      ED Course          Procedures  ED Vital Signs:  Vitals:    01/23/23 1113   BP: (!) 155/88   Pulse: 94   Resp: 20   Temp: 98 °F (36.7 °C)   TempSrc: Oral   SpO2: 98%   Weight: 117 kg (257 lb 15 oz)                  Imaging Results:  Imaging Results              X-Ray Foot Complete Left (Final result)  Result time 01/23/23 12:57:37      Final result by EVELYNE Lomas Sr., MD (01/23/23 12:57:37)                   Impression:      1. There is mild hallux valgus deformity.  2. There are surgical changes associated with a bunionectomy in the medial aspect of the distal portion of the 1st metatarsal.  3. There is a small spur at the site of attachment of the plantar fascia to the calcaneus.  4. There are surgical changes in the distal aspect of the fibula.  5. There are surgical changes in the medial malleolus.      Electronically signed by: Alber Lomas MD  Date:    01/23/2023  Time:    12:57               Narrative:    EXAMINATION:  XR FOOT COMPLETE 3 VIEW LEFT    CLINICAL HISTORY:  Pain in left foot    COMPARISON:  09/12/2021    FINDINGS:  There is no acute fracture visualized.  There is no dislocation.  There is mild hallux valgus deformity.  There are surgical changes associated with a bunionectomy in the medial aspect of the distal portion of the 1st metatarsal.  There is a small spur at the site of attachment of the plantar fascia to the calcaneus.  There are surgical changes in the distal aspect of the fibula.  There are surgical changes in the medial malleolus.                                         The Emergency Provider reviewed the vital signs and test results, which are outlined above.    ED Discussion             Medication(s) given in the ER:  Medications - No data to display         Follow-up Information       Sandy Cohn DPM.    Specialty: Podiatry  Why: KEEP YOU THURSDAY APPOINTMENT  Contact information:  [address] [place] [address] DR Blanca FRITZ [id number] [phone]                                    Medication List        START taking these medications      clindamycin 150 MG capsule  Commonly known as: CLEOCIN  Take 2 capsules (300 mg total) by mouth every 8 (eight) hours. for 7 days             CHANGE how you take these medications      HYDROcodone-acetaminophen 5-325 mg per tablet  Commonly known as: NORCO  Take 1 tablet by mouth every 4 (four) hours as needed for Pain.  What changed: when to take this            ASK your doctor about these medications      atorvastatin 40 MG tablet  Commonly known as: LIPITOR  TAKE ONE TABLET BY MOUTH EVERY NIGHT     blood sugar diagnostic Strp  To check BG 2 times daily, to use with insurance preferred meter     blood-glucose meter kit  To check BG 2 times daily, to use with insurance preferred meter     clotrimazole-betamethasone 1-0.05% cream  Commonly known as: LOTRISONE  Apply topically 2 (two) times daily.     DEXCOM G6  Misc  Generic drug: blood-glucose meter,continuous  Use as directed,  to be changed every 365 days.     DEXCOM G6 SENSOR Selin  Generic drug: blood-glucose sensor  Use as directed, change sensor every 10 days     DEXCOM G6 TRANSMITTER Selin  Generic drug: blood-glucose transmitter  Use as directed, change transmitter every 3 months     dicyclomine 20 mg tablet  Commonly known as: BENTYL  TAKE ONE TABLET BY MOUTH THREE TIMES A DAY AS NEEDED FOR ABDOMINAL PAIN     ergocalciferol 50,000 unit Cap  Commonly known as: ERGOCALCIFEROL     fluconazole 200 MG Tab  Commonly known as: DIFLUCAN  TAKE ONE TABLET BY MOUTH EVERY DAY FOR 3 DAYS     glimepiride 4 MG tablet  Commonly known as: AMARYL  Take 1 tablet (4 mg total) by mouth daily with breakfast.     irbesartan 150 MG tablet  Commonly known as: AVAPRO  Take 1 tablet (150 mg total) by mouth once daily.     JANUVIA 100 MG Tab  Generic drug: SITagliptin phosphate  TAKE 1 TABLET BY MOUTH EVERY DAY     lancets Misc  To check BG 2 times daily, to use with insurance preferred meter     LEVEMIR FLEXTOUCH U-100 INSULN 100 unit/mL (3 mL) Inpn pen  Generic drug: insulin detemir U-100  INJECT 20 UNITS EVERY EVENING     linaCLOtide 145 mcg Cap capsule  Commonly known as: LINZESS  Take 1 capsule  "(145 mcg total) by mouth before breakfast.     metFORMIN 500 MG ER 24hr tablet  Commonly known as: GLUCOPHAGE-XR  Take 2 tablets (1,000 mg total) by mouth 2 (two) times daily with meals.     metoprolol succinate 50 MG 24 hr tablet  Commonly known as: TOPROL-XL  TAKE 1 TABLET BY MOUTH EVERY DAY     ondansetron 4 MG Tbdl  Commonly known as: ZOFRAN-ODT  DISSOLVE 1-2 TABLETS BY MOUTH EVERY SIX TO EIGHT HOURS AS NEEDED     OZEMPIC 0.25 mg or 0.5 mg(2 mg/1.5 mL) pen injector  Generic drug: semaglutide  Inject 0.5 mg into the skin every 7 days.     pen needle, diabetic 32 gauge x 5/32" Ndle  Commonly known as: BD ULTRA-FINE GAYLA PEN NEEDLE  1 Units by Misc.(Non-Drug; Combo Route) route once daily.     spironolactone 25 MG tablet  Commonly known as: ALDACTONE  Take 2 tablets by mouth once daily     tiZANidine 4 MG tablet  Commonly known as: ZANAFLEX  Take 1 tablet (4 mg total) by mouth every 8 (eight) hours.     valACYclovir 500 MG tablet  Commonly known as: VALTREX  Take 1 tablet (500 mg total) by mouth 2 (two) times daily. for three days     zolpidem 5 MG Tab  Commonly known as: AMBIEN  Take 1 tablet (5 mg total) by mouth nightly as needed (for sleep).               Where to Get Your Medications        These medications were sent to [id number] - [place] - [id number]  [id number] [id number]      Phone: [phone]   clindamycin 150 MG capsule  HYDROcodone-acetaminophen 5-325 mg per tablet             Medical Decision Making        All findings were reviewed with the patient/family in detail.   All remaining questions and concerns were addressed at that time.  Patient/family has been counseled regarding the need for follow-up as well as the indication to return to the emergency room should new or worrisome developments occur.    Medical Decision Making:   Differential Diagnosis:   Cellulitis, flare of plantar fasciitis  Clinical Tests:   Radiological Study: Ordered and Reviewed "   MDM                 Clinical Impression:        ICD-10-CM ICD-9-CM   1. Cellulitis of foot  L03.119 682.7   2. Foot pain, left  M79.672 729.5               Nya Scott PA-C  01/23/23 [id number]

## 2023-01-24 ENCOUNTER — OFFICE VISIT (OUTPATIENT)
Dept: FAMILY MEDICINE | Facility: CLINIC | Age: 33
End: 2023-01-24
Payer: MEDICAID

## 2023-01-24 VITALS
SYSTOLIC BLOOD PRESSURE: 124 MMHG | DIASTOLIC BLOOD PRESSURE: 66 MMHG | HEIGHT: 64 IN | TEMPERATURE: 97 F | WEIGHT: 258.81 LBS | HEART RATE: 96 BPM | BODY MASS INDEX: 44.18 KG/M2 | OXYGEN SATURATION: 98 %

## 2023-01-24 DIAGNOSIS — T78.3XXS ANGIOEDEMA, SEQUELA: ICD-10-CM

## 2023-01-24 DIAGNOSIS — M79.672 FOOT PAIN, LEFT: ICD-10-CM

## 2023-01-24 DIAGNOSIS — L03.116 CELLULITIS OF FOOT, LEFT: Primary | ICD-10-CM

## 2023-01-24 PROCEDURE — 99214 PR OFFICE/OUTPT VISIT, EST, LEVL IV, 30-39 MIN: ICD-10-PCS | Mod: 25,S$PBB,, | Performed by: REGISTERED NURSE

## 2023-01-24 PROCEDURE — 3008F PR BODY MASS INDEX (BMI) DOCUMENTED: ICD-10-PCS | Mod: CPTII,,, | Performed by: REGISTERED NURSE

## 2023-01-24 PROCEDURE — 99999 PR PBB SHADOW E&M-EST. PATIENT-LVL III: ICD-10-PCS | Mod: PBBFAC,,, | Performed by: REGISTERED NURSE

## 2023-01-24 PROCEDURE — 3078F PR MOST RECENT DIASTOLIC BLOOD PRESSURE < 80 MM HG: ICD-10-PCS | Mod: CPTII,,, | Performed by: REGISTERED NURSE

## 2023-01-24 PROCEDURE — 99214 OFFICE O/P EST MOD 30 MIN: CPT | Mod: 25,S$PBB,, | Performed by: REGISTERED NURSE

## 2023-01-24 PROCEDURE — 99999 PR PBB SHADOW E&M-EST. PATIENT-LVL III: CPT | Mod: PBBFAC,,, | Performed by: REGISTERED NURSE

## 2023-01-24 PROCEDURE — 3008F BODY MASS INDEX DOCD: CPT | Mod: CPTII,,, | Performed by: REGISTERED NURSE

## 2023-01-24 PROCEDURE — 3074F PR MOST RECENT SYSTOLIC BLOOD PRESSURE < 130 MM HG: ICD-10-PCS | Mod: CPTII,,, | Performed by: REGISTERED NURSE

## 2023-01-24 PROCEDURE — 3074F SYST BP LT 130 MM HG: CPT | Mod: CPTII,,, | Performed by: REGISTERED NURSE

## 2023-01-24 PROCEDURE — 3078F DIAST BP <80 MM HG: CPT | Mod: CPTII,,, | Performed by: REGISTERED NURSE

## 2023-01-24 PROCEDURE — 4010F PR ACE/ARB THEARPY RXD/TAKEN: ICD-10-PCS | Mod: CPTII,,, | Performed by: REGISTERED NURSE

## 2023-01-24 PROCEDURE — 99213 OFFICE O/P EST LOW 20 MIN: CPT | Mod: 25,PBBFAC,PO | Performed by: REGISTERED NURSE

## 2023-01-24 PROCEDURE — 4010F ACE/ARB THERAPY RXD/TAKEN: CPT | Mod: CPTII,,, | Performed by: REGISTERED NURSE

## 2023-01-24 PROCEDURE — 96372 THER/PROPH/DIAG INJ SC/IM: CPT | Mod: PBBFAC,PO

## 2023-01-24 RX ORDER — CEFTRIAXONE 1 G/1
1 INJECTION, POWDER, FOR SOLUTION INTRAMUSCULAR; INTRAVENOUS
Status: COMPLETED | OUTPATIENT
Start: 2023-01-24 | End: 2023-01-24

## 2023-01-24 RX ORDER — ACETAMINOPHEN AND CODEINE PHOSPHATE 300; 30 MG/1; MG/1
1 TABLET ORAL
Qty: 15 TABLET | Refills: 0 | Status: SHIPPED | OUTPATIENT
Start: 2023-01-24 | End: 2023-03-13

## 2023-01-24 RX ADMIN — CEFTRIAXONE SODIUM 1 G: 1 INJECTION, POWDER, FOR SOLUTION INTRAMUSCULAR; INTRAVENOUS at 02:01

## 2023-01-26 ENCOUNTER — OFFICE VISIT (OUTPATIENT)
Dept: PODIATRY | Facility: CLINIC | Age: 33
End: 2023-01-26
Payer: MEDICAID

## 2023-01-26 ENCOUNTER — LAB VISIT (OUTPATIENT)
Dept: LAB | Facility: HOSPITAL | Age: 33
End: 2023-01-26
Attending: REGISTERED NURSE
Payer: MEDICAID

## 2023-01-26 DIAGNOSIS — E11.69 TYPE 2 DIABETES MELLITUS WITH MORBID OBESITY: ICD-10-CM

## 2023-01-26 DIAGNOSIS — E66.01 TYPE 2 DIABETES MELLITUS WITH MORBID OBESITY: ICD-10-CM

## 2023-01-26 DIAGNOSIS — R23.4 FISSURE IN SKIN OF FOOT: ICD-10-CM

## 2023-01-26 DIAGNOSIS — E66.01 MORBID OBESITY WITH BMI OF 40.0-44.9, ADULT: ICD-10-CM

## 2023-01-26 DIAGNOSIS — E11.9 TYPE 2 DIABETES MELLITUS WITHOUT COMPLICATION, WITHOUT LONG-TERM CURRENT USE OF INSULIN: ICD-10-CM

## 2023-01-26 DIAGNOSIS — T78.3XXS ANGIOEDEMA, SEQUELA: ICD-10-CM

## 2023-01-26 DIAGNOSIS — M79.672 PAIN IN LEFT FOOT: Primary | ICD-10-CM

## 2023-01-26 PROCEDURE — 4010F ACE/ARB THERAPY RXD/TAKEN: CPT | Mod: CPTII,,, | Performed by: PODIATRIST

## 2023-01-26 PROCEDURE — 99214 PR OFFICE/OUTPT VISIT, EST, LEVL IV, 30-39 MIN: ICD-10-PCS | Mod: S$PBB,,, | Performed by: PODIATRIST

## 2023-01-26 PROCEDURE — 4010F PR ACE/ARB THEARPY RXD/TAKEN: ICD-10-PCS | Mod: CPTII,,, | Performed by: PODIATRIST

## 2023-01-26 PROCEDURE — 86003 ALLG SPEC IGE CRUDE XTRC EA: CPT | Mod: 59 | Performed by: REGISTERED NURSE

## 2023-01-26 PROCEDURE — 86003 ALLG SPEC IGE CRUDE XTRC EA: CPT | Performed by: REGISTERED NURSE

## 2023-01-26 PROCEDURE — 1159F MED LIST DOCD IN RCRD: CPT | Mod: CPTII,,, | Performed by: PODIATRIST

## 2023-01-26 PROCEDURE — 99999 PR PBB SHADOW E&M-EST. PATIENT-LVL III: CPT | Mod: PBBFAC,,, | Performed by: PODIATRIST

## 2023-01-26 PROCEDURE — 99213 OFFICE O/P EST LOW 20 MIN: CPT | Mod: PBBFAC | Performed by: PODIATRIST

## 2023-01-26 PROCEDURE — 36415 COLL VENOUS BLD VENIPUNCTURE: CPT | Performed by: REGISTERED NURSE

## 2023-01-26 PROCEDURE — 99214 OFFICE O/P EST MOD 30 MIN: CPT | Mod: S$PBB,,, | Performed by: PODIATRIST

## 2023-01-26 PROCEDURE — 1160F RVW MEDS BY RX/DR IN RCRD: CPT | Mod: CPTII,,, | Performed by: PODIATRIST

## 2023-01-26 PROCEDURE — 1159F PR MEDICATION LIST DOCUMENTED IN MEDICAL RECORD: ICD-10-PCS | Mod: CPTII,,, | Performed by: PODIATRIST

## 2023-01-26 PROCEDURE — 99999 PR PBB SHADOW E&M-EST. PATIENT-LVL III: ICD-10-PCS | Mod: PBBFAC,,, | Performed by: PODIATRIST

## 2023-01-26 PROCEDURE — 1160F PR REVIEW ALL MEDS BY PRESCRIBER/CLIN PHARMACIST DOCUMENTED: ICD-10-PCS | Mod: CPTII,,, | Performed by: PODIATRIST

## 2023-01-26 NOTE — PROGRESS NOTES
Subjective:       Patient ID: Nathan Carlos is a 32 y.o. female.    Chief Complaint: Foot Pain (Patient states she [place] her left [place]. Complains of 10/10 pain at present as well as swelling to area. )    HPI: Nathan Carlos presents to the office today with severe 10/10 pain to the left foot.  Patient relates that while she was in [place], she developed fissure to the plantar aspect of the left foot.  She reports that she then began to ambulate on the [facility] and in the [facility] without shoes or protective covering on.  She states having increase in swelling discoloration that time.  She was seen in urgent care and placed on clindamycin.  She was also recently seen on Tuesday 01/23/2023 and provided a Rocephin injection by her primary care provider.  Presents to clinic today with her mother present.    Review of patient's allergies indicates:   Allergen Reactions    Amitriptyline      xerostomia    Amlodipine      DRY MOUTH    Tramadol Itching       Past Medical History:   Diagnosis Date    Carpal tunnel syndrome     Diabetes mellitus     GERD (gastroesophageal reflux disease)     Herpes simplex virus (HSV) infection     High serum testosterone 9/7/2017    Hyperlipidemia     Hypertension     Insulin resistance syndrome 9/7/2017    Lumbar facet arthropathy 8/14/2019    Morbid obesity     Ovarian cyst     bilateral        Family History   Problem Relation Age of Onset    Hypertension Mother     Fibroids Mother     Arthritis Mother     No Known Problems Father     Diabetes Sister     Hypertension Brother     Hypertension Maternal Aunt     Diabetes Maternal Aunt     Arthritis Maternal Aunt     Diabetes Maternal Uncle     Hypertension Maternal Grandmother     Leukemia Maternal Grandfather     Hypertension Maternal Grandfather     Stroke Neg Hx        Social History     Socioeconomic History    Marital status:     Number of children: 0   Occupational History     Employer:  [id number]   Tobacco Use    Smoking status: Never    Smokeless tobacco: Never   Substance and Sexual Activity    Alcohol use: Never    Drug use: No    Sexual activity: Yes     Partners: Male     Birth control/protection: Other-see comments, None     Comment: Birth control pill     Social Determinants of Health     Financial Resource Strain: Low Risk     Difficulty of Paying Living Expenses: Not hard at all   Food Insecurity: No Food Insecurity    Worried About Running Out of Food in the Last Year: Never true    Ran Out of Food in the Last Year: Never true   Transportation Needs: No Transportation Needs    Lack of Transportation (Medical): No    Lack of Transportation (Non-Medical): No   Physical Activity: Inactive    Days of Exercise per Week: 0 days    Minutes of Exercise per Session: 0 min   Stress: Stress Concern Present    Feeling of Stress : Rather much   Social Connections: Unknown    Frequency of Communication with Friends and Family: More than three times a week    Frequency of Social Gatherings with Friends and Family: Twice a week    Active Member of Clubs or Organizations: No    Attends Club or Organization Meetings: Never    Marital Status:    Housing Stability: Low Risk     Unable to Pay for Housing in the Last Year: No    Number of Places Lived in the Last Year: 1    Unstable Housing in the Last Year: No       Past Surgical History:   Procedure Laterality Date    ANKLE SURGERY Left     BUNIONECTOMY Bilateral     CARPAL TUNNEL RELEASE Left 03/19/2021    Procedure: RELEASE, CARPAL TUNNEL;  Surgeon: Juan F Bernard MD;  Location: [facility] OR;  Service: Orthopedics;  Laterality: Left;    CARPAL TUNNEL RELEASE Right 11/04/2021    Procedure: RELEASE, CARPAL TUNNEL;  Surgeon: Juan F Bernard MD;  Location: [facility] OR;  Service: Orthopedics;  Laterality: Right;    COLONOSCOPY  06/12/2018    EPIDURAL STEROID INJECTION N/A 11/23/2022    Procedure: Lumbar L5/S1 IL BRUNILDA RN IV Sedation;   Surgeon: Eula Rojas MD;  Location: [facility] PAIN MGT;  Service: Pain Management;  Laterality: N/A;    ESOPHAGOGASTRODUODENOSCOPY      ESOPHAGOGASTRODUODENOSCOPY N/A 12/02/2021    Procedure: EGD (ESOPHAGOGASTRODUODENOSCOPY);  Surgeon: Pili Eagle MD;  Location: [facility];  Service: Endoscopy;  Laterality: N/A;    INJECTION OF ANESTHETIC AGENT AROUND MEDIAL BRANCH NERVES INNERVATING LUMBAR FACET JOINT Bilateral 08/14/2019    Procedure: Bilateral L3-5 MBB;  Surgeon: Yo Kirkland MD;  [facility];  Service: Pain Management;  Laterality: Bilateral;    INJECTION OF ANESTHETIC AGENT AROUND MEDIAL BRANCH NERVES INNERVATING LUMBAR FACET JOINT Bilateral 07/16/2020    Procedure: Bilateral L3-5 MBB;  Surgeon: Yo Kirkland MD;  Location: [facility];  Service: Pain Management;  Laterality: Bilateral;    INJECTION OF ANESTHETIC AGENT INTO SACROILIAC JOINT Right 02/05/2020    Procedure: Right SIJ Injection with local;  Surgeon: Yo Kirkland MD;  Location: [facility];  Service: Pain Management;  Laterality: Right;    INJECTION OF ANESTHETIC AGENT INTO SACROILIAC JOINT Left 06/17/2021    Procedure: Left GT bursa + left SIJ injection;  Surgeon: Yo Kirkland MD;  Location: [facility] PAIN MGT;  Service: Pain Management;  Laterality: Left;    INJECTION OF ANESTHETIC AGENT INTO SACROILIAC JOINT Bilateral 05/05/2022    Procedure: Bilateral BLOCK, SACROILIAC JOINT and Bilateral GTB RN IV sedation;  Surgeon: Yo Kirkland MD;  Location: [facility];  Service: Pain Management;  Laterality: Bilateral;    INJECTION OF JOINT Left 06/17/2021    Procedure: Left GT bursa + left SIJ injection;  Surgeon: Yo Kirkland MD;  Location: [facility];  Service: Pain Management;  Laterality: Left;    INJECTION OF JOINT Bilateral 12/9/2022    Procedure: Bilateral GT bursa + bilateral SIJ injection RN IV Sedation;  Surgeon: Eula Rojas MD;  Location: [facility];  Service: Pain Management;  Laterality: Bilateral;    LUMBAR FUSION       RADIOFREQUENCY THERMOCOAGULATION Right 12/08/2020    Procedure: RADIOFREQUENCY THERMAL COAGULATION Right L3, 4,5 MBB RFA with RN IV sedation// NO STEROID;  Surgeon: Yo Kirkland MD;  Location: [facility];  Service: Pain Management;  Laterality: Right;    RADIOFREQUENCY THERMOCOAGULATION Left 01/21/2021    Procedure: Left L3-5 Lumbar RFA;  Surgeon: Rian Chaney MD;  Location: [facility]IN [facility]GT;  Service: Pain Management;  Laterality: Left;    SELECTIVE INJECTION OF ANESTHETIC AGENT AROUND LUMBAR SPINAL NERVE ROOT BY TRANSFORAMINAL APPROACH Left 03/08/2022    Procedure: BLOCK, SPINAL NERVE ROOT, LUMBAR, SELECTIVE, TRANSFORAMINAL APPROACH Left L5-S1, S1 RN IV sedation;  Surgeon: Yo Kirkland MD;  Location: [facility] PAIN [facility]GT;  Service: Pain Management;  Laterality: Left;    TRANSFORAMINAL EPIDURAL INJECTION OF STEROID Left 07/14/2022    Procedure: left L4/5 + L5/S1 TF BRUNILDA RN IV Sedation;  Surgeon: Eula Rojas MD;  Location: [facility];  Service: Pain Management;  Laterality: Left;       Review of Systems       Objective:   LMP 01/09/2023     X-Ray Foot Complete Left  Narrative: EXAMINATION:  XR FOOT COMPLETE 3 VIEW LEFT    CLINICAL HISTORY:  Pain in left foot    COMPARISON:  09/12/2021    FINDINGS:  There is no acute fracture visualized.  There is no dislocation.  There is mild hallux valgus deformity.  There are surgical changes associated with a bunionectomy in the medial aspect of the distal portion of the 1st metatarsal.  There is a small spur at the site of attachment of the plantar fascia to the calcaneus.  There are surgical changes in the distal aspect of the fibula.  There are surgical changes in the medial malleolus.  Impression: 1. There is mild hallux valgus deformity.  2. There are surgical changes associated with a bunionectomy in the medial aspect of the distal portion of the 1st metatarsal.  3. There is a small spur at the site of attachment of the plantar fascia to the calcaneus.  4. There are  surgical changes in the distal aspect of the fibula.  5. There are surgical changes in the medial malleolus.    Electronically signed by: Alber Lomas MD  Date:    01/23/2023  Time:    12:57       Physical Exam   LOWER EXTREMITY PHYSICAL EXAMINATION    Vascular:  The right dorsalis pedis pulse 2/4 and the right posterior tibial pulse 2/4.  The left dorsalis pedis pulse 2/4 and posterior tibial pulse on the left is 2/4.  Capillary refill is intact.  Pedal hair growth intact    Musculoskeletal: Manual Muscle Testing is 5/5 with dorsiflexion, plantar flexion, abduction, and adduction.   There is normal range of motion in the forefoot, hindfoot, and Ankle joint.  There pain on palpation the plantar aspect left foot at the plantar heel.    Dermatological:  Skin is dry.  There is a fissure present in the skin which appears healing but continues have a small area of open area.  Discoloration present to the plantar aspect the skin.  There is no visualized fluctuance but is some discoloration.      Imaging:       Results for orders placed during the hospital encounter of 01/23/23    X-Ray Foot Complete Left    Narrative  EXAMINATION:  XR FOOT COMPLETE 3 VIEW LEFT    CLINICAL HISTORY:  Pain in left foot    COMPARISON:  09/12/2021    FINDINGS:  There is no acute fracture visualized.  There is no dislocation.  There is mild hallux valgus deformity.  There are surgical changes associated with a bunionectomy in the medial aspect of the distal portion of the 1st metatarsal.  There is a small spur at the site of attachment of the plantar fascia to the calcaneus.  There are surgical changes in the distal aspect of the fibula.  There are surgical changes in the medial malleolus.    Impression  1. There is mild hallux valgus deformity.  2. There are surgical changes associated with a bunionectomy in the medial aspect of the distal portion of the 1st metatarsal.  3. There is a small spur at the site of attachment of the plantar  fascia to the calcaneus.  4. There are surgical changes in the distal aspect of the fibula.  5. There are surgical changes in the medial malleolus.      Electronically signed by: Alber Lomas MD  Date:    01/23/2023  Time:    12:57       No results found for this or any previous visit.          Assessment:     1. Pain in left foot    2. Fissure in skin of foot - Left Foot    3. Type 2 diabetes mellitus with morbid obesity    4. Type 2 diabetes mellitus without complication, without long-term current use of insulin    5. Morbid obesity with BMI of 40.0-44.9, adult        Plan:     Pain in left foot    Fissure in skin of foot - Left Foot    Type 2 diabetes mellitus with morbid obesity    Type 2 diabetes mellitus without complication, without long-term current use of insulin    Morbid obesity with BMI of 40.0-44.9, adult      Thorough discussion is had with the patient today, concerning the diagnosis, its etiology, and the treatment algorithm at present.    Discussed possibility of an abscess aspect of the foot.  We discussed performing a local injection of and scattered to locally anesthetize the skin to determine if there is no underlying abscess present.  Patient was agreeable to this.  The foot was preoperative injected with 3 cc of 0.5% Marcaine plain.  Once the skin was locally anesthetized, an 18 gauge needle was then introduced into a proximal aspect of the fissure.  No evidence of abscess or fluctuance was visualized.  Upon palpation direct pressure of the foot, the pain was resolved which suggest that the pain is associated with the fissure itself.    Foot counseling and education is provided at this visit. Patient is advised to wear socks and shoes at all times.  Do not walk barefoot, or with just socks, even when indoors.  Be sure to check and inspect the inside of the shoe before putting them on her feet.  Protect your feet at all times.  Walking shoes and/or athletic shoes are the best types of shoe  gear. Do not wear vinyl or plastic type shoe gear, as they do not stretch and/or breathe.  Protect your feet from hot and/or cold. Elevate the extremities when sitting.  Do not wear excessively tight socks and/or shoe gear. Wiggle your toes for a few minutes throughout the day. Move your ankles up and down, in and out, to help blood flow in your lower extremity.    Patient is counseled and reminded concerning the importance of good nutrition and healthy eating habits, which may include blood sugar control, to prevent and/or help podiatric foot and ankle complications.    Continue current antibiotics till entire course has been completed.  Follow-up as needed for new worsening pathology        Future Appointments   Date Time Provider Department Center   1/26/2023  4:30 PM LABORATORY, [facility]   3/13/2023  9:00 AM Darlin Iverson PA-C [facility]

## 2023-01-30 LAB
ALLERGEN NAME: NORMAL
ALLERGEN RESULT: NORMAL

## 2023-01-31 ENCOUNTER — PATIENT MESSAGE (OUTPATIENT)
Dept: FAMILY MEDICINE | Facility: CLINIC | Age: 33
End: 2023-01-31
Payer: MEDICAID

## 2023-01-31 LAB
CLAM IGE QN: <0.1 KU/L
CRAB IGE QN: <0.1 KU/L
CRAWFISH IGE QN: <0.1 KU/L
DEPRECATED CLAM IGE RAST QL: NORMAL
DEPRECATED CRAB IGE RAST QL: NORMAL
DEPRECATED CRAWFISH IGE RAST QL: NORMAL
DEPRECATED LOBSTER IGE RAST QL: NORMAL
DEPRECATED OYSTER IGE RAST QL: NORMAL
DEPRECATED SCALLOP IGE RAST QL: NORMAL
DEPRECATED SHRIMP IGE RAST QL: NORMAL
LOBSTER IGE QN: <0.1 KU/L
OYSTER IGE QN: <0.1 KU/L
SCALLOP IGE QN: <0.1 KU/L
SHRIMP IGE QN: <0.1 KU/L

## 2023-02-10 NOTE — PRE-PROCEDURE INSTRUCTIONS
Spoke with patient regarding procedure scheduled on 2.17    Arrival time 0700    Has patient been sick with fever or on antibiotics within the last 7 days? No    Does the patient have any open wounds, sores or rashes? No    Does the patient have any recent fractures? no    Has patient received a vaccination within the last 7 days? No    Received the COVID vaccination? yes    Has the patient stopped all medications as directed? NA    Does patient have a pacemaker and or defibrillator? no    Does the patient have a ride to and from procedure and someone reliable to remain with patient?      Is the patient diabetic? YES    Does the patient have sleep apnea? Or use O2 at home? No and no     Is the patient receiving sedation? yes    Is the patient instructed to remain NPO beginning at midnight the night before their procedure? yes    Procedure location confirmed with patient? Yes    Covid- Denies signs/symptoms. Instructed to notify PAT/MD if any changes.

## 2023-02-15 ENCOUNTER — PATIENT MESSAGE (OUTPATIENT)
Dept: FAMILY MEDICINE | Facility: CLINIC | Age: 33
End: 2023-02-15
Payer: MEDICAID

## 2023-02-16 DIAGNOSIS — Z91.018 FOOD ALLERGY: Primary | ICD-10-CM

## 2023-02-17 ENCOUNTER — HOSPITAL ENCOUNTER (OUTPATIENT)
Facility: HOSPITAL | Age: 33
Discharge: HOME | End: 2023-02-17
Attending: ANESTHESIOLOGY | Admitting: ANESTHESIOLOGY
Payer: MEDICAID

## 2023-02-17 VITALS
SYSTOLIC BLOOD PRESSURE: 124 MMHG | HEIGHT: 64 IN | BODY MASS INDEX: 43.96 KG/M2 | DIASTOLIC BLOOD PRESSURE: 79 MMHG | RESPIRATION RATE: 19 BRPM | OXYGEN SATURATION: 96 % | TEMPERATURE: 99 F | WEIGHT: 257.5 LBS | HEART RATE: 94 BPM

## 2023-02-17 DIAGNOSIS — M54.16 LUMBAR RADICULOPATHY: ICD-10-CM

## 2023-02-17 LAB
B-HCG UR QL: NEGATIVE
CTP QC/QA: YES
POCT GLUCOSE: 89 MG/DL (ref 70–110)

## 2023-02-17 PROCEDURE — 62323 NJX INTERLAMINAR LMBR/SAC: CPT | Performed by: ANESTHESIOLOGY

## 2023-02-17 PROCEDURE — 25000003 PHARM REV CODE 250: Performed by: ANESTHESIOLOGY

## 2023-02-17 PROCEDURE — 81025 URINE PREGNANCY TEST: CPT | Performed by: ANESTHESIOLOGY

## 2023-02-17 PROCEDURE — 25500020 PHARM REV CODE 255: Performed by: ANESTHESIOLOGY

## 2023-02-17 PROCEDURE — 62323 PR INJ LUMBAR/SACRAL, W/IMAGING GUIDANCE: ICD-10-PCS | Mod: ,,, | Performed by: ANESTHESIOLOGY

## 2023-02-17 PROCEDURE — 63600175 PHARM REV CODE 636 W HCPCS: Performed by: ANESTHESIOLOGY

## 2023-02-17 PROCEDURE — 62323 NJX INTERLAMINAR LMBR/SAC: CPT | Mod: ,,, | Performed by: ANESTHESIOLOGY

## 2023-02-17 RX ORDER — FENTANYL CITRATE 50 UG/ML
INJECTION, SOLUTION INTRAMUSCULAR; INTRAVENOUS
Status: DISCONTINUED | OUTPATIENT
Start: 2023-02-17 | End: 2023-02-17 | Stop reason: HOSPADM

## 2023-02-17 RX ORDER — MIDAZOLAM HYDROCHLORIDE 1 MG/ML
INJECTION, SOLUTION INTRAMUSCULAR; INTRAVENOUS
Status: DISCONTINUED | OUTPATIENT
Start: 2023-02-17 | End: 2023-02-17 | Stop reason: HOSPADM

## 2023-02-17 RX ORDER — INDOMETHACIN 25 MG/1
CAPSULE ORAL
Status: DISCONTINUED | OUTPATIENT
Start: 2023-02-17 | End: 2023-02-17 | Stop reason: HOSPADM

## 2023-02-17 RX ORDER — BUPIVACAINE HYDROCHLORIDE 2.5 MG/ML
INJECTION, SOLUTION EPIDURAL; INFILTRATION; INTRACAUDAL
Status: DISCONTINUED | OUTPATIENT
Start: 2023-02-17 | End: 2023-02-17 | Stop reason: HOSPADM

## 2023-02-17 RX ORDER — METHYLPREDNISOLONE ACETATE 40 MG/ML
INJECTION, SUSPENSION INTRA-ARTICULAR; INTRALESIONAL; INTRAMUSCULAR; SOFT TISSUE
Status: DISCONTINUED | OUTPATIENT
Start: 2023-02-17 | End: 2023-02-17 | Stop reason: HOSPADM

## 2023-02-17 NOTE — DISCHARGE INSTRUCTIONS

## 2023-02-17 NOTE — H&P
HPI  Patient presenting for Procedure(s) (LRB):  Lumbar L5/S1 IL BRUNILDA with left paramedian approach RN IV Sedation (N/A)     Patient on Anti-coagulation No    No health changes since previous encounter    Past Medical History:   Diagnosis Date    Carpal tunnel syndrome     Diabetes mellitus     GERD (gastroesophageal reflux disease)     Herpes simplex virus (HSV) infection     High serum testosterone 9/7/2017    Hyperlipidemia     Hypertension     Insulin resistance syndrome 9/7/2017    Lumbar facet arthropathy 8/14/2019    Morbid obesity     Ovarian cyst     bilateral      Past Surgical History:   Procedure Laterality Date    ANKLE SURGERY Left     BUNIONECTOMY Bilateral     CARPAL TUNNEL RELEASE Left 03/19/2021    Procedure: RELEASE, CARPAL TUNNEL;  Surgeon: Juan F Bernard MD;  Location: [facility] OR;  Service: Orthopedics;  Laterality: Left;    CARPAL TUNNEL RELEASE Right 11/04/2021    Procedure: RELEASE, CARPAL TUNNEL;  Surgeon: Juan F Bernard MD;  Location: [facility] OR;  Service: Orthopedics;  Laterality: Right;    COLONOSCOPY  06/12/2018    EPIDURAL STEROID INJECTION N/A 11/23/2022    Procedure: Lumbar L5/S1 IL BRUNILDA RN IV Sedation;  Surgeon: Eula Rojas MD;  Location: [facility] MGT;  Service: Pain Management;  Laterality: N/A;    ESOPHAGOGASTRODUODENOSCOPY      ESOPHAGOGASTRODUODENOSCOPY N/A 12/02/2021    Procedure: EGD (ESOPHAGOGASTRODUODENOSCOPY);  Surgeon: Pili Eagle MD;  Location: [facility];  Service: Endoscopy;  Laterality: N/A;    INJECTION OF ANESTHETIC AGENT AROUND MEDIAL BRANCH NERVES INNERVATING LUMBAR FACET JOINT Bilateral 08/14/2019    Procedure: Bilateral L3-5 MBB;  Surgeon: Yo Kirkland MD;  Location: [facility] PAIN MGT;  Service: Pain Management;  Laterality: Bilateral;    INJECTION OF ANESTHETIC AGENT AROUND MEDIAL BRANCH NERVES INNERVATING LUMBAR FACET JOINT Bilateral 07/16/2020    Procedure: Bilateral L3-5 MBB;  Surgeon: Yo Kirkland MD;  Location: [facility] PAIN MGT;  Service: Pain  Management;  Laterality: Bilateral;    INJECTION OF ANESTHETIC AGENT INTO SACROILIAC JOINT Right 02/05/2020    Procedure: Right SIJ Injection with local;  Surgeon: Yo Kirkland MD;  Location: [facility] PAIN [facility]GT;  Service: Pain Management;  Laterality: Right;    INJECTION OF ANESTHETIC AGENT INTO SACROILIAC JOINT Left 06/17/2021    Procedure: Left GT bursa + left SIJ injection;  Surgeon: Yo Kirkland MD;  Location: [facility]IN [facility];  Service: Pain Management;  Laterality: Left;    INJECTION OF ANESTHETIC AGENT INTO SACROILIAC JOINT Bilateral 05/05/2022    Procedure: Bilateral BLOCK, SACROILIAC JOINT and Bilateral GTB RN IV sedation;  Surgeon: Yo Kirkland MD;  Location: [facility];  Service: Pain Management;  Laterality: Bilateral;    INJECTION OF JOINT Left 06/17/2021    Procedure: Left GT bursa + left SIJ injection;  Surgeon: Yo Kirkland MD;  Location: [facility];  Service: Pain Management;  Laterality: Left;    INJECTION OF JOINT Bilateral 12/9/2022    Procedure: Bilateral GT bursa + bilateral SIJ injection RN IV Sedation;  Surgeon: Eula Rojas MD;  Location: [facility];  Service: Pain Management;  Laterality: Bilateral;    LUMBAR FUSION      RADIOFREQUENCY THERMOCOAGULATION Right 12/08/2020    Procedure: RADIOFREQUENCY THERMAL COAGULATION Right L3, 4,5 MBB RFA with RN IV sedation// NO STEROID;  Surgeon: Yo Kirkland MD;  Location: [facility];  Service: Pain Management;  Laterality: Right;    RADIOFREQUENCY THERMOCOAGULATION Left 01/21/2021    Procedure: Left L3-5 Lumbar RFA;  Surgeon: Rian Chaney MD;  Location: [facility];  Service: Pain Management;  Laterality: Left;    SELECTIVE INJECTION OF ANESTHETIC AGENT AROUND LUMBAR SPINAL NERVE ROOT BY TRANSFORAMINAL APPROACH Left 03/08/2022    Procedure: BLOCK, SPINAL NERVE ROOT, LUMBAR, SELECTIVE, TRANSFORAMINAL APPROACH Left L5-S1, S1 RN IV sedation;  Surgeon: Yo Kirkland MD;  Location: [facility];  Service: Pain Management;  Laterality: Left;     TRANSFORAMINAL EPIDURAL INJECTION OF STEROID Left 07/14/2022    Procedure: left L4/5 + L5/S1 TF BRUNILDA RN IV Sedation;  Surgeon: Eula Rojas MD;  [facility];  Service: Pain Management;  Laterality: Left;     Review of patient's allergies indicates:   Allergen Reactions    Amitriptyline      xerostomia    Amlodipine      DRY MOUTH    Tramadol Itching        No current facility-administered medications on file prior to encounter.     Current Outpatient Medications on File Prior to Encounter   Medication Sig Dispense Refill    atorvastatin (LIPITOR) 40 MG tablet TAKE ONE TABLET BY MOUTH EVERY NIGHT 90 tablet 1    irbesartan (AVAPRO) 150 MG tablet Take 1 tablet (150 mg total) by mouth once daily. 90 tablet 3    metFORMIN (GLUCOPHAGE-XR) 500 MG ER 24hr tablet Take 2 tablets (1,000 mg total) by mouth 2 (two) times daily with meals. 360 tablet 1    spironolactone (ALDACTONE) 25 MG tablet Take 2 tablets by mouth once daily 180 tablet 3    blood sugar diagnostic Strp To check BG 2 times daily, to use with insurance preferred meter 100 strip 2    blood-glucose meter kit To check BG 2 times daily, to use with insurance preferred meter 1 each 0    blood-glucose meter,continuous (DEXCOM G6 ) Misc Use as directed,  to be changed every 365 days. 1 each 0    blood-glucose sensor (DEXCOM G6 SENSOR) Selin Use as directed, change sensor every 10 days 9 each 1    blood-glucose transmitter (DEXCOM G6 TRANSMITTER) Selin Use as directed, change transmitter every 3 months 1 each 1    clotrimazole-betamethasone 1-0.05% (LOTRISONE) cream Apply topically 2 (two) times daily. 45 g 2    dicyclomine (BENTYL) 20 mg tablet TAKE ONE TABLET BY MOUTH THREE TIMES A DAY AS NEEDED FOR ABDOMINAL PAIN 90 tablet 11    ergocalciferol (ERGOCALCIFEROL) 50,000 unit Cap TAKE 1 CAPSULE BY MOUTH ONCE A WEEK FOR 28 DAYS      fluconazole (DIFLUCAN) 200 MG Tab TAKE ONE TABLET BY MOUTH EVERY DAY FOR 3 DAYS 3 tablet 0    JANUVIA 100 mg Tab  "TAKE 1 TABLET BY MOUTH EVERY DAY 90 tablet 1    lancets Misc To check BG 2 times daily, to use with insurance preferred meter 100 each 2    LEVEMIR FLEXTOUCH U-100 INSULN 100 unit/mL (3 mL) InPn pen INJECT 20 UNITS EVERY EVENING 15 mL 1    linaCLOtide (LINZESS) 145 mcg Cap capsule Take 1 capsule (145 mcg total) by mouth before breakfast. 90 capsule 1    ondansetron (ZOFRAN-ODT) 4 MG TbDL DISSOLVE 1-2 TABLETS BY MOUTH EVERY SIX TO EIGHT HOURS AS NEEDED 30 tablet 0    pen needle, diabetic (BD ULTRA-FINE GAYLA PEN NEEDLE) 32 gauge x 5/32" Ndle 1 Units by Misc.(Non-Drug; Combo Route) route once daily. 100 each 6    semaglutide (OZEMPIC) 0.25 mg or 0.5 mg(2 mg/1.5 mL) pen injector Inject 0.5 mg into the skin every 7 days. 3 pen 1    tiZANidine (ZANAFLEX) 4 MG tablet Take 1 tablet (4 mg total) by mouth every 8 (eight) hours. 90 tablet 2    valACYclovir (VALTREX) 500 MG tablet Take 1 tablet (500 mg total) by mouth 2 (two) times daily. for three days 6 tablet 6        PMHx, PSHx, Allergies, Medications reviewed in epic    ROS negative except pain complaints in HPI    OBJECTIVE:    /89 (BP Location: Right arm, Patient Position: Lying)   Pulse 89   Temp 97.7 °F (36.5 °C) (Temporal)   Resp 20   Ht 5' 4" (1.626 m)   Wt 116.8 kg (257 lb 8 oz)   SpO2 100%   Breastfeeding No   BMI 44.20 kg/m²     PHYSICAL EXAMINATION:    GENERAL: Well appearing, in no acute distress, alert and oriented x3.  PSYCH:  Mood and affect appropriate.  SKIN: Skin color, texture, turgor normal, no rashes or lesions which will impact the procedure.  CV: RRR with palpation of the radial artery.  PULM: No evidence of respiratory difficulty, symmetric chest rise. Clear to auscultation.  NEURO: Cranial nerves grossly intact.    Plan:    Proceed with procedure as planned Procedure(s) (LRB):  Lumbar L5/S1 IL BRUNILDA with left paramedian approach RN IV Sedation (N/A)    Eula Rojas MD  02/17/2023            "

## 2023-02-17 NOTE — DISCHARGE SUMMARY
Discharge Note  Short Stay      SUMMARY     Admit Date: 2/17/2023    Attending Physician: Eula Rojas MD        Discharge Physician: Eula Rojas MD        Discharge Date: 2/17/2023 8:07 AM    Procedure(s) (LRB):  Lumbar L5/S1 IL BRUNILDA with left paramedian approach RN IV Sedation (N/A)    Final Diagnosis: Lumbar radiculopathy [M54.16]    Disposition: Home or self care    Patient Instructions:   Current Discharge Medication List        CONTINUE these medications which have NOT CHANGED    Details   atorvastatin (LIPITOR) 40 MG tablet TAKE ONE TABLET BY MOUTH EVERY NIGHT  Qty: 90 tablet, Refills: 1    Associated Diagnoses: Type 2 diabetes mellitus without complication, without long-term current use of insulin; Elevated LDL cholesterol level      glimepiride (AMARYL) 4 MG tablet TAKE ONE TABLET BY MOUTH EVERY DAY WITH BREAKFAST  Qty: 90 tablet, Refills: 0    Associated Diagnoses: Type 2 diabetes mellitus without complication, without long-term current use of insulin      irbesartan (AVAPRO) 150 MG tablet Take 1 tablet (150 mg total) by mouth once daily.  Qty: 90 tablet, Refills: 3    Comments: .  Associated Diagnoses: Essential hypertension; At risk for cardiovascular event; Type 2 diabetes mellitus without complication, with long-term current use of insulin      metFORMIN (GLUCOPHAGE-XR) 500 MG ER 24hr tablet Take 2 tablets (1,000 mg total) by mouth 2 (two) times daily with meals.  Qty: 360 tablet, Refills: 1    Associated Diagnoses: Type 2 diabetes mellitus without complication, without long-term current use of insulin      metoprolol succinate (TOPROL-XL) 50 MG 24 hr tablet TAKE 1 TABLET BY MOUTH EVERY DAY  Qty: 90 tablet, Refills: 0    Associated Diagnoses: Essential hypertension      spironolactone (ALDACTONE) 25 MG tablet Take 2 tablets by mouth once daily  Qty: 180 tablet, Refills: 3    Associated Diagnoses: Essential hypertension      acetaminophen-codeine 300-30mg (TYLENOL #3) 300-30 mg Tab Take 1 tablet by  mouth every 4 to 6 hours as needed (pain).  Qty: 15 tablet, Refills: 0    Associated Diagnoses: Foot pain, left      blood sugar diagnostic Strp To check BG 2 times daily, to use with insurance preferred meter  Qty: 100 strip, Refills: 2    Associated Diagnoses: Type 2 diabetes mellitus without complication, without long-term current use of insulin      blood-glucose meter kit To check BG 2 times daily, to use with insurance preferred meter  Qty: 1 each, Refills: 0    Associated Diagnoses: Type 2 diabetes mellitus without complication, without long-term current use of insulin      blood-glucose meter,continuous (DEXCOM G6 ) Misc Use as directed,  to be changed every 365 days.  Qty: 1 each, Refills: 0      blood-glucose sensor (DEXCOM G6 SENSOR) Selin Use as directed, change sensor every 10 days  Qty: 9 each, Refills: 1      blood-glucose transmitter (DEXCOM G6 TRANSMITTER) Selin Use as directed, change transmitter every 3 months  Qty: 1 each, Refills: 1      clotrimazole-betamethasone 1-0.05% (LOTRISONE) cream Apply topically 2 (two) times daily.  Qty: 45 g, Refills: 2    Associated Diagnoses: Intertrigo      dicyclomine (BENTYL) 20 mg tablet TAKE ONE TABLET BY MOUTH THREE TIMES A DAY AS NEEDED FOR ABDOMINAL PAIN  Qty: 90 tablet, Refills: 11      ergocalciferol (ERGOCALCIFEROL) 50,000 unit Cap TAKE 1 CAPSULE BY MOUTH ONCE A WEEK FOR 28 DAYS      fluconazole (DIFLUCAN) 200 MG Tab TAKE ONE TABLET BY MOUTH EVERY DAY FOR 3 DAYS  Qty: 3 tablet, Refills: 0    Associated Diagnoses: Intertrigo      JANUVIA 100 mg Tab TAKE 1 TABLET BY MOUTH EVERY DAY  Qty: 90 tablet, Refills: 1    Associated Diagnoses: Type 2 diabetes mellitus without complication, without long-term current use of insulin      lancets Misc To check BG 2 times daily, to use with insurance preferred meter  Qty: 100 each, Refills: 2    Associated Diagnoses: Type 2 diabetes mellitus without complication, without long-term current use of insulin     "  LEVEMIR FLEXTOUCH U-100 INSULN 100 unit/mL (3 mL) InPn pen INJECT 20 UNITS EVERY EVENING  Qty: 15 mL, Refills: 1    Associated Diagnoses: Uncontrolled type 2 diabetes mellitus with hyperglycemia; Hemoglobin A1C greater than 9%, indicating poor diabetic control      linaCLOtide (LINZESS) 145 mcg Cap capsule Take 1 capsule (145 mcg total) by mouth before breakfast.  Qty: 90 capsule, Refills: 1    Associated Diagnoses: Constipation, unspecified constipation type      ondansetron (ZOFRAN-ODT) 4 MG TbDL DISSOLVE 1-2 TABLETS BY MOUTH EVERY SIX TO EIGHT HOURS AS NEEDED  Qty: 30 tablet, Refills: 0    Associated Diagnoses: Nausea      pen needle, diabetic (BD ULTRA-FINE GAYLA PEN NEEDLE) 32 gauge x 5/32" Ndle 1 Units by Misc.(Non-Drug; Combo Route) route once daily.  Qty: 100 each, Refills: 6    Associated Diagnoses: Uncontrolled type 2 diabetes mellitus with hyperglycemia      semaglutide (OZEMPIC) 0.25 mg or 0.5 mg(2 mg/1.5 mL) pen injector Inject 0.5 mg into the skin every 7 days.  Qty: 3 pen, Refills: 1    Associated Diagnoses: Hemoglobin A1C greater than 9%, indicating poor diabetic control; Uncontrolled type 2 diabetes mellitus with hyperglycemia      tiZANidine (ZANAFLEX) 4 MG tablet Take 1 tablet (4 mg total) by mouth every 8 (eight) hours.  Qty: 90 tablet, Refills: 2    Associated Diagnoses: Lumbar radiculopathy      valACYclovir (VALTREX) 500 MG tablet Take 1 tablet (500 mg total) by mouth 2 (two) times daily. for three days  Qty: 6 tablet, Refills: 6    Associated Diagnoses: Medication refill      zolpidem (AMBIEN) 5 MG Tab Take 1 tablet (5 mg total) by mouth nightly as needed (for sleep).  Qty: 30 tablet, Refills: 5    Associated Diagnoses: Chronic insomnia                 Discharge Diagnosis: Lumbar radiculopathy [M54.16]  Condition on Discharge: Stable with no complications to procedure   Diet on Discharge: Same as before.  Activity: as per instruction sheet.  Discharge to: Home with a responsible " adult.  Follow up: 2-4 weeks       Please call the office at [phone] if you experience any weakness or loss of sensation, fever > 101.5, pain uncontrolled with oral medications, persistent nausea/vomiting/or diarrhea, redness or drainage from the incisions, or any other worrisome concerns. If physician on call was not reached or could not communicate with our office for any reason please go to the nearest emergency department

## 2023-02-17 NOTE — OP NOTE
Lumbar Interlaminar Epidural Steroid Injection under Fluoroscopic Guidance.   Time-out taken to identify patient and procedure prior to starting the procedure.     02/17/2023    PROCEDURE: Interlaminar epidural steroid injection under fluoroscopic guidance.     Pre-Op diagnosis: Lumbar radiculopathy [M54.16]    Post-Op diagnosis: Lumbar radiculopathy [M54.16]    PHYSICIAN: Eula Rojas MD    ASSISTANTS: None     ESTIMATED BLOOD LOSS: none.     COMPLICATIONS: none.     SPECIMENS: none    TECHNIQUE: With the patient laying in a prone position, the area was prepped and draped in the usual sterile fashion using ChloraPrep and a fenestrated drape. 1% lidocaine was given using a 27-gauge needle by raising a wheal and going down to the hub of the needle over the L5/S1 interlaminar space.  The interlaminar space was then approached with a 3.5 inch 18-gauge Touhy needle was introduced under fluoroscopic guidance in the AP and Lateral view. Once the Ligamentum flavum was encountered loss of resistance to saline was used to enter the epidural space. With positive loss of resistance and negative CSF or Blood, 3mL contrast dye Omnipaque (300mg/ml) was injected to confirm placement and there was no vascular runoff. Then 1ml 80mg/ml Depomedrol + 5 mL 0.25% Bupivicaine  was injected slowly. Displacement of the radio opaque contrast after injection of the medication confirmed that the medication went into the area of the epidural space.  The patient tolerated the procedure well.     Conscious sedation provided by M.D    The patient was monitored with continuous pulse oximetry, EKG, and intermittent blood pressure monitors.  The patient was hemodynamically stable throughout the entire process was responsive to voice, and breathing spontaneously.  Supplemental O2 was provided at 2L/min via nasal cannula.  Patient was comfortable for the duration of the procedure. (See nurse documentation and case log for sedation time)    There was a  total of 1mg IV Midazolam and 50mcg Fentanyl titrated for the procedure      The patient was monitored after the procedure.   They were given post-procedure and discharge instructions to follow at home.  The patient was discharged in a stable condition.

## 2023-02-19 ENCOUNTER — PATIENT MESSAGE (OUTPATIENT)
Dept: PODIATRY | Facility: CLINIC | Age: 33
End: 2023-02-19
Payer: MEDICAID

## 2023-02-20 ENCOUNTER — TELEPHONE (OUTPATIENT)
Dept: RHEUMATOLOGY | Facility: CLINIC | Age: 33
End: 2023-02-20
Payer: MEDICAID

## 2023-02-20 DIAGNOSIS — F51.04 CHRONIC INSOMNIA: ICD-10-CM

## 2023-02-20 RX ORDER — ZOLPIDEM TARTRATE 5 MG/1
5 TABLET ORAL NIGHTLY PRN
Qty: 30 TABLET | Refills: 5 | OUTPATIENT
Start: 2023-02-20

## 2023-02-20 NOTE — TELEPHONE ENCOUNTER
I called patient and did speak with her I did find her referral but I informed her as of right now we are to booked out to make appts for new patient at this time. [place]         ----- Message from Khari Mitchell sent at 2/20/2023 10:02 AM [facility] -----  Contact: Self - [phone]  Patient called in to schedule a referral from Dr. Paez for allergy testing. Please give her a call back to schedule the appoint. Naz would not allow me to

## 2023-02-21 ENCOUNTER — PATIENT MESSAGE (OUTPATIENT)
Dept: FAMILY MEDICINE | Facility: CLINIC | Age: 33
End: 2023-02-21
Payer: MEDICAID

## 2023-02-22 DIAGNOSIS — R73.09 HEMOGLOBIN A1C GREATER THAN 9%, INDICATING POOR DIABETIC CONTROL: ICD-10-CM

## 2023-02-22 DIAGNOSIS — E11.65 UNCONTROLLED TYPE 2 DIABETES MELLITUS WITH HYPERGLYCEMIA: ICD-10-CM

## 2023-02-22 RX ORDER — INSULIN DETEMIR 100 [IU]/ML
INJECTION, SOLUTION SUBCUTANEOUS
Qty: 15 ML | Refills: 1 | Status: SHIPPED | OUTPATIENT
Start: 2023-02-22 | End: 2023-07-24

## 2023-02-22 NOTE — TELEPHONE ENCOUNTER
LV:1/24/23  LAV:  Upcoming Appt:    Pt requesting  LEVEMIR FLEXTOUCH U-100 INSULN 100 unit/mL (3 mL) InPn pen  Last fill 10/10/22

## 2023-02-23 ENCOUNTER — PATIENT MESSAGE (OUTPATIENT)
Dept: FAMILY MEDICINE | Facility: CLINIC | Age: 33
End: 2023-02-23
Payer: MEDICAID

## 2023-02-23 NOTE — TELEPHONE ENCOUNTER
Called to inform pt about pt needing to call to get setup with an allergist pt states she will call back and give the name     KRISHNA Bhatia

## 2023-02-24 DIAGNOSIS — Z91.018 FOOD ALLERGY: Primary | ICD-10-CM

## 2023-03-02 ENCOUNTER — OFFICE VISIT (OUTPATIENT)
Dept: PODIATRY | Facility: CLINIC | Age: 33
End: 2023-03-02
Payer: MEDICAID

## 2023-03-02 ENCOUNTER — HOSPITAL ENCOUNTER (OUTPATIENT)
Dept: RADIOLOGY | Facility: HOSPITAL | Age: 33
Discharge: HOME | End: 2023-03-02
Attending: PODIATRIST
Payer: MEDICAID

## 2023-03-02 ENCOUNTER — PATIENT MESSAGE (OUTPATIENT)
Dept: FAMILY MEDICINE | Facility: CLINIC | Age: 33
End: 2023-03-02
Payer: MEDICAID

## 2023-03-02 DIAGNOSIS — M72.2 PLANTAR FASCIITIS: Primary | ICD-10-CM

## 2023-03-02 DIAGNOSIS — M21.612 HALLUX ABDUCTOVALGUS WITH BUNIONS, LEFT: ICD-10-CM

## 2023-03-02 DIAGNOSIS — E66.01 MORBID OBESITY WITH BMI OF 40.0-44.9, ADULT: ICD-10-CM

## 2023-03-02 DIAGNOSIS — E11.9 TYPE 2 DIABETES MELLITUS WITHOUT COMPLICATION, WITHOUT LONG-TERM CURRENT USE OF INSULIN: ICD-10-CM

## 2023-03-02 DIAGNOSIS — M20.12 HALLUX ABDUCTOVALGUS WITH BUNIONS, LEFT: ICD-10-CM

## 2023-03-02 DIAGNOSIS — R11.0 NAUSEA: ICD-10-CM

## 2023-03-02 DIAGNOSIS — L30.4 INTERTRIGO: ICD-10-CM

## 2023-03-02 PROCEDURE — 73630 XR FOOT COMPLETE 3 VIEW LEFT: ICD-10-PCS | Mod: 26,LT,, | Performed by: RADIOLOGY

## 2023-03-02 PROCEDURE — 73630 X-RAY EXAM OF FOOT: CPT | Mod: 26,LT,, | Performed by: RADIOLOGY

## 2023-03-02 PROCEDURE — 1159F MED LIST DOCD IN RCRD: CPT | Mod: CPTII,,, | Performed by: PODIATRIST

## 2023-03-02 PROCEDURE — 99214 OFFICE O/P EST MOD 30 MIN: CPT | Mod: S$PBB,,, | Performed by: PODIATRIST

## 2023-03-02 PROCEDURE — 1159F PR MEDICATION LIST DOCUMENTED IN MEDICAL RECORD: ICD-10-PCS | Mod: CPTII,,, | Performed by: PODIATRIST

## 2023-03-02 PROCEDURE — 4010F ACE/ARB THERAPY RXD/TAKEN: CPT | Mod: CPTII,,, | Performed by: PODIATRIST

## 2023-03-02 PROCEDURE — 99213 OFFICE O/P EST LOW 20 MIN: CPT | Mod: PBBFAC | Performed by: PODIATRIST

## 2023-03-02 PROCEDURE — 99999 PR PBB SHADOW E&M-EST. PATIENT-LVL III: CPT | Mod: PBBFAC,,, | Performed by: PODIATRIST

## 2023-03-02 PROCEDURE — 4010F PR ACE/ARB THEARPY RXD/TAKEN: ICD-10-PCS | Mod: CPTII,,, | Performed by: PODIATRIST

## 2023-03-02 PROCEDURE — 99999 PR PBB SHADOW E&M-EST. PATIENT-LVL III: ICD-10-PCS | Mod: PBBFAC,,, | Performed by: PODIATRIST

## 2023-03-02 PROCEDURE — 73630 X-RAY EXAM OF FOOT: CPT | Mod: TC,LT

## 2023-03-02 PROCEDURE — 1160F PR REVIEW ALL MEDS BY PRESCRIBER/CLIN PHARMACIST DOCUMENTED: ICD-10-PCS | Mod: CPTII,,, | Performed by: PODIATRIST

## 2023-03-02 PROCEDURE — 99214 PR OFFICE/OUTPT VISIT, EST, LEVL IV, 30-39 MIN: ICD-10-PCS | Mod: S$PBB,,, | Performed by: PODIATRIST

## 2023-03-02 PROCEDURE — 1160F RVW MEDS BY RX/DR IN RCRD: CPT | Mod: CPTII,,, | Performed by: PODIATRIST

## 2023-03-02 RX ORDER — ONDANSETRON 4 MG/1
TABLET, ORALLY DISINTEGRATING ORAL
Qty: 30 TABLET | Refills: 0 | Status: SHIPPED | OUTPATIENT
Start: 2023-03-02 | End: 2023-05-18

## 2023-03-02 RX ORDER — FLUCONAZOLE 200 MG/1
TABLET ORAL
Qty: 3 TABLET | Refills: 0 | Status: SHIPPED | OUTPATIENT
Start: 2023-03-02 | End: 2023-04-26

## 2023-03-03 LAB
LEFT EYE DM RETINOPATHY: NEGATIVE
RIGHT EYE DM RETINOPATHY: NEGATIVE

## 2023-03-03 NOTE — PROGRESS NOTES
Subjective:       Patient ID: Nathan Carlos is a 32 y.o. female.    Chief Complaint: Heel Pain (Patient complains of 7/10 pain at present to left heel. She states the peeling skin hurts the most. )      HPI: Nathan Carlos complains of moderate pains to the left foot. States pains are sharp and stabbing-like in nature. Pains are to the plantar foot, mostly with walking and standing. Rates the pains at approx. 7/10. States post-static dyskinesia. Denies any recent identifiable trauma. Does limp with gait. No NSAID medications thus far.  Patient also having complaints of bunion deformity.  Patient's Primary Care Provider is Qasim Paez NP.     Review of patient's allergies indicates:   Allergen Reactions    Amitriptyline      xerostomia    Amlodipine      DRY MOUTH    Tramadol Itching       Past Medical History:   Diagnosis Date    Carpal tunnel syndrome     Diabetes mellitus     GERD (gastroesophageal reflux disease)     Herpes simplex virus (HSV) infection     High serum testosterone 9/7/2017    Hyperlipidemia     Hypertension     Insulin resistance syndrome 9/7/2017    Lumbar facet arthropathy 8/14/2019    Morbid obesity     Ovarian cyst     bilateral        Family History   Problem Relation Age of Onset    Hypertension Mother     Fibroids Mother     Arthritis Mother     No Known Problems Father     Diabetes Sister     Hypertension Brother     Hypertension Maternal Aunt     Diabetes Maternal Aunt     Arthritis Maternal Aunt     Diabetes Maternal Uncle     Hypertension Maternal Grandmother     Leukemia Maternal Grandfather     Hypertension Maternal Grandfather     Stroke Neg Hx        Social History     Socioeconomic History    Marital status:     Number of children: 0   Occupational History     Employer: [id number]   Tobacco Use    Smoking status: Never    Smokeless tobacco: Never   Substance and Sexual Activity    Alcohol use: Never    Drug use: No    Sexual activity: Yes      Partners: Male     Birth control/protection: Other-see comments, None     Comment: Birth control pill     Social Determinants of Health     Financial Resource Strain: Low Risk     Difficulty of Paying Living Expenses: Not hard at all   Food Insecurity: No Food Insecurity    Worried About Running Out of Food in the Last Year: Never true    Ran Out of Food in the Last Year: Never true   Transportation Needs: No Transportation Needs    Lack of Transportation (Medical): No    Lack of Transportation (Non-Medical): No   Physical Activity: Inactive    Days of Exercise per Week: 0 days    Minutes of Exercise per Session: 0 min   Stress: Stress Concern Present    Feeling of Stress : Rather much   Social Connections: Unknown    Frequency of Communication with Friends and Family: More than three times a week    Frequency of Social Gatherings with Friends and Family: Twice a week    Active Member of Clubs or Organizations: No    Attends Club or Organization Meetings: Never    Marital Status:    Housing Stability: Low Risk     Unable to Pay for Housing in the Last Year: No    Number of Places Lived in the Last Year: 1    Unstable Housing in the Last Year: No       Past Surgical History:   Procedure Laterality Date    ANKLE SURGERY Left     BUNIONECTOMY Bilateral     CARPAL TUNNEL RELEASE Left 03/19/2021    Procedure: RELEASE, CARPAL TUNNEL;  Surgeon: Juan F Bernard MD;  Location: [facility] OR;  Service: Orthopedics;  Laterality: Left;    CARPAL TUNNEL RELEASE Right 11/04/2021    Procedure: RELEASE, CARPAL TUNNEL;  Surgeon: Juan F Bernard MD;  Location: [facility] OR;  Service: Orthopedics;  Laterality: Right;    COLONOSCOPY  06/12/2018    EPIDURAL STEROID INJECTION N/A 11/23/2022    Procedure: Lumbar L5/S1 IL BRUNILDA RN IV Sedation;  Surgeon: Eula Rojas MD;  Location: [facility] PAIN MGT;  Service: Pain Management;  Laterality: N/A;    EPIDURAL STEROID INJECTION N/A 2/17/2023    Procedure: Lumbar L5/S1 IL BRUNILDA with left  paramedian approach RN IV Sedation;  Surgeon: Eula Rojas MD;  [facility];  Service: Pain Management;  Laterality: N/A;    ESOPHAGOGASTRODUODENOSCOPY      ESOPHAGOGASTRODUODENOSCOPY N/A 12/02/2021    Procedure: EGD (ESOPHAGOGASTRODUODENOSCOPY);  Surgeon: Pili Eagle MD;  Location: [facility];  Service: Endoscopy;  Laterality: N/A;    INJECTION OF ANESTHETIC AGENT AROUND MEDIAL BRANCH NERVES INNERVATING LUMBAR FACET JOINT Bilateral 08/14/2019    Procedure: Bilateral L3-5 MBB;  Surgeon: Yo Kirkland MD;  Location: [facility] PAIN [facility]GT;  Service: Pain Management;  Laterality: Bilateral;    INJECTION OF ANESTHETIC AGENT AROUND MEDIAL BRANCH NERVES INNERVATING LUMBAR FACET JOINT Bilateral 07/16/2020    Procedure: Bilateral L3-5 MBB;  Surgeon: Yo Kirkland MD;  Location: [facility];  Service: Pain Management;  Laterality: Bilateral;    INJECTION OF ANESTHETIC AGENT INTO SACROILIAC JOINT Right 02/05/2020    Procedure: Right SIJ Injection with local;  Surgeon: Yo Kirkland MD;  Location: [facility];  Service: Pain Management;  Laterality: Right;    INJECTION OF ANESTHETIC AGENT INTO SACROILIAC JOINT Left 06/17/2021    Procedure: Left GT bursa + left SIJ injection;  Surgeon: Yo Kirkland MD;  [facility];  Service: Pain Management;  Laterality: Left;    INJECTION OF ANESTHETIC AGENT INTO SACROILIAC JOINT Bilateral 05/05/2022    Procedure: Bilateral BLOCK, SACROILIAC JOINT and Bilateral GTB RN IV sedation;  Surgeon: Yo Kirkland MD;  Location: [facility];  Service: Pain Management;  Laterality: Bilateral;    INJECTION OF JOINT Left 06/17/2021    Procedure: Left GT bursa + left SIJ injection;  Surgeon: Yo Kirkland MD;  Location: [facility];  Service: Pain Management;  Laterality: Left;    INJECTION OF JOINT Bilateral 12/9/2022    Procedure: Bilateral GT bursa + bilateral SIJ injection RN IV Sedation;  Surgeon: Eula Rojas MD;  Location: [facility];  Service: Pain Management;   Laterality: Bilateral;    LUMBAR FUSION      RADIOFREQUENCY THERMOCOAGULATION Right 12/08/2020    Procedure: RADIOFREQUENCY THERMAL COAGULATION Right L3, 4,5 MBB RFA with RN IV sedation// NO STEROID;  Surgeon: Yo Kirkland MD;  Location: [facility];  Service: Pain Management;  Laterality: Right;    RADIOFREQUENCY THERMOCOAGULATION Left 01/21/2021    Procedure: Left L3-5 Lumbar RFA;  Surgeon: Rian Chaney MD;  Location: [facility];  Service: Pain Management;  Laterality: Left;    SELECTIVE INJECTION OF ANESTHETIC AGENT AROUND LUMBAR SPINAL NERVE ROOT BY TRANSFORAMINAL APPROACH Left 03/08/2022    Procedure: BLOCK, SPINAL NERVE ROOT, LUMBAR, SELECTIVE, TRANSFORAMINAL APPROACH Left L5-S1, S1 RN IV sedation;  Surgeon: Yo Kirkland MD;  Location: [facility];  Service: Pain Management;  Laterality: Left;    TRANSFORAMINAL EPIDURAL INJECTION OF STEROID Left 07/14/2022    Procedure: left L4/5 + L5/S1 TF BRUNILDA RN IV Sedation;  Surgeon: Eula Rojas MD;  Location: [facility];  Service: Pain Management;  Laterality: Left;       Review of Systems      Objective:   There were no vitals taken for this visit.    X-Ray Foot Complete Left  Narrative: EXAMINATION:  XR FOOT COMPLETE 3 VIEW LEFT    CLINICAL HISTORY:  . Hallux valgus (acquired), left foot    TECHNIQUE:  AP, lateral, and oblique views of the foot were performed.    COMPARISON:  01/23/2023    FINDINGS:  A mild hallux valgus deformity is noted.  There are postoperative changes associated with the 1st metatarsal.  Small dorsal and plantar calcaneal enthesophytes are noted.  Postsurgical changes seen associated with the ankle.  The joint spaces appear to be well maintained.  Impression: As above    Electronically signed by: Stephon Benjamin DO  Date:    03/02/2023  Time:    11:44      Physical Exam  LOWER EXTREMITY PHYSICAL EXAMINATION    VASCULAR: The right DP pulse is 2/4 and the left DP is 2/4. The right PT pulse is 2/4 and the left PT pulse is 2/4. Proximal  to distal, warm to warm. No dependent rubor or elevation palor is noted. Capillary refill time is less than 3 seconds. Hair growth is appreciated to the dorsal foot and digits.    NEUROLOGY: Proprioception is intact, bilateral. Sensation to light touch is intact. Negative Tinel's Sign and negative Valleix Sign. No neurological sensations with compression of the area of Ji's Nerve in the area of the Abductor Hallucis muscle belly.    ORTHOPEDIC:  Moderate tenderness to palpation of the area around the plantar medial calcaneal tubercle on the left foot. Pains are characterized as sharp and stabbing-like with direct palpation of the area. There is also mild pain to palpation of the immediate plantar aspect of the heel, and no pains to the lateral band of the fascia. No edema is noted. No fullness is noted. No pains or defects are noted within the plantar fascia at the arch. No plantar fibromas are noted. No defects are noted within the ligament. Dorsiflexion of the MTPJs with simultaneous palpation of the fascia at the arch, does worsen and exacerbate the pains. No pains with medial to lateral compression of the heel. Equinus contracture is noted. Antalgic gait pattern is noted.  Mild moderate size bunion deformity present with hallux valgus.  There is noted to have elevation of the 1st ray.    DERMATOLOGY: No ecchymosis is noted.  Skin is dry, flaking, and xerotic.No hyperkeratosis noted. No calluses.  No open wounds or ulcerations are noted.  No palpable plantar fibromas noted.    Assessment:     1. Plantar fasciitis - Left Foot    2. Hallux abductovalgus with bunions, left    3. Type 2 diabetes mellitus without complication, without long-term current use of insulin    4. Morbid obesity with BMI of 40.0-44.9, adult          Plan:     Plantar fasciitis - Left Foot    Hallux abductovalgus with bunions, left  -     X-Ray Foot Complete Left; Future; Expected date: 03/02/2023    Type 2 diabetes mellitus without  complication, without long-term current use of insulin    Morbid obesity with BMI of 40.0-44.9, adult        Thorough discussion is had with the patient today concerning the diagnosis, its etiology, and the treatment algorithm at present.    I explained to the patient that etiology and all treatment options for heel pain including rest,  ice messages, stretching exercises, strappings/tappings, NSAID's, injections, new shoegear with orthotic inserts, and/or surgical treatment. I also discussed a possible injection of steroid to help calm down the inflammation sooner. I expressed the importance of wearing the orthotics in culmination of other treatment modalities. Patient agreed to stretching exercises and inserts. I gave written and verbal instructions on heel cord stretching and this was demonstrated for the patient. Patient expressed understanding and had the patient teach back the instructions.  Patient instructed on adequate icing techniques which should be done for acute pain and inflammation.    Discussed the importance of stretching to the posterior muscle groups of the gastrocnemius and the soleus.  A stretching sheet was provided to the patient in conjunction with a Thera-Band.  I do recommend patient perform stretching exercises 4-6 times per day and holding the stretches for approximately 15-30 seconds apiece.  We discussed importance of stretching as relates to lengthening the posterior muscle group which can decrease drain on the posterior aspect of the heel as well as the plantar aspect of the heel.  This will also decrease pain associated with post static dyskinesia.  Teach back mechanism was performed with the patient demonstrating the stretching exercises.    A prescription was provided to the patient for orthotics.  We discussed the importance of wearing the orthotics to help elevate the arch which will allow for tension to be lessened to the plantar fascia and posterior heel cord.  Discussed the  importance of the break-in period with the inserts by wearing them 2-3 hours for the 1st day and increasing their use an hour each day until able to tolerate a full work period.    Obtain x-rays of the bunion deformity to have better appreciation of the underlying pathology.  Discuss results patient.    Patient follow-up in 4-6 weeks.        Future Appointments   Date Time Provider Department Center   3/13/2023  9:00 AM Darlin Ivesron PA-C [facility]

## 2023-03-05 ENCOUNTER — PATIENT MESSAGE (OUTPATIENT)
Dept: PODIATRY | Facility: CLINIC | Age: 33
End: 2023-03-05
Payer: MEDICAID

## 2023-03-13 ENCOUNTER — OFFICE VISIT (OUTPATIENT)
Dept: PAIN MEDICINE | Facility: CLINIC | Age: 33
End: 2023-03-13
Payer: MEDICAID

## 2023-03-13 ENCOUNTER — TELEPHONE (OUTPATIENT)
Dept: PAIN MEDICINE | Facility: CLINIC | Age: 33
End: 2023-03-13
Payer: MEDICAID

## 2023-03-13 ENCOUNTER — PATIENT MESSAGE (OUTPATIENT)
Dept: PAIN MEDICINE | Facility: CLINIC | Age: 33
End: 2023-03-13

## 2023-03-13 DIAGNOSIS — M54.16 LUMBAR RADICULOPATHY: ICD-10-CM

## 2023-03-13 PROCEDURE — 1159F PR MEDICATION LIST DOCUMENTED IN MEDICAL RECORD: ICD-10-PCS | Mod: CPTII,95,, | Performed by: PHYSICIAN ASSISTANT

## 2023-03-13 PROCEDURE — 4010F ACE/ARB THERAPY RXD/TAKEN: CPT | Mod: CPTII,95,, | Performed by: PHYSICIAN ASSISTANT

## 2023-03-13 PROCEDURE — 99214 PR OFFICE/OUTPT VISIT, EST, LEVL IV, 30-39 MIN: ICD-10-PCS | Mod: 95,,, | Performed by: PHYSICIAN ASSISTANT

## 2023-03-13 PROCEDURE — 99214 OFFICE O/P EST MOD 30 MIN: CPT | Mod: 95,,, | Performed by: PHYSICIAN ASSISTANT

## 2023-03-13 PROCEDURE — 1160F RVW MEDS BY RX/DR IN RCRD: CPT | Mod: CPTII,95,, | Performed by: PHYSICIAN ASSISTANT

## 2023-03-13 PROCEDURE — 1159F MED LIST DOCD IN RCRD: CPT | Mod: CPTII,95,, | Performed by: PHYSICIAN ASSISTANT

## 2023-03-13 PROCEDURE — 4010F PR ACE/ARB THEARPY RXD/TAKEN: ICD-10-PCS | Mod: CPTII,95,, | Performed by: PHYSICIAN ASSISTANT

## 2023-03-13 PROCEDURE — 1160F PR REVIEW ALL MEDS BY PRESCRIBER/CLIN PHARMACIST DOCUMENTED: ICD-10-PCS | Mod: CPTII,95,, | Performed by: PHYSICIAN ASSISTANT

## 2023-03-13 RX ORDER — TIZANIDINE 4 MG/1
4 TABLET ORAL EVERY 8 HOURS
Qty: 90 TABLET | Refills: 2 | Status: SHIPPED | OUTPATIENT
Start: 2023-04-11 | End: 2023-06-21 | Stop reason: SDUPTHER

## 2023-03-13 NOTE — PROGRESS NOTES
Chief Pain Complaint:  Lumbar Back Pain    The patient location is: [place]  The chief complaint leading to consultation is: chronic pain     Visit type: audiovisual    Face to Face time with patient: 10-15 minutes  20 minutes of total time spent on the encounter, which includes face to face time and non-face to face time preparing to see the patient (eg, review of tests), Obtaining and/or reviewing separately obtained history, Documenting clinical information in the electronic or other health record, Independently interpreting results (not separately reported) and communicating results to the patient/family/caregiver, or Care coordination (not separately reported).     Each patient to whom he or she provides medical services by telemedicine is:  (1) informed of the relationship between the physician and patient and the respective role of any other health care provider with respect to management of the patient; and (2) notified that he or she may decline to receive medical services by telemedicine and may withdraw from such care at any time.        History of Present Illness:     Interval History (3/13/2023): Nathan Carlos presents today via telemed for follow-up visit.  she underwent Lumbar L5/S1 IL BRUNILDA with left paramedian approach on 2/17/23.  The patient reports that she is/was better following the procedure.  she reports 80% pain relief.  The changes lasted 4 weeks so far.  The changes have continued through this visit.  She reports that the injection helped her a lot, and states that her low back pain is now intermittent instead of constant.  She is also able to stand a little bit longer.  She has since began walking daily and is performing physician directed exercises at home.  She takes tizanidine as needed for muscle pain, and reports that this is helpful.  Patient reports pain as 2/10 today.      Interval History (1/9/2023): Nathan Carlos presents today for follow-up visit.  she underwent lumbar  L5/S1 IL BRUNILDA on 11/23/2022 followed by bilateral SIJ + GT bursa injection on 12/9/22.  The patient reports that she is/was better following the procedure.  she reports 80-90% pain relief from SIJ injection and 60% relief from BRUNILDA. Reports continued leg pain/burning/tingling, but improved since injection.  Patient reports pain as 10/10 today.      Interval History (10/20/2022):  Nathan Carlos presents today for follow-up visit.  Patient was last seen on 8/18/2022. Last injection Left L4/5 +L5/S1 TF BRUNILDA on 07/14/2021. Patient reports pain as 8/10 today. Reports she has had several episodes during which her left leg gave out. She also reports pain in her lower lumbar spine in a band-like distribution with radiation into her lower extremities left > right. Pain interferes with daily activities.      Interval History (8/18/2022): Nathan Carlos presents today for follow-up visit.  she underwent Left L4/5 +L5/S1 TF BRUNILDA on 7/14/22.  The patient reports that she is/was better following the procedure.  she reports 40% pain relief.  The changes lasted 4 weeks so far.  The changes have continued through this visit.  Patient reports pain as 6/10 today, 10/10 on her worst days. Reports she is still experiencing pain but is able to stand for somewhat longer periods of time. Continues to report pain in left buttock down posterior thigh, but does admit she is now having pain in the right lumbar region now as well. She reports she takes tizanidine three times daily on bad days, and this seems to help.      Interval History (6/2/2022): Nathan Carlos presents today for follow-up visit.  she underwent bilateral SIJ + GTB injection on 5/5/22.  The patient reports that she is/was better following the procedure.  she reports 60% pain relief.  The changes lasted 4 weeks so far.  The changes have continued through this visit.  Reports back and buttock pain improved, but reports continued pain along her left leg.  Reports pain along anterior, lateral, and posterior aspect of left leg, radiating down to foot at times. Patient reports pain as 6/10 today. Reports that previous epidural did not offer much relief.    IMPRESSION  1. ABNORMAL study  2. There is electrodiagnostic evidence of an acute on chronic radiculopathy of the LEFT S1 nerve root and a subacute on chronic radiculopathy of the LEFT L5 nerve root     Interval HPI 04/14/2022  Nathan Carlos is a 32 y.o. female  who is presenting with a chief complaint of Lumbar Back Pain. The patient began experiencing this problem insidiously, and the pain has been gradually worsening over the past 3 month(s). The pain is described as throbbing, cramping, burning and electrical and is located in the left lumbar spine . Pain is intermittent and lasts hours. The pain radiates to  left leg. The patient rates her pain a 8 out of ten and interferes with activities of daily living a 8 out of ten. Pain is exacerbated by flexion of the lumbar spine, and is improved by rest. Patient reports no prior trauma, no prior spinal surgery     Interval HPI 03/31/2022  Nathan Carlos is a 32 y.o. female  who is presenting with a chief complaint of Low-back Pain. The patient began experiencing this problem insidiously, and the pain has been gradually worsening. The pain is described as throbbing, cramping, aching and heavy and is located in the bilateral lumbar spine . Pain is intermittent and lasts hours. The  pain is nonradiating. The patient rates her pain a 8 out of ten and interferes with activities of daily living a 7 out of ten. Pain is exacerbated by extension of the lumbar spine, and is improved by rest. Patient reports no prior trauma, no prior spinal surgery     - pertinent negatives: No fever, No chills, No weight loss, No bladder dysfunction, No bowel dysfunction, No saddle anesthesia  - pertinent positives: none    - medications, other therapies tried (physical therapy,  injections):     >> NSAIDs, Tylenol, Norco, zanaflex and flexeril (no relief), topamax (no relief)    >> Has previously undergone Physical Therapy    >> Has previously undergone spinal injection/s   - Left SI joint injection 1/2018 with 100% relief for 6 months   - Right L3, L4, L5 MBB with local on 8/15/18 with 90% pain relief   - left SIJ + left GT bursa injection with local on 11/8/18 with good relief of bursitis but only 10% of SIJ pain   - left L3-5 MBB with local on 5/16/19 with 90% pain relief   - bilateral L3-5 MBB on 8/14/19 with limited relief   - right SIJ injection on 2/5/20 with 80% pain relief   - bilateral L3-5 MBB on 7/16/2020 with 100% relief of lumbar back pain   - right L3-5 RFA on 12/08/2020 with 100% pain relief   - left L3-5 RFA on 1/21/21 with at least 50% pain relief   - left SIJ + left GT bursa injection on 6/17/21 with at least 40% pain relief    - L5-S1, S1 TFESI on 3/8/22 with 60% Pain relief    -bilateral SIJ + GTB injection on 5/5/22 with 60% relief   -lumbar L5/S1 IL BRUNILDA on 11/23/2022 with 60% relief   -bilateral SIJ + GT bursa injection on 12/9/22 with 80-90% relief  -Lumbar L5/S1 IL BRUNILDA with left paramedian approach on 2/17/23 with 80% relief    Imaging / Labs / Studies (reviewed on 3/13/2023):  MRI lumbar spine 12/16/2021    FINDINGS:  Detail limited by poor signal noise ratio.  Prominent edema within the subcutaneous soft tissues of the lower back.  Stable anatomic alignment, unchanged since 11/22/2017.  The vertebral body heights are maintained.  Mild disc desiccation at L4-L5 is noted.  No significant disc space narrowing.  The distal tip of the conus medullaris terminates near the L2 level.  There are no significant areas of disc bulge or protrusion.  Minimal grade 1 retrolisthesis of L4 on L5 is noted following the administration of intravenous contrast, no abnormal areas of enhancement are noted.     L1-L2: No spinal canal or neural foraminal encroachment.     L2-L3: No spinal  canal or neural foraminal encroachment.     L3-L4: No spinal canal or neural foraminal encroachment.     L4-L5: Mild facet arthropathy.  No spinal canal or neural foraminal encroachment.     L5-S1: Mild bilateral facet arthropathy.  No spinal canal or neural foraminal encroachment.     Impression:     Mild lower lumbar spine degenerative changes as above.    Results for orders placed during the hospital encounter of 11/22/17   MRI Lumbar Spine Without Contrast    Narrative Technique: Standard noncontrast multiplanar multisequence imaging of the lumbar spine was performed.  Findings: There is anatomic spinal alignment.  Disc interspaces are of normal height and signal intensity.  Vertebral marrow signal pattern and architecture are normal.  Distal cord is unremarkable with conus medullaris terminating at L1-L2.  Paravertebral soft tissues are symmetric and normal in appearance.  T12-L1: No disc protrusion, neuroforaminal narrowing, or central canal stenosis.  L1-L2: No disc protrusion, neuroforaminal narrowing, or central canal stenosis.  L2-L3: No disc protrusion, neuroforaminal narrowing, or central canal stenosis.  L3-L4: No disc protrusion, neuroforaminal narrowing, or central canal stenosis.  L4-L5: No disc protrusion, neuroforaminal narrowing, or central canal stenosis.  L5-S1: Mild bilateral facet hypertrophy. No disc protrusion, neuroforaminal narrowing, or central canal stenosis.    Impression  Negative MRI of the lumbar spine.     Results for orders placed during the hospital encounter of 01/04/18   X-Ray Lumbar Complete With Flex And Ext    Narrative Comparison: None  Technique: AP, lateral, lateral flexion, lateral extension, bilateral oblique, and lumbosacral coned down views were obtained of the lumbar spine  Findings: There is mild scoliosis of the lower thoracic and upper lumbar spine.  Vertebral body heights are well-maintained.  No spondylolisthesis demonstrated.  No change in spinal alignment with  flexion or extension to suggest instability. Intervertebral disk spaces are well preserved.  No pars defects visualized.  Posterior elements appear grossly intact. No acute fractures or subluxations are demonstrated.  The remaining visualized osseous and soft tissue structures demonstrate no appreciable abnormality.    Impression As above.  No acute findings.         Review of Systems:  CONSTITUTIONAL: patient denies any fever, chills, or weight loss  SKIN: patient denies any rash or itching  RESPIRATORY: patient denies having any shortness of breath  GASTROINTESTINAL: patient denies having any diarrhea, constipation, or bowel incontinence  GENITOURINARY: patient denies having any abnormal bladder function    MUSCULOSKELETAL:  - patient complains of the above noted pain/s (see chief pain complaint)    NEUROLOGICAL:   - pain as above  - strength in Lower extremities is intact, BILATERALLY  - sensation in Lower extremities is intact, BILATERALLY  - patient denies any loss of bowel or bladder control      PSYCHIATRIC: patient denies any change in mood    Other:  All other systems reviewed and are negative        Physical Exam:  Telemedicine Exam  There were no vitals filed for this visit.   There is no height or weight on file to calculate BMI.   (reviewed on 3/13/2023)    GENERAL: Well appearing, in no acute distress, alert and oriented x3.  obese  PSYCH:  Mood and affect appropriate.  SKIN: Skin color, texture, turgor normal, no rashes or lesions.  HEAD/FACE:  Normocephalic, atraumatic. Cranial nerves grossly intact.  PULM:  No difficulty breathing  Neuro/MSK:  BACK: Palpation over the lumbar paraspinous muscles causes some pain. Some pain with flexion, extension, or lateral flexion - L>R.    EXTREMITIES: Peripheral joint active ROM is full and pain free without obvious instability or laxity in all four extremities. No deformities, edema, or skin discoloration.   MUSCULOSKELETAL: No atrophy or tone abnormalities are  noted.  NEURO:  Able to toe walk and heel walk without difficulty  GAIT: normal.        Physical Exam: last in clinic visit:  General: alert and oriented, in no apparent distress, obese.  Gait: normal gait.  Skin: no rashes, no discoloration, no obvious lesions  HEENT: normocephalic, atraumatic. Pupils equal and round.  Cardiovascular: no significant peripheral edema present.  Respiratory: without use of accessory muscles of respiration.    Musculoskeletal - Lumbar Spine:  - Pain on flexion of lumbar spine: Present   - Pain on extension of lumbar spine: Present  - Lumbar facet loading: Present bilaterally  - TTP over the lumbar facet joints: Present on left at L5-S1   - TTP over the para lumbar muscles on left   - TTP over the SI joints: Present bilaterally  - TTP over GT bursa: Present bilaterally  - TTP over piriformis: absent  - Straight Leg Raise: equivocal on right, positive on left  - JULISSA: Positive bilaterally    Neuro - Extremities:  - BUE Strength:R/L: D: 5/5; B: 5/5; T: 5/5; WF: 5/5; WE: 5/5; IO: 5/5  - BLE Strength: R/L: HF: 5/5, HE: 5/5, KF: 5/5; KE: 5/4; FE: 5/5; FF: 5/5  - Extremity Reflexes: Brisk and symmetric throughout  - Sensory: Sensation to light touch intact bilaterally      Psych:  Mood and affect is appropriate          Assessment:  Nathan Carlos is a 32 y.o. year old female who is presenting with   No diagnosis found.        Plan:  1. Interventional:  That this time, consider repeat lumbar BRUNILDA versus SIJ +GT bursa injection should symptoms return or exacerbate  -s/p Lumbar L5/S1 IL BRUNILDA with left paramedian approach on 2/17/23 with 80% relief    -s/p lumbar L5/S1 IL BRUNILDA on 11/23/2022 with 60% relief  -s/p bilateral SIJ + GT bursa injection on 12/9/22 with 80-90% relief    -s/p Left L4/5 +L5/S1 TF BRUNILDA with 40% relief  -s/p bilateral SI and GTB with 60% relief  - S/p  L5-S1, S1 TFESI on 3/8/22 with 60% Pain relief.   - S/p left SIJ + left GT bursa injection on 6/17/21 with 40% pain  relief.   - S/p left L3-5 RFA on 1/21/21 with at least 50% pain relief.   - S/p right L3-5 RFA on 12/08/2020 with 100% pain relief.   - S/p bilateral L3-5 MBB on 7/16/2020 with 100% relief of lumbar back pain.  - S/p Right SIJ injection on 2/5/20 with 80% pain relief.       2. Pharmacologic:   - Disontinued Gabapentin 300 mg Po QHs with up titration  - Discontinued diclofenac 1% gel to apply 2g topically up to 4 times per day     - Continue Tizanidne 4 mg TID PRN.  She can take up to QID PRN while pain Increased.  - Anticoagulation use: None.     3. Rehabilitative: Encouraged regular exercise. Continue exercises and activities as tolerated at home. Consider restarting physical therapy since this helped in the past.     4. Diagnostic: Updated lumbar MRI reviewed. Lower ext EMG shows electrodiagnostic evidence of an acute on chronic radiculopathy of the LEFT S1 nerve root and a subacute on chronic radiculopathy of the LEFT L5 nerve root.     5. Follow up: 3 months or PRN    Other: patient has paperwork for Disability. Discussed our goals of IPM as they pertain limiting time off of work and getting patients back into the work force    Darlin Iverson PA-C  Interventional Pain Medicine

## 2023-03-13 NOTE — TELEPHONE ENCOUNTER
----- Message from Sis Martinez sent at 3/13/2023  8:38 AM CDT -----  Contact: lissette  Patient states that transportation cancel her ride and she would like to know if her visit can be changed to virtual. Please call her back at [phone].          Thanks  DD

## 2023-04-03 ENCOUNTER — PATIENT MESSAGE (OUTPATIENT)
Dept: PAIN MEDICINE | Facility: CLINIC | Age: 33
End: 2023-04-03
Payer: MEDICAID

## 2023-04-03 DIAGNOSIS — M54.16 LUMBAR RADICULOPATHY: ICD-10-CM

## 2023-04-10 ENCOUNTER — OFFICE VISIT (OUTPATIENT)
Dept: PODIATRY | Facility: CLINIC | Age: 33
End: 2023-04-10
Payer: MEDICAID

## 2023-04-10 VITALS — BODY MASS INDEX: 43.87 KG/M2 | WEIGHT: 257 LBS | HEIGHT: 64 IN

## 2023-04-10 DIAGNOSIS — T81.9XXS FAILED BUNIONECTOMY, SEQUELA: ICD-10-CM

## 2023-04-10 DIAGNOSIS — E11.9 TYPE 2 DIABETES MELLITUS WITHOUT COMPLICATION, WITHOUT LONG-TERM CURRENT USE OF INSULIN: ICD-10-CM

## 2023-04-10 DIAGNOSIS — M21.612 HALLUX ABDUCTOVALGUS WITH BUNIONS, LEFT: Primary | ICD-10-CM

## 2023-04-10 DIAGNOSIS — E66.01 MORBID OBESITY WITH BMI OF 40.0-44.9, ADULT: ICD-10-CM

## 2023-04-10 DIAGNOSIS — Z98.890 HISTORY OF BUNIONECTOMY OF LEFT GREAT TOE: ICD-10-CM

## 2023-04-10 DIAGNOSIS — M20.12 HALLUX ABDUCTOVALGUS WITH BUNIONS, LEFT: Primary | ICD-10-CM

## 2023-04-10 PROCEDURE — 99215 OFFICE O/P EST HI 40 MIN: CPT | Mod: PBBFAC | Performed by: PODIATRIST

## 2023-04-10 PROCEDURE — 1159F PR MEDICATION LIST DOCUMENTED IN MEDICAL RECORD: ICD-10-PCS | Mod: CPTII,,, | Performed by: PODIATRIST

## 2023-04-10 PROCEDURE — 99999 PR PBB SHADOW E&M-EST. PATIENT-LVL V: ICD-10-PCS | Mod: PBBFAC,,, | Performed by: PODIATRIST

## 2023-04-10 PROCEDURE — 3008F PR BODY MASS INDEX (BMI) DOCUMENTED: ICD-10-PCS | Mod: CPTII,,, | Performed by: PODIATRIST

## 2023-04-10 PROCEDURE — 1160F RVW MEDS BY RX/DR IN RCRD: CPT | Mod: CPTII,,, | Performed by: PODIATRIST

## 2023-04-10 PROCEDURE — 1160F PR REVIEW ALL MEDS BY PRESCRIBER/CLIN PHARMACIST DOCUMENTED: ICD-10-PCS | Mod: CPTII,,, | Performed by: PODIATRIST

## 2023-04-10 PROCEDURE — 3008F BODY MASS INDEX DOCD: CPT | Mod: CPTII,,, | Performed by: PODIATRIST

## 2023-04-10 PROCEDURE — 1159F MED LIST DOCD IN RCRD: CPT | Mod: CPTII,,, | Performed by: PODIATRIST

## 2023-04-10 PROCEDURE — 99214 PR OFFICE/OUTPT VISIT, EST, LEVL IV, 30-39 MIN: ICD-10-PCS | Mod: S$PBB,,, | Performed by: PODIATRIST

## 2023-04-10 PROCEDURE — 4010F PR ACE/ARB THEARPY RXD/TAKEN: ICD-10-PCS | Mod: CPTII,,, | Performed by: PODIATRIST

## 2023-04-10 PROCEDURE — 4010F ACE/ARB THERAPY RXD/TAKEN: CPT | Mod: CPTII,,, | Performed by: PODIATRIST

## 2023-04-10 PROCEDURE — 99214 OFFICE O/P EST MOD 30 MIN: CPT | Mod: S$PBB,,, | Performed by: PODIATRIST

## 2023-04-10 PROCEDURE — 99999 PR PBB SHADOW E&M-EST. PATIENT-LVL V: CPT | Mod: PBBFAC,,, | Performed by: PODIATRIST

## 2023-04-10 NOTE — PROGRESS NOTES
Subjective:       Patient ID: Nathan Carlos is a 32 y.o. female.    Chief Complaint: Follow-up (5 week f/u for PF, left foot, 0 improvement, rates pain 6/10, x-ray review, discuss surgery today, diabetic, wears tennis and socks, last seen PCP Qasim Paez on 01/24/23)      HPI: Nathan Carlos presents to the office today with complaints of moderate pains to the left foot at the great toe due to bunion deformity.  Patient relates that she had a bunion surgery performed approximately 16 years ago by Dr. Frederick Delgado at [facility].  Relates that she did have subsequent hardware removal thereafter.  Continues to have a bunion which is present secondary to failed bunion procedure and recurrence.  Patient states pains are recalcitrant to NSAID therapy and wider width shoe gear. Patient has no had injection therapy to the 1st MTPJ on the affected limb prior. Patient has had discomfort to the great toe for the past several years. Patient is indeed interested in surgical intervention and/or cure for alleviation of symptoms. Patient's Primary Care Provider is Qasim Paez NP.     Review of patient's allergies indicates:   Allergen Reactions    Amitriptyline      xerostomia    Amlodipine      DRY MOUTH    Tramadol Itching       Past Medical History:   Diagnosis Date    Carpal tunnel syndrome     Diabetes mellitus     GERD (gastroesophageal reflux disease)     Herpes simplex virus (HSV) infection     High serum testosterone 9/7/2017    Hyperlipidemia     Hypertension     Insulin resistance syndrome 9/7/2017    Lumbar facet arthropathy 8/14/2019    Morbid obesity     Ovarian cyst     bilateral        Family History   Problem Relation Age of Onset    Hypertension Mother     Fibroids Mother     Arthritis Mother     No Known Problems Father     Diabetes Sister     Hypertension Brother     Hypertension Maternal Aunt     Diabetes Maternal Aunt     Arthritis Maternal Aunt     Diabetes Maternal Uncle      Hypertension Maternal Grandmother     Leukemia Maternal Grandfather     Hypertension Maternal Grandfather     Stroke Neg Hx        Social History     Socioeconomic History    Marital status:     Number of children: 0   Occupational History     Employer: [id number]   Tobacco Use    Smoking status: Never    Smokeless tobacco: Never   Substance and Sexual Activity    Alcohol use: Never    Drug use: No    Sexual activity: Yes     Partners: Male     Birth control/protection: Other-see comments, None     Comment: Birth control pill     Social Determinants of Health     Financial Resource Strain: Low Risk     Difficulty of Paying Living Expenses: Not hard at all   Food Insecurity: No Food Insecurity    Worried About Running Out of Food in the Last Year: Never true    Ran Out of Food in the Last Year: Never true   Transportation Needs: No Transportation Needs    Lack of Transportation (Medical): No    Lack of Transportation (Non-Medical): No   Physical Activity: Inactive    Days of Exercise per Week: 0 days    Minutes of Exercise per Session: 0 min   Stress: Stress Concern Present    Feeling of Stress : Rather much   Social Connections: Unknown    Frequency of Communication with Friends and Family: More than three times a week    Frequency of Social Gatherings with Friends and Family: Twice a week    Active Member of Clubs or Organizations: No    Attends Club or Organization Meetings: Never    Marital Status:    Housing Stability: Low Risk     Unable to Pay for Housing in the Last Year: No    Number of Places Lived in the Last Year: 1    Unstable Housing in the Last Year: No       Past Surgical History:   Procedure Laterality Date    ANKLE SURGERY Left     BUNIONECTOMY Bilateral     CARPAL TUNNEL RELEASE Left 03/19/2021    Procedure: RELEASE, CARPAL TUNNEL;  Surgeon: Juan F Bernard MD;  Location: [facility];  Service: Orthopedics;  Laterality: Left;    CARPAL TUNNEL RELEASE Right 11/04/2021     Procedure: RELEASE, CARPAL TUNNEL;  Surgeon: Juan F Bernard MD;  Location: [facility] OR;  Service: Orthopedics;  Laterality: Right;    COLONOSCOPY  06/12/2018    EPIDURAL STEROID INJECTION N/A 11/23/2022    Procedure: Lumbar L5/S1 IL BRUNILDA RN IV Sedation;  Surgeon: Eula Rojas MD;  Location: [facility]IN [facility];  Service: Pain Management;  Laterality: N/A;    EPIDURAL STEROID INJECTION N/A 2/17/2023    Procedure: Lumbar L5/S1 IL BRUNILDA with left paramedian approach RN IV Sedation;  Surgeon: Eula Rojas MD;  Location: [facility];  Service: Pain Management;  Laterality: N/A;    ESOPHAGOGASTRODUODENOSCOPY      ESOPHAGOGASTRODUODENOSCOPY N/A 12/02/2021    Procedure: EGD (ESOPHAGOGASTRODUODENOSCOPY);  Surgeon: Pili Eagle MD;  Location: [facility];  Service: Endoscopy;  Laterality: N/A;    INJECTION OF ANESTHETIC AGENT AROUND MEDIAL BRANCH NERVES INNERVATING LUMBAR FACET JOINT Bilateral 08/14/2019    Procedure: Bilateral L3-5 MBB;  Surgeon: Yo Kirkland MD;  Location: [facility];  Service: Pain Management;  Laterality: Bilateral;    INJECTION OF ANESTHETIC AGENT AROUND MEDIAL BRANCH NERVES INNERVATING LUMBAR FACET JOINT Bilateral 07/16/2020    Procedure: Bilateral L3-5 MBB;  Surgeon: Yo Kirkland MD;  Location: [facility];  Service: Pain Management;  Laterality: Bilateral;    INJECTION OF ANESTHETIC AGENT INTO SACROILIAC JOINT Right 02/05/2020    Procedure: Right SIJ Injection with local;  Surgeon: Yo Kirkland MD;  Location: [facility];  Service: Pain Management;  Laterality: Right;    INJECTION OF ANESTHETIC AGENT INTO SACROILIAC JOINT Left 06/17/2021    Procedure: Left GT bursa + left SIJ injection;  Surgeon: Yo Kirkland MD;  Location: [facility];  Service: Pain Management;  Laterality: Left;    INJECTION OF ANESTHETIC AGENT INTO SACROILIAC JOINT Bilateral 05/05/2022    Procedure: Bilateral BLOCK, SACROILIAC JOINT and Bilateral GTB RN IV sedation;  Surgeon: Yo Kirkland MD;  Location: [facility];   "Service: Pain Management;  Laterality: Bilateral;    INJECTION OF JOINT Left 06/17/2021    Procedure: Left GT bursa + left SIJ injection;  Surgeon: Yo Kirkland MD;  Location: [facility];  Service: Pain Management;  Laterality: Left;    INJECTION OF JOINT Bilateral 12/9/2022    Procedure: Bilateral GT bursa + bilateral SIJ injection RN IV Sedation;  Surgeon: Eula Rojas MD;  Location: [facility];  Service: Pain Management;  Laterality: Bilateral;    LUMBAR FUSION      RADIOFREQUENCY THERMOCOAGULATION Right 12/08/2020    Procedure: RADIOFREQUENCY THERMAL COAGULATION Right L3, 4,5 MBB RFA with RN IV sedation// NO STEROID;  Surgeon: Yo Kirkland MD;  Location: [facility];  Service: Pain Management;  Laterality: Right;    RADIOFREQUENCY THERMOCOAGULATION Left 01/21/2021    Procedure: Left L3-5 Lumbar RFA;  Surgeon: Rian Chaney MD;  Location: [facility];  Service: Pain Management;  Laterality: Left;    SELECTIVE INJECTION OF ANESTHETIC AGENT AROUND LUMBAR SPINAL NERVE ROOT BY TRANSFORAMINAL APPROACH Left 03/08/2022    Procedure: BLOCK, SPINAL NERVE ROOT, LUMBAR, SELECTIVE, TRANSFORAMINAL APPROACH Left L5-S1, S1 RN IV sedation;  Surgeon: Yo Kirkland MD;  Location: [facility];  Service: Pain Management;  Laterality: Left;    TRANSFORAMINAL EPIDURAL INJECTION OF STEROID Left 07/14/2022    Procedure: left L4/5 + L5/S1 TF BRUNILDA RN IV Sedation;  Surgeon: Eula Rojas MD;  Location: [facility];  Service: Pain Management;  Laterality: Left;       Review of Systems    Objective:   Ht 5' 4" (1.626 m)   Wt 116.6 kg (257 lb)   BMI 44.11 kg/m²     X-Ray Foot Complete Left  Narrative: EXAMINATION:  XR FOOT COMPLETE 3 VIEW LEFT    CLINICAL HISTORY:  . Hallux valgus (acquired), left foot    TECHNIQUE:  AP, lateral, and oblique views of the foot were performed.    COMPARISON:  01/23/2023    FINDINGS:  A mild hallux valgus deformity is noted.  There are postoperative changes associated with the 1st " metatarsal.  Small dorsal and plantar calcaneal enthesophytes are noted.  Postsurgical changes seen associated with the ankle.  The joint spaces appear to be well maintained.  Impression: As above    Electronically signed by: Stephon Benjamin DO  Date:    03/02/2023  Time:    11:44       Physical Exam  LOWER EXTREMITY PHYSICAL EXAMINATION    VASCULAR: On the left foot, the dorsalis pedis pulse is 2/4 and the posterior tibial pulse is 2/4. Capillary refill time is less than 3 seconds. Hair growth is present on the dorsum of the foot and at the digits. Proximal to distal temperature is warm to warm    ORTHOPEDIC:  Moderate bunion deformity is noted to the left foot. Upon ROM in the sagittal plan, there is mild to moderate pain related at the end ROM, dorsally; no plantar pains at the 1st MTPJ are stated. No pains to palpation of the tibial or the fibular sesamoid. There is a moderate medial eminence appreciated. There is no dorsal spurring noted. Palpation of the dorsal-lateral aspect of the 1st MTPJ is slightly painful. Hallux abductus is  noted.  Hallux interphalangeus is not noted. There is tracking. The hallux is  trackbound. There is instability noted at the 1st TMTJ. Upon reduction of the IM angle at the 1st metatarsal head, the hallux is rectus.     DERMATOLOGY: Skin is supple, dry and intact. No ecchymosis is noted. No ulcerations are noted. Skin is supple and moist.     NEUROLOGY: Protective sensation is intact via 5.07 [place] monofilament. Proprioception is intact. Sensation to light touch is intact.     Assessment:     1. Hallux abductovalgus with bunions, left    2. Failed bunionectomy, sequela    3. History of bunionectomy of left great toe    4. Type 2 diabetes mellitus without complication, without long-term current use of insulin    5. Morbid obesity with BMI of 40.0-44.9, adult        Plan:     Hallux abductovalgus with bunions, left  -     Case Request Operating Room: BHARAT OWENS  OSTEOTOMY, AKIN    Failed bunionectomy, sequela  -     Case Request Operating Room: BUNIONECTOMY, LAPIDUS, OSTEOTOMY, AKIN    History of bunionectomy of left great toe  -     Case Request Operating Room: BUNIONECTOMY, LAPIDUS, OSTEOTOMY, AKIN    Type 2 diabetes mellitus without complication, without long-term current use of insulin    Morbid obesity with BMI of 40.0-44.9, adult        Thorough discussion is had with the patient today, concerning the diagnosis, its etiology, and the treatment algorithm at present.    X-rays were taken and reviewed by myself with the patient the room.  Hallux abducto valgus is noted.  Intermetatarsal angle of 11°.  Hallux abductus angle 36°.  Tibial sesamoid position of 6°.  There is noted to be 9° of elevatus on weight-bearing secondary to under corrected procedure.    Foot counseling and education is provided at this visit. Patient is advised to wear socks and shoes at all times.  Do not walk barefoot, or with just socks, even when indoors.  Be sure to check and inspect the inside of the shoe before putting them on her feet.  Protect your feet at all times.  Walking shoes and/or athletic shoes are the best types of shoe gear. Do not wear vinyl or plastic type shoe gear, as they do not stretch and/or breathe.  Protect your feet from hot and/or cold. Elevate the extremities when sitting.  Do not wear excessively tight socks and/or shoe gear. Wiggle your toes for a few minutes throughout the day. Move your ankles up and down, in and out, to help blood flow in your lower extremity.    Did discuss surgical intervention detail.  We discussed correction of the bunion with derotation of the 1st metatarsal bone.  We discussed complications associated with bunion procedure.  No guarantees were promised or implied.    The procedure of (left 1st metatarsophalangeal joint arthrodesis, left [place]) was thoroughly explained to the patient. Its necessity and/or purpose and the implications therein  were outlined, including any pertinent advantages and/or disadvantages, and possible complications, if any. Possible complications include recurrence of pathology and/or deformity, infection (cellulitis, drainage, purulence, malodor, etc...), pain, numbness, neuritis, edema, burning, loss of function, need for further surgery, possible need for removal of any implanted hardware, soft tissue contracture and/or scarring, etc... No guarantees were given and/or implied. Post-operative expectations and weightbearing protocol is thoroughly explained the patient, who acknowledges understanding. Rx. is given for pre-operative labs and for medical clearance by patient's PMD/[facility] PAT staff.      Patient is counseled and reminded concerning the importance of good nutrition and healthy eating habits, which may include blood sugar control, to prevent and/or help podiatric foot and ankle complications.    Discussed DVT prophylaxis.  Will have patient utilize aspirin 81 mg b.i.d. day following surgery to reduce risk of blood clots/DVT.    Continue to keep A1c below 7.0        Future Appointments   Date Time Provider Department Center   6/5/2023 10:40 AM Darlin Iverson PA-C [facility] INT [facility]

## 2023-04-11 ENCOUNTER — PATIENT MESSAGE (OUTPATIENT)
Dept: FAMILY MEDICINE | Facility: CLINIC | Age: 33
End: 2023-04-11
Payer: MEDICAID

## 2023-04-11 DIAGNOSIS — I10 ESSENTIAL HYPERTENSION: ICD-10-CM

## 2023-04-11 RX ORDER — METOPROLOL SUCCINATE 50 MG/1
50 TABLET, EXTENDED RELEASE ORAL DAILY
Qty: 90 TABLET | Refills: 0 | Status: SHIPPED | OUTPATIENT
Start: 2023-04-11 | End: 2023-07-10

## 2023-05-08 DIAGNOSIS — E78.00 ELEVATED LDL CHOLESTEROL LEVEL: ICD-10-CM

## 2023-05-08 DIAGNOSIS — E11.9 TYPE 2 DIABETES MELLITUS WITHOUT COMPLICATION, WITHOUT LONG-TERM CURRENT USE OF INSULIN: ICD-10-CM

## 2023-05-09 RX ORDER — ATORVASTATIN CALCIUM 40 MG/1
TABLET, FILM COATED ORAL
Qty: 90 TABLET | Refills: 1 | Status: SHIPPED | OUTPATIENT
Start: 2023-05-09 | End: 2023-11-07

## 2023-05-11 DIAGNOSIS — Z01.818 PRE-OP EXAM: Primary | ICD-10-CM

## 2023-05-15 ENCOUNTER — OFFICE VISIT (OUTPATIENT)
Dept: INTERNAL MEDICINE | Facility: CLINIC | Age: 33
End: 2023-05-15
Payer: MEDICAID

## 2023-05-15 ENCOUNTER — PATIENT MESSAGE (OUTPATIENT)
Dept: PREADMISSION TESTING | Facility: HOSPITAL | Age: 33
End: 2023-05-15
Payer: MEDICAID

## 2023-05-15 ENCOUNTER — PATIENT MESSAGE (OUTPATIENT)
Dept: SURGERY | Facility: HOSPITAL | Age: 33
End: 2023-05-15
Payer: MEDICAID

## 2023-05-15 ENCOUNTER — LAB VISIT (OUTPATIENT)
Dept: LAB | Facility: HOSPITAL | Age: 33
End: 2023-05-15
Attending: PHYSICIAN ASSISTANT
Payer: MEDICAID

## 2023-05-15 VITALS
SYSTOLIC BLOOD PRESSURE: 134 MMHG | TEMPERATURE: 98 F | OXYGEN SATURATION: 98 % | HEART RATE: 75 BPM | DIASTOLIC BLOOD PRESSURE: 82 MMHG

## 2023-05-15 DIAGNOSIS — Z01.818 PRE-OP EXAM: ICD-10-CM

## 2023-05-15 DIAGNOSIS — I10 ESSENTIAL HYPERTENSION: ICD-10-CM

## 2023-05-15 DIAGNOSIS — G89.29 CHRONIC BILATERAL LOW BACK PAIN WITH LEFT-SIDED SCIATICA: ICD-10-CM

## 2023-05-15 DIAGNOSIS — E78.00 HYPERCHOLESTEREMIA: ICD-10-CM

## 2023-05-15 DIAGNOSIS — B00.9 HSV-2 (HERPES SIMPLEX VIRUS 2) INFECTION: ICD-10-CM

## 2023-05-15 DIAGNOSIS — M54.42 CHRONIC BILATERAL LOW BACK PAIN WITH LEFT-SIDED SCIATICA: ICD-10-CM

## 2023-05-15 DIAGNOSIS — E11.9 TYPE 2 DIABETES MELLITUS WITHOUT COMPLICATION, WITHOUT LONG-TERM CURRENT USE OF INSULIN: ICD-10-CM

## 2023-05-15 DIAGNOSIS — K21.9 GASTROESOPHAGEAL REFLUX DISEASE, UNSPECIFIED WHETHER ESOPHAGITIS PRESENT: ICD-10-CM

## 2023-05-15 DIAGNOSIS — E28.2 POLYCYSTIC OVARIES: ICD-10-CM

## 2023-05-15 DIAGNOSIS — M21.612 BUNION OF GREAT TOE OF LEFT FOOT: ICD-10-CM

## 2023-05-15 DIAGNOSIS — E66.01 MORBID OBESITY WITH BMI OF 40.0-44.9, ADULT: ICD-10-CM

## 2023-05-15 LAB
ALBUMIN SERPL BCP-MCNC: 4 G/DL (ref 3.5–5.2)
ALP SERPL-CCNC: 55 U/L (ref 55–135)
ALT SERPL W/O P-5'-P-CCNC: 32 U/L (ref 10–44)
ANION GAP SERPL CALC-SCNC: 9 MMOL/L (ref 8–16)
AST SERPL-CCNC: 20 U/L (ref 10–40)
BASOPHILS # BLD AUTO: 0.03 K/UL (ref 0–0.2)
BASOPHILS NFR BLD: 0.3 % (ref 0–1.9)
BILIRUB SERPL-MCNC: 0.2 MG/DL (ref 0.1–1)
BUN SERPL-MCNC: 11 MG/DL (ref 6–20)
CALCIUM SERPL-MCNC: 10.1 MG/DL (ref 8.7–10.5)
CHLORIDE SERPL-SCNC: 105 MMOL/L (ref 95–110)
CO2 SERPL-SCNC: 27 MMOL/L (ref 23–29)
CREAT SERPL-MCNC: 0.7 MG/DL (ref 0.5–1.4)
DIFFERENTIAL METHOD: ABNORMAL
EOSINOPHIL # BLD AUTO: 0.1 K/UL (ref 0–0.5)
EOSINOPHIL NFR BLD: 0.9 % (ref 0–8)
ERYTHROCYTE [DISTWIDTH] IN BLOOD BY AUTOMATED COUNT: 13.1 % (ref 11.5–14.5)
EST. GFR  (NO RACE VARIABLE): >60 ML/MIN/1.73 M^2
ESTIMATED AVG GLUCOSE: 137 MG/DL (ref 68–131)
GLUCOSE SERPL-MCNC: 78 MG/DL (ref 70–110)
HBA1C MFR BLD: 6.4 % (ref 4–5.6)
HCT VFR BLD AUTO: 44.3 % (ref 37–48.5)
HGB BLD-MCNC: 14.4 G/DL (ref 12–16)
IMM GRANULOCYTES # BLD AUTO: 0.03 K/UL (ref 0–0.04)
IMM GRANULOCYTES NFR BLD AUTO: 0.3 % (ref 0–0.5)
LYMPHOCYTES # BLD AUTO: 4.1 K/UL (ref 1–4.8)
LYMPHOCYTES NFR BLD: 40.2 % (ref 18–48)
MCH RBC QN AUTO: 28.3 PG (ref 27–31)
MCHC RBC AUTO-ENTMCNC: 32.5 G/DL (ref 32–36)
MCV RBC AUTO: 87 FL (ref 82–98)
MONOCYTES # BLD AUTO: 1.1 K/UL (ref 0.3–1)
MONOCYTES NFR BLD: 10.8 % (ref 4–15)
NEUTROPHILS # BLD AUTO: 4.9 K/UL (ref 1.8–7.7)
NEUTROPHILS NFR BLD: 47.5 % (ref 38–73)
NRBC BLD-RTO: 0 /100 WBC
PLATELET # BLD AUTO: 267 K/UL (ref 150–450)
PMV BLD AUTO: 11.8 FL (ref 9.2–12.9)
POTASSIUM SERPL-SCNC: 4.4 MMOL/L (ref 3.5–5.1)
PROT SERPL-MCNC: 6.7 G/DL (ref 6–8.4)
RBC # BLD AUTO: 5.09 M/UL (ref 4–5.4)
SODIUM SERPL-SCNC: 141 MMOL/L (ref 136–145)
WBC # BLD AUTO: 10.23 K/UL (ref 3.9–12.7)

## 2023-05-15 PROCEDURE — 93010 ELECTROCARDIOGRAM REPORT: CPT | Mod: ,,, | Performed by: INTERNAL MEDICINE

## 2023-05-15 PROCEDURE — 36415 COLL VENOUS BLD VENIPUNCTURE: CPT | Performed by: PHYSICIAN ASSISTANT

## 2023-05-15 PROCEDURE — 99999 PR PBB SHADOW E&M-EST. PATIENT-LVL V: ICD-10-PCS | Mod: PBBFAC,,,

## 2023-05-15 PROCEDURE — 83036 HEMOGLOBIN GLYCOSYLATED A1C: CPT | Performed by: PHYSICIAN ASSISTANT

## 2023-05-15 PROCEDURE — 80053 COMPREHEN METABOLIC PANEL: CPT | Performed by: PHYSICIAN ASSISTANT

## 2023-05-15 PROCEDURE — 85025 COMPLETE CBC W/AUTO DIFF WBC: CPT | Performed by: PHYSICIAN ASSISTANT

## 2023-05-15 PROCEDURE — 93005 ELECTROCARDIOGRAM TRACING: CPT

## 2023-05-15 PROCEDURE — 99215 OFFICE O/P EST HI 40 MIN: CPT | Mod: PBBFAC

## 2023-05-15 PROCEDURE — 99999 PR PBB SHADOW E&M-EST. PATIENT-LVL V: CPT | Mod: PBBFAC,,,

## 2023-05-15 PROCEDURE — 93010 EKG 12-LEAD: ICD-10-PCS | Mod: ,,, | Performed by: INTERNAL MEDICINE

## 2023-05-15 NOTE — PROGRESS NOTES
Preoperative History and Physical  [facility]                                                                   Chief Complaint: Preoperative evaluation     History of Present Illness:      Nathan Carlos is a 32 y.o. female with hypertension, hyperlipidemia, type 2 diabetes, controlled anxiety depression, reflux, history of elevated liver enzymes, BMI 44 who presents to the office today for a preoperative consultation at the request of Dr. Cohn  who plans on performing left 1st metatarsophalangeal joint arthrodesis, left Akin on May 23.  No prior complications with anesthesia no family history of malignant hyperthermia or pseudocholinesterase deficiency    Functional Status:      The patient is able to climb a flight of stairs. The patient is able to ambulate  without difficulty. The patient's functional status is affected by the surgical problem. The patient's functional status is not affected by shortness of breath, chest pain, dyspnea on exertion and fatigue.  Can walk to mailbox , can walk in neighborhood.   MET score greater than 4    Past Medical History:      Past Medical History:   Diagnosis Date    Anxiety     Carpal tunnel syndrome     Diabetes mellitus     Diabetes mellitus, type 2     GERD (gastroesophageal reflux disease)     Herpes simplex virus (HSV) infection     High serum testosterone 09/07/2017    Hyperlipidemia     Hypertension     Insulin resistance syndrome 09/07/2017    Lumbar facet arthropathy 08/14/2019    Morbid obesity     Ovarian cyst     bilateral         Past Surgical History:      Past Surgical History:   Procedure Laterality Date    ANKLE SURGERY Left     BUNIONECTOMY Bilateral     CARPAL TUNNEL RELEASE Left 03/19/2021    Procedure: RELEASE, CARPAL TUNNEL;  Surgeon: Juan F Bernard MD;  Location: [facility];  Service: Orthopedics;  Laterality: Left;    CARPAL TUNNEL RELEASE Right 11/04/2021    Procedure: RELEASE,  CARPAL TUNNEL;  Surgeon: Juan F Bernard MD;  Location: [facility] OR;  Service: Orthopedics;  Laterality: Right;    COLONOSCOPY  06/12/2018    EPIDURAL STEROID INJECTION N/A 11/23/2022    Procedure: Lumbar L5/S1 IL BRUNILDA RN IV Sedation;  Surgeon: Eula Rojas MD;  Location: [facility] PAIN [facility]GT;  Service: Pain Management;  Laterality: N/A;    EPIDURAL STEROID INJECTION N/A 2/17/2023    Procedure: Lumbar L5/S1 IL BRUNILDA with left paramedian approach RN IV Sedation;  Surgeon: Eula Rojas MD;  Location: [facility];  Service: Pain Management;  Laterality: N/A;    ESOPHAGOGASTRODUODENOSCOPY      ESOPHAGOGASTRODUODENOSCOPY N/A 12/02/2021    Procedure: EGD (ESOPHAGOGASTRODUODENOSCOPY);  Surgeon: Pili Eagle MD;  Location: [facility];  Service: Endoscopy;  Laterality: N/A;    INJECTION OF ANESTHETIC AGENT AROUND MEDIAL BRANCH NERVES INNERVATING LUMBAR FACET JOINT Bilateral 08/14/2019    Procedure: Bilateral L3-5 MBB;  Surgeon: Yo Kirkland MD;  Location: [facility];  Service: Pain Management;  Laterality: Bilateral;    INJECTION OF ANESTHETIC AGENT AROUND MEDIAL BRANCH NERVES INNERVATING LUMBAR FACET JOINT Bilateral 07/16/2020    Procedure: Bilateral L3-5 MBB;  Surgeon: Yo Kirkland MD;  Location: [facility];  Service: Pain Management;  Laterality: Bilateral;    INJECTION OF ANESTHETIC AGENT INTO SACROILIAC JOINT Right 02/05/2020    Procedure: Right SIJ Injection with local;  Surgeon: Yo Kirkland MD;  Location: [facility];  Service: Pain Management;  Laterality: Right;    INJECTION OF ANESTHETIC AGENT INTO SACROILIAC JOINT Left 06/17/2021    Procedure: Left GT bursa + left SIJ injection;  Surgeon: Yo Kirkland MD;  Location: [facility];  Service: Pain Management;  Laterality: Left;    INJECTION OF ANESTHETIC AGENT INTO SACROILIAC JOINT Bilateral 05/05/2022    Procedure: Bilateral BLOCK, SACROILIAC JOINT and Bilateral GTB RN IV sedation;  Surgeon: Yo Kirkland MD;  Location: [facility];  Service: Pain  Management;  Laterality: Bilateral;    INJECTION OF JOINT Left 06/17/2021    Procedure: Left GT bursa + left SIJ injection;  Surgeon: Yo Kirkland MD;  Location: [facility]IN [facility];  Service: Pain Management;  Laterality: Left;    INJECTION OF JOINT Bilateral 12/9/2022    Procedure: Bilateral GT bursa + bilateral SIJ injection RN IV Sedation;  Surgeon: Eula Rojas MD;  Location: [facility];  Service: Pain Management;  Laterality: Bilateral;    LUMBAR FUSION      RADIOFREQUENCY THERMOCOAGULATION Right 12/08/2020    Procedure: RADIOFREQUENCY THERMAL COAGULATION Right L3, 4,5 MBB RFA with RN IV sedation// NO STEROID;  Surgeon: Yo Kirkland MD;  Location: [facility];  Service: Pain Management;  Laterality: Right;    RADIOFREQUENCY THERMOCOAGULATION Left 01/21/2021    Procedure: Left L3-5 Lumbar RFA;  Surgeon: Rian Chaney MD;  Location: [facility];  Service: Pain Management;  Laterality: Left;    SELECTIVE INJECTION OF ANESTHETIC AGENT AROUND LUMBAR SPINAL NERVE ROOT BY TRANSFORAMINAL APPROACH Left 03/08/2022    Procedure: BLOCK, SPINAL NERVE ROOT, LUMBAR, SELECTIVE, TRANSFORAMINAL APPROACH Left L5-S1, S1 RN IV sedation;  Surgeon: Yo Kirkland MD;  Location: [facility];  Service: Pain Management;  Laterality: Left;    TRANSFORAMINAL EPIDURAL INJECTION OF STEROID Left 07/14/2022    Procedure: left L4/5 + L5/S1 TF BRUNILDA RN IV Sedation;  Surgeon: Eula Rojas MD;  Location: [facility];  Service: Pain Management;  Laterality: Left;        Social History:      Social History     Socioeconomic History    Marital status:     Number of children: 0   Occupational History     Employer: [id number]   Tobacco Use    Smoking status: Never    Smokeless tobacco: Never   Substance and Sexual Activity    Alcohol use: Never    Drug use: No    Sexual activity: Yes     Partners: Male     Birth control/protection: Other-see comments, None     Comment: Birth control pill     Social Determinants of Health      Financial Resource Strain: Low Risk     Difficulty of Paying Living Expenses: Not hard at all   Food Insecurity: No Food Insecurity    Worried About Running Out of Food in the Last Year: Never true    Ran Out of Food in the Last Year: Never true   Transportation Needs: No Transportation Needs    Lack of Transportation (Medical): No    Lack of Transportation (Non-Medical): No   Physical Activity: Inactive    Days of Exercise per Week: 0 days    Minutes of Exercise per Session: 0 min   Stress: Stress Concern Present    Feeling of Stress : Rather much   Social Connections: Unknown    Frequency of Communication with Friends and Family: More than three times a week    Frequency of Social Gatherings with Friends and Family: Twice a week    Active Member of Clubs or Organizations: No    Attends Club or Organization Meetings: Never    Marital Status:    Housing Stability: Low Risk     Unable to Pay for Housing in the Last Year: No    Number of Places Lived in the Last Year: 1    Unstable Housing in the Last Year: No        Family History:      Family History   Problem Relation Age of Onset    Hypertension Mother     Fibroids Mother     Arthritis Mother     Diabetes Sister     Hypertension Brother     No Known Problems Brother     Hypertension Maternal Aunt     Diabetes Maternal Aunt     Arthritis Maternal Aunt     Diabetes Maternal Uncle     Hypertension Maternal Grandmother     Leukemia Maternal Grandfather     Hypertension Maternal Grandfather     Stroke Neg Hx        Allergies:      Review of patient's allergies indicates:   Allergen Reactions    Amitriptyline      xerostomia    Amlodipine      DRY MOUTH    Tramadol Itching       Medications:      Current Outpatient Medications   Medication Sig    atorvastatin (LIPITOR) 40 MG tablet TAKE ONE TABLET BY MOUTH EVERY NIGHT    blood sugar diagnostic Strp To check BG 2 times daily, to use with insurance preferred meter    blood-glucose meter kit To check BG 2 times  daily, to use with insurance preferred meter    blood-glucose meter,continuous (DEXCOM G6 ) Misc Use as directed,  to be changed every 365 days.    clotrimazole-betamethasone 1-0.05% (LOTRISONE) cream Apply topically 2 (two) times daily.    dicyclomine (BENTYL) 20 mg tablet TAKE ONE TABLET BY MOUTH THREE TIMES A DAY AS NEEDED FOR ABDOMINAL PAIN    ergocalciferol (ERGOCALCIFEROL) 50,000 unit Cap TAKE 1 CAPSULE BY MOUTH ONCE A WEEK FOR 28 DAYS    fluconazole (DIFLUCAN) 200 MG Tab TAKE 1 TABLET BY MOUTH EVERY 72 HOURS FOR 3 DOSES    glimepiride (AMARYL) 4 MG tablet TAKE ONE TABLET BY MOUTH EVERY DAY IN THE MORNING WITH BREAKFAST    irbesartan (AVAPRO) 150 MG tablet Take 1 tablet (150 mg total) by mouth once daily.    JANUVIA 100 mg Tab TAKE 1 TABLET BY MOUTH EVERY DAY    lancets Misc To check BG 2 times daily, to use with insurance preferred meter    LEVEMIR FLEXTOUCH U-100 INSULN 100 unit/mL (3 mL) InPn pen INJECT 20 UNITS EVERY EVENING    linaCLOtide (LINZESS) 145 mcg Cap capsule Take 1 capsule (145 mcg total) by mouth before breakfast.    metFORMIN (GLUCOPHAGE-XR) 500 MG ER 24hr tablet TAKE 2 TABLETS BY MOUTH TWO TIMES A DAY WITH MEALS    metoprolol succinate (TOPROL-XL) 50 MG 24 hr tablet Take 1 tablet (50 mg total) by mouth once daily.    omeprazole/sodium bicarbonate (ZEGERID ORAL) Take by mouth.    ondansetron (ZOFRAN-ODT) 4 MG TbDL DISSOLVE 1-2 TABLETS BY MOUTH EVERY SIX TO EIGHT HOURS AS NEEDED    semaglutide (OZEMPIC) 0.25 mg or 0.5 mg(2 mg/1.5 mL) pen injector Inject 0.5 mg into the skin every 7 days.    simethicone (GAS-X ORAL) Take by mouth.    spironolactone (ALDACTONE) 25 MG tablet Take 2 tablets by mouth once daily    tiZANidine (ZANAFLEX) 4 MG tablet Take 1 tablet (4 mg total) by mouth every 8 (eight) hours.    valACYclovir (VALTREX) 500 MG tablet TAKE ONE TABLET BY MOUTH TWO TIMES A DAY FOR 3 DAYS    zolpidem (AMBIEN) 5 MG Tab Take 1 tablet (5 mg total) by mouth nightly as needed (for  "sleep).    blood-glucose sensor (DEXCOM G6 SENSOR) Selin Use as directed, change sensor every 10 days (Patient not taking: Reported on 5/15/2023)    blood-glucose transmitter (DEXCOM G6 TRANSMITTER) Selin Use as directed, change transmitter every 3 months (Patient not taking: Reported on 5/15/2023)    pen needle, diabetic (BD ULTRA-FINE GAYLA PEN NEEDLE) 32 gauge x 5/32" Ndle 1 Units by Misc.(Non-Drug; Combo Route) route once daily.     No current facility-administered medications for this visit.       Vitals:      There were no vitals filed for this visit.    Review of Systems:        Constitutional: Negative for fever, chills, , malaise/fatigue and diaphoresis.   HENT: Negative for hearing loss, ear pain, nosebleeds, congestion, sore throat, neck pain, tinnitus and ear discharge.    Eyes: Negative for blurred vision, double vision, photophobia, pain, discharge and redness.   Respiratory: Negative for cough, hemoptysis, sputum production, shortness of breath, wheezing and stridor.    Cardiovascular: Negative for chest pain, palpitations, orthopnea, claudication, leg swelling and PND.   Gastrointestinal: Negative for , nausea, vomiting, abdominal pain, diarrhea, , blood in stool and melena. + constipation ; + GERD   Genitourinary: Negative for dysuria, urgency, frequency, hematuria and flank pain.   Musculoskeletal: Negative for myalgias, back pain, joint pain and falls.   Skin: Negative for itching and rash. Inferior breast rash come and goes, pt denies active lesions currently.   Neurological: Negative for dizziness, tingling, tremors, sensory change, speech change, focal weakness, seizures, loss of consciousness, weakness and headaches.   Endo/Heme/Allergies: Negative for environmental allergies and polydipsia..   Psychiatric/Behavioral: positive  for depression, anxiety   Physical Exam:      Constitutional: Appears well-developed, well-nourished and in no acute distress.  Patient is oriented to person, place, and " time.   Head: Normocephalic and atraumatic. Mucous membranes moist.  Neck: Neck supple  Cardiovascular: Normal rate and regular rhythm.  S1 S2 appreciated by ascultation.  Pulmonary/Chest: Effort normal and clear to auscultation bilaterally. No respiratory distress.   Abdomen: Soft. Non-tender and non-distended. Bowel sounds are normal.   Neurological: Patient is alert and oriented to person, place and time. Moves all extremities.  Skin: Warm and dry. No lesions.  Extremities: No clubbing, cyanosis or edema.    Laboratory data:      Reviewed and noted in plan where applicable. Please see chart for full laboratory data.    @UOACUUNFY30(cpk,cpkmb,troponini,mb)@ @IUIFGPZHV63(poctglucose)@     No results found for: INR, PROTIME    Lab Results   Component Value Date    WBC 12.78 (H) 10/13/2022    HGB 15.1 10/13/2022    HCT 45.6 10/13/2022    MCV 85 10/13/2022     10/13/2022       @GJQDBSSJM31(GLU,NA,K,Cl,CO2,BUN,Creatinine,Calcium,MG)@    Predictors of intubation difficulty:       Morbid obesity? BMI 44   Anatomically abnormal facies? no   Prominent incisors? no   Receding mandible? no   Short, thick neck? yes - short and thick neck   Neck range of motion: normal   Dentition:  intact  Based on the Modified Mallampati, patient is a mallampati score: II (hard and soft palate, upper portion of tonsils anduvula visible)    Cardiographics:      EC/15/23 NSR   Echocardiogram:  none    Imaging:      Chest x-ray:  none      Assessment and Plan:      Pre-op exam  S/p B bunionectomy in teens with Dr. Lalit Gleason plans for a left 1st metatarsophalangeal joint arthrodesis, left Akin on May 23   Known risk factors for perioperative complications: Diabetes mellitus  HLD, BMI 44    Difficulty with intubation is not anticipated.    Cardiac Risk Estimation:  Per Revised Cardiac Risk Index patient is a Class II risk with a 6.0% risk of a major cardiac event.      1.) Preoperative workup as follows: ECG, hemoglobin,  hematocrit, electrolytes, creatinine, glucose, liver function studies.  2.) Change in medication regimen before surgery: Hold NSAIDs 7 days preop, take half dose of insulin evening prior to procedure, no oral diabetic a.m. of surgery.  3.) Prophylaxis for cardiac events with perioperative beta-blockers:  Continue home metoprolol.  4.) Invasive hemodynamic monitoring perioperatively: not indicated.  5.) Deep vein thrombosis prophylaxis postoperatively: intermittent pneumatic compression boots and regimen to be chosen by surgical team.  6.) Surveillance for postoperative MI with ECG immediately postoperatively and on postoperati ve days 1 and 2 AND troponin levels 24 hours postoperatively and on day 4 or hospital discharge (whichever comes first): not indicated.  7.) Current medications which may produce withdrawal symptoms if withheld perioperatively: none  8.) Other measures: .      Bunion of great toe of left foot  Planned left 1st metatarsophalangeal joint arthrodesis, left Akin May 23     Chronic bilateral low back pain with left-sided sciatica  Pt followed by pain management   - S/p injections  - pt denies prior lumbar fusion   - keep f/u with pain management     Gastroesophageal reflux disease  - diet dependent   - can wake pt at night  - continue home OTC meds    Type 2 diabetes mellitus without complication, without long-term current use of insulin  - A1c pending  - continue home meds including Amaryl, januvia, metformin, insulin and ozempic  - A1c 6.7 11/2022  - AM glucose low 100s  - Continue to follow up with PCP       Essential hypertension  - continue home meds   - controlled   - Continue to follow up with PCP       Hypercholesteremia  - continue home statin  - seen by hepatology in past for elevated LFTs with concern for     Polycystic ovaries  - on spironolactone  - irregular cycles    Morbid obesity with BMI of 40.0-44.9, adult  - 40# weight loss with ozempic over last year       HSV-2 (herpes simplex  virus 2) infection  - patient denies any active lesions currently  Valtrex as needed

## 2023-05-15 NOTE — ASSESSMENT & PLAN NOTE
Pt followed by pain management   - S/p injections  - pt denies prior lumbar fusion   - keep f/u with pain management

## 2023-05-15 NOTE — DISCHARGE INSTRUCTIONS
To confirm, your doctor has instructed you that surgery is scheduled for 5/23/2023.       Pre admit office will call the afternoon prior to surgery between 1PM and 3PM with arrival time.    Surgery will be at [facility],  The address is [address]. [place]  [id number].      IMPORTANT INSTRUCTIONS!    Do not eat or drink after 12 midnight, including water.   Do not smoke or use chewing tobacco after 12 midnight  OK to brush teeth, but no gum, candy, or mints!      Take only these medicines with a small swallow of water-morning of surgery.     Bentyl, metoprolol         ____ Stop Aspirin, Ibuprofen, Motrin and Aleve at least 5-7 days before surgery, unless otherwise instructed by your doctor, or the nurse.   You MAY use Tylenol/acetaminophen until day of surgery.      ____  If you take diabetic medication, do NOT take morning of surgery unless instructed by Doctor. Metformin must be stopped 24 hrs prior to surgery time.       ____ Stop taking any Fish Oil supplements or Vitamins at least 5 days prior to surgery, unless instructed otherwise by your Doctor.       Please notify MD office if you have an active infection, currently taking antibiotics or received a vaccination within the past 7 days.      Bathing Instructions: The night before surgery and the morning prior to coming to the hospital:    - Shower & rinse your body as usual with anti-bacterial Soap (Dial or Cheli 2000)   -Hibiclens (if indicated) use AFTER anti-bacterial soap; 1 packet PM/1 packet in AM on surgical site only   -Do not use hibiclens on your head, face, or genitals.    -Do not wash with anti-bacterial soap after you use the hibiclens.    -Do not shave surgical site 5-7 days prior to surgery.    -Pubic hair 7 days prior to surgery (gyn pt's).      Pediatric patients do not need to use anti-bacterial soap or Hibiclens.             After Bathing:   __ No powder, lotions, creams, or body spray to skin     __No deodorant for  any breast procedure, PORT, or upper arm surgery     __ No makeup, mascara, nail polish or artificial nails        **SURGERY WILL BE CANCELLED IF ARTIFICIAL/NAIL POLISH IS PRESENT!!!**    __ Please remove all piercings and leave all jewelry at home.    **SURGERY WILL BE CANCELLED IF PIERCINGS ARE PRESENT!!!**      __ Dentures, Hearing Aids and Contact Lens need to be removed prior to the start of surgery.      __ Wear clean, loose-fitting clothing. Allow for dressings/bandages/surgical equipment     __ You must have transportation, and they MUST stay the entire time.       [facility] Visitor/Ride Policy:   Only 1 adult allowed (over the age of 18) to accompany you into Pre-op/Recovery Surgery Dept and must stay through the entire length of admission.     Must have a ride home from a responsible adult that you know and trust.      Pediatric Patients are allowed 2 adult visitors.     Medical Transport, Uber or Lyft can only be used if patient has a responsible adult to accompany them during ride home.         Post-Op Instructions: You will receive surgery post-op instructions by your Discharge Nurse prior to going home.     Surgical Site Infection:   Prevention of surgical site infections:   -Keep incisions clean and dry.   -Do not soak/submerge incisions in water until completely healed.   -Do not apply lotions, powders, creams, or deodorants to site.   -Always make sure hands are cleaned with antibacterial soap/ alcohol-based   prior to touching the surgical site.       Signs and symptoms:               -Redness and pain around the area where you had surgery               -Drainage of cloudy fluid from your surgical wound               -Fever over 100.4 or chills     >>>Call Surgeon office/on-call Surgeon if you experience any of these signs & symptoms post-surgery @ [phone].      *Please Call [facility] Pre-Admit Department for surgery instruction questions:  [phone]    *Payment  questions:  [phone]    *Billing questions:  [phone]

## 2023-05-15 NOTE — ASSESSMENT & PLAN NOTE
- A1c pending  - continue home meds including Amaryl, januvia, metformin, insulin and ozempic  - A1c 6.7 11/2022  - AM glucose low 100s  - Continue to follow up with PCP

## 2023-05-15 NOTE — ASSESSMENT & PLAN NOTE
S/p B bunionectomy in teens with Dr. Lalit Gleason plans for a left 1st metatarsophalangeal joint arthrodesis, left Akin on May 23   Known risk factors for perioperative complications: Diabetes mellitus  HLD, BMI 44    Difficulty with intubation is not anticipated.    Cardiac Risk Estimation:  Per Revised Cardiac Risk Index patient is a Class II risk with a 6.0% risk of a major cardiac event.      1.) Preoperative workup as follows: ECG, hemoglobin, hematocrit, electrolytes, creatinine, glucose, liver function studies.  2.) Change in medication regimen before surgery: Hold NSAIDs 7 days preop, take half dose of insulin evening prior to procedure, no oral diabetic a.m. of surgery.  3.) Prophylaxis for cardiac events with perioperative beta-blockers:  Continue home metoprolol.  4.) Invasive hemodynamic monitoring perioperatively: not indicated.  5.) Deep vein thrombosis prophylaxis postoperatively: intermittent pneumatic compression boots and regimen to be chosen by surgical team.  6.) Surveillance for postoperative MI with ECG immediately postoperatively and on postoperati ve days 1 and 2 AND troponin levels 24 hours postoperatively and on day 4 or hospital discharge (whichever comes first): not indicated.  7.) Current medications which may produce withdrawal symptoms if withheld perioperatively: none  8.) Other measures: .

## 2023-05-15 NOTE — PRE-PROCEDURE INSTRUCTIONS
To confirm, your doctor has instructed you that surgery is scheduled for 5/23/2023.       Pre admit office will call the afternoon prior to surgery between 1PM and 3PM with arrival time.    Surgery will be at [facility],  The address is [address]. [place]  [id number].      IMPORTANT INSTRUCTIONS!    Do not eat or drink after 12 midnight, including water.   Do not smoke or use chewing tobacco after 12 midnight  OK to brush teeth, but no gum, candy, or mints!      Take only these medicines with a small swallow of water-morning of surgery.     Bentyl, metoprolol         ____ Stop Aspirin, Ibuprofen, Motrin and Aleve at least 5-7 days before surgery, unless otherwise instructed by your doctor, or the nurse.   You MAY use Tylenol/acetaminophen until day of surgery.      ____  If you take diabetic medication, do NOT take morning of surgery unless instructed by Doctor. Metformin must be stopped 24 hrs prior to surgery time.       ____ Stop taking any Fish Oil supplements or Vitamins at least 5 days prior to surgery, unless instructed otherwise by your Doctor.       Please notify MD office if you have an active infection, currently taking antibiotics or received a vaccination within the past 7 days.      Bathing Instructions: The night before surgery and the morning prior to coming to the hospital:    - Shower & rinse your body as usual with anti-bacterial Soap (Dial or Cheli 2000)   -Hibiclens (if indicated) use AFTER anti-bacterial soap; 1 packet PM/1 packet in AM on surgical site only   -Do not use hibiclens on your head, face, or genitals.    -Do not wash with anti-bacterial soap after you use the hibiclens.    -Do not shave surgical site 5-7 days prior to surgery.    -Pubic hair 7 days prior to surgery (gyn pt's).      Pediatric patients do not need to use anti-bacterial soap or Hibiclens.             After Bathing:   __ No powder, lotions, creams, or body spray to skin     __No deodorant for  any breast procedure, PORT, or upper arm surgery     __ No makeup, mascara, nail polish or artificial nails        **SURGERY WILL BE CANCELLED IF ARTIFICIAL/NAIL POLISH IS PRESENT!!!**    __ Please remove all piercings and leave all jewelry at home.    **SURGERY WILL BE CANCELLED IF PIERCINGS ARE PRESENT!!!**      __ Dentures, Hearing Aids and Contact Lens need to be removed prior to the start of surgery.      __ Wear clean, loose-fitting clothing. Allow for dressings/bandages/surgical equipment     __ You must have transportation, and they MUST stay the entire time.       [facility] Visitor/Ride Policy:   Only 1 adult allowed (over the age of 18) to accompany you into Pre-op/Recovery Surgery Dept and must stay through the entire length of admission.     Must have a ride home from a responsible adult that you know and trust.      Pediatric Patients are allowed 2 adult visitors.     Medical Transport, Uber or Lyft can only be used if patient has a responsible adult to accompany them during ride home.         Post-Op Instructions: You will receive surgery post-op instructions by your Discharge Nurse prior to going home.     Surgical Site Infection:   Prevention of surgical site infections:   -Keep incisions clean and dry.   -Do not soak/submerge incisions in water until completely healed.   -Do not apply lotions, powders, creams, or deodorants to site.   -Always make sure hands are cleaned with antibacterial soap/ alcohol-based   prior to touching the surgical site.       Signs and symptoms:               -Redness and pain around the area where you had surgery               -Drainage of cloudy fluid from your surgical wound               -Fever over 100.4 or chills     >>>Call Surgeon office/on-call Surgeon if you experience any of these signs & symptoms post-surgery @ [phone].      *Please Call [facility] Pre-Admit Department for surgery instruction questions:  [phone]    *Payment  questions:  [phone]    *Billing questions:  [phone]

## 2023-05-15 NOTE — H&P (VIEW-ONLY)
Preoperative History and Physical  [facility]                                                                   Chief Complaint: Preoperative evaluation     History of Present Illness:      Nathan Carlos is a 32 y.o. female with hypertension, hyperlipidemia, type 2 diabetes, controlled anxiety depression, reflux, history of elevated liver enzymes, BMI 44 who presents to the office today for a preoperative consultation at the request of Dr. Cohn  who plans on performing left 1st metatarsophalangeal joint arthrodesis, left Akin on May 23.  No prior complications with anesthesia no family history of malignant hyperthermia or pseudocholinesterase deficiency    Functional Status:      The patient is able to climb a flight of stairs. The patient is able to ambulate  without difficulty. The patient's functional status is affected by the surgical problem. The patient's functional status is not affected by shortness of breath, chest pain, dyspnea on exertion and fatigue.  Can walk to mailbox , can walk in neighborhood.   MET score greater than 4    Past Medical History:      Past Medical History:   Diagnosis Date    Anxiety     Carpal tunnel syndrome     Diabetes mellitus     Diabetes mellitus, type 2     GERD (gastroesophageal reflux disease)     Herpes simplex virus (HSV) infection     High serum testosterone 09/07/2017    Hyperlipidemia     Hypertension     Insulin resistance syndrome 09/07/2017    Lumbar facet arthropathy 08/14/2019    Morbid obesity     Ovarian cyst     bilateral         Past Surgical History:      Past Surgical History:   Procedure Laterality Date    ANKLE SURGERY Left     BUNIONECTOMY Bilateral     CARPAL TUNNEL RELEASE Left 03/19/2021    Procedure: RELEASE, CARPAL TUNNEL;  Surgeon: Juan F Bernard MD;  Location: [facility];  Service: Orthopedics;  Laterality: Left;    CARPAL TUNNEL RELEASE Right 11/04/2021    Procedure: RELEASE,  CARPAL TUNNEL;  Surgeon: Juan F Bernard MD;  Location: [facility] OR;  Service: Orthopedics;  Laterality: Right;    COLONOSCOPY  06/12/2018    EPIDURAL STEROID INJECTION N/A 11/23/2022    Procedure: Lumbar L5/S1 IL BRUNILDA RN IV Sedation;  Surgeon: Eula Rojas MD;  Location: [facility] PAIN MGT;  Service: Pain Management;  Laterality: N/A;    EPIDURAL STEROID INJECTION N/A 2/17/2023    Procedure: Lumbar L5/S1 IL BRUNILDA with left paramedian approach RN IV Sedation;  Surgeon: Eula Rojas MD;  Location: [facility]IN [facility];  Service: Pain Management;  Laterality: N/A;    ESOPHAGOGASTRODUODENOSCOPY      ESOPHAGOGASTRODUODENOSCOPY N/A 12/02/2021    Procedure: EGD (ESOPHAGOGASTRODUODENOSCOPY);  Surgeon: Pili Eagle MD;  Location: [facility];  Service: Endoscopy;  Laterality: N/A;    INJECTION OF ANESTHETIC AGENT AROUND MEDIAL BRANCH NERVES INNERVATING LUMBAR FACET JOINT Bilateral 08/14/2019    Procedure: Bilateral L3-5 MBB;  Surgeon: Yo Kirkland MD;  Location: [facility]GT;  Service: Pain Management;  Laterality: Bilateral;    INJECTION OF ANESTHETIC AGENT AROUND MEDIAL BRANCH NERVES INNERVATING LUMBAR FACET JOINT Bilateral 07/16/2020    Procedure: Bilateral L3-5 MBB;  Surgeon: Yo Kirkland MD;  Location: [facility];  Service: Pain Management;  Laterality: Bilateral;    INJECTION OF ANESTHETIC AGENT INTO SACROILIAC JOINT Right 02/05/2020    Procedure: Right SIJ Injection with local;  Surgeon: Yo Kirkland MD;  Location: [facility];  Service: Pain Management;  Laterality: Right;    INJECTION OF ANESTHETIC AGENT INTO SACROILIAC JOINT Left 06/17/2021    Procedure: Left GT bursa + left SIJ injection;  Surgeon: Yo Kirkland MD;  Location: [facility];  Service: Pain Management;  Laterality: Left;    INJECTION OF ANESTHETIC AGENT INTO SACROILIAC JOINT Bilateral 05/05/2022    Procedure: Bilateral BLOCK, SACROILIAC JOINT and Bilateral GTB RN IV sedation;  Surgeon: Yo Kirkland MD;  Location: [facility];  Service: Pain  Management;  Laterality: Bilateral;    INJECTION OF JOINT Left 06/17/2021    Procedure: Left GT bursa + left SIJ injection;  Surgeon: Yo Kirkland MD;  Location: [facility] PAIN [facility]GT;  Service: Pain Management;  Laterality: Left;    INJECTION OF JOINT Bilateral 12/9/2022    Procedure: Bilateral GT bursa + bilateral SIJ injection RN IV Sedation;  Surgeon: Eula Rojas MD;  Location: [facility];  Service: Pain Management;  Laterality: Bilateral;    LUMBAR FUSION      RADIOFREQUENCY THERMOCOAGULATION Right 12/08/2020    Procedure: RADIOFREQUENCY THERMAL COAGULATION Right L3, 4,5 MBB RFA with RN IV sedation// NO STEROID;  Surgeon: Yo Kirkland MD;  Location: [facility];  Service: Pain Management;  Laterality: Right;    RADIOFREQUENCY THERMOCOAGULATION Left 01/21/2021    Procedure: Left L3-5 Lumbar RFA;  Surgeon: Rian Chaney MD;  Location: [facility];  Service: Pain Management;  Laterality: Left;    SELECTIVE INJECTION OF ANESTHETIC AGENT AROUND LUMBAR SPINAL NERVE ROOT BY TRANSFORAMINAL APPROACH Left 03/08/2022    Procedure: BLOCK, SPINAL NERVE ROOT, LUMBAR, SELECTIVE, TRANSFORAMINAL APPROACH Left L5-S1, S1 RN IV sedation;  Surgeon: Yo Kirkland MD;  Location: [facility];  Service: Pain Management;  Laterality: Left;    TRANSFORAMINAL EPIDURAL INJECTION OF STEROID Left 07/14/2022    Procedure: left L4/5 + L5/S1 TF BRUNILDA RN IV Sedation;  Surgeon: Eula Rojas MD;  Location: [facility];  Service: Pain Management;  Laterality: Left;        Social History:      Social History     Socioeconomic History    Marital status:     Number of children: 0   Occupational History     Employer: [id number]   Tobacco Use    Smoking status: Never    Smokeless tobacco: Never   Substance and Sexual Activity    Alcohol use: Never    Drug use: No    Sexual activity: Yes     Partners: Male     Birth control/protection: Other-see comments, None     Comment: Birth control pill     Social Determinants of Health      Financial Resource Strain: Low Risk     Difficulty of Paying Living Expenses: Not hard at all   Food Insecurity: No Food Insecurity    Worried About Running Out of Food in the Last Year: Never true    Ran Out of Food in the Last Year: Never true   Transportation Needs: No Transportation Needs    Lack of Transportation (Medical): No    Lack of Transportation (Non-Medical): No   Physical Activity: Inactive    Days of Exercise per Week: 0 days    Minutes of Exercise per Session: 0 min   Stress: Stress Concern Present    Feeling of Stress : Rather much   Social Connections: Unknown    Frequency of Communication with Friends and Family: More than three times a week    Frequency of Social Gatherings with Friends and Family: Twice a week    Active Member of Clubs or Organizations: No    Attends Club or Organization Meetings: Never    Marital Status:    Housing Stability: Low Risk     Unable to Pay for Housing in the Last Year: No    Number of Places Lived in the Last Year: 1    Unstable Housing in the Last Year: No        Family History:      Family History   Problem Relation Age of Onset    Hypertension Mother     Fibroids Mother     Arthritis Mother     Diabetes Sister     Hypertension Brother     No Known Problems Brother     Hypertension Maternal Aunt     Diabetes Maternal Aunt     Arthritis Maternal Aunt     Diabetes Maternal Uncle     Hypertension Maternal Grandmother     Leukemia Maternal Grandfather     Hypertension Maternal Grandfather     Stroke Neg Hx        Allergies:      Review of patient's allergies indicates:   Allergen Reactions    Amitriptyline      xerostomia    Amlodipine      DRY MOUTH    Tramadol Itching       Medications:      Current Outpatient Medications   Medication Sig    atorvastatin (LIPITOR) 40 MG tablet TAKE ONE TABLET BY MOUTH EVERY NIGHT    blood sugar diagnostic Strp To check BG 2 times daily, to use with insurance preferred meter    blood-glucose meter kit To check BG 2 times  daily, to use with insurance preferred meter    blood-glucose meter,continuous (DEXCOM G6 ) Misc Use as directed,  to be changed every 365 days.    clotrimazole-betamethasone 1-0.05% (LOTRISONE) cream Apply topically 2 (two) times daily.    dicyclomine (BENTYL) 20 mg tablet TAKE ONE TABLET BY MOUTH THREE TIMES A DAY AS NEEDED FOR ABDOMINAL PAIN    ergocalciferol (ERGOCALCIFEROL) 50,000 unit Cap TAKE 1 CAPSULE BY MOUTH ONCE A WEEK FOR 28 DAYS    fluconazole (DIFLUCAN) 200 MG Tab TAKE 1 TABLET BY MOUTH EVERY 72 HOURS FOR 3 DOSES    glimepiride (AMARYL) 4 MG tablet TAKE ONE TABLET BY MOUTH EVERY DAY IN THE MORNING WITH BREAKFAST    irbesartan (AVAPRO) 150 MG tablet Take 1 tablet (150 mg total) by mouth once daily.    JANUVIA 100 mg Tab TAKE 1 TABLET BY MOUTH EVERY DAY    lancets Misc To check BG 2 times daily, to use with insurance preferred meter    LEVEMIR FLEXTOUCH U-100 INSULN 100 unit/mL (3 mL) InPn pen INJECT 20 UNITS EVERY EVENING    linaCLOtide (LINZESS) 145 mcg Cap capsule Take 1 capsule (145 mcg total) by mouth before breakfast.    metFORMIN (GLUCOPHAGE-XR) 500 MG ER 24hr tablet TAKE 2 TABLETS BY MOUTH TWO TIMES A DAY WITH MEALS    metoprolol succinate (TOPROL-XL) 50 MG 24 hr tablet Take 1 tablet (50 mg total) by mouth once daily.    omeprazole/sodium bicarbonate (ZEGERID ORAL) Take by mouth.    ondansetron (ZOFRAN-ODT) 4 MG TbDL DISSOLVE 1-2 TABLETS BY MOUTH EVERY SIX TO EIGHT HOURS AS NEEDED    semaglutide (OZEMPIC) 0.25 mg or 0.5 mg(2 mg/1.5 mL) pen injector Inject 0.5 mg into the skin every 7 days.    simethicone (GAS-X ORAL) Take by mouth.    spironolactone (ALDACTONE) 25 MG tablet Take 2 tablets by mouth once daily    tiZANidine (ZANAFLEX) 4 MG tablet Take 1 tablet (4 mg total) by mouth every 8 (eight) hours.    valACYclovir (VALTREX) 500 MG tablet TAKE ONE TABLET BY MOUTH TWO TIMES A DAY FOR 3 DAYS    zolpidem (AMBIEN) 5 MG Tab Take 1 tablet (5 mg total) by mouth nightly as needed (for  "sleep).    blood-glucose sensor (DEXCOM G6 SENSOR) Selin Use as directed, change sensor every 10 days (Patient not taking: Reported on 5/15/2023)    blood-glucose transmitter (DEXCOM G6 TRANSMITTER) Selin Use as directed, change transmitter every 3 months (Patient not taking: Reported on 5/15/2023)    pen needle, diabetic (BD ULTRA-FINE GAYLA PEN NEEDLE) 32 gauge x 5/32" Ndle 1 Units by Misc.(Non-Drug; Combo Route) route once daily.     No current facility-administered medications for this visit.       Vitals:      There were no vitals filed for this visit.    Review of Systems:        Constitutional: Negative for fever, chills, , malaise/fatigue and diaphoresis.   HENT: Negative for hearing loss, ear pain, nosebleeds, congestion, sore throat, neck pain, tinnitus and ear discharge.    Eyes: Negative for blurred vision, double vision, photophobia, pain, discharge and redness.   Respiratory: Negative for cough, hemoptysis, sputum production, shortness of breath, wheezing and stridor.    Cardiovascular: Negative for chest pain, palpitations, orthopnea, claudication, leg swelling and PND.   Gastrointestinal: Negative for , nausea, vomiting, abdominal pain, diarrhea, , blood in stool and melena. + constipation ; + GERD   Genitourinary: Negative for dysuria, urgency, frequency, hematuria and flank pain.   Musculoskeletal: Negative for myalgias, back pain, joint pain and falls.   Skin: Negative for itching and rash. Inferior breast rash come and goes, pt denies active lesions currently.   Neurological: Negative for dizziness, tingling, tremors, sensory change, speech change, focal weakness, seizures, loss of consciousness, weakness and headaches.   Endo/Heme/Allergies: Negative for environmental allergies and polydipsia..   Psychiatric/Behavioral: positive  for depression, anxiety   Physical Exam:      Constitutional: Appears well-developed, well-nourished and in no acute distress.  Patient is oriented to person, place, and " time.   Head: Normocephalic and atraumatic. Mucous membranes moist.  Neck: Neck supple  Cardiovascular: Normal rate and regular rhythm.  S1 S2 appreciated by ascultation.  Pulmonary/Chest: Effort normal and clear to auscultation bilaterally. No respiratory distress.   Abdomen: Soft. Non-tender and non-distended. Bowel sounds are normal.   Neurological: Patient is alert and oriented to person, place and time. Moves all extremities.  Skin: Warm and dry. No lesions.  Extremities: No clubbing, cyanosis or edema.    Laboratory data:      Reviewed and noted in plan where applicable. Please see chart for full laboratory data.    @CSIHZNCBB38(cpk,cpkmb,troponini,mb)@ @NIREGILCR39(poctglucose)@     No results found for: INR, PROTIME    Lab Results   Component Value Date    WBC 12.78 (H) 10/13/2022    HGB 15.1 10/13/2022    HCT 45.6 10/13/2022    MCV 85 10/13/2022     10/13/2022       @HRNOMRQSI13(GLU,NA,K,Cl,CO2,BUN,Creatinine,Calcium,MG)@    Predictors of intubation difficulty:       Morbid obesity? BMI 44   Anatomically abnormal facies? no   Prominent incisors? no   Receding mandible? no   Short, thick neck? yes - short and thick neck   Neck range of motion: normal   Dentition:  intact  Based on the Modified Mallampati, patient is a mallampati score: II (hard and soft palate, upper portion of tonsils anduvula visible)    Cardiographics:      EC/15/23 NSR   Echocardiogram:  none    Imaging:      Chest x-ray:  none      Assessment and Plan:      Pre-op exam  S/p B bunionectomy in teens with Dr. Lalit Gleason plans for a left 1st metatarsophalangeal joint arthrodesis, left Akin on May 23   Known risk factors for perioperative complications: Diabetes mellitus  HLD, BMI 44    Difficulty with intubation is not anticipated.    Cardiac Risk Estimation:  Per Revised Cardiac Risk Index patient is a Class II risk with a 6.0% risk of a major cardiac event.      1.) Preoperative workup as follows: ECG, hemoglobin,  hematocrit, electrolytes, creatinine, glucose, liver function studies.  2.) Change in medication regimen before surgery: Hold NSAIDs 7 days preop, take half dose of insulin evening prior to procedure, no oral diabetic a.m. of surgery.  3.) Prophylaxis for cardiac events with perioperative beta-blockers:  Continue home metoprolol.  4.) Invasive hemodynamic monitoring perioperatively: not indicated.  5.) Deep vein thrombosis prophylaxis postoperatively: intermittent pneumatic compression boots and regimen to be chosen by surgical team.  6.) Surveillance for postoperative MI with ECG immediately postoperatively and on postoperati ve days 1 and 2 AND troponin levels 24 hours postoperatively and on day 4 or hospital discharge (whichever comes first): not indicated.  7.) Current medications which may produce withdrawal symptoms if withheld perioperatively: none  8.) Other measures: .      Bunion of great toe of left foot  Planned left 1st metatarsophalangeal joint arthrodesis, left Akin May 23     Chronic bilateral low back pain with left-sided sciatica  Pt followed by pain management   - S/p injections  - pt denies prior lumbar fusion   - keep f/u with pain management     Gastroesophageal reflux disease  - diet dependent   - can wake pt at night  - continue home OTC meds    Type 2 diabetes mellitus without complication, without long-term current use of insulin  - A1c pending  - continue home meds including Amaryl, januvia, metformin, insulin and ozempic  - A1c 6.7 11/2022  - AM glucose low 100s  - Continue to follow up with PCP       Essential hypertension  - continue home meds   - controlled   - Continue to follow up with PCP       Hypercholesteremia  - continue home statin  - seen by hepatology in past for elevated LFTs with concern for     Polycystic ovaries  - on spironolactone  - irregular cycles    Morbid obesity with BMI of 40.0-44.9, adult  - 40# weight loss with ozempic over last year       HSV-2 (herpes simplex  virus 2) infection  - patient denies any active lesions currently  Valtrex as needed

## 2023-05-18 DIAGNOSIS — R11.0 NAUSEA: ICD-10-CM

## 2023-05-18 RX ORDER — ONDANSETRON 4 MG/1
TABLET, ORALLY DISINTEGRATING ORAL
Qty: 30 TABLET | Refills: 0 | Status: SHIPPED | OUTPATIENT
Start: 2023-05-18 | End: 2023-08-02

## 2023-05-22 ENCOUNTER — TELEPHONE (OUTPATIENT)
Dept: PREADMISSION TESTING | Facility: HOSPITAL | Age: 33
End: 2023-05-22
Payer: MEDICAID

## 2023-05-22 ENCOUNTER — ANESTHESIA EVENT (OUTPATIENT)
Dept: SURGERY | Facility: HOSPITAL | Age: 33
End: 2023-05-22
Payer: MEDICAID

## 2023-05-22 NOTE — ANESTHESIA PREPROCEDURE EVALUATION
05/22/2023  Nathan Carlos is a 32 y.o., female.    Past Medical History:   Diagnosis Date    Anxiety     Carpal tunnel syndrome     Diabetes mellitus     Diabetes mellitus, type 2     GERD (gastroesophageal reflux disease)     Herpes simplex virus (HSV) infection     High serum testosterone 09/07/2017    Hyperlipidemia     Hypertension     Insulin resistance syndrome 09/07/2017    Lumbar facet arthropathy 08/14/2019    Morbid obesity     Ovarian cyst     bilateral      Past Surgical History:   Procedure Laterality Date    ANKLE SURGERY Left     BUNIONECTOMY Bilateral     CARPAL TUNNEL RELEASE Left 03/19/2021    Procedure: RELEASE, CARPAL TUNNEL;  Surgeon: Juan F Bernard MD;  Location: [facility] OR;  Service: Orthopedics;  Laterality: Left;    CARPAL TUNNEL RELEASE Right 11/04/2021    Procedure: RELEASE, CARPAL TUNNEL;  Surgeon: Juan F Bernard MD;  Location: [facility] OR;  Service: Orthopedics;  Laterality: Right;    COLONOSCOPY  06/12/2018    EPIDURAL STEROID INJECTION N/A 11/23/2022    Procedure: Lumbar L5/S1 IL BRUNILDA RN IV Sedation;  Surgeon: Eula Rojas MD;  Location: [facility] PAIN MGT;  Service: Pain Management;  Laterality: N/A;    EPIDURAL STEROID INJECTION N/A 2/17/2023    Procedure: Lumbar L5/S1 IL BRUNILDA with left paramedian approach RN IV Sedation;  Surgeon: Eula Rojas MD;  Location: [facility] PAIN MGT;  Service: Pain Management;  Laterality: N/A;    ESOPHAGOGASTRODUODENOSCOPY      ESOPHAGOGASTRODUODENOSCOPY N/A 12/02/2021    Procedure: EGD (ESOPHAGOGASTRODUODENOSCOPY);  Surgeon: Pili Eagle MD;  Location: [facility];  Service: Endoscopy;  Laterality: N/A;    INJECTION OF ANESTHETIC AGENT AROUND MEDIAL BRANCH NERVES INNERVATING LUMBAR FACET JOINT Bilateral 08/14/2019    Procedure: Bilateral L3-5 MBB;  Surgeon: Yo Kirkland MD;  Location: [facility] PAIN MGT;  Service:  Pain Management;  Laterality: Bilateral;    INJECTION OF ANESTHETIC AGENT AROUND MEDIAL BRANCH NERVES INNERVATING LUMBAR FACET JOINT Bilateral 07/16/2020    Procedure: Bilateral L3-5 MBB;  Surgeon: Yo Kirkland MD;  Location: [facility] PAIN [facility]GT;  Service: Pain Management;  Laterality: Bilateral;    INJECTION OF ANESTHETIC AGENT INTO SACROILIAC JOINT Right 02/05/2020    Procedure: Right SIJ Injection with local;  Surgeon: Yo Kirkland MD;  Location: [facility] PAIN MGT;  Service: Pain Management;  Laterality: Right;    INJECTION OF ANESTHETIC AGENT INTO SACROILIAC JOINT Left 06/17/2021    Procedure: Left GT bursa + left SIJ injection;  Surgeon: Yo Kirkland MD;  Location: [facility] PAIN [facility]GT;  Service: Pain Management;  Laterality: Left;    INJECTION OF ANESTHETIC AGENT INTO SACROILIAC JOINT Bilateral 05/05/2022    Procedure: Bilateral BLOCK, SACROILIAC JOINT and Bilateral GTB RN IV sedation;  Surgeon: Yo Kirkland MD;  Location: [facility]IN [facility]GT;  Service: Pain Management;  Laterality: Bilateral;    INJECTION OF JOINT Left 06/17/2021    Procedure: Left GT bursa + left SIJ injection;  Surgeon: Yo Kirkland MD;  Location: [facility]IN [facility]GT;  Service: Pain Management;  Laterality: Left;    INJECTION OF JOINT Bilateral 12/9/2022    Procedure: Bilateral GT bursa + bilateral SIJ injection RN IV Sedation;  Surgeon: Eula Rojas MD;  Location: [facility] PAIN [facility]GT;  Service: Pain Management;  Laterality: Bilateral;    LUMBAR FUSION      RADIOFREQUENCY THERMOCOAGULATION Right 12/08/2020    Procedure: RADIOFREQUENCY THERMAL COAGULATION Right L3, 4,5 MBB RFA with RN IV sedation// NO STEROID;  Surgeon: Yo Kirkland MD;  Location: [facility]IN [facility]GT;  Service: Pain Management;  Laterality: Right;    RADIOFREQUENCY THERMOCOAGULATION Left 01/21/2021    Procedure: Left L3-5 Lumbar RFA;  Surgeon: Rian Chaney MD;  Location: [facility] PAIN [facility]GT;  Service: Pain Management;  Laterality: Left;    SELECTIVE INJECTION OF ANESTHETIC AGENT AROUND LUMBAR SPINAL  NERVE ROOT BY TRANSFORAMINAL APPROACH Left 03/08/2022    Procedure: BLOCK, SPINAL NERVE ROOT, LUMBAR, SELECTIVE, TRANSFORAMINAL APPROACH Left L5-S1, S1 RN IV sedation;  Surgeon: Yo Kirkland MD;  Location: [facility] PAIN MGT;  Service: Pain Management;  Laterality: Left;    TRANSFORAMINAL EPIDURAL INJECTION OF STEROID Left 07/14/2022    Procedure: left L4/5 + L5/S1 TF BRUNILDA RN IV Sedation;  Surgeon: Eula Rojas MD;  Location: [facility] PAIN MGT;  Service: Pain Management;  Laterality: Left;         Pre-op Assessment    I have reviewed the Patient Summary Reports.    I have reviewed the Nursing Notes. I have reviewed the NPO Status.   I have reviewed the Medications.     Review of Systems  Anesthesia Hx:  No problems with previous Anesthesia  History of prior surgery of interest to airway management or planning: Previous anesthesia: General Denies Family Hx of Anesthesia complications.   Denies Personal Hx of Anesthesia complications.   Social:  Non-Smoker    Hematology/Oncology:  Hematology Normal        Cardiovascular:   Hypertension hyperlipidemia ECG has been reviewed.    Pulmonary:  Pulmonary Normal    Renal/:   renal calculi    Hepatic/GI:   GERD    Musculoskeletal:  Spine Disorders: lumbar    Neurological:  Neurology Normal    Endocrine:   Diabetes, type 2  Morbid Obesity / BMI > 40  Psych:  Psychiatric Normal           Physical Exam  General:  Morbid Obesity      Airway/Jaw/Neck:  Airway Findings: Mouth Opening: Small, but > 3cm   Tongue: Normal   General Airway Assessment: Adult Mallampati: II  TM Distance: Normal, at least 6 cm   Jaw/Neck Findings:  Neck ROM: Normal ROM   Neck Findings:  Girth Increased     Eyes/Ears/Nose:  Eyes/Ears/Nose Findings:    Dental:  Dental Findings: In tact     Chest/Lungs:  Chest/Lungs Findings: Clear to auscultation, Normal Respiratory Rate      Heart/Vascular:  Heart Findings: Rate: Normal  Rhythm: Regular Rhythm  Sounds: Normal  Heart murmur: negative Vascular Findings: Normal     Abdomen:  Abdomen Findings: Normal    Musculoskeletal:  Musculoskeletal Findings: Normal   Skin:  Skin Findings: Normal    Mental Status:  Mental Status Findings:  Alert and Oriented         Anesthesia Plan  Type of Anesthesia, risks & benefits discussed:  Anesthesia Type:  Gen ETT    Patient's Preference:   Plan Factors:          Intra-op Monitoring Plan: standard ASA monitors  Intra-op Monitoring Plan Comments:   Post Op Pain Control Plan: multimodal analgesia, per primary service following discharge from PACU and peripheral nerve block  Post Op Pain Control Plan Comments:     Induction:   IV  Beta Blocker:  Patient is not currently on a Beta-Blocker (No further documentation required).       Informed Consent: Informed consent signed with the Patient and all parties understand the risks and agree with anesthesia plan.  All questions answered.  Anesthesia consent signed with patient.  ASA Score: 3     Day of Surgery Review of History & Physical:     H&P completed by Anesthesiologist.         Ready For Surgery From Anesthesia Perspective.           Physical Exam  General: Morbid Obesity    Airway:  Mallampati: II   Mouth Opening: Small, but > 3cm  TM Distance: Normal, at least 6 cm  Tongue: Normal  Neck ROM: Normal ROM  Neck: Girth Increased    Dental:  In tact    Chest/Lungs:  Clear to auscultation, Normal Respiratory Rate    Heart:  Rate: Normal  Rhythm: Regular Rhythm  Sounds: Normal          Anesthesia Plan  Type of Anesthesia, risks & benefits discussed:    Anesthesia Type: Gen ETT  Intra-op Monitoring Plan: standard ASA monitors  Post Op Pain Control Plan: multimodal analgesia, per primary service following discharge from PACU and peripheral nerve block  Induction:  IV  Informed Consent: Informed consent signed with the Patient and all parties understand the risks and agree with anesthesia plan.  All questions answered.   ASA Score: 3  Day of Surgery Review of History & Physical: H&P completed by  Anesthesiologist.    Ready For Surgery From Anesthesia Perspective.       .

## 2023-05-22 NOTE — TELEPHONE ENCOUNTER
Called and spoke with the patient about the following:     Your Surgery arrival time is at 0730 on 5/23/2023 at [facility] location.   The address is [address]. [place] [id number].      Only 1 adult allowed (over the age of 18) to accompany you and MUST STAY through the entire length of admission.     Must have a ride home from a responsible adult that you know and trust.    Medical Transport, Uber or Lyft can only be used if patient has a responsible adult to accompany them during ride home.  Pediatric patients are encouraged to have 2 adults accompany them to the surgery center.     ~Your ride MUST STAY the entire time until you are discharged~    You may be required to provide a urine sample prior to procedure;   Please ask  for a specimen cup if you need to use the restroom prior to being called into pre-op.    Please come to the main lobby and be prepared to show your photo ID and insurance card.      Nothing to eat or drink after midnight, unless you were instructed to take specific medications discussed with the Pre-admit Nurse.      Please call with any questions or concerns.     [phone]      Thanks.

## 2023-05-23 ENCOUNTER — HOSPITAL ENCOUNTER (OUTPATIENT)
Facility: HOSPITAL | Age: 33
Discharge: HOME | End: 2023-05-23
Attending: PODIATRIST | Admitting: PODIATRIST
Payer: MEDICAID

## 2023-05-23 ENCOUNTER — HOSPITAL ENCOUNTER (OUTPATIENT)
Dept: RADIOLOGY | Facility: HOSPITAL | Age: 33
Discharge: HOME | End: 2023-05-23
Attending: PODIATRIST | Admitting: PODIATRIST
Payer: MEDICAID

## 2023-05-23 ENCOUNTER — ANESTHESIA (OUTPATIENT)
Dept: SURGERY | Facility: HOSPITAL | Age: 33
End: 2023-05-23
Payer: MEDICAID

## 2023-05-23 DIAGNOSIS — M21.612 HALLUX ABDUCTOVALGUS WITH BUNIONS, LEFT: Primary | ICD-10-CM

## 2023-05-23 DIAGNOSIS — Z01.818 PRE-OP EXAM: ICD-10-CM

## 2023-05-23 DIAGNOSIS — M21.612 BUNION OF GREAT TOE OF LEFT FOOT: ICD-10-CM

## 2023-05-23 DIAGNOSIS — E66.01 MORBID OBESITY WITH BMI OF 40.0-44.9, ADULT: ICD-10-CM

## 2023-05-23 DIAGNOSIS — T81.9XXA FAILED BUNIONECTOMY, INITIAL ENCOUNTER: ICD-10-CM

## 2023-05-23 DIAGNOSIS — M20.12 HALLUX ABDUCTOVALGUS WITH BUNIONS, LEFT: Primary | ICD-10-CM

## 2023-05-23 LAB
B-HCG UR QL: NEGATIVE
CTP QC/QA: YES
POCT GLUCOSE: 148 MG/DL (ref 70–110)

## 2023-05-23 PROCEDURE — 01480 ANES OPEN PX LOWER L/A/F NOS: CPT | Performed by: PODIATRIST

## 2023-05-23 PROCEDURE — 27201423 OPTIME MED/SURG SUP & DEVICES STERILE SUPPLY: Performed by: PODIATRIST

## 2023-05-23 PROCEDURE — C1713 ANCHOR/SCREW BN/BN,TIS/BN: HCPCS | Performed by: PODIATRIST

## 2023-05-23 PROCEDURE — 28299 PR CORRECT BUNION,DOUBLE OSTEOTOMY: ICD-10-PCS | Mod: TA,,, | Performed by: PODIATRIST

## 2023-05-23 PROCEDURE — 64445 PERIPHERAL BLOCK: ICD-10-PCS | Mod: 59,LT,, | Performed by: STUDENT IN AN ORGANIZED HEALTH CARE EDUCATION/TRAINING PROGRAM

## 2023-05-23 PROCEDURE — 71000015 HC POSTOP RECOV 1ST HR: Performed by: PODIATRIST

## 2023-05-23 PROCEDURE — 25000003 PHARM REV CODE 250: Performed by: NURSE ANESTHETIST, CERTIFIED REGISTERED

## 2023-05-23 PROCEDURE — D9220A PRA ANESTHESIA: ICD-10-PCS | Mod: ANES,,, | Performed by: STUDENT IN AN ORGANIZED HEALTH CARE EDUCATION/TRAINING PROGRAM

## 2023-05-23 PROCEDURE — 36000708 HC OR TIME LEV III 1ST 15 MIN: Performed by: PODIATRIST

## 2023-05-23 PROCEDURE — D9220A PRA ANESTHESIA: Mod: CRNA,,, | Performed by: NURSE ANESTHETIST, CERTIFIED REGISTERED

## 2023-05-23 PROCEDURE — D9220A PRA ANESTHESIA: ICD-10-PCS | Mod: CRNA,,, | Performed by: NURSE ANESTHETIST, CERTIFIED REGISTERED

## 2023-05-23 PROCEDURE — 81025 URINE PREGNANCY TEST: CPT | Performed by: PODIATRIST

## 2023-05-23 PROCEDURE — 37000008 HC ANESTHESIA 1ST 15 MINUTES: Performed by: PODIATRIST

## 2023-05-23 PROCEDURE — 25000242 PHARM REV CODE 250 ALT 637 W/ HCPCS: Performed by: NURSE ANESTHETIST, CERTIFIED REGISTERED

## 2023-05-23 PROCEDURE — D9220A PRA ANESTHESIA: Mod: ANES,,, | Performed by: STUDENT IN AN ORGANIZED HEALTH CARE EDUCATION/TRAINING PROGRAM

## 2023-05-23 PROCEDURE — 71000033 HC RECOVERY, INTIAL HOUR: Performed by: PODIATRIST

## 2023-05-23 PROCEDURE — 36000709 HC OR TIME LEV III EA ADD 15 MIN: Performed by: PODIATRIST

## 2023-05-23 PROCEDURE — 63600175 PHARM REV CODE 636 W HCPCS: Performed by: ANESTHESIOLOGY

## 2023-05-23 PROCEDURE — 73620 X-RAY EXAM OF FOOT: CPT | Mod: 26,LT,, | Performed by: RADIOLOGY

## 2023-05-23 PROCEDURE — 64447 NJX AA&/STRD FEMORAL NRV IMG: CPT | Mod: 59,LT,, | Performed by: STUDENT IN AN ORGANIZED HEALTH CARE EDUCATION/TRAINING PROGRAM

## 2023-05-23 PROCEDURE — 64445 NJX AA&/STRD SCIATIC NRV IMG: CPT | Mod: 59 | Performed by: ANESTHESIOLOGY

## 2023-05-23 PROCEDURE — 63600175 PHARM REV CODE 636 W HCPCS: Performed by: PODIATRIST

## 2023-05-23 PROCEDURE — C1769 GUIDE WIRE: HCPCS | Performed by: PODIATRIST

## 2023-05-23 PROCEDURE — 63600175 PHARM REV CODE 636 W HCPCS: Performed by: NURSE ANESTHETIST, CERTIFIED REGISTERED

## 2023-05-23 PROCEDURE — 73620 X-RAY EXAM OF FOOT: CPT | Mod: TC,LT

## 2023-05-23 PROCEDURE — 64450 NJX AA&/STRD OTHER PN/BRANCH: CPT | Performed by: PODIATRIST

## 2023-05-23 PROCEDURE — 37000009 HC ANESTHESIA EA ADD 15 MINS: Performed by: PODIATRIST

## 2023-05-23 PROCEDURE — 64447 PERIPHERAL BLOCK: ICD-10-PCS | Mod: 59,LT,, | Performed by: STUDENT IN AN ORGANIZED HEALTH CARE EDUCATION/TRAINING PROGRAM

## 2023-05-23 PROCEDURE — 73620 XR FOOT 2 VIEW LEFT: ICD-10-PCS | Mod: 26,LT,, | Performed by: RADIOLOGY

## 2023-05-23 PROCEDURE — 64445 NJX AA&/STRD SCIATIC NRV IMG: CPT | Mod: 59,LT,, | Performed by: STUDENT IN AN ORGANIZED HEALTH CARE EDUCATION/TRAINING PROGRAM

## 2023-05-23 PROCEDURE — 28299 COR HLX VLGS DOUBLE OSTEOT: CPT | Mod: TA,,, | Performed by: PODIATRIST

## 2023-05-23 DEVICE — IMPLANTABLE DEVICE: Type: IMPLANTABLE DEVICE | Site: FOOT | Status: FUNCTIONAL

## 2023-05-23 RX ORDER — ONDANSETRON 2 MG/ML
INJECTION INTRAMUSCULAR; INTRAVENOUS
Status: DISCONTINUED | OUTPATIENT
Start: 2023-05-23 | End: 2023-05-23

## 2023-05-23 RX ORDER — ROPIVACAINE HYDROCHLORIDE 5 MG/ML
INJECTION, SOLUTION EPIDURAL; INFILTRATION; PERINEURAL
Status: COMPLETED | OUTPATIENT
Start: 2023-05-23 | End: 2023-05-23

## 2023-05-23 RX ORDER — DEXMEDETOMIDINE HYDROCHLORIDE 100 UG/ML
INJECTION, SOLUTION INTRAVENOUS
Status: DISCONTINUED | OUTPATIENT
Start: 2023-05-23 | End: 2023-05-23

## 2023-05-23 RX ORDER — ACETAMINOPHEN 10 MG/ML
INJECTION, SOLUTION INTRAVENOUS
Status: DISCONTINUED | OUTPATIENT
Start: 2023-05-23 | End: 2023-05-23

## 2023-05-23 RX ORDER — ASPIRIN 325 MG
325 TABLET ORAL DAILY
Qty: 30 TABLET | Refills: 0 | Status: SHIPPED | OUTPATIENT
Start: 2023-05-24 | End: 2023-08-02

## 2023-05-23 RX ORDER — MEPERIDINE HYDROCHLORIDE 25 MG/ML
12.5 INJECTION INTRAMUSCULAR; INTRAVENOUS; SUBCUTANEOUS ONCE
Status: COMPLETED | OUTPATIENT
Start: 2023-05-23 | End: 2023-05-23

## 2023-05-23 RX ORDER — DIPHENHYDRAMINE HYDROCHLORIDE 50 MG/ML
25 INJECTION INTRAMUSCULAR; INTRAVENOUS EVERY 6 HOURS PRN
Status: DISCONTINUED | OUTPATIENT
Start: 2023-05-23 | End: 2023-05-23 | Stop reason: HOSPADM

## 2023-05-23 RX ORDER — MIDAZOLAM HYDROCHLORIDE 1 MG/ML
INJECTION INTRAMUSCULAR; INTRAVENOUS
Status: DISCONTINUED | OUTPATIENT
Start: 2023-05-23 | End: 2023-05-23

## 2023-05-23 RX ORDER — SUCCINYLCHOLINE CHLORIDE 20 MG/ML
INJECTION INTRAMUSCULAR; INTRAVENOUS
Status: DISCONTINUED | OUTPATIENT
Start: 2023-05-23 | End: 2023-05-23

## 2023-05-23 RX ORDER — OXYCODONE AND ACETAMINOPHEN 10; 325 MG/1; MG/1
1 TABLET ORAL EVERY 4 HOURS PRN
Qty: 28 TABLET | Refills: 0 | Status: SHIPPED | OUTPATIENT
Start: 2023-05-23 | End: 2023-05-30

## 2023-05-23 RX ORDER — AMOXICILLIN 250 MG
1 CAPSULE ORAL DAILY
Qty: 10 TABLET | Refills: 1 | Status: SHIPPED | OUTPATIENT
Start: 2023-05-23 | End: 2023-11-24

## 2023-05-23 RX ORDER — LIDOCAINE HYDROCHLORIDE 20 MG/ML
INJECTION, SOLUTION EPIDURAL; INFILTRATION; INTRACAUDAL; PERINEURAL
Status: DISCONTINUED | OUTPATIENT
Start: 2023-05-23 | End: 2023-05-23

## 2023-05-23 RX ORDER — ROCURONIUM BROMIDE 10 MG/ML
INJECTION, SOLUTION INTRAVENOUS
Status: DISCONTINUED | OUTPATIENT
Start: 2023-05-23 | End: 2023-05-23

## 2023-05-23 RX ORDER — ONDANSETRON HYDROCHLORIDE 8 MG/1
8 TABLET, FILM COATED ORAL 2 TIMES DAILY
Qty: 10 TABLET | Refills: 0 | Status: SHIPPED | OUTPATIENT
Start: 2023-05-23 | End: 2023-05-28

## 2023-05-23 RX ORDER — PROPOFOL 10 MG/ML
VIAL (ML) INTRAVENOUS
Status: DISCONTINUED | OUTPATIENT
Start: 2023-05-23 | End: 2023-05-23

## 2023-05-23 RX ORDER — ONDANSETRON 2 MG/ML
4 INJECTION INTRAMUSCULAR; INTRAVENOUS ONCE AS NEEDED
Status: DISCONTINUED | OUTPATIENT
Start: 2023-05-23 | End: 2023-05-23 | Stop reason: HOSPADM

## 2023-05-23 RX ORDER — ALBUTEROL SULFATE 90 UG/1
AEROSOL, METERED RESPIRATORY (INHALATION)
Status: DISCONTINUED | OUTPATIENT
Start: 2023-05-23 | End: 2023-05-23

## 2023-05-23 RX ORDER — DEXAMETHASONE SODIUM PHOSPHATE 4 MG/ML
INJECTION, SOLUTION INTRA-ARTICULAR; INTRALESIONAL; INTRAMUSCULAR; INTRAVENOUS; SOFT TISSUE
Status: DISCONTINUED | OUTPATIENT
Start: 2023-05-23 | End: 2023-05-23

## 2023-05-23 RX ORDER — SODIUM CHLORIDE, SODIUM LACTATE, POTASSIUM CHLORIDE, CALCIUM CHLORIDE 600; 310; 30; 20 MG/100ML; MG/100ML; MG/100ML; MG/100ML
INJECTION, SOLUTION INTRAVENOUS CONTINUOUS
Status: DISCONTINUED | OUTPATIENT
Start: 2023-05-23 | End: 2023-05-23 | Stop reason: HOSPADM

## 2023-05-23 RX ORDER — FENTANYL CITRATE 50 UG/ML
25 INJECTION, SOLUTION INTRAMUSCULAR; INTRAVENOUS EVERY 5 MIN PRN
Status: COMPLETED | OUTPATIENT
Start: 2023-05-23 | End: 2023-05-23

## 2023-05-23 RX ORDER — FENTANYL CITRATE 50 UG/ML
INJECTION, SOLUTION INTRAMUSCULAR; INTRAVENOUS
Status: DISCONTINUED | OUTPATIENT
Start: 2023-05-23 | End: 2023-05-23

## 2023-05-23 RX ADMIN — FENTANYL CITRATE 50 MCG: 50 INJECTION, SOLUTION INTRAMUSCULAR; INTRAVENOUS at 10:05

## 2023-05-23 RX ADMIN — FENTANYL CITRATE 25 MCG: 50 INJECTION INTRAMUSCULAR; INTRAVENOUS at 11:05

## 2023-05-23 RX ADMIN — MEPERIDINE HYDROCHLORIDE 12.5 MG: 25 INJECTION INTRAMUSCULAR; INTRAVENOUS; SUBCUTANEOUS at 11:05

## 2023-05-23 RX ADMIN — ROCURONIUM BROMIDE 10 MG: 10 SOLUTION INTRAVENOUS at 09:05

## 2023-05-23 RX ADMIN — ROPIVACAINE HYDROCHLORIDE 14 ML: 5 INJECTION, SOLUTION EPIDURAL; INFILTRATION; PERINEURAL at 09:05

## 2023-05-23 RX ADMIN — ONDANSETRON 4 MG: 2 INJECTION INTRAMUSCULAR; INTRAVENOUS at 10:05

## 2023-05-23 RX ADMIN — PROPOFOL 200 MG: 10 INJECTION, EMULSION INTRAVENOUS at 09:05

## 2023-05-23 RX ADMIN — MIDAZOLAM HYDROCHLORIDE 2 MG: 1 INJECTION INTRAMUSCULAR; INTRAVENOUS at 09:05

## 2023-05-23 RX ADMIN — DEXMEDETOMIDINE HYDROCHLORIDE 8 MCG: 100 INJECTION, SOLUTION INTRAVENOUS at 10:05

## 2023-05-23 RX ADMIN — FENTANYL CITRATE 50 MCG: 50 INJECTION, SOLUTION INTRAMUSCULAR; INTRAVENOUS at 09:05

## 2023-05-23 RX ADMIN — ALBUTEROL SULFATE 5 PUFF: 90 AEROSOL, METERED RESPIRATORY (INHALATION) at 11:05

## 2023-05-23 RX ADMIN — DEXAMETHASONE SODIUM PHOSPHATE 4 MG: 4 INJECTION, SOLUTION INTRA-ARTICULAR; INTRALESIONAL; INTRAMUSCULAR; INTRAVENOUS; SOFT TISSUE at 10:05

## 2023-05-23 RX ADMIN — LIDOCAINE HYDROCHLORIDE 80 MG: 20 INJECTION, SOLUTION INTRAVENOUS at 09:05

## 2023-05-23 RX ADMIN — ACETAMINOPHEN 1000 MG: 10 INJECTION, SOLUTION INTRAVENOUS at 10:05

## 2023-05-23 RX ADMIN — SODIUM CHLORIDE, SODIUM LACTATE, POTASSIUM CHLORIDE, AND CALCIUM CHLORIDE: 600; 310; 30; 20 INJECTION, SOLUTION INTRAVENOUS at 09:05

## 2023-05-23 RX ADMIN — SUCCINYLCHOLINE CHLORIDE 140 MG: 20 INJECTION, SOLUTION INTRAMUSCULAR; INTRAVENOUS; PARENTERAL at 09:05

## 2023-05-23 RX ADMIN — DEXMEDETOMIDINE HYDROCHLORIDE 4 MCG: 100 INJECTION, SOLUTION INTRAVENOUS at 11:05

## 2023-05-23 RX ADMIN — ROPIVACAINE HYDROCHLORIDE 10 ML: 5 INJECTION, SOLUTION EPIDURAL; INFILTRATION; PERINEURAL at 09:05

## 2023-05-23 RX ADMIN — DEXTROSE 2 G: 50 INJECTION, SOLUTION INTRAVENOUS at 09:05

## 2023-05-23 NOTE — ANESTHESIA PROCEDURE NOTES
Peripheral Block    Patient location during procedure: pre-op   Block not for primary anesthetic.  Reason for block: at surgeon's request and post-op pain management   Post-op Pain Location: Left foot   Start time: 5/23/2023 9:18 AM  Timeout: 5/23/2023 9:13 AM   End time: 5/23/2023 9:21 AM    Staffing  Authorizing Provider: Sarah Rice MD  Performing Provider: Sarah Rice MD    Staffing  Other anesthesia staff: Lore Barrett CRNA  Preanesthetic Checklist  Completed: patient identified, IV checked, site marked, risks and benefits discussed, surgical consent, monitors and equipment checked, pre-op evaluation and timeout performed  Peripheral Block  Patient position: supine  Prep: ChloraPrep  Patient monitoring: heart rate, cardiac monitor, continuous pulse ox, continuous capnometry and frequent blood pressure checks  Block type: adductor canal  Laterality: left  Injection technique: single shot  Needle  Needle type: Stimuplex   Needle gauge: 22 G  Needle length: 4 in  Needle localization: anatomical landmarks and ultrasound guidance   -ultrasound image captured on disc.  Assessment  Injection assessment: negative aspiration, negative parasthesia and local visualized surrounding nerve  Paresthesia pain: none  Heart rate change: no  Slow fractionated injection: yes  Pain Tolerance: comfortable throughout block and no complaints  Medications:    Medications: ropivacaine (NAROPIN) injection 0.5% - Perineural   10 mL - 5/23/2023 9:21:00 AM    Additional Notes  VSS.  [facility] RN monitoring vitals throughout procedure.  Patient tolerated procedure well.

## 2023-05-23 NOTE — OP NOTE
[facility]  Podiatric Medicine & Surgery  Operative Report    SUMMARY     Date of Procedure: 5/23/2023    Surgeon(s) and Role: Surgeon(s) and Role:     * Sandy Cohn DPM - Primary    Pre-Operative Diagnosis: Pre-Op Diagnosis Codes:     * Hallux abductovalgus with bunions, left [M20.12, M21.612]     * Failed bunionectomy, sequela [T81.9XXS]     * History of bunionectomy of left great toe [Z98.890]    Post-Operative Diagnosis: Post-Op Diagnosis Codes:     * Hallux abductovalgus with bunions, left [M20.12, M21.612]     * Failed bunionectomy, sequela [T81.9XXS]     * History of bunionectomy of left great toe [Z98.890]    Planned Procedure: Procedure(s):  BUNIONECTOMY, LAPIDUS  OSTEOTOMY, AKIN    Technical Procedures Used:   1. First metatarsal cuneiform joint arthrodesis.   2. Akin osteotomy.   3. Live intraoperative fluoroscopy.    Anesthesia: General    Hemostasis: Well padded tourniquet inflated to 300 mmHg applied to the left thigh    Estimated Blood Loss (EBL): Minimal    Drains: none    Specimens: * No specimens in log *    Implants:   Implant Name Type Inv. Item Serial No.  Lot No. LRB No. Used Action   InCore Lapidus Left Titanium post    [employer] [id number] Left 1 Implanted   InCore Lapidus Titanium Screw    [employer] [id number] Left 1 Implanted   InCore Lapidus Post Plug Screw    [email or web address] [id number] Left 1 Implanted   InCore Lapidus Titanium Screw    [employer] [id number] Left 1 Implanted   InCore Lapidus K Wire Set 2 mm x 4 inch    [employer]  Left 1 Implanted and Explanted   Arcus Staple System 8 mm staple    [email or web address] [id number] Left 1 Implanted       Complications: * No complications entered in OR log *      Description of the Findings of the Procedure: The patient was seen in the Holding Room. The risks, benefits, complications, treatment options, and expected outcomes were  discussed with the patient. The risks and potential complications of their problem and purposed treatment include but are not limited to infection, nerve injury, vascular injury, nonunion/malunion/delayed union of the surgical site, persistent pain, potential skin necrosis, deep vein thrombosis, possible pulmonary embolus, complications of the anesthetics and failure of the implant.  The patient concurred with the proposed plan, giving informed consent. The patient is aware that the procedure may be a part of a staged collection of procedures for definitive cure and/or alleviation of symptoms. The site of surgery properly noted/marked. Preoperative intravenous antibiotics are hanging at bedside, and are currently being administered via the heparin lock.  Preoperative block was performed by anesthesia prior to proceeding to the operating room.  The patient was taken to Operating Suite.    Once in the operative suite, the patient is transferred onto the operative table in the supine position. A TIME-OUT is taken to identify the proper patient, procedure to be performed, and laterality.  The patient is properly positioned on the operating room table for ease of dissection and for any ancillary imaging.  A well-padded tourniquet was applied to the left thigh.  Nursing and ancillary OR staff prepared the patient for the procedure. The patient is adequately sedated by the Attending Anesthesiologist and/or covering CRNA.  Next, the operative limb was rendered sterile using chlorhexidine paint and scrub. Another TIME-OUT is taken as per protocol to identify the proper patient, correct extremity, and procedure to be performed. Once this coincided with all members of the team, the extremity was scrubbed, prepped, and draped in a sterile aseptic fashion.    The tourniquet is elevated to 300 mmHg after the limb is exsanguinated for approximately 2 min with an Esmarch bandage.    Attention was directed to the left foot at 1st  metatarsal cuneiform joint.  Linear incision was made dorsally the foot just medial to the extensor hallucis tendon.  Incision was then carried down to the level of the subcutaneous tissue.  Dissection was carefully performed with a Metzenbaum scissors.  Electrocautery was utilized for hemostasis.  The extensor hallucis tendon was visualized and retracted laterally throughout the main portion of the case.  The incision was then.  Down to the level of the 1st metatarsal cuneiform joint.  The joint capsule was released medially laterally.  A [place] elevator was utilized to carefully dissect the lateral aspect the 1st tarsometatarsal joint with care to preserve the Lisfranc articulation.  Guide was placed into the 1st metatarsal cuneiform joint a pin was placed.  This was examined to be perpendicular to the 1st metatarsal cuneiform joint on lateral view as well as away from the intermetatarsal joint on the AP image.  Once this was deemed adequate, overdrill was performed.  The autogenous bone graft was removed from the drill flutes and saved in a sterile specimen cup.  The [employer] InCore system was utilized for fixation.  Frontal plane do rotation was corrected and the intermetatarsal angle was fixed appropriately.  This was temporarily held in fixation with K-wires.  Distraction was surfaces were removed from both 1st metatarsal bone proximally and a laterally based wedge from the medial cuneiform.  The articular surfaces which were cut were removed in full.  Osteotome was performed to utilized to fish scale the fusion site in conjunction with a wire drill bit.  Autogenous bone graft was then introduced into the fusion site.  Compression was performed at this area with complete derotation of the 1st metatarsal bone, appropriate position in the sagittal plane, and improvement of the intermetatarsal angle in the transverse plane.  This was temporarily held in the appropriate position.  Locking screws were then  placed from dorsal distal to plantar proximal within the post.    Attention was then directed to the lateral aspect of the 1st metatarsophalangeal joint.  Dissection was performed carefully with Metzenbaum scissors.  The sesamoidal suspensory ligament was released to complete a lateral release.    Medial dissection was then performed of the distal 1st metatarsophalangeal joint.  Sterile 15 blade was utilized perform the incision.  Dissection was then carried down to the level of the underlying osseous structure.  Care was taken to avoid the extensor hallucis tendon dorsally.  The periosteum was reflected from the medial aspect of the proximal phalanx.  An osteotomy was performed perpendicular to the long axis of the proximal phalanx.  A small wedge with bone was removed.  With attempting to preserve the lateral cortex, the lateral cortex did break at which point the toe was positioned the appropriate position at 8 mm compression staple was utilized to hold permanent fixation.    Live intraoperative fluoroscopy was utilized to ensure appropriate position of all underlying osseous structures and no prominence of all hardware placed.  Deep subcutaneous tissue closed with a 2-0 Vicryl suture.  Subcutaneous tissue closed with 4-0 Vicryl suture and the skin closed with 3-0 nylon.  Xeroform, fluffs, Sir Alber Sylvester compression dressing with a posterior splint was applied to left lower extremity.  Tourniquet was deflated with immediate hyperemia to the digits.    The anesthesia is weaned. There were no complications to this procedure. Any final necessary imaging to be performed in the PACU if it was not performed here in the OR suite.  The patient is transferred to the [facility] bed[facility]stretcher, [facility]. The patient is transferred to the PACU.    Condition: stable    Disposition: PACU - hemodynamically stable.    Attestation: I performed the procedure.

## 2023-05-23 NOTE — ANESTHESIA POSTPROCEDURE EVALUATION
Anesthesia Post Evaluation    Patient: Nathan Carlos    Procedure(s) Performed: Procedure(s) (LRB):  BUNIONECTOMY, LAPIDUS (Left)  OSTEOTOMY, AKIN (Left)    Final Anesthesia Type: general      Patient location during evaluation: PACU  Patient participation: Yes- Able to Participate  Level of consciousness: awake and alert and oriented  Post-procedure vital signs: reviewed and stable  Pain management: adequate  Airway patency: patent    PONV status at discharge: No PONV  Anesthetic complications: no      Cardiovascular status: blood pressure returned to baseline, stable and hemodynamically stable  Respiratory status: unassisted  Hydration status: euvolemic  Follow-up not needed.          Vitals Value Taken Time   /72 05/23/23 1218   Temp 36.7 °C (98.1 °F) 05/23/23 1118   Pulse 93 05/23/23 1218   Resp 24 05/23/23 1218   SpO2 94 % 05/23/23 1218   Vitals shown include unvalidated device data.      Event Time   Out of Recovery 11:45:00         Pain/Candelaria Score: Pain Rating Prior to Med Admin: 7 (5/23/2023 11:55 AM)  Candelaria Score: 10 (5/23/2023 11:33 AM)

## 2023-05-23 NOTE — INTERVAL H&P NOTE
The patient has been examined and the H&P has been reviewed:    I concur with the findings and no changes have occurred since H&P was written.    Surgery risks, benefits and alternative options discussed and understood by patient/family.          Active Hospital Problems    Diagnosis  POA    *Hallux abductovalgus with bunions, left [M20.12, M21.612]  Yes    Failed bunionectomy [T81.9XXA]  Yes    Morbid obesity with BMI of 40.0-44.9, adult [E66.01, Z68.41]  Not Applicable    Type 2 diabetes mellitus without complication, without long-term current use of insulin [E11.9]  Yes      Resolved Hospital Problems   No resolved problems to display.

## 2023-05-23 NOTE — ANESTHESIA PROCEDURE NOTES
Intubation    Date/Time: 5/23/2023 9:36 AM  Performed by: Татьяна Bauer CRNA  Authorized by: Sarah Rice MD     Intubation:     Induction:  Intravenous    Intubated:  Postinduction    Mask Ventilation:  Easy with oral airway    Attempts:  1    Attempted By:  CRNA    Method of Intubation:  Video laryngoscopy and bougie    Blade:  Duff 3    Laryngeal View Grade: Grade I - full view of cords      Difficult Airway Encountered?: No      Complications:  None    Airway Device:  Oral endotracheal tube    Airway Device Size:  7.0    Style/Cuff Inflation:  Cuffed (inflated to minimal occlusive pressure)    Inflation Amount (mL):  8    Tube secured:  20    Secured at:  The lips    Placement Verified By:  Capnometry    Complicating Factors:  Obesity and short neck    Findings Post-Intubation:  BS equal bilateral and atraumatic/condition of teeth unchanged  Notes:      Easy 2 person MV w 9.0 OPA

## 2023-05-23 NOTE — DISCHARGE SUMMARY
[facility] Peri Services  Discharge Note  Short Stay    Procedure(s) (LRB):  BUNIONECTOMY, LAPIDUS (Left)  OSTEOTOMY, AKIN (Left)      OUTCOME: Condition has improved and patient is now ready for discharge.    DISPOSITION: Home or Self Care    FINAL DIAGNOSIS:  Hallux abductovalgus with bunions, left    FOLLOWUP: In clinic    DISCHARGE INSTRUCTIONS:    Discharge Procedure Orders   Keep surgical extremity elevated   Order Comments: 2--3 pillows     Ice to affected area   Order Comments: Behind the knee     Notify your health care provider if you experience any of the following:  temperature >100.4     Notify your health care provider if you experience any of the following:  persistent nausea and vomiting or diarrhea     Notify your health care provider if you experience any of the following:  severe uncontrolled pain     Notify your health care provider if you experience any of the following:  redness, tenderness, or signs of infection (pain, swelling, redness, odor or green/yellow discharge around incision site)     Notify your health care provider if you experience any of the following:  difficulty breathing or increased cough     Notify your health care provider if you experience any of the following:  severe persistent headache     Notify your health care provider if you experience any of the following:  worsening rash     Notify your health care provider if you experience any of the following:  persistent dizziness, light-headedness, or visual disturbances     Notify your health care provider if you experience any of the following:  increased confusion or weakness     Leave dressing on - Keep it clean, dry, and intact until clinic visit     Weight bearing restrictions (specify):   Order Comments: No weight on the left lower extremity

## 2023-05-23 NOTE — INTERVAL H&P NOTE
The patient has been examined and the H&P has been reviewed:    I concur with the findings and no changes have occurred since H&P was written.    Anesthesia risks, benefits and alternative options discussed and understood by patient/family.          Active Hospital Problems    Diagnosis  POA    *Hallux abductovalgus with bunions, left [M20.12, M21.612]  Yes    Failed bunionectomy [T81.9XXA]  Yes    Morbid obesity with BMI of 40.0-44.9, adult [E66.01, Z68.41]  Not Applicable    Type 2 diabetes mellitus without complication, without long-term current use of insulin [E11.9]  Yes      Resolved Hospital Problems   No resolved problems to display.

## 2023-05-23 NOTE — ANESTHESIA PROCEDURE NOTES
Peripheral Block    Patient location during procedure: pre-op   Block not for primary anesthetic.  Reason for block: at surgeon's request and post-op pain management   Post-op Pain Location: Left foot   Start time: 5/23/2023 9:13 AM  Timeout: 5/23/2023 9:13 AM   End time: 5/23/2023 9:17 AM    Staffing  Authorizing Provider: Sarah Rice MD  Performing Provider: Sarah Rice MD    Staffing  Other anesthesia staff: Lore Barrett CRNA  Preanesthetic Checklist  Completed: patient identified, IV checked, site marked, risks and benefits discussed, surgical consent, monitors and equipment checked, pre-op evaluation and timeout performed  Peripheral Block  Patient position: supine  Prep: ChloraPrep  Patient monitoring: heart rate, cardiac monitor, continuous pulse ox, continuous capnometry and frequent blood pressure checks  Block type: popliteal  Laterality: left  Injection technique: single shot  Needle  Needle type: Stimuplex   Needle gauge: 22 G  Needle length: 4 in  Needle localization: anatomical landmarks, ultrasound guidance and nerve stimulator   -ultrasound image captured on disc.  Assessment  Injection assessment: negative aspiration, negative parasthesia and local visualized surrounding nerve  Paresthesia pain: none  Heart rate change: no  Slow fractionated injection: yes  Pain Tolerance: comfortable throughout block and no complaints  Medications:    Medications: ropivacaine (NAROPIN) injection 0.5% - Perineural   14 mL - 5/23/2023 9:17:00 AM    Additional Notes  VSS.  [facility] RN monitoring vitals throughout procedure.  Patient tolerated procedure well.

## 2023-05-23 NOTE — PLAN OF CARE
Left popliteal and adductor canal peripheral nerve block performed succesfully by Dr. Rice. Patient tolerated well. VSS. Cardiac monitor in place.

## 2023-05-23 NOTE — BRIEF OP NOTE
[facility] - Periop Services  Brief Operative Note    Surgery Date: 5/23/2023     Surgeon(s) and Role:     * Sandy Cohn DPM - Primary    Assisting Surgeon: None    Pre-op Diagnosis:  Hallux abductovalgus with bunions, left [M20.12, M21.612]  Failed bunionectomy, sequela [T81.9XXS]  History of bunionectomy of left great toe [Z98.890]    Post-op Diagnosis:  Post-Op Diagnosis Codes:     * Hallux abductovalgus with bunions, left [M20.12, M21.612]     * Failed bunionectomy, sequela [T81.9XXS]     * History of bunionectomy of left great toe [Z98.890]    Procedure(s) (LRB):  BUNIONECTOMY, LAPIDUS (Left)  OSTEOTOMY, AKIN (Left)    Anesthesia: General    Operative Findings:  History of failed bunion deformity with residual bunion present.  There was deviation in the frontal, transverse, and sagittal plane with the 1st metatarsal bone being elevated.  Derotation was performed with improvement of the intermetatarsal angle.  Tayo was needed to clinically improve the position of the great toe.  Unfortunately, with manipulation of the hinge, the hinge did break and the toe was then needed to be fixed in the appropriate position with a compression staple    Estimated Blood Loss: * No values recorded between 5/23/2023  9:56 AM and 5/23/2023 11:18 AM *         Specimens:   Specimen (24h ago, onward)      None              Discharge Note    OUTCOME: Patient tolerated treatment/procedure well without complication and is now ready for discharge.    DISPOSITION: Home or Self Care    FINAL DIAGNOSIS:  Hallux abductovalgus with bunions, left    FOLLOWUP: In clinic    DISCHARGE INSTRUCTIONS:    Discharge Procedure Orders   Keep surgical extremity elevated   Order Comments: 2--3 pillows     Ice to affected area   Order Comments: Behind the knee     Notify your health care provider if you experience any of the following:  temperature >100.4     Notify your health care provider if you experience any of the following:  persistent nausea  and vomiting or diarrhea     Notify your health care provider if you experience any of the following:  severe uncontrolled pain     Notify your health care provider if you experience any of the following:  redness, tenderness, or signs of infection (pain, swelling, redness, odor or green/yellow discharge around incision site)     Notify your health care provider if you experience any of the following:  difficulty breathing or increased cough     Notify your health care provider if you experience any of the following:  severe persistent headache     Notify your health care provider if you experience any of the following:  worsening rash     Notify your health care provider if you experience any of the following:  persistent dizziness, light-headedness, or visual disturbances     Notify your health care provider if you experience any of the following:  increased confusion or weakness     Leave dressing on - Keep it clean, dry, and intact until clinic visit     Weight bearing restrictions (specify):   Order Comments: No weight on the left lower extremity

## 2023-05-23 NOTE — TRANSFER OF CARE
"Anesthesia Transfer of Care Note    Patient: Nathan Carlos    Procedure(s) Performed: Procedure(s) (LRB):  BUNIONECTOMY, LAPIDUS (Left)  OSTEOTOMY, SHARON (Left)    Patient location: PACU    Anesthesia Type: general and regional    Transport from OR: Transported from OR on room air with adequate spontaneous ventilation    Post pain: adequate analgesia    Post assessment: no apparent anesthetic complications    Post vital signs: stable    Level of consciousness: awake    Nausea/Vomiting: no nausea/vomiting    Complications: none    Transfer of care protocol was followed      Last vitals:   Visit Vitals  BP (!) 152/85 (BP Location: Right arm, Patient Position: Lying)   Pulse 93   Temp 36.3 °C (97.4 °F) (Temporal)   Resp (!) 25   Ht 5' 4" (1.626 m)   Wt 115.9 kg (255 lb 8.2 oz)   SpO2 98%   Breastfeeding No   BMI 43.86 kg/m²     "

## 2023-05-24 ENCOUNTER — PATIENT MESSAGE (OUTPATIENT)
Dept: PODIATRY | Facility: CLINIC | Age: 33
End: 2023-05-24
Payer: MEDICAID

## 2023-05-25 DIAGNOSIS — K59.00 CONSTIPATION, UNSPECIFIED CONSTIPATION TYPE: ICD-10-CM

## 2023-05-25 DIAGNOSIS — E11.9 TYPE 2 DIABETES MELLITUS WITHOUT COMPLICATION, WITHOUT LONG-TERM CURRENT USE OF INSULIN: ICD-10-CM

## 2023-05-25 RX ORDER — LINACLOTIDE 145 UG/1
CAPSULE, GELATIN COATED ORAL
Qty: 30 CAPSULE | Refills: 0 | Status: SHIPPED | OUTPATIENT
Start: 2023-05-25 | End: 2023-07-05

## 2023-05-25 RX ORDER — METFORMIN HYDROCHLORIDE 500 MG/1
TABLET, EXTENDED RELEASE ORAL
Qty: 360 TABLET | Refills: 0 | Status: SHIPPED | OUTPATIENT
Start: 2023-05-25 | End: 2023-08-25

## 2023-05-26 ENCOUNTER — PATIENT MESSAGE (OUTPATIENT)
Dept: PODIATRY | Facility: CLINIC | Age: 33
End: 2023-05-26
Payer: MEDICAID

## 2023-05-30 ENCOUNTER — OFFICE VISIT (OUTPATIENT)
Dept: PODIATRY | Facility: CLINIC | Age: 33
End: 2023-05-30
Payer: MEDICAID

## 2023-05-30 DIAGNOSIS — Z09 FOLLOW-UP EXAMINATION FOLLOWING SURGERY: Primary | ICD-10-CM

## 2023-05-30 DIAGNOSIS — Z98.890 HISTORY OF BUNIONECTOMY OF LEFT GREAT TOE: ICD-10-CM

## 2023-05-30 DIAGNOSIS — E11.9 TYPE 2 DIABETES MELLITUS WITHOUT COMPLICATION, WITHOUT LONG-TERM CURRENT USE OF INSULIN: ICD-10-CM

## 2023-05-30 DIAGNOSIS — E66.01 MORBID OBESITY WITH BMI OF 40.0-44.9, ADULT: ICD-10-CM

## 2023-05-30 DIAGNOSIS — M21.612 HALLUX ABDUCTOVALGUS WITH BUNIONS, LEFT: ICD-10-CM

## 2023-05-30 DIAGNOSIS — M20.12 HALLUX ABDUCTOVALGUS WITH BUNIONS, LEFT: ICD-10-CM

## 2023-05-30 DIAGNOSIS — T81.9XXS FAILED BUNIONECTOMY, SEQUELA: ICD-10-CM

## 2023-05-30 PROCEDURE — 1159F MED LIST DOCD IN RCRD: CPT | Mod: CPTII,,, | Performed by: PODIATRIST

## 2023-05-30 PROCEDURE — 99024 PR POST-OP FOLLOW-UP VISIT: ICD-10-PCS | Mod: ,,, | Performed by: PODIATRIST

## 2023-05-30 PROCEDURE — 3044F PR MOST RECENT HEMOGLOBIN A1C LEVEL <7.0%: ICD-10-PCS | Mod: CPTII,,, | Performed by: PODIATRIST

## 2023-05-30 PROCEDURE — 99999 PR PBB SHADOW E&M-EST. PATIENT-LVL II: CPT | Mod: PBBFAC,,, | Performed by: PODIATRIST

## 2023-05-30 PROCEDURE — 1160F RVW MEDS BY RX/DR IN RCRD: CPT | Mod: CPTII,,, | Performed by: PODIATRIST

## 2023-05-30 PROCEDURE — 4010F PR ACE/ARB THEARPY RXD/TAKEN: ICD-10-PCS | Mod: CPTII,,, | Performed by: PODIATRIST

## 2023-05-30 PROCEDURE — 1159F PR MEDICATION LIST DOCUMENTED IN MEDICAL RECORD: ICD-10-PCS | Mod: CPTII,,, | Performed by: PODIATRIST

## 2023-05-30 PROCEDURE — 99999 PR PBB SHADOW E&M-EST. PATIENT-LVL II: ICD-10-PCS | Mod: PBBFAC,,, | Performed by: PODIATRIST

## 2023-05-30 PROCEDURE — 4010F ACE/ARB THERAPY RXD/TAKEN: CPT | Mod: CPTII,,, | Performed by: PODIATRIST

## 2023-05-30 PROCEDURE — 99212 OFFICE O/P EST SF 10 MIN: CPT | Mod: PBBFAC | Performed by: PODIATRIST

## 2023-05-30 PROCEDURE — 3044F HG A1C LEVEL LT 7.0%: CPT | Mod: CPTII,,, | Performed by: PODIATRIST

## 2023-05-30 PROCEDURE — 1160F PR REVIEW ALL MEDS BY PRESCRIBER/CLIN PHARMACIST DOCUMENTED: ICD-10-PCS | Mod: CPTII,,, | Performed by: PODIATRIST

## 2023-05-30 PROCEDURE — 99024 POSTOP FOLLOW-UP VISIT: CPT | Mod: ,,, | Performed by: PODIATRIST

## 2023-05-30 RX ORDER — OXYCODONE AND ACETAMINOPHEN 5; 325 MG/1; MG/1
1 TABLET ORAL EVERY 8 HOURS PRN
Qty: 15 TABLET | Refills: 0 | Status: SHIPPED | OUTPATIENT
Start: 2023-05-30 | End: 2023-08-02

## 2023-05-30 NOTE — PROGRESS NOTES
Subjective:       Patient ID: Nathan Carlos is a 32 y.o. female.    Chief Complaint: Post-op Evaluation (5.23.23 left bunionectomy. Patient complains of 6/10 pain at present and remains non wb/)    HPI: Nathan Carlos presents clinic status post 1st tarsometatarsal joint arthrodesis for revision bunion deformity correction.  Remains nonweightbearing on the operative extremity.  States 6/10 pain.  Currently utilizing narcotic pain medication for pain control.  Utilizing aspirin for DVT prophylaxis.    Review of patient's allergies indicates:   Allergen Reactions    Amitriptyline      xerostomia    Amlodipine      DRY MOUTH    Tramadol Itching       Past Medical History:   Diagnosis Date    Anxiety     Carpal tunnel syndrome     Diabetes mellitus     Diabetes mellitus, type 2     GERD (gastroesophageal reflux disease)     Herpes simplex virus (HSV) infection     High serum testosterone 09/07/2017    Hyperlipidemia     Hypertension     Insulin resistance syndrome 09/07/2017    Lumbar facet arthropathy 08/14/2019    Morbid obesity     Ovarian cyst     bilateral        Family History   Problem Relation Age of Onset    Hypertension Mother     Fibroids Mother     Arthritis Mother     Diabetes Sister     Hypertension Brother     No Known Problems Brother     Hypertension Maternal Aunt     Diabetes Maternal Aunt     Arthritis Maternal Aunt     Diabetes Maternal Uncle     Hypertension Maternal Grandmother     Leukemia Maternal Grandfather     Hypertension Maternal Grandfather     Stroke Neg Hx        Social History     Socioeconomic History    Marital status:     Number of children: 0   Occupational History     Employer: [id number]   Tobacco Use    Smoking status: Never    Smokeless tobacco: Never   Substance and Sexual Activity    Alcohol use: Never    Drug use: No    Sexual activity: Yes     Partners: Male     Birth control/protection: Other-see comments, None     Comment: Birth control pill      Social Determinants of Health     Financial Resource Strain: Low Risk     Difficulty of Paying Living Expenses: Not hard at all   Food Insecurity: No Food Insecurity    Worried About Running Out of Food in the Last Year: Never true    Ran Out of Food in the Last Year: Never true   Transportation Needs: No Transportation Needs    Lack of Transportation (Medical): No    Lack of Transportation (Non-Medical): No   Physical Activity: Inactive    Days of Exercise per Week: 0 days    Minutes of Exercise per Session: 0 min   Stress: Stress Concern Present    Feeling of Stress : Rather much   Social Connections: Unknown    Frequency of Communication with Friends and Family: More than three times a week    Frequency of Social Gatherings with Friends and Family: Twice a week    Active Member of Clubs or Organizations: No    Attends Club or Organization Meetings: Never    Marital Status:    Housing Stability: Low Risk     Unable to Pay for Housing in the Last Year: No    Number of Places Lived in the Last Year: 1    Unstable Housing in the Last Year: No       Past Surgical History:   Procedure Laterality Date    ANKLE SURGERY Left     BUNIONECTOMY Bilateral     CARPAL TUNNEL RELEASE Left 03/19/2021    Procedure: RELEASE, CARPAL TUNNEL;  Surgeon: Juan F Bernard MD;  Location: [facility] OR;  Service: Orthopedics;  Laterality: Left;    CARPAL TUNNEL RELEASE Right 11/04/2021    Procedure: RELEASE, CARPAL TUNNEL;  Surgeon: Juan F Bernard MD;  Location: [facility] OR;  Service: Orthopedics;  Laterality: Right;    COLONOSCOPY  06/12/2018    EPIDURAL STEROID INJECTION N/A 11/23/2022    Procedure: Lumbar L5/S1 IL BRUNILDA RN IV Sedation;  Surgeon: Eula Rojas MD;  Location: [facility] PAIN MGT;  Service: Pain Management;  Laterality: N/A;    EPIDURAL STEROID INJECTION N/A 2/17/2023    Procedure: Lumbar L5/S1 IL BRUNILDA with left paramedian approach RN IV Sedation;  Surgeon: Eula Rojas MD;  Location: [facility] PAIN [facility]GT;  Service: Pain  Management;  Laterality: N/A;    ESOPHAGOGASTRODUODENOSCOPY      ESOPHAGOGASTRODUODENOSCOPY N/A 12/02/2021    Procedure: EGD (ESOPHAGOGASTRODUODENOSCOPY);  Surgeon: Pili Eagle MD;  Location: [facility];  Service: Endoscopy;  Laterality: N/A;    INJECTION OF ANESTHETIC AGENT AROUND MEDIAL BRANCH NERVES INNERVATING LUMBAR FACET JOINT Bilateral 08/14/2019    Procedure: Bilateral L3-5 MBB;  Surgeon: Yo Kirkland MD;  Location: [facility] PAIN MGT;  Service: Pain Management;  Laterality: Bilateral;    INJECTION OF ANESTHETIC AGENT AROUND MEDIAL BRANCH NERVES INNERVATING LUMBAR FACET JOINT Bilateral 07/16/2020    Procedure: Bilateral L3-5 MBB;  Surgeon: Yo Kirkland MD;  Location: [facility] PAIN MGT;  Service: Pain Management;  Laterality: Bilateral;    INJECTION OF ANESTHETIC AGENT INTO SACROILIAC JOINT Right 02/05/2020    Procedure: Right SIJ Injection with local;  Surgeon: Yo Kirkland MD;  Location: [facility] PAIN MGT;  Service: Pain Management;  Laterality: Right;    INJECTION OF ANESTHETIC AGENT INTO SACROILIAC JOINT Left 06/17/2021    Procedure: Left GT bursa + left SIJ injection;  Surgeon: Yo Kirkland MD;  Location: [facility] PAIN [facility]GT;  Service: Pain Management;  Laterality: Left;    INJECTION OF ANESTHETIC AGENT INTO SACROILIAC JOINT Bilateral 05/05/2022    Procedure: Bilateral BLOCK, SACROILIAC JOINT and Bilateral GTB RN IV sedation;  Surgeon: Yo Kirkland MD;  Location: [facility] PAIN [facility]GT;  Service: Pain Management;  Laterality: Bilateral;    INJECTION OF JOINT Left 06/17/2021    Procedure: Left GT bursa + left SIJ injection;  Surgeon: Yo Kirkland MD;  Location: [facility];  Service: Pain Management;  Laterality: Left;    INJECTION OF JOINT Bilateral 12/9/2022    Procedure: Bilateral GT bursa + bilateral SIJ injection RN IV Sedation;  Surgeon: Eula Rojas MD;  Location: [facility]IN [facility];  Service: Pain Management;  Laterality: Bilateral;    LUMBAR FUSION      RADIOFREQUENCY THERMOCOAGULATION Right 12/08/2020    Procedure:  RADIOFREQUENCY THERMAL COAGULATION Right L3, 4,5 MBB RFA with RN IV sedation// NO STEROID;  Surgeon: Yo Kirkland MD;  Location: [facility] PAIN [facility]GT;  Service: Pain Management;  Laterality: Right;    RADIOFREQUENCY THERMOCOAGULATION Left 01/21/2021    Procedure: Left L3-5 Lumbar RFA;  Surgeon: Rian Chaney MD;  Location: [facility] PAIN [facility]GT;  Service: Pain Management;  Laterality: Left;    SELECTIVE INJECTION OF ANESTHETIC AGENT AROUND LUMBAR SPINAL NERVE ROOT BY TRANSFORAMINAL APPROACH Left 03/08/2022    Procedure: BLOCK, SPINAL NERVE ROOT, LUMBAR, SELECTIVE, TRANSFORAMINAL APPROACH Left L5-S1, S1 RN IV sedation;  Surgeon: Yo Kirkland MD;  Location: [facility] PAIN [facility]GT;  Service: Pain Management;  Laterality: Left;    TRANSFORAMINAL EPIDURAL INJECTION OF STEROID Left 07/14/2022    Procedure: left L4/5 + L5/S1 TF BRUNILDA RN IV Sedation;  Surgeon: Eula Rojas MD;  [facility];  Service: Pain Management;  Laterality: Left;       Review of Systems       Objective:   There were no vitals taken for this visit.    X-Ray Foot 2 View Left  Narrative: EXAM: XR FOOT 2 VIEW LEFT    CLINICAL HISTORY: Arthrodesis.    FINDINGS: Fluoroscopy was utilized for assistance with surgical arthrodesis of the first tarsometatarsal joint and of the mid phalanx of the great toe.  There is normal joint alignment with no acute findings.  Total fluoroscopic time 42 seconds.  11 images were obtained  Impression:  As above.    Finalized on: 5/25/2023 8:34 AM By:  Yaya Weiner MD  [facility]# [id number]      2023-05-25 08:36:[id number]    R  X-Ray Foot 2 View Left  Narrative: EXAM: XR FOOT 2 VIEW LEFT    CLINICAL HISTORY: Status post bunion surgery.    TECHNIQUE: 2 views of the left foot.    COMPARISON: Prior radiographs from 03/20/2023 were reviewed.    FINDINGS:  There is splint material about the foot with normal appearance of the orthopedic hardware within the first tarsometatarsal joint and of the interphalangeal joint of the great toe with normal  alignment.  No new fractures.  No signs of complication.  Impression: No acute findings after surgery as above.    Finalized on: 5/25/2023 8:34 AM By:  Yaya Weiner MD  [facility]# [id number]      2023-05-25 08:36:[id number]    [facility]       Physical Exam   LOWER EXTREMITY PHYSICAL EXAMINATION    Vascular: Capillary refill time is intact.  Dorsalis pedis pulse 2/4 on the left.  Posterior tibial pulse on the left 2/4.    Neurological: Sensation to light touch is intact. Proprioception is intact. Sensation to pin prick is intact.  No numbness or tingling identified.    Musculoskeletal:  Status post 1st tarsometatarsal joint arthrodesis    Dermatological:  Steri-Strips/sutures are intact. There is no signs of increased edema or erythema noted. The skin is well approximated and coapting.  No signs of postoperative infection.  Skin lines are present to the level affected lower extremity as result of decreased edema    Imaging:           Results for orders placed during the hospital encounter of 03/02/23    X-Ray Foot Complete Left    Narrative  EXAMINATION:  XR FOOT COMPLETE 3 VIEW LEFT    CLINICAL HISTORY:  . Hallux valgus (acquired), left foot    TECHNIQUE:  AP, lateral, and oblique views of the foot were performed.    COMPARISON:  01/23/2023    FINDINGS:  A mild hallux valgus deformity is noted.  There are postoperative changes associated with the 1st metatarsal.  Small dorsal and plantar calcaneal enthesophytes are noted.  Postsurgical changes seen associated with the ankle.  The joint spaces appear to be well maintained.    Impression  As above      Electronically signed by: Stephon Benjamin DO  Date:    03/02/2023  Time:    11:44       No results found for this or any previous visit.          Assessment:     1. Follow-up examination following surgery    2. Hallux abductovalgus with bunions, left    3. Failed bunionectomy, sequela    4. History of bunionectomy of left great toe    5. Type 2 diabetes mellitus without complication,  without long-term current use of insulin    6. Morbid obesity with BMI of 40.0-44.9, adult        Plan:     Follow-up examination following surgery    Hallux abductovalgus with bunions, left    Failed bunionectomy, sequela    History of bunionectomy of left great toe    Type 2 diabetes mellitus without complication, without long-term current use of insulin    Morbid obesity with BMI of 40.0-44.9, adult    Other orders  -     oxyCODONE-acetaminophen (PERCOCET) 5-325 mg per tablet; Take 1 tablet by mouth every 8 (eight) hours as needed for Pain.  Dispense: 15 tablet; Refill: 0      Thorough discussion is had with the patient today, concerning the diagnosis, its etiology, and the treatment algorithm at present.    X-rays reviewed by myself patient room.  Appears show adequate surgical hardware placement.  We discuss the Akin osteotomy and the breakage of the lateral cortex with manipulation of the proximal phalanx.  Adequate placement with staple in place.    Continue nonweightbearing on the operative extremity    Continue aspirin for DVT prophylaxis    Will place patient in postoperative boot which she can remove when sitting or lying.  Discussed the importance of appropriate range of motion activities    Continue to reinforce with the patient the importance of calling immediately if there is any noted complications or sudden changes to the patient's condition.  We discuss red flag risk factors of things to continue to monitor for.  Patient relates understanding as discussed    Future Appointments   Date Time Provider Department Center   6/14/2023 11:15 AM Sandy Cohn DPM [facility]   7/17/2023  9:00 AM Darlin Iverson PA-C [facility]

## 2023-06-05 ENCOUNTER — PATIENT MESSAGE (OUTPATIENT)
Dept: PODIATRY | Facility: CLINIC | Age: 33
End: 2023-06-05
Payer: MEDICAID

## 2023-06-14 ENCOUNTER — HOSPITAL ENCOUNTER (OUTPATIENT)
Dept: RADIOLOGY | Facility: HOSPITAL | Age: 33
Discharge: HOME | End: 2023-06-14
Attending: PODIATRIST
Payer: MEDICAID

## 2023-06-14 ENCOUNTER — OFFICE VISIT (OUTPATIENT)
Dept: PODIATRY | Facility: CLINIC | Age: 33
End: 2023-06-14
Payer: MEDICAID

## 2023-06-14 DIAGNOSIS — T81.9XXS FAILED BUNIONECTOMY, SEQUELA: ICD-10-CM

## 2023-06-14 DIAGNOSIS — M21.612 HALLUX ABDUCTOVALGUS WITH BUNIONS, LEFT: ICD-10-CM

## 2023-06-14 DIAGNOSIS — Z09 FOLLOW-UP EXAMINATION FOLLOWING SURGERY: Primary | ICD-10-CM

## 2023-06-14 DIAGNOSIS — M20.12 HALLUX ABDUCTOVALGUS WITH BUNIONS, LEFT: ICD-10-CM

## 2023-06-14 DIAGNOSIS — Z09 FOLLOW-UP EXAMINATION FOLLOWING SURGERY: ICD-10-CM

## 2023-06-14 PROCEDURE — 1159F MED LIST DOCD IN RCRD: CPT | Mod: CPTII,,, | Performed by: PODIATRIST

## 2023-06-14 PROCEDURE — 1160F RVW MEDS BY RX/DR IN RCRD: CPT | Mod: CPTII,,, | Performed by: PODIATRIST

## 2023-06-14 PROCEDURE — 4010F ACE/ARB THERAPY RXD/TAKEN: CPT | Mod: CPTII,,, | Performed by: PODIATRIST

## 2023-06-14 PROCEDURE — 99999 PR PBB SHADOW E&M-EST. PATIENT-LVL IV: CPT | Mod: PBBFAC,,, | Performed by: PODIATRIST

## 2023-06-14 PROCEDURE — 3044F HG A1C LEVEL LT 7.0%: CPT | Mod: CPTII,,, | Performed by: PODIATRIST

## 2023-06-14 PROCEDURE — 1160F PR REVIEW ALL MEDS BY PRESCRIBER/CLIN PHARMACIST DOCUMENTED: ICD-10-PCS | Mod: CPTII,,, | Performed by: PODIATRIST

## 2023-06-14 PROCEDURE — 99999 PR PBB SHADOW E&M-EST. PATIENT-LVL IV: ICD-10-PCS | Mod: PBBFAC,,, | Performed by: PODIATRIST

## 2023-06-14 PROCEDURE — 99024 POSTOP FOLLOW-UP VISIT: CPT | Mod: ,,, | Performed by: PODIATRIST

## 2023-06-14 PROCEDURE — 4010F PR ACE/ARB THEARPY RXD/TAKEN: ICD-10-PCS | Mod: CPTII,,, | Performed by: PODIATRIST

## 2023-06-14 PROCEDURE — 99214 OFFICE O/P EST MOD 30 MIN: CPT | Mod: PBBFAC | Performed by: PODIATRIST

## 2023-06-14 PROCEDURE — 1159F PR MEDICATION LIST DOCUMENTED IN MEDICAL RECORD: ICD-10-PCS | Mod: CPTII,,, | Performed by: PODIATRIST

## 2023-06-14 PROCEDURE — 73630 XR FOOT COMPLETE 3 VIEW LEFT: ICD-10-PCS | Mod: 26,LT,, | Performed by: RADIOLOGY

## 2023-06-14 PROCEDURE — 3044F PR MOST RECENT HEMOGLOBIN A1C LEVEL <7.0%: ICD-10-PCS | Mod: CPTII,,, | Performed by: PODIATRIST

## 2023-06-14 PROCEDURE — 73630 X-RAY EXAM OF FOOT: CPT | Mod: TC,LT

## 2023-06-14 PROCEDURE — 73630 X-RAY EXAM OF FOOT: CPT | Mod: 26,LT,, | Performed by: RADIOLOGY

## 2023-06-14 PROCEDURE — 99024 PR POST-OP FOLLOW-UP VISIT: ICD-10-PCS | Mod: ,,, | Performed by: PODIATRIST

## 2023-06-14 NOTE — PROGRESS NOTES
Subjective:       Patient ID: Nathan Carlos is a 32 y.o. female.    Chief Complaint: Post-op Evaluation (5.23.23 left bunionectomy. Patient denies pain at present and continues NWB status./)    HPI: Nathan Carlos presents clinic status post 1st tarsometatarsal joint arthrodesis for revision bunion deformity correction.  Remains nonweightbearing on the operative extremity.  States 0/10 pain.  Utilizing aspirin for DVT prophylaxis.    Review of patient's allergies indicates:   Allergen Reactions    Amitriptyline      xerostomia    Amlodipine      DRY MOUTH    Tramadol Itching       Past Medical History:   Diagnosis Date    Anxiety     Carpal tunnel syndrome     Diabetes mellitus     Diabetes mellitus, type 2     GERD (gastroesophageal reflux disease)     Herpes simplex virus (HSV) infection     High serum testosterone 09/07/2017    Hyperlipidemia     Hypertension     Insulin resistance syndrome 09/07/2017    Lumbar facet arthropathy 08/14/2019    Morbid obesity     Ovarian cyst     bilateral        Family History   Problem Relation Age of Onset    Hypertension Mother     Fibroids Mother     Arthritis Mother     Diabetes Sister     Hypertension Brother     No Known Problems Brother     Hypertension Maternal Aunt     Diabetes Maternal Aunt     Arthritis Maternal Aunt     Diabetes Maternal Uncle     Hypertension Maternal Grandmother     Leukemia Maternal Grandfather     Hypertension Maternal Grandfather     Stroke Neg Hx        Social History     Socioeconomic History    Marital status:     Number of children: 0   Occupational History     Employer: [id number]   Tobacco Use    Smoking status: Never    Smokeless tobacco: Never   Substance and Sexual Activity    Alcohol use: Never    Drug use: No    Sexual activity: Yes     Partners: Male     Birth control/protection: Other-see comments, None     Comment: Birth control pill     Social Determinants of Health     Financial Resource Strain:  Low Risk     Difficulty of Paying Living Expenses: Not hard at all   Food Insecurity: No Food Insecurity    Worried About Running Out of Food in the Last Year: Never true    Ran Out of Food in the Last Year: Never true   Transportation Needs: No Transportation Needs    Lack of Transportation (Medical): No    Lack of Transportation (Non-Medical): No   Physical Activity: Inactive    Days of Exercise per Week: 0 days    Minutes of Exercise per Session: 0 min   Stress: Stress Concern Present    Feeling of Stress : Rather much   Social Connections: Unknown    Frequency of Communication with Friends and Family: More than three times a week    Frequency of Social Gatherings with Friends and Family: Twice a week    Active Member of Clubs or Organizations: No    Attends Club or Organization Meetings: Never    Marital Status:    Housing Stability: Low Risk     Unable to Pay for Housing in the Last Year: No    Number of Places Lived in the Last Year: 1    Unstable Housing in the Last Year: No       Past Surgical History:   Procedure Laterality Date    AKIN OSTEOTOMY Left 5/23/2023    Procedure: OSTEOTOMY, AKIN;  Surgeon: Sandy Cohn DPM;  Location: [facility] OR;  Service: Podiatry;  Laterality: Left;    ANKLE SURGERY Left     BUNIONECTOMY Bilateral     CARPAL TUNNEL RELEASE Left 03/19/2021    Procedure: RELEASE, CARPAL TUNNEL;  Surgeon: Juan F Bernard MD;  Location: [facility] OR;  Service: Orthopedics;  Laterality: Left;    CARPAL TUNNEL RELEASE Right 11/04/2021    Procedure: RELEASE, CARPAL TUNNEL;  Surgeon: Juan F Bernard MD;  Location: [facility] OR;  Service: Orthopedics;  Laterality: Right;    COLONOSCOPY  06/12/2018    EPIDURAL STEROID INJECTION N/A 11/23/2022    Procedure: Lumbar L5/S1 IL BRUNILDA RN IV Sedation;  Surgeon: Eula Rojas MD;  Location: [facility] PAIN MGT;  Service: Pain Management;  Laterality: N/A;    EPIDURAL STEROID INJECTION N/A 2/17/2023    Procedure: Lumbar L5/S1 IL BRUNILDA with left paramedian  approach RN IV Sedation;  Surgeon: Eula Rojas MD;  Location: [facility];  Service: Pain Management;  Laterality: N/A;    ESOPHAGOGASTRODUODENOSCOPY      ESOPHAGOGASTRODUODENOSCOPY N/A 12/02/2021    Procedure: EGD (ESOPHAGOGASTRODUODENOSCOPY);  Surgeon: Pili Eagle MD;  Location: [facility];  Service: Endoscopy;  Laterality: N/A;    INJECTION OF ANESTHETIC AGENT AROUND MEDIAL BRANCH NERVES INNERVATING LUMBAR FACET JOINT Bilateral 08/14/2019    Procedure: Bilateral L3-5 MBB;  Surgeon: Yo Kirkland MD;  Location: [facility];  Service: Pain Management;  Laterality: Bilateral;    INJECTION OF ANESTHETIC AGENT AROUND MEDIAL BRANCH NERVES INNERVATING LUMBAR FACET JOINT Bilateral 07/16/2020    Procedure: Bilateral L3-5 MBB;  Surgeon: Yo Kirkland MD;  Location: [facility];  Service: Pain Management;  Laterality: Bilateral;    INJECTION OF ANESTHETIC AGENT INTO SACROILIAC JOINT Right 02/05/2020    Procedure: Right SIJ Injection with local;  Surgeon: Yo Kirkland MD;  [facility];  Service: Pain Management;  Laterality: Right;    INJECTION OF ANESTHETIC AGENT INTO SACROILIAC JOINT Left 06/17/2021    Procedure: Left GT bursa + left SIJ injection;  Surgeon: Yo Kirkland MD;  [facility];  Service: Pain Management;  Laterality: Left;    INJECTION OF ANESTHETIC AGENT INTO SACROILIAC JOINT Bilateral 05/05/2022    Procedure: Bilateral BLOCK, SACROILIAC JOINT and Bilateral GTB RN IV sedation;  Surgeon: Yo Kirkland MD;  Location: [facility];  Service: Pain Management;  Laterality: Bilateral;    INJECTION OF JOINT Left 06/17/2021    Procedure: Left GT bursa + left SIJ injection;  Surgeon: Yo Kirkland MD;  Location: [facility];  Service: Pain Management;  Laterality: Left;    INJECTION OF JOINT Bilateral 12/9/2022    Procedure: Bilateral GT bursa + bilateral SIJ injection RN IV Sedation;  Surgeon: Eula Rojas MD;  Location: [facility];  Service: Pain Management;  Laterality:  Bilateral;    LAPIDUS BUNIONECTOMY Left 5/23/2023    Procedure: BUNIONECTOMY, LAPIDUS;  Surgeon: Sandy Cohn DPM;  Location: [facility] OR;  Service: Podiatry;  Laterality: Left;  First metatarsal cuneiform joint arthrodesis.      LUMBAR FUSION      RADIOFREQUENCY THERMOCOAGULATION Right 12/08/2020    Procedure: RADIOFREQUENCY THERMAL COAGULATION Right L3, 4,5 MBB RFA with RN IV sedation// NO STEROID;  Surgeon: Yo Kirkland MD;  Location: [facility] PAIN MGT;  Service: Pain Management;  Laterality: Right;    RADIOFREQUENCY THERMOCOAGULATION Left 01/21/2021    Procedure: Left L3-5 Lumbar RFA;  Surgeon: Rian Chaney MD;  Location: [facility] PAIN MGT;  Service: Pain Management;  Laterality: Left;    SELECTIVE INJECTION OF ANESTHETIC AGENT AROUND LUMBAR SPINAL NERVE ROOT BY TRANSFORAMINAL APPROACH Left 03/08/2022    Procedure: BLOCK, SPINAL NERVE ROOT, LUMBAR, SELECTIVE, TRANSFORAMINAL APPROACH Left L5-S1, S1 RN IV sedation;  Surgeon: Yo Kirkland MD;  Location: [facility] PAIN MGT;  Service: Pain Management;  Laterality: Left;    TRANSFORAMINAL EPIDURAL INJECTION OF STEROID Left 07/14/2022    Procedure: left L4/5 + L5/S1 TF BRUNILDA RN IV Sedation;  Surgeon: Eula Rojas MD;  Location: [facility] PAIN MGT;  Service: Pain Management;  Laterality: Left;       Review of Systems       Objective:   There were no vitals taken for this visit.    X-Ray Foot 2 View Left  Narrative: EXAM: XR FOOT 2 VIEW LEFT    CLINICAL HISTORY: Arthrodesis.    FINDINGS: Fluoroscopy was utilized for assistance with surgical arthrodesis of the first tarsometatarsal joint and of the mid phalanx of the great toe.  There is normal joint alignment with no acute findings.  Total fluoroscopic time 42 seconds.  11 images were obtained  Impression:  As above.    Finalized on: 5/25/2023 8:34 AM By:  Yaya Weiner MD  [facility]# [id number]      2023-05-25 08:36:[id number]    [facility]  X-Ray Foot 2 View Left  Narrative: EXAM: XR FOOT 2 VIEW LEFT    CLINICAL HISTORY: Status post bunion  surgery.    TECHNIQUE: 2 views of the left foot.    COMPARISON: Prior radiographs from 03/20/2023 were reviewed.    FINDINGS:  There is splint material about the foot with normal appearance of the orthopedic hardware within the first tarsometatarsal joint and of the interphalangeal joint of the great toe with normal alignment.  No new fractures.  No signs of complication.  Impression: No acute findings after surgery as above.    Finalized on: 5/25/2023 8:34 AM By:  Yaya Weiner MD  [facility]# [id number]      2023-05-25 08:36:[id number]    [facility]       Physical Exam   LOWER EXTREMITY PHYSICAL EXAMINATION    Vascular: Capillary refill time is intact.  Dorsalis pedis pulse 2/4 on the left.  Posterior tibial pulse on the left 2/4.    Neurological: Sensation to light touch is intact. Proprioception is intact. Sensation to pin prick is intact.  No numbness or tingling identified.    Musculoskeletal:  Status post 1st tarsometatarsal joint arthrodesis    Dermatological:  Steri-Strips/sutures are intact. There is no signs of increased edema noted. Mild edema.  The skin is well approximated and coapting.  No signs of postoperative infection.  Skin lines are present to the level affected lower extremity as result of decreased edema    Imaging:       Results for orders placed during the hospital encounter of 03/02/23    X-Ray Foot Complete Left    Narrative  EXAMINATION:  XR FOOT COMPLETE 3 VIEW LEFT    CLINICAL HISTORY:  . Hallux valgus (acquired), left foot    TECHNIQUE:  AP, lateral, and oblique views of the foot were performed.    COMPARISON:  01/23/2023    FINDINGS:  A mild hallux valgus deformity is noted.  There are postoperative changes associated with the 1st metatarsal.  Small dorsal and plantar calcaneal enthesophytes are noted.  Postsurgical changes seen associated with the ankle.  The joint spaces appear to be well maintained.    Impression  As above      Electronically signed by: Stephon Benjamin  DO  Date:    03/02/2023  Time:    11:44       No results found for this or any previous visit.    Assessment:     1. Follow-up examination following surgery    2. Hallux abductovalgus with bunions, left    3. Failed bunionectomy, sequela        Plan:     Follow-up examination following surgery    Hallux abductovalgus with bunions, left    Failed bunionectomy, sequela      Thorough discussion is had with the patient today, concerning the diagnosis, its etiology, and the treatment algorithm at present.    X-rays reviewed by myself patient room.  Appears show adequate surgical hardware placement.  We discuss the Akin osteotomy and the breakage of the lateral cortex with manipulation of the proximal phalanx.  Adequate placement with staple in place.    Okay to initiate weight-bearing in the Cam boot only.      Sutures were removed today without complications.  No evidence of gapping was noted.  Steri-Strips were applied.  Patient is okay to allow the area to get wet but did discussed the importance of not scrubbing the surgical incision.     Continue aspirin for DVT prophylaxis    Continue to reinforce with the patient the importance of calling immediately if there is any noted complications or sudden changes to the patient's condition.  We discuss red flag risk factors of things to continue to monitor for.  Patient relates understanding as discussed    Future Appointments   Date Time Provider Department Center   7/17/2023  9:00 AM Darlin Iverson PA-C [facility] [place]   7/26/2023 11:15 AM Sandy Cohn DPM [facility]

## 2023-06-16 ENCOUNTER — TELEPHONE (OUTPATIENT)
Dept: FAMILY MEDICINE | Facility: CLINIC | Age: 33
End: 2023-06-16
Payer: MEDICAID

## 2023-06-16 NOTE — TELEPHONE ENCOUNTER
----- Message from Pili Weeks sent at 6/16/2023 12:22 PM CDT -----  Contact: marleen Evans  Pt is calling to speak with the nurse regarding appt. Reports needing to schedule annual for 8/2023 but no available appts. Please give pt a call back at [phone]. Thanks CONG

## 2023-06-21 ENCOUNTER — PATIENT MESSAGE (OUTPATIENT)
Dept: PAIN MEDICINE | Facility: CLINIC | Age: 33
End: 2023-06-21
Payer: MEDICAID

## 2023-06-21 ENCOUNTER — PATIENT MESSAGE (OUTPATIENT)
Dept: PODIATRY | Facility: CLINIC | Age: 33
End: 2023-06-21
Payer: MEDICAID

## 2023-06-21 DIAGNOSIS — M54.9 DORSALGIA, UNSPECIFIED: Primary | ICD-10-CM

## 2023-06-21 DIAGNOSIS — M54.16 LUMBAR RADICULOPATHY: ICD-10-CM

## 2023-06-21 DIAGNOSIS — M54.9 DORSALGIA, UNSPECIFIED: ICD-10-CM

## 2023-06-21 RX ORDER — IBUPROFEN 800 MG/1
800 TABLET ORAL 3 TIMES DAILY PRN
Qty: 90 TABLET | Refills: 0 | Status: SHIPPED | OUTPATIENT
Start: 2023-06-21 | End: 2023-06-21 | Stop reason: SDUPTHER

## 2023-06-21 RX ORDER — IBUPROFEN 800 MG/1
800 TABLET ORAL 3 TIMES DAILY PRN
Qty: 90 TABLET | Refills: 0 | Status: SHIPPED | OUTPATIENT
Start: 2023-06-21 | End: 2023-07-21

## 2023-06-21 RX ORDER — TIZANIDINE 4 MG/1
4 TABLET ORAL EVERY 8 HOURS
Qty: 90 TABLET | Refills: 2 | Status: SHIPPED | OUTPATIENT
Start: 2023-06-21 | End: 2023-06-21 | Stop reason: SDUPTHER

## 2023-06-21 RX ORDER — TIZANIDINE 4 MG/1
4 TABLET ORAL EVERY 8 HOURS
Qty: 90 TABLET | Refills: 2 | Status: SHIPPED | OUTPATIENT
Start: 2023-06-21 | End: 2023-09-21 | Stop reason: SDUPTHER

## 2023-06-22 ENCOUNTER — PATIENT MESSAGE (OUTPATIENT)
Dept: PODIATRY | Facility: CLINIC | Age: 33
End: 2023-06-22
Payer: MEDICAID

## 2023-06-23 ENCOUNTER — TELEPHONE (OUTPATIENT)
Dept: PODIATRY | Facility: CLINIC | Age: 33
End: 2023-06-23
Payer: MEDICAID

## 2023-06-23 NOTE — TELEPHONE ENCOUNTER
Called patient to discuss post op pictures sent via my chart. Patient advised to reapply steri strips and cover with band aid. Patient verbally expressed understanding. Call ended pleasantly.

## 2023-06-26 ENCOUNTER — HOSPITAL ENCOUNTER (OUTPATIENT)
Dept: RADIOLOGY | Facility: HOSPITAL | Age: 33
Discharge: HOME | End: 2023-06-26
Attending: PODIATRIST
Payer: MEDICAID

## 2023-06-26 ENCOUNTER — OFFICE VISIT (OUTPATIENT)
Dept: PODIATRY | Facility: CLINIC | Age: 33
End: 2023-06-26
Payer: MEDICAID

## 2023-06-26 DIAGNOSIS — T81.9XXS FAILED BUNIONECTOMY, SEQUELA: ICD-10-CM

## 2023-06-26 DIAGNOSIS — M20.12 HALLUX ABDUCTOVALGUS WITH BUNIONS, LEFT: ICD-10-CM

## 2023-06-26 DIAGNOSIS — E11.9 TYPE 2 DIABETES MELLITUS WITHOUT COMPLICATION, WITHOUT LONG-TERM CURRENT USE OF INSULIN: ICD-10-CM

## 2023-06-26 DIAGNOSIS — Z98.890 HISTORY OF BUNIONECTOMY OF LEFT GREAT TOE: ICD-10-CM

## 2023-06-26 DIAGNOSIS — Z09 FOLLOW-UP EXAMINATION FOLLOWING SURGERY: Primary | ICD-10-CM

## 2023-06-26 DIAGNOSIS — M21.612 HALLUX ABDUCTOVALGUS WITH BUNIONS, LEFT: ICD-10-CM

## 2023-06-26 DIAGNOSIS — E66.01 MORBID OBESITY WITH BMI OF 40.0-44.9, ADULT: ICD-10-CM

## 2023-06-26 DIAGNOSIS — Z09 FOLLOW-UP EXAMINATION FOLLOWING SURGERY: ICD-10-CM

## 2023-06-26 PROCEDURE — 73630 XR FOOT COMPLETE 3 VIEW LEFT: ICD-10-PCS | Mod: 26,LT,, | Performed by: RADIOLOGY

## 2023-06-26 PROCEDURE — 1159F PR MEDICATION LIST DOCUMENTED IN MEDICAL RECORD: ICD-10-PCS | Mod: CPTII,,, | Performed by: PODIATRIST

## 2023-06-26 PROCEDURE — 4010F ACE/ARB THERAPY RXD/TAKEN: CPT | Mod: CPTII,,, | Performed by: PODIATRIST

## 2023-06-26 PROCEDURE — 99024 POSTOP FOLLOW-UP VISIT: CPT | Mod: ,,, | Performed by: PODIATRIST

## 2023-06-26 PROCEDURE — 99024 PR POST-OP FOLLOW-UP VISIT: ICD-10-PCS | Mod: ,,, | Performed by: PODIATRIST

## 2023-06-26 PROCEDURE — 73630 X-RAY EXAM OF FOOT: CPT | Mod: TC,LT

## 2023-06-26 PROCEDURE — 1159F MED LIST DOCD IN RCRD: CPT | Mod: CPTII,,, | Performed by: PODIATRIST

## 2023-06-26 PROCEDURE — 99999 PR PBB SHADOW E&M-EST. PATIENT-LVL II: CPT | Mod: PBBFAC,,, | Performed by: PODIATRIST

## 2023-06-26 PROCEDURE — 4010F PR ACE/ARB THEARPY RXD/TAKEN: ICD-10-PCS | Mod: CPTII,,, | Performed by: PODIATRIST

## 2023-06-26 PROCEDURE — 99999 PR PBB SHADOW E&M-EST. PATIENT-LVL II: ICD-10-PCS | Mod: PBBFAC,,, | Performed by: PODIATRIST

## 2023-06-26 PROCEDURE — 1160F PR REVIEW ALL MEDS BY PRESCRIBER/CLIN PHARMACIST DOCUMENTED: ICD-10-PCS | Mod: CPTII,,, | Performed by: PODIATRIST

## 2023-06-26 PROCEDURE — 1160F RVW MEDS BY RX/DR IN RCRD: CPT | Mod: CPTII,,, | Performed by: PODIATRIST

## 2023-06-26 PROCEDURE — 3044F HG A1C LEVEL LT 7.0%: CPT | Mod: CPTII,,, | Performed by: PODIATRIST

## 2023-06-26 PROCEDURE — 99212 OFFICE O/P EST SF 10 MIN: CPT | Mod: PBBFAC | Performed by: PODIATRIST

## 2023-06-26 PROCEDURE — 3044F PR MOST RECENT HEMOGLOBIN A1C LEVEL <7.0%: ICD-10-PCS | Mod: CPTII,,, | Performed by: PODIATRIST

## 2023-06-26 PROCEDURE — 73630 X-RAY EXAM OF FOOT: CPT | Mod: 26,LT,, | Performed by: RADIOLOGY

## 2023-06-26 NOTE — PROGRESS NOTES
Subjective:       Patient ID: Nathan Carlos is a 32 y.o. female.    Chief Complaint: Post-op Evaluation (5.23.23 left bunionectomy Patient complains of 8/10 pain at present to surgical incision. )    HPI: Nathan Carlos presents clinic status post 1st tarsometatarsal joint arthrodesis for revision bunion deformity correction.  Remains nonweightbearing on the operative extremity.  States 8/10 pain to the toe.  Utilizing aspirin for DVT prophylaxis.    Review of patient's allergies indicates:   Allergen Reactions    Amitriptyline      xerostomia    Amlodipine      DRY MOUTH    Tramadol Itching       Past Medical History:   Diagnosis Date    Anxiety     Carpal tunnel syndrome     Diabetes mellitus     Diabetes mellitus, type 2     GERD (gastroesophageal reflux disease)     Herpes simplex virus (HSV) infection     High serum testosterone 09/07/2017    Hyperlipidemia     Hypertension     Insulin resistance syndrome 09/07/2017    Lumbar facet arthropathy 08/14/2019    Morbid obesity     Ovarian cyst     bilateral        Family History   Problem Relation Age of Onset    Hypertension Mother     Fibroids Mother     Arthritis Mother     Diabetes Sister     Hypertension Brother     No Known Problems Brother     Hypertension Maternal Aunt     Diabetes Maternal Aunt     Arthritis Maternal Aunt     Diabetes Maternal Uncle     Hypertension Maternal Grandmother     Leukemia Maternal Grandfather     Hypertension Maternal Grandfather     Stroke Neg Hx        Social History     Socioeconomic History    Marital status:     Number of children: 0   Occupational History     Employer: [id number]   Tobacco Use    Smoking status: Never    Smokeless tobacco: Never   Substance and Sexual Activity    Alcohol use: Never    Drug use: No    Sexual activity: Yes     Partners: Male     Birth control/protection: Other-see comments, None     Comment: Birth control pill     Social Determinants of Health     Financial  Resource Strain: Low Risk     Difficulty of Paying Living Expenses: Not hard at all   Food Insecurity: No Food Insecurity    Worried About Running Out of Food in the Last Year: Never true    Ran Out of Food in the Last Year: Never true   Transportation Needs: No Transportation Needs    Lack of Transportation (Medical): No    Lack of Transportation (Non-Medical): No   Physical Activity: Inactive    Days of Exercise per Week: 0 days    Minutes of Exercise per Session: 0 min   Stress: Stress Concern Present    Feeling of Stress : Rather much   Social Connections: Unknown    Frequency of Communication with Friends and Family: More than three times a week    Frequency of Social Gatherings with Friends and Family: Twice a week    Active Member of Clubs or Organizations: No    Attends Club or Organization Meetings: Never    Marital Status:    Housing Stability: Low Risk     Unable to Pay for Housing in the Last Year: No    Number of Places Lived in the Last Year: 1    Unstable Housing in the Last Year: No       Past Surgical History:   Procedure Laterality Date    AKIN OSTEOTOMY Left 5/23/2023    Procedure: OSTEOTOMY, AKIN;  Surgeon: Sandy Cohn DPM;  Location: [facility] OR;  Service: Podiatry;  Laterality: Left;    ANKLE SURGERY Left     BUNIONECTOMY Bilateral     CARPAL TUNNEL RELEASE Left 03/19/2021    Procedure: RELEASE, CARPAL TUNNEL;  Surgeon: Juan F Bernard MD;  Location: [facility] OR;  Service: Orthopedics;  Laterality: Left;    CARPAL TUNNEL RELEASE Right 11/04/2021    Procedure: RELEASE, CARPAL TUNNEL;  Surgeon: Juan F Bernard MD;  Location: [facility] OR;  Service: Orthopedics;  Laterality: Right;    COLONOSCOPY  06/12/2018    EPIDURAL STEROID INJECTION N/A 11/23/2022    Procedure: Lumbar L5/S1 IL BRUNILDA RN IV Sedation;  Surgeon: Eula Rojas MD;  Location: [facility] PAIN MGT;  Service: Pain Management;  Laterality: N/A;    EPIDURAL STEROID INJECTION N/A 2/17/2023    Procedure: Lumbar L5/S1 IL BRUNILDA with  left paramedian approach RN IV Sedation;  Surgeon: Eula Rojas MD;  Location: [facility];  Service: Pain Management;  Laterality: N/A;    ESOPHAGOGASTRODUODENOSCOPY      ESOPHAGOGASTRODUODENOSCOPY N/A 12/02/2021    Procedure: EGD (ESOPHAGOGASTRODUODENOSCOPY);  Surgeon: Pili Eagle MD;  Location: [facility];  Service: Endoscopy;  Laterality: N/A;    INJECTION OF ANESTHETIC AGENT AROUND MEDIAL BRANCH NERVES INNERVATING LUMBAR FACET JOINT Bilateral 08/14/2019    Procedure: Bilateral L3-5 MBB;  Surgeon: Yo Kirkland MD;  Location: [facility];  Service: Pain Management;  Laterality: Bilateral;    INJECTION OF ANESTHETIC AGENT AROUND MEDIAL BRANCH NERVES INNERVATING LUMBAR FACET JOINT Bilateral 07/16/2020    Procedure: Bilateral L3-5 MBB;  Surgeon: Yo Kirkland MD;  Location: [facility];  Service: Pain Management;  Laterality: Bilateral;    INJECTION OF ANESTHETIC AGENT INTO SACROILIAC JOINT Right 02/05/2020    Procedure: Right SIJ Injection with local;  Surgeon: Yo Kirkland MD;  Location: [facility];  Service: Pain Management;  Laterality: Right;    INJECTION OF ANESTHETIC AGENT INTO SACROILIAC JOINT Left 06/17/2021    Procedure: Left GT bursa + left SIJ injection;  Surgeon: Yo Kirkland MD;  [facility];  Service: Pain Management;  Laterality: Left;    INJECTION OF ANESTHETIC AGENT INTO SACROILIAC JOINT Bilateral 05/05/2022    Procedure: Bilateral BLOCK, SACROILIAC JOINT and Bilateral GTB RN IV sedation;  Surgeon: Yo Kirkland MD;  Location: [facility];  Service: Pain Management;  Laterality: Bilateral;    INJECTION OF JOINT Left 06/17/2021    Procedure: Left GT bursa + left SIJ injection;  Surgeon: Yo Kirkland MD;  Location: [facility];  Service: Pain Management;  Laterality: Left;    INJECTION OF JOINT Bilateral 12/9/2022    Procedure: Bilateral GT bursa + bilateral SIJ injection RN IV Sedation;  Surgeon: Eula Rojas MD;  Location: [facility];  Service: Pain Management;   Laterality: Bilateral;    LAPIDUS BUNIONECTOMY Left 5/23/2023    Procedure: BUNIONECTOMY, LAPIDUS;  Surgeon: Sandy Cohn DPM;  Location: [facility] OR;  Service: Podiatry;  Laterality: Left;  First metatarsal cuneiform joint arthrodesis.      LUMBAR FUSION      RADIOFREQUENCY THERMOCOAGULATION Right 12/08/2020    Procedure: RADIOFREQUENCY THERMAL COAGULATION Right L3, 4,5 MBB RFA with RN IV sedation// NO STEROID;  Surgeon: Yo Kirkland MD;  Location: [facility] PAIN MGT;  Service: Pain Management;  Laterality: Right;    RADIOFREQUENCY THERMOCOAGULATION Left 01/21/2021    Procedure: Left L3-5 Lumbar RFA;  Surgeon: Rian Chaney MD;  Location: [facility] PAIN [facility]GT;  Service: Pain Management;  Laterality: Left;    SELECTIVE INJECTION OF ANESTHETIC AGENT AROUND LUMBAR SPINAL NERVE ROOT BY TRANSFORAMINAL APPROACH Left 03/08/2022    Procedure: BLOCK, SPINAL NERVE ROOT, LUMBAR, SELECTIVE, TRANSFORAMINAL APPROACH Left L5-S1, S1 RN IV sedation;  Surgeon: Yo Kirkland MD;  Location: [facility] PAIN MGT;  Service: Pain Management;  Laterality: Left;    TRANSFORAMINAL EPIDURAL INJECTION OF STEROID Left 07/14/2022    Procedure: left L4/5 + L5/S1 TF BRUNILDA RN IV Sedation;  Surgeon: Eula Rojas MD;  Location: [facility] PAIN [facility]GT;  Service: Pain Management;  Laterality: Left;       Review of Systems       Objective:   There were no vitals taken for this visit.    X-Ray Foot Complete Left  Narrative: EXAMINATION:  XR FOOT COMPLETE 3 VIEW LEFT    CLINICAL HISTORY:  .  Encounter for follow-up examination after completed treatment for conditions other than malignant neoplasm    TECHNIQUE:  AP, lateral and oblique views of the left foot were performed.    COMPARISON:  Prior radiographs    FINDINGS:  Postsurgical findings of the 1st proximal phalanx, 1st TMT joint and ankle are unchanged.  Alignment stable.  Soft tissue edema improved.  Impression: As above    Electronically signed by: Barrie Mayfield MD  Date:    06/26/2023  Time:    09:25       Physical  Exam   LOWER EXTREMITY PHYSICAL EXAMINATION    Vascular: Capillary refill time is intact.  Dorsalis pedis pulse 2/4 on the left.  Posterior tibial pulse on the left 2/4.    Neurological: Sensation to light touch is intact. Proprioception is intact. Sensation to pin prick is intact.  No numbness or tingling identified.    Musculoskeletal:  Status post 1st tarsometatarsal joint arthrodesis    Dermatological:  Steri-Strips/sutures are intact. There is no signs of increased edema noted. Mild edema.  The skin is well approximated and coapting. Small central area of superficial wound development measuring 0.3 x 0.05 cm. Superficial in nature. No drainage.  No signs of postoperative infection.  Skin lines are present to the level affected lower extremity as result of decreased edema    Imaging:       Results for orders placed during the hospital encounter of 03/02/23    X-Ray Foot Complete Left    Narrative  EXAMINATION:  XR FOOT COMPLETE 3 VIEW LEFT    CLINICAL HISTORY:  . Hallux valgus (acquired), left foot    TECHNIQUE:  AP, lateral, and oblique views of the foot were performed.    COMPARISON:  01/23/2023    FINDINGS:  A mild hallux valgus deformity is noted.  There are postoperative changes associated with the 1st metatarsal.  Small dorsal and plantar calcaneal enthesophytes are noted.  Postsurgical changes seen associated with the ankle.  The joint spaces appear to be well maintained.    Impression  As above      Electronically signed by: Stephon Benjamin DO  Date:    03/02/2023  Time:    11:44       No results found for this or any previous visit.    Assessment:     1. Follow-up examination following surgery    2. Hallux abductovalgus with bunions, left    3. Failed bunionectomy, sequela    4. History of bunionectomy of left great toe    5. Type 2 diabetes mellitus without complication, without long-term current use of insulin    6. Morbid obesity with BMI of 40.0-44.9, adult        Plan:     Follow-up examination  following surgery    Hallux abductovalgus with bunions, left    Failed bunionectomy, sequela    History of bunionectomy of left great toe    Type 2 diabetes mellitus without complication, without long-term current use of insulin    Morbid obesity with BMI of 40.0-44.9, adult      Thorough discussion is had with the patient today, concerning the diagnosis, its etiology, and the treatment algorithm at present.    X-rays reviewed by myself patient room.  Appears show adequate surgical hardware placement.  We discuss the Akin osteotomy and the breakage of the lateral cortex with manipulation of the proximal phalanx.  Adequate placement with staple in place.    Continue weight-bearing in the Cam boot only.      With a small with a small area of superficial wound development, would recommend patient utilize Betadine moist gauze to the area and cover with dry sterile gauze and tape.  Recommend daily dressing changes.  Avoid bathing or showering as there is a small superficial wound.  Continue to watch monitor this area.  Call if there is any significant change.    Continue aspirin for DVT prophylaxis    Continue to reinforce with the patient the importance of calling immediately if there is any noted complications or sudden changes to the patient's condition.  We discuss red flag risk factors of things to continue to monitor for.  Patient relates understanding as discussed    Future Appointments   Date Time Provider Department Center   6/26/2023 11:45 AM [facility] [id number] ON [facility]   7/17/2023  9:00 AM Darlin Iverson PA-C [facility]   7/26/2023 11:15 AM Sandy Cohn DPM [facility]   8/2/2023  8:00 AM Qasim Paez NP [facility]

## 2023-06-27 VITALS
BODY MASS INDEX: 43.62 KG/M2 | OXYGEN SATURATION: 95 % | TEMPERATURE: 98 F | HEART RATE: 95 BPM | HEIGHT: 64 IN | RESPIRATION RATE: 20 BRPM | SYSTOLIC BLOOD PRESSURE: 116 MMHG | DIASTOLIC BLOOD PRESSURE: 73 MMHG | WEIGHT: 255.5 LBS

## 2023-07-03 DIAGNOSIS — L30.4 INTERTRIGO: ICD-10-CM

## 2023-07-03 DIAGNOSIS — K59.00 CONSTIPATION, UNSPECIFIED CONSTIPATION TYPE: ICD-10-CM

## 2023-07-05 RX ORDER — SEMAGLUTIDE 0.68 MG/ML
INJECTION, SOLUTION SUBCUTANEOUS
Qty: 3 ML | Refills: 1 | Status: SHIPPED | OUTPATIENT
Start: 2023-07-05 | End: 2023-11-24

## 2023-07-05 RX ORDER — FLUCONAZOLE 200 MG/1
TABLET ORAL
Qty: 3 TABLET | Refills: 0 | Status: SHIPPED | OUTPATIENT
Start: 2023-07-05 | End: 2023-08-01

## 2023-07-05 RX ORDER — LINACLOTIDE 145 UG/1
CAPSULE, GELATIN COATED ORAL
Qty: 30 CAPSULE | Refills: 0 | Status: SHIPPED | OUTPATIENT
Start: 2023-07-05 | End: 2023-08-02

## 2023-07-05 NOTE — TELEPHONE ENCOUNTER
Refill Routing Note   Medication(s) are not appropriate for processing by [facility] for the following reason(s):      Non-participating provider    [facility] action(s):  Route Care Due:  None identified          Appointments  past 12m or future 3m with PCP    Date Provider   Last Visit   1/24/2023 Qasim Paez NP   Next Visit   7/3/2023 Qasim Paez NP   ED visits in past 90 days: 0        Note composed:2:37 PM 07/05/2023

## 2023-07-05 NOTE — TELEPHONE ENCOUNTER
Refill Routing Note   Medication(s) are not appropriate for processing by [facility] for the following reason(s):      Medication outside of protocol  Non-participating provider:  Currently there are no NP or PA participating in the [facility] program    [facility] action(s):  Route Care Due:  None identified          Appointments  past 12m or future 3m with PCP    Date Provider   Last Visit   1/24/2023 Qasim Paez NP   Next Visit   8/2/2023 Qasim Paez NP   ED visits in past 90 days: 0        Note composed:1:27 PM 07/05/2023

## 2023-07-06 ENCOUNTER — TELEPHONE (OUTPATIENT)
Dept: FAMILY MEDICINE | Facility: CLINIC | Age: 33
End: 2023-07-06
Payer: MEDICAID

## 2023-07-06 PROBLEM — Z01.818 PRE-OP EXAM: Status: RESOLVED | Noted: 2023-05-15 | Resolved: 2023-07-06

## 2023-07-06 NOTE — TELEPHONE ENCOUNTER
----- Message from Carol Tim sent at 7/6/2023  8:43 AM CDT -----  Contact: Drug company  [employer] Is calling in regards to getting a diagnostic code for patient ozempic medication. Please fax it over to [phone]. Thanks KB

## 2023-07-07 ENCOUNTER — TELEPHONE (OUTPATIENT)
Dept: FAMILY MEDICINE | Facility: CLINIC | Age: 33
End: 2023-07-07
Payer: MEDICAID

## 2023-07-10 DIAGNOSIS — I10 ESSENTIAL HYPERTENSION: ICD-10-CM

## 2023-07-10 RX ORDER — METOPROLOL SUCCINATE 50 MG/1
TABLET, EXTENDED RELEASE ORAL
Qty: 90 TABLET | Refills: 0 | Status: SHIPPED | OUTPATIENT
Start: 2023-07-10 | End: 2023-10-10

## 2023-07-12 DIAGNOSIS — E11.65 UNCONTROLLED TYPE 2 DIABETES MELLITUS WITH HYPERGLYCEMIA: ICD-10-CM

## 2023-07-12 RX ORDER — PEN NEEDLE, DIABETIC 31 GX5/16"
NEEDLE, DISPOSABLE MISCELLANEOUS
Qty: 100 EACH | Refills: 6 | Status: SHIPPED | OUTPATIENT
Start: 2023-07-12

## 2023-07-13 DIAGNOSIS — Z91.89 AT RISK FOR CARDIOVASCULAR EVENT: ICD-10-CM

## 2023-07-13 DIAGNOSIS — I10 ESSENTIAL HYPERTENSION: ICD-10-CM

## 2023-07-13 DIAGNOSIS — E11.9 TYPE 2 DIABETES MELLITUS WITHOUT COMPLICATION, WITH LONG-TERM CURRENT USE OF INSULIN: ICD-10-CM

## 2023-07-13 DIAGNOSIS — Z79.4 TYPE 2 DIABETES MELLITUS WITHOUT COMPLICATION, WITH LONG-TERM CURRENT USE OF INSULIN: ICD-10-CM

## 2023-07-13 RX ORDER — IRBESARTAN 150 MG/1
TABLET ORAL
Qty: 90 TABLET | Refills: 0 | Status: SHIPPED | OUTPATIENT
Start: 2023-07-13 | End: 2023-10-05

## 2023-07-14 DIAGNOSIS — F51.04 CHRONIC INSOMNIA: ICD-10-CM

## 2023-07-14 RX ORDER — ZOLPIDEM TARTRATE 5 MG/1
TABLET ORAL
Qty: 30 TABLET | Refills: 4 | Status: SHIPPED | OUTPATIENT
Start: 2023-07-14 | End: 2024-01-08 | Stop reason: SDUPTHER

## 2023-07-17 ENCOUNTER — OFFICE VISIT (OUTPATIENT)
Dept: PAIN MEDICINE | Facility: CLINIC | Age: 33
End: 2023-07-17
Payer: MEDICAID

## 2023-07-17 DIAGNOSIS — M54.16 LUMBAR RADICULOPATHY: Primary | ICD-10-CM

## 2023-07-17 DIAGNOSIS — E11.9 TYPE 2 DIABETES MELLITUS WITHOUT COMPLICATION, WITHOUT LONG-TERM CURRENT USE OF INSULIN: ICD-10-CM

## 2023-07-17 PROCEDURE — 1159F MED LIST DOCD IN RCRD: CPT | Mod: CPTII,95,, | Performed by: PHYSICIAN ASSISTANT

## 2023-07-17 PROCEDURE — 1160F RVW MEDS BY RX/DR IN RCRD: CPT | Mod: CPTII,95,, | Performed by: PHYSICIAN ASSISTANT

## 2023-07-17 PROCEDURE — 99214 PR OFFICE/OUTPT VISIT, EST, LEVL IV, 30-39 MIN: ICD-10-PCS | Mod: 95,,, | Performed by: PHYSICIAN ASSISTANT

## 2023-07-17 PROCEDURE — 4010F PR ACE/ARB THEARPY RXD/TAKEN: ICD-10-PCS | Mod: CPTII,95,, | Performed by: PHYSICIAN ASSISTANT

## 2023-07-17 PROCEDURE — 1159F PR MEDICATION LIST DOCUMENTED IN MEDICAL RECORD: ICD-10-PCS | Mod: CPTII,95,, | Performed by: PHYSICIAN ASSISTANT

## 2023-07-17 PROCEDURE — 3044F HG A1C LEVEL LT 7.0%: CPT | Mod: CPTII,95,, | Performed by: PHYSICIAN ASSISTANT

## 2023-07-17 PROCEDURE — 99214 OFFICE O/P EST MOD 30 MIN: CPT | Mod: 95,,, | Performed by: PHYSICIAN ASSISTANT

## 2023-07-17 PROCEDURE — 3044F PR MOST RECENT HEMOGLOBIN A1C LEVEL <7.0%: ICD-10-PCS | Mod: CPTII,95,, | Performed by: PHYSICIAN ASSISTANT

## 2023-07-17 PROCEDURE — 1160F PR REVIEW ALL MEDS BY PRESCRIBER/CLIN PHARMACIST DOCUMENTED: ICD-10-PCS | Mod: CPTII,95,, | Performed by: PHYSICIAN ASSISTANT

## 2023-07-17 PROCEDURE — 4010F ACE/ARB THERAPY RXD/TAKEN: CPT | Mod: CPTII,95,, | Performed by: PHYSICIAN ASSISTANT

## 2023-07-17 RX ORDER — PREGABALIN 50 MG/1
50 CAPSULE ORAL 2 TIMES DAILY
Qty: 60 CAPSULE | Refills: 3 | Status: SHIPPED | OUTPATIENT
Start: 2023-07-17 | End: 2023-10-26 | Stop reason: DRUGHIGH

## 2023-07-17 RX ORDER — GLIMEPIRIDE 4 MG/1
TABLET ORAL
Qty: 90 TABLET | Refills: 0 | Status: SHIPPED | OUTPATIENT
Start: 2023-07-17 | End: 2023-10-13

## 2023-07-17 NOTE — PROGRESS NOTES
"Chief Pain Complaint:  Lumbar Back Pain    The patient location is: [place]  The chief complaint leading to consultation is: chronic pain     Visit type: audiovisual    Face to Face time with patient: 10-15 minutes  20 minutes of total time spent on the encounter, which includes face to face time and non-face to face time preparing to see the patient (eg, review of tests), Obtaining and/or reviewing separately obtained history, Documenting clinical information in the electronic or other health record, Independently interpreting results (not separately reported) and communicating results to the patient/family/caregiver, or Care coordination (not separately reported).     Each patient to whom he or she provides medical services by telemedicine is:  (1) informed of the relationship between the physician and patient and the respective role of any other health care provider with respect to management of the patient; and (2) notified that he or she may decline to receive medical services by telemedicine and may withdraw from such care at any time.        History of Present Illness:     Interval History (7/17/2023):  Nathan Carlos presents today for follow-up visit.  Patient was last seen on 3/13/2023. She underwent bunionectomy with Dr. Cohn on 05/23/2023. She reports since then she has been having nerve pain in her left leg, reports she feels the nerves "jumping". She reports Dr. Cohn states this is expected and should resolve. She is still wearing a post op boot. Has follow up with podiatry on July 26th to evaluate to see if she can discontinue boot. Overall doing well, still has 40-50% sustained relief from previous lumbar BRUNILDA.  Patient reports pain as 4/10 today.      Interval History (3/13/2023): Nathan Carlos presents today via telemed for follow-up visit.  she underwent Lumbar L5/S1 IL BRUNILDA with left paramedian approach on 2/17/23.  The patient reports that she is/was better following the procedure. "  she reports 80% pain relief.  The changes lasted 4 weeks so far.  The changes have continued through this visit.  She reports that the injection helped her a lot, and states that her low back pain is now intermittent instead of constant.  She is also able to stand a little bit longer.  She has since began walking daily and is performing physician directed exercises at home.  She takes tizanidine as needed for muscle pain, and reports that this is helpful.  Patient reports pain as 2/10 today.      Interval History (1/9/2023): Nathan Carlos presents today for follow-up visit.  she underwent lumbar L5/S1 IL BRUNILDA on 11/23/2022 followed by bilateral SIJ + GT bursa injection on 12/9/22.  The patient reports that she is/was better following the procedure.  she reports 80-90% pain relief from SIJ injection and 60% relief from BRUNILDA. Reports continued leg pain/burning/tingling, but improved since injection.  Patient reports pain as 10/10 today.      Interval History (10/20/2022):  Nathan Carlos presents today for follow-up visit.  Patient was last seen on 8/18/2022. Last injection Left L4/5 +L5/S1 TF BRUNILDA on 07/14/2021. Patient reports pain as 8/10 today. Reports she has had several episodes during which her left leg gave out. She also reports pain in her lower lumbar spine in a band-like distribution with radiation into her lower extremities left > right. Pain interferes with daily activities.      Interval History (8/18/2022): Nathan Carlos presents today for follow-up visit.  she underwent Left L4/5 +L5/S1 TF BRUNILDA on 7/14/22.  The patient reports that she is/was better following the procedure.  she reports 40% pain relief.  The changes lasted 4 weeks so far.  The changes have continued through this visit.  Patient reports pain as 6/10 today, 10/10 on her worst days. Reports she is still experiencing pain but is able to stand for somewhat longer periods of time. Continues to report pain in left buttock  down posterior thigh, but does admit she is now having pain in the right lumbar region now as well. She reports she takes tizanidine three times daily on bad days, and this seems to help.      Interval History (6/2/2022): Nathan Carlos presents today for follow-up visit.  she underwent bilateral SIJ + GTB injection on 5/5/22.  The patient reports that she is/was better following the procedure.  she reports 60% pain relief.  The changes lasted 4 weeks so far.  The changes have continued through this visit.  Reports back and buttock pain improved, but reports continued pain along her left leg. Reports pain along anterior, lateral, and posterior aspect of left leg, radiating down to foot at times. Patient reports pain as 6/10 today. Reports that previous epidural did not offer much relief.    IMPRESSION  1. ABNORMAL study  2. There is electrodiagnostic evidence of an acute on chronic radiculopathy of the LEFT S1 nerve root and a subacute on chronic radiculopathy of the LEFT L5 nerve root     Interval HPI 04/14/2022  Nathan Carlos is a 32 y.o. female  who is presenting with a chief complaint of Lumbar Back Pain. The patient began experiencing this problem insidiously, and the pain has been gradually worsening over the past 3 month(s). The pain is described as throbbing, cramping, burning and electrical and is located in the left lumbar spine . Pain is intermittent and lasts hours. The pain radiates to  left leg. The patient rates her pain a 8 out of ten and interferes with activities of daily living a 8 out of ten. Pain is exacerbated by flexion of the lumbar spine, and is improved by rest. Patient reports no prior trauma, no prior spinal surgery     Interval HPI 03/31/2022  Nathan Carlos is a 32 y.o. female  who is presenting with a chief complaint of Low-back Pain. The patient began experiencing this problem insidiously, and the pain has been gradually worsening. The pain is described as  throbbing, cramping, aching and heavy and is located in the bilateral lumbar spine . Pain is intermittent and lasts hours. The  pain is nonradiating. The patient rates her pain a 8 out of ten and interferes with activities of daily living a 7 out of ten. Pain is exacerbated by extension of the lumbar spine, and is improved by rest. Patient reports no prior trauma, no prior spinal surgery     - pertinent negatives: No fever, No chills, No weight loss, No bladder dysfunction, No bowel dysfunction, No saddle anesthesia  - pertinent positives: none    - medications, other therapies tried (physical therapy, injections):     >> NSAIDs, Tylenol, Norco, zanaflex and flexeril (no relief), topamax (no relief)    >> Has previously undergone Physical Therapy    >> Has previously undergone spinal injection/s   - Left SI joint injection 1/2018 with 100% relief for 6 months   - Right L3, L4, L5 MBB with local on 8/15/18 with 90% pain relief   - left SIJ + left GT bursa injection with local on 11/8/18 with good relief of bursitis but only 10% of SIJ pain   - left L3-5 MBB with local on 5/16/19 with 90% pain relief   - bilateral L3-5 MBB on 8/14/19 with limited relief   - right SIJ injection on 2/5/20 with 80% pain relief   - bilateral L3-5 MBB on 7/16/2020 with 100% relief of lumbar back pain   - right L3-5 RFA on 12/08/2020 with 100% pain relief   - left L3-5 RFA on 1/21/21 with at least 50% pain relief   - left SIJ + left GT bursa injection on 6/17/21 with at least 40% pain relief    - L5-S1, S1 TFESI on 3/8/22 with 60% Pain relief    -bilateral SIJ + GTB injection on 5/5/22 with 60% relief   -lumbar L5/S1 IL BRUNILDA on 11/23/2022 with 60% relief   -bilateral SIJ + GT bursa injection on 12/9/22 with 80-90% relief  -Lumbar L5/S1 IL BRUNILDA with left paramedian approach on 2/17/23 with 80% relief    Imaging / Labs / Studies (reviewed on 7/17/2023):  MRI lumbar spine 12/16/2021    FINDINGS:  Detail limited by poor signal noise ratio.   Prominent edema within the subcutaneous soft tissues of the lower back.  Stable anatomic alignment, unchanged since 11/22/2017.  The vertebral body heights are maintained.  Mild disc desiccation at L4-L5 is noted.  No significant disc space narrowing.  The distal tip of the conus medullaris terminates near the L2 level.  There are no significant areas of disc bulge or protrusion.  Minimal grade 1 retrolisthesis of L4 on L5 is noted following the administration of intravenous contrast, no abnormal areas of enhancement are noted.     L1-L2: No spinal canal or neural foraminal encroachment.     L2-L3: No spinal canal or neural foraminal encroachment.     L3-L4: No spinal canal or neural foraminal encroachment.     L4-L5: Mild facet arthropathy.  No spinal canal or neural foraminal encroachment.     L5-S1: Mild bilateral facet arthropathy.  No spinal canal or neural foraminal encroachment.     Impression:     Mild lower lumbar spine degenerative changes as above.    Results for orders placed during the hospital encounter of 11/22/17   MRI Lumbar Spine Without Contrast    Narrative Technique: Standard noncontrast multiplanar multisequence imaging of the lumbar spine was performed.  Findings: There is anatomic spinal alignment.  Disc interspaces are of normal height and signal intensity.  Vertebral marrow signal pattern and architecture are normal.  Distal cord is unremarkable with conus medullaris terminating at L1-L2.  Paravertebral soft tissues are symmetric and normal in appearance.  T12-L1: No disc protrusion, neuroforaminal narrowing, or central canal stenosis.  L1-L2: No disc protrusion, neuroforaminal narrowing, or central canal stenosis.  L2-L3: No disc protrusion, neuroforaminal narrowing, or central canal stenosis.  L3-L4: No disc protrusion, neuroforaminal narrowing, or central canal stenosis.  L4-L5: No disc protrusion, neuroforaminal narrowing, or central canal stenosis.  L5-S1: Mild bilateral facet  hypertrophy. No disc protrusion, neuroforaminal narrowing, or central canal stenosis.    Impression  Negative MRI of the lumbar spine.     Results for orders placed during the hospital encounter of 01/04/18   X-Ray Lumbar Complete With Flex And Ext    Narrative Comparison: None  Technique: AP, lateral, lateral flexion, lateral extension, bilateral oblique, and lumbosacral coned down views were obtained of the lumbar spine  Findings: There is mild scoliosis of the lower thoracic and upper lumbar spine.  Vertebral body heights are well-maintained.  No spondylolisthesis demonstrated.  No change in spinal alignment with flexion or extension to suggest instability. Intervertebral disk spaces are well preserved.  No pars defects visualized.  Posterior elements appear grossly intact. No acute fractures or subluxations are demonstrated.  The remaining visualized osseous and soft tissue structures demonstrate no appreciable abnormality.    Impression As above.  No acute findings.         Review of Systems:  CONSTITUTIONAL: patient denies any fever, chills, or weight loss  SKIN: patient denies any rash or itching  RESPIRATORY: patient denies having any shortness of breath  GASTROINTESTINAL: patient denies having any diarrhea, constipation, or bowel incontinence  GENITOURINARY: patient denies having any abnormal bladder function    MUSCULOSKELETAL:  - patient complains of the above noted pain/s (see chief pain complaint)    NEUROLOGICAL:   - pain as above  - strength in Lower extremities is intact, BILATERALLY  - sensation in Lower extremities is intact, BILATERALLY  - patient denies any loss of bowel or bladder control      PSYCHIATRIC: patient denies any change in mood    Other:  All other systems reviewed and are negative        Physical Exam:  Telemedicine Exam  There were no vitals filed for this visit.   There is no height or weight on file to calculate BMI.   (reviewed on 7/17/2023)    GENERAL: Well appearing, in no  acute distress, alert and oriented x3.  obese  PSYCH:  Mood and affect appropriate.  SKIN: Skin color, texture, turgor normal, no rashes or lesions.  HEAD/FACE:  Normocephalic, atraumatic. Cranial nerves grossly intact.  PULM:  No difficulty breathing  MUSCULOSKELETAL: No atrophy or tone abnormalities are noted.  NEURO:  Able to toe walk and heel walk without difficulty  GAIT: normal.        Physical Exam: last in clinic visit:  General: alert and oriented, in no apparent distress, obese.  Gait: normal gait.  Skin: no rashes, no discoloration, no obvious lesions  HEENT: normocephalic, atraumatic. Pupils equal and round.  Cardiovascular: no significant peripheral edema present.  Respiratory: without use of accessory muscles of respiration.    Musculoskeletal - Lumbar Spine:  - Pain on flexion of lumbar spine: Present   - Pain on extension of lumbar spine: Present  - Lumbar facet loading: Present bilaterally  - TTP over the lumbar facet joints: Present on left at L5-S1   - TTP over the para lumbar muscles on left   - TTP over the SI joints: Present bilaterally  - TTP over GT bursa: Present bilaterally  - TTP over piriformis: absent  - Straight Leg Raise: equivocal on right, positive on left  - JULISSA: Positive bilaterally    Neuro - Extremities:  - BUE Strength:R/L: D: 5/5; B: 5/5; T: 5/5; WF: 5/5; WE: 5/5; IO: 5/5  - BLE Strength: R/L: HF: 5/5, HE: 5/5, KF: 5/5; KE: 5/4; FE: 5/5; FF: 5/5  - Extremity Reflexes: Brisk and symmetric throughout  - Sensory: Sensation to light touch intact bilaterally      Psych:  Mood and affect is appropriate          Assessment:  Nathan Carlos is a 32 y.o. year old female who is presenting with   Encounter Diagnosis   Name Primary?    Lumbar radiculopathy Yes           Plan:  1. Interventional:  That this time, consider repeat lumbar BRUNILDA versus SIJ +GT bursa injection should symptoms return or exacerbate  -s/p Lumbar L5/S1 IL BRUNILDA with left paramedian approach on 2/17/23 with 80%  relief    -s/p lumbar L5/S1 IL BRUNILDA on 11/23/2022 with 60% relief  -s/p bilateral SIJ + GT bursa injection on 12/9/22 with 80-90% relief    -s/p Left L4/5 +L5/S1 TF BRUNILDA with 40% relief  -s/p bilateral SI and GTB with 60% relief  - S/p  L5-S1, S1 TFESI on 3/8/22 with 60% Pain relief.   - S/p left SIJ + left GT bursa injection on 6/17/21 with 40% pain relief.   - S/p left L3-5 RFA on 1/21/21 with at least 50% pain relief.   - S/p right L3-5 RFA on 12/08/2020 with 100% pain relief.   - S/p bilateral L3-5 MBB on 7/16/2020 with 100% relief of lumbar back pain.  - S/p Right SIJ injection on 2/5/20 with 80% pain relief.       2. Pharmacologic:   - Disontinued Gabapentin 300 mg Po QHs with up titration  - Start Lyrica 50mg BID.  Consider further Increase if no improvement after 1-3 weeks.  Patient informed not to stop taking if she does not find significant pain relief.     - Discontinued diclofenac 1% gel to apply 2g topically up to 4 times per day     - Continue Tizanidne 4 mg TID PRN.  She can take up to QID PRN while pain Increased.  - Anticoagulation use: None.     3. Rehabilitative: Encouraged regular exercise. Continue exercises and activities as tolerated at home. Consider restarting physical therapy since this helped in the past.     4. Diagnostic: Updated lumbar MRI reviewed. Lower ext EMG shows electrodiagnostic evidence of an acute on chronic radiculopathy of the LEFT S1 nerve root and a subacute on chronic radiculopathy of the LEFT L5 nerve root.     5. Follow up: 8 weeks, continue follow up with podiatry as scheduled    Other: patient has paperwork for Disability. Discussed our goals of IPM as they pertain limiting time off of work and getting patients back into the work force    Darlin Iverson PA-C  Interventional Pain Medicine

## 2023-07-24 DIAGNOSIS — E11.65 UNCONTROLLED TYPE 2 DIABETES MELLITUS WITH HYPERGLYCEMIA: ICD-10-CM

## 2023-07-24 DIAGNOSIS — R73.09 HEMOGLOBIN A1C GREATER THAN 9%, INDICATING POOR DIABETIC CONTROL: ICD-10-CM

## 2023-07-24 RX ORDER — INSULIN DETEMIR 100 [IU]/ML
INJECTION, SOLUTION SUBCUTANEOUS
Qty: 15 ML | Refills: 1 | Status: SHIPPED | OUTPATIENT
Start: 2023-07-24 | End: 2023-11-24 | Stop reason: SDUPTHER

## 2023-07-24 NOTE — TELEPHONE ENCOUNTER
Refill Routing Note     Refill Routing Note   Medication(s) are not appropriate for processing by [facility] for the following reason(s):      Non-participating provider    [facility] action(s):  Route Care Due:  None identified            Appointments  past 12m or future 3m with PCP    Date Provider   Last Visit   1/24/2023 Qasim Paez NP   Next Visit   8/2/2023 Qasim Paez NP   ED visits in past 90 days: 0        Note composed:3:29 PM 07/24/2023

## 2023-07-26 ENCOUNTER — HOSPITAL ENCOUNTER (OUTPATIENT)
Dept: RADIOLOGY | Facility: HOSPITAL | Age: 33
Discharge: HOME | End: 2023-07-26
Attending: PODIATRIST
Payer: MEDICAID

## 2023-07-26 ENCOUNTER — OFFICE VISIT (OUTPATIENT)
Dept: PODIATRY | Facility: CLINIC | Age: 33
End: 2023-07-26
Payer: MEDICAID

## 2023-07-26 DIAGNOSIS — Z09 FOLLOW-UP EXAMINATION FOLLOWING SURGERY: Primary | ICD-10-CM

## 2023-07-26 DIAGNOSIS — E66.01 MORBID OBESITY WITH BMI OF 40.0-44.9, ADULT: ICD-10-CM

## 2023-07-26 DIAGNOSIS — Z09 FOLLOW-UP EXAMINATION FOLLOWING SURGERY: ICD-10-CM

## 2023-07-26 DIAGNOSIS — E11.9 TYPE 2 DIABETES MELLITUS WITHOUT COMPLICATION, WITHOUT LONG-TERM CURRENT USE OF INSULIN: ICD-10-CM

## 2023-07-26 DIAGNOSIS — M20.12 HALLUX ABDUCTOVALGUS WITH BUNIONS, LEFT: ICD-10-CM

## 2023-07-26 DIAGNOSIS — M21.612 HALLUX ABDUCTOVALGUS WITH BUNIONS, LEFT: ICD-10-CM

## 2023-07-26 DIAGNOSIS — Z98.890 HISTORY OF BUNIONECTOMY OF LEFT GREAT TOE: ICD-10-CM

## 2023-07-26 DIAGNOSIS — T81.9XXS FAILED BUNIONECTOMY, SEQUELA: ICD-10-CM

## 2023-07-26 PROCEDURE — 4010F PR ACE/ARB THEARPY RXD/TAKEN: ICD-10-PCS | Mod: CPTII,,, | Performed by: PODIATRIST

## 2023-07-26 PROCEDURE — 99999 PR PBB SHADOW E&M-EST. PATIENT-LVL III: CPT | Mod: PBBFAC,,, | Performed by: PODIATRIST

## 2023-07-26 PROCEDURE — 1160F PR REVIEW ALL MEDS BY PRESCRIBER/CLIN PHARMACIST DOCUMENTED: ICD-10-PCS | Mod: CPTII,,, | Performed by: PODIATRIST

## 2023-07-26 PROCEDURE — 3044F PR MOST RECENT HEMOGLOBIN A1C LEVEL <7.0%: ICD-10-PCS | Mod: CPTII,,, | Performed by: PODIATRIST

## 2023-07-26 PROCEDURE — 99999 PR PBB SHADOW E&M-EST. PATIENT-LVL III: ICD-10-PCS | Mod: PBBFAC,,, | Performed by: PODIATRIST

## 2023-07-26 PROCEDURE — 4010F ACE/ARB THERAPY RXD/TAKEN: CPT | Mod: CPTII,,, | Performed by: PODIATRIST

## 2023-07-26 PROCEDURE — 99213 OFFICE O/P EST LOW 20 MIN: CPT | Mod: PBBFAC | Performed by: PODIATRIST

## 2023-07-26 PROCEDURE — 1159F PR MEDICATION LIST DOCUMENTED IN MEDICAL RECORD: ICD-10-PCS | Mod: CPTII,,, | Performed by: PODIATRIST

## 2023-07-26 PROCEDURE — 99024 POSTOP FOLLOW-UP VISIT: CPT | Mod: ,,, | Performed by: PODIATRIST

## 2023-07-26 PROCEDURE — 99024 PR POST-OP FOLLOW-UP VISIT: ICD-10-PCS | Mod: ,,, | Performed by: PODIATRIST

## 2023-07-26 PROCEDURE — 73630 X-RAY EXAM OF FOOT: CPT | Mod: 26,LT,, | Performed by: RADIOLOGY

## 2023-07-26 PROCEDURE — 3044F HG A1C LEVEL LT 7.0%: CPT | Mod: CPTII,,, | Performed by: PODIATRIST

## 2023-07-26 PROCEDURE — 1159F MED LIST DOCD IN RCRD: CPT | Mod: CPTII,,, | Performed by: PODIATRIST

## 2023-07-26 PROCEDURE — 73630 X-RAY EXAM OF FOOT: CPT | Mod: TC,LT

## 2023-07-26 PROCEDURE — 1160F RVW MEDS BY RX/DR IN RCRD: CPT | Mod: CPTII,,, | Performed by: PODIATRIST

## 2023-07-26 PROCEDURE — 73630 XR FOOT COMPLETE 3 VIEW LEFT: ICD-10-PCS | Mod: 26,LT,, | Performed by: RADIOLOGY

## 2023-07-26 NOTE — PROGRESS NOTES
Subjective:       Patient ID: Nathan Carlos is a 32 y.o. female.    Chief Complaint: Post-op Evaluation (Pt post op, pt is non diabetic, last seen pcp Qasim Paez NP at 1/24/2023, no pain, Pt wears boot on left foot , tennis shoes with socks on foot.)    HPI: Nathan Carlos presents clinic status post 1st tarsometatarsal joint arthrodesis for revision bunion deformity correction.  Remains full weightbearing on the operative extremity in CAM boot.  States 0/10 pain to the toe.  Utilizing aspirin for DVT prophylaxis.    Review of patient's allergies indicates:   Allergen Reactions    Amitriptyline      xerostomia    Amlodipine      DRY MOUTH    Tramadol Itching       Past Medical History:   Diagnosis Date    Anxiety     Carpal tunnel syndrome     Diabetes mellitus     Diabetes mellitus, type 2     GERD (gastroesophageal reflux disease)     Herpes simplex virus (HSV) infection     High serum testosterone 09/07/2017    Hyperlipidemia     Hypertension     Insulin resistance syndrome 09/07/2017    Lumbar facet arthropathy 08/14/2019    Morbid obesity     Ovarian cyst     bilateral        Family History   Problem Relation Age of Onset    Hypertension Mother     Fibroids Mother     Arthritis Mother     Diabetes Sister     Hypertension Brother     No Known Problems Brother     Hypertension Maternal Aunt     Diabetes Maternal Aunt     Arthritis Maternal Aunt     Diabetes Maternal Uncle     Hypertension Maternal Grandmother     Leukemia Maternal Grandfather     Hypertension Maternal Grandfather     Stroke Neg Hx      Social History     Socioeconomic History    Marital status:     Number of children: 0   Occupational History     Employer: [id number]   Tobacco Use    Smoking status: Never    Smokeless tobacco: Never   Substance and Sexual Activity    Alcohol use: Never    Drug use: No    Sexual activity: Yes     Partners: Male     Birth control/protection: Other-see comments, None      Comment: Birth control pill     Social Determinants of Health     Financial Resource Strain: Low Risk     Difficulty of Paying Living Expenses: Not hard at all   Food Insecurity: No Food Insecurity    Worried About Running Out of Food in the Last Year: Never true    Ran Out of Food in the Last Year: Never true   Transportation Needs: No Transportation Needs    Lack of Transportation (Medical): No    Lack of Transportation (Non-Medical): No   Physical Activity: Inactive    Days of Exercise per Week: 0 days    Minutes of Exercise per Session: 0 min   Stress: Stress Concern Present    Feeling of Stress : Rather much   Social Connections: Unknown    Frequency of Communication with Friends and Family: More than three times a week    Frequency of Social Gatherings with Friends and Family: Twice a week    Active Member of Clubs or Organizations: No    Attends Club or Organization Meetings: Never    Marital Status:    Housing Stability: Low Risk     Unable to Pay for Housing in the Last Year: No    Number of Places Lived in the Last Year: 1    Unstable Housing in the Last Year: No     Past Surgical History:   Procedure Laterality Date    AKIN OSTEOTOMY Left 5/23/2023    Procedure: OSTEOTOMY, AKIN;  Surgeon: Sandy Cohn DPM;  Location: [facility] OR;  Service: Podiatry;  Laterality: Left;    ANKLE SURGERY Left     BUNIONECTOMY Bilateral     CARPAL TUNNEL RELEASE Left 03/19/2021    Procedure: RELEASE, CARPAL TUNNEL;  Surgeon: Juan F Bernard MD;  Location: [facility] OR;  Service: Orthopedics;  Laterality: Left;    CARPAL TUNNEL RELEASE Right 11/04/2021    Procedure: RELEASE, CARPAL TUNNEL;  Surgeon: Juan F Bernard MD;  Location: [facility] OR;  Service: Orthopedics;  Laterality: Right;    COLONOSCOPY  06/12/2018    EPIDURAL STEROID INJECTION N/A 11/23/2022    Procedure: Lumbar L5/S1 IL BRUNILDA RN IV Sedation;  Surgeon: Eula Rojas MD;  Location: [facility];  Service: Pain Management;  Laterality: N/A;    EPIDURAL  STEROID INJECTION N/A 2/17/2023    Procedure: Lumbar L5/S1 IL BRUNILDA with left paramedian approach RN IV Sedation;  Surgeon: Eula Rojas MD;  [facility];  Service: Pain Management;  Laterality: N/A;    ESOPHAGOGASTRODUODENOSCOPY      ESOPHAGOGASTRODUODENOSCOPY N/A 12/02/2021    Procedure: EGD (ESOPHAGOGASTRODUODENOSCOPY);  Surgeon: Pili Eagle MD;  Location: [facility];  Service: Endoscopy;  Laterality: N/A;    INJECTION OF ANESTHETIC AGENT AROUND MEDIAL BRANCH NERVES INNERVATING LUMBAR FACET JOINT Bilateral 08/14/2019    Procedure: Bilateral L3-5 MBB;  Surgeon: Yo Kirkland MD;  Location: [facility];  Service: Pain Management;  Laterality: Bilateral;    INJECTION OF ANESTHETIC AGENT AROUND MEDIAL BRANCH NERVES INNERVATING LUMBAR FACET JOINT Bilateral 07/16/2020    Procedure: Bilateral L3-5 MBB;  Surgeon: Yo Kirkland MD;  Location: [facility];  Service: Pain Management;  Laterality: Bilateral;    INJECTION OF ANESTHETIC AGENT INTO SACROILIAC JOINT Right 02/05/2020    Procedure: Right SIJ Injection with local;  Surgeon: Yo Kirkland MD;  [facility];  Service: Pain Management;  Laterality: Right;    INJECTION OF ANESTHETIC AGENT INTO SACROILIAC JOINT Left 06/17/2021    Procedure: Left GT bursa + left SIJ injection;  Surgeon: Yo Kirkland MD;  Location: [facility];  Service: Pain Management;  Laterality: Left;    INJECTION OF ANESTHETIC AGENT INTO SACROILIAC JOINT Bilateral 05/05/2022    Procedure: Bilateral BLOCK, SACROILIAC JOINT and Bilateral GTB RN IV sedation;  Surgeon: Yo iKrkland MD;  Location: [facility];  Service: Pain Management;  Laterality: Bilateral;    INJECTION OF JOINT Left 06/17/2021    Procedure: Left GT bursa + left SIJ injection;  Surgeon: Yo Kirkland MD;  Location: [facility];  Service: Pain Management;  Laterality: Left;    INJECTION OF JOINT Bilateral 12/9/2022    Procedure: Bilateral GT bursa + bilateral SIJ injection RN IV Sedation;  Surgeon:  Eula Rojas MD;  Location: [facility];  Service: Pain Management;  Laterality: Bilateral;    LAPIDUS BUNIONECTOMY Left 5/23/2023    Procedure: BUNIONECTOMY, LAPIDUS;  Surgeon: Sandy Cohn DPM;  Location: [facility] OR;  Service: Podiatry;  Laterality: Left;  First metatarsal cuneiform joint arthrodesis.      LUMBAR FUSION      RADIOFREQUENCY THERMOCOAGULATION Right 12/08/2020    Procedure: RADIOFREQUENCY THERMAL COAGULATION Right L3, 4,5 MBB RFA with RN IV sedation// NO STEROID;  Surgeon: Yo Kirkland MD;  Location: [facility];  Service: Pain Management;  Laterality: Right;    RADIOFREQUENCY THERMOCOAGULATION Left 01/21/2021    Procedure: Left L3-5 Lumbar RFA;  Surgeon: Rian Chaney MD;  Location: [facility];  Service: Pain Management;  Laterality: Left;    SELECTIVE INJECTION OF ANESTHETIC AGENT AROUND LUMBAR SPINAL NERVE ROOT BY TRANSFORAMINAL APPROACH Left 03/08/2022    Procedure: BLOCK, SPINAL NERVE ROOT, LUMBAR, SELECTIVE, TRANSFORAMINAL APPROACH Left L5-S1, S1 RN IV sedation;  Surgeon: Yo Kirkland MD;  Location: [facility];  Service: Pain Management;  Laterality: Left;    TRANSFORAMINAL EPIDURAL INJECTION OF STEROID Left 07/14/2022    Procedure: left L4/5 + L5/S1 TF BRUNILDA RN IV Sedation;  Surgeon: Eula Rojas MD;  Location: [facility];  Service: Pain Management;  Laterality: Left;     Review of Systems     Objective:   There were no vitals taken for this visit.    X-Ray Foot Complete 3 view Left  EXAM: XR FOOT COMPLETE 3 VIEW LEFT    CLINICAL HISTORY: [Z09]-Encounter for follow-up examination after completed treatment for conditions other than malignant neoplasm./[M20.12]-Hallux valgus (acquired), left foot./[M21.612]-Bunion of left foot.    FINDINGS:  3 views left foot.  Comparison 06/14/2023.    Similar osseous density across the proximal great toe osteotomy.  Similar findings about the great toe tarsometatarsal fixation.    Similar postsurgical fixations to both  malleoli.    IMPRESSION:  Stable postsurgical left foot.    Finalized on: 7/26/2023 9:30 AM By:  Jose E Calderon MD  [facility]# [id number]      2023-07-26 09:32:[id number]    [facility]       Physical Exam   LOWER EXTREMITY PHYSICAL EXAMINATION    Vascular: Capillary refill time is intact.  Dorsalis pedis pulse 2/4 on the left.  Posterior tibial pulse on the left 2/4.    Neurological: Sensation to light touch is intact. Proprioception is intact. Sensation to pin prick is intact.  No numbness or tingling identified.    Musculoskeletal:  Status post 1st tarsometatarsal joint arthrodesis    Dermatological:  Incision is completely healed. No evidence of wound development. No erythema or edema. No drainage.  No signs of postoperative infection.  Skin lines are present to the level affected lower extremity as result of decreased edema    Imaging:       Results for orders placed during the hospital encounter of 03/02/23    X-Ray Foot Complete Left    Narrative  EXAMINATION:  XR FOOT COMPLETE 3 VIEW LEFT    CLINICAL HISTORY:  . Hallux valgus (acquired), left foot    TECHNIQUE:  AP, lateral, and oblique views of the foot were performed.    COMPARISON:  01/23/2023    FINDINGS:  A mild hallux valgus deformity is noted.  There are postoperative changes associated with the 1st metatarsal.  Small dorsal and plantar calcaneal enthesophytes are noted.  Postsurgical changes seen associated with the ankle.  The joint spaces appear to be well maintained.    Impression  As above      Electronically signed by: Stephon Benjamin DO  Date:    03/02/2023  Time:    11:44       No results found for this or any previous visit.    Assessment:     1. Follow-up examination following surgery    2. Hallux abductovalgus with bunions, left    3. Failed bunionectomy, sequela    4. History of bunionectomy of left great toe    5. Type 2 diabetes mellitus without complication, without long-term current use of insulin    6. Morbid obesity with BMI of 40.0-44.9, adult         Plan:     Follow-up examination following surgery    Hallux abductovalgus with bunions, left    Failed bunionectomy, sequela    History of bunionectomy of left great toe    Type 2 diabetes mellitus without complication, without long-term current use of insulin    Morbid obesity with BMI of 40.0-44.9, adult      Thorough discussion is had with the patient today, concerning the diagnosis, its etiology, and the treatment algorithm at present.    X-rays reviewed by myself patient room.  Appears show adequate surgical hardware placement.  Akin site is healed without complications. Hardware in placed.      Continue weight-bearing as tolerate and transition to regular shoe gear.     Thorough discussion is had with the patient this afternoon, concerning the diagnosis, its etiology, and the treatment algorithm at present.  Greater than 50% of this visit spent on counseling and coordination of care. Greater than 15 minutes of a 20 minute appointment spent on education about the diabetic foot, neuropathy, and prevention of limb loss.  Shoe inspection. Diabetic Foot Education. Patient reminded of the importance of good nutrition and blood sugar control to help prevent podiatric complications of diabetes. Patient instructed on proper foot hygeine. We discussed wearing proper and supportive shoe gear, daily foot inspections, never walking barefooted or sock footed, never putting sharp instruments to feet which can cause major complications associated with infection, ulcers, lacerations.      Continue to reinforce with the patient the importance of calling immediately if there is any noted complications or sudden changes to the patient's condition.  We discuss red flag risk factors of things to continue to monitor for.  Patient relates understanding as discussed    Future Appointments   Date Time Provider Department Center   8/2/2023  8:00 AM Qasim Paez NP [facility]   9/28/2023  9:00 AM Eula Rojas,  MD [facility]

## 2023-07-31 ENCOUNTER — PATIENT MESSAGE (OUTPATIENT)
Dept: FAMILY MEDICINE | Facility: CLINIC | Age: 33
End: 2023-07-31
Payer: MEDICAID

## 2023-08-01 NOTE — PROGRESS NOTES
"SUBJECTIVE:     Nathan Carlos is a 32 y.o. female, here today for:   PREVENTATIVE HEALTH EXAM    HPI:    Nathan Carlos has fasted to have bloodwork done today.  I have reviewed the patient's medical history in detail and updated the computerized patient record.  History obtained from the patient.    HEALTHCARE MAINTENANCE:    COMPLETED:  Health Maintenance Topics with due status: Not Due       Topic Last Completion Date    Colonoscopy 06/13/2018    TETANUS VACCINE 12/06/2019    Cervical Cancer Screening 08/09/2022    Influenza Vaccine 10/13/2022    Hemoglobin A1c 05/15/2023       DUE:  Health Maintenance Due   Topic Date Due    Foot Exam  12/01/2022    Eye Exam  12/09/2022    Lipid Panel  02/28/2023    Diabetes Urine Screening  08/09/2023       REVIEW OF SYSTEMS:    Review of Systems   Constitutional:  Negative for activity change, appetite change, chills, diaphoresis, fatigue and fever.        Wt Readings from Last 4 Encounters:  08/02/23 0812 : 114.4 kg (252 lb 3.3 oz)  05/23/23 0733 : 115.9 kg (255 lb 8.2 oz)  05/11/23 1348 : 116.6 kg (257 lb)  04/10/23 1030 : 116.6 kg (257 lb)  Slight weight loss over time noted.  No exercise, has been slowly increasing daily activity (recently got out of her walking boot).  Taking Vitamin-D3 once a week (on Tues).   HENT: Negative.     Eyes:  Negative for discharge and visual disturbance.   Respiratory:  Negative for cough, shortness of breath and wheezing.    Cardiovascular:  Negative for chest pain, palpitations and leg swelling.        HTN --- on med as ordered, no home checks.  Increasing daily intake of water, trying to eat healthy (cutting back on salt, fried foods).   Gastrointestinal:  Positive for abdominal pain, bloating, constipation (on Linzess, not helping --- has tried Miralax, fiber, water --- tried taking 2 of her Linzess but has not helped), nausea (chronic issue) and reflux (depends "on what I eat", med prn). Negative for blood in stool, " diarrhea, vomiting and fecal incontinence.        Stools 0 to 1 times per week, never fully empties out.   Endocrine: Positive for diabetes (no home checks --- trying to cut back on carbs (lots of rice/bread)). Negative for hair loss and hot flashes.   Genitourinary:  Positive for dysmenorrhea (mild), vaginal discharge (brownish colored --- started a few days ago) and vaginal odor. Negative for flank pain, frequency, hematuria, pelvic pain, vaginal pain and urinary incontinence.        LMP --- July 21 to 25th ---- irregular, light flow, minimal pain/cramps, no clots.   Musculoskeletal:  Positive for back pain (chronic issue, h/o nerve blocks -- followed by Pain/Back specialist) and myalgias.   Integumentary:  Positive for rash (redness under breasts, on cream as ordered). Negative for acne, hair changes and mole/lesion.   Neurological:  Negative for vertigo, seizures, syncope, numbness and headaches.   Hematological: Negative.    Psychiatric/Behavioral:  Negative for depression and sleep disturbance. The patient is not nervous/anxious.    Breast: negative.        ALLERGIES:  Review of patient's allergies indicates:   Allergen Reactions    Amitriptyline Xerostomia    Amlodipine Xerostomia    Tramadol Itching         CURRENT PROBLEM LIST:  Patient Active Problem List   Diagnosis    Essential hypertension    Gastroesophageal reflux disease    Hypercholesteremia    High serum testosterone    HSV-2 (herpes simplex virus 2) infection    Nephrolithiasis    Chronic bilateral low back pain with left-sided sciatica    Polycystic ovaries    Lumbar facet arthropathy    Type 2 diabetes mellitus without complication, without long-term current use of insulin    Paresthesias in left hand    Morbid obesity with BMI of 40.0-44.9, adult    Lumbar facet joint pain    Spondylosis without myelopathy or radiculopathy, lumbar region    Left carpal tunnel syndrome    Sacroiliac joint dysfunction    Lumbar radiculopathy    Plantar fasciitis,  "bilateral    Decreased range of motion with decreased strength    Greater trochanteric bursitis    Bunion of great toe of left foot    Hallux abductovalgus with bunions, left    Failed bunionectomy         CURRENT MEDICATIONS:    Current Outpatient Medications:     aspirin 325 MG tablet, Take 1 tablet (325 mg total) by mouth once daily., Disp: 30 tablet, Rfl: 0    atorvastatin (LIPITOR) 40 MG tablet, TAKE ONE TABLET BY MOUTH EVERY NIGHT, Disp: 90 tablet, Rfl: 1    BD ULTRA-FINE GAYLA PEN NEEDLE 32 gauge x 5/32" Ndle, USE WITH INSULIN EVERY DAY, Disp: 100 each, Rfl: 6    blood-glucose meter,continuous (DEXCOM G6 ) Misc, Use as directed,  to be changed every 365 days., Disp: 1 each, Rfl: 0    blood-glucose sensor (DEXCOM G6 SENSOR) Selin, Use as directed, change sensor every 10 days, Disp: 9 each, Rfl: 1    blood-glucose transmitter (DEXCOM G6 TRANSMITTER) Selin, Use as directed, change transmitter every 3 months, Disp: 1 each, Rfl: 1    clotrimazole-betamethasone 1-0.05% (LOTRISONE) cream, Apply topically 2 (two) times daily., Disp: 45 g, Rfl: 2    dicyclomine (BENTYL) 20 mg tablet, TAKE ONE TABLET BY MOUTH THREE TIMES A DAY AS NEEDED FOR ABDOMINAL PAIN, Disp: 90 tablet, Rfl: 11    ergocalciferol (ERGOCALCIFEROL) 50,000 unit Cap, TAKE 1 CAPSULE BY MOUTH ONCE A WEEK FOR 28 DAYS, Disp: , Rfl:     glimepiride (AMARYL) 4 MG tablet, TAKE ONE TABLET BY MOUTH EVERY DAY IN THE MORNING WITH BREAKFAST, Disp: 90 tablet, Rfl: 0    irbesartan (AVAPRO) 150 MG tablet, TAKE ONE TABLET BY MOUTH EVERY DAY, Disp: 90 tablet, Rfl: 0    JANUVIA 100 mg Tab, TAKE 1 TABLET BY MOUTH EVERY DAY, Disp: 90 tablet, Rfl: 1    LEVEMIR FLEXPEN 100 unit/mL (3 mL) InPn pen, INJECT 20 UNITS EVERY EVENING, Disp: 15 mL, Rfl: 1    LINZESS 145 mcg Cap capsule, TAKE 1 CAPSULE BY MOUTH EVERY MORNING BEFORE BREAKFAST, Disp: 30 capsule, Rfl: 0    metFORMIN (GLUCOPHAGE-XR) 500 MG ER 24hr tablet, TAKE 2 TABLETS BY MOUTH TWO TIMES A DAY WITH A MEAL, " Disp: 360 tablet, Rfl: 0    metoprolol succinate (TOPROL-XL) 50 MG 24 hr tablet, TAKE 1 TABLET BY MOUTH EVERY DAY, Disp: 90 tablet, Rfl: 0    omeprazole/sodium bicarbonate (ZEGERID ORAL), Take by mouth., Disp: , Rfl:     ondansetron (ZOFRAN-ODT) 4 MG TbDL, DISSOLVE 1 TO 2 TABLETS IN MOUTH EVERY 6 TO 8 HOURS AS NEEDED, Disp: 30 tablet, Rfl: 0    oxyCODONE-acetaminophen (PERCOCET) 5-325 mg per tablet, Take 1 tablet by mouth every 8 (eight) hours as needed for Pain., Disp: 15 tablet, Rfl: 0    OZEMPIC 0.25 mg or 0.5 mg (2 mg/3 mL) pen injector, INJECT 0.5 MG INTO THE SKIN ONCE A WEEK, Disp: 3 mL, Rfl: 1    pregabalin (LYRICA) 50 MG capsule, Take 1 capsule (50 mg total) by mouth 2 (two) times daily. Start with 1 at night x 5-7 days, then increase to twice daily as tolerated or two tablets at night, Disp: 60 capsule, Rfl: 3    senna-docusate 8.6-50 mg (SENNA PLUS) 8.6-50 mg per tablet, Take 1 tablet by mouth once daily., Disp: 10 tablet, Rfl: 1    simethicone (GAS-X ORAL), Take by mouth., Disp: , Rfl:     spironolactone (ALDACTONE) 25 MG tablet, Take 2 tablets by mouth once daily, Disp: 180 tablet, Rfl: 3    tiZANidine (ZANAFLEX) 4 MG tablet, Take 1 tablet (4 mg total) by mouth every 8 (eight) hours., Disp: 90 tablet, Rfl: 2    zolpidem (AMBIEN) 5 MG Tab, TAKE ONE TABLET BY MOUTH AT BEDTIME AS NEEDED, Disp: 30 tablet, Rfl: 4      HISTORY:    PAST MEDICAL HISTORY:  Past Medical History:   Diagnosis Date    Anxiety     Carpal tunnel syndrome     Diabetes mellitus, type 2     GERD (gastroesophageal reflux disease)     Herpes simplex virus (HSV) infection     High serum testosterone 09/07/2017    Hyperlipidemia     Hypertension     Insulin resistance syndrome 09/07/2017    Lumbar facet arthropathy 08/14/2019    Morbid obesity     Ovarian cyst     bilateral        PAST SURGICAL HISTORY:  Past Surgical History:   Procedure Laterality Date    AKIN OSTEOTOMY Left 5/23/2023    Procedure: OSTEOTOMY, AKIN;  Surgeon: Sandy TIWARI  SHANON Cohn;  Location: [facility] OR;  Service: Podiatry;  Laterality: Left;    ANKLE SURGERY Left     BUNIONECTOMY Bilateral     CARPAL TUNNEL RELEASE Left 03/19/2021    Procedure: RELEASE, CARPAL TUNNEL;  Surgeon: Juan F Bernard MD;  Location: [facility] OR;  Service: Orthopedics;  Laterality: Left;    CARPAL TUNNEL RELEASE Right 11/04/2021    Procedure: RELEASE, CARPAL TUNNEL;  Surgeon: Juan F Bernard MD;  Location: [facility] OR;  Service: Orthopedics;  Laterality: Right;    COLONOSCOPY  06/12/2018    EPIDURAL STEROID INJECTION N/A 11/23/2022    Procedure: Lumbar L5/S1 IL BRUNILDA RN IV Sedation;  Surgeon: Eula Rojas MD;  Location: [facility] PAIN [facility]GT;  Service: Pain Management;  Laterality: N/A;    EPIDURAL STEROID INJECTION N/A 2/17/2023    Procedure: Lumbar L5/S1 IL BRUNILDA with left paramedian approach RN IV Sedation;  Surgeon: Eula Rojas MD;  Location: [facility] PAIN [facility]GT;  Service: Pain Management;  Laterality: N/A;    ESOPHAGOGASTRODUODENOSCOPY      ESOPHAGOGASTRODUODENOSCOPY N/A 12/02/2021    Procedure: EGD (ESOPHAGOGASTRODUODENOSCOPY);  Surgeon: Pili Eagle MD;  Location: [facility];  Service: Endoscopy;  Laterality: N/A;    INJECTION OF ANESTHETIC AGENT AROUND MEDIAL BRANCH NERVES INNERVATING LUMBAR FACET JOINT Bilateral 08/14/2019    Procedure: Bilateral L3-5 MBB;  Surgeon: Yo Kirkland MD;  Location: [facility] PAIN [facility]GT;  Service: Pain Management;  Laterality: Bilateral;    INJECTION OF ANESTHETIC AGENT AROUND MEDIAL BRANCH NERVES INNERVATING LUMBAR FACET JOINT Bilateral 07/16/2020    Procedure: Bilateral L3-5 MBB;  Surgeon: Yo Kirkland MD;  Location: [facility] PAIN [facility]GT;  Service: Pain Management;  Laterality: Bilateral;    INJECTION OF ANESTHETIC AGENT INTO SACROILIAC JOINT Right 02/05/2020    Procedure: Right SIJ Injection with local;  Surgeon: Yo Kirkland MD;  Location: [facility] PAIN [facility]GT;  Service: Pain Management;  Laterality: Right;    INJECTION OF ANESTHETIC AGENT INTO SACROILIAC JOINT Left 06/17/2021    Procedure:  Left GT bursa + left SIJ injection;  Surgeon: Yo Kirkland MD;  Location: [facility] PAIN MGT;  Service: Pain Management;  Laterality: Left;    INJECTION OF ANESTHETIC AGENT INTO SACROILIAC JOINT Bilateral 05/05/2022    Procedure: Bilateral BLOCK, SACROILIAC JOINT and Bilateral GTB RN IV sedation;  Surgeon: Yo Kirkland MD;  Location: [facility] PAIN [facility]GT;  Service: Pain Management;  Laterality: Bilateral;    INJECTION OF JOINT Left 06/17/2021    Procedure: Left GT bursa + left SIJ injection;  Surgeon: Yo Kirkland MD;  Location: [facility] PAIN [facility]GT;  Service: Pain Management;  Laterality: Left;    INJECTION OF JOINT Bilateral 12/9/2022    Procedure: Bilateral GT bursa + bilateral SIJ injection RN IV Sedation;  Surgeon: Eula Rojas MD;  Location: [facility] PAIN [facility]GT;  Service: Pain Management;  Laterality: Bilateral;    LAPIDUS BUNIONECTOMY Left 5/23/2023    Procedure: BUNIONECTOMY, LAPIDUS;  Surgeon: Sandy Cohn DPM;  Location: [facility] OR;  Service: Podiatry;  Laterality: Left;  First metatarsal cuneiform joint arthrodesis.      LUMBAR FUSION      RADIOFREQUENCY THERMOCOAGULATION Right 12/08/2020    Procedure: RADIOFREQUENCY THERMAL COAGULATION Right L3, 4,5 MBB RFA with RN IV sedation// NO STEROID;  Surgeon: Yo Kirkland MD;  Location: [facility] PAIN [facility]GT;  Service: Pain Management;  Laterality: Right;    RADIOFREQUENCY THERMOCOAGULATION Left 01/21/2021    Procedure: Left L3-5 Lumbar RFA;  Surgeon: Rian Chaney MD;  Location: [facility] PAIN [facility]GT;  Service: Pain Management;  Laterality: Left;    SELECTIVE INJECTION OF ANESTHETIC AGENT AROUND LUMBAR SPINAL NERVE ROOT BY TRANSFORAMINAL APPROACH Left 03/08/2022    Procedure: BLOCK, SPINAL NERVE ROOT, LUMBAR, SELECTIVE, TRANSFORAMINAL APPROACH Left L5-S1, S1 RN IV sedation;  Surgeon: Yo Kirkland MD;  Location: [facility] PAIN [facility]GT;  Service: Pain Management;  Laterality: Left;    TRANSFORAMINAL EPIDURAL INJECTION OF STEROID Left 07/14/2022    Procedure: left L4/5 + L5/S1 TF BRUNILDA RN IV Sedation;   "Surgeon: Eula Rojas MD;  Location: [facility];  Service: Pain Management;  Laterality: Left;       FAMILY HISTORY:  Family History   Problem Relation Age of Onset    Hypertension Mother     Fibroids Mother     Arthritis Mother     No Known Problems Father     Diabetes Sister     Hypertension Brother     No Known Problems Brother     Hypertension Maternal Grandmother     Leukemia Maternal Grandfather     Hypertension Maternal Grandfather     Hypertension Maternal Aunt     Diabetes Maternal Aunt     Arthritis Maternal Aunt     Diabetes Maternal Uncle     Stroke Neg Hx        SOCIAL HISTORY:  Social History     Socioeconomic History    Marital status:     Number of children: 0   Occupational History     Employer: [id number]   Tobacco Use    Smoking status: Never    Smokeless tobacco: Never   Substance and Sexual Activity    Alcohol use: Never    Drug use: No    Sexual activity: Yes     Partners: Male     Birth control/protection: OCP       OBJECTIVE:     VITAL SIGNS:  Vitals:    08/02/23 0812   BP: 122/82   Pulse: 83   Temp: 98.7 °F (37.1 °C)   SpO2: 98%   Weight: 114.4 kg (252 lb 3.3 oz)   Height: 5' 4" (1.626 m)       CURRENT BMI:   Body mass index is 43.29 kg/m².      PHYSICAL EXAM:  Physical Exam  Constitutional:       General: She is not in acute distress.     Appearance: She is well-developed. She is not diaphoretic.   HENT:      Head: Normocephalic and atraumatic.      Right Ear: Tympanic membrane, ear canal and external ear normal. There is no impacted cerumen.      Left Ear: Tympanic membrane, ear canal and external ear normal. There is no impacted cerumen.      Nose: Nose normal. No nasal deformity, mucosal edema, congestion or rhinorrhea.      Mouth/Throat:      Mouth: Mucous membranes are moist. Mucous membranes are not dry and not cyanotic. No oral lesions.      Dentition: Normal dentition.      Pharynx: Oropharynx is clear. Uvula midline. No oropharyngeal exudate, posterior " oropharyngeal erythema or uvula swelling.      Tonsils: No tonsillar abscesses.   Eyes:      General: Lids are normal. Lids are everted, no foreign bodies appreciated. No scleral icterus.        Right eye: No discharge.         Left eye: No discharge.      Conjunctiva/sclera: Conjunctivae normal.      Right eye: Right conjunctiva is not injected. No exudate.     Left eye: Left conjunctiva is not injected. No exudate.     Pupils: Pupils are equal, round, and reactive to light.   Neck:      Thyroid: No thyroid mass or thyromegaly.      Vascular: No carotid bruit or JVD.      Trachea: No tracheal deviation.   Cardiovascular:      Rate and Rhythm: Normal rate and regular rhythm.      Pulses: Normal pulses.      Heart sounds: Normal heart sounds. No murmur heard.     No friction rub. No gallop.   Pulmonary:      Effort: Pulmonary effort is normal. No respiratory distress.      Breath sounds: Normal breath sounds. No stridor. No wheezing.   Chest:      Chest wall: No tenderness.   Abdominal:      General: Bowel sounds are normal. There is no distension.      Palpations: Abdomen is soft. There is no mass.      Tenderness: There is no abdominal tenderness. There is no guarding or rebound.   Genitourinary:     Vagina: Vaginal discharge present. No erythema or tenderness.      Cervix: No cervical motion tenderness.      Uterus: Normal.       Adnexa: Right adnexa normal and left adnexa normal.   Musculoskeletal:         General: No swelling, tenderness or deformity. Normal range of motion.      Cervical back: Normal range of motion and neck supple. No edema or erythema. Normal range of motion.   Lymphadenopathy:      Cervical: No cervical adenopathy.   Skin:     General: Skin is warm and dry.      Capillary Refill: Capillary refill takes less than 2 seconds.      Coloration: Skin is not pale.      Findings: No bruising, erythema or rash.   Neurological:      Mental Status: She is alert and oriented to person, place, and time.  "     Sensory: No sensory deficit.      Motor: No weakness, tremor, atrophy or abnormal muscle tone.      Coordination: Coordination normal.      Gait: Gait normal.      Deep Tendon Reflexes: Reflexes are normal and symmetric.   Psychiatric:         Mood and Affect: Mood normal.         Speech: Speech normal.         Behavior: Behavior normal.         Thought Content: Thought content normal.         Cognition and Memory: Memory is not impaired.         Judgment: Judgment normal.         ~~~~~~~~~~~~~~~~~~~~~~~~~~~~~~~~~~~~~~~~~~~~~~~    DIABETIC FOOT EXAM:  Protective Sensation (w/ 10 gram monofilament):  Right: Intact  Left: Intact    Visual Inspection:  Normal except for bilateral surgical scars both feet, well-healed.    Pedal Pulses:   Right: Present  Left: Present    Posterior Tibialis Pulses:   Right:Present  Left: Present      ```````````````````````````````````````````````````````````````    LABS REVIEWED:    HEMATOLOGY:    Lab Results   Component Value Date    WBC 10.23 05/15/2023    RBC 5.09 05/15/2023    HGB 14.4 05/15/2023    HCT 44.3 05/15/2023    MCV 87 05/15/2023    MCH 28.3 05/15/2023    MCHC 32.5 05/15/2023    RDW 13.1 05/15/2023     05/15/2023    MPV 11.8 05/15/2023    GRAN 4.9 05/15/2023    GRAN 47.5 05/15/2023    LYMPH 4.1 05/15/2023    LYMPH 40.2 05/15/2023    MONO 1.1 (H) 05/15/2023    MONO 10.8 05/15/2023    EOS 0.1 05/15/2023    BASO 0.03 05/15/2023    EOSINOPHIL 0.9 05/15/2023    BASOPHIL 0.3 05/15/2023     Lab Results   Component Value Date    FERRITIN 86 02/14/2023     No results found for: "UIBC", "IRON", "TRANS", "TRANSFERRIN", "TIBC", "LABIRON", "FESATURATED"   No results found for: "RETICCTPCT"    CHEMISTRY:    Lab Results   Component Value Date     05/15/2023    K 4.4 05/15/2023     05/15/2023    CO2 27 05/15/2023    ANIONGAP 9 05/15/2023     Lab Results   Component Value Date    CALCIUM 10.1 05/15/2023     No results found for: "MG"    LIPID PROFILE:    Lab Results " "  Component Value Date    CHOL 125 02/28/2022     Lab Results   Component Value Date    HDL 32 (L) 02/28/2022     Lab Results   Component Value Date    LDLCALC 73.8 02/28/2022     Lab Results   Component Value Date    TRIG 96 02/28/2022       RENAL:    Lab Results   Component Value Date    CREATININE 0.7 05/15/2023    BUN 11 05/15/2023     eGFR   Date Value Ref Range Status   05/15/2023 >60.0 >60 mL/min/1.73 m^2 Final     Microalb/Creat Ratio   Date Value Ref Range Status   08/09/2022 16.3 0.0 - 30.0 ug/mg Final       LIVER:    Lab Results   Component Value Date    ALT 32 05/15/2023    AST 20 05/15/2023    ALKPHOS 55 05/15/2023    BILITOT 0.2 05/15/2023       ENDOCRINE/THYROID:    Lab Results   Component Value Date    TSH 2.864 08/09/2022    FREET4 0.79 08/10/2021       Lab Results   Component Value Date    TESTOSTERONE 54 (H) 08/10/2020    TESTOSTERONE 2.2 08/10/2020       DIABETES SCREENING:    Glucose   Date Value Ref Range Status   05/15/2023 78 70 - 110 mg/dL Final     Insulin   Date Value Ref Range Status   08/10/2020 111.5 (H) <25.0 uU/mL Final     Lab Results   Component Value Date    HGBA1C 6.4 (H) 05/15/2023       STD/VIRAL DISEASE SCREEN:    Lab Results   Component Value Date    LAP11LYBY Positive (A) 09/26/2022     Lab Results   Component Value Date    HEPCAB Non Reactive 02/14/2023     Lab Results   Component Value Date    RPR Non-reactive 06/12/2019     No results found for: "FIN2YGJ", "WFA0YDU"    NUTRITION/VITAMINS:    No results found for: "MFXIAZMF26"    Vit D, 25-Hydroxy   Date Value Ref Range Status   11/04/2022 32 30 - 96 ng/mL Final     Comment:     Vitamin D deficiency.........<10 ng/mL                              Vitamin D insufficiency......10-29 ng/mL       Vitamin D sufficiency........> or equal to 30 ng/mL  Vitamin D toxicity............>100 ng/mL       No results found for: "FOLATE"    ASSESSMENT:     1. Preventative health care  HCM addressed, fasting lab today  -     TSH; Future; " Expected date: 08/02/2023  -     Lipid Panel; Future; Expected date: 08/02/2023  -     Microalbumin/creatinine urine ratio; Future; Expected date: 08/02/2023  -     Vitamin D; Future; Expected date: 08/02/2023    2. Type 2 diabetes mellitus without complication, without long-term current use of insulin  On med as ordered, A1c < 7 %  -     Lipid Panel; Future; Expected date: 08/02/2023  -     Microalbumin/creatinine urine ratio; Future; Expected date: 08/02/2023    3. Essential hypertension  Stable, well-controlled on medication as ordered  -     TSH; Future; Expected date: 08/02/2023  -     Lipid Panel; Future; Expected date: 08/02/2023  -     Microalbumin/creatinine urine ratio; Future; Expected date: 08/02/2023    4. Low serum high density lipoprotein (HDL)  On statin  -     Lipid Panel; Future; Expected date: 08/02/2023    5. Gastroesophageal reflux disease, unspecified whether esophagitis present  Chronic intermittent issue, tx with Zegerid and tries to avoid known triggers    6. Chronic constipation  Increased Linzess to 290 mcg daily, advised to take consistently  -     linaCLOtide (LINZESS) 290 mcg Cap capsule; Take 1 capsule (290 mcg total) by mouth before breakfast.  Dispense: 30 capsule; Refill: 6  -     X-Ray Abdomen Flat And Erect; Future; Expected date: 08/02/2023    7. High serum testosterone  On floyd, check lab  -     Testosterone; Future; Expected date: 08/02/2023    8. Acute vaginitis  Infection triggers and prevention discussed, lab pending  -     Vaginosis Screen by DNA Probe    9. Nausea  Refilled, chronic intermittent issue  -     promethazine (PHENERGAN) 25 MG tablet; Take 1-2 tablets (25-50 mg total) by mouth every 6 (six) hours as needed for Nausea.  Dispense: 30 tablet; Refill: 0    10. Morbid obesity with BMI of 40.0-44.9, adult  Healthy diet and lifestyle changes  -     TSH; Future; Expected date: 08/02/2023  -     Lipid Panel; Future; Expected date: 08/02/2023  -     Vitamin D; Future;  Expected date: 08/02/2023      PLAN:     Healthy dietary and lifestyle changes discussed.  RAVINDER form ---- need copy of last eye exam from this year --- Dr. Chapo Warren.      FOLLOW-UP:     Next A1c due on/after 11/15/23.  Pending lab.  RTC as directed and/or prn.        VANCE Wagner  [facility]       Patient Care Team:  Qasim Paez NP as PCP - General (Family Medicine)  Agnieszka Reid MD (Obstetrics and Gynecology)  Eula Rojas MD as Consulting Physician (Pain Medicine)  Terrie Chang MD as Consulting Physician (Gastroenterology)  Chapo Warren, JOIE (Optometry)

## 2023-08-02 ENCOUNTER — HOSPITAL ENCOUNTER (OUTPATIENT)
Dept: RADIOLOGY | Facility: HOSPITAL | Age: 33
Discharge: HOME | End: 2023-08-02
Attending: REGISTERED NURSE
Payer: MEDICAID

## 2023-08-02 ENCOUNTER — OFFICE VISIT (OUTPATIENT)
Dept: FAMILY MEDICINE | Facility: CLINIC | Age: 33
End: 2023-08-02
Payer: MEDICAID

## 2023-08-02 VITALS
HEIGHT: 64 IN | BODY MASS INDEX: 43.05 KG/M2 | OXYGEN SATURATION: 98 % | HEART RATE: 83 BPM | SYSTOLIC BLOOD PRESSURE: 122 MMHG | DIASTOLIC BLOOD PRESSURE: 82 MMHG | TEMPERATURE: 99 F | WEIGHT: 252.19 LBS

## 2023-08-02 DIAGNOSIS — E66.01 MORBID OBESITY WITH BMI OF 40.0-44.9, ADULT: ICD-10-CM

## 2023-08-02 DIAGNOSIS — R11.0 NAUSEA: ICD-10-CM

## 2023-08-02 DIAGNOSIS — K21.9 GASTROESOPHAGEAL REFLUX DISEASE, UNSPECIFIED WHETHER ESOPHAGITIS PRESENT: ICD-10-CM

## 2023-08-02 DIAGNOSIS — R74.8 LOW SERUM HIGH DENSITY LIPOPROTEIN (HDL): ICD-10-CM

## 2023-08-02 DIAGNOSIS — Z00.00 PREVENTATIVE HEALTH CARE: Primary | ICD-10-CM

## 2023-08-02 DIAGNOSIS — K59.09 CHRONIC CONSTIPATION: ICD-10-CM

## 2023-08-02 DIAGNOSIS — E11.9 TYPE 2 DIABETES MELLITUS WITHOUT COMPLICATION, WITHOUT LONG-TERM CURRENT USE OF INSULIN: ICD-10-CM

## 2023-08-02 DIAGNOSIS — N76.0 ACUTE VAGINITIS: ICD-10-CM

## 2023-08-02 DIAGNOSIS — I10 ESSENTIAL HYPERTENSION: ICD-10-CM

## 2023-08-02 DIAGNOSIS — R79.89 HIGH SERUM TESTOSTERONE: ICD-10-CM

## 2023-08-02 PROCEDURE — 99999 PR PBB SHADOW E&M-EST. PATIENT-LVL V: ICD-10-PCS | Mod: PBBFAC,,, | Performed by: REGISTERED NURSE

## 2023-08-02 PROCEDURE — 3066F NEPHROPATHY DOC TX: CPT | Mod: CPTII,,, | Performed by: REGISTERED NURSE

## 2023-08-02 PROCEDURE — 81514 NFCT DS BV&VAGINITIS DNA ALG: CPT | Performed by: REGISTERED NURSE

## 2023-08-02 PROCEDURE — 1159F PR MEDICATION LIST DOCUMENTED IN MEDICAL RECORD: ICD-10-PCS | Mod: CPTII,,, | Performed by: REGISTERED NURSE

## 2023-08-02 PROCEDURE — 3074F PR MOST RECENT SYSTOLIC BLOOD PRESSURE < 130 MM HG: ICD-10-PCS | Mod: CPTII,,, | Performed by: REGISTERED NURSE

## 2023-08-02 PROCEDURE — 4010F ACE/ARB THERAPY RXD/TAKEN: CPT | Mod: CPTII,,, | Performed by: REGISTERED NURSE

## 2023-08-02 PROCEDURE — 3079F PR MOST RECENT DIASTOLIC BLOOD PRESSURE 80-89 MM HG: ICD-10-PCS | Mod: CPTII,,, | Performed by: REGISTERED NURSE

## 2023-08-02 PROCEDURE — 3079F DIAST BP 80-89 MM HG: CPT | Mod: CPTII,,, | Performed by: REGISTERED NURSE

## 2023-08-02 PROCEDURE — 74019 XR ABDOMEN FLAT AND ERECT: ICD-10-PCS | Mod: 26,,, | Performed by: RADIOLOGY

## 2023-08-02 PROCEDURE — 3008F BODY MASS INDEX DOCD: CPT | Mod: CPTII,,, | Performed by: REGISTERED NURSE

## 2023-08-02 PROCEDURE — 99395 PR PREVENTIVE VISIT,EST,18-39: ICD-10-PCS | Mod: S$PBB,,, | Performed by: REGISTERED NURSE

## 2023-08-02 PROCEDURE — 3061F PR NEG MICROALBUMINURIA RESULT DOCUMENTED/REVIEW: ICD-10-PCS | Mod: CPTII,,, | Performed by: REGISTERED NURSE

## 2023-08-02 PROCEDURE — 4010F PR ACE/ARB THEARPY RXD/TAKEN: ICD-10-PCS | Mod: CPTII,,, | Performed by: REGISTERED NURSE

## 2023-08-02 PROCEDURE — 3061F NEG MICROALBUMINURIA REV: CPT | Mod: CPTII,,, | Performed by: REGISTERED NURSE

## 2023-08-02 PROCEDURE — 3008F PR BODY MASS INDEX (BMI) DOCUMENTED: ICD-10-PCS | Mod: CPTII,,, | Performed by: REGISTERED NURSE

## 2023-08-02 PROCEDURE — 3066F PR DOCUMENTATION OF TREATMENT FOR NEPHROPATHY: ICD-10-PCS | Mod: CPTII,,, | Performed by: REGISTERED NURSE

## 2023-08-02 PROCEDURE — 74019 RADEX ABDOMEN 2 VIEWS: CPT | Mod: 26,,, | Performed by: RADIOLOGY

## 2023-08-02 PROCEDURE — 3074F SYST BP LT 130 MM HG: CPT | Mod: CPTII,,, | Performed by: REGISTERED NURSE

## 2023-08-02 PROCEDURE — 99215 OFFICE O/P EST HI 40 MIN: CPT | Mod: PBBFAC,PO | Performed by: REGISTERED NURSE

## 2023-08-02 PROCEDURE — 99999 PR PBB SHADOW E&M-EST. PATIENT-LVL V: CPT | Mod: PBBFAC,,, | Performed by: REGISTERED NURSE

## 2023-08-02 PROCEDURE — 74019 RADEX ABDOMEN 2 VIEWS: CPT | Mod: TC,FY,PO

## 2023-08-02 PROCEDURE — 1159F MED LIST DOCD IN RCRD: CPT | Mod: CPTII,,, | Performed by: REGISTERED NURSE

## 2023-08-02 PROCEDURE — 3044F PR MOST RECENT HEMOGLOBIN A1C LEVEL <7.0%: ICD-10-PCS | Mod: CPTII,,, | Performed by: REGISTERED NURSE

## 2023-08-02 PROCEDURE — 3044F HG A1C LEVEL LT 7.0%: CPT | Mod: CPTII,,, | Performed by: REGISTERED NURSE

## 2023-08-02 PROCEDURE — 99395 PREV VISIT EST AGE 18-39: CPT | Mod: S$PBB,,, | Performed by: REGISTERED NURSE

## 2023-08-02 RX ORDER — PROMETHAZINE HYDROCHLORIDE 25 MG/1
25-50 TABLET ORAL EVERY 6 HOURS PRN
Qty: 30 TABLET | Refills: 0 | Status: SHIPPED | OUTPATIENT
Start: 2023-08-02 | End: 2023-09-14

## 2023-08-06 LAB
BACTERIAL VAGINOSIS DNA: NEGATIVE
CANDIDA GLABRATA DNA: NEGATIVE
CANDIDA KRUSEI DNA: NEGATIVE
CANDIDA RRNA VAG QL PROBE: POSITIVE
T VAGINALIS RRNA GENITAL QL PROBE: NEGATIVE

## 2023-08-07 DIAGNOSIS — B37.31 RECURRENT CANDIDIASIS OF VAGINA: Primary | ICD-10-CM

## 2023-08-07 RX ORDER — FLUCONAZOLE 150 MG/1
TABLET ORAL
Qty: 27 TABLET | Refills: 0 | Status: SHIPPED | OUTPATIENT
Start: 2023-08-07 | End: 2023-11-24

## 2023-08-08 ENCOUNTER — PATIENT OUTREACH (OUTPATIENT)
Dept: ADMINISTRATIVE | Facility: HOSPITAL | Age: 33
End: 2023-08-08
Payer: MEDICAID

## 2023-08-22 NOTE — PROGRESS NOTES
I called Dr. Chapo Warren ([facility] [place]  1x to retreive pt eye exam; I was informed by staff that eye exam will be faxed over today. I will f/u in 1 week if no eye exam received.

## 2023-08-24 DIAGNOSIS — E11.9 TYPE 2 DIABETES MELLITUS WITHOUT COMPLICATION, WITHOUT LONG-TERM CURRENT USE OF INSULIN: ICD-10-CM

## 2023-08-24 NOTE — TELEPHONE ENCOUNTER
Refill Routing Note   Medication(s) are not appropriate for processing by [facility] for the following reason(s):      Non-participating provider    [facility] action(s):  Route Care Due:  None identified              Appointments  past 12m or future 3m with PCP    Date Provider   Last Visit   8/2/2023 Qasim Paez, NP   Next Visit   Visit date not found Qasim Paez NP   ED visits in past 90 days: 0        Note composed:1:28 PM 08/24/2023

## 2023-08-25 RX ORDER — METFORMIN HYDROCHLORIDE 500 MG/1
TABLET, EXTENDED RELEASE ORAL
Qty: 360 TABLET | Refills: 1 | Status: SHIPPED | OUTPATIENT
Start: 2023-08-25 | End: 2023-11-24 | Stop reason: SDUPTHER

## 2023-08-27 ENCOUNTER — HOSPITAL ENCOUNTER (EMERGENCY)
Facility: HOSPITAL | Age: 33
Discharge: HOME | End: 2023-08-27
Attending: EMERGENCY MEDICINE
Payer: MEDICAID

## 2023-08-27 VITALS
SYSTOLIC BLOOD PRESSURE: 148 MMHG | WEIGHT: 251.31 LBS | DIASTOLIC BLOOD PRESSURE: 87 MMHG | OXYGEN SATURATION: 95 % | BODY MASS INDEX: 43.14 KG/M2 | HEART RATE: 75 BPM | RESPIRATION RATE: 20 BRPM | TEMPERATURE: 98 F

## 2023-08-27 DIAGNOSIS — S61.412A STAB WOUND OF LEFT HAND, INITIAL ENCOUNTER: Primary | ICD-10-CM

## 2023-08-27 PROCEDURE — 63600175 PHARM REV CODE 636 W HCPCS: Mod: ER | Performed by: EMERGENCY MEDICINE

## 2023-08-27 PROCEDURE — 25000003 PHARM REV CODE 250: Mod: ER | Performed by: EMERGENCY MEDICINE

## 2023-08-27 PROCEDURE — 90715 TDAP VACCINE 7 YRS/> IM: CPT | Mod: ER | Performed by: EMERGENCY MEDICINE

## 2023-08-27 PROCEDURE — 90471 IMMUNIZATION ADMIN: CPT | Mod: ER | Performed by: EMERGENCY MEDICINE

## 2023-08-27 PROCEDURE — 99284 EMERGENCY DEPT VISIT MOD MDM: CPT | Mod: ER

## 2023-08-27 RX ORDER — ACETAMINOPHEN 500 MG
1000 TABLET ORAL
Status: COMPLETED | OUTPATIENT
Start: 2023-08-27 | End: 2023-08-27

## 2023-08-27 RX ORDER — NAPROXEN 500 MG/1
500 TABLET ORAL
Status: DISCONTINUED | OUTPATIENT
Start: 2023-08-27 | End: 2023-08-27 | Stop reason: HOSPADM

## 2023-08-27 RX ORDER — CEPHALEXIN 500 MG/1
500 CAPSULE ORAL 4 TIMES DAILY
Qty: 20 CAPSULE | Refills: 0 | Status: SHIPPED | OUTPATIENT
Start: 2023-08-27 | End: 2023-09-01

## 2023-08-27 RX ORDER — CEPHALEXIN 500 MG/1
500 CAPSULE ORAL
Status: COMPLETED | OUTPATIENT
Start: 2023-08-27 | End: 2023-08-27

## 2023-08-27 RX ADMIN — BACITRACIN ZINC, NEOMYCIN, POLYMYXIN B 1 EACH: 400; 3.5; 5 OINTMENT TOPICAL at 08:08

## 2023-08-27 RX ADMIN — ACETAMINOPHEN 1000 MG: 500 TABLET ORAL at 08:08

## 2023-08-27 RX ADMIN — CEPHALEXIN 500 MG: 500 CAPSULE ORAL at 08:08

## 2023-08-27 RX ADMIN — TETANUS TOXOID, REDUCED DIPHTHERIA TOXOID AND ACELLULAR PERTUSSIS VACCINE, ADSORBED 0.5 ML: 5; 2.5; 8; 8; 2.5 SUSPENSION INTRAMUSCULAR at 08:08

## 2023-08-27 NOTE — DISCHARGE INSTRUCTIONS
No stitches due to the nature and timing of this wound.      It should heal well but needs careful wound care as per nursing instructions.      We have updated your tetanus shot.      Fill and take the antibiotics starting today.      See your primary care provider for a wound recheck in 2 days.      Return at any time if worse.      Use bandaging and Ace wrap as per nursing instructions to help hold the hand relatively still while healing.    Keep the area clean and dry.    Tylenol + Aleve or similar as labeled/ as needed for pain.
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
## 2023-08-28 ENCOUNTER — PATIENT MESSAGE (OUTPATIENT)
Dept: FAMILY MEDICINE | Facility: CLINIC | Age: 33
End: 2023-08-28
Payer: MEDICAID
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
## 2023-08-30 ENCOUNTER — OFFICE VISIT (OUTPATIENT)
Dept: FAMILY MEDICINE | Facility: CLINIC | Age: 33
End: 2023-08-30
Payer: MEDICAID

## 2023-08-30 VITALS
BODY MASS INDEX: 43.13 KG/M2 | OXYGEN SATURATION: 98 % | WEIGHT: 252.63 LBS | HEART RATE: 75 BPM | SYSTOLIC BLOOD PRESSURE: 104 MMHG | TEMPERATURE: 97 F | DIASTOLIC BLOOD PRESSURE: 68 MMHG | HEIGHT: 64 IN

## 2023-08-30 DIAGNOSIS — S61.412A STAB WOUND OF LEFT HAND, INITIAL ENCOUNTER: Primary | ICD-10-CM

## 2023-08-30 PROCEDURE — 99214 PR OFFICE/OUTPT VISIT, EST, LEVL IV, 30-39 MIN: ICD-10-PCS | Mod: S$PBB,,, | Performed by: REGISTERED NURSE

## 2023-08-30 PROCEDURE — 1159F PR MEDICATION LIST DOCUMENTED IN MEDICAL RECORD: ICD-10-PCS | Mod: CPTII,,, | Performed by: REGISTERED NURSE

## 2023-08-30 PROCEDURE — 3061F PR NEG MICROALBUMINURIA RESULT DOCUMENTED/REVIEW: ICD-10-PCS | Mod: CPTII,,, | Performed by: REGISTERED NURSE

## 2023-08-30 PROCEDURE — 3074F PR MOST RECENT SYSTOLIC BLOOD PRESSURE < 130 MM HG: ICD-10-PCS | Mod: CPTII,,, | Performed by: REGISTERED NURSE

## 2023-08-30 PROCEDURE — 3008F PR BODY MASS INDEX (BMI) DOCUMENTED: ICD-10-PCS | Mod: CPTII,,, | Performed by: REGISTERED NURSE

## 2023-08-30 PROCEDURE — 3078F PR MOST RECENT DIASTOLIC BLOOD PRESSURE < 80 MM HG: ICD-10-PCS | Mod: CPTII,,, | Performed by: REGISTERED NURSE

## 2023-08-30 PROCEDURE — 3078F DIAST BP <80 MM HG: CPT | Mod: CPTII,,, | Performed by: REGISTERED NURSE

## 2023-08-30 PROCEDURE — 3061F NEG MICROALBUMINURIA REV: CPT | Mod: CPTII,,, | Performed by: REGISTERED NURSE

## 2023-08-30 PROCEDURE — 3066F NEPHROPATHY DOC TX: CPT | Mod: CPTII,,, | Performed by: REGISTERED NURSE

## 2023-08-30 PROCEDURE — 3044F HG A1C LEVEL LT 7.0%: CPT | Mod: CPTII,,, | Performed by: REGISTERED NURSE

## 2023-08-30 PROCEDURE — 3044F PR MOST RECENT HEMOGLOBIN A1C LEVEL <7.0%: ICD-10-PCS | Mod: CPTII,,, | Performed by: REGISTERED NURSE

## 2023-08-30 PROCEDURE — 99214 OFFICE O/P EST MOD 30 MIN: CPT | Mod: S$PBB,,, | Performed by: REGISTERED NURSE

## 2023-08-30 PROCEDURE — 4010F ACE/ARB THERAPY RXD/TAKEN: CPT | Mod: CPTII,,, | Performed by: REGISTERED NURSE

## 2023-08-30 PROCEDURE — 99215 OFFICE O/P EST HI 40 MIN: CPT | Mod: PBBFAC,PO | Performed by: REGISTERED NURSE

## 2023-08-30 PROCEDURE — 3008F BODY MASS INDEX DOCD: CPT | Mod: CPTII,,, | Performed by: REGISTERED NURSE

## 2023-08-30 PROCEDURE — 3074F SYST BP LT 130 MM HG: CPT | Mod: CPTII,,, | Performed by: REGISTERED NURSE

## 2023-08-30 PROCEDURE — 4010F PR ACE/ARB THEARPY RXD/TAKEN: ICD-10-PCS | Mod: CPTII,,, | Performed by: REGISTERED NURSE

## 2023-08-30 PROCEDURE — 3066F PR DOCUMENTATION OF TREATMENT FOR NEPHROPATHY: ICD-10-PCS | Mod: CPTII,,, | Performed by: REGISTERED NURSE

## 2023-08-30 PROCEDURE — 99999 PR PBB SHADOW E&M-EST. PATIENT-LVL V: ICD-10-PCS | Mod: PBBFAC,,, | Performed by: REGISTERED NURSE

## 2023-08-30 PROCEDURE — 1159F MED LIST DOCD IN RCRD: CPT | Mod: CPTII,,, | Performed by: REGISTERED NURSE

## 2023-08-30 PROCEDURE — 99999 PR PBB SHADOW E&M-EST. PATIENT-LVL V: CPT | Mod: PBBFAC,,, | Performed by: REGISTERED NURSE

## 2023-08-31 DIAGNOSIS — E11.9 TYPE 2 DIABETES MELLITUS WITHOUT COMPLICATION, WITHOUT LONG-TERM CURRENT USE OF INSULIN: ICD-10-CM

## 2023-08-31 RX ORDER — SITAGLIPTIN 100 MG/1
TABLET, FILM COATED ORAL
Qty: 30 TABLET | Refills: 1 | Status: SHIPPED | OUTPATIENT
Start: 2023-08-31 | End: 2023-11-24

## 2023-09-01 ENCOUNTER — PATIENT MESSAGE (OUTPATIENT)
Dept: FAMILY MEDICINE | Facility: CLINIC | Age: 33
End: 2023-09-01
Payer: MEDICAID

## 2023-09-10 ENCOUNTER — PATIENT MESSAGE (OUTPATIENT)
Dept: PODIATRY | Facility: CLINIC | Age: 33
End: 2023-09-10
Payer: MEDICAID

## 2023-09-14 DIAGNOSIS — R11.0 NAUSEA: ICD-10-CM

## 2023-09-14 RX ORDER — PROMETHAZINE HYDROCHLORIDE 25 MG/1
TABLET ORAL
Qty: 30 TABLET | Refills: 0 | Status: SHIPPED | OUTPATIENT
Start: 2023-09-14 | End: 2023-10-23

## 2023-09-15 ENCOUNTER — PATIENT MESSAGE (OUTPATIENT)
Dept: PODIATRY | Facility: CLINIC | Age: 33
End: 2023-09-15
Payer: MEDICAID

## 2023-09-19 DIAGNOSIS — M20.12 HALLUX ABDUCTOVALGUS WITH BUNIONS, LEFT: Primary | ICD-10-CM

## 2023-09-19 DIAGNOSIS — M21.612 HALLUX ABDUCTOVALGUS WITH BUNIONS, LEFT: Primary | ICD-10-CM

## 2023-09-21 ENCOUNTER — PATIENT MESSAGE (OUTPATIENT)
Dept: PAIN MEDICINE | Facility: CLINIC | Age: 33
End: 2023-09-21
Payer: MEDICAID

## 2023-09-21 DIAGNOSIS — M54.16 LUMBAR RADICULOPATHY: ICD-10-CM

## 2023-09-21 DIAGNOSIS — M54.9 DORSALGIA, UNSPECIFIED: ICD-10-CM

## 2023-09-22 RX ORDER — TIZANIDINE 4 MG/1
4 TABLET ORAL EVERY 8 HOURS
Qty: 90 TABLET | Refills: 2 | Status: SHIPPED | OUTPATIENT
Start: 2023-09-22 | End: 2023-12-11 | Stop reason: SDUPTHER

## 2023-09-28 ENCOUNTER — PATIENT MESSAGE (OUTPATIENT)
Dept: PODIATRY | Facility: CLINIC | Age: 33
End: 2023-09-28
Payer: MEDICAID

## 2023-10-05 DIAGNOSIS — I10 ESSENTIAL HYPERTENSION: ICD-10-CM

## 2023-10-05 DIAGNOSIS — E11.9 TYPE 2 DIABETES MELLITUS WITHOUT COMPLICATION, WITH LONG-TERM CURRENT USE OF INSULIN: ICD-10-CM

## 2023-10-05 DIAGNOSIS — Z79.4 TYPE 2 DIABETES MELLITUS WITHOUT COMPLICATION, WITH LONG-TERM CURRENT USE OF INSULIN: ICD-10-CM

## 2023-10-05 DIAGNOSIS — Z91.89 AT RISK FOR CARDIOVASCULAR EVENT: ICD-10-CM

## 2023-10-05 RX ORDER — IRBESARTAN 150 MG/1
TABLET ORAL
Qty: 30 TABLET | Refills: 0 | Status: SHIPPED | OUTPATIENT
Start: 2023-10-05 | End: 2023-11-06

## 2023-10-05 NOTE — TELEPHONE ENCOUNTER
Refill Routing Note   Medication(s) are not appropriate for processing by [facility] for the following reason(s):      Non-participating provider    [facility] action(s):  Route Care Due:  None identified            Appointments  past 12m or future 3m with PCP    Date Provider   Last Visit   8/30/2023 Qasim Paez, NP   Next Visit   11/15/2023 Qasim Paez, NP   ED visits in past 90 days: 1        Note composed:10:35 AM 10/05/2023

## 2023-10-06 ENCOUNTER — PATIENT OUTREACH (OUTPATIENT)
Dept: ADMINISTRATIVE | Facility: HOSPITAL | Age: 33
End: 2023-10-06
Payer: MEDICAID

## 2023-10-06 NOTE — PROGRESS NOTES
[facility] Panel Clean Up: Patient has been seeing MARIA T Paez NP since 2017 and is [employer] insurance.

## 2023-10-10 DIAGNOSIS — I10 ESSENTIAL HYPERTENSION: ICD-10-CM

## 2023-10-10 RX ORDER — METOPROLOL SUCCINATE 50 MG/1
50 TABLET, EXTENDED RELEASE ORAL
Qty: 30 TABLET | Refills: 0 | Status: SHIPPED | OUTPATIENT
Start: 2023-10-10 | End: 2023-11-06

## 2023-10-13 DIAGNOSIS — E11.9 TYPE 2 DIABETES MELLITUS WITHOUT COMPLICATION, WITHOUT LONG-TERM CURRENT USE OF INSULIN: ICD-10-CM

## 2023-10-13 RX ORDER — GLIMEPIRIDE 4 MG/1
TABLET ORAL
Qty: 90 TABLET | Refills: 0 | Status: SHIPPED | OUTPATIENT
Start: 2023-10-13 | End: 2024-01-09

## 2023-10-23 ENCOUNTER — OFFICE VISIT (OUTPATIENT)
Dept: PODIATRY | Facility: CLINIC | Age: 33
End: 2023-10-23
Payer: MEDICAID

## 2023-10-23 ENCOUNTER — PATIENT MESSAGE (OUTPATIENT)
Dept: PODIATRY | Facility: CLINIC | Age: 33
End: 2023-10-23

## 2023-10-23 ENCOUNTER — HOSPITAL ENCOUNTER (OUTPATIENT)
Dept: RADIOLOGY | Facility: HOSPITAL | Age: 33
Discharge: HOME | End: 2023-10-23
Attending: PODIATRIST
Payer: MEDICAID

## 2023-10-23 VITALS — HEIGHT: 64 IN | WEIGHT: 252 LBS | BODY MASS INDEX: 43.02 KG/M2

## 2023-10-23 DIAGNOSIS — M21.612 HALLUX ABDUCTOVALGUS WITH BUNIONS, LEFT: ICD-10-CM

## 2023-10-23 DIAGNOSIS — E66.01 MORBID OBESITY WITH BMI OF 40.0-44.9, ADULT: ICD-10-CM

## 2023-10-23 DIAGNOSIS — M20.12 HALLUX ABDUCTOVALGUS WITH BUNIONS, LEFT: ICD-10-CM

## 2023-10-23 DIAGNOSIS — M79.672 PAIN IN LEFT FOOT: Primary | ICD-10-CM

## 2023-10-23 DIAGNOSIS — M77.42 METATARSALGIA OF LEFT FOOT: ICD-10-CM

## 2023-10-23 DIAGNOSIS — Z98.890 HISTORY OF BUNIONECTOMY OF LEFT GREAT TOE: ICD-10-CM

## 2023-10-23 DIAGNOSIS — E11.9 TYPE 2 DIABETES MELLITUS WITHOUT COMPLICATION, WITHOUT LONG-TERM CURRENT USE OF INSULIN: ICD-10-CM

## 2023-10-23 DIAGNOSIS — R11.0 NAUSEA: ICD-10-CM

## 2023-10-23 PROCEDURE — 1160F RVW MEDS BY RX/DR IN RCRD: CPT | Mod: CPTII,,, | Performed by: PODIATRIST

## 2023-10-23 PROCEDURE — 3066F NEPHROPATHY DOC TX: CPT | Mod: CPTII,,, | Performed by: PODIATRIST

## 2023-10-23 PROCEDURE — 73630 XR FOOT COMPLETE 3 VIEW LEFT: ICD-10-PCS | Mod: 26,LT,, | Performed by: RADIOLOGY

## 2023-10-23 PROCEDURE — 99999 PR PBB SHADOW E&M-EST. PATIENT-LVL IV: ICD-10-PCS | Mod: PBBFAC,,, | Performed by: PODIATRIST

## 2023-10-23 PROCEDURE — 3044F PR MOST RECENT HEMOGLOBIN A1C LEVEL <7.0%: ICD-10-PCS | Mod: CPTII,,, | Performed by: PODIATRIST

## 2023-10-23 PROCEDURE — 3066F PR DOCUMENTATION OF TREATMENT FOR NEPHROPATHY: ICD-10-PCS | Mod: CPTII,,, | Performed by: PODIATRIST

## 2023-10-23 PROCEDURE — 1159F PR MEDICATION LIST DOCUMENTED IN MEDICAL RECORD: ICD-10-PCS | Mod: CPTII,,, | Performed by: PODIATRIST

## 2023-10-23 PROCEDURE — 3008F PR BODY MASS INDEX (BMI) DOCUMENTED: ICD-10-PCS | Mod: CPTII,,, | Performed by: PODIATRIST

## 2023-10-23 PROCEDURE — 99214 OFFICE O/P EST MOD 30 MIN: CPT | Mod: PBBFAC | Performed by: PODIATRIST

## 2023-10-23 PROCEDURE — 3008F BODY MASS INDEX DOCD: CPT | Mod: CPTII,,, | Performed by: PODIATRIST

## 2023-10-23 PROCEDURE — 3061F NEG MICROALBUMINURIA REV: CPT | Mod: CPTII,,, | Performed by: PODIATRIST

## 2023-10-23 PROCEDURE — 3044F HG A1C LEVEL LT 7.0%: CPT | Mod: CPTII,,, | Performed by: PODIATRIST

## 2023-10-23 PROCEDURE — 99214 PR OFFICE/OUTPT VISIT, EST, LEVL IV, 30-39 MIN: ICD-10-PCS | Mod: S$PBB,,, | Performed by: PODIATRIST

## 2023-10-23 PROCEDURE — 73630 X-RAY EXAM OF FOOT: CPT | Mod: TC,LT

## 2023-10-23 PROCEDURE — 1159F MED LIST DOCD IN RCRD: CPT | Mod: CPTII,,, | Performed by: PODIATRIST

## 2023-10-23 PROCEDURE — 99999 PR PBB SHADOW E&M-EST. PATIENT-LVL IV: CPT | Mod: PBBFAC,,, | Performed by: PODIATRIST

## 2023-10-23 PROCEDURE — 4010F ACE/ARB THERAPY RXD/TAKEN: CPT | Mod: CPTII,,, | Performed by: PODIATRIST

## 2023-10-23 PROCEDURE — 99214 OFFICE O/P EST MOD 30 MIN: CPT | Mod: S$PBB,,, | Performed by: PODIATRIST

## 2023-10-23 PROCEDURE — 4010F PR ACE/ARB THEARPY RXD/TAKEN: ICD-10-PCS | Mod: CPTII,,, | Performed by: PODIATRIST

## 2023-10-23 PROCEDURE — 3061F PR NEG MICROALBUMINURIA RESULT DOCUMENTED/REVIEW: ICD-10-PCS | Mod: CPTII,,, | Performed by: PODIATRIST

## 2023-10-23 PROCEDURE — 1160F PR REVIEW ALL MEDS BY PRESCRIBER/CLIN PHARMACIST DOCUMENTED: ICD-10-PCS | Mod: CPTII,,, | Performed by: PODIATRIST

## 2023-10-23 PROCEDURE — 73630 X-RAY EXAM OF FOOT: CPT | Mod: 26,LT,, | Performed by: RADIOLOGY

## 2023-10-23 RX ORDER — METHYLPREDNISOLONE 4 MG/1
TABLET ORAL
Qty: 21 EACH | Refills: 0 | Status: SHIPPED | OUTPATIENT
Start: 2023-10-23 | End: 2023-11-13

## 2023-10-23 RX ORDER — PROMETHAZINE HYDROCHLORIDE 25 MG/1
TABLET ORAL
Qty: 30 TABLET | Refills: 0 | Status: SHIPPED | OUTPATIENT
Start: 2023-10-23 | End: 2023-12-11

## 2023-10-23 NOTE — LETTER
October 23, 2023      [facility]  [address]  [place] [address]  Phone: [phone]  Fax: [phone]       Patient: Nathan Carlos   YOB: 1990  Date of Visit: 10/23/2023    To Whom It May Concern:    Saida Carlos  was at [facility] on 10/23/2023. Patient had undergone surgery on 5/23/2023. She has appropriately healed the speedy surgical site, but does have some minor swelling at times making shoe wear difficult.  If you have any questions or concerns, or if I can be of further assistance, please do not hesitate to contact me.    Sincerely,    Sandy Cohn DPM

## 2023-10-23 NOTE — PROGRESS NOTES
Subjective:       Patient ID: Nathan Carlos is a 32 y.o. female.    Chief Complaint: Foot Pain (Pt presesnts with pain on left foot after surgery. Pain 7/10, Diabetic closed toe shoes and socks, last saw PCP Qasim Paez, NP at 8/30/2023)    HPI: Nathan Carlos presents clinic status post 1st tarsometatarsal joint arthrodesis for revision bunion deformity correction.  Ambulating in regular shoe gear on the left foot.  Does have new pain present to the lateral aspect of the left foot.  Denies any recent trauma or injury.  States 7/10 pain at the worst.  Reports that she has mild swelling at times.  Happy with the results of the great toe in terms of position.  Request note be provided for food assistance      Review of patient's allergies indicates:   Allergen Reactions    Amitriptyline      xerostomia    Amlodipine      DRY MOUTH    Tramadol Itching       Past Medical History:   Diagnosis Date    Anxiety     Carpal tunnel syndrome     Diabetes mellitus, type 2     GERD (gastroesophageal reflux disease)     Herpes simplex virus (HSV) infection     High serum testosterone 09/07/2017    Hyperlipidemia     Hypertension     Insulin resistance syndrome 09/07/2017    Lumbar facet arthropathy 08/14/2019    Morbid obesity     Ovarian cyst     bilateral        Family History   Problem Relation Age of Onset    Hypertension Mother     Fibroids Mother     Arthritis Mother     No Known Problems Father     Diabetes Sister     Hypertension Brother     No Known Problems Brother     Hypertension Maternal Grandmother     Leukemia Maternal Grandfather     Hypertension Maternal Grandfather     Hypertension Maternal Aunt     Diabetes Maternal Aunt     Arthritis Maternal Aunt     Diabetes Maternal Uncle     Stroke Neg Hx      Social History     Socioeconomic History    Marital status:     Number of children: 0   Occupational History     Employer: [id number]   Tobacco Use    Smoking status: Never     Smokeless tobacco: Never   Substance and Sexual Activity    Alcohol use: Never    Drug use: No    Sexual activity: Yes     Partners: Male     Birth control/protection: OCP     Social Determinants of Health     Financial Resource Strain: Low Risk  (8/28/2023)    Overall Financial Resource Strain (CARDIA)     Difficulty of Paying Living Expenses: Not hard at all   Food Insecurity: No Food Insecurity (8/28/2023)    Hunger Vital Sign     Worried About Running Out of Food in the Last Year: Never true     Ran Out of Food in the Last Year: Never true   Transportation Needs: No Transportation Needs (8/28/2023)    PRAPARE - Transportation     Lack of Transportation (Medical): No     Lack of Transportation (Non-Medical): No   Physical Activity: Inactive (8/28/2023)    Exercise Vital Sign     Days of Exercise per Week: 0 days     Minutes of Exercise per Session: 0 min   Stress: Stress Concern Present (8/28/2023)    [employer] - Occupational Stress Questionnaire     Feeling of Stress : To some extent   Social Connections: Unknown (8/28/2023)    Social Connection and Isolation Panel [NHANES]     Frequency of Communication with Friends and Family: More than three times a week     Frequency of Social Gatherings with Friends and Family: Once a week     Active Member of Clubs or Organizations: No     Attends Club or Organization Meetings: Never     Marital Status:    Housing Stability: Low Risk  (8/28/2023)    Housing Stability Vital Sign     Unable to Pay for Housing in the Last Year: No     Number of Places Lived in the Last Year: 1     Unstable Housing in the Last Year: No     Past Surgical History:   Procedure Laterality Date    AKIN OSTEOTOMY Left 5/23/2023    Procedure: OSTEOTOMY, AKIN;  Surgeon: Sandy Cohn DPM;  Location: [facility];  Service: Podiatry;  Laterality: Left;    ANKLE SURGERY Left     BUNIONECTOMY Bilateral     CARPAL TUNNEL RELEASE Left 03/19/2021    Procedure: RELEASE,  CARPAL TUNNEL;  Surgeon: Juan F Bernard MD;  Location: [facility] OR;  Service: Orthopedics;  Laterality: Left;    CARPAL TUNNEL RELEASE Right 11/04/2021    Procedure: RELEASE, CARPAL TUNNEL;  Surgeon: Juan F Bernard MD;  Location: [facility] OR;  Service: Orthopedics;  Laterality: Right;    COLONOSCOPY  06/12/2018    EPIDURAL STEROID INJECTION N/A 11/23/2022    Procedure: Lumbar L5/S1 IL BRUNILDA RN IV Sedation;  Surgeon: Eula Rojas MD;  Location: [facility];  Service: Pain Management;  Laterality: N/A;    EPIDURAL STEROID INJECTION N/A 2/17/2023    Procedure: Lumbar L5/S1 IL BRUNILDA with left paramedian approach RN IV Sedation;  Surgeon: Eula Rojas MD;  Location: [facility];  Service: Pain Management;  Laterality: N/A;    ESOPHAGOGASTRODUODENOSCOPY      ESOPHAGOGASTRODUODENOSCOPY N/A 12/02/2021    Procedure: EGD (ESOPHAGOGASTRODUODENOSCOPY);  Surgeon: Plii Eagle MD;  Location: [facility];  Service: Endoscopy;  Laterality: N/A;    INJECTION OF ANESTHETIC AGENT AROUND MEDIAL BRANCH NERVES INNERVATING LUMBAR FACET JOINT Bilateral 08/14/2019    Procedure: Bilateral L3-5 MBB;  Surgeon: Yo Kirkland MD;  Location: [facility];  Service: Pain Management;  Laterality: Bilateral;    INJECTION OF ANESTHETIC AGENT AROUND MEDIAL BRANCH NERVES INNERVATING LUMBAR FACET JOINT Bilateral 07/16/2020    Procedure: Bilateral L3-5 MBB;  Surgeon: Yo Kirkland MD;  Location: [facility];  Service: Pain Management;  Laterality: Bilateral;    INJECTION OF ANESTHETIC AGENT INTO SACROILIAC JOINT Right 02/05/2020    Procedure: Right SIJ Injection with local;  Surgeon: Yo Kirkland MD;  Location: [facility];  Service: Pain Management;  Laterality: Right;    INJECTION OF ANESTHETIC AGENT INTO SACROILIAC JOINT Left 06/17/2021    Procedure: Left GT bursa + left SIJ injection;  Surgeon: Yo Kirkland MD;  Location: [facility];  Service: Pain Management;  Laterality: Left;    INJECTION OF ANESTHETIC AGENT INTO SACROILIAC  "JOINT Bilateral 05/05/2022    Procedure: Bilateral BLOCK, SACROILIAC JOINT and Bilateral GTB RN IV sedation;  Surgeon: Yo Kirkland MD;  Location: [facility];  Service: Pain Management;  Laterality: Bilateral;    INJECTION OF JOINT Left 06/17/2021    Procedure: Left GT bursa + left SIJ injection;  Surgeon: Yo Kirkland MD;  Location: [facility];  Service: Pain Management;  Laterality: Left;    INJECTION OF JOINT Bilateral 12/9/2022    Procedure: Bilateral GT bursa + bilateral SIJ injection RN IV Sedation;  Surgeon: Eula Rojas MD;  Location: [facility];  Service: Pain Management;  Laterality: Bilateral;    LAPIDUS BUNIONECTOMY Left 5/23/2023    Procedure: BUNIONECTOMY, LAPIDUS;  Surgeon: Sandy Cohn DPM;  Location: [facility] OR;  Service: Podiatry;  Laterality: Left;  First metatarsal cuneiform joint arthrodesis.      LUMBAR FUSION      RADIOFREQUENCY THERMOCOAGULATION Right 12/08/2020    Procedure: RADIOFREQUENCY THERMAL COAGULATION Right L3, 4,5 MBB RFA with RN IV sedation// NO STEROID;  Surgeon: Yo Kirkland MD;  Location: [facility];  Service: Pain Management;  Laterality: Right;    RADIOFREQUENCY THERMOCOAGULATION Left 01/21/2021    Procedure: Left L3-5 Lumbar RFA;  Surgeon: Rian Chaney MD;  Location: [facility];  Service: Pain Management;  Laterality: Left;    SELECTIVE INJECTION OF ANESTHETIC AGENT AROUND LUMBAR SPINAL NERVE ROOT BY TRANSFORAMINAL APPROACH Left 03/08/2022    Procedure: BLOCK, SPINAL NERVE ROOT, LUMBAR, SELECTIVE, TRANSFORAMINAL APPROACH Left L5-S1, S1 RN IV sedation;  Surgeon: Yo Kirkland MD;  Location: [facility];  Service: Pain Management;  Laterality: Left;    TRANSFORAMINAL EPIDURAL INJECTION OF STEROID Left 07/14/2022    Procedure: left L4/5 + L5/S1 TF BRUNILDA RN IV Sedation;  Surgeon: Eula Rojas MD;  Location: [facility];  Service: Pain Management;  Laterality: Left;     Review of Systems     Objective:   Ht 5' 4" (1.626 m)   Wt 114.3 kg (252 lb)   BMI " 43.26 kg/m²     X-Ray Foot Complete Left  Narrative: EXAMINATION:  XR FOOT COMPLETE 3 VIEW LEFT    CLINICAL HISTORY:  .  Hallux valgus (acquired), left foot    TECHNIQUE:  AP, lateral and oblique views of the left foot were performed.    COMPARISON:  07/26/2023    FINDINGS:  Postoperative findings are again noted involving the 1st ray.  Osseous alignment is unchanged.  Hardware is unchanged in appearance.  Lucency at the osteotomy site of the 1st proximal phalangeal shaft appears slightly less distinct suggesting some interval healing.  No acute fractures or dislocations visualized.  Postoperative changes are again noted involving the medial malleolus and distal fibula.  Mild osteoarthritis is again noted involving the 1st MTP joint.  No erosive changes demonstrated.  Impression: As above.  No acute findings.    Electronically signed by: Willie Ricketts MD  Date:    10/23/2023  Time:    09:37       Physical Exam   LOWER EXTREMITY PHYSICAL EXAMINATION    Vascular: Capillary refill time is intact.  Dorsalis pedis pulse 2/4 on the left.  Posterior tibial pulse on the left 2/4.    Neurological: Sensation to light touch is intact. Proprioception is intact. Sensation to pin prick is intact.  No numbness or tingling identified.    Musculoskeletal:  Status post 1st tarsometatarsal joint arthrodesis.  Rectus position of toe.  No pain with range of motion of the metatarsophalangeal joint.  Moderate pain on palpation of the 3rd interspace and the 4th metatarsophalangeal joint.  There is no evidence of plantar plate rupture.  No visualized or palpable intermetatarsal bursitis.  Negative for neuroma    Dermatological:  Incision is completely healed. No evidence of wound development. No erythema or edema. No drainage.  No signs of postoperative infection.  Skin lines are present to the level affected lower extremity as result of decreased edema    Imaging:       Results for orders placed during the hospital encounter of  03/02/23    X-Ray Foot Complete Left    Narrative  EXAMINATION:  XR FOOT COMPLETE 3 VIEW LEFT    CLINICAL HISTORY:  . Hallux valgus (acquired), left foot    TECHNIQUE:  AP, lateral, and oblique views of the foot were performed.    COMPARISON:  01/23/2023    FINDINGS:  A mild hallux valgus deformity is noted.  There are postoperative changes associated with the 1st metatarsal.  Small dorsal and plantar calcaneal enthesophytes are noted.  Postsurgical changes seen associated with the ankle.  The joint spaces appear to be well maintained.    Impression  As above      Electronically signed by: Stephon Benjamin DO  Date:    03/02/2023  Time:    11:44       No results found for this or any previous visit.    Assessment:     1. Pain in left foot    2. Metatarsalgia of left foot    3. History of bunionectomy of left great toe    4. Type 2 diabetes mellitus without complication, without long-term current use of insulin    5. Morbid obesity with BMI of 40.0-44.9, adult        Plan:     Pain in left foot    Metatarsalgia of left foot    History of bunionectomy of left great toe    Type 2 diabetes mellitus without complication, without long-term current use of insulin    Morbid obesity with BMI of 40.0-44.9, adult    Other orders  -     methylPREDNISolone (MEDROL DOSEPACK) 4 mg tablet; use as directed  Dispense: 21 each; Refill: 0      Thorough discussion is had with the patient today, concerning the diagnosis, its etiology, and the treatment algorithm at present.    X-rays reviewed by myself patient room.  Appears show adequate surgical hardware placement.  Akin site is healed without complications. Hardware in placed.  No evidence of stress fractures or acute complications.      Continue weight-bearing as tolerate and transition to regular shoe gear.     Discussed pain to the left foot is associated with metatarsalgia and capsulitis/inflammation of the forefoot.  Will start patient on oral steroid Dosepak to reduce inflammatory  response.  Discuss that she may have slight increase in her blood sugar daily but this should not cause issues with the A1c long-term.  Patient was aware.    Note was provided to the patient to discuss her previous surgery which shows healed fusion site of the 1st tarsometatarsal joint as well as the great toe Akin osteotomy.  Does mention that she has mild swelling but x-rays do show complete healing.    Continue to reinforce with the patient the importance of calling immediately if there is any noted complications or sudden changes to the patient's condition.  We discuss red flag risk factors of things to continue to monitor for.  Patient relates understanding as discussed    Future Appointments   Date Time Provider Department Center   10/26/2023  9:00 AM [facility]   11/15/2023  8:20 AM Qasim Paez NP [facility]

## 2023-10-25 NOTE — PROGRESS NOTES
Established Patient Interventional Pain Clinic Visit    The patient location is: home  The chief complaint leading to consultation is: back and leg pain  Visit type: Virtual visit with synchronous audio and video  Total time spent with patient: 11-15m  Each patient to whom he or she provides medical services by telemedicine is: (1) informed of the relationship between the physician and patient and the respective role of any other health care provider with respect to management of the patient; and (2) notified that he or she may decline to receive medical services by telemedicine and may withdraw from such care at any time.      Chief Pain Complaint:  No chief complaint on file.      Interval history 10/26/2023  Nathan Carlos is a 32 y.o. female with past medical history significant for hypertension, hypercholesterolemia, type 2 diabetes, morbid obesity, GERD, s/p bunionectomy of the L foot with Dr. Cohn 05/23/2023 who presents to the clinic for the evaluation of back and leg pain.  Today she reports return of pain in a bandlike distribution in the lower back which radiates down the posterior aspect of the left lower extremity in L5-S1 distribution to the foot.  Of note patient initially believe pain was exacerbated after recent bunionectomy but she followed up with Dr. Cohn, 10/23/2023 with left foot x-rays reviewed with adequate surgical hardware placement healing well without complications and no evidence of stress fractures.  Patient was given Medrol s Dosepak to help with capsulitis/inflammation of the forefoot.  Pain is exacerbated with prolonged standing or with ambulation.  She does endorse associated weakness in the left lower extremity associated with her pain.  Patient has continued physician directed physical therapy exercises at home over the last 6 weeks without meaningful improvement in her pain.  Of note she received greater than 80% relief exceeding 5 months in duration with her last  "L5-S1 interlaminar epidural steroid injection 02/2023.  Of note patient trialed Lyrica 50 mg up to twice daily from MsBart Kishor PA-C without noticeable improvement in her pain.      Patient reports significant motor weakness and loss of sensations.  Patient denies night fever/night sweats, urinary incontinence, bowel incontinence, and significant weight loss.      Pain Disability Index Review:         1/9/2023     8:13 AM 10/20/2022    10:55 AM 4/14/2022     8:51 AM   Last 3 PDI Scores   Pain Disability Index (PDI) 63 70 67       Non-Pharmacologic Treatments:  Physical Therapy/Home Exercise: yes  Ice/Heat:yes  TENS: no  Acupuncture: no  Massage: no  Chiropractic: no    Other: no      Pain Medications:  - Adjuvant Medications: Ambien (Zolpidem), Lyrica ( Pregabalin), and Zanaflex ( Tizanidine)    Pain Procedures:     Dr. Rojas:  -02/17/2023:  L5-S1 interlaminar epidural steroid injection  -12/09/2022:  Bilateral sacroiliac joint and greater trochanteric bursa injection  -11/23/2022:  L4-5 interlaminar epidural steroid injection (L5-S1 aborted)  -07/14/2022: Left-sided L4-5 and L5-S1 transforaminal epidural steroid injection      Dr. Kirkland:  -05/05/2022: Bilateral sacroiliac joint and bilateral greater trochanteric bursa injection  -03/08/2022: Left-sided L5-S1 and S1 transforaminal epidural steroid injection  -06/17/2021: Left-sided sacroiliac joint and greater trochanteric bursa injection  -01/21/2021: Left-sided L3-5 lumbar radiofrequency ablation  -12/18/2020: Right-sided L3-5 lumbar radiofrequency ablation        Interval History (07/17/2023):  Chrisssakina Nancy Carlos presents today for follow-up visit.  Patient was last seen on 3/13/2023. She underwent bunionectomy with Dr. Cohn on 05/23/2023. She reports since then she has been having nerve pain in her left leg, reports she feels the nerves "jumping". She reports Dr. Cohn states this is expected and should resolve. She is still wearing a post op boot. Has " follow up with podiatry on July 26th to evaluate to see if she can discontinue boot. Overall doing well, still has 40-50% sustained relief from previous lumbar BRUNILDA.  Patient reports pain as 4/10 today.      Interval History (3/13/2023): Nathan Carlos presents today via telemed for follow-up visit.  she underwent Lumbar L5/S1 IL BRUNILDA with left paramedian approach on 2/17/23.  The patient reports that she is/was better following the procedure.  she reports 80% pain relief.  The changes lasted 4 weeks so far.  The changes have continued through this visit.  She reports that the injection helped her a lot, and states that her low back pain is now intermittent instead of constant.  She is also able to stand a little bit longer.  She has since began walking daily and is performing physician directed exercises at home.  She takes tizanidine as needed for muscle pain, and reports that this is helpful.  Patient reports pain as 2/10 today.      Interval History (1/9/2023): Nathan Carlos presents today for follow-up visit.  she underwent lumbar L5/S1 IL BRUNILDA on 11/23/2022 followed by bilateral SIJ + GT bursa injection on 12/9/22.  The patient reports that she is/was better following the procedure.  she reports 80-90% pain relief from SIJ injection and 60% relief from BRUNILDA. Reports continued leg pain/burning/tingling, but improved since injection.  Patient reports pain as 10/10 today.      Interval History (10/20/2022):  Nathan Carlos presents today for follow-up visit.  Patient was last seen on 8/18/2022. Last injection Left L4/5 +L5/S1 TF BRUNILDA on 07/14/2021. Patient reports pain as 8/10 today. Reports she has had several episodes during which her left leg gave out. She also reports pain in her lower lumbar spine in a band-like distribution with radiation into her lower extremities left > right. Pain interferes with daily activities.      Interval History (8/18/2022): Nathan Carlos presents today  for follow-up visit.  she underwent Left L4/5 +L5/S1 TF BRUNILDA on 7/14/22.  The patient reports that she is/was better following the procedure.  she reports 40% pain relief.  The changes lasted 4 weeks so far.  The changes have continued through this visit.  Patient reports pain as 6/10 today, 10/10 on her worst days. Reports she is still experiencing pain but is able to stand for somewhat longer periods of time. Continues to report pain in left buttock down posterior thigh, but does admit she is now having pain in the right lumbar region now as well. She reports she takes tizanidine three times daily on bad days, and this seems to help.      Interval History (6/2/2022): Nathan Carlos presents today for follow-up visit.  she underwent bilateral SIJ + GTB injection on 5/5/22.  The patient reports that she is/was better following the procedure.  she reports 60% pain relief.  The changes lasted 4 weeks so far.  The changes have continued through this visit.  Reports back and buttock pain improved, but reports continued pain along her left leg. Reports pain along anterior, lateral, and posterior aspect of left leg, radiating down to foot at times. Patient reports pain as 6/10 today. Reports that previous epidural did not offer much relief.    IMPRESSION  1. ABNORMAL study  2. There is electrodiagnostic evidence of an acute on chronic radiculopathy of the LEFT S1 nerve root and a subacute on chronic radiculopathy of the LEFT L5 nerve root     Interval HPI 04/14/2022  Nathan Carlos is a 32 y.o. female  who is presenting with a chief complaint of Lumbar Back Pain. The patient began experiencing this problem insidiously, and the pain has been gradually worsening over the past 3 month(s). The pain is described as throbbing, cramping, burning and electrical and is located in the left lumbar spine . Pain is intermittent and lasts hours. The pain radiates to  left leg. The patient rates her pain a 8 out of ten and  interferes with activities of daily living a 8 out of ten. Pain is exacerbated by flexion of the lumbar spine, and is improved by rest. Patient reports no prior trauma, no prior spinal surgery     Interval HPI 03/31/2022  Nathan Carlos is a 32 y.o. female  who is presenting with a chief complaint of Low-back Pain. The patient began experiencing this problem insidiously, and the pain has been gradually worsening. The pain is described as throbbing, cramping, aching and heavy and is located in the bilateral lumbar spine . Pain is intermittent and lasts hours. The  pain is nonradiating. The patient rates her pain a 8 out of ten and interferes with activities of daily living a 7 out of ten. Pain is exacerbated by extension of the lumbar spine, and is improved by rest. Patient reports no prior trauma, no prior spinal surgery     - pertinent negatives: No fever, No chills, No weight loss, No bladder dysfunction, No bowel dysfunction, No saddle anesthesia  - pertinent positives: none    - medications, other therapies tried (physical therapy, injections):     >> NSAIDs, Tylenol, Norco, zanaflex and flexeril (no relief), topamax (no relief)    >> Has previously undergone Physical Therapy    >> Has previously undergone spinal injection/s   - Left SI joint injection 1/2018 with 100% relief for 6 months   - Right L3, L4, L5 MBB with local on 8/15/18 with 90% pain relief   - left SIJ + left GT bursa injection with local on 11/8/18 with good relief of bursitis but only 10% of SIJ pain   - left L3-5 MBB with local on 5/16/19 with 90% pain relief   - bilateral L3-5 MBB on 8/14/19 with limited relief   - right SIJ injection on 2/5/20 with 80% pain relief   - bilateral L3-5 MBB on 7/16/2020 with 100% relief of lumbar back pain   - right L3-5 RFA on 12/08/2020 with 100% pain relief   - left L3-5 RFA on 1/21/21 with at least 50% pain relief   - left SIJ + left GT bursa injection on 6/17/21 with at least 40% pain relief    -  L5-S1, S1 TFESI on 3/8/22 with 60% Pain relief    -bilateral SIJ + GTB injection on 5/5/22 with 60% relief   -lumbar L5/S1 IL BRUNILDA on 11/23/2022 with 60% relief   -bilateral SIJ + GT bursa injection on 12/9/22 with 80-90% relief  -Lumbar L5/S1 IL BRUNILDA with left paramedian approach on 2/17/23 with 80% relief    Imaging / Labs / Studies (reviewed on 10/26/2023):  MRI lumbar spine 12/16/2021    FINDINGS:  Detail limited by poor signal noise ratio.  Prominent edema within the subcutaneous soft tissues of the lower back.  Stable anatomic alignment, unchanged since 11/22/2017.  The vertebral body heights are maintained.  Mild disc desiccation at L4-L5 is noted.  No significant disc space narrowing.  The distal tip of the conus medullaris terminates near the L2 level.  There are no significant areas of disc bulge or protrusion.  Minimal grade 1 retrolisthesis of L4 on L5 is noted following the administration of intravenous contrast, no abnormal areas of enhancement are noted.     L1-L2: No spinal canal or neural foraminal encroachment.     L2-L3: No spinal canal or neural foraminal encroachment.     L3-L4: No spinal canal or neural foraminal encroachment.     L4-L5: Mild facet arthropathy.  No spinal canal or neural foraminal encroachment.     L5-S1: Mild bilateral facet arthropathy.  No spinal canal or neural foraminal encroachment.     Impression:     Mild lower lumbar spine degenerative changes as above.    Results for orders placed during the hospital encounter of 11/22/17   MRI Lumbar Spine Without Contrast    Narrative Technique: Standard noncontrast multiplanar multisequence imaging of the lumbar spine was performed.  Findings: There is anatomic spinal alignment.  Disc interspaces are of normal height and signal intensity.  Vertebral marrow signal pattern and architecture are normal.  Distal cord is unremarkable with conus medullaris terminating at L1-L2.  Paravertebral soft tissues are symmetric and normal in  appearance.  T12-L1: No disc protrusion, neuroforaminal narrowing, or central canal stenosis.  L1-L2: No disc protrusion, neuroforaminal narrowing, or central canal stenosis.  L2-L3: No disc protrusion, neuroforaminal narrowing, or central canal stenosis.  L3-L4: No disc protrusion, neuroforaminal narrowing, or central canal stenosis.  L4-L5: No disc protrusion, neuroforaminal narrowing, or central canal stenosis.  L5-S1: Mild bilateral facet hypertrophy. No disc protrusion, neuroforaminal narrowing, or central canal stenosis.    Impression  Negative MRI of the lumbar spine.     Results for orders placed during the hospital encounter of 01/04/18   X-Ray Lumbar Complete With Flex And Ext    Narrative Comparison: None  Technique: AP, lateral, lateral flexion, lateral extension, bilateral oblique, and lumbosacral coned down views were obtained of the lumbar spine  Findings: There is mild scoliosis of the lower thoracic and upper lumbar spine.  Vertebral body heights are well-maintained.  No spondylolisthesis demonstrated.  No change in spinal alignment with flexion or extension to suggest instability. Intervertebral disk spaces are well preserved.  No pars defects visualized.  Posterior elements appear grossly intact. No acute fractures or subluxations are demonstrated.  The remaining visualized osseous and soft tissue structures demonstrate no appreciable abnormality.    Impression As above.  No acute findings.         Review of Systems:  CONSTITUTIONAL: patient denies any fever, chills, or weight loss  SKIN: patient denies any rash or itching  RESPIRATORY: patient denies having any shortness of breath  GASTROINTESTINAL: patient denies having any diarrhea, constipation, or bowel incontinence  GENITOURINARY: patient denies having any abnormal bladder function    MUSCULOSKELETAL:  - patient complains of the above noted pain/s (see chief pain complaint)    NEUROLOGICAL:   - pain as above  - strength in Lower extremities  is intact, BILATERALLY  - sensation in Lower extremities is intact, BILATERALLY  - patient denies any loss of bowel or bladder control      PSYCHIATRIC: patient denies any change in mood    Other:  All other systems reviewed and are negative        Physical Exam:  Telemedicine Exam  There were no vitals filed for this visit.   There is no height or weight on file to calculate BMI.   (reviewed on 10/26/2023)    GENERAL: Well appearing, in no acute distress, alert and oriented x3.  obese  PSYCH:  Mood and affect appropriate.  SKIN: Skin color, texture, turgor normal, no rashes or lesions.  HEAD/FACE:  Normocephalic, atraumatic. Cranial nerves grossly intact.  PULM:  No difficulty breathing  MUSCULOSKELETAL: No atrophy or tone abnormalities are noted.  NEURO:  Able to toe walk and heel walk without difficulty  GAIT: normal.        Physical Exam: last in clinic visit:  General: alert and oriented, in no apparent distress, obese.  Gait: normal gait.  Skin: no rashes, no discoloration, no obvious lesions  HEENT: normocephalic, atraumatic. Pupils equal and round.  Cardiovascular: no significant peripheral edema present.  Respiratory: without use of accessory muscles of respiration.    Musculoskeletal - Lumbar Spine:  - Pain on flexion of lumbar spine: Present   - Pain on extension of lumbar spine: Present  - Lumbar facet loading: Present bilaterally  - TTP over the lumbar facet joints: Present on left at L5-S1   - TTP over the para lumbar muscles on left   - TTP over the SI joints: Present bilaterally  - TTP over GT bursa: Present bilaterally  - TTP over piriformis: absent  - Straight Leg Raise: equivocal on right, positive on left  - JULISSA: Positive bilaterally    Neuro - Extremities:  - BUE Strength:R/L: D: 5/5; B: 5/5; T: 5/5; WF: 5/5; WE: 5/5; IO: 5/5  - BLE Strength: R/L: HF: 5/5, HE: 5/5, KF: 5/5; KE: 5/4; FE: 5/5; FF: 5/5  - Extremity Reflexes: Brisk and symmetric throughout  - Sensory: Sensation to light touch  intact bilaterally      Psych:  Mood and affect is appropriate          Assessment:  Nathan Carlos is a 32 y.o. year old female who is presenting with   Encounter Diagnoses   Name Primary?    Lumbar radiculopathy Yes    Lumbar spondylosis     S/P bunionectomy          Plan:  1. Interventional:  Schedule for L5-S1 interlaminar epidural steroid injection with left paramedian approach for lumbar radiculopathy.  Of note patient received greater than 80% relief exceeding 5 months in duration with her last procedure.  We have discussed the procedure, benefits, potential risk and alternative options in detail.  Patient has elected to pursue this procedure.    - Anticoagulation use: None.     2. Pharmacologic:    reviewed and consistent with use    - Disontinued Gabapentin 3 secondary inefficacy    --I will start the patient on a Lyrica titration to see if this helps with neuropathic pain.  We discussed increasing the dose gradually to reach a therapeutic goal according to the following algorithm.  We discussed potential side effects of this medication which may include drowsiness,dizziness, dry mouth, constipation or peripheral edema.    -Week 1: Take 1 capsule, 75 mg BID  -Week 2: Take 2 capsules, 150 mg BID  -Week 3: Take 3 capsules, 225 mg BID    3. Rehabilitative: We discussed continuing at home physician directed physical therapy to help manage the patient/s painful condition. The patient was counseled that muscle strengthening will improve the long term prognosis in regards to pain and may also help increase range of motion and mobility.     4. Diagnostic:  Reviewed lumbar MRI and answered any relevant patient questions.    5. -Dr. Cohn: Podiatry; FU bunionectomy, prior L foot sx    6. Follow up:  4-6 weeks post injection      The above plan and management options were discussed at length with patient. Patient is in agreement with the above and verbalized understanding.    - I discussed the goals of  interventional chronic pain management with the patient on today's visit. We discussed a multimodal and systematic approach to pain.  This includes diagnostic and therapeutic injections, adjuvant pharmacologic treatment, physical therapy, and at times psychiatry.  I emphasized the importance of regular exercise, core strengthening and stretching, diet and weight loss as a cornerstone of long-term pain management.    - This condition does not require this patient to take time off of work, and the primary goal of our Pain Management services is to improve the patient's functional capacity.  - Patient Questions: Answered all of the patient's questions regarding diagnoses, therapy, treatment and next steps        Eula Rojas MD  Interventional Pain Medicine

## 2023-10-26 ENCOUNTER — TELEPHONE (OUTPATIENT)
Dept: PAIN MEDICINE | Facility: CLINIC | Age: 33
End: 2023-10-26

## 2023-10-26 ENCOUNTER — OFFICE VISIT (OUTPATIENT)
Dept: PAIN MEDICINE | Facility: CLINIC | Age: 33
End: 2023-10-26
Payer: MEDICAID

## 2023-10-26 DIAGNOSIS — Z98.890 S/P BUNIONECTOMY: ICD-10-CM

## 2023-10-26 DIAGNOSIS — M54.16 LUMBAR RADICULOPATHY: Primary | ICD-10-CM

## 2023-10-26 DIAGNOSIS — M47.816 LUMBAR SPONDYLOSIS: ICD-10-CM

## 2023-10-26 PROCEDURE — 3044F PR MOST RECENT HEMOGLOBIN A1C LEVEL <7.0%: ICD-10-PCS | Mod: CPTII,95,, | Performed by: ANESTHESIOLOGY

## 2023-10-26 PROCEDURE — 1159F MED LIST DOCD IN RCRD: CPT | Mod: CPTII,95,, | Performed by: ANESTHESIOLOGY

## 2023-10-26 PROCEDURE — 1159F PR MEDICATION LIST DOCUMENTED IN MEDICAL RECORD: ICD-10-PCS | Mod: CPTII,95,, | Performed by: ANESTHESIOLOGY

## 2023-10-26 PROCEDURE — 1160F RVW MEDS BY RX/DR IN RCRD: CPT | Mod: CPTII,95,, | Performed by: ANESTHESIOLOGY

## 2023-10-26 PROCEDURE — 3061F NEG MICROALBUMINURIA REV: CPT | Mod: CPTII,95,, | Performed by: ANESTHESIOLOGY

## 2023-10-26 PROCEDURE — 3066F PR DOCUMENTATION OF TREATMENT FOR NEPHROPATHY: ICD-10-PCS | Mod: CPTII,95,, | Performed by: ANESTHESIOLOGY

## 2023-10-26 PROCEDURE — 1160F PR REVIEW ALL MEDS BY PRESCRIBER/CLIN PHARMACIST DOCUMENTED: ICD-10-PCS | Mod: CPTII,95,, | Performed by: ANESTHESIOLOGY

## 2023-10-26 PROCEDURE — 3061F PR NEG MICROALBUMINURIA RESULT DOCUMENTED/REVIEW: ICD-10-PCS | Mod: CPTII,95,, | Performed by: ANESTHESIOLOGY

## 2023-10-26 PROCEDURE — 4010F PR ACE/ARB THEARPY RXD/TAKEN: ICD-10-PCS | Mod: CPTII,95,, | Performed by: ANESTHESIOLOGY

## 2023-10-26 PROCEDURE — 3044F HG A1C LEVEL LT 7.0%: CPT | Mod: CPTII,95,, | Performed by: ANESTHESIOLOGY

## 2023-10-26 PROCEDURE — 3066F NEPHROPATHY DOC TX: CPT | Mod: CPTII,95,, | Performed by: ANESTHESIOLOGY

## 2023-10-26 PROCEDURE — 4010F ACE/ARB THERAPY RXD/TAKEN: CPT | Mod: CPTII,95,, | Performed by: ANESTHESIOLOGY

## 2023-10-26 PROCEDURE — 99214 PR OFFICE/OUTPT VISIT, EST, LEVL IV, 30-39 MIN: ICD-10-PCS | Mod: 95,,, | Performed by: ANESTHESIOLOGY

## 2023-10-26 PROCEDURE — 99214 OFFICE O/P EST MOD 30 MIN: CPT | Mod: 95,,, | Performed by: ANESTHESIOLOGY

## 2023-10-26 RX ORDER — PREGABALIN 75 MG/1
CAPSULE ORAL
Qty: 60 CAPSULE | Refills: 0 | Status: SHIPPED | OUTPATIENT
Start: 2023-10-26 | End: 2024-01-09 | Stop reason: SDUPTHER

## 2023-10-26 NOTE — TELEPHONE ENCOUNTER
Called pt to set up their procedure. Pt answered and procedure has been made. Inform pt on the procedure instruction.Pt understood and all question answered.     Juan Luis Hammond   Medical

## 2023-11-02 ENCOUNTER — PATIENT MESSAGE (OUTPATIENT)
Dept: FAMILY MEDICINE | Facility: CLINIC | Age: 33
End: 2023-11-02
Payer: MEDICAID

## 2023-11-06 DIAGNOSIS — E78.00 ELEVATED LDL CHOLESTEROL LEVEL: ICD-10-CM

## 2023-11-06 DIAGNOSIS — I10 ESSENTIAL HYPERTENSION: ICD-10-CM

## 2023-11-06 DIAGNOSIS — Z91.89 AT RISK FOR CARDIOVASCULAR EVENT: ICD-10-CM

## 2023-11-06 DIAGNOSIS — E11.9 TYPE 2 DIABETES MELLITUS WITHOUT COMPLICATION, WITH LONG-TERM CURRENT USE OF INSULIN: ICD-10-CM

## 2023-11-06 DIAGNOSIS — E11.9 TYPE 2 DIABETES MELLITUS WITHOUT COMPLICATION, WITHOUT LONG-TERM CURRENT USE OF INSULIN: ICD-10-CM

## 2023-11-06 DIAGNOSIS — Z79.4 TYPE 2 DIABETES MELLITUS WITHOUT COMPLICATION, WITH LONG-TERM CURRENT USE OF INSULIN: ICD-10-CM

## 2023-11-06 RX ORDER — IRBESARTAN 150 MG/1
TABLET ORAL
Qty: 30 TABLET | Refills: 0 | Status: SHIPPED | OUTPATIENT
Start: 2023-11-06 | End: 2023-12-05

## 2023-11-07 RX ORDER — ATORVASTATIN CALCIUM 40 MG/1
TABLET, FILM COATED ORAL
Qty: 30 TABLET | Refills: 1 | Status: SHIPPED | OUTPATIENT
Start: 2023-11-07 | End: 2024-01-03

## 2023-11-07 NOTE — TELEPHONE ENCOUNTER
Refill Routing Note   Medication(s) are not appropriate for processing by [facility] for the following reason(s):      Non-participating provider    [facility] action(s):  Route Care Due:  None identified            Appointments  past 12m or future 3m with PCP    Date Provider   Last Visit   8/30/2023 Qasim Paez, NP   Next Visit   11/15/2023 Qasim Paez, NP   ED visits in past 90 days: 1        Note composed:6:04 AM 11/07/2023

## 2023-11-08 DIAGNOSIS — R51.9 INTRACTABLE EPISODIC HEADACHE, UNSPECIFIED HEADACHE TYPE: Primary | ICD-10-CM

## 2023-11-08 RX ORDER — BUTALBITAL, ACETAMINOPHEN AND CAFFEINE 50; 325; 40 MG/1; MG/1; MG/1
1 TABLET ORAL
Qty: 45 TABLET | Refills: 0 | Status: SHIPPED | OUTPATIENT
Start: 2023-11-08 | End: 2023-11-15

## 2023-11-09 NOTE — PRE-PROCEDURE INSTRUCTIONS
Spoke with patient regarding procedure scheduled on 11.21     Arrival time 0645     Has patient been sick with fever or on antibiotics within the last 7 days? No     Does the patient have any open wounds, sores or rashes? No     Does the patient have any recent fractures? no     Has patient received a vaccination within the last 7 days? No     Received the COVID vaccination? yes     Has the patient stopped all medications as directed? na     Does patient have a pacemaker, defibrillator, or implantable stimulator? No     Does the patient have a ride to and from procedure and someone reliable to remain with patient?  MOTHER     Is the patient diabetic? YES     Does the patient have sleep apnea? Or use O2 at home? No and no     Is the patient receiving sedation? yes     Is the patient instructed to remain NPO beginning at midnight the night before their procedure? yes     Procedure location confirmed with patient? Yes     Covid- Denies signs/symptoms. Instructed to notify PAT/MD if any changes.

## 2023-11-10 NOTE — PROGRESS NOTES
SUBJECTIVE:      Nathan Carlos is a 32 y.o. female, here today for follow-up of:    Type-2 Diabetes    HPI:    Nathan presents for follow up of diabetes.  On medication as ordered, no home FBS checks.    Diabetes Medications               glimepiride (AMARYL) 4 MG tablet TAKE ONE TABLET BY MOUTH EVERY MORNING WITH BREAKFAST      JANUVIA 100 mg Tab TAKE 1 TABLET BY MOUTH EVERY DAY    May possibly discontinue      LEVEMIR FLEXPEN 100 unit/mL (3 mL) InPn pen INJECT 20 UNITS EVERY EVENING    Has not been taking in a few days      metFORMIN (GLUCOPHAGE-XR) 500 MG ER 24hr tablet TAKE 2 TABLETS BY MOUTH TWO TIMES A DAY WITH A MEAL    OZEMPIC 0.25 mg or 0.5 mg (2 mg/3 mL) pen injector INJECT 0.5 MG INTO THE SKIN ONCE A WEEK    No sig weight loss         REVIEW OF SYSTEMS:    Review of Systems   Constitutional:  Positive for unexpected weight change (gain). Negative for activity change, appetite change, chills, diaphoresis, fatigue and fever.        Wt Readings from Last 3 Encounters:  11/15/23 0817 : 118.3 kg (260 lb 12.9 oz)  10/23/23 0855 : 114.3 kg (252 lb)  08/30/23 0846 : 114.6 kg (252 lb 10.4 oz)   HENT:  Positive for congestion, sinus pressure, sinus pain and sneezing. Negative for ear pain, facial swelling, sore throat, tinnitus, trouble swallowing and voice change.         Sinus issues, headache, mucus   Eyes:  Negative for photophobia, pain and visual disturbance.   Respiratory:  Negative for cough, chest tightness, shortness of breath and wheezing.    Cardiovascular:  Negative for chest pain, palpitations and leg swelling.   Gastrointestinal:  Negative for abdominal distention, abdominal pain, anal bleeding, blood in stool, constipation, diarrhea, nausea, rectal pain and vomiting.   Endocrine: Negative for polydipsia, polyphagia and polyuria.        No home checks, on med as ordered.   Genitourinary:  Positive for menstrual problem and vaginal discharge (recurrent yeast infection, tx with boric acid  supp). Negative for difficulty urinating, dysuria, enuresis, flank pain, hematuria and vaginal pain.        History of PCOS, fertility issues, menses problems   Musculoskeletal:  Positive for arthralgias (LT foot pain, prev eval by Podiatry). Negative for gait problem and joint swelling.   Skin:  Positive for rash. Negative for wound.        Chronic intermittent rash under breasts w/ redness, skin irritation, itching, skin excoriations and musty odor.   Neurological:  Positive for headaches (Fioricet & Naproxen not helping, poss sinus/allergy issues). Negative for dizziness, tremors, seizures, syncope, facial asymmetry, speech difficulty, weakness, light-headedness and numbness.        Current HA pain 8-9/10   Hematological:  Negative for adenopathy. Does not bruise/bleed easily.   Psychiatric/Behavioral: Negative.           CURRENT ALLERGIES:    Review of patient's allergies indicates:   Allergen Reactions    Amitriptyline      xerostomia    Amlodipine      DRY MOUTH    Tramadol Itching         CURRENT PROBLEM LIST:    Patient Active Problem List   Diagnosis    Essential hypertension    Gastroesophageal reflux disease    Hypercholesteremia    High serum testosterone    HSV-2 (herpes simplex virus 2) infection    Nephrolithiasis    Chronic bilateral low back pain with left-sided sciatica    Polycystic ovaries    Lumbar facet arthropathy    Type 2 diabetes mellitus without complication, without long-term current use of insulin    Paresthesias in left hand    Morbid obesity with BMI of 40.0-44.9, adult    Lumbar facet joint pain    Spondylosis without myelopathy or radiculopathy, lumbar region    Left carpal tunnel syndrome    Sacroiliac joint dysfunction    Lumbar radiculopathy    Plantar fasciitis, bilateral    Decreased range of motion with decreased strength    Greater trochanteric bursitis    Bunion of great toe of left foot    Hallux abductovalgus with bunions, left    Failed bunionectomy         CURRENT  "MEDICATION LIST:      Current Outpatient Medications:     atorvastatin (LIPITOR) 40 MG tablet, TAKE ONE TABLET BY MOUTH EVERY NIGHT, Disp: 30 tablet, Rfl: 1    BD ULTRA-FINE GAYLA PEN NEEDLE 32 gauge x 5/32" Ndle, USE WITH INSULIN EVERY DAY, Disp: 100 each, Rfl: 6    blood-glucose meter,continuous (DEXCOM G6 ) Misc, Use as directed,  to be changed every 365 days., Disp: 1 each, Rfl: 0    blood-glucose sensor (DEXCOM G6 SENSOR) Selin, Use as directed, change sensor every 10 days, Disp: 9 each, Rfl: 1    blood-glucose transmitter (DEXCOM G6 TRANSMITTER) Selin, Use as directed, change transmitter every 3 months, Disp: 1 each, Rfl: 1    butalbital-acetaminophen-caffeine -40 mg (FIORICET, ESGIC) -40 mg per tablet, Take 1 tablet by mouth every 4 to 6 hours as needed for Headaches., Disp: 45 tablet, Rfl: 0    clotrimazole-betamethasone 1-0.05% (LOTRISONE) cream, Apply topically 2 (two) times daily., Disp: 45 g, Rfl: 2    dicyclomine (BENTYL) 20 mg tablet, TAKE ONE TABLET BY MOUTH THREE TIMES A DAY AS NEEDED FOR ABDOMINAL PAIN, Disp: 90 tablet, Rfl: 11    ergocalciferol (ERGOCALCIFEROL) 50,000 unit Cap, TAKE 1 CAPSULE BY MOUTH ONCE A WEEK FOR 28 DAYS, Disp: , Rfl:     fluconazole (DIFLUCAN) 150 MG Tab, Take 1 tab by mouth every 3 days for 3 doses, followed by 1 tab by mouth once a week x 6 months., Disp: 27 tablet, Rfl: 0    glimepiride (AMARYL) 4 MG tablet, TAKE ONE TABLET BY MOUTH EVERY MORNING WITH BREAKFAST, Disp: 90 tablet, Rfl: 0    irbesartan (AVAPRO) 150 MG tablet, TAKE ONE TABLET BY MOUTH EVERY DAY, Disp: 30 tablet, Rfl: 0    JANUVIA 100 mg Tab, TAKE 1 TABLET BY MOUTH EVERY DAY, Disp: 30 tablet, Rfl: 1    LEVEMIR FLEXPEN 100 unit/mL (3 mL) InPn pen, INJECT 20 UNITS EVERY EVENING, Disp: 15 mL, Rfl: 1    linaCLOtide (LINZESS) 290 mcg Cap capsule, Take 1 capsule (290 mcg total) by mouth before breakfast., Disp: 30 capsule, Rfl: 6    metFORMIN (GLUCOPHAGE-XR) 500 MG ER 24hr tablet, TAKE 2 " "TABLETS BY MOUTH TWO TIMES A DAY WITH A MEAL, Disp: 360 tablet, Rfl: 1    methylPREDNISolone (MEDROL DOSEPACK) 4 mg tablet, use as directed, Disp: 21 each, Rfl: 0    metoprolol succinate (TOPROL-XL) 50 MG 24 hr tablet, TAKE ONE TABLET BY MOUTH EVERY DAY, Disp: 30 tablet, Rfl: 11    omeprazole/sodium bicarbonate (ZEGERID ORAL), Take by mouth., Disp: , Rfl:     OZEMPIC 0.25 mg or 0.5 mg (2 mg/3 mL) pen injector, INJECT 0.5 MG INTO THE SKIN ONCE A WEEK, Disp: 3 mL, Rfl: 1    pregabalin (LYRICA) 75 MG capsule, Take 1 capsule (75 mg total) by mouth every evening for 10 days, THEN 2 capsules (150 mg total) every evening for 10 days, THEN 3 capsules (225 mg total) every evening for 10 days., Disp: 60 capsule, Rfl: 0    promethazine (PHENERGAN) 25 MG tablet, TAKE 1 TO 2 TABLETS BY MOUTH EVERY SIX HOURS AS NEEDED FOR NAUSEA, Disp: 30 tablet, Rfl: 0    senna-docusate 8.6-50 mg (SENNA PLUS) 8.6-50 mg per tablet, Take 1 tablet by mouth once daily., Disp: 10 tablet, Rfl: 1    simethicone (GAS-X ORAL), Take by mouth., Disp: , Rfl:     spironolactone (ALDACTONE) 25 MG tablet, Take 2 tablets by mouth once daily, Disp: 180 tablet, Rfl: 3    tiZANidine (ZANAFLEX) 4 MG tablet, Take 1 tablet (4 mg total) by mouth every 8 (eight) hours., Disp: 90 tablet, Rfl: 2    zolpidem (AMBIEN) 5 MG Tab, TAKE ONE TABLET BY MOUTH AT BEDTIME AS NEEDED, Disp: 30 tablet, Rfl: 4      HISTORY:    Past medical, surgical, family and social histories have been reviewed today.      OBJECTIVE:     Vitals:    11/15/23 0817   BP: 120/72   Pulse: 97   Resp: 16   Temp: 97 °F (36.1 °C)   TempSrc: Tympanic   SpO2: 97%   Weight: 118.3 kg (260 lb 12.9 oz)   Height: 5' 4" (1.626 m)       Physical Exam  Vitals reviewed.   Constitutional:       General: She is not in acute distress.  HENT:      Head: Normocephalic and atraumatic.      Right Ear: Tympanic membrane normal.      Left Ear: Tympanic membrane normal.      Nose: Mucosal edema and congestion present. No " rhinorrhea.      Right Sinus: Frontal sinus tenderness present. No maxillary sinus tenderness.      Left Sinus: Frontal sinus tenderness present. No maxillary sinus tenderness.      Mouth/Throat:      Mouth: Mucous membranes are moist.      Pharynx: Oropharynx is clear. No pharyngeal swelling or posterior oropharyngeal erythema.   Eyes:      Extraocular Movements: Extraocular movements intact.      Pupils: Pupils are equal, round, and reactive to light.   Neck:      Vascular: No carotid bruit.   Cardiovascular:      Rate and Rhythm: Normal rate and regular rhythm.      Pulses: Normal pulses.      Heart sounds: Normal heart sounds. No murmur heard.     No gallop.   Pulmonary:      Effort: Pulmonary effort is normal.      Breath sounds: Normal breath sounds.   Musculoskeletal:         General: Tenderness (lateral aspect LT foot) present. No swelling or deformity. Normal range of motion.      Cervical back: Normal range of motion and neck supple. No rigidity. No muscular tenderness.      Right lower leg: No edema.      Left lower leg: No edema.   Lymphadenopathy:      Cervical: No cervical adenopathy.   Skin:     General: Skin is warm and dry.      Capillary Refill: Capillary refill takes less than 2 seconds.      Findings: No rash.   Neurological:      General: No focal deficit present.      Mental Status: She is alert and oriented to person, place, and time. Mental status is at baseline.      Cranial Nerves: No cranial nerve deficit.      Sensory: No sensory deficit.      Motor: No weakness.      Coordination: Coordination normal.      Gait: Gait normal.      Deep Tendon Reflexes: Reflexes normal.   Psychiatric:         Mood and Affect: Mood normal.         Behavior: Behavior normal.         Thought Content: Thought content normal.         Judgment: Judgment normal.             Lab Results   Component Value Date    HGBA1C 6.4 (H) 05/15/2023     CMP  Sodium   Date Value Ref Range Status   05/15/2023 141 136 - 145  mmol/L Final     Potassium   Date Value Ref Range Status   05/15/2023 4.4 3.5 - 5.1 mmol/L Final     Chloride   Date Value Ref Range Status   05/15/2023 105 95 - 110 mmol/L Final     CO2   Date Value Ref Range Status   05/15/2023 27 23 - 29 mmol/L Final     Glucose   Date Value Ref Range Status   05/15/2023 78 70 - 110 mg/dL Final     BUN   Date Value Ref Range Status   05/15/2023 11 6 - 20 mg/dL Final     Creatinine   Date Value Ref Range Status   05/15/2023 0.7 0.5 - 1.4 mg/dL Final     Calcium   Date Value Ref Range Status   05/15/2023 10.1 8.7 - 10.5 mg/dL Final     Total Protein   Date Value Ref Range Status   05/15/2023 6.7 6.0 - 8.4 g/dL Final     Albumin   Date Value Ref Range Status   05/15/2023 4.0 3.5 - 5.2 g/dL Final   08/10/2020 3.9 3.6 - 5.1 g/dL Final     Comment:     For additional information, please refer to   http://education.[employer]/faq/FIF799 (This link is   being provided for informational/ educational purposes only.)  This test was developed and its analytical performance   characteristics have been determined by [facility]. It has not been cleared or approved by the   [place] Food and Drug Administration. This assay has been validated   pursuant to the CLIA regulations and is used for clinical   purposes.  @ Test Performed By:  [employer] [facility]  Rahul Teresa M.D., Ph.D.,   [id number] [place] [id number]  [facility]  [id number]       Total Bilirubin   Date Value Ref Range Status   05/15/2023 0.2 0.1 - 1.0 mg/dL Final     Comment:     For infants and newborns, interpretation of results should be based  on gestational age, weight and in agreement with clinical  observations.    Premature Infant recommended reference ranges:  Up to 24 hours.............<8.0 mg/dL  Up to 48 hours............<12.0 mg/dL  3-5 days..................<15.0 mg/dL  6-29 days.................<15.0 mg/dL       Alkaline Phosphatase    Date Value Ref Range Status   05/15/2023 55 55 - 135 U/L Final     AST   Date Value Ref Range Status   05/15/2023 20 10 - 40 U/L Final     ALT   Date Value Ref Range Status   05/15/2023 32 10 - 44 U/L Final     Anion Gap   Date Value Ref Range Status   05/15/2023 9 8 - 16 mmol/L Final     eGFR   Date Value Ref Range Status   05/15/2023 >60.0 >60 mL/min/1.73 m^2 Final       Lab Results   Component Value Date    WBC 10.23 05/15/2023    HGB 14.4 05/15/2023    HCT 44.3 05/15/2023    MCV 87 05/15/2023     05/15/2023       Vit D, 25-Hydroxy   Date Value Ref Range Status   08/02/2023 29 (L) 30 - 96 ng/mL Final     Comment:     Vitamin D deficiency.........<10 ng/mL                              Vitamin D insufficiency......10-29 ng/mL       Vitamin D sufficiency........> or equal to 30 ng/mL  Vitamin D toxicity............>100 ng/mL         ASSESSMENT:     1. Essential hypertension --- chronic issue, on med as ordered --- DASH diet, reg exercise, reduce weight.  Monitor.  -     Comprehensive Metabolic Panel; Future; Expected date: 11/15/2023  -     Hemoglobin A1C; Future; Expected date: 11/15/2023    2. Hypercholesteremia --- on statin --- low fat/chol diet  -     Comprehensive Metabolic Panel; Future; Expected date: 11/15/2023  -     Hemoglobin A1C; Future; Expected date: 11/15/2023    3. Type 2 diabetes mellitus without complication, without long-term current use of insulin --- on med as ordered, no home checks --- low-carb/starch, cut back on refined sugars.  Reduce weight, ex reg.  -     Comprehensive Metabolic Panel; Future; Expected date: 11/15/2023  -     Hemoglobin A1C; Future; Expected date: 11/15/2023    4. PCOS (polycystic ovarian syndrome)  -     Ambulatory referral/consult to Obstetrics / Gynecology; Future; Expected date: 11/22/2023    5. Female fertility problem  -     Ambulatory referral/consult to Obstetrics / Gynecology; Future; Expected date: 11/22/2023    6. Sinusitis, unspecified chronicity,  unspecified location  -     azithromycin (ZITHROMAX Z-JEFF) 250 MG tablet; Take 2 tab today, then 1 tab daily x 4 days.  Dispense: 6 tablet; Refill: 0    7. Intractable headache, unspecified chronicity pattern, unspecified headache type  -     ketorolac injection 60 mg    8. Foot pain, left  -     Vitamin B12; Future; Expected date: 11/15/2023  -     Vitamin D; Future; Expected date: 11/15/2023  -     diclofenac 0.15% LIDOcaine 2.25% prilocaine 2.25% topical cream; Apply 1 or 2 pumps to painful area up to 5 times daily.  Maximum 10 pumps/day.  Dispense: 480 g; Refill: 1    9. Vitamin D insufficiency  -     Vitamin D; Future; Expected date: 11/15/2023    10. Morbid obesity with BMI of 40.0-44.9, adult --- healthy diet/lifestyle changes discussed      PLAN:     Pending lab.  RTC as directed and/or prn.        VANCE Wagner  [facility]       30 minutes of total time spent on the encounter, which includes face to face time and non-face to face time preparing to see the patient.  This includes obtaining and/or reviewing separately obtained history, performing a medically appropriate examination and/or evaluation, and counseling and educating the patient/family/caregiver.  Includes documenting clinical information in the electronic or other health record, independently interpreting results (not separately reported) and communicating results to the patient/family/caregiver, with care coordination (not separately reported).  Medications, tests and/or procedures ordered as necessary along with referring and communicating with other health professionals (when not separately reported).

## 2023-11-12 ENCOUNTER — PATIENT MESSAGE (OUTPATIENT)
Dept: FAMILY MEDICINE | Facility: CLINIC | Age: 33
End: 2023-11-12
Payer: MEDICAID

## 2023-11-15 ENCOUNTER — TELEPHONE (OUTPATIENT)
Dept: FAMILY MEDICINE | Facility: CLINIC | Age: 33
End: 2023-11-15
Payer: MEDICAID

## 2023-11-15 ENCOUNTER — OFFICE VISIT (OUTPATIENT)
Dept: FAMILY MEDICINE | Facility: CLINIC | Age: 33
End: 2023-11-15
Payer: MEDICAID

## 2023-11-15 ENCOUNTER — LAB VISIT (OUTPATIENT)
Dept: LAB | Facility: HOSPITAL | Age: 33
End: 2023-11-15
Attending: REGISTERED NURSE
Payer: MEDICAID

## 2023-11-15 ENCOUNTER — PATIENT MESSAGE (OUTPATIENT)
Dept: FAMILY MEDICINE | Facility: CLINIC | Age: 33
End: 2023-11-15
Payer: MEDICAID

## 2023-11-15 ENCOUNTER — TELEPHONE (OUTPATIENT)
Dept: OBSTETRICS AND GYNECOLOGY | Facility: CLINIC | Age: 33
End: 2023-11-15
Payer: MEDICAID

## 2023-11-15 ENCOUNTER — PATIENT MESSAGE (OUTPATIENT)
Dept: FAMILY MEDICINE | Facility: CLINIC | Age: 33
End: 2023-11-15

## 2023-11-15 VITALS
RESPIRATION RATE: 16 BRPM | HEIGHT: 64 IN | HEART RATE: 97 BPM | OXYGEN SATURATION: 97 % | TEMPERATURE: 97 F | WEIGHT: 260.81 LBS | SYSTOLIC BLOOD PRESSURE: 120 MMHG | DIASTOLIC BLOOD PRESSURE: 72 MMHG | BODY MASS INDEX: 44.53 KG/M2

## 2023-11-15 DIAGNOSIS — M79.672 FOOT PAIN, LEFT: ICD-10-CM

## 2023-11-15 DIAGNOSIS — E11.9 TYPE 2 DIABETES MELLITUS WITHOUT COMPLICATION, WITHOUT LONG-TERM CURRENT USE OF INSULIN: ICD-10-CM

## 2023-11-15 DIAGNOSIS — I10 ESSENTIAL HYPERTENSION: Primary | ICD-10-CM

## 2023-11-15 DIAGNOSIS — E78.00 HYPERCHOLESTEREMIA: ICD-10-CM

## 2023-11-15 DIAGNOSIS — R51.9 INTRACTABLE HEADACHE, UNSPECIFIED CHRONICITY PATTERN, UNSPECIFIED HEADACHE TYPE: ICD-10-CM

## 2023-11-15 DIAGNOSIS — E28.2 PCOS (POLYCYSTIC OVARIAN SYNDROME): ICD-10-CM

## 2023-11-15 DIAGNOSIS — E55.9 VITAMIN D INSUFFICIENCY: ICD-10-CM

## 2023-11-15 DIAGNOSIS — I10 ESSENTIAL HYPERTENSION: ICD-10-CM

## 2023-11-15 DIAGNOSIS — J32.9 SINUSITIS, UNSPECIFIED CHRONICITY, UNSPECIFIED LOCATION: ICD-10-CM

## 2023-11-15 DIAGNOSIS — N97.9 FEMALE FERTILITY PROBLEM: ICD-10-CM

## 2023-11-15 DIAGNOSIS — E66.01 MORBID OBESITY WITH BMI OF 40.0-44.9, ADULT: ICD-10-CM

## 2023-11-15 LAB
25(OH)D3+25(OH)D2 SERPL-MCNC: 30 NG/ML (ref 30–96)
ALBUMIN SERPL BCP-MCNC: 3.8 G/DL (ref 3.5–5.2)
ALP SERPL-CCNC: 59 U/L (ref 55–135)
ALT SERPL W/O P-5'-P-CCNC: 54 U/L (ref 10–44)
ANION GAP SERPL CALC-SCNC: 10 MMOL/L (ref 8–16)
AST SERPL-CCNC: 30 U/L (ref 10–40)
BILIRUB SERPL-MCNC: 0.3 MG/DL (ref 0.1–1)
BUN SERPL-MCNC: 11 MG/DL (ref 6–20)
CALCIUM SERPL-MCNC: 9.4 MG/DL (ref 8.7–10.5)
CHLORIDE SERPL-SCNC: 102 MMOL/L (ref 95–110)
CO2 SERPL-SCNC: 30 MMOL/L (ref 23–29)
CREAT SERPL-MCNC: 0.7 MG/DL (ref 0.5–1.4)
EST. GFR  (NO RACE VARIABLE): >60 ML/MIN/1.73 M^2
ESTIMATED AVG GLUCOSE: 177 MG/DL (ref 68–131)
GLUCOSE SERPL-MCNC: 118 MG/DL (ref 70–110)
HBA1C MFR BLD: 7.8 % (ref 4–5.6)
POTASSIUM SERPL-SCNC: 3.8 MMOL/L (ref 3.5–5.1)
PROT SERPL-MCNC: 6.8 G/DL (ref 6–8.4)
SODIUM SERPL-SCNC: 142 MMOL/L (ref 136–145)
VIT B12 SERPL-MCNC: 489 PG/ML (ref 210–950)

## 2023-11-15 PROCEDURE — 82306 VITAMIN D 25 HYDROXY: CPT | Performed by: REGISTERED NURSE

## 2023-11-15 PROCEDURE — 99999 PR PBB SHADOW E&M-EST. PATIENT-LVL IV: CPT | Mod: PBBFAC,,, | Performed by: REGISTERED NURSE

## 2023-11-15 PROCEDURE — 36415 COLL VENOUS BLD VENIPUNCTURE: CPT | Mod: PO | Performed by: REGISTERED NURSE

## 2023-11-15 PROCEDURE — 3074F SYST BP LT 130 MM HG: CPT | Mod: CPTII,,, | Performed by: REGISTERED NURSE

## 2023-11-15 PROCEDURE — 99999PBSHW PR PBB SHADOW TECHNICAL ONLY FILED TO HB: ICD-10-PCS | Mod: PBBFAC,,,

## 2023-11-15 PROCEDURE — 3061F PR NEG MICROALBUMINURIA RESULT DOCUMENTED/REVIEW: ICD-10-PCS | Mod: CPTII,,, | Performed by: REGISTERED NURSE

## 2023-11-15 PROCEDURE — 1159F MED LIST DOCD IN RCRD: CPT | Mod: CPTII,,, | Performed by: REGISTERED NURSE

## 2023-11-15 PROCEDURE — 96372 THER/PROPH/DIAG INJ SC/IM: CPT | Mod: PBBFAC,PO

## 2023-11-15 PROCEDURE — 99999PBSHW PR PBB SHADOW TECHNICAL ONLY FILED TO HB: Mod: PBBFAC,,,

## 2023-11-15 PROCEDURE — 3061F NEG MICROALBUMINURIA REV: CPT | Mod: CPTII,,, | Performed by: REGISTERED NURSE

## 2023-11-15 PROCEDURE — 99999 PR PBB SHADOW E&M-EST. PATIENT-LVL IV: ICD-10-PCS | Mod: PBBFAC,,, | Performed by: REGISTERED NURSE

## 2023-11-15 PROCEDURE — 99214 OFFICE O/P EST MOD 30 MIN: CPT | Mod: 25,S$PBB,, | Performed by: REGISTERED NURSE

## 2023-11-15 PROCEDURE — 3051F PR MOST RECENT HEMOGLOBIN A1C LEVEL 7.0 - < 8.0%: ICD-10-PCS | Mod: CPTII,,, | Performed by: REGISTERED NURSE

## 2023-11-15 PROCEDURE — 4010F PR ACE/ARB THEARPY RXD/TAKEN: ICD-10-PCS | Mod: CPTII,,, | Performed by: REGISTERED NURSE

## 2023-11-15 PROCEDURE — 3078F DIAST BP <80 MM HG: CPT | Mod: CPTII,,, | Performed by: REGISTERED NURSE

## 2023-11-15 PROCEDURE — 3066F NEPHROPATHY DOC TX: CPT | Mod: CPTII,,, | Performed by: REGISTERED NURSE

## 2023-11-15 PROCEDURE — 3074F PR MOST RECENT SYSTOLIC BLOOD PRESSURE < 130 MM HG: ICD-10-PCS | Mod: CPTII,,, | Performed by: REGISTERED NURSE

## 2023-11-15 PROCEDURE — 83036 HEMOGLOBIN GLYCOSYLATED A1C: CPT | Performed by: REGISTERED NURSE

## 2023-11-15 PROCEDURE — 4010F ACE/ARB THERAPY RXD/TAKEN: CPT | Mod: CPTII,,, | Performed by: REGISTERED NURSE

## 2023-11-15 PROCEDURE — 99214 PR OFFICE/OUTPT VISIT, EST, LEVL IV, 30-39 MIN: ICD-10-PCS | Mod: 25,S$PBB,, | Performed by: REGISTERED NURSE

## 2023-11-15 PROCEDURE — 3066F PR DOCUMENTATION OF TREATMENT FOR NEPHROPATHY: ICD-10-PCS | Mod: CPTII,,, | Performed by: REGISTERED NURSE

## 2023-11-15 PROCEDURE — 3008F PR BODY MASS INDEX (BMI) DOCUMENTED: ICD-10-PCS | Mod: CPTII,,, | Performed by: REGISTERED NURSE

## 2023-11-15 PROCEDURE — 80053 COMPREHEN METABOLIC PANEL: CPT | Performed by: REGISTERED NURSE

## 2023-11-15 PROCEDURE — 1159F PR MEDICATION LIST DOCUMENTED IN MEDICAL RECORD: ICD-10-PCS | Mod: CPTII,,, | Performed by: REGISTERED NURSE

## 2023-11-15 PROCEDURE — 3078F PR MOST RECENT DIASTOLIC BLOOD PRESSURE < 80 MM HG: ICD-10-PCS | Mod: CPTII,,, | Performed by: REGISTERED NURSE

## 2023-11-15 PROCEDURE — 82607 VITAMIN B-12: CPT | Performed by: REGISTERED NURSE

## 2023-11-15 PROCEDURE — 3008F BODY MASS INDEX DOCD: CPT | Mod: CPTII,,, | Performed by: REGISTERED NURSE

## 2023-11-15 PROCEDURE — 99214 OFFICE O/P EST MOD 30 MIN: CPT | Mod: 25,PBBFAC,PO | Performed by: REGISTERED NURSE

## 2023-11-15 PROCEDURE — 3051F HG A1C>EQUAL 7.0%<8.0%: CPT | Mod: CPTII,,, | Performed by: REGISTERED NURSE

## 2023-11-15 RX ORDER — AZITHROMYCIN 250 MG/1
TABLET, FILM COATED ORAL
Qty: 6 TABLET | Refills: 0 | Status: SHIPPED | OUTPATIENT
Start: 2023-11-15 | End: 2024-01-02

## 2023-11-15 RX ORDER — KETOROLAC TROMETHAMINE 30 MG/ML
60 INJECTION, SOLUTION INTRAMUSCULAR; INTRAVENOUS
Status: COMPLETED | OUTPATIENT
Start: 2023-11-15 | End: 2023-11-15

## 2023-11-15 RX ADMIN — KETOROLAC TROMETHAMINE 60 MG: 30 INJECTION, SOLUTION INTRAMUSCULAR; INTRAVENOUS at 09:11

## 2023-11-15 NOTE — TELEPHONE ENCOUNTER
----- Message from Cecilia Rivas sent at 11/15/2023  9:32 AM CST -----  Regarding: Established Patient Appointment (Medicaid)  Good Morning, patient seen by Qasim Paez NP has referral/consult for PCOS (polycystic ovarian syndrome), Female fertility problem. She asked that appt be made w/ MD only. Please call patient at [phone]. Thanks/elr

## 2023-11-15 NOTE — TELEPHONE ENCOUNTER
----- Message from Kimberly Javier sent at 11/15/2023 11:09 AM CST -----  [facility] is requesting a call back concerning the prescriptions sent over for the pt today. [phone]

## 2023-11-16 NOTE — TELEPHONE ENCOUNTER
"Please advise per pt portal message:    "Hey nobody cant fill the prescription is there any other cream that we can try"    KRISHNA Bhatia    "

## 2023-11-17 ENCOUNTER — PATIENT MESSAGE (OUTPATIENT)
Dept: FAMILY MEDICINE | Facility: CLINIC | Age: 33
End: 2023-11-17
Payer: MEDICAID

## 2023-11-21 ENCOUNTER — HOSPITAL ENCOUNTER (OUTPATIENT)
Facility: HOSPITAL | Age: 33
Discharge: HOME | End: 2023-11-21
Attending: ANESTHESIOLOGY | Admitting: ANESTHESIOLOGY
Payer: MEDICAID

## 2023-11-21 VITALS
BODY MASS INDEX: 44.04 KG/M2 | SYSTOLIC BLOOD PRESSURE: 112 MMHG | DIASTOLIC BLOOD PRESSURE: 70 MMHG | OXYGEN SATURATION: 97 % | HEART RATE: 81 BPM | TEMPERATURE: 98 F | WEIGHT: 257.94 LBS | RESPIRATION RATE: 15 BRPM | HEIGHT: 64 IN

## 2023-11-21 DIAGNOSIS — M54.16 LUMBAR RADICULOPATHY, CHRONIC: ICD-10-CM

## 2023-11-21 LAB
B-HCG UR QL: NEGATIVE
CTP QC/QA: YES
POCT GLUCOSE: 153 MG/DL (ref 70–110)

## 2023-11-21 PROCEDURE — 62323 PR INJ LUMBAR/SACRAL, W/IMAGING GUIDANCE: ICD-10-PCS | Mod: ,,, | Performed by: ANESTHESIOLOGY

## 2023-11-21 PROCEDURE — 82962 GLUCOSE BLOOD TEST: CPT | Performed by: ANESTHESIOLOGY

## 2023-11-21 PROCEDURE — 63600175 PHARM REV CODE 636 W HCPCS: Performed by: ANESTHESIOLOGY

## 2023-11-21 PROCEDURE — 62323 NJX INTERLAMINAR LMBR/SAC: CPT | Mod: ,,, | Performed by: ANESTHESIOLOGY

## 2023-11-21 PROCEDURE — 81025 URINE PREGNANCY TEST: CPT | Performed by: ANESTHESIOLOGY

## 2023-11-21 PROCEDURE — 62323 NJX INTERLAMINAR LMBR/SAC: CPT | Performed by: ANESTHESIOLOGY

## 2023-11-21 PROCEDURE — 25000003 PHARM REV CODE 250: Performed by: ANESTHESIOLOGY

## 2023-11-21 PROCEDURE — 25500020 PHARM REV CODE 255: Performed by: ANESTHESIOLOGY

## 2023-11-21 RX ORDER — SODIUM BICARBONATE 1 MEQ/ML
SYRINGE (ML) INTRAVENOUS
Status: DISCONTINUED | OUTPATIENT
Start: 2023-11-21 | End: 2023-11-21 | Stop reason: HOSPADM

## 2023-11-21 RX ORDER — BUPIVACAINE HYDROCHLORIDE 2.5 MG/ML
INJECTION, SOLUTION EPIDURAL; INFILTRATION; INTRACAUDAL
Status: DISCONTINUED | OUTPATIENT
Start: 2023-11-21 | End: 2023-11-21 | Stop reason: HOSPADM

## 2023-11-21 RX ORDER — METHYLPREDNISOLONE ACETATE 40 MG/ML
INJECTION, SUSPENSION INTRA-ARTICULAR; INTRALESIONAL; INTRAMUSCULAR; SOFT TISSUE
Status: DISCONTINUED | OUTPATIENT
Start: 2023-11-21 | End: 2023-11-21 | Stop reason: HOSPADM

## 2023-11-21 RX ORDER — MIDAZOLAM HYDROCHLORIDE 1 MG/ML
INJECTION, SOLUTION INTRAMUSCULAR; INTRAVENOUS
Status: DISCONTINUED | OUTPATIENT
Start: 2023-11-21 | End: 2023-11-21 | Stop reason: HOSPADM

## 2023-11-21 RX ORDER — FENTANYL CITRATE 50 UG/ML
INJECTION, SOLUTION INTRAMUSCULAR; INTRAVENOUS
Status: DISCONTINUED | OUTPATIENT
Start: 2023-11-21 | End: 2023-11-21 | Stop reason: HOSPADM

## 2023-11-21 NOTE — OP NOTE
Lumbar Interlaminar Epidural Steroid Injection under Fluoroscopic Guidance.   Time-out taken to identify patient and procedure prior to starting the procedure.     11/21/2023    PROCEDURE: Interlaminar epidural steroid injection under fluoroscopic guidance.     Pre-Op diagnosis: Lumbar radiculopathy [M54.16]    Post-Op diagnosis: Lumbar radiculopathy [M54.16]    PHYSICIAN: Eula Rojas MD    ASSISTANTS: None     ESTIMATED BLOOD LOSS: none.     COMPLICATIONS: none.     SPECIMENS: none    TECHNIQUE: With the patient laying in a prone position, the area was prepped and draped in the usual sterile fashion using ChloraPrep and a fenestrated drape. 1% lidocaine was given using a 27-gauge needle by raising a wheal and going down to the hub of the needle over the L5/S1 interlaminar space.  The interlaminar space was then approached from a L paramedian approach with a 3.5 inch 18-gauge Touhy needle was introduced under fluoroscopic guidance in the AP and Lateral view. Once the Ligamentum flavum was encountered loss of resistance to saline was used to enter the epidural space. With positive loss of resistance and negative CSF or Blood, 3mL contrast dye Omnipaque (300mg/ml) was injected to confirm placement and there was no vascular runoff. Then 1ml 80mg/ml Depomedrol + 5 mL 0.25% Bupivicaine  was injected slowly. Displacement of the radio opaque contrast after injection of the medication confirmed that the medication went into the area of the epidural space.  The patient tolerated the procedure well.     Conscious sedation provided by M.D    The patient was monitored with continuous pulse oximetry, EKG, and intermittent blood pressure monitors.  The patient was hemodynamically stable throughout the entire process was responsive to voice, and breathing spontaneously.  Supplemental O2 was provided at 2L/min via nasal cannula.  Patient was comfortable for the duration of the procedure. (See nurse documentation and case log for  sedation time)    There was a total of 2mg IV Midazolam and 50mcg Fentanyl titrated for the procedure      The patient was monitored after the procedure.   They were given post-procedure and discharge instructions to follow at home.  The patient was discharged in a stable condition.

## 2023-11-21 NOTE — DISCHARGE SUMMARY
Discharge Note  Short Stay      SUMMARY     Admit Date: 11/21/2023    Attending Physician: Eula Rojas MD        Discharge Physician: Eula Rojas MD        Discharge Date: 11/21/2023 7:17 AM    Procedure(s) (LRB):  Lumbar L5/S1 IL BRUNILDA with left paramedian approach (N/A)    Final Diagnosis: Lumbar radiculopathy [M54.16]    Disposition: Home or self care    Patient Instructions:   Current Discharge Medication List        CONTINUE these medications which have NOT CHANGED    Details   atorvastatin (LIPITOR) 40 MG tablet TAKE ONE TABLET BY MOUTH EVERY NIGHT  Qty: 30 tablet, Refills: 1    Associated Diagnoses: Type 2 diabetes mellitus without complication, without long-term current use of insulin; Elevated LDL cholesterol level      glimepiride (AMARYL) 4 MG tablet TAKE ONE TABLET BY MOUTH EVERY MORNING WITH BREAKFAST  Qty: 90 tablet, Refills: 0    Associated Diagnoses: Type 2 diabetes mellitus without complication, without long-term current use of insulin      irbesartan (AVAPRO) 150 MG tablet TAKE ONE TABLET BY MOUTH EVERY DAY  Qty: 30 tablet, Refills: 0    Comments: .  Associated Diagnoses: Essential hypertension; At risk for cardiovascular event; Type 2 diabetes mellitus without complication, with long-term current use of insulin      JANUVIA 100 mg Tab TAKE 1 TABLET BY MOUTH EVERY DAY  Qty: 30 tablet, Refills: 1    Associated Diagnoses: Type 2 diabetes mellitus without complication, without long-term current use of insulin      LEVEMIR FLEXPEN 100 unit/mL (3 mL) InPn pen INJECT 20 UNITS EVERY EVENING  Qty: 15 mL, Refills: 1    Associated Diagnoses: Uncontrolled type 2 diabetes mellitus with hyperglycemia; Hemoglobin A1C greater than 9%, indicating poor diabetic control      linaCLOtide (LINZESS) 290 mcg Cap capsule Take 1 capsule (290 mcg total) by mouth before breakfast.  Qty: 30 capsule, Refills: 6    Associated Diagnoses: Chronic constipation      metFORMIN (GLUCOPHAGE-XR) 500 MG ER 24hr tablet TAKE 2 TABLETS  "BY MOUTH TWO TIMES A DAY WITH A MEAL  Qty: 360 tablet, Refills: 1    Associated Diagnoses: Type 2 diabetes mellitus without complication, without long-term current use of insulin      metoprolol succinate (TOPROL-XL) 50 MG 24 hr tablet TAKE ONE TABLET BY MOUTH EVERY DAY  Qty: 30 tablet, Refills: 11    Comments: .  Associated Diagnoses: Essential hypertension      omeprazole/sodium bicarbonate (ZEGERID ORAL) Take by mouth.      OZEMPIC 0.25 mg or 0.5 mg (2 mg/3 mL) pen injector INJECT 0.5 MG INTO THE SKIN ONCE A WEEK  Qty: 3 mL, Refills: 1      pregabalin (LYRICA) 75 MG capsule Take 1 capsule (75 mg total) by mouth every evening for 10 days, THEN 2 capsules (150 mg total) every evening for 10 days, THEN 3 capsules (225 mg total) every evening for 10 days.  Qty: 60 capsule, Refills: 0      spironolactone (ALDACTONE) 25 MG tablet Take 2 tablets by mouth once daily  Qty: 180 tablet, Refills: 3    Associated Diagnoses: Essential hypertension      azithromycin (ZITHROMAX Z-JEFF) 250 MG tablet Take 2 tab today, then 1 tab daily x 4 days.  Qty: 6 tablet, Refills: 0    Associated Diagnoses: Sinusitis, unspecified chronicity, unspecified location      BD ULTRA-FINE GAYLA PEN NEEDLE 32 gauge x 5/32" Ndle USE WITH INSULIN EVERY DAY  Qty: 100 each, Refills: 6    Associated Diagnoses: Uncontrolled type 2 diabetes mellitus with hyperglycemia      blood-glucose meter,continuous (DEXCOM G6 ) Misc Use as directed,  to be changed every 365 days.  Qty: 1 each, Refills: 0      blood-glucose sensor (DEXCOM G6 SENSOR) Selin Use as directed, change sensor every 10 days  Qty: 9 each, Refills: 1      blood-glucose transmitter (DEXCOM G6 TRANSMITTER) Selin Use as directed, change transmitter every 3 months  Qty: 1 each, Refills: 1      clotrimazole-betamethasone 1-0.05% (LOTRISONE) cream Apply topically 2 (two) times daily.  Qty: 45 g, Refills: 2    Associated Diagnoses: Intertrigo      diclofenac 0.15% LIDOcaine 2.25% prilocaine " 2.25% topical cream Apply 1 or 2 pumps to painful area up to 5 times daily.  Maximum 10 pumps/day.  Qty: 480 g, Refills: 1    Associated Diagnoses: Foot pain, left      dicyclomine (BENTYL) 20 mg tablet TAKE ONE TABLET BY MOUTH THREE TIMES A DAY AS NEEDED FOR ABDOMINAL PAIN  Qty: 90 tablet, Refills: 11      ergocalciferol (ERGOCALCIFEROL) 50,000 unit Cap TAKE 1 CAPSULE BY MOUTH ONCE A WEEK FOR 28 DAYS      fluconazole (DIFLUCAN) 150 MG Tab Take 1 tab by mouth every 3 days for 3 doses, followed by 1 tab by mouth once a week x 6 months.  Qty: 27 tablet, Refills: 0    Associated Diagnoses: Recurrent candidiasis of vagina      promethazine (PHENERGAN) 25 MG tablet TAKE 1 TO 2 TABLETS BY MOUTH EVERY SIX HOURS AS NEEDED FOR NAUSEA  Qty: 30 tablet, Refills: 0    Associated Diagnoses: Nausea      senna-docusate 8.6-50 mg (SENNA PLUS) 8.6-50 mg per tablet Take 1 tablet by mouth once daily.  Qty: 10 tablet, Refills: 1      simethicone (GAS-X ORAL) Take by mouth.      tiZANidine (ZANAFLEX) 4 MG tablet Take 1 tablet (4 mg total) by mouth every 8 (eight) hours.  Qty: 90 tablet, Refills: 2    Associated Diagnoses: Dorsalgia, unspecified; Lumbar radiculopathy      zolpidem (AMBIEN) 5 MG Tab TAKE ONE TABLET BY MOUTH AT BEDTIME AS NEEDED  Qty: 30 tablet, Refills: 4    Associated Diagnoses: Chronic insomnia                 Discharge Diagnosis: Lumbar radiculopathy [M54.16]  Condition on Discharge: Stable with no complications to procedure   Diet on Discharge: Same as before.  Activity: as per instruction sheet.  Discharge to: Home with a responsible adult.  Follow up: 2-4 weeks       Please call the office at [phone] if you experience any weakness or loss of sensation, fever > 101.5, pain uncontrolled with oral medications, persistent nausea/vomiting/or diarrhea, redness or drainage from the incisions, or any other worrisome concerns. If physician on call was not reached or could not communicate with our office for any reason  please go to the nearest emergency department

## 2023-11-21 NOTE — DISCHARGE INSTRUCTIONS

## 2023-11-24 DIAGNOSIS — E11.9 TYPE 2 DIABETES MELLITUS WITHOUT COMPLICATION, WITHOUT LONG-TERM CURRENT USE OF INSULIN: ICD-10-CM

## 2023-11-24 DIAGNOSIS — R73.09 HEMOGLOBIN A1C GREATER THAN 9%, INDICATING POOR DIABETIC CONTROL: ICD-10-CM

## 2023-11-24 DIAGNOSIS — E11.65 UNCONTROLLED TYPE 2 DIABETES MELLITUS WITH HYPERGLYCEMIA: ICD-10-CM

## 2023-11-24 RX ORDER — INSULIN DETEMIR 100 [IU]/ML
25 INJECTION, SOLUTION SUBCUTANEOUS NIGHTLY
Qty: 15 ML | Refills: 1 | Status: SHIPPED | OUTPATIENT
Start: 2023-11-24

## 2023-11-24 RX ORDER — METFORMIN HYDROCHLORIDE 750 MG/1
750 TABLET, EXTENDED RELEASE ORAL DAILY
Qty: 90 TABLET | Refills: 1 | Status: SHIPPED | OUTPATIENT
Start: 2023-11-24

## 2023-11-24 RX ORDER — SEMAGLUTIDE 1.34 MG/ML
1 INJECTION, SOLUTION SUBCUTANEOUS
Qty: 9 ML | Refills: 1 | Status: SHIPPED | OUTPATIENT
Start: 2023-11-24 | End: 2024-02-22

## 2023-12-04 ENCOUNTER — PATIENT MESSAGE (OUTPATIENT)
Dept: FAMILY MEDICINE | Facility: CLINIC | Age: 33
End: 2023-12-04
Payer: MEDICAID

## 2023-12-05 DIAGNOSIS — E11.9 TYPE 2 DIABETES MELLITUS WITHOUT COMPLICATION, WITH LONG-TERM CURRENT USE OF INSULIN: ICD-10-CM

## 2023-12-05 DIAGNOSIS — Z79.4 TYPE 2 DIABETES MELLITUS WITHOUT COMPLICATION, WITH LONG-TERM CURRENT USE OF INSULIN: ICD-10-CM

## 2023-12-05 DIAGNOSIS — Z91.89 AT RISK FOR CARDIOVASCULAR EVENT: ICD-10-CM

## 2023-12-05 DIAGNOSIS — I10 ESSENTIAL HYPERTENSION: ICD-10-CM

## 2023-12-05 RX ORDER — IRBESARTAN 150 MG/1
150 TABLET ORAL
Qty: 90 TABLET | Refills: 3 | Status: SHIPPED | OUTPATIENT
Start: 2023-12-05

## 2023-12-11 ENCOUNTER — PATIENT MESSAGE (OUTPATIENT)
Dept: PAIN MEDICINE | Facility: CLINIC | Age: 33
End: 2023-12-11
Payer: MEDICAID

## 2023-12-11 DIAGNOSIS — R11.0 NAUSEA: ICD-10-CM

## 2023-12-11 DIAGNOSIS — M54.9 DORSALGIA, UNSPECIFIED: ICD-10-CM

## 2023-12-11 DIAGNOSIS — M54.16 LUMBAR RADICULOPATHY: ICD-10-CM

## 2023-12-11 RX ORDER — PROMETHAZINE HYDROCHLORIDE 25 MG/1
TABLET ORAL
Qty: 30 TABLET | Refills: 0 | Status: SHIPPED | OUTPATIENT
Start: 2023-12-11

## 2023-12-11 NOTE — TELEPHONE ENCOUNTER
Pt is requesting for a refill of: tiZANidine (ZANAFLEX) 4 MG tablet   Last filed:10/22/23  Last encounter: 10/26/23  Last proc: 11/21/23  Up coming apt: 1/09/23  Pharmacy:  [facility] - [place], [place] - [id number] [address]     Is this something you can do?

## 2023-12-12 RX ORDER — TIZANIDINE 4 MG/1
4 TABLET ORAL EVERY 8 HOURS
Qty: 90 TABLET | Refills: 2 | Status: SHIPPED | OUTPATIENT
Start: 2023-12-12 | End: 2024-03-14 | Stop reason: SDUPTHER

## 2023-12-13 ENCOUNTER — PATIENT OUTREACH (OUTPATIENT)
Dept: ADMINISTRATIVE | Facility: HOSPITAL | Age: 33
End: 2023-12-13
Payer: MEDICAID

## 2024-01-02 ENCOUNTER — OFFICE VISIT (OUTPATIENT)
Dept: OBSTETRICS AND GYNECOLOGY | Facility: CLINIC | Age: 34
End: 2024-01-02
Payer: MEDICAID

## 2024-01-02 VITALS
WEIGHT: 258.63 LBS | BODY MASS INDEX: 44.15 KG/M2 | SYSTOLIC BLOOD PRESSURE: 130 MMHG | DIASTOLIC BLOOD PRESSURE: 86 MMHG | HEIGHT: 64 IN

## 2024-01-02 DIAGNOSIS — Z01.419 ENCOUNTER FOR GYNECOLOGICAL EXAMINATION (GENERAL) (ROUTINE) WITHOUT ABNORMAL FINDINGS: Primary | ICD-10-CM

## 2024-01-02 DIAGNOSIS — N97.9 FEMALE FERTILITY PROBLEM: ICD-10-CM

## 2024-01-02 DIAGNOSIS — E66.01 MORBID OBESITY WITH BMI OF 40.0-44.9, ADULT: ICD-10-CM

## 2024-01-02 DIAGNOSIS — N92.6 IRREGULAR MENSES: ICD-10-CM

## 2024-01-02 DIAGNOSIS — Z12.4 SCREENING FOR CERVICAL CANCER: ICD-10-CM

## 2024-01-02 PROCEDURE — 99213 OFFICE O/P EST LOW 20 MIN: CPT | Mod: PBBFAC | Performed by: OBSTETRICS & GYNECOLOGY

## 2024-01-02 PROCEDURE — 3075F SYST BP GE 130 - 139MM HG: CPT | Mod: CPTII,,, | Performed by: OBSTETRICS & GYNECOLOGY

## 2024-01-02 PROCEDURE — 3079F DIAST BP 80-89 MM HG: CPT | Mod: CPTII,,, | Performed by: OBSTETRICS & GYNECOLOGY

## 2024-01-02 PROCEDURE — 99999 PR PBB SHADOW E&M-EST. PATIENT-LVL III: CPT | Mod: PBBFAC,,, | Performed by: OBSTETRICS & GYNECOLOGY

## 2024-01-02 PROCEDURE — 99395 PREV VISIT EST AGE 18-39: CPT | Mod: S$PBB,,, | Performed by: OBSTETRICS & GYNECOLOGY

## 2024-01-02 PROCEDURE — 3008F BODY MASS INDEX DOCD: CPT | Mod: CPTII,,, | Performed by: OBSTETRICS & GYNECOLOGY

## 2024-01-02 RX ORDER — LURASIDONE HYDROCHLORIDE 60 MG/1
60 TABLET, FILM COATED ORAL
COMMUNITY
Start: 2023-12-28

## 2024-01-02 RX ORDER — FLUTICASONE PROPIONATE 50 MCG
1 SPRAY, SUSPENSION (ML) NASAL 2 TIMES DAILY
COMMUNITY
Start: 2023-11-07

## 2024-01-02 RX ORDER — MEDROXYPROGESTERONE ACETATE 10 MG/1
20 TABLET ORAL DAILY
Qty: 20 TABLET | Refills: 0 | Status: SHIPPED | OUTPATIENT
Start: 2024-01-02 | End: 2024-01-12

## 2024-01-02 NOTE — PROGRESS NOTES
Subjective:       Patient ID: Nathan Carlos is a 33 y.o. female.    Chief Complaint:  Well Woman      History of Present Illness  HPI  Annual Exam-Premenopausal  Patient presents for annual exam. The patient has no complaints today. The patient is sexually active. --no contraception; 1 partner; GYN screening history: last pap: approximate date 2022 and was normal. The patient wears seatbelts: yes. The patient participates in regular exercise: no. Has the patient ever been transfused or tattooed?: yes. The patient reports that there is not domestic violence in her life.  Reports irreg menses, last menses 2023  No problems sleeping--when using ambien      GYN & OB History  Patient's last menstrual period was 2023 (exact date).   Date of Last Pap: No result found    OB History    Para Term  AB Living   0 0 0 0 0 0   SAB IAB Ectopic Multiple Live Births   0 0 0 0 0       Review of Systems  Review of Systems   Genitourinary:  Positive for menstrual problem.   All other systems reviewed and are negative.          Objective:      Physical Exam:   Constitutional: She is oriented to person, place, and time. She appears well-developed and well-nourished.     Eyes: Pupils are equal, round, and reactive to light. Conjunctivae and EOM are normal.      Pulmonary/Chest: Effort normal. Right breast exhibits no mass, no nipple discharge, no skin change and no tenderness. Left breast exhibits no mass, no nipple discharge, no skin change and no tenderness. Breasts are symmetrical.        Abdominal: Soft.     Genitourinary:    Vagina, uterus, right adnexa, left adnexa and rectum normal.      Pelvic exam was performed with patient supine.   The external female genitalia was normal.     Labial bartholins normal.Cervix is normal. Right adnexum displays no mass and no tenderness. Left adnexum displays no mass and no tenderness. No erythema, vaginal discharge, bleeding, rectocele, cystocele or prolapse of  vaginal walls in the vagina. Vagina was moist.Uerus contour normal  Normal urethral meatus.Urethra findings: no urethral massBladder findings: no bladder distention and no bladder tenderness          Musculoskeletal: Normal range of motion and moves all extremeties.       Neurological: She is alert and oriented to person, place, and time.    Skin: Skin is warm.    Psychiatric: She has a normal mood and affect. Her behavior is normal.           Assessment:        1. Encounter for gynecological examination (general) (routine) without abnormal findings    2. Female fertility problem    3. Screening for cervical cancer    4. Irregular menses    5. Morbid obesity with BMI of 40.0-44.9, adult               Plan:      Continue annual well woman exam.  Pap 2022 due in 2025  Reviewed androgen excess and irreg menses and infertility  Strongly encouraged weight loss, diet, exercise  Accepts provera to bring on menses; does not want to regulate cycle at this time  Mammo due age 42

## 2024-01-03 DIAGNOSIS — E11.9 TYPE 2 DIABETES MELLITUS WITHOUT COMPLICATION, WITHOUT LONG-TERM CURRENT USE OF INSULIN: ICD-10-CM

## 2024-01-03 DIAGNOSIS — E78.00 ELEVATED LDL CHOLESTEROL LEVEL: ICD-10-CM

## 2024-01-03 RX ORDER — ATORVASTATIN CALCIUM 40 MG/1
TABLET, FILM COATED ORAL
Qty: 30 TABLET | Refills: 1 | Status: SHIPPED | OUTPATIENT
Start: 2024-01-03

## 2024-01-08 DIAGNOSIS — F51.04 CHRONIC INSOMNIA: ICD-10-CM

## 2024-01-08 DIAGNOSIS — R11.0 NAUSEA: ICD-10-CM

## 2024-01-08 RX ORDER — PROMETHAZINE HYDROCHLORIDE 25 MG/1
TABLET ORAL
Qty: 30 TABLET | Refills: 0 | OUTPATIENT
Start: 2024-01-08

## 2024-01-08 RX ORDER — ZOLPIDEM TARTRATE 5 MG/1
TABLET ORAL
Qty: 30 TABLET | Refills: 3 | Status: SHIPPED | OUTPATIENT
Start: 2024-01-08

## 2024-01-08 NOTE — PROGRESS NOTES
Established Patient Interventional Pain Clinic Visit    The patient location is: home  The chief complaint leading to consultation is: back and leg pain  Visit type: Virtual visit with synchronous audio and video  Total time spent with patient: 11-15m  Each patient to whom he or she provides medical services by telemedicine is: (1) informed of the relationship between the physician and patient and the respective role of any other health care provider with respect to management of the patient; and (2) notified that he or she may decline to receive medical services by telemedicine and may withdraw from such care at any time.      Chief Pain Complaint:  No chief complaint on file.    Interval Hx: 01/09/2024  Patient presents status post L5-S1 interlaminar epidural steroid injection with left paramedian approach 11/21/2023.  Patient reports approximately 60%  relief in lower extremity radiculopathy following L5-S1 interlaminar epidural steroid injection.  She does report that this is compounded with an improvement in range of motion and functionality, including standing and walking activities of daily living.  Patient has continued Lyrica titration and has reached a dosage of 75 mg twice daily.  She has not noticed a significant benefit at this particular dosage.  She denies any side effects from this medication.  Today she reports radicular pain can be exacerbated with increased exertion but overall denies lower extremity weakness, bowel or bladder incontinence or saddle anesthesia.      Interval history 10/26/2023  Nathan Carlos is a 33 y.o. female with past medical history significant for hypertension, hypercholesterolemia, type 2 diabetes, morbid obesity, GERD, s/p bunionectomy of the L foot with Dr. Cohn 05/23/2023 who presents to the clinic for the evaluation of back and leg pain.  Today she reports return of pain in a bandlike distribution in the lower back which radiates down the posterior aspect of  the left lower extremity in L5-S1 distribution to the foot.  Of note patient initially believe pain was exacerbated after recent bunionectomy but she followed up with Dr. Cohn, 10/23/2023 with left foot x-rays reviewed with adequate surgical hardware placement healing well without complications and no evidence of stress fractures.  Patient was given Medrol s Dosepak to help with capsulitis/inflammation of the forefoot.  Pain is exacerbated with prolonged standing or with ambulation.  She does endorse associated weakness in the left lower extremity associated with her pain.  Patient has continued physician directed physical therapy exercises at home over the last 6 weeks without meaningful improvement in her pain.  Of note she received greater than 80% relief exceeding 5 months in duration with her last L5-S1 interlaminar epidural steroid injection 02/2023.  Of note patient trialed Lyrica 50 mg up to twice daily from Ms. Kishor WING without noticeable improvement in her pain.      Patient reports significant motor weakness and loss of sensations.  Patient denies night fever/night sweats, urinary incontinence, bowel incontinence, and significant weight loss.      Pain Disability Index Review:         1/9/2023     8:13 AM 10/20/2022    10:55 AM 4/14/2022     8:51 AM   Last 3 PDI Scores   Pain Disability Index (PDI) 63 70 67       Non-Pharmacologic Treatments:  Physical Therapy/Home Exercise: yes  Ice/Heat:yes  TENS: no  Acupuncture: no  Massage: no  Chiropractic: no    Other: no      Pain Medications:  - Adjuvant Medications: Ambien (Zolpidem), Lyrica ( Pregabalin), and Zanaflex ( Tizanidine)    Pain Procedures:     Dr. Rojas:  -11/21/2023:  L5-S1 interlaminar epidural steroid injection with left paramedian approach  -02/17/2023:  L5-S1 interlaminar epidural steroid injection  -12/09/2022:  Bilateral sacroiliac joint and greater trochanteric bursa injection  -11/23/2022:  L4-5 interlaminar epidural steroid  "injection (L5-S1 aborted)  -07/14/2022: Left-sided L4-5 and L5-S1 transforaminal epidural steroid injection      Dr. Kirkland:  -05/05/2022: Bilateral sacroiliac joint and bilateral greater trochanteric bursa injection  -03/08/2022: Left-sided L5-S1 and S1 transforaminal epidural steroid injection  -06/17/2021: Left-sided sacroiliac joint and greater trochanteric bursa injection  -01/21/2021: Left-sided L3-5 lumbar radiofrequency ablation  -12/18/2020: Right-sided L3-5 lumbar radiofrequency ablation        Interval History (07/17/2023):  Nathan Carlos presents today for follow-up visit.  Patient was last seen on 3/13/2023. She underwent bunionectomy with Dr. Cohn on 05/23/2023. She reports since then she has been having nerve pain in her left leg, reports she feels the nerves "jumping". She reports Dr. Cohn states this is expected and should resolve. She is still wearing a post op boot. Has follow up with podiatry on July 26th to evaluate to see if she can discontinue boot. Overall doing well, still has 40-50% sustained relief from previous lumbar BRUNILDA.  Patient reports pain as 4/10 today.      Interval History (3/13/2023): Nathan Carlos presents today via telemed for follow-up visit.  she underwent Lumbar L5/S1 IL BRUNILDA with left paramedian approach on 2/17/23.  The patient reports that she is/was better following the procedure.  she reports 80% pain relief.  The changes lasted 4 weeks so far.  The changes have continued through this visit.  She reports that the injection helped her a lot, and states that her low back pain is now intermittent instead of constant.  She is also able to stand a little bit longer.  She has since began walking daily and is performing physician directed exercises at home.  She takes tizanidine as needed for muscle pain, and reports that this is helpful.  Patient reports pain as 2/10 today.      Interval History (1/9/2023): Nathan Carlos presents today for " follow-up visit.  she underwent lumbar L5/S1 IL BRUNILDA on 11/23/2022 followed by bilateral SIJ + GT bursa injection on 12/9/22.  The patient reports that she is/was better following the procedure.  she reports 80-90% pain relief from SIJ injection and 60% relief from BRUNILDA. Reports continued leg pain/burning/tingling, but improved since injection.  Patient reports pain as 10/10 today.      Interval History (10/20/2022):  Nathan Carlos presents today for follow-up visit.  Patient was last seen on 8/18/2022. Last injection Left L4/5 +L5/S1 TF BRUNILDA on 07/14/2021. Patient reports pain as 8/10 today. Reports she has had several episodes during which her left leg gave out. She also reports pain in her lower lumbar spine in a band-like distribution with radiation into her lower extremities left > right. Pain interferes with daily activities.      Interval History (8/18/2022): Nathan Carlos presents today for follow-up visit.  she underwent Left L4/5 +L5/S1 TF BRUNILDA on 7/14/22.  The patient reports that she is/was better following the procedure.  she reports 40% pain relief.  The changes lasted 4 weeks so far.  The changes have continued through this visit.  Patient reports pain as 6/10 today, 10/10 on her worst days. Reports she is still experiencing pain but is able to stand for somewhat longer periods of time. Continues to report pain in left buttock down posterior thigh, but does admit she is now having pain in the right lumbar region now as well. She reports she takes tizanidine three times daily on bad days, and this seems to help.      Interval History (6/2/2022): Nathan Carlos presents today for follow-up visit.  she underwent bilateral SIJ + GTB injection on 5/5/22.  The patient reports that she is/was better following the procedure.  she reports 60% pain relief.  The changes lasted 4 weeks so far.  The changes have continued through this visit.  Reports back and buttock pain improved, but reports  continued pain along her left leg. Reports pain along anterior, lateral, and posterior aspect of left leg, radiating down to foot at times. Patient reports pain as 6/10 today. Reports that previous epidural did not offer much relief.    IMPRESSION  1. ABNORMAL study  2. There is electrodiagnostic evidence of an acute on chronic radiculopathy of the LEFT S1 nerve root and a subacute on chronic radiculopathy of the LEFT L5 nerve root     Interval HPI 04/14/2022  Nathan Carlos is a 33 y.o. female  who is presenting with a chief complaint of Lumbar Back Pain. The patient began experiencing this problem insidiously, and the pain has been gradually worsening over the past 3 month(s). The pain is described as throbbing, cramping, burning and electrical and is located in the left lumbar spine . Pain is intermittent and lasts hours. The pain radiates to  left leg. The patient rates her pain a 8 out of ten and interferes with activities of daily living a 8 out of ten. Pain is exacerbated by flexion of the lumbar spine, and is improved by rest. Patient reports no prior trauma, no prior spinal surgery     Interval HPI 03/31/2022  Nathan Carlos is a 33 y.o. female  who is presenting with a chief complaint of Low-back Pain. The patient began experiencing this problem insidiously, and the pain has been gradually worsening. The pain is described as throbbing, cramping, aching and heavy and is located in the bilateral lumbar spine . Pain is intermittent and lasts hours. The  pain is nonradiating. The patient rates her pain a 8 out of ten and interferes with activities of daily living a 7 out of ten. Pain is exacerbated by extension of the lumbar spine, and is improved by rest. Patient reports no prior trauma, no prior spinal surgery     - pertinent negatives: No fever, No chills, No weight loss, No bladder dysfunction, No bowel dysfunction, No saddle anesthesia  - pertinent positives: none    - medications, other  therapies tried (physical therapy, injections):     >> NSAIDs, Tylenol, Norco, zanaflex and flexeril (no relief), topamax (no relief)    >> Has previously undergone Physical Therapy    >> Has previously undergone spinal injection/s   - Left SI joint injection 1/2018 with 100% relief for 6 months   - Right L3, L4, L5 MBB with local on 8/15/18 with 90% pain relief   - left SIJ + left GT bursa injection with local on 11/8/18 with good relief of bursitis but only 10% of SIJ pain   - left L3-5 MBB with local on 5/16/19 with 90% pain relief   - bilateral L3-5 MBB on 8/14/19 with limited relief   - right SIJ injection on 2/5/20 with 80% pain relief   - bilateral L3-5 MBB on 7/16/2020 with 100% relief of lumbar back pain   - right L3-5 RFA on 12/08/2020 with 100% pain relief   - left L3-5 RFA on 1/21/21 with at least 50% pain relief   - left SIJ + left GT bursa injection on 6/17/21 with at least 40% pain relief    - L5-S1, S1 TFESI on 3/8/22 with 60% Pain relief    -bilateral SIJ + GTB injection on 5/5/22 with 60% relief   -lumbar L5/S1 IL BRUNILDA on 11/23/2022 with 60% relief   -bilateral SIJ + GT bursa injection on 12/9/22 with 80-90% relief  -Lumbar L5/S1 IL BRUNILDA with left paramedian approach on 2/17/23 with 80% relief    Imaging / Labs / Studies (reviewed on 1/9/2024):  MRI lumbar spine 12/16/2021    FINDINGS:  Detail limited by poor signal noise ratio.  Prominent edema within the subcutaneous soft tissues of the lower back.  Stable anatomic alignment, unchanged since 11/22/2017.  The vertebral body heights are maintained.  Mild disc desiccation at L4-L5 is noted.  No significant disc space narrowing.  The distal tip of the conus medullaris terminates near the L2 level.  There are no significant areas of disc bulge or protrusion.  Minimal grade 1 retrolisthesis of L4 on L5 is noted following the administration of intravenous contrast, no abnormal areas of enhancement are noted.     L1-L2: No spinal canal or neural foraminal  encroachment.     L2-L3: No spinal canal or neural foraminal encroachment.     L3-L4: No spinal canal or neural foraminal encroachment.     L4-L5: Mild facet arthropathy.  No spinal canal or neural foraminal encroachment.     L5-S1: Mild bilateral facet arthropathy.  No spinal canal or neural foraminal encroachment.     Impression:     Mild lower lumbar spine degenerative changes as above.    Results for orders placed during the hospital encounter of 11/22/17   MRI Lumbar Spine Without Contrast    Narrative Technique: Standard noncontrast multiplanar multisequence imaging of the lumbar spine was performed.  Findings: There is anatomic spinal alignment.  Disc interspaces are of normal height and signal intensity.  Vertebral marrow signal pattern and architecture are normal.  Distal cord is unremarkable with conus medullaris terminating at L1-L2.  Paravertebral soft tissues are symmetric and normal in appearance.  T12-L1: No disc protrusion, neuroforaminal narrowing, or central canal stenosis.  L1-L2: No disc protrusion, neuroforaminal narrowing, or central canal stenosis.  L2-L3: No disc protrusion, neuroforaminal narrowing, or central canal stenosis.  L3-L4: No disc protrusion, neuroforaminal narrowing, or central canal stenosis.  L4-L5: No disc protrusion, neuroforaminal narrowing, or central canal stenosis.  L5-S1: Mild bilateral facet hypertrophy. No disc protrusion, neuroforaminal narrowing, or central canal stenosis.    Impression  Negative MRI of the lumbar spine.     Results for orders placed during the hospital encounter of 01/04/18   X-Ray Lumbar Complete With Flex And Ext    Narrative Comparison: None  Technique: AP, lateral, lateral flexion, lateral extension, bilateral oblique, and lumbosacral coned down views were obtained of the lumbar spine  Findings: There is mild scoliosis of the lower thoracic and upper lumbar spine.  Vertebral body heights are well-maintained.  No spondylolisthesis demonstrated.   No change in spinal alignment with flexion or extension to suggest instability. Intervertebral disk spaces are well preserved.  No pars defects visualized.  Posterior elements appear grossly intact. No acute fractures or subluxations are demonstrated.  The remaining visualized osseous and soft tissue structures demonstrate no appreciable abnormality.    Impression As above.  No acute findings.         Review of Systems:  CONSTITUTIONAL: patient denies any fever, chills, or weight loss  SKIN: patient denies any rash or itching  RESPIRATORY: patient denies having any shortness of breath  GASTROINTESTINAL: patient denies having any diarrhea, constipation, or bowel incontinence  GENITOURINARY: patient denies having any abnormal bladder function    MUSCULOSKELETAL:  - patient complains of the above noted pain/s (see chief pain complaint)    NEUROLOGICAL:   - pain as above  - strength in Lower extremities is intact, BILATERALLY  - sensation in Lower extremities is intact, BILATERALLY  - patient denies any loss of bowel or bladder control      PSYCHIATRIC: patient denies any change in mood    Other:  All other systems reviewed and are negative        Physical Exam:  Telemedicine Exam  There were no vitals filed for this visit.   There is no height or weight on file to calculate BMI.   (reviewed on 1/9/2024)    GENERAL: Well appearing, in no acute distress, alert and oriented x3.  obese  PSYCH:  Mood and affect appropriate.  SKIN: Skin color, texture, turgor normal, no rashes or lesions.  HEAD/FACE:  Normocephalic, atraumatic. Cranial nerves grossly intact.  PULM:  No difficulty breathing  MUSCULOSKELETAL: No atrophy or tone abnormalities are noted.  NEURO:  Able to toe walk and heel walk without difficulty  GAIT: normal.        Physical Exam: last in clinic visit:  General: alert and oriented, in no apparent distress, obese.  Gait: normal gait.  Skin: no rashes, no discoloration, no obvious lesions  HEENT: normocephalic,  atraumatic. Pupils equal and round.  Cardiovascular: no significant peripheral edema present.  Respiratory: without use of accessory muscles of respiration.    Musculoskeletal - Lumbar Spine:  - Pain on flexion of lumbar spine: Present   - Pain on extension of lumbar spine: Present  - Lumbar facet loading: Present bilaterally  - TTP over the lumbar facet joints: Present on left at L5-S1   - TTP over the para lumbar muscles on left   - TTP over the SI joints: Present bilaterally  - TTP over GT bursa: Present bilaterally  - TTP over piriformis: absent  - Straight Leg Raise: equivocal on right, positive on left  - JULISSA: Positive bilaterally    Neuro - Extremities:  - BUE Strength:R/L: D: 5/5; B: 5/5; T: 5/5; WF: 5/5; WE: 5/5; IO: 5/5  - BLE Strength: R/L: HF: 5/5, HE: 5/5, KF: 5/5; KE: 5/4; FE: 5/5; FF: 5/5  - Extremity Reflexes: Brisk and symmetric throughout  - Sensory: Sensation to light touch intact bilaterally      Psych:  Mood and affect is appropriate          Assessment:  Nathan Carlos is a 33 y.o. year old female who is presenting with   Encounter Diagnoses   Name Primary?    Lumbar radiculopathy Yes    Lumbar spondylosis     Lumbar facet arthropathy            Plan:  1. Interventional:  Patient has sustained 60% relief in lower extremity radiculopathy following L5-S1 interlaminar epidural steroid injection.  We discussed repeating this injection in the future should symptoms exacerbate.  We have discussed the procedure, benefits, potential risk and alternative options in detail.    - Anticoagulation use: None.     2. Pharmacologic:    reviewed and consistent with use    - Disontinued Gabapentin 3 secondary inefficacy    -Continue Lyrica.  We discussed potential side effects of this medication which may include drowsiness,dizziness, dry mouth, constipation or peripheral edema.  -150 mg b.i.d.  -90 day supply given    3. Rehabilitative: We discussed continuing at home physician directed physical  therapy to help manage the patient/s painful condition. The patient was counseled that muscle strengthening will improve the long term prognosis in regards to pain and may also help increase range of motion and mobility.     4. Diagnostic:  Reviewed lumbar MRI and answered any relevant patient questions.    5. -Dr. Cohn: Podiatry; FU bunionectomy, prior L foot sx    6. Follow up:  Three-month.  Virtual visit      The above plan and management options were discussed at length with patient. Patient is in agreement with the above and verbalized understanding.    - I discussed the goals of interventional chronic pain management with the patient on today's visit. We discussed a multimodal and systematic approach to pain.  This includes diagnostic and therapeutic injections, adjuvant pharmacologic treatment, physical therapy, and at times psychiatry.  I emphasized the importance of regular exercise, core strengthening and stretching, diet and weight loss as a cornerstone of long-term pain management.    - This condition does not require this patient to take time off of work, and the primary goal of our Pain Management services is to improve the patient's functional capacity.  - Patient Questions: Answered all of the patient's questions regarding diagnoses, therapy, treatment and next steps        Eula Rojas MD  Interventional Pain Medicine

## 2024-01-09 ENCOUNTER — OFFICE VISIT (OUTPATIENT)
Dept: PAIN MEDICINE | Facility: CLINIC | Age: 34
End: 2024-01-09
Payer: MEDICAID

## 2024-01-09 DIAGNOSIS — E11.9 TYPE 2 DIABETES MELLITUS WITHOUT COMPLICATION, WITHOUT LONG-TERM CURRENT USE OF INSULIN: ICD-10-CM

## 2024-01-09 DIAGNOSIS — M54.16 LUMBAR RADICULOPATHY: Primary | ICD-10-CM

## 2024-01-09 DIAGNOSIS — M47.816 LUMBAR SPONDYLOSIS: ICD-10-CM

## 2024-01-09 DIAGNOSIS — M47.816 LUMBAR FACET ARTHROPATHY: ICD-10-CM

## 2024-01-09 PROCEDURE — 99214 OFFICE O/P EST MOD 30 MIN: CPT | Mod: 95,,, | Performed by: ANESTHESIOLOGY

## 2024-01-09 PROCEDURE — 1159F MED LIST DOCD IN RCRD: CPT | Mod: CPTII,95,, | Performed by: ANESTHESIOLOGY

## 2024-01-09 PROCEDURE — 1160F RVW MEDS BY RX/DR IN RCRD: CPT | Mod: CPTII,95,, | Performed by: ANESTHESIOLOGY

## 2024-01-09 RX ORDER — PREGABALIN 150 MG/1
150 CAPSULE ORAL 2 TIMES DAILY
Qty: 180 CAPSULE | Refills: 0 | Status: SHIPPED | OUTPATIENT
Start: 2024-01-09 | End: 2024-04-08

## 2024-01-09 RX ORDER — GLIMEPIRIDE 4 MG/1
TABLET ORAL
Qty: 90 TABLET | Refills: 0 | Status: SHIPPED | OUTPATIENT
Start: 2024-01-09

## 2024-01-17 ENCOUNTER — PATIENT MESSAGE (OUTPATIENT)
Dept: ORTHOPEDICS | Facility: CLINIC | Age: 34
End: 2024-01-17
Payer: MEDICAID

## 2024-01-31 ENCOUNTER — PATIENT MESSAGE (OUTPATIENT)
Dept: FAMILY MEDICINE | Facility: CLINIC | Age: 34
End: 2024-01-31
Payer: MEDICAID

## 2024-02-05 NOTE — ED PROVIDER NOTES
"Encounter Date: 6/21/2020       History     Chief Complaint   Patient presents with    Headache     H/A starting yesterday with insomnia.  -n/v or photophobia.  Tylenol yesterday without relief.  Afebrile.     Chief complaint:  Headache    History of present illness:  29-year-old female complains of a 2-3 day history of frontal headache.  Severity of the pain was "severe" this morning but has improved and is milder at this time.  No aggravating or ameliorating factors.  No relief with Tylenol taken prior to arrival.  She denies any aura.  No complaints of numbness or tingling.  No thunderclap headache.  No neck stiffness.  No photophobia.  No fever or chills.  No red flags.  Patient states she was told she had hypertension in the past and this may be a cause of her occasional headaches.  She states her blood pressure was eventually controlled but her headaches would have the same frequency and severity.  She denies migraine history.  No complaints of unilateral weakness slurring of speech or any visual difficulty..        Review of patient's allergies indicates:   Allergen Reactions    Amitriptyline      xerostomia    Amlodipine      DRY MOUTH     Past Medical History:   Diagnosis Date    GERD (gastroesophageal reflux disease)     Herpes simplex virus (HSV) infection     High serum testosterone 9/7/2017    Hyperlipidemia     Hypertension     Insulin resistance syndrome 9/7/2017    Lumbar facet arthropathy 8/14/2019    Morbid obesity     Ovarian cyst     bilateral      Past Surgical History:   Procedure Laterality Date    ANKLE SURGERY Left     BUNIONECTOMY Bilateral     COLONOSCOPY  06/12/2018    INJECTION OF ANESTHETIC AGENT AROUND MEDIAL BRANCH NERVES INNERVATING LUMBAR FACET JOINT Bilateral 8/14/2019    Procedure: Bilateral L3-5 MBB;  Surgeon: Yo Kirkland MD;  Location: [facility];  Service: Pain Management;  Laterality: Bilateral;    INJECTION OF ANESTHETIC AGENT INTO SACROILIAC JOINT Right " Called mom to let her know that PCP is OOC until 2/7 at which time her message will be addressed.    2/5/2020    Procedure: Right SIJ Injection with local;  Surgeon: Yo Kirkland MD;  Location: [facility];  Service: Pain Management;  Laterality: Right;    LUMBAR FUSION       Family History   Problem Relation Age of Onset    Hypertension Mother     Fibroids Mother     No Known Problems Father     Diabetes Sister     Hypertension Brother     Hypertension Maternal Aunt     Diabetes Maternal Aunt     Diabetes Maternal Uncle     Hypertension Maternal Grandmother     Leukemia Maternal Grandfather     Hypertension Maternal Grandfather     Stroke Neg Hx      Social History     Tobacco Use    Smoking status: Never Smoker    Smokeless tobacco: Never Used   Substance Use Topics    Alcohol use: Yes     Frequency: Never     Drinks per session: Patient refused     Binge frequency: Never     Comment: special occasions    Drug use: No     Review of Systems   Constitutional: Negative.    HENT: Negative.    Eyes: Negative.    Respiratory: Negative.    Cardiovascular: Negative.    Gastrointestinal: Negative for nausea and vomiting.   Neurological: Positive for headaches. Negative for dizziness, tremors, seizures, syncope, speech difficulty, weakness, light-headedness and numbness.   Psychiatric/Behavioral: Negative.    All other systems reviewed and are negative.      Physical Exam     Initial Vitals [06/21/20 0950]   BP Pulse Resp Temp SpO2   138/86 84 20 98.6 °F (37 °C) 96 %      MAP       --         Physical Exam    Nursing note and vitals reviewed.  Constitutional: She appears well-developed and well-nourished. No distress.   HENT:   Head: Normocephalic and atraumatic.   Right Ear: External ear normal.   Left Ear: External ear normal.   Nose: Nose normal.   Mouth/Throat: Oropharynx is clear and moist.   Tenderness to the forehead on palpation consistent with the patient's quality of pain   Eyes: Conjunctivae and EOM are normal. Pupils are equal, round, and reactive to light.   Neck: Normal range of motion.  Neck supple.   Cardiovascular: Normal rate, regular rhythm and intact distal pulses.   Pulmonary/Chest: Breath sounds normal. No respiratory distress.   Abdominal: She exhibits no distension.   Neurological: She is alert and oriented to person, place, and time. She has normal strength. No cranial nerve deficit. GCS score is 15. GCS eye subscore is 4. GCS verbal subscore is 5. GCS motor subscore is 6.   Skin: Skin is warm and dry.   Psychiatric: She has a normal mood and affect. Her behavior is normal. Judgment and thought content normal.         ED Course   Procedures  Labs Reviewed - No data to display       Imaging Results    None          Medical Decision Making:   Initial Assessment:   Patient in no distress, appearing very comfortable  Differential Diagnosis:   Tension headache/migraine/vascular headache  ED Management:  Advised patient of the likely nature of her headache be muscle contractions secondary to the palpable tenderness in the scalp/forehead.  She understands this and is agreement with treatment plan to symptomatically treat and follow-up with her primary care doctor on an as-needed basis.  Condition on discharge is good.  She is told that there are no red flags as noted in the HPI.                                 Clinical Impression:               ICD-10-CM ICD-9-CM   1. Tension headache  G44.209 307.81          Frederick Gibson MD  06/21/20 1140

## 2024-03-14 ENCOUNTER — PATIENT MESSAGE (OUTPATIENT)
Dept: PAIN MEDICINE | Facility: CLINIC | Age: 34
End: 2024-03-14
Payer: MEDICAID

## 2024-03-14 DIAGNOSIS — M54.9 DORSALGIA, UNSPECIFIED: ICD-10-CM

## 2024-03-14 DIAGNOSIS — M54.16 LUMBAR RADICULOPATHY: ICD-10-CM

## 2024-03-14 RX ORDER — TIZANIDINE 4 MG/1
4 TABLET ORAL EVERY 8 HOURS
Qty: 90 TABLET | Refills: 2 | Status: SHIPPED | OUTPATIENT
Start: 2024-03-14 | End: 2024-05-30 | Stop reason: SDUPTHER

## 2024-03-14 NOTE — TELEPHONE ENCOUNTER
Pt is requesting for a refill of: tiZANidine (ZANAFLEX) 4 MG tablet   Last filed:2/12/24  Last encounter: 1/09/24   Up coming apt: 4/09/24   Pharmacy:[facility]   Is this something you can do?

## 2024-04-02 NOTE — PROGRESS NOTES
Established Patient Interventional Pain Clinic Visit    The patient location is: home  The chief complaint leading to consultation is: back and leg pain  Visit type: Virtual visit with synchronous audio and video  Total time spent with patient: 11-15m  Each patient to whom he or she provides medical services by telemedicine is: (1) informed of the relationship between the physician and patient and the respective role of any other health care provider with respect to management of the patient; and (2) notified that he or she may decline to receive medical services by telemedicine and may withdraw from such care at any time.      Chief Pain Complaint:  No chief complaint on file.    Interval history 04/03/2024  Patient presents for three-month follow-up.  Today she reports return of lower back and left leg pain.  Today she reports pain in the lower back which can radiate down the left lower extremity in L5-S1 distribution to the foot.  Pain today is rated a 6/10.  She is continued Lyrica 150 mg twice daily with noticeable improvement in her pain.  She denies any side effects from this medication.  She is continued physician directed physical therapy exercises at home over the last 8 weeks from 02/03/2024 through 04/03/2024 with marginal improvement in pain, range of motion and functionality.  She would like to repeat interlaminar epidural steroid injection as she received greater than 50% relief exceeding 5 months in duration.    Interval Hx: 01/09/2024  Patient presents status post L5-S1 interlaminar epidural steroid injection with left paramedian approach 11/21/2023.  Patient reports approximately 60%  relief in lower extremity radiculopathy following L5-S1 interlaminar epidural steroid injection.  She does report that this is compounded with an improvement in range of motion and functionality, including standing and walking activities of daily living.  Patient has continued Lyrica titration and has reached a dosage  of 75 mg twice daily.  She has not noticed a significant benefit at this particular dosage.  She denies any side effects from this medication.  Today she reports radicular pain can be exacerbated with increased exertion but overall denies lower extremity weakness, bowel or bladder incontinence or saddle anesthesia.      Interval history 10/26/2023  Nathan Carlos is a 33 y.o. female with past medical history significant for hypertension, hypercholesterolemia, type 2 diabetes, morbid obesity, GERD, s/p bunionectomy of the L foot with Dr. Cohn 05/23/2023 who presents to the clinic for the evaluation of back and leg pain.  Today she reports return of pain in a bandlike distribution in the lower back which radiates down the posterior aspect of the left lower extremity in L5-S1 distribution to the foot.  Of note patient initially believe pain was exacerbated after recent bunionectomy but she followed up with Dr. Cohn, 10/23/2023 with left foot x-rays reviewed with adequate surgical hardware placement healing well without complications and no evidence of stress fractures.  Patient was given Medrol s Dosepak to help with capsulitis/inflammation of the forefoot.  Pain is exacerbated with prolonged standing or with ambulation.  She does endorse associated weakness in the left lower extremity associated with her pain.  Patient has continued physician directed physical therapy exercises at home over the last 6 weeks without meaningful improvement in her pain.  Of note she received greater than 80% relief exceeding 5 months in duration with her last L5-S1 interlaminar epidural steroid injection 02/2023.  Of note patient trialed Lyrica 50 mg up to twice daily from Ms. Kishor WING without noticeable improvement in her pain.      Patient reports significant motor weakness and loss of sensations.  Patient denies night fever/night sweats, urinary incontinence, bowel incontinence, and significant weight  "loss.      Pain Disability Index Review:         1/9/2023     8:13 AM 10/20/2022    10:55 AM 4/14/2022     8:51 AM   Last 3 PDI Scores   Pain Disability Index (PDI) 63 70 67       Non-Pharmacologic Treatments:  Physical Therapy/Home Exercise: yes  Ice/Heat:yes  TENS: no  Acupuncture: no  Massage: no  Chiropractic: no    Other: no      Pain Medications:  - Adjuvant Medications: Ambien (Zolpidem), Lyrica ( Pregabalin), and Zanaflex ( Tizanidine)    Pain Procedures:     Dr. Rojas:  -11/21/2023:  L5-S1 interlaminar epidural steroid injection with left paramedian approach  -02/17/2023:  L5-S1 interlaminar epidural steroid injection  -12/09/2022:  Bilateral sacroiliac joint and greater trochanteric bursa injection  -11/23/2022:  L4-5 interlaminar epidural steroid injection (L5-S1 aborted)  -07/14/2022: Left-sided L4-5 and L5-S1 transforaminal epidural steroid injection      Dr. Kirkland:  -05/05/2022: Bilateral sacroiliac joint and bilateral greater trochanteric bursa injection  -03/08/2022: Left-sided L5-S1 and S1 transforaminal epidural steroid injection  -06/17/2021: Left-sided sacroiliac joint and greater trochanteric bursa injection  -01/21/2021: Left-sided L3-5 lumbar radiofrequency ablation  -12/18/2020: Right-sided L3-5 lumbar radiofrequency ablation        Interval History (07/17/2023):  Nathna Carlos presents today for follow-up visit.  Patient was last seen on 3/13/2023. She underwent bunionectomy with Dr. Cohn on 05/23/2023. She reports since then she has been having nerve pain in her left leg, reports she feels the nerves "jumping". She reports Dr. Cohn states this is expected and should resolve. She is still wearing a post op boot. Has follow up with podiatry on July 26th to evaluate to see if she can discontinue boot. Overall doing well, still has 40-50% sustained relief from previous lumbar BRUNILDA.  Patient reports pain as 4/10 today.      Interval History (3/13/2023): Nathan Carlos presents " today via telemed for follow-up visit.  she underwent Lumbar L5/S1 IL BRUNILDA with left paramedian approach on 2/17/23.  The patient reports that she is/was better following the procedure.  she reports 80% pain relief.  The changes lasted 4 weeks so far.  The changes have continued through this visit.  She reports that the injection helped her a lot, and states that her low back pain is now intermittent instead of constant.  She is also able to stand a little bit longer.  She has since began walking daily and is performing physician directed exercises at home.  She takes tizanidine as needed for muscle pain, and reports that this is helpful.  Patient reports pain as 2/10 today.      Interval History (1/9/2023): Nathan Carlos presents today for follow-up visit.  she underwent lumbar L5/S1 IL BRUNILDA on 11/23/2022 followed by bilateral SIJ + GT bursa injection on 12/9/22.  The patient reports that she is/was better following the procedure.  she reports 80-90% pain relief from SIJ injection and 60% relief from BRUNILDA. Reports continued leg pain/burning/tingling, but improved since injection.  Patient reports pain as 10/10 today.      Interval History (10/20/2022):  Nathan Carlos presents today for follow-up visit.  Patient was last seen on 8/18/2022. Last injection Left L4/5 +L5/S1 TF BRUNILDA on 07/14/2021. Patient reports pain as 8/10 today. Reports she has had several episodes during which her left leg gave out. She also reports pain in her lower lumbar spine in a band-like distribution with radiation into her lower extremities left > right. Pain interferes with daily activities.      Interval History (8/18/2022): Nathan Carlos presents today for follow-up visit.  she underwent Left L4/5 +L5/S1 TF BRUNILDA on 7/14/22.  The patient reports that she is/was better following the procedure.  she reports 40% pain relief.  The changes lasted 4 weeks so far.  The changes have continued through this visit.  Patient  reports pain as 6/10 today, 10/10 on her worst days. Reports she is still experiencing pain but is able to stand for somewhat longer periods of time. Continues to report pain in left buttock down posterior thigh, but does admit she is now having pain in the right lumbar region now as well. She reports she takes tizanidine three times daily on bad days, and this seems to help.      Interval History (6/2/2022): Nathan Carlos presents today for follow-up visit.  she underwent bilateral SIJ + GTB injection on 5/5/22.  The patient reports that she is/was better following the procedure.  she reports 60% pain relief.  The changes lasted 4 weeks so far.  The changes have continued through this visit.  Reports back and buttock pain improved, but reports continued pain along her left leg. Reports pain along anterior, lateral, and posterior aspect of left leg, radiating down to foot at times. Patient reports pain as 6/10 today. Reports that previous epidural did not offer much relief.    IMPRESSION  1. ABNORMAL study  2. There is electrodiagnostic evidence of an acute on chronic radiculopathy of the LEFT S1 nerve root and a subacute on chronic radiculopathy of the LEFT L5 nerve root     Interval HPI 04/14/2022  Nathan Carlos is a 33 y.o. female  who is presenting with a chief complaint of Lumbar Back Pain. The patient began experiencing this problem insidiously, and the pain has been gradually worsening over the past 3 month(s). The pain is described as throbbing, cramping, burning and electrical and is located in the left lumbar spine . Pain is intermittent and lasts hours. The pain radiates to  left leg. The patient rates her pain a 8 out of ten and interferes with activities of daily living a 8 out of ten. Pain is exacerbated by flexion of the lumbar spine, and is improved by rest. Patient reports no prior trauma, no prior spinal surgery     Interval HPI 03/31/2022  Nathan Carlos is a 33 y.o. female   who is presenting with a chief complaint of Low-back Pain. The patient began experiencing this problem insidiously, and the pain has been gradually worsening. The pain is described as throbbing, cramping, aching and heavy and is located in the bilateral lumbar spine . Pain is intermittent and lasts hours. The  pain is nonradiating. The patient rates her pain a 8 out of ten and interferes with activities of daily living a 7 out of ten. Pain is exacerbated by extension of the lumbar spine, and is improved by rest. Patient reports no prior trauma, no prior spinal surgery     - pertinent negatives: No fever, No chills, No weight loss, No bladder dysfunction, No bowel dysfunction, No saddle anesthesia  - pertinent positives: none    - medications, other therapies tried (physical therapy, injections):     >> NSAIDs, Tylenol, Norco, zanaflex and flexeril (no relief), topamax (no relief)    >> Has previously undergone Physical Therapy    >> Has previously undergone spinal injection/s   - Left SI joint injection 1/2018 with 100% relief for 6 months   - Right L3, L4, L5 MBB with local on 8/15/18 with 90% pain relief   - left SIJ + left GT bursa injection with local on 11/8/18 with good relief of bursitis but only 10% of SIJ pain   - left L3-5 MBB with local on 5/16/19 with 90% pain relief   - bilateral L3-5 MBB on 8/14/19 with limited relief   - right SIJ injection on 2/5/20 with 80% pain relief   - bilateral L3-5 MBB on 7/16/2020 with 100% relief of lumbar back pain   - right L3-5 RFA on 12/08/2020 with 100% pain relief   - left L3-5 RFA on 1/21/21 with at least 50% pain relief   - left SIJ + left GT bursa injection on 6/17/21 with at least 40% pain relief    - L5-S1, S1 TFESI on 3/8/22 with 60% Pain relief    -bilateral SIJ + GTB injection on 5/5/22 with 60% relief   -lumbar L5/S1 IL BRUNILDA on 11/23/2022 with 60% relief   -bilateral SIJ + GT bursa injection on 12/9/22 with 80-90% relief  -Lumbar L5/S1 IL BRUNILDA with left  paramedian approach on 2/17/23 with 80% relief    Imaging / Labs / Studies (reviewed on 4/3/2024):    MRI lumbar spine 12/16/2021    FINDINGS:  Detail limited by poor signal noise ratio.  Prominent edema within the subcutaneous soft tissues of the lower back.  Stable anatomic alignment, unchanged since 11/22/2017.  The vertebral body heights are maintained.  Mild disc desiccation at L4-L5 is noted.  No significant disc space narrowing.  The distal tip of the conus medullaris terminates near the L2 level.  There are no significant areas of disc bulge or protrusion.  Minimal grade 1 retrolisthesis of L4 on L5 is noted following the administration of intravenous contrast, no abnormal areas of enhancement are noted.     L1-L2: No spinal canal or neural foraminal encroachment.     L2-L3: No spinal canal or neural foraminal encroachment.     L3-L4: No spinal canal or neural foraminal encroachment.     L4-L5: Mild facet arthropathy.  No spinal canal or neural foraminal encroachment.     L5-S1: Mild bilateral facet arthropathy.  No spinal canal or neural foraminal encroachment.     Impression:     Mild lower lumbar spine degenerative changes as above.      Review of Systems:  CONSTITUTIONAL: patient denies any fever, chills, or weight loss  SKIN: patient denies any rash or itching  RESPIRATORY: patient denies having any shortness of breath  GASTROINTESTINAL: patient denies having any diarrhea, constipation, or bowel incontinence  GENITOURINARY: patient denies having any abnormal bladder function    MUSCULOSKELETAL:  - patient complains of the above noted pain/s (see chief pain complaint)    NEUROLOGICAL:   - pain as above  - strength in Lower extremities is intact, BILATERALLY  - sensation in Lower extremities is intact, BILATERALLY  - patient denies any loss of bowel or bladder control      PSYCHIATRIC: patient denies any change in mood    Other:  All other systems reviewed and are negative        Physical  Exam:  Telemedicine Exam  There were no vitals filed for this visit.   There is no height or weight on file to calculate BMI.   (reviewed on 4/3/2024)    GENERAL: Well appearing, in no acute distress, alert and oriented x3.  obese  PSYCH:  Mood and affect appropriate.  SKIN: Skin color, texture, turgor normal, no rashes or lesions.  HEAD/FACE:  Normocephalic, atraumatic. Cranial nerves grossly intact.  PULM:  No difficulty breathing  MUSCULOSKELETAL: No atrophy or tone abnormalities are noted.  NEURO:  Able to toe walk and heel walk without difficulty  GAIT: normal.        Physical Exam: last in clinic visit:  General: alert and oriented, in no apparent distress, obese.  Gait: normal gait.  Skin: no rashes, no discoloration, no obvious lesions  HEENT: normocephalic, atraumatic. Pupils equal and round.  Cardiovascular: no significant peripheral edema present.  Respiratory: without use of accessory muscles of respiration.    Musculoskeletal - Lumbar Spine:  - Pain on flexion of lumbar spine: Present   - Pain on extension of lumbar spine: Present  - Lumbar facet loading: Present bilaterally  - TTP over the lumbar facet joints: Present on left at L5-S1   - TTP over the para lumbar muscles on left   - TTP over the SI joints: Present bilaterally  - TTP over GT bursa: Present bilaterally  - TTP over piriformis: absent  - Straight Leg Raise: equivocal on right, positive on left  - JULISSA: Positive bilaterally    Neuro - Extremities:  - BUE Strength:R/L: D: 5/5; B: 5/5; T: 5/5; WF: 5/5; WE: 5/5; IO: 5/5  - BLE Strength: R/L: HF: 5/5, HE: 5/5, KF: 5/5; KE: 5/4; FE: 5/5; FF: 5/5  - Extremity Reflexes: Brisk and symmetric throughout  - Sensory: Sensation to light touch intact bilaterally      Psych:  Mood and affect is appropriate          Assessment:  Nathan Carlos is a 33 y.o. year old female who is presenting with   Encounter Diagnoses   Name Primary?    Lumbar radiculopathy Yes    Lumbar facet arthropathy     Lumbar  degenerative disc disease        Plan:  1. Interventional:  Schedule for L5-S1 interlaminar epidural steroid injection with left paramedian approach for lumbar radiculopathy.  Of note patient received greater than 50% relief exceeding 5 months in duration with her prior procedure. We have discussed the procedure, benefits, potential risk and alternative options in detail.  Patient has elected to pursue this procedure.    - Anticoagulation use: None.     2. Pharmacologic:    reviewed and consistent with use    - Disontinued Gabapentin secondary inefficacy    -Continue Lyrica.  We discussed potential side effects of this medication which may include drowsiness,dizziness, dry mouth, constipation or peripheral edema.  -150 mg b.i.d.    3. Rehabilitative: We discussed continuing at home physician directed physical therapy to help manage the patient/s painful condition. The patient was counseled that muscle strengthening will improve the long term prognosis in regards to pain and may also help increase range of motion and mobility.     4. Diagnostic:  Reviewed lumbar MRI and answered any relevant patient questions.    5. -Dr. Cohn: Podiatry; FU bunionectomy, prior L foot sx    6. Follow up:  4-6 weeks post injection.  Virtual visit      The above plan and management options were discussed at length with patient. Patient is in agreement with the above and verbalized understanding.    - I discussed the goals of interventional chronic pain management with the patient on today's visit. We discussed a multimodal and systematic approach to pain.  This includes diagnostic and therapeutic injections, adjuvant pharmacologic treatment, physical therapy, and at times psychiatry.  I emphasized the importance of regular exercise, core strengthening and stretching, diet and weight loss as a cornerstone of long-term pain management.    - This condition does not require this patient to take time off of work, and the primary goal of  our Pain Management services is to improve the patient's functional capacity.  - Patient Questions: Answered all of the patient's questions regarding diagnoses, therapy, treatment and next steps        Eula Rojas MD  Interventional Pain Medicine

## 2024-04-03 ENCOUNTER — OFFICE VISIT (OUTPATIENT)
Dept: PAIN MEDICINE | Facility: CLINIC | Age: 34
End: 2024-04-03
Payer: MEDICAID

## 2024-04-03 DIAGNOSIS — M51.36 LUMBAR DEGENERATIVE DISC DISEASE: ICD-10-CM

## 2024-04-03 DIAGNOSIS — M47.816 LUMBAR FACET ARTHROPATHY: ICD-10-CM

## 2024-04-03 DIAGNOSIS — M54.16 LUMBAR RADICULOPATHY: Primary | ICD-10-CM

## 2024-04-03 PROCEDURE — 99214 OFFICE O/P EST MOD 30 MIN: CPT | Mod: 95,,, | Performed by: ANESTHESIOLOGY

## 2024-04-05 ENCOUNTER — TELEPHONE (OUTPATIENT)
Dept: PAIN MEDICINE | Facility: CLINIC | Age: 34
End: 2024-04-05
Payer: MEDICAID

## 2024-04-05 NOTE — TELEPHONE ENCOUNTER
Call to schedule pt procedure with Dr Rojas, pt was scheduled for May 25.    .Johnathan Vela CCM

## 2024-04-05 NOTE — TELEPHONE ENCOUNTER
----- Message from Eula Rojas MD sent at 4/3/2024 10:13 AM CDT -----  Pain Provider: Bob  Patient Name: Nathan Carlos  MRN: [id number]  Case: LUMBAR INTERLAMINAR EPIDURAL STEROID INJECTION  Level: L5/S1  Laterality: left  Anticoagulation: None    Please schedule 4-6 week virtual follow-up with me.  Thank you

## 2024-04-08 DIAGNOSIS — E11.9 TYPE 2 DIABETES MELLITUS WITHOUT COMPLICATION, WITHOUT LONG-TERM CURRENT USE OF INSULIN: ICD-10-CM

## 2024-04-08 RX ORDER — GLIMEPIRIDE 4 MG/1
TABLET ORAL
Qty: 90 TABLET | Refills: 0 | Status: SHIPPED | OUTPATIENT
Start: 2024-04-08

## 2024-04-10 ENCOUNTER — PATIENT MESSAGE (OUTPATIENT)
Dept: PAIN MEDICINE | Facility: CLINIC | Age: 34
End: 2024-04-10
Payer: MEDICAID

## 2024-04-11 RX ORDER — PREGABALIN 150 MG/1
150 CAPSULE ORAL 2 TIMES DAILY
Qty: 180 CAPSULE | Refills: 0 | Status: SHIPPED | OUTPATIENT
Start: 2024-04-11 | End: 2024-07-10

## 2024-04-11 NOTE — TELEPHONE ENCOUNTER
Pt is requesting for a refill of: pregabalin (LYRICA) 150 MG capsule   Last filed:1/09/24   Last encounter: 4/03/24   Next proc: 5/24/24   Up coming apt: 6/20/24   Pharmacy:  [facility] - [place], [place] - [id number] [address]     Is this something you can do?

## 2024-05-02 NOTE — INTERVAL H&P NOTE
Is the patient currently in the state of [place]? YES    Visit mode:VIDEO    If the visit is dropped, the patient can be reconnected by: VIDEO VISIT: Text to cell phone:   Telephone Information:   Mobile [phone]       Will anyone else be joining the visit? NO  (If patient encounters technical issues they should call [phone]    How would you like to obtain your AVS? MyChart    Are changes needed to the allergy or medication list? No    Are refills needed on medications prescribed by this physician? YES    methylphenidate HCL ER, OSM, (CONCERTA) 27 MG CR tablet    Reason for visit: RECHECK    Meredith MCLEODF         The patient has been examined and the H&P has been reviewed:    I concur with the findings and no changes have occurred since H&P was written.    Anesthesia/Surgery risks, benefits and alternative options discussed and understood by patient/family.          There are no hospital problems to display for this patient.

## 2024-05-08 ENCOUNTER — TELEPHONE (OUTPATIENT)
Dept: PAIN MEDICINE | Facility: CLINIC | Age: 34
End: 2024-05-08
Payer: MEDICAID

## 2024-05-08 NOTE — PRE-PROCEDURE INSTRUCTIONS
Spoke with patient regarding procedure scheduled on 5.24     Arrival time 0700     Has patient been sick with fever or on antibiotics within the last 7 days? No     Does the patient have any open wounds, sores or rashes? No     Does the patient have any recent fractures? no     Has patient received a vaccination within the last 7 days? No     Received the COVID vaccination?      Has the patient stopped all medications as directed? na     Does patient have a pacemaker, defibrillator, or implantable stimulator? No     Does the patient have a ride to and from procedure and someone reliable to remain with patient? mother     Is the patient diabetic? yes     Does the patient have sleep apnea? Or use O2 at home? no     Is the patient receiving sedation?      Is the patient instructed to remain NPO beginning at midnight the night before their procedure? yes     Procedure location confirmed with patient? Yes     Covid- Denies signs/symptoms. Instructed to notify PAT/MD if any changes.

## 2024-05-08 NOTE — TELEPHONE ENCOUNTER
Reached out to patient to reschedule appointment because the provider will be out of clinic.  Apt has been made . All questions answered.     Juan Luis Hammond  Medical

## 2024-05-24 ENCOUNTER — HOSPITAL ENCOUNTER (OUTPATIENT)
Facility: HOSPITAL | Age: 34
Discharge: HOME | End: 2024-05-24
Attending: ANESTHESIOLOGY | Admitting: ANESTHESIOLOGY
Payer: MEDICAID

## 2024-05-24 VITALS
WEIGHT: 249.25 LBS | BODY MASS INDEX: 42.55 KG/M2 | HEIGHT: 64 IN | HEART RATE: 88 BPM | TEMPERATURE: 98 F | RESPIRATION RATE: 16 BRPM | DIASTOLIC BLOOD PRESSURE: 68 MMHG | SYSTOLIC BLOOD PRESSURE: 101 MMHG | OXYGEN SATURATION: 96 %

## 2024-05-24 DIAGNOSIS — E11.9 TYPE 2 DIABETES MELLITUS WITHOUT COMPLICATION, WITHOUT LONG-TERM CURRENT USE OF INSULIN: ICD-10-CM

## 2024-05-24 DIAGNOSIS — M54.16 LUMBAR RADICULOPATHY: ICD-10-CM

## 2024-05-24 LAB
B-HCG UR QL: NEGATIVE
CTP QC/QA: YES
POCT GLUCOSE: 158 MG/DL (ref 70–110)

## 2024-05-24 PROCEDURE — 63600175 PHARM REV CODE 636 W HCPCS: Performed by: ANESTHESIOLOGY

## 2024-05-24 PROCEDURE — 62323 NJX INTERLAMINAR LMBR/SAC: CPT | Performed by: ANESTHESIOLOGY

## 2024-05-24 PROCEDURE — 62323 NJX INTERLAMINAR LMBR/SAC: CPT | Mod: ,,, | Performed by: ANESTHESIOLOGY

## 2024-05-24 PROCEDURE — 25500020 PHARM REV CODE 255: Performed by: ANESTHESIOLOGY

## 2024-05-24 PROCEDURE — 82962 GLUCOSE BLOOD TEST: CPT | Performed by: ANESTHESIOLOGY

## 2024-05-24 PROCEDURE — 81025 URINE PREGNANCY TEST: CPT | Performed by: ANESTHESIOLOGY

## 2024-05-24 PROCEDURE — 25000003 PHARM REV CODE 250: Performed by: ANESTHESIOLOGY

## 2024-05-24 RX ORDER — METFORMIN HYDROCHLORIDE 750 MG/1
750 TABLET, EXTENDED RELEASE ORAL
Qty: 90 TABLET | Refills: 0 | Status: SHIPPED | OUTPATIENT
Start: 2024-05-24

## 2024-05-24 RX ORDER — INDOMETHACIN 25 MG/1
CAPSULE ORAL
Status: DISCONTINUED | OUTPATIENT
Start: 2024-05-24 | End: 2024-05-24 | Stop reason: HOSPADM

## 2024-05-24 RX ORDER — BUPIVACAINE HYDROCHLORIDE 2.5 MG/ML
INJECTION, SOLUTION EPIDURAL; INFILTRATION; INTRACAUDAL
Status: DISCONTINUED | OUTPATIENT
Start: 2024-05-24 | End: 2024-05-24 | Stop reason: HOSPADM

## 2024-05-24 RX ORDER — METHYLPREDNISOLONE ACETATE 40 MG/ML
INJECTION, SUSPENSION INTRA-ARTICULAR; INTRALESIONAL; INTRAMUSCULAR; SOFT TISSUE
Status: DISCONTINUED | OUTPATIENT
Start: 2024-05-24 | End: 2024-05-24 | Stop reason: HOSPADM

## 2024-05-24 RX ORDER — MIDAZOLAM HYDROCHLORIDE 1 MG/ML
INJECTION, SOLUTION INTRAMUSCULAR; INTRAVENOUS
Status: DISCONTINUED | OUTPATIENT
Start: 2024-05-24 | End: 2024-05-24 | Stop reason: HOSPADM

## 2024-05-24 RX ORDER — FENTANYL CITRATE 50 UG/ML
INJECTION, SOLUTION INTRAMUSCULAR; INTRAVENOUS
Status: DISCONTINUED | OUTPATIENT
Start: 2024-05-24 | End: 2024-05-24 | Stop reason: HOSPADM

## 2024-05-24 NOTE — OP NOTE
Lumbar Interlaminar Epidural Steroid Injection under Fluoroscopic Guidance.   Time-out taken to identify patient and procedure prior to starting the procedure.     05/24/2024    PROCEDURE: Interlaminar epidural steroid injection under fluoroscopic guidance.     Pre-Op diagnosis: Lumbar radiculopathy [M54.16]    Post-Op diagnosis: Lumbar radiculopathy [M54.16]    PHYSICIAN: Eula Rojas MD    ASSISTANTS: None     ESTIMATED BLOOD LOSS: none.     COMPLICATIONS: none.     SPECIMENS: none    TECHNIQUE: With the patient laying in a prone position, the area was prepped and draped in the usual sterile fashion using ChloraPrep and a fenestrated drape. 1% lidocaine was given using a 27-gauge needle by raising a wheal and going down to the hub of the needle over the L5/S1 interlaminar space.  The interlaminar space was then approached with a 3.5 inch 18-gauge Touhy needle was introduced under fluoroscopic guidance in the AP and Lateral view. Once the Ligamentum flavum was encountered loss of resistance to saline was used to enter the epidural space. With positive loss of resistance and negative CSF or Blood, 3mL contrast dye Omnipaque (300mg/ml) was injected to confirm placement and there was no vascular runoff. Then 1ml 40mg/ml Depomedrol + 5 mL 0.25% Bupivicaine was injected slowly. Displacement of the radio opaque contrast after injection of the medication confirmed that the medication went into the area of the epidural space.  The patient tolerated the procedure well.     Conscious sedation provided by M.D    The patient was monitored with continuous pulse oximetry, EKG, and intermittent blood pressure monitors.  The patient was hemodynamically stable throughout the entire process was responsive to voice, and breathing spontaneously.  Supplemental O2 was provided at 2L/min via nasal cannula.  Patient was comfortable for the duration of the procedure. (See nurse documentation and case log for sedation time)    There was a  total of 2mg IV Midazolam and 50mcg Fentanyl titrated for the procedure      The patient was monitored after the procedure.   They were given post-procedure and discharge instructions to follow at home.  The patient was discharged in a stable condition.

## 2024-05-24 NOTE — TELEPHONE ENCOUNTER
I refilled her metformin per request.  She will be due for her annual wellness exam on/after 8/3/24 BUT will need to ECA with MD and may not be at this location if her insurance is same as is currently in system.

## 2024-05-24 NOTE — DISCHARGE SUMMARY
"Discharge Note  Short Stay      SUMMARY     Admit Date: 5/24/2024    Attending Physician: Eula Rojas MD        Discharge Physician: Eula Rojas MD        Discharge Date: 5/24/2024 7:45 AM    Procedure(s) (LRB):  Lumbar L5/S1 IL RBUNILDA with left paramedian approach (N/A)    Final Diagnosis: Lumbar radiculopathy [M54.16]    Disposition: Home or self care    Patient Instructions:   Current Discharge Medication List        CONTINUE these medications which have NOT CHANGED    Details   atorvastatin (LIPITOR) 40 MG tablet TAKE ONE TABLET BY MOUTH EVERY NIGHT  Qty: 30 tablet, Refills: 1    Associated Diagnoses: Type 2 diabetes mellitus without complication, without long-term current use of insulin; Elevated LDL cholesterol level      BD ULTRA-FINE GAYLA PEN NEEDLE 32 gauge x 5/32" Ndle USE WITH INSULIN EVERY DAY  Qty: 100 each, Refills: 6    Associated Diagnoses: Uncontrolled type 2 diabetes mellitus with hyperglycemia      blood-glucose meter,continuous (DEXCOM G6 ) Misc Use as directed,  to be changed every 365 days.  Qty: 1 each, Refills: 0      blood-glucose sensor (DEXCOM G6 SENSOR) Selin Use as directed, change sensor every 10 days  Qty: 9 each, Refills: 1      blood-glucose transmitter (DEXCOM G6 TRANSMITTER) Selin Use as directed, change transmitter every 3 months  Qty: 1 each, Refills: 1      clotrimazole-betamethasone 1-0.05% (LOTRISONE) cream Apply topically 2 (two) times daily.  Qty: 45 g, Refills: 2    Associated Diagnoses: Intertrigo      diclofenac 0.15% LIDOcaine 2.25% prilocaine 2.25% topical cream Apply 1 or 2 pumps to painful area up to 5 times daily.  Maximum 10 pumps/day.  Qty: 480 g, Refills: 1    Associated Diagnoses: Foot pain, left      dicyclomine (BENTYL) 20 mg tablet TAKE ONE TABLET BY MOUTH THREE TIMES A DAY AS NEEDED FOR ABDOMINAL PAIN  Qty: 90 tablet, Refills: 11      ergocalciferol (ERGOCALCIFEROL) 50,000 unit Cap TAKE 1 CAPSULE BY MOUTH ONCE A WEEK FOR 28 DAYS    "   fluticasone propionate (FLONASE) 50 mcg/actuation nasal spray 1 spray by Each Nostril route 2 (two) times daily.      glimepiride (AMARYL) 4 MG tablet TAKE ONE TABLET BY MOUTH EVERY MORNING WITH BREAKFAST  Qty: 90 tablet, Refills: 0    Associated Diagnoses: Type 2 diabetes mellitus without complication, without long-term current use of insulin      insulin detemir U-100, Levemir, (LEVEMIR FLEXPEN) 100 unit/mL (3 mL) InPn pen Inject 25 Units into the skin every evening.  Qty: 15 mL, Refills: 1    Associated Diagnoses: Uncontrolled type 2 diabetes mellitus with hyperglycemia; Hemoglobin A1C greater than 9%, indicating poor diabetic control      irbesartan (AVAPRO) 150 MG tablet TAKE 1 TABLET BY MOUTH EVERY DAY  Qty: 90 tablet, Refills: 3    Comments: .  Associated Diagnoses: Essential hypertension; At risk for cardiovascular event; Type 2 diabetes mellitus without complication, with long-term current use of insulin      linaCLOtide (LINZESS) 290 mcg Cap capsule Take 1 capsule (290 mcg total) by mouth before breakfast.  Qty: 30 capsule, Refills: 6    Associated Diagnoses: Chronic constipation      lurasidone (LATUDA) 60 mg Tab tablet Take 60 mg by mouth.      medroxyPROGESTERone (PROVERA) 10 MG tablet Take 2 tablets (20 mg total) by mouth once daily. for 10 days  Qty: 20 tablet, Refills: 0    Associated Diagnoses: Irregular menses      metFORMIN (GLUCOPHAGE-XR) 750 MG ER 24hr tablet Take 1 tablet (750 mg total) by mouth Daily.  Qty: 90 tablet, Refills: 1    Associated Diagnoses: Type 2 diabetes mellitus without complication, without long-term current use of insulin      metoprolol succinate (TOPROL-XL) 50 MG 24 hr tablet TAKE ONE TABLET BY MOUTH EVERY DAY  Qty: 30 tablet, Refills: 11    Comments: .  Associated Diagnoses: Essential hypertension      omeprazole/sodium bicarbonate (ZEGERID ORAL) Take by mouth.      pregabalin (LYRICA) 150 MG capsule Take 1 capsule (150 mg total) by mouth 2 (two) times daily.  Qty: 180  capsule, Refills: 0      promethazine (PHENERGAN) 25 MG tablet TAKE 1 TO 2 TABLETS BY MOUTH EVERY SIX HOURS AS NEEDED FOR NAUSEA  Qty: 30 tablet, Refills: 0    Associated Diagnoses: Nausea      simethicone (GAS-X ORAL) Take by mouth.      spironolactone (ALDACTONE) 25 MG tablet Take 2 tablets by mouth once daily  Qty: 180 tablet, Refills: 3    Associated Diagnoses: Essential hypertension      tiZANidine (ZANAFLEX) 4 MG tablet Take 1 tablet (4 mg total) by mouth every 8 (eight) hours.  Qty: 90 tablet, Refills: 2    Associated Diagnoses: Dorsalgia, unspecified; Lumbar radiculopathy      zolpidem (AMBIEN) 5 MG Tab TAKE ONE TABLET BY MOUTH AT BEDTIME AS NEEDED  Qty: 30 tablet, Refills: 3    Associated Diagnoses: Chronic insomnia                 Discharge Diagnosis: Lumbar radiculopathy [M54.16]  Condition on Discharge: Stable with no complications to procedure   Diet on Discharge: Same as before.  Activity: as per instruction sheet.  Discharge to: Home with a responsible adult.  Follow up: 2-4 weeks       Please call the office at [phone] if you experience any weakness or loss of sensation, fever > 101.5, pain uncontrolled with oral medications, persistent nausea/vomiting/or diarrhea, redness or drainage from the incisions, or any other worrisome concerns. If physician on call was not reached or could not communicate with our office for any reason please go to the nearest emergency department

## 2024-05-24 NOTE — H&P
HPI  Patient presenting for Procedure(s) (LRB):  Lumbar L5/S1 IL BRUNILDA with left paramedian approach (N/A)     Patient on Anti-coagulation No    No health changes since previous encounter    Past Medical History:   Diagnosis Date    Anxiety     Carpal tunnel syndrome     Diabetes mellitus, type 2     GERD (gastroesophageal reflux disease)     Herpes simplex virus (HSV) infection     High serum testosterone 09/07/2017    Hyperlipidemia     Hypertension     Insulin resistance syndrome 09/07/2017    Lumbar facet arthropathy 08/14/2019    Morbid obesity     Ovarian cyst     bilateral      Past Surgical History:   Procedure Laterality Date    AKIN OSTEOTOMY Left 5/23/2023    Procedure: OSTEOTOMY, AKIN;  Surgeon: Sandy Cohn DPM;  Location: [facility] OR;  Service: Podiatry;  Laterality: Left;    ANKLE SURGERY Left     BUNIONECTOMY Bilateral     CARPAL TUNNEL RELEASE Left 03/19/2021    Procedure: RELEASE, CARPAL TUNNEL;  Surgeon: Juan F Bernard MD;  Location: [facility] OR;  Service: Orthopedics;  Laterality: Left;    CARPAL TUNNEL RELEASE Right 11/04/2021    Procedure: RELEASE, CARPAL TUNNEL;  Surgeon: Juan F Bernard MD;  Location: [facility] OR;  Service: Orthopedics;  Laterality: Right;    COLONOSCOPY  06/12/2018    EPIDURAL STEROID INJECTION N/A 11/23/2022    Procedure: Lumbar L5/S1 IL BRUNILDA RN IV Sedation;  Surgeon: Eula Rojas MD;  Location: [facility];  Service: Pain Management;  Laterality: N/A;    EPIDURAL STEROID INJECTION N/A 2/17/2023    Procedure: Lumbar L5/S1 IL BRUNILDA with left paramedian approach RN IV Sedation;  Surgeon: Eula Rojas MD;  Location: [facility];  Service: Pain Management;  Laterality: N/A;    EPIDURAL STEROID INJECTION N/A 11/21/2023    Procedure: Lumbar L5/S1 IL BRUNILDA with left paramedian approach;  Surgeon: Eula Rojas MD;  Location: [facility];  Service: Pain Management;  Laterality: N/A;    ESOPHAGOGASTRODUODENOSCOPY      ESOPHAGOGASTRODUODENOSCOPY N/A 12/02/2021     Procedure: EGD (ESOPHAGOGASTRODUODENOSCOPY);  Surgeon: Pili Eagle MD;  Location[facility];  Service: Endoscopy;  Laterality: N/A;    INJECTION OF ANESTHETIC AGENT AROUND MEDIAL BRANCH NERVES INNERVATING LUMBAR FACET JOINT Bilateral 08/14/2019    Procedure: Bilateral L3-5 MBB;  Surgeon: Yo Kirkland MD;  Location: [facility]GT;  Service: Pain Management;  Laterality: Bilateral;    INJECTION OF ANESTHETIC AGENT AROUND MEDIAL BRANCH NERVES INNERVATING LUMBAR FACET JOINT Bilateral 07/16/2020    Procedure: Bilateral L3-5 MBB;  Surgeon: Yo Kirkland MD;  Location: [facility];  Service: Pain Management;  Laterality: Bilateral;    INJECTION OF ANESTHETIC AGENT INTO SACROILIAC JOINT Right 02/05/2020    Procedure: Right SIJ Injection with local;  Surgeon: Yo Kirkland MD;  Location: [facility];  Service: Pain Management;  Laterality: Right;    INJECTION OF ANESTHETIC AGENT INTO SACROILIAC JOINT Left 06/17/2021    Procedure: Left GT bursa + left SIJ injection;  Surgeon: Yo Kirkland MD;  Location: [facility];  Service: Pain Management;  Laterality: Left;    INJECTION OF ANESTHETIC AGENT INTO SACROILIAC JOINT Bilateral 05/05/2022    Procedure: Bilateral BLOCK, SACROILIAC JOINT and Bilateral GTB RN IV sedation;  Surgeon: Yo Kirkland MD;  Location: [facility];  Service: Pain Management;  Laterality: Bilateral;    INJECTION OF JOINT Left 06/17/2021    Procedure: Left GT bursa + left SIJ injection;  Surgeon: Yo Kirkland MD;  Location: [facility];  Service: Pain Management;  Laterality: Left;    INJECTION OF JOINT Bilateral 12/9/2022    Procedure: Bilateral GT bursa + bilateral SIJ injection RN IV Sedation;  Surgeon: Eula Rojas MD;  Location: [facility];  Service: Pain Management;  Laterality: Bilateral;    LAPIDUS BUNIONECTOMY Left 5/23/2023    Procedure: BUNIONECTOMY, LAPIDUS;  Surgeon: Sandy Cohn DPM;  Location: [facility] OR;  Service: Podiatry;  Laterality: Left;  First metatarsal cuneiform  "joint arthrodesis.      LUMBAR FUSION      RADIOFREQUENCY THERMOCOAGULATION Right 12/08/2020    Procedure: RADIOFREQUENCY THERMAL COAGULATION Right L3, 4,5 MBB RFA with RN IV sedation// NO STEROID;  Surgeon: Yo Kirkland MD;  Location: [facility];  Service: Pain Management;  Laterality: Right;    RADIOFREQUENCY THERMOCOAGULATION Left 01/21/2021    Procedure: Left L3-5 Lumbar RFA;  Surgeon: Rian hCaney MD;  Location: [facility];  Service: Pain Management;  Laterality: Left;    SELECTIVE INJECTION OF ANESTHETIC AGENT AROUND LUMBAR SPINAL NERVE ROOT BY TRANSFORAMINAL APPROACH Left 03/08/2022    Procedure: BLOCK, SPINAL NERVE ROOT, LUMBAR, SELECTIVE, TRANSFORAMINAL APPROACH Left L5-S1, S1 RN IV sedation;  Surgeon: Yo Kirkland MD;  [facility];  Service: Pain Management;  Laterality: Left;    TRANSFORAMINAL EPIDURAL INJECTION OF STEROID Left 07/14/2022    Procedure: left L4/5 + L5/S1 TF BRUNILDA RN IV Sedation;  Surgeon: Eula Rojas MD;  [facility];  Service: Pain Management;  Laterality: Left;     Review of patient's allergies indicates:   Allergen Reactions    Amitriptyline      xerostomia    Amlodipine      DRY MOUTH    Tramadol Itching        No current facility-administered medications on file prior to encounter.     Current Outpatient Medications on File Prior to Encounter   Medication Sig Dispense Refill    atorvastatin (LIPITOR) 40 MG tablet TAKE ONE TABLET BY MOUTH EVERY NIGHT 30 tablet 1    BD ULTRA-FINE GAYLA PEN NEEDLE 32 gauge x 5/32" Ndle USE WITH INSULIN EVERY  each 6    blood-glucose meter,continuous (DEXCOM G6 ) Misc Use as directed,  to be changed every 365 days. 1 each 0    blood-glucose sensor (DEXCOM G6 SENSOR) Selin Use as directed, change sensor every 10 days 9 each 1    blood-glucose transmitter (DEXCOM G6 TRANSMITTER) Selin Use as directed, change transmitter every 3 months 1 each 1    clotrimazole-betamethasone 1-0.05% (LOTRISONE) cream " Apply topically 2 (two) times daily. 45 g 2    diclofenac 0.15% LIDOcaine 2.25% prilocaine 2.25% topical cream Apply 1 or 2 pumps to painful area up to 5 times daily.  Maximum 10 pumps/day. 480 g 1    dicyclomine (BENTYL) 20 mg tablet TAKE ONE TABLET BY MOUTH THREE TIMES A DAY AS NEEDED FOR ABDOMINAL PAIN 90 tablet 11    ergocalciferol (ERGOCALCIFEROL) 50,000 unit Cap TAKE 1 CAPSULE BY MOUTH ONCE A WEEK FOR 28 DAYS      fluticasone propionate (FLONASE) 50 mcg/actuation nasal spray 1 spray by Each Nostril route 2 (two) times daily.      insulin detemir U-100, Levemir, (LEVEMIR FLEXPEN) 100 unit/mL (3 mL) InPn pen Inject 25 Units into the skin every evening. 15 mL 1    irbesartan (AVAPRO) 150 MG tablet TAKE 1 TABLET BY MOUTH EVERY DAY 90 tablet 3    linaCLOtide (LINZESS) 290 mcg Cap capsule Take 1 capsule (290 mcg total) by mouth before breakfast. 30 capsule 6    lurasidone (LATUDA) 60 mg Tab tablet Take 60 mg by mouth.      medroxyPROGESTERone (PROVERA) 10 MG tablet Take 2 tablets (20 mg total) by mouth once daily. for 10 days 20 tablet 0    metFORMIN (GLUCOPHAGE-XR) 750 MG ER 24hr tablet Take 1 tablet (750 mg total) by mouth Daily. 90 tablet 1    metoprolol succinate (TOPROL-XL) 50 MG 24 hr tablet TAKE ONE TABLET BY MOUTH EVERY DAY 30 tablet 11    omeprazole/sodium bicarbonate (ZEGERID ORAL) Take by mouth.      promethazine (PHENERGAN) 25 MG tablet TAKE 1 TO 2 TABLETS BY MOUTH EVERY SIX HOURS AS NEEDED FOR NAUSEA 30 tablet 0    simethicone (GAS-X ORAL) Take by mouth.      spironolactone (ALDACTONE) 25 MG tablet Take 2 tablets by mouth once daily 180 tablet 3    tiZANidine (ZANAFLEX) 4 MG tablet Take 1 tablet (4 mg total) by mouth every 8 (eight) hours. 90 tablet 2    zolpidem (AMBIEN) 5 MG Tab TAKE ONE TABLET BY MOUTH AT BEDTIME AS NEEDED 30 tablet 3        PMHx, PSHx, Allergies, Medications reviewed in epic    ROS negative except pain complaints in HPI    OBJECTIVE:    /73 (BP Location: Right arm, Patient  "Position: Sitting)   Pulse 81   Temp 97 °F (36.1 °C)   Resp 16   Ht 5' 4" (1.626 m)   Wt 113 kg (249 lb 3.7 oz)   SpO2 98%   Breastfeeding No   BMI 42.78 kg/m²     PHYSICAL EXAMINATION:    GENERAL: Well appearing, in no acute distress, alert and oriented x3.  PSYCH:  Mood and affect appropriate.  SKIN: Skin color, texture, turgor normal, no rashes or lesions which will impact the procedure.  CV: RRR with palpation of the radial artery.  PULM: No evidence of respiratory difficulty, symmetric chest rise. Clear to auscultation.  NEURO: Cranial nerves grossly intact.    Plan:    Proceed with procedure as planned Procedure(s) (LRB):  Lumbar L5/S1 IL BRUNILDA with left paramedian approach (N/A)    Eula Rojas MD  05/24/2024            "

## 2024-05-24 NOTE — DISCHARGE INSTRUCTIONS

## 2024-05-30 ENCOUNTER — PATIENT MESSAGE (OUTPATIENT)
Dept: PAIN MEDICINE | Facility: CLINIC | Age: 34
End: 2024-05-30
Payer: MEDICAID

## 2024-05-30 DIAGNOSIS — M54.9 DORSALGIA, UNSPECIFIED: ICD-10-CM

## 2024-05-30 DIAGNOSIS — M54.16 LUMBAR RADICULOPATHY: ICD-10-CM

## 2024-05-30 RX ORDER — TIZANIDINE 4 MG/1
4 TABLET ORAL EVERY 8 HOURS
Qty: 90 TABLET | Refills: 2 | Status: SHIPPED | OUTPATIENT
Start: 2024-05-30

## 2024-05-30 NOTE — TELEPHONE ENCOUNTER
Pt is requesting for a refill of: tiZANidine (ZANAFLEX) 4 MG tablet   Last filed:5/14/24  Last encounter: 4/03/24  Up coming apt: 6/27/524  Pharmacy:  [facility] - [place], [place] - [id number] [address]     Is this something you can do?

## 2024-05-31 ENCOUNTER — PATIENT MESSAGE (OUTPATIENT)
Dept: FAMILY MEDICINE | Facility: CLINIC | Age: 34
End: 2024-05-31
Payer: MEDICAID

## 2024-06-10 DIAGNOSIS — E11.65 UNCONTROLLED TYPE 2 DIABETES MELLITUS WITH HYPERGLYCEMIA: ICD-10-CM

## 2024-06-10 RX ORDER — SEMAGLUTIDE 1.34 MG/ML
1 INJECTION, SOLUTION SUBCUTANEOUS
Qty: 3 ML | Refills: 1 | Status: SHIPPED | OUTPATIENT
Start: 2024-06-10

## 2024-06-26 ENCOUNTER — TELEPHONE (OUTPATIENT)
Dept: FAMILY MEDICINE | Facility: CLINIC | Age: 34
End: 2024-06-26
Payer: MEDICAID

## 2024-06-26 NOTE — TELEPHONE ENCOUNTER
Spoke with pt she has never seen a PCP just an NP  stated that she needs to establish care with a PCP and that I am not able to make that appt. That she can call [phone] or [phone]  She voice understanding

## 2024-06-26 NOTE — TELEPHONE ENCOUNTER
----- Message from Harsh Sanchez sent at 6/26/2024 12:02 PM CDT -----  Name of Who is Calling:BONNIE PALMA [[id number]]        What is the request in detail:Pt would like a callback from the office to [facility] annual appt.Please advise thank you       Can the clinic reply by [facility]:NO        What Number to Call Back if not in [facility]:.Telephone Information:  [employer]          [phone]

## 2024-06-26 NOTE — PROGRESS NOTES
Established Patient Interventional Pain Clinic Visit    The patient location is: home  The chief complaint leading to consultation is: back and leg pain  Visit type: Virtual visit with synchronous audio and video  Total time spent with patient: 11-15m  Each patient to whom he or she provides medical services by telemedicine is: (1) informed of the relationship between the physician and patient and the respective role of any other health care provider with respect to management of the patient; and (2) notified that he or she may decline to receive medical services by telemedicine and may withdraw from such care at any time.      Chief Pain Complaint:  Lower back, left leg pain    Interval history 06/27/2024  Patient presents status post L5-S1 interlaminar epidural steroid injection with left paramedian approach 05/24/2024.  Patient reports 80% sustained relief in lower back and left-sided radicular symptoms following epidural steroid injection.  Pain can be exacerbated with exertion, standing and with ambulation.  She is continued Lyrica 150 mg twice daily with noticeable improvement in her pain.  She denies any side effects from this medication.  Today she denies significant lower extremity weakness, bowel or bladder incontinence or saddle anesthesia.    Interval history 04/03/2024  Patient presents for three-month follow-up.  Today she reports return of lower back and left leg pain.  Today she reports pain in the lower back which can radiate down the left lower extremity in L5-S1 distribution to the foot.  Pain today is rated a 6/10.  She is continued Lyrica 150 mg twice daily with noticeable improvement in her pain.  She denies any side effects from this medication.  She is continued physician directed physical therapy exercises at home over the last 8 weeks from 02/03/2024 through 04/03/2024 with marginal improvement in pain, range of motion and functionality.  She would like to repeat interlaminar epidural  steroid injection as she received greater than 50% relief exceeding 5 months in duration.    Interval Hx: 01/09/2024  Patient presents status post L5-S1 interlaminar epidural steroid injection with left paramedian approach 11/21/2023.  Patient reports approximately 60%  relief in lower extremity radiculopathy following L5-S1 interlaminar epidural steroid injection.  She does report that this is compounded with an improvement in range of motion and functionality, including standing and walking activities of daily living.  Patient has continued Lyrica titration and has reached a dosage of 75 mg twice daily.  She has not noticed a significant benefit at this particular dosage.  She denies any side effects from this medication.  Today she reports radicular pain can be exacerbated with increased exertion but overall denies lower extremity weakness, bowel or bladder incontinence or saddle anesthesia.      Interval history 10/26/2023  Nathan Carlos is a 33 y.o. female with past medical history significant for hypertension, hypercholesterolemia, type 2 diabetes, morbid obesity, GERD, s/p bunionectomy of the L foot with Dr. Cohn 05/23/2023 who presents to the clinic for the evaluation of back and leg pain.  Today she reports return of pain in a bandlike distribution in the lower back which radiates down the posterior aspect of the left lower extremity in L5-S1 distribution to the foot.  Of note patient initially believe pain was exacerbated after recent bunionectomy but she followed up with Dr. Cohn, 10/23/2023 with left foot x-rays reviewed with adequate surgical hardware placement healing well without complications and no evidence of stress fractures.  Patient was given Medrol s Dosepak to help with capsulitis/inflammation of the forefoot.  Pain is exacerbated with prolonged standing or with ambulation.  She does endorse associated weakness in the left lower extremity associated with her pain.  Patient has  continued physician directed physical therapy exercises at home over the last 6 weeks without meaningful improvement in her pain.  Of note she received greater than 80% relief exceeding 5 months in duration with her last L5-S1 interlaminar epidural steroid injection 02/2023.  Of note patient trialed Lyrica 50 mg up to twice daily from Ms. Iverson PAADA without noticeable improvement in her pain.      Patient reports significant motor weakness and loss of sensations.  Patient denies night fever/night sweats, urinary incontinence, bowel incontinence, and significant weight loss.      Pain Disability Index Review:         1/9/2023     8:13 AM 10/20/2022    10:55 AM 4/14/2022     8:51 AM   Last 3 PDI Scores   Pain Disability Index (PDI) 63 70 67       Non-Pharmacologic Treatments:  Physical Therapy/Home Exercise: yes  Ice/Heat:yes  TENS: no  Acupuncture: no  Massage: no  Chiropractic: no    Other: no      Pain Medications:  - Adjuvant Medications: Ambien (Zolpidem), Lyrica ( Pregabalin), and Zanaflex ( Tizanidine)    Pain Procedures:     Dr. Rojas:  -05/24/2024: L5-S1 interlaminar epidural steroid injection with left paramedian approach  -11/21/2023:  L5-S1 interlaminar epidural steroid injection with left paramedian approach  -02/17/2023:  L5-S1 interlaminar epidural steroid injection  -12/09/2022:  Bilateral sacroiliac joint and greater trochanteric bursa injection  -11/23/2022:  L4-5 interlaminar epidural steroid injection (L5-S1 aborted)  -07/14/2022: Left-sided L4-5 and L5-S1 transforaminal epidural steroid injection      Dr. Kirkland:  -05/05/2022: Bilateral sacroiliac joint and bilateral greater trochanteric bursa injection  -03/08/2022: Left-sided L5-S1 and S1 transforaminal epidural steroid injection  -06/17/2021: Left-sided sacroiliac joint and greater trochanteric bursa injection  -01/21/2021: Left-sided L3-5 lumbar radiofrequency ablation  -12/18/2020: Right-sided L3-5 lumbar radiofrequency  "ablation    Interval History (07/17/2023):  Nathan Carlos presents today for follow-up visit.  Patient was last seen on 3/13/2023. She underwent bunionectomy with Dr. Cohn on 05/23/2023. She reports since then she has been having nerve pain in her left leg, reports she feels the nerves "jumping". She reports Dr. Cohn states this is expected and should resolve. She is still wearing a post op boot. Has follow up with podiatry on July 26th to evaluate to see if she can discontinue boot. Overall doing well, still has 40-50% sustained relief from previous lumbar BRUNILDA.  Patient reports pain as 4/10 today.      Interval History (3/13/2023): Nathan Carlos presents today via telemed for follow-up visit.  she underwent Lumbar L5/S1 IL BRUNILDA with left paramedian approach on 2/17/23.  The patient reports that she is/was better following the procedure.  she reports 80% pain relief.  The changes lasted 4 weeks so far.  The changes have continued through this visit.  She reports that the injection helped her a lot, and states that her low back pain is now intermittent instead of constant.  She is also able to stand a little bit longer.  She has since began walking daily and is performing physician directed exercises at home.  She takes tizanidine as needed for muscle pain, and reports that this is helpful.  Patient reports pain as 2/10 today.      Interval History (1/9/2023): Nathan Carlos presents today for follow-up visit.  she underwent lumbar L5/S1 IL BRUNILDA on 11/23/2022 followed by bilateral SIJ + GT bursa injection on 12/9/22.  The patient reports that she is/was better following the procedure.  she reports 80-90% pain relief from SIJ injection and 60% relief from BRUNILDA. Reports continued leg pain/burning/tingling, but improved since injection.  Patient reports pain as 10/10 today.      Interval History (10/20/2022):  Nathan Carlos presents today for follow-up visit.  Patient was last seen on " 8/18/2022. Last injection Left L4/5 +L5/S1 TF BRUNILDA on 07/14/2021. Patient reports pain as 8/10 today. Reports she has had several episodes during which her left leg gave out. She also reports pain in her lower lumbar spine in a band-like distribution with radiation into her lower extremities left > right. Pain interferes with daily activities.      Interval History (8/18/2022): Nathan Carlos presents today for follow-up visit.  she underwent Left L4/5 +L5/S1 TF BRUNILDA on 7/14/22.  The patient reports that she is/was better following the procedure.  she reports 40% pain relief.  The changes lasted 4 weeks so far.  The changes have continued through this visit.  Patient reports pain as 6/10 today, 10/10 on her worst days. Reports she is still experiencing pain but is able to stand for somewhat longer periods of time. Continues to report pain in left buttock down posterior thigh, but does admit she is now having pain in the right lumbar region now as well. She reports she takes tizanidine three times daily on bad days, and this seems to help.      Interval History (6/2/2022): Nathan Carlos presents today for follow-up visit.  she underwent bilateral SIJ + GTB injection on 5/5/22.  The patient reports that she is/was better following the procedure.  she reports 60% pain relief.  The changes lasted 4 weeks so far.  The changes have continued through this visit.  Reports back and buttock pain improved, but reports continued pain along her left leg. Reports pain along anterior, lateral, and posterior aspect of left leg, radiating down to foot at times. Patient reports pain as 6/10 today. Reports that previous epidural did not offer much relief.    IMPRESSION  1. ABNORMAL study  2. There is electrodiagnostic evidence of an acute on chronic radiculopathy of the LEFT S1 nerve root and a subacute on chronic radiculopathy of the LEFT L5 nerve root     Interval HPI 04/14/2022  Nathan Carlos is a 33 y.o.  female  who is presenting with a chief complaint of Lumbar Back Pain. The patient began experiencing this problem insidiously, and the pain has been gradually worsening over the past 3 month(s). The pain is described as throbbing, cramping, burning and electrical and is located in the left lumbar spine . Pain is intermittent and lasts hours. The pain radiates to  left leg. The patient rates her pain a 8 out of ten and interferes with activities of daily living a 8 out of ten. Pain is exacerbated by flexion of the lumbar spine, and is improved by rest. Patient reports no prior trauma, no prior spinal surgery     Interval [facility] 03/31/2022  Nathan Carlos is a 33 y.o. female  who is presenting with a chief complaint of Low-back Pain. The patient began experiencing this problem insidiously, and the pain has been gradually worsening. The pain is described as throbbing, cramping, aching and heavy and is located in the bilateral lumbar spine . Pain is intermittent and lasts hours. The  pain is nonradiating. The patient rates her pain a 8 out of ten and interferes with activities of daily living a 7 out of ten. Pain is exacerbated by extension of the lumbar spine, and is improved by rest. Patient reports no prior trauma, no prior spinal surgery     - pertinent negatives: No fever, No chills, No weight loss, No bladder dysfunction, No bowel dysfunction, No saddle anesthesia  - pertinent positives: none    - medications, other therapies tried (physical therapy, injections):     >> NSAIDs, Tylenol, Norco, zanaflex and flexeril (no relief), topamax (no relief)    >> Has previously undergone Physical Therapy    >> Has previously undergone spinal injection/s   - Left SI joint injection 1/2018 with 100% relief for 6 months   - Right L3, L4, L5 MBB with local on 8/15/18 with 90% pain relief   - left SIJ + left GT bursa injection with local on 11/8/18 with good relief of bursitis but only 10% of SIJ pain   - left L3-5 MBB with  local on 5/16/19 with 90% pain relief   - bilateral L3-5 MBB on 8/14/19 with limited relief   - right SIJ injection on 2/5/20 with 80% pain relief   - bilateral L3-5 MBB on 7/16/2020 with 100% relief of lumbar back pain   - right L3-5 RFA on 12/08/2020 with 100% pain relief   - left L3-5 RFA on 1/21/21 with at least 50% pain relief   - left SIJ + left GT bursa injection on 6/17/21 with at least 40% pain relief    - L5-S1, S1 TFESI on 3/8/22 with 60% Pain relief    -bilateral SIJ + GTB injection on 5/5/22 with 60% relief   -lumbar L5/S1 IL BRUNILDA on 11/23/2022 with 60% relief   -bilateral SIJ + GT bursa injection on 12/9/22 with 80-90% relief  -Lumbar L5/S1 IL BRUNILDA with left paramedian approach on 2/17/23 with 80% relief    Imaging / Labs / Studies (reviewed on 6/27/2024):    MRI lumbar spine 12/16/2021    FINDINGS:  Detail limited by poor signal noise ratio.  Prominent edema within the subcutaneous soft tissues of the lower back.  Stable anatomic alignment, unchanged since 11/22/2017.  The vertebral body heights are maintained.  Mild disc desiccation at L4-L5 is noted.  No significant disc space narrowing.  The distal tip of the conus medullaris terminates near the L2 level.  There are no significant areas of disc bulge or protrusion.  Minimal grade 1 retrolisthesis of L4 on L5 is noted following the administration of intravenous contrast, no abnormal areas of enhancement are noted.     L1-L2: No spinal canal or neural foraminal encroachment.     L2-L3: No spinal canal or neural foraminal encroachment.     L3-L4: No spinal canal or neural foraminal encroachment.     L4-L5: Mild facet arthropathy.  No spinal canal or neural foraminal encroachment.     L5-S1: Mild bilateral facet arthropathy.  No spinal canal or neural foraminal encroachment.     Impression:     Mild lower lumbar spine degenerative changes as above.      Review of Systems:  CONSTITUTIONAL: patient denies any fever, chills, or weight loss  SKIN: patient  denies any rash or itching  RESPIRATORY: patient denies having any shortness of breath  GASTROINTESTINAL: patient denies having any diarrhea, constipation, or bowel incontinence  GENITOURINARY: patient denies having any abnormal bladder function    MUSCULOSKELETAL:  - patient complains of the above noted pain/s (see chief pain complaint)    NEUROLOGICAL:   - pain as above  - strength in Lower extremities is intact, BILATERALLY  - sensation in Lower extremities is intact, BILATERALLY  - patient denies any loss of bowel or bladder control      PSYCHIATRIC: patient denies any change in mood    Other:  All other systems reviewed and are negative        Physical Exam:  Telemedicine Exam  There were no vitals filed for this visit.   There is no height or weight on file to calculate BMI.   (reviewed on 6/27/2024)    GENERAL: Well appearing, in no acute distress, alert and oriented x3.  obese  PSYCH:  Mood and affect appropriate.  SKIN: Skin color, texture, turgor normal, no rashes or lesions.  HEAD/FACE:  Normocephalic, atraumatic. Cranial nerves grossly intact.  PULM:  No difficulty breathing  MUSCULOSKELETAL: No atrophy or tone abnormalities are noted.  NEURO:  Able to toe walk and heel walk without difficulty  GAIT: normal.        Physical Exam: last in clinic visit:  General: alert and oriented, in no apparent distress, obese.  Gait: normal gait.  Skin: no rashes, no discoloration, no obvious lesions  HEENT: normocephalic, atraumatic. Pupils equal and round.  Cardiovascular: no significant peripheral edema present.  Respiratory: without use of accessory muscles of respiration.    Musculoskeletal - Lumbar Spine:  - Pain on flexion of lumbar spine: Present   - Pain on extension of lumbar spine: Present  - Lumbar facet loading: Present bilaterally  - TTP over the lumbar facet joints: Present on left at L5-S1   - TTP over the para lumbar muscles on left   - TTP over the SI joints: Present bilaterally  - TTP over GT bursa:  Present bilaterally  - TTP over piriformis: absent  - Straight Leg Raise: equivocal on right, positive on left  - JULISSA: Positive bilaterally    Neuro - Extremities:  - BUE Strength:R/L: D: 5/5; B: 5/5; T: 5/5; WF: 5/5; WE: 5/5; IO: 5/5  - BLE Strength: R/L: HF: 5/5, HE: 5/5, KF: 5/5; KE: 5/4; FE: 5/5; FF: 5/5  - Extremity Reflexes: Brisk and symmetric throughout  - Sensory: Sensation to light touch intact bilaterally      Psych:  Mood and affect is appropriate      Assessment:  Nathan Carlos is a 33 y.o. year old female who is presenting with   Encounter Diagnoses   Name Primary?    Lumbar radiculopathy Yes    Lumbar degenerative disc disease     Lumbar facet arthropathy        Plan:  1. Interventional:  Patient has 80% sustained relief following L5-S1 interlaminar epidural steroid injection with left paramedian approach for lumbar radiculopathy.  We discussed repeating this injection in the future should radicular symptoms exacerbate.    - Anticoagulation use: None.     2. Pharmacologic:    reviewed and consistent with use    -DC Gabapentin 2/2 inefficacy    -Increase Lyrica.  We discussed potential side effects of this medication which may include drowsiness,dizziness, dry mouth, constipation or peripheral edema.  -200 mg b.i.d.  -90 day supply sent in    3. Rehabilitative: We discussed continuing at home physician directed physical therapy to help manage the patient/s painful condition. The patient was counseled that muscle strengthening will improve the long term prognosis in regards to pain and may also help increase range of motion and mobility.     4. Diagnostic:  Reviewed lumbar MRI and answered any relevant patient questions.    5. -Dr. Cohn (PRN): Podiatry; FU bunionectomy, prior L foot sx    6. Follow up:  3 months.  Virtual visit.      The above plan and management options were discussed at length with patient. Patient is in agreement with the above and verbalized understanding.    - I  discussed the goals of interventional chronic pain management with the patient on today's visit. We discussed a multimodal and systematic approach to pain.  This includes diagnostic and therapeutic injections, adjuvant pharmacologic treatment, physical therapy, and at times psychiatry.  I emphasized the importance of regular exercise, core strengthening and stretching, diet and weight loss as a cornerstone of long-term pain management.    - This condition does not require this patient to take time off of work, and the primary goal of our Pain Management services is to improve the patient's functional capacity.  - Patient Questions: Answered all of the patient's questions regarding diagnoses, therapy, treatment and next steps        Eula Rojas MD  Interventional Pain Medicine

## 2024-06-27 ENCOUNTER — OFFICE VISIT (OUTPATIENT)
Dept: PAIN MEDICINE | Facility: CLINIC | Age: 34
End: 2024-06-27
Payer: MEDICAID

## 2024-06-27 DIAGNOSIS — M54.16 LUMBAR RADICULOPATHY: Primary | ICD-10-CM

## 2024-06-27 DIAGNOSIS — M47.816 LUMBAR FACET ARTHROPATHY: ICD-10-CM

## 2024-06-27 DIAGNOSIS — M51.36 LUMBAR DEGENERATIVE DISC DISEASE: ICD-10-CM

## 2024-06-27 PROCEDURE — 99214 OFFICE O/P EST MOD 30 MIN: CPT | Mod: 95,,, | Performed by: ANESTHESIOLOGY

## 2024-06-27 PROCEDURE — 1159F MED LIST DOCD IN RCRD: CPT | Mod: CPTII,95,, | Performed by: ANESTHESIOLOGY

## 2024-06-27 PROCEDURE — 1160F RVW MEDS BY RX/DR IN RCRD: CPT | Mod: CPTII,95,, | Performed by: ANESTHESIOLOGY

## 2024-06-27 PROCEDURE — 4010F ACE/ARB THERAPY RXD/TAKEN: CPT | Mod: CPTII,95,, | Performed by: ANESTHESIOLOGY

## 2024-07-09 ENCOUNTER — TELEPHONE (OUTPATIENT)
Dept: FAMILY MEDICINE | Facility: CLINIC | Age: 34
End: 2024-07-09
Payer: MEDICAID

## 2024-07-09 ENCOUNTER — PATIENT MESSAGE (OUTPATIENT)
Dept: OBSTETRICS AND GYNECOLOGY | Facility: CLINIC | Age: 34
End: 2024-07-09
Payer: MEDICAID

## 2024-07-09 ENCOUNTER — PATIENT MESSAGE (OUTPATIENT)
Dept: FAMILY MEDICINE | Facility: CLINIC | Age: 34
End: 2024-07-09
Payer: MEDICAID

## 2024-07-09 DIAGNOSIS — E11.9 TYPE 2 DIABETES MELLITUS WITHOUT COMPLICATION, WITHOUT LONG-TERM CURRENT USE OF INSULIN: Primary | ICD-10-CM

## 2024-07-10 NOTE — TELEPHONE ENCOUNTER
Her lab order for A1c is in ---- she can have done when she can.  No need to be fasting.    And, as she has been told multiple times previously, she needs to ECA with MD.  I would recommend this be done on/after 8/3/24 when her next annual is due.  But if her current insurance is same as in system now, then she may be ECA at another location.

## 2024-07-10 NOTE — TELEPHONE ENCOUNTER
Called pt. And informed her that her lab order was put in and also that she needs to eca with a doctor

## 2024-07-23 DIAGNOSIS — F51.04 CHRONIC INSOMNIA: ICD-10-CM

## 2024-07-23 RX ORDER — ZOLPIDEM TARTRATE 5 MG/1
TABLET ORAL
Qty: 30 TABLET | Refills: 0 | Status: SHIPPED | OUTPATIENT
Start: 2024-07-23

## 2024-07-29 ENCOUNTER — PATIENT MESSAGE (OUTPATIENT)
Dept: PAIN MEDICINE | Facility: CLINIC | Age: 34
End: 2024-07-29
Payer: MEDICAID

## 2024-07-29 RX ORDER — PREGABALIN 150 MG/1
150 CAPSULE ORAL 2 TIMES DAILY
Qty: 180 CAPSULE | Refills: 0 | Status: SHIPPED | OUTPATIENT
Start: 2024-07-29 | End: 2024-10-27

## 2024-07-29 NOTE — TELEPHONE ENCOUNTER
Pt is requesting for a refill of: pregabalin (LYRICA) 150 MG capsule   Last filed:4/11/24   Last encounter: 6/27/24   Up coming apt: 9/19/24   Pharmacy:  [facility]-[place] - [place], [place] - [address] [address]     Is this something you can do?

## 2024-08-12 DIAGNOSIS — E11.9 TYPE 2 DIABETES MELLITUS WITHOUT COMPLICATION, WITHOUT LONG-TERM CURRENT USE OF INSULIN: ICD-10-CM

## 2024-08-12 RX ORDER — GLIMEPIRIDE 4 MG/1
TABLET ORAL
Qty: 90 TABLET | Refills: 0 | Status: SHIPPED | OUTPATIENT
Start: 2024-08-12

## 2024-08-12 NOTE — TELEPHONE ENCOUNTER
I did refill her diabetes medication; however, she is overdue for her annual and needs lab updated.  She will need to ECA with MD and due to her insurance, may not be at this location.  If this happens, then she will need to be followed and managed by the MD and SALINAS working with him/her.

## 2024-08-19 ENCOUNTER — OFFICE VISIT (OUTPATIENT)
Dept: OBSTETRICS AND GYNECOLOGY | Facility: CLINIC | Age: 34
End: 2024-08-19
Payer: MEDICAID

## 2024-08-19 VITALS
HEIGHT: 64 IN | SYSTOLIC BLOOD PRESSURE: 108 MMHG | DIASTOLIC BLOOD PRESSURE: 66 MMHG | BODY MASS INDEX: 40.83 KG/M2 | WEIGHT: 239.19 LBS

## 2024-08-19 DIAGNOSIS — N92.6 IRREGULAR MENSES: Primary | ICD-10-CM

## 2024-08-19 PROCEDURE — 99213 OFFICE O/P EST LOW 20 MIN: CPT | Mod: S$PBB,,, | Performed by: NURSE PRACTITIONER

## 2024-08-19 PROCEDURE — 4010F ACE/ARB THERAPY RXD/TAKEN: CPT | Mod: CPTII,,, | Performed by: NURSE PRACTITIONER

## 2024-08-19 PROCEDURE — 1160F RVW MEDS BY RX/DR IN RCRD: CPT | Mod: CPTII,,, | Performed by: NURSE PRACTITIONER

## 2024-08-19 PROCEDURE — 1159F MED LIST DOCD IN RCRD: CPT | Mod: CPTII,,, | Performed by: NURSE PRACTITIONER

## 2024-08-19 PROCEDURE — 3078F DIAST BP <80 MM HG: CPT | Mod: CPTII,,, | Performed by: NURSE PRACTITIONER

## 2024-08-19 PROCEDURE — 99999 PR PBB SHADOW E&M-EST. PATIENT-LVL IV: CPT | Mod: PBBFAC,,, | Performed by: NURSE PRACTITIONER

## 2024-08-19 PROCEDURE — 3074F SYST BP LT 130 MM HG: CPT | Mod: CPTII,,, | Performed by: NURSE PRACTITIONER

## 2024-08-19 PROCEDURE — 3008F BODY MASS INDEX DOCD: CPT | Mod: CPTII,,, | Performed by: NURSE PRACTITIONER

## 2024-08-19 PROCEDURE — 99214 OFFICE O/P EST MOD 30 MIN: CPT | Mod: PBBFAC | Performed by: NURSE PRACTITIONER

## 2024-08-19 RX ORDER — NORETHINDRONE ACETATE AND ETHINYL ESTRADIOL 1.5-30(21)
1 KIT ORAL DAILY
Qty: 90 TABLET | Refills: 3 | Status: SHIPPED | OUTPATIENT
Start: 2024-08-19 | End: 2025-08-19

## 2024-08-19 NOTE — PROGRESS NOTES
Nathan Carlos is a 33 y.o. female  presents with complaint no cycle for August.  Had seen Dr. Lindquist for annual exam - was given provera at that time to stimulate cycle which it did, pt had called in for more but was told needed to be seen  I had seen over a year ago and placed on lybrel but pt states never took.     Past Medical History:   Diagnosis Date    Anxiety     Carpal tunnel syndrome     Diabetes mellitus, type 2     GERD (gastroesophageal reflux disease)     Herpes simplex virus (HSV) infection     High serum testosterone 2017    Hyperlipidemia     Hypertension     Insulin resistance syndrome 2017    Lumbar facet arthropathy 2019    Morbid obesity     Ovarian cyst     bilateral      Past Surgical History:   Procedure Laterality Date    AKIN OSTEOTOMY Left 2023    Procedure: OSTEOTOMY, AKIN;  Surgeon: Sandy Cohn DPM;  Location: [facility] OR;  Service: Podiatry;  Laterality: Left;    ANKLE SURGERY Left     BUNIONECTOMY Bilateral     CARPAL TUNNEL RELEASE Left 2021    Procedure: RELEASE, CARPAL TUNNEL;  Surgeon: Juan F Bernard MD;  Location: [facility] OR;  Service: Orthopedics;  Laterality: Left;    CARPAL TUNNEL RELEASE Right 2021    Procedure: RELEASE, CARPAL TUNNEL;  Surgeon: Juan F Bernard MD;  Location: [facility] OR;  Service: Orthopedics;  Laterality: Right;    COLONOSCOPY  2018    EPIDURAL STEROID INJECTION N/A 2022    Procedure: Lumbar L5/S1 IL BRUNILDA RN IV Sedation;  Surgeon: Eula Rojas MD;  Location: [facility] PAIN MGT;  Service: Pain Management;  Laterality: N/A;    EPIDURAL STEROID INJECTION N/A 2023    Procedure: Lumbar L5/S1 IL BRUNILDA with left paramedian approach RN IV Sedation;  Surgeon: Eula Rojas MD;  Location: [facility] PAIN [facility]GT;  Service: Pain Management;  Laterality: N/A;    EPIDURAL STEROID INJECTION N/A 2023    Procedure: Lumbar L5/S1 IL BRUNILDA with left paramedian approach;  Surgeon: Eula Rojas MD;  Location:  [facility] PAIN MGT;  Service: Pain Management;  Laterality: N/A;    EPIDURAL STEROID INJECTION N/A 5/24/2024    Procedure: Lumbar L5/S1 IL BRUNILDA with left paramedian approach;  Surgeon: Eula Rojas MD;  [facility];  Service: Pain Management;  Laterality: N/A;    ESOPHAGOGASTRODUODENOSCOPY      ESOPHAGOGASTRODUODENOSCOPY N/A 12/02/2021    Procedure: EGD (ESOPHAGOGASTRODUODENOSCOPY);  Surgeon: Pili Eagle MD;  Location: [facility] ENDO;  Service: Endoscopy;  Laterality: N/A;    INJECTION OF ANESTHETIC AGENT AROUND MEDIAL BRANCH NERVES INNERVATING LUMBAR FACET JOINT Bilateral 08/14/2019    Procedure: Bilateral L3-5 MBB;  Surgeon: Yo Kirkland MD;  [facility];  Service: Pain Management;  Laterality: Bilateral;    INJECTION OF ANESTHETIC AGENT AROUND MEDIAL BRANCH NERVES INNERVATING LUMBAR FACET JOINT Bilateral 07/16/2020    Procedure: Bilateral L3-5 MBB;  Surgeon: Yo Kirkland MD;  Location: [facility] PAIN [facility]GT;  Service: Pain Management;  Laterality: Bilateral;    INJECTION OF ANESTHETIC AGENT INTO SACROILIAC JOINT Right 02/05/2020    Procedure: Right SIJ Injection with local;  Surgeon: Yo Kirkland MD;  Location: [facility] PAIN MGT;  Service: Pain Management;  Laterality: Right;    INJECTION OF ANESTHETIC AGENT INTO SACROILIAC JOINT Left 06/17/2021    Procedure: Left GT bursa + left SIJ injection;  Surgeon: Yo Kirkland MD;  Location: [facility] PAIN MGT;  Service: Pain Management;  Laterality: Left;    INJECTION OF ANESTHETIC AGENT INTO SACROILIAC JOINT Bilateral 05/05/2022    Procedure: Bilateral BLOCK, SACROILIAC JOINT and Bilateral GTB RN IV sedation;  Surgeon: Yo Kirkland MD;  [facility];  Service: Pain Management;  Laterality: Bilateral;    INJECTION OF JOINT Left 06/17/2021    Procedure: Left GT bursa + left SIJ injection;  Surgeon: Yo Kirkland MD;  [facility];  Service: Pain Management;  Laterality: Left;    INJECTION OF JOINT Bilateral 12/9/2022    Procedure: Bilateral GT  bursa + bilateral SIJ injection RN IV Sedation;  Surgeon: Eula Rojas MD;  Location: [facility]IN [facility];  Service: Pain Management;  Laterality: Bilateral;    LAPIDUS BUNIONECTOMY Left 5/23/2023    Procedure: BUNIONECTOMY, LAPIDUS;  Surgeon: Sandy Cohn DPM;  Location: [facility] OR;  Service: Podiatry;  Laterality: Left;  First metatarsal cuneiform joint arthrodesis.      LUMBAR FUSION      RADIOFREQUENCY THERMOCOAGULATION Right 12/08/2020    Procedure: RADIOFREQUENCY THERMAL COAGULATION Right L3, 4,5 MBB RFA with RN IV sedation// NO STEROID;  Surgeon: Yo Kirkland MD;  Location: [facility];  Service: Pain Management;  Laterality: Right;    RADIOFREQUENCY THERMOCOAGULATION Left 01/21/2021    Procedure: Left L3-5 Lumbar RFA;  Surgeon: Rian Chaney MD;  Location: [facility];  Service: Pain Management;  Laterality: Left;    SELECTIVE INJECTION OF ANESTHETIC AGENT AROUND LUMBAR SPINAL NERVE ROOT BY TRANSFORAMINAL APPROACH Left 03/08/2022    Procedure: BLOCK, SPINAL NERVE ROOT, LUMBAR, SELECTIVE, TRANSFORAMINAL APPROACH Left L5-S1, S1 RN IV sedation;  Surgeon: Yo Kirkland MD;  Location: [facility];  Service: Pain Management;  Laterality: Left;    TRANSFORAMINAL EPIDURAL INJECTION OF STEROID Left 07/14/2022    Procedure: left L4/5 + L5/S1 TF BRUNILDA RN IV Sedation;  Surgeon: Eula Rojas MD;  Location: [facility]IN [facility];  Service: Pain Management;  Laterality: Left;     Social History     Tobacco Use    Smoking status: Never    Smokeless tobacco: Never   Substance Use Topics    Alcohol use: Never    Drug use: No     Family History   Problem Relation Name Age of Onset    Hypertension Mother Raisa Villatoro     Fibroids Mother Raisa Villatoro     Arthritis Mother Raisa Villatoro     No Known Problems Father unk hx     Diabetes Sister Korina Vásquez     Depression Sister Korina Vásquez     Hypertension Brother C     No Known Problems Brother K     Hypertension Maternal Grandmother Susan Hoffman     Leukemia Maternal  "Grandfather Korina     Hypertension Maternal Grandfather Korina     Hypertension Maternal Aunt Fito Berman     Diabetes Maternal Aunt Fito Berman     Arthritis Maternal Aunt Fito Berman     Diabetes Maternal Uncle Korina     Stroke Neg Hx       OB History    Para Term  AB Living   0 0 0 0 0 0   SAB IAB Ectopic Multiple Live Births   0 0 0 0 0       /66 (BP Location: Left arm, Patient Position: Sitting, BP Method: Medium (Manual))   Ht 5' 4" (1.626 m)   Wt 108.5 kg (239 lb 3.2 oz)   LMP 2024   BMI 41.06 kg/m²     ROS:  Per hpi    PHYSICAL EXAM:  examination not indicated  Physical Exam     ASSESSMENT and PLAN:  1. Irregular menses  POCT Urine Pregnancy    norethindrone-ethinyl estradiol-iron (MICROGESTIN FE1.5/30) 1.5 mg-30 mcg (21)/75 mg (7) tablet          Nathan was seen today for contraception.    Diagnoses and all orders for this visit:    Irregular menses  -     POCT Urine Pregnancy  -     norethindrone-ethinyl estradiol-iron (MICROGESTIN FE1.5/30) 1.5 mg-30 mcg (21)/75 mg (7) tablet; Take 1 tablet by mouth once daily.     Upt in office negative  Will start pt on ocp today to regulate cycle  If not regular by 3rd pack pt to recheck      Answers submitted by the patient for this visit:  Gynecologic Exam Questionnaire  (Submitted on 2024)  Chief Complaint: Gynecologic exam  genital itching: No  genital lesions: No  genital odor: No  genital rash: No  missed menses: No  pelvic pain: No  vaginal bleeding: No  vaginal discharge: No  Onset: more than 1 year ago  Progression since onset: unchanged  Pain severity: no pain  Pregnant now?: No  abdominal pain: No  anorexia: No  back pain: No  chills: No  constipation: No  diarrhea: No  discolored urine: No  dysuria: No  fever: No  flank pain: No  frequency: No  headaches: No  hematuria: No  nausea: No  painful intercourse: No  rash: No  urgency: No  vomiting: No  Vaginal bleeding: heavier than menses  Passing clots?: " Yes  Passing tissue?: No  Aggravated by: nothing  treatments tried: nothing  Improvement on treatment: no relief  Sexual activity: sexually active  Partner with STD symptoms: no  Menstrual history: irregular  STD: No  abdominal surgery: No   section: No  Ectopic pregnancy: No  Endometriosis: No  herpes simplex: No  gynecological surgery: No  menorrhagia: No  metrorrhagia: No  miscarriage: No  ovarian cysts: No  perineal abscess: No  PID: No  terminated pregnancy: No  vaginosis: No

## 2024-08-21 DIAGNOSIS — E11.9 TYPE 2 DIABETES MELLITUS WITHOUT COMPLICATION, WITHOUT LONG-TERM CURRENT USE OF INSULIN: ICD-10-CM

## 2024-08-21 RX ORDER — METFORMIN HYDROCHLORIDE 750 MG/1
750 TABLET, EXTENDED RELEASE ORAL
Qty: 90 TABLET | Refills: 0 | Status: SHIPPED | OUTPATIENT
Start: 2024-08-21

## 2024-08-21 NOTE — TELEPHONE ENCOUNTER
Metformin refilled but she is overdue for her annual.  Needs to get this caught-up and any lab needed.  If her listed insurance is still correct, will likely be ECA with an MD at other location.  She will be under the care of that MD and SALINAS at that location.

## 2024-08-26 ENCOUNTER — PATIENT MESSAGE (OUTPATIENT)
Dept: PAIN MEDICINE | Facility: CLINIC | Age: 34
End: 2024-08-26
Payer: MEDICAID

## 2024-08-26 DIAGNOSIS — M54.16 LUMBAR RADICULOPATHY: ICD-10-CM

## 2024-08-26 DIAGNOSIS — M54.9 DORSALGIA, UNSPECIFIED: ICD-10-CM

## 2024-08-26 RX ORDER — TIZANIDINE 4 MG/1
4 TABLET ORAL EVERY 8 HOURS
Qty: 90 TABLET | Refills: 2 | Status: SHIPPED | OUTPATIENT
Start: 2024-08-26

## 2024-08-26 NOTE — TELEPHONE ENCOUNTER
Pt is requesting for a refill of: tiZANidine (ZANAFLEX) 4 MG tablet   Last filed:7/30/24  Last encounter: 6/27/24  Up coming apt: 9/19/24  Pharmacy:  [facility] - [place], [place] - [id number] [address]     Is this something you can do?

## 2024-09-05 ENCOUNTER — TELEPHONE (OUTPATIENT)
Dept: FAMILY MEDICINE | Facility: CLINIC | Age: 34
End: 2024-09-05
Payer: MEDICAID

## 2024-09-05 NOTE — TELEPHONE ENCOUNTER
----- Message from Marcia Perez sent at 9/5/2024  8:52 AM CDT -----  Type:  Patient Return Call  requested    Who Called:Tien  Does the patient know what this is regarding?:Appointment  Would the patient rather a call back or a response via [facility]? Callback  Best Call Back Number:[phone]  Additional Information: Need to reschedule appointment

## 2024-09-06 ENCOUNTER — PATIENT MESSAGE (OUTPATIENT)
Dept: FAMILY MEDICINE | Facility: CLINIC | Age: 34
End: 2024-09-06
Payer: MEDICAID

## 2024-09-17 ENCOUNTER — TELEPHONE (OUTPATIENT)
Dept: FAMILY MEDICINE | Facility: CLINIC | Age: 34
End: 2024-09-17
Payer: MEDICAID

## 2024-09-19 ENCOUNTER — OFFICE VISIT (OUTPATIENT)
Dept: PAIN MEDICINE | Facility: CLINIC | Age: 34
End: 2024-09-19
Payer: MEDICAID

## 2024-09-19 VITALS — BODY MASS INDEX: 41.06 KG/M2 | HEIGHT: 64 IN

## 2024-09-19 DIAGNOSIS — M47.816 LUMBAR SPONDYLOSIS: ICD-10-CM

## 2024-09-19 DIAGNOSIS — M51.36 LUMBAR DEGENERATIVE DISC DISEASE: ICD-10-CM

## 2024-09-19 DIAGNOSIS — M54.16 LEFT LUMBAR RADICULOPATHY: Primary | ICD-10-CM

## 2024-09-19 DIAGNOSIS — M53.3 SACROILIAC JOINT DYSFUNCTION: ICD-10-CM

## 2024-09-19 DIAGNOSIS — Z98.890 S/P BUNIONECTOMY: ICD-10-CM

## 2024-09-19 PROCEDURE — 3008F BODY MASS INDEX DOCD: CPT | Mod: CPTII,95,, | Performed by: PHYSICIAN ASSISTANT

## 2024-09-19 PROCEDURE — 4010F ACE/ARB THERAPY RXD/TAKEN: CPT | Mod: CPTII,95,, | Performed by: PHYSICIAN ASSISTANT

## 2024-09-19 PROCEDURE — 1160F RVW MEDS BY RX/DR IN RCRD: CPT | Mod: CPTII,95,, | Performed by: PHYSICIAN ASSISTANT

## 2024-09-19 PROCEDURE — 99214 OFFICE O/P EST MOD 30 MIN: CPT | Mod: 95,,, | Performed by: PHYSICIAN ASSISTANT

## 2024-09-19 PROCEDURE — 1159F MED LIST DOCD IN RCRD: CPT | Mod: CPTII,95,, | Performed by: PHYSICIAN ASSISTANT

## 2024-09-19 NOTE — PROGRESS NOTES
"Established Patient - TeleHealth Visit    The patient location is: [place]  The chief complaint leading to consultation is: chronic pain     Visit type: audiovisual    Face to Face time with patient: 10-15 minutes  20 minutes of total time spent on the encounter, which includes face to face time and non-face to face time preparing to see the patient (eg, review of tests), Obtaining and/or reviewing separately obtained history, Documenting clinical information in the electronic or other health record, Independently interpreting results (not separately reported) and communicating results to the patient/family/caregiver, or Care coordination (not separately reported).     Each patient to whom he or she provides medical services by telemedicine is:  (1) informed of the relationship between the physician and patient and the respective role of any other health care provider with respect to management of the patient; and (2) notified that he or she may decline to receive medical services by telemedicine and may withdraw from such care at any time.        Chronic Pain -- Established Patient (Follow-up visit)    Chief Pain Complaint:  Chief Complaint   Patient presents with    Follow-up     C/o Lower back with left leg pain      Interval History (9/19/2024):  Nathan Carlos presents today for follow-up visit.  Patient was last seen on 6/27/2024. At that visit, the plan was to increase Lyrica, which has been helping. Patient reports pain as "0/10 today.  She is also still reporting pain relief from last lumbar epidural.    Interval history 06/27/2024  Patient presents status post L5-S1 interlaminar epidural steroid injection with left paramedian approach 05/24/2024.  Patient reports 80% sustained relief in lower back and left-sided radicular symptoms following epidural steroid injection.  Pain can be exacerbated with exertion, standing and with ambulation.  She is continued Lyrica 150 mg twice daily with noticeable " improvement in her pain.  She denies any side effects from this medication.  Today she denies significant lower extremity weakness, bowel or bladder incontinence or saddle anesthesia.    Interval history 04/03/2024  Patient presents for three-month follow-up.  Today she reports return of lower back and left leg pain.  Today she reports pain in the lower back which can radiate down the left lower extremity in L5-S1 distribution to the foot.  Pain today is rated a 6/10.  She is continued Lyrica 150 mg twice daily with noticeable improvement in her pain.  She denies any side effects from this medication.  She is continued physician directed physical therapy exercises at home over the last 8 weeks from 02/03/2024 through 04/03/2024 with marginal improvement in pain, range of motion and functionality.  She would like to repeat interlaminar epidural steroid injection as she received greater than 50% relief exceeding 5 months in duration.    Interval Hx: 01/09/2024  Patient presents status post L5-S1 interlaminar epidural steroid injection with left paramedian approach 11/21/2023.  Patient reports approximately 60%  relief in lower extremity radiculopathy following L5-S1 interlaminar epidural steroid injection.  She does report that this is compounded with an improvement in range of motion and functionality, including standing and walking activities of daily living.  Patient has continued Lyrica titration and has reached a dosage of 75 mg twice daily.  She has not noticed a significant benefit at this particular dosage.  She denies any side effects from this medication.  Today she reports radicular pain can be exacerbated with increased exertion but overall denies lower extremity weakness, bowel or bladder incontinence or saddle anesthesia.    Interval history 10/26/2023 - initial visit with Dr. Rojas:  Nathan Carlos is a 32 y.o. female with past medical history significant for hypertension, hypercholesterolemia,  type 2 diabetes, morbid obesity, GERD, s/p bunionectomy of the L foot with Dr. Cohn 05/23/2023 who presents to the clinic for the evaluation of back and leg pain.  Today she reports return of pain in a bandlike distribution in the lower back which radiates down the posterior aspect of the left lower extremity in L5-S1 distribution to the foot.  Of note patient initially believe pain was exacerbated after recent bunionectomy but she followed up with Dr. Cohn, 10/23/2023 with left foot x-rays reviewed with adequate surgical hardware placement healing well without complications and no evidence of stress fractures.  Patient was given Medrol s Dosepak to help with capsulitis/inflammation of the forefoot.  Pain is exacerbated with prolonged standing or with ambulation.  She does endorse associated weakness in the left lower extremity associated with her pain.  Patient has continued physician directed physical therapy exercises at home over the last 6 weeks without meaningful improvement in her pain.  Of note she received greater than 80% relief exceeding 5 months in duration with her last L5-S1 interlaminar epidural steroid injection 02/2023.  Of note patient trialed Lyrica 50 mg up to twice daily from Ms. Kishor WING without noticeable improvement in her pain.  Patient reports significant motor weakness and loss of sensations.  Patient denies night fever/night sweats, urinary incontinence, bowel incontinence, and significant weight loss.      Pain Disability Index (PDI) Score Review:      1/9/2023     8:13 AM 10/20/2022    10:55 AM 4/14/2022     8:51 AM   Last 3 PDI Scores   Pain Disability Index (PDI) 63 70 67       Non-Pharmacologic Treatments:  Physical Therapy/Home Exercise: yes  Ice/Heat:yes  TENS: no  Acupuncture: no  Massage: no  Chiropractic: no    Other: no    Pain Medications:  - Adjuvant Medications: Ambien (Zolpidem), Lyrica ( Pregabalin), and Zanaflex ( Tizanidine)        Pain Procedures:       "Rojas:  -05/24/2024: L5-S1 interlaminar epidural steroid injection with left paramedian approach  -11/21/2023:  L5-S1 interlaminar epidural steroid injection with left paramedian approach  -02/17/2023:  L5-S1 interlaminar epidural steroid injection  -12/09/2022:  Bilateral sacroiliac joint and greater trochanteric bursa injection  -11/23/2022:  L4-5 interlaminar epidural steroid injection (L5-S1 aborted)  -07/14/2022: Left-sided L4-5 and L5-S1 transforaminal epidural steroid injection    Dr. Kirkland:  -05/05/2022: Bilateral sacroiliac joint and bilateral greater trochanteric bursa injection  -03/08/2022: Left-sided L5-S1 and S1 transforaminal epidural steroid injection  -06/17/2021: Left-sided sacroiliac joint and greater trochanteric bursa injection  -01/21/2021: Left-sided L3-5 lumbar radiofrequency ablation  -12/18/2020: Right-sided L3-5 lumbar radiofrequency ablation        Interval History (07/17/2023):  Nathan Carlos presents today for follow-up visit.  Patient was last seen on 3/13/2023. She underwent bunionectomy with Dr. Cohn on 05/23/2023. She reports since then she has been having nerve pain in her left leg, reports she feels the nerves "jumping". She reports Dr. Cohn states this is expected and should resolve. She is still wearing a post op boot. Has follow up with podiatry on July 26th to evaluate to see if she can discontinue boot. Overall doing well, still has 40-50% sustained relief from previous lumbar BRUNILDA.  Patient reports pain as 4/10 today.    Interval History (3/13/2023): Nathan Carlos presents today via telemed for follow-up visit.  she underwent Lumbar L5/S1 IL BRUNILDA with left paramedian approach on 2/17/23.  The patient reports that she is/was better following the procedure.  she reports 80% pain relief.  The changes lasted 4 weeks so far.  The changes have continued through this visit.  She reports that the injection helped her a lot, and states that her low back pain is now " intermittent instead of constant.  She is also able to stand a little bit longer.  She has since began walking daily and is performing physician directed exercises at home.  She takes tizanidine as needed for muscle pain, and reports that this is helpful.  Patient reports pain as 2/10 today.    Interval History (1/9/2023): Nathan Carlos presents today for follow-up visit.  she underwent lumbar L5/S1 IL BRUNILDA on 11/23/2022 followed by bilateral SIJ + GT bursa injection on 12/9/22.  The patient reports that she is/was better following the procedure.  she reports 80-90% pain relief from SIJ injection and 60% relief from BRUNILDA. Reports continued leg pain/burning/tingling, but improved since injection.  Patient reports pain as 10/10 today.    Interval History (10/20/2022):  Nathan Carlos presents today for follow-up visit.  Patient was last seen on 8/18/2022. Last injection Left L4/5 +L5/S1 TF BRUNILDA on 07/14/2021. Patient reports pain as 8/10 today. Reports she has had several episodes during which her left leg gave out. She also reports pain in her lower lumbar spine in a band-like distribution with radiation into her lower extremities left > right. Pain interferes with daily activities.    Interval History (8/18/2022): Nathan Carlos presents today for follow-up visit.  she underwent Left L4/5 +L5/S1 TF BRUNILDA on 7/14/22.  The patient reports that she is/was better following the procedure.  she reports 40% pain relief.  The changes lasted 4 weeks so far.  The changes have continued through this visit.  Patient reports pain as 6/10 today, 10/10 on her worst days. Reports she is still experiencing pain but is able to stand for somewhat longer periods of time. Continues to report pain in left buttock down posterior thigh, but does admit she is now having pain in the right lumbar region now as well. She reports she takes tizanidine three times daily on bad days, and this seems to help.    Interval History  (6/2/2022): Nathan Carlos presents today for follow-up visit.  she underwent bilateral SIJ + GTB injection on 5/5/22.  The patient reports that she is/was better following the procedure.  she reports 60% pain relief.  The changes lasted 4 weeks so far.  The changes have continued through this visit.  Reports back and buttock pain improved, but reports continued pain along her left leg. Reports pain along anterior, lateral, and posterior aspect of left leg, radiating down to foot at times. Patient reports pain as 6/10 today. Reports that previous epidural did not offer much relief.    Interval HPI (08/04/2021) - Dr. Kirkland:  Nathan Carlos is a 30 y.o. female  who is presenting with a chief complaint of Lumbar Back Pain. The patient began experiencing this problem insidiously, and the pain has been gradually worsening over the past 3 month(s). The pain is described as throbbing, cramping, burning and electrical and is located in the left lumbar spine. Pain is intermittent and lasts hours. The pain radiates to  left leg. The patient rates her pain a 8 out of ten and interferes with activities of daily living a 8 out of ten. Pain is exacerbated by flexion of the lumbar spine, and is improved by rest. Patient reports no prior trauma, no prior spinal surgery     Interval HPI (04/22/2020) - Dr. Kirkland:  Nathan Carlos is a 29 y.o. female  who is presenting with a chief complaint of Low-back Pain. The patient began experiencing this problem insidiously, and the pain has been gradually worsening. The pain is described as throbbing, cramping, aching and heavy and is located in the bilateral lumbar spine. Pain is intermittent and lasts hours. The  pain is nonradiating. The patient rates her pain a 8 out of ten and interferes with activities of daily living a 7 out of ten. Pain is exacerbated by extension of the lumbar spine, and is improved by rest. Patient reports no prior trauma, no prior spinal surgery      Initial HPI (01/04/2018) - Dr. Kirkland:  Nathan Carlos is a 27 y.o. female  who is presenting with a chief complaint of Left Buttock Pain. The patient began experiencing this problem insidiously, and the pain has been gradually worsening over the past 3 month(s). The pain is described as throbbing, cramping, aching and heavy and is located in the left buttock. Pain is intermittent and lasts hours. The pain radiates to pain is nonradiating. The patient rates her pain a 8 out of ten and interferes with activities of daily living a 8 out of ten. Pain is exacerbated by getting up from a seated position, and is improved by rest. Patient reports no prior trauma, no prior spinal surgery       - pertinent negatives: No fever, No chills, No weight loss, No bladder dysfunction, No bowel dysfunction, No saddle anesthesia  - pertinent positives: none    - medications, other therapies tried (physical therapy, injections):     >> NSAIDs, Tylenol, Norco, zanaflex and flexeril (no relief), topamax (no relief)    >> Has previously undergone Physical Therapy    >> Has previously undergone spinal injection/s   - Left SI joint injection 1/2018 with 100% relief for 6 months   - Right L3, L4, L5 MBB with local on 8/15/18 with 90% pain relief   - left SIJ + left GT bursa injection with local on 11/8/18 with good relief of bursitis but only 10% of SIJ pain   - left L3-5 MBB with local on 5/16/19 with 90% pain relief   - bilateral L3-5 MBB on 8/14/19 with limited relief   - right SIJ injection on 2/5/20 with 80% pain relief   - bilateral L3-5 MBB on 7/16/2020 with 100% relief of lumbar back pain   - right L3-5 RFA on 12/08/2020 with 100% pain relief   - left L3-5 RFA on 1/21/21 with at least 50% pain relief   - left SIJ + left GT bursa injection on 6/17/21 with at least 40% pain relief    - L5-S1, S1 TFESI on 3/8/22 with 60% Pain relief    -bilateral SIJ + GTB injection on 5/5/22 with 60% relief   -lumbar L5/S1 IL BRUNILDA on 11/23/2022 with  60% relief   -bilateral SIJ + GT bursa injection on 12/9/22 with 80-90% relief  -Lumbar L5/S1 IL BRUNILDA with left paramedian approach on 2/17/23 with 80% relief        Imaging/ Diagnostic Studies/ Labs (Reviewed on 9/19/2024):    MRI lumbar spine 12/16/2021    FINDINGS:  Detail limited by poor signal noise ratio.  Prominent edema within the subcutaneous soft tissues of the lower back.  Stable anatomic alignment, unchanged since 11/22/2017.  The vertebral body heights are maintained.  Mild disc desiccation at L4-L5 is noted.  No significant disc space narrowing.  The distal tip of the conus medullaris terminates near the L2 level.  There are no significant areas of disc bulge or protrusion.  Minimal grade 1 retrolisthesis of L4 on L5 is noted following the administration of intravenous contrast, no abnormal areas of enhancement are noted.     L1-L2: No spinal canal or neural foraminal encroachment.     L2-L3: No spinal canal or neural foraminal encroachment.     L3-L4: No spinal canal or neural foraminal encroachment.     L4-L5: Mild facet arthropathy.  No spinal canal or neural foraminal encroachment.     L5-S1: Mild bilateral facet arthropathy.  No spinal canal or neural foraminal encroachment.     Impression:  Mild lower lumbar spine degenerative changes as above.       1/27/2022 BLE EMG/NCS   1. ABNORMAL study  2. There is electrodiagnostic evidence of an acute on chronic radiculopathy of the LEFT S1 nerve root and a subacute on chronic radiculopathy of the LEFT L5 nerve root       Review of Systems:  CONSTITUTIONAL: patient denies any fever, chills, or weight loss  SKIN: patient denies any rash or itching  RESPIRATORY: patient denies having any shortness of breath  GASTROINTESTINAL: patient denies having any diarrhea, constipation, or bowel incontinence  GENITOURINARY: patient denies having any abnormal bladder function    MUSCULOSKELETAL:  - patient complains of the above noted pain/s (see chief pain  "complaint)    NEUROLOGICAL:   - pain as above  - strength in Lower extremities is intact, BILATERALLY  - sensation in Lower extremities is intact, BILATERALLY  - patient denies any loss of bowel or bladder control      PSYCHIATRIC: patient denies any change in mood    Other:  All other systems reviewed and are negative        Telemedicine Exam  Vitals:    09/19/24 1026   Height: 5' 4" (1.626 m)  Comment: Patient reported     Body mass index is 41.06 kg/m².   (reviewed on 9/19/2024)     GENERAL: Well appearing, in no acute distress, alert and oriented x3.  Cooperative.  PSYCH:  Mood and affect appropriate.  SKIN: Skin color & texture with no obvious abnormalities.    HEAD/FACE:  Normocephalic, atraumatic.    PULM:  No difficulty breathing. No nasal flaring. No obvious wheezing.  EXTREMITIES: No obvious deformities.    MUSCULOSKELETAL: No obvious atrophy abnormalities are noted.   NEURO: No obvious neurologic deficit.      Physical Exam: last in clinic visit:  General: alert and oriented, in no apparent distress, obese.  Gait: normal gait.  Skin: no rashes, no discoloration, no obvious lesions  HEENT: normocephalic, atraumatic. Pupils equal and round.  Cardiovascular: no significant peripheral edema present.  Respiratory: without use of accessory muscles of respiration.    Musculoskeletal - Lumbar Spine:  - Pain on flexion of lumbar spine: Present   - Pain on extension of lumbar spine: Present  - Lumbar facet loading: Present bilaterally  - TTP over the lumbar facet joints: Present on left at L5-S1   - TTP over the para lumbar muscles on left   - TTP over the SI joints: Present bilaterally  - TTP over GT bursa: Present bilaterally  - TTP over piriformis: absent  - Straight Leg Raise: equivocal on right, positive on left  - JULISSA: Positive bilaterally    Neuro - Extremities:  - BUE Strength:R/L: D: 5/5; B: 5/5; T: 5/5; WF: 5/5; WE: 5/5; IO: 5/5  - BLE Strength: R/L: HF: 5/5, HE: 5/5, KF: 5/5; KE: 5/4; FE: 5/5; FF: " 5/5  - Extremity Reflexes: Brisk and symmetric throughout  - Sensory: Sensation to light touch intact bilaterally      Psych:  Mood and affect is appropriate        Assessment:  Nathan Carlos is a 33 y.o. year old female who is presenting with   Encounter Diagnoses   Name Primary?    Left lumbar radiculopathy Yes    Lumbar degenerative disc disease     Lumbar spondylosis     Sacroiliac joint dysfunction     h/o bunionectomy, left foot          Plan:  1. Interventional:  Patient has 80% sustained relief following L5-S1 interlaminar epidural steroid injection with left paramedian approach for lumbar radiculopathy.  We discussed repeating this injection in the future should radicular symptoms exacerbate.    - Anticoagulation use: None.     2. Pharmacologic:   -Refill Lyrica (pregabalin) 150mg BID, which she is tolerating well & providing pain relief.  We previously discussed potential side effects of this medication which may include drowsiness,dizziness, dry mouth, constipation or peripheral edema.    - [place]  reviewed and appropriate.       3. Rehabilitative: We discussed continuing at home physician directed physical therapy to help manage the patient/s painful condition. The patient was counseled that muscle strengthening will improve the long term prognosis in regards to pain and may also help increase range of motion and mobility.     4. Diagnostic/ Imaging: No new imaging ordered. Previous imaging reviewed: MRI lumbar spine 12/16/2021.    5. Consult/ Referral: Continue follow-up with Dr. Cohn (PRN): Podiatry; S/p bunionectomy, prior L foot sx    6. Follow up:  3 months - virtual visit - Mague Molina PA-C       Future Appointments   Date Time Provider Department Center   12/12/2024  9:20 AM Mague Molina PA-C [facility]         - Patient Questions: Answered all of the patient's questions regarding diagnosis, therapy, and treatment.    - This condition does not require this  patient to take time off of work, and the primary goal of our Pain Management services is to improve the patient's functional capacity.   - I discussed the risks, benefits, and alternatives to potential treatment options. All questions and concerns were fully addressed today in clinic.         Mague Molina PA-C  Interventional Pain Management - [facility]    Disclaimer:  This note was prepared using voice recognition system and is likely to have sound alike errors that may have been overlooked even after proof reading.  Please call me with any questions.

## 2024-10-29 ENCOUNTER — PATIENT MESSAGE (OUTPATIENT)
Dept: PAIN MEDICINE | Facility: CLINIC | Age: 34
End: 2024-10-29
Payer: MEDICAID

## 2024-10-29 RX ORDER — PREGABALIN 150 MG/1
150 CAPSULE ORAL 2 TIMES DAILY
Qty: 180 CAPSULE | Refills: 0 | Status: SHIPPED | OUTPATIENT
Start: 2024-10-29 | End: 2025-01-27

## 2024-11-07 DIAGNOSIS — E11.9 TYPE 2 DIABETES MELLITUS WITHOUT COMPLICATION, WITHOUT LONG-TERM CURRENT USE OF INSULIN: ICD-10-CM

## 2024-11-07 DIAGNOSIS — I10 ESSENTIAL HYPERTENSION: ICD-10-CM

## 2024-11-07 RX ORDER — METOPROLOL SUCCINATE 50 MG/1
50 TABLET, EXTENDED RELEASE ORAL
Qty: 90 TABLET | Refills: 0 | OUTPATIENT
Start: 2024-11-07

## 2024-11-07 NOTE — TELEPHONE ENCOUNTER
Provider Staff:  Please note Refusal of medication.     Action required for this patient.      Requested Prescriptions     Refused Prescriptions Disp Refills    metoprolol succinate (TOPROL-XL) 50 MG 24 hr tablet [Pharmacy Med Name: METOPROLOL SUCCINATE ER 50 50 Tablet] 90 tablet 0     Sig: TAKE ONE TABLET BY MOUTH EVERY DAY     Refused By: CHRIS HA     Reason for Refusal: Patient needs an appointment      Thanks!  [facility]   Note composed: 11/07/2024 2:00 PM

## 2024-11-07 NOTE — TELEPHONE ENCOUNTER
Refill Routing Note   Medication(s) are not appropriate for processing by [facility] for the following reason(s):        Patient not seen by provider within 15 months  ED/Hospital Visit since last OV with provider  Responsible provider unclear    [facility] action(s):  Defer             Appointments  past 12m or future 3m with PCP    Date Provider   Last Visit   Visit date not found Kalina Huerta,    Next Visit   Visit date not found Kalina Huerta, DO   ED visits in past 90 days: 0        Note composed:11:59 AM 11/07/2024

## 2024-11-08 RX ORDER — GLIMEPIRIDE 4 MG/1
TABLET ORAL
Qty: 30 TABLET | Refills: 1 | Status: SHIPPED | OUTPATIENT
Start: 2024-11-08

## 2024-11-14 ENCOUNTER — PATIENT MESSAGE (OUTPATIENT)
Dept: PAIN MEDICINE | Facility: CLINIC | Age: 34
End: 2024-11-14
Payer: MEDICAID

## 2024-11-14 DIAGNOSIS — M54.9 DORSALGIA, UNSPECIFIED: ICD-10-CM

## 2024-11-14 DIAGNOSIS — M54.16 LUMBAR RADICULOPATHY: ICD-10-CM

## 2024-11-14 RX ORDER — TIZANIDINE 4 MG/1
4 TABLET ORAL EVERY 8 HOURS
Qty: 90 TABLET | Refills: 2 | Status: SHIPPED | OUTPATIENT
Start: 2024-11-14

## 2024-11-14 NOTE — TELEPHONE ENCOUNTER
Pt is requesting for a refill of: tiZANidine (ZANAFLEX) 4 MG tablet   Last filed:10/26/24  Last encounter: 9/19/24  Up coming apt: 12/12/24  Pharmacy:  [facility] - [place], [place] - [id number]     Is this something you can do?

## 2024-12-12 ENCOUNTER — TELEPHONE (OUTPATIENT)
Dept: PAIN MEDICINE | Facility: CLINIC | Age: 34
End: 2024-12-12

## 2024-12-12 ENCOUNTER — OFFICE VISIT (OUTPATIENT)
Dept: PAIN MEDICINE | Facility: CLINIC | Age: 34
End: 2024-12-12
Payer: MEDICAID

## 2024-12-12 ENCOUNTER — PATIENT MESSAGE (OUTPATIENT)
Dept: PAIN MEDICINE | Facility: CLINIC | Age: 34
End: 2024-12-12

## 2024-12-12 VITALS — HEIGHT: 64 IN | BODY MASS INDEX: 41.06 KG/M2

## 2024-12-12 DIAGNOSIS — Z98.890 S/P BUNIONECTOMY: ICD-10-CM

## 2024-12-12 DIAGNOSIS — M54.16 LEFT LUMBAR RADICULOPATHY: Primary | ICD-10-CM

## 2024-12-12 DIAGNOSIS — M47.816 LUMBAR SPONDYLOSIS: ICD-10-CM

## 2024-12-12 DIAGNOSIS — M51.361 DEGENERATION OF INTERVERTEBRAL DISC OF LUMBAR REGION WITH LOWER EXTREMITY PAIN: ICD-10-CM

## 2024-12-12 PROCEDURE — 1160F RVW MEDS BY RX/DR IN RCRD: CPT | Mod: CPTII,95,, | Performed by: PHYSICIAN ASSISTANT

## 2024-12-12 PROCEDURE — 3008F BODY MASS INDEX DOCD: CPT | Mod: CPTII,95,, | Performed by: PHYSICIAN ASSISTANT

## 2024-12-12 PROCEDURE — 1159F MED LIST DOCD IN RCRD: CPT | Mod: CPTII,95,, | Performed by: PHYSICIAN ASSISTANT

## 2024-12-12 PROCEDURE — 4010F ACE/ARB THERAPY RXD/TAKEN: CPT | Mod: CPTII,95,, | Performed by: PHYSICIAN ASSISTANT

## 2024-12-12 PROCEDURE — 99214 OFFICE O/P EST MOD 30 MIN: CPT | Mod: 95,,, | Performed by: PHYSICIAN ASSISTANT

## 2024-12-12 NOTE — PROGRESS NOTES
"Established Patient - TeleHealth Visit    The patient location is: [place]  The chief complaint leading to consultation is: chronic pain     Visit type: audiovisual    Face to Face time with patient: 10-15 minutes  20 minutes of total time spent on the encounter, which includes face to face time and non-face to face time preparing to see the patient (eg, review of tests), Obtaining and/or reviewing separately obtained history, Documenting clinical information in the electronic or other health record, Independently interpreting results (not separately reported) and communicating results to the patient/family/caregiver, or Care coordination (not separately reported).     Each patient to whom he or she provides medical services by telemedicine is:  (1) informed of the relationship between the physician and patient and the respective role of any other health care provider with respect to management of the patient; and (2) notified that he or she may decline to receive medical services by telemedicine and may withdraw from such care at any time.        Chronic Pain -- Established Patient (Follow-up visit)    Chief complaint:  Follow-up     C/o Lower back with left leg pain    Interval History (12/12/2024):  Patient presents today for follow-up visit.  Patient was last seen on 9/19/2024, about 3 months ago. she presents today for follow-up for medication refill. she feels the Lyrica is providing adequate pain relief and reduces the negative effects of chronic pain that affects quality of life. No major SE from medications.  She does feel pain has returned from previous injection. Patient reports pain as 7/10 with activity.      Interval History (9/19/2024):  Nathan Carlos presents today for follow-up visit.  Patient was last seen on 6/27/2024. At that visit, the plan was to increase Lyrica, which has been helping. Patient reports pain as "0/10 today.  She is also still reporting pain relief from last lumbar " epidural.    Interval history 06/27/2024  Patient presents status post L5-S1 interlaminar epidural steroid injection with left paramedian approach 05/24/2024.  Patient reports 80% sustained relief in lower back and left-sided radicular symptoms following epidural steroid injection.  Pain can be exacerbated with exertion, standing and with ambulation.  She is continued Lyrica 150 mg twice daily with noticeable improvement in her pain.  She denies any side effects from this medication.  Today she denies significant lower extremity weakness, bowel or bladder incontinence or saddle anesthesia.    Interval history 04/03/2024  Patient presents for three-month follow-up.  Today she reports return of lower back and left leg pain.  Today she reports pain in the lower back which can radiate down the left lower extremity in L5-S1 distribution to the foot.  Pain today is rated a 6/10.  She is continued Lyrica 150 mg twice daily with noticeable improvement in her pain.  She denies any side effects from this medication.  She is continued physician directed physical therapy exercises at home over the last 8 weeks from 02/03/2024 through 04/03/2024 with marginal improvement in pain, range of motion and functionality.  She would like to repeat interlaminar epidural steroid injection as she received greater than 50% relief exceeding 5 months in duration.    Interval Hx: 01/09/2024  Patient presents status post L5-S1 interlaminar epidural steroid injection with left paramedian approach 11/21/2023.  Patient reports approximately 60%  relief in lower extremity radiculopathy following L5-S1 interlaminar epidural steroid injection.  She does report that this is compounded with an improvement in range of motion and functionality, including standing and walking activities of daily living.  Patient has continued Lyrica titration and has reached a dosage of 75 mg twice daily.  She has not noticed a significant benefit at this particular  dosage.  She denies any side effects from this medication.  Today she reports radicular pain can be exacerbated with increased exertion but overall denies lower extremity weakness, bowel or bladder incontinence or saddle anesthesia.    Interval history 10/26/2023 - initial visit with Dr. Rojas:  Nathan Carlos is a 32 y.o. female with past medical history significant for hypertension, hypercholesterolemia, type 2 diabetes, morbid obesity, GERD, s/p bunionectomy of the L foot with Dr. Cohn 05/23/2023 who presents to the clinic for the evaluation of back and leg pain.  Today she reports return of pain in a bandlike distribution in the lower back which radiates down the posterior aspect of the left lower extremity in L5-S1 distribution to the foot.  Of note patient initially believe pain was exacerbated after recent bunionectomy but she followed up with Dr. Cohn, 10/23/2023 with left foot x-rays reviewed with adequate surgical hardware placement healing well without complications and no evidence of stress fractures.  Patient was given Medrol s Dosepak to help with capsulitis/inflammation of the forefoot.  Pain is exacerbated with prolonged standing or with ambulation.  She does endorse associated weakness in the left lower extremity associated with her pain.  Patient has continued physician directed physical therapy exercises at home over the last 6 weeks without meaningful improvement in her pain.  Of note she received greater than 80% relief exceeding 5 months in duration with her last L5-S1 interlaminar epidural steroid injection 02/2023.  Of note patient trialed Lyrica 50 mg up to twice daily from Ms. Kishor WING without noticeable improvement in her pain.  Patient reports significant motor weakness and loss of sensations.  Patient denies night fever/night sweats, urinary incontinence, bowel incontinence, and significant weight loss.      Pain Disability Index (PDI) Score Review:      1/9/2023      "8:13 AM 10/20/2022    10:55 AM 4/14/2022     8:51 AM   Last 3 PDI Scores   Pain Disability Index (PDI) 63 70 67       Non-Pharmacologic Treatments:  Physical Therapy/Home Exercise: yes  Ice/Heat:yes  TENS: no  Acupuncture: no  Massage: no  Chiropractic: no    Other: no    Pain Medications:  - Adjuvant Medications: Ambien (Zolpidem), Lyrica ( Pregabalin), and Zanaflex ( Tizanidine)        Pain Procedures:     Dr. Rojas:  -05/24/2024: L5-S1 interlaminar epidural steroid injection with left paramedian approach  -11/21/2023:  L5-S1 interlaminar epidural steroid injection with left paramedian approach  -02/17/2023:  L5-S1 interlaminar epidural steroid injection  -12/09/2022:  Bilateral sacroiliac joint and greater trochanteric bursa injection  -11/23/2022:  L4-5 interlaminar epidural steroid injection (L5-S1 aborted)  -07/14/2022: Left-sided L4-5 and L5-S1 transforaminal epidural steroid injection    Dr. Kirkland:  -05/05/2022: Bilateral sacroiliac joint and bilateral greater trochanteric bursa injection  -03/08/2022: Left-sided L5-S1 and S1 transforaminal epidural steroid injection  -06/17/2021: Left-sided sacroiliac joint and greater trochanteric bursa injection  -01/21/2021: Left-sided L3-5 lumbar radiofrequency ablation  -12/18/2020: Right-sided L3-5 lumbar radiofrequency ablation        Interval History (07/17/2023):  Nathan Nancy Carlos presents today for follow-up visit.  Patient was last seen on 3/13/2023. She underwent bunionectomy with Dr. Cohn on 05/23/2023. She reports since then she has been having nerve pain in her left leg, reports she feels the nerves "jumping". She reports Dr. Cohn states this is expected and should resolve. She is still wearing a post op boot. Has follow up with podiatry on July 26th to evaluate to see if she can discontinue boot. Overall doing well, still has 40-50% sustained relief from previous lumbar BRUNILDA.  Patient reports pain as 4/10 today.    Interval History (3/13/2023): " Nathan Carlos presents today via telemed for follow-up visit.  she underwent Lumbar L5/S1 IL BRUNILDA with left paramedian approach on 2/17/23.  The patient reports that she is/was better following the procedure.  she reports 80% pain relief.  The changes lasted 4 weeks so far.  The changes have continued through this visit.  She reports that the injection helped her a lot, and states that her low back pain is now intermittent instead of constant.  She is also able to stand a little bit longer.  She has since began walking daily and is performing physician directed exercises at home.  She takes tizanidine as needed for muscle pain, and reports that this is helpful.  Patient reports pain as 2/10 today.    Interval History (1/9/2023): Nathan Carlos presents today for follow-up visit.  she underwent lumbar L5/S1 IL BRUNILDA on 11/23/2022 followed by bilateral SIJ + GT bursa injection on 12/9/22.  The patient reports that she is/was better following the procedure.  she reports 80-90% pain relief from SIJ injection and 60% relief from BRUNILDA. Reports continued leg pain/burning/tingling, but improved since injection.  Patient reports pain as 10/10 today.    Interval History (10/20/2022):  Nathan Carlos presents today for follow-up visit.  Patient was last seen on 8/18/2022. Last injection Left L4/5 +L5/S1 TF BRUNILDA on 07/14/2021. Patient reports pain as 8/10 today. Reports she has had several episodes during which her left leg gave out. She also reports pain in her lower lumbar spine in a band-like distribution with radiation into her lower extremities left > right. Pain interferes with daily activities.    Interval History (8/18/2022): Nathan Carlos presents today for follow-up visit.  she underwent Left L4/5 +L5/S1 TF BRUNILDA on 7/14/22.  The patient reports that she is/was better following the procedure.  she reports 40% pain relief.  The changes lasted 4 weeks so far.  The changes have continued through  this visit.  Patient reports pain as 6/10 today, 10/10 on her worst days. Reports she is still experiencing pain but is able to stand for somewhat longer periods of time. Continues to report pain in left buttock down posterior thigh, but does admit she is now having pain in the right lumbar region now as well. She reports she takes tizanidine three times daily on bad days, and this seems to help.    Interval History (6/2/2022): Nathan Carlos presents today for follow-up visit.  she underwent bilateral SIJ + GTB injection on 5/5/22.  The patient reports that she is/was better following the procedure.  she reports 60% pain relief.  The changes lasted 4 weeks so far.  The changes have continued through this visit.  Reports back and buttock pain improved, but reports continued pain along her left leg. Reports pain along anterior, lateral, and posterior aspect of left leg, radiating down to foot at times. Patient reports pain as 6/10 today. Reports that previous epidural did not offer much relief.    Interval HPI (08/04/2021) - Dr. Kirkland:  Nathan Carlos is a 30 y.o. female  who is presenting with a chief complaint of Lumbar Back Pain. The patient began experiencing this problem insidiously, and the pain has been gradually worsening over the past 3 month(s). The pain is described as throbbing, cramping, burning and electrical and is located in the left lumbar spine. Pain is intermittent and lasts hours. The pain radiates to  left leg. The patient rates her pain a 8 out of ten and interferes with activities of daily living a 8 out of ten. Pain is exacerbated by flexion of the lumbar spine, and is improved by rest. Patient reports no prior trauma, no prior spinal surgery     Interval HPI (04/22/2020) - Dr. Kirkland:  Nathan Carlos is a 29 y.o. female  who is presenting with a chief complaint of Low-back Pain. The patient began experiencing this problem insidiously, and the pain has been gradually  worsening. The pain is described as throbbing, cramping, aching and heavy and is located in the bilateral lumbar spine. Pain is intermittent and lasts hours. The  pain is nonradiating. The patient rates her pain a 8 out of ten and interferes with activities of daily living a 7 out of ten. Pain is exacerbated by extension of the lumbar spine, and is improved by rest. Patient reports no prior trauma, no prior spinal surgery     Initial HPI (01/04/2018) - Dr. Kirkland:  Nathan Carlos is a 27 y.o. female  who is presenting with a chief complaint of Left Buttock Pain. The patient began experiencing this problem insidiously, and the pain has been gradually worsening over the past 3 month(s). The pain is described as throbbing, cramping, aching and heavy and is located in the left buttock. Pain is intermittent and lasts hours. The pain radiates to pain is nonradiating. The patient rates her pain a 8 out of ten and interferes with activities of daily living a 8 out of ten. Pain is exacerbated by getting up from a seated position, and is improved by rest. Patient reports no prior trauma, no prior spinal surgery       - pertinent negatives: No fever, No chills, No weight loss, No bladder dysfunction, No bowel dysfunction, No saddle anesthesia  - pertinent positives: none    - medications, other therapies tried (physical therapy, injections):     >> NSAIDs, Tylenol, Norco, zanaflex and flexeril (no relief), topamax (no relief)    >> Has previously undergone Physical Therapy    >> Has previously undergone spinal injection/s   - Left SI joint injection 1/2018 with 100% relief for 6 months   - Right L3, L4, L5 MBB with local on 8/15/18 with 90% pain relief   - left SIJ + left GT bursa injection with local on 11/8/18 with good relief of bursitis but only 10% of SIJ pain   - left L3-5 MBB with local on 5/16/19 with 90% pain relief   - bilateral L3-5 MBB on 8/14/19 with limited relief   - right SIJ injection on 2/5/20 with 80% pain  relief   - bilateral L3-5 MBB on 7/16/2020 with 100% relief of lumbar back pain   - right L3-5 RFA on 12/08/2020 with 100% pain relief   - left L3-5 RFA on 1/21/21 with at least 50% pain relief   - left SIJ + left GT bursa injection on 6/17/21 with at least 40% pain relief    - L5-S1, S1 TFESI on 3/8/22 with 60% Pain relief    -bilateral SIJ + GTB injection on 5/5/22 with 60% relief   -lumbar L5/S1 IL BRUNILDA on 11/23/2022 with 60% relief   -bilateral SIJ + GT bursa injection on 12/9/22 with 80-90% relief  -Lumbar L5/S1 IL BRUNILDA with left paramedian approach on 2/17/23 with 80% relief        Imaging/ Diagnostic Studies/ Labs (Reviewed on 12/12/2024):    MRI lumbar spine 12/16/2021    FINDINGS:  Detail limited by poor signal noise ratio.  Prominent edema within the subcutaneous soft tissues of the lower back.  Stable anatomic alignment, unchanged since 11/22/2017.  The vertebral body heights are maintained.  Mild disc desiccation at L4-L5 is noted.  No significant disc space narrowing.  The distal tip of the conus medullaris terminates near the L2 level.  There are no significant areas of disc bulge or protrusion.  Minimal grade 1 retrolisthesis of L4 on L5 is noted following the administration of intravenous contrast, no abnormal areas of enhancement are noted.     L1-L2: No spinal canal or neural foraminal encroachment.     L2-L3: No spinal canal or neural foraminal encroachment.     L3-L4: No spinal canal or neural foraminal encroachment.     L4-L5: Mild facet arthropathy.  No spinal canal or neural foraminal encroachment.     L5-S1: Mild bilateral facet arthropathy.  No spinal canal or neural foraminal encroachment.     Impression:  Mild lower lumbar spine degenerative changes as above.       1/27/2022 BLE EMG/NCS   1. ABNORMAL study  2. There is electrodiagnostic evidence of an acute on chronic radiculopathy of the LEFT S1 nerve root and a subacute on chronic radiculopathy of the LEFT L5 nerve root       Review of  "Systems:  CONSTITUTIONAL: patient denies any fever, chills, or weight loss  SKIN: patient denies any rash or itching  RESPIRATORY: patient denies having any shortness of breath  GASTROINTESTINAL: patient denies having any diarrhea, constipation, or bowel incontinence  GENITOURINARY: patient denies having any abnormal bladder function    MUSCULOSKELETAL:  - patient complains of the above noted pain/s (see chief pain complaint)    NEUROLOGICAL:   - pain as above  - strength in Lower extremities is intact, BILATERALLY  - sensation in Lower extremities is intact, BILATERALLY  - patient denies any loss of bowel or bladder control      PSYCHIATRIC: patient denies any change in mood    Other:  All other systems reviewed and are negative        Telemedicine Physical Exam:   Vitals:    12/12/24 0920   Height: 5' 4" (1.626 m)     Body mass index is 41.06 kg/m².   (reviewed on 12/12/2024)     (Physical exam performed virtually with patient guided on specific actions and diagnostic maneuvers)  GENERAL: Well appearing, in no acute distress, alert and oriented x3.  Cooperative.  PSYCH:  Mood and affect appropriate.  SKIN: Skin color & texture with no obvious abnormalities.    HEAD/FACE:  Normocephalic, atraumatic.    PULM:  No difficulty breathing. No nasal flaring. No obvious wheezing.  EXTREMITIES: No obvious deformities. Moving all extremities well, appears to have symmetric strength throughout.  MUSCULOSKELETAL: No obvious atrophy abnormalities are noted.   NEURO: No obvious neurologic deficit.   GAIT: sitting.     Physical Exam: last in clinic visit:  General: alert and oriented, in no apparent distress, obese.  Gait: normal gait.  Skin: no rashes, no discoloration, no obvious lesions  HEENT: normocephalic, atraumatic. Pupils equal and round.  Cardiovascular: no significant peripheral edema present.  Respiratory: without use of accessory muscles of respiration.    Musculoskeletal - Lumbar Spine:  - Pain on flexion of lumbar " spine: Present   - Pain on extension of lumbar spine: Present  - Lumbar facet loading: Present bilaterally  - TTP over the lumbar facet joints: Present on left at L5-S1   - TTP over the para lumbar muscles on left   - TTP over the SI joints: Present bilaterally  - TTP over GT bursa: Present bilaterally  - TTP over piriformis: absent  - Straight Leg Raise: equivocal on right, positive on left  - JULISSA: Positive bilaterally    Neuro - Extremities:  - BUE Strength:R/L: D: 5/5; B: 5/5; T: 5/5; WF: 5/5; WE: 5/5; IO: 5/5  - BLE Strength: R/L: HF: 5/5, HE: 5/5, KF: 5/5; KE: 5/4; FE: 5/5; FF: 5/5  - Extremity Reflexes: Brisk and symmetric throughout  - Sensory: Sensation to light touch intact bilaterally      Psych:  Mood and affect is appropriate        Assessment:  Nathan Carlos is a 33 y.o. year old female who is presenting with   Encounter Diagnoses   Name Primary?    Left lumbar radiculopathy Yes    Degeneration of intervertebral disc of lumbar region with lower extremity pain     Lumbar spondylosis     h/o bunionectomy, left foot            Plan:  1. Interventional:   - Schedule L5-S1 IL BRUNILDA with left paramedian approach.   Patient is not taking prescription blood thinners or ASA.    Patient has 80% sustained relief following L5-S1 interlaminar epidural steroid injection with left paramedian approach for lumbar radiculopathy.  We discussed repeating this injection in the future should radicular symptoms exacerbate.    - Anticoagulation use: None.     2. Pharmacologic:   -Refill Lyrica (pregabalin) 150mg BID, which she is tolerating well & providing pain relief.  We previously discussed potential side effects of this medication which may include drowsiness,dizziness, dry mouth, constipation or peripheral edema.    - [place]  reviewed and appropriate.       3. Rehabilitative: We discussed continuing at home physician directed physical therapy to help manage the patient/s painful condition. The patient was  counseled that muscle strengthening will improve the long term prognosis in regards to pain and may also help increase range of motion and mobility.     4. Diagnostic/ Imaging: No new imaging ordered. Previous imaging reviewed: MRI lumbar spine 12/16/2021.    5. Consult/ Referral: Continue follow-up with Dr. Cohn (PRN): Podiatry; S/p bunionectomy, prior L foot sx    6. Follow up:  4 weeks post-procedure  - virtual visit - Mague Molina PA-C       Future Appointments   Date Time Provider Department Center   12/23/2024  8:00 AM Woody Perla, [facility]         - Patient Questions: Answered all of the patient's questions regarding diagnosis, therapy, and treatment.    - This condition does not require this patient to take time off of work, and the primary goal of our Pain Management services is to improve the patient's functional capacity.   - I discussed the risks, benefits, and alternatives to potential treatment options. All questions and concerns were fully addressed today in clinic.         Mague Molina PA-C  Interventional Pain Management - [facility]    Disclaimer:  This note was prepared using voice recognition system and is likely to have sound alike errors that may have been overlooked even after proof reading.  Please call me with any questions.

## 2024-12-12 NOTE — TELEPHONE ENCOUNTER
Contacted pt. Injection scheduled. Pre injection instructions taught. Pt was able to verbalize all understanding. All questions answered.//lp

## 2024-12-12 NOTE — TELEPHONE ENCOUNTER
----- Message from Mauge Molina PA-C sent at 12/12/2024  9:59 AM CST -----  Contact: Nathan  Please call to schedule BRUNILDA :)  ----- Message -----  From: Gaby Chao  Sent: 12/12/2024   9:43 AM CST  To: Jesse Evans was on virtual call with REINA Molina and the phone froze on her. Please call her at [phone]

## 2024-12-23 ENCOUNTER — LAB VISIT (OUTPATIENT)
Dept: LAB | Facility: HOSPITAL | Age: 34
End: 2024-12-23
Attending: NURSE PRACTITIONER
Payer: MEDICAID

## 2024-12-23 ENCOUNTER — CLINICAL SUPPORT (OUTPATIENT)
Dept: PRIMARY CARE CLINIC | Facility: CLINIC | Age: 34
End: 2024-12-23
Attending: NURSE PRACTITIONER
Payer: MEDICAID

## 2024-12-23 ENCOUNTER — OFFICE VISIT (OUTPATIENT)
Dept: PRIMARY CARE CLINIC | Facility: CLINIC | Age: 34
End: 2024-12-23
Payer: MEDICAID

## 2024-12-23 VITALS
TEMPERATURE: 98 F | WEIGHT: 242 LBS | SYSTOLIC BLOOD PRESSURE: 106 MMHG | OXYGEN SATURATION: 96 % | HEIGHT: 64 IN | DIASTOLIC BLOOD PRESSURE: 80 MMHG | HEART RATE: 73 BPM | BODY MASS INDEX: 41.32 KG/M2

## 2024-12-23 DIAGNOSIS — F51.04 CHRONIC INSOMNIA: ICD-10-CM

## 2024-12-23 DIAGNOSIS — E11.9 TYPE 2 DIABETES MELLITUS WITHOUT COMPLICATION, WITHOUT LONG-TERM CURRENT USE OF INSULIN: ICD-10-CM

## 2024-12-23 DIAGNOSIS — I10 ESSENTIAL HYPERTENSION: ICD-10-CM

## 2024-12-23 DIAGNOSIS — E11.9 TYPE 2 DIABETES MELLITUS WITHOUT COMPLICATION, WITH LONG-TERM CURRENT USE OF INSULIN: ICD-10-CM

## 2024-12-23 DIAGNOSIS — Z11.59 ENCOUNTER FOR HEPATITIS C SCREENING TEST FOR LOW RISK PATIENT: ICD-10-CM

## 2024-12-23 DIAGNOSIS — Z79.4 TYPE 2 DIABETES MELLITUS WITHOUT COMPLICATION, WITH LONG-TERM CURRENT USE OF INSULIN: ICD-10-CM

## 2024-12-23 DIAGNOSIS — E78.00 HYPERCHOLESTEREMIA: ICD-10-CM

## 2024-12-23 DIAGNOSIS — Z91.89 AT RISK FOR CARDIOVASCULAR EVENT: ICD-10-CM

## 2024-12-23 DIAGNOSIS — Z11.4 SCREENING FOR HIV (HUMAN IMMUNODEFICIENCY VIRUS): ICD-10-CM

## 2024-12-23 DIAGNOSIS — E78.00 ELEVATED LDL CHOLESTEROL LEVEL: ICD-10-CM

## 2024-12-23 DIAGNOSIS — E11.9 TYPE 2 DIABETES MELLITUS WITHOUT COMPLICATION, WITHOUT LONG-TERM CURRENT USE OF INSULIN: Primary | ICD-10-CM

## 2024-12-23 LAB
ALBUMIN SERPL BCP-MCNC: 3.9 G/DL (ref 3.5–5.2)
ALP SERPL-CCNC: 84 U/L (ref 40–150)
ALT SERPL W/O P-5'-P-CCNC: 25 U/L (ref 10–44)
ANION GAP SERPL CALC-SCNC: 11 MMOL/L (ref 8–16)
AST SERPL-CCNC: 16 U/L (ref 10–40)
BASOPHILS # BLD AUTO: 0.02 K/UL (ref 0–0.2)
BASOPHILS NFR BLD: 0.2 % (ref 0–1.9)
BILIRUB SERPL-MCNC: 0.4 MG/DL (ref 0.1–1)
BUN SERPL-MCNC: 11 MG/DL (ref 6–20)
CALCIUM SERPL-MCNC: 9.5 MG/DL (ref 8.7–10.5)
CHLORIDE SERPL-SCNC: 102 MMOL/L (ref 95–110)
CHOLEST SERPL-MCNC: 203 MG/DL (ref 120–199)
CHOLEST/HDLC SERPL: 4.4 {RATIO} (ref 2–5)
CO2 SERPL-SCNC: 26 MMOL/L (ref 23–29)
CREAT SERPL-MCNC: 0.8 MG/DL (ref 0.5–1.4)
DIFFERENTIAL METHOD BLD: ABNORMAL
EOSINOPHIL # BLD AUTO: 0.1 K/UL (ref 0–0.5)
EOSINOPHIL NFR BLD: 0.6 % (ref 0–8)
ERYTHROCYTE [DISTWIDTH] IN BLOOD BY AUTOMATED COUNT: 13.4 % (ref 11.5–14.5)
EST. GFR  (NO RACE VARIABLE): >60 ML/MIN/1.73 M^2
ESTIMATED AVG GLUCOSE: 203 MG/DL (ref 68–131)
GLUCOSE SERPL-MCNC: 158 MG/DL (ref 70–110)
HBA1C MFR BLD: 8.7 % (ref 4–5.6)
HCT VFR BLD AUTO: 45.5 % (ref 37–48.5)
HCV AB SERPL QL IA: NORMAL
HDLC SERPL-MCNC: 46 MG/DL (ref 40–75)
HDLC SERPL: 22.7 % (ref 20–50)
HGB BLD-MCNC: 14.5 G/DL (ref 12–16)
IMM GRANULOCYTES # BLD AUTO: 0.03 K/UL (ref 0–0.04)
IMM GRANULOCYTES NFR BLD AUTO: 0.3 % (ref 0–0.5)
LDLC SERPL CALC-MCNC: 126 MG/DL (ref 63–159)
LYMPHOCYTES # BLD AUTO: 4.2 K/UL (ref 1–4.8)
LYMPHOCYTES NFR BLD: 42.1 % (ref 18–48)
MCH RBC QN AUTO: 27.6 PG (ref 27–31)
MCHC RBC AUTO-ENTMCNC: 31.9 G/DL (ref 32–36)
MCV RBC AUTO: 87 FL (ref 82–98)
MONOCYTES # BLD AUTO: 0.8 K/UL (ref 0.3–1)
MONOCYTES NFR BLD: 8.2 % (ref 4–15)
NEUTROPHILS # BLD AUTO: 4.8 K/UL (ref 1.8–7.7)
NEUTROPHILS NFR BLD: 48.6 % (ref 38–73)
NONHDLC SERPL-MCNC: 157 MG/DL
NRBC BLD-RTO: 0 /100 WBC
PLATELET # BLD AUTO: 257 K/UL (ref 150–450)
PMV BLD AUTO: 12.5 FL (ref 9.2–12.9)
POTASSIUM SERPL-SCNC: 4.1 MMOL/L (ref 3.5–5.1)
PROT SERPL-MCNC: 7.2 G/DL (ref 6–8.4)
RBC # BLD AUTO: 5.25 M/UL (ref 4–5.4)
SODIUM SERPL-SCNC: 139 MMOL/L (ref 136–145)
T3FREE SERPL-MCNC: 2.7 PG/ML (ref 2.3–4.2)
T4 FREE SERPL-MCNC: 0.74 NG/DL (ref 0.71–1.51)
TRIGL SERPL-MCNC: 155 MG/DL (ref 30–150)
TSH SERPL DL<=0.005 MIU/L-ACNC: 2.01 UIU/ML (ref 0.4–4)
WBC # BLD AUTO: 9.87 K/UL (ref 3.9–12.7)

## 2024-12-23 PROCEDURE — 84443 ASSAY THYROID STIM HORMONE: CPT | Performed by: NURSE PRACTITIONER

## 2024-12-23 PROCEDURE — 36415 COLL VENOUS BLD VENIPUNCTURE: CPT | Mod: PN | Performed by: NURSE PRACTITIONER

## 2024-12-23 PROCEDURE — 84439 ASSAY OF FREE THYROXINE: CPT | Performed by: NURSE PRACTITIONER

## 2024-12-23 PROCEDURE — 85025 COMPLETE CBC W/AUTO DIFF WBC: CPT | Performed by: NURSE PRACTITIONER

## 2024-12-23 PROCEDURE — 86803 HEPATITIS C AB TEST: CPT | Performed by: NURSE PRACTITIONER

## 2024-12-23 PROCEDURE — 83036 HEMOGLOBIN GLYCOSYLATED A1C: CPT | Performed by: NURSE PRACTITIONER

## 2024-12-23 PROCEDURE — 87389 HIV-1 AG W/HIV-1&-2 AB AG IA: CPT | Performed by: NURSE PRACTITIONER

## 2024-12-23 PROCEDURE — 3079F DIAST BP 80-89 MM HG: CPT | Mod: CPTII,,, | Performed by: NURSE PRACTITIONER

## 2024-12-23 PROCEDURE — 1160F RVW MEDS BY RX/DR IN RCRD: CPT | Mod: CPTII,,, | Performed by: NURSE PRACTITIONER

## 2024-12-23 PROCEDURE — 99214 OFFICE O/P EST MOD 30 MIN: CPT | Mod: S$PBB,,, | Performed by: NURSE PRACTITIONER

## 2024-12-23 PROCEDURE — 1159F MED LIST DOCD IN RCRD: CPT | Mod: CPTII,,, | Performed by: NURSE PRACTITIONER

## 2024-12-23 PROCEDURE — 3008F BODY MASS INDEX DOCD: CPT | Mod: CPTII,,, | Performed by: NURSE PRACTITIONER

## 2024-12-23 PROCEDURE — 99215 OFFICE O/P EST HI 40 MIN: CPT | Mod: PBBFAC,PN | Performed by: NURSE PRACTITIONER

## 2024-12-23 PROCEDURE — 3074F SYST BP LT 130 MM HG: CPT | Mod: CPTII,,, | Performed by: NURSE PRACTITIONER

## 2024-12-23 PROCEDURE — 80061 LIPID PANEL: CPT | Performed by: NURSE PRACTITIONER

## 2024-12-23 PROCEDURE — 80053 COMPREHEN METABOLIC PANEL: CPT | Performed by: NURSE PRACTITIONER

## 2024-12-23 PROCEDURE — 87389 HIV-1 AG W/HIV-1&-2 AB AG IA: CPT | Mod: 91 | Performed by: NURSE PRACTITIONER

## 2024-12-23 PROCEDURE — 84481 FREE ASSAY (FT-3): CPT | Performed by: NURSE PRACTITIONER

## 2024-12-23 PROCEDURE — 99999 PR PBB SHADOW E&M-EST. PATIENT-LVL V: CPT | Mod: PBBFAC,,, | Performed by: NURSE PRACTITIONER

## 2024-12-23 RX ORDER — ZOLPIDEM TARTRATE 5 MG/1
TABLET ORAL
Qty: 30 TABLET | Refills: 3 | Status: SHIPPED | OUTPATIENT
Start: 2024-12-23

## 2024-12-23 RX ORDER — METOPROLOL SUCCINATE 50 MG/1
50 TABLET, EXTENDED RELEASE ORAL DAILY
Qty: 90 TABLET | Refills: 0 | Status: SHIPPED | OUTPATIENT
Start: 2024-12-23 | End: 2025-03-23

## 2024-12-23 RX ORDER — METFORMIN HYDROCHLORIDE 750 MG/1
750 TABLET, EXTENDED RELEASE ORAL
Qty: 90 TABLET | Refills: 0 | Status: SHIPPED | OUTPATIENT
Start: 2024-12-23 | End: 2025-03-23

## 2024-12-23 RX ORDER — IRBESARTAN 150 MG/1
150 TABLET ORAL NIGHTLY
Qty: 90 TABLET | Refills: 0 | Status: SHIPPED | OUTPATIENT
Start: 2024-12-23 | End: 2025-03-23

## 2024-12-23 RX ORDER — SEMAGLUTIDE 1.34 MG/ML
1 INJECTION, SOLUTION SUBCUTANEOUS
Qty: 3 ML | Refills: 1 | Status: SHIPPED | OUTPATIENT
Start: 2024-12-23

## 2024-12-23 RX ORDER — GLIMEPIRIDE 4 MG/1
4 TABLET ORAL
Qty: 90 TABLET | Refills: 0 | Status: SHIPPED | OUTPATIENT
Start: 2024-12-23 | End: 2025-03-23

## 2024-12-23 RX ORDER — ATORVASTATIN CALCIUM 40 MG/1
40 TABLET, FILM COATED ORAL NIGHTLY
Qty: 30 TABLET | Refills: 1 | Status: SHIPPED | OUTPATIENT
Start: 2024-12-23

## 2024-12-23 NOTE — PROGRESS NOTES
Subjective:       Patient ID: Nathan Carlos is a 34 y.o. female.    Chief Complaint: Establish Care and Medication Refill      History of Present Illness:   Nathan Carlos 34 y.o. female presents today for medication management and refills for HTN, DM II and HLD. Patient reports that she has problems sleeping at night. Treatment options and alternatives were discussed with the patient. Patient provided opportunity to ask additional questions.  All questions were answered. Voices understanding and acceptance of this advice. Instructed to call back if any further questions or concerns.    Past Medical History:   Diagnosis Date    Anxiety     Carpal tunnel syndrome     Diabetes mellitus, type 2     GERD (gastroesophageal reflux disease)     Herpes simplex virus (HSV) infection     High serum testosterone 09/07/2017    Hyperlipidemia     Hypertension     Insulin resistance syndrome 09/07/2017    Lumbar facet arthropathy 08/14/2019    Morbid obesity     Ovarian cyst     bilateral      Family History   Problem Relation Name Age of Onset    Hypertension Mother Raisa Villatoro     Fibroids Mother Raisa Villatoro     Arthritis Mother Raisa Villatoro     No Known Problems Father unk hx     Diabetes Sister Korina Vásquez     Depression Sister Korina Vásquez     Hypertension Brother C     No Known Problems Brother K     Hypertension Maternal Grandmother Susan Hoffman     Leukemia Maternal Grandfather Dinisha     Hypertension Maternal Grandfather Dinisha     Hypertension Maternal Aunt Bevelry [place]     Diabetes Maternal Aunt Bevelry [place]     Arthritis Maternal Aunt Bevelry [place]     Diabetes Maternal Uncle Dinisha     Stroke Neg Hx       Social History     Socioeconomic History    Marital status:     Number of children: 0   Occupational History     Employer: [id number]   Tobacco Use    Smoking status: Never    Smokeless tobacco: Never   Substance and Sexual Activity    Alcohol use: Never    Drug use: No  "   Sexual activity: Yes     Partners: Male     Birth control/protection: None     Social Drivers of Health     Financial Resource Strain: Low Risk  (1/3/2025)    Overall Financial Resource Strain (CARDIA)     Difficulty of Paying Living Expenses: Not hard at all   Food Insecurity: No Food Insecurity (1/3/2025)    Hunger Vital Sign     Worried About Running Out of Food in the Last Year: Never true     Ran Out of Food in the Last Year: Never true   Transportation Needs: No Transportation Needs (11/8/2023)    PRAPARE - Transportation     Lack of Transportation (Medical): No     Lack of Transportation (Non-Medical): No   Physical Activity: Inactive (1/3/2025)    Exercise Vital Sign     Days of Exercise per Week: 7 days     Minutes of Exercise per Session: 0 min   Stress: Stress Concern Present (1/3/2025)    [place] citizen [place] [employer] - Occupational Stress Questionnaire     Feeling of Stress : Rather much   Housing Stability: Low Risk  (11/8/2023)    Housing Stability Vital Sign     Unable to Pay for Housing in the Last Year: No     Number of Places Lived in the Last Year: 1     Unstable Housing in the Last Year: No     Outpatient Encounter Medications as of 12/23/2024   Medication Sig Dispense Refill    BD ULTRA-FINE GAYLA PEN NEEDLE 32 gauge x 5/32" Ndle USE WITH INSULIN EVERY  each 6    blood-glucose meter,continuous (DEXCOM G6 ) Misc Use as directed,  to be changed every 365 days. 1 each 0    blood-glucose sensor (DEXCOM G6 SENSOR) Selin Use as directed, change sensor every 10 days 9 each 1    blood-glucose transmitter (DEXCOM G6 TRANSMITTER) Selin Use as directed, change transmitter every 3 months 1 each 1    clotrimazole-betamethasone 1-0.05% (LOTRISONE) cream Apply topically 2 (two) times daily. 45 g 2    ergocalciferol (ERGOCALCIFEROL) 50,000 unit Cap TAKE 1 CAPSULE BY MOUTH ONCE A WEEK FOR 28 DAYS      fluticasone propionate (FLONASE) 50 mcg/actuation nasal spray 1 spray by Each " Nostril route 2 (two) times daily.      linaCLOtide (LINZESS) 290 mcg Cap capsule Take 1 capsule (290 mcg total) by mouth before breakfast. 30 capsule 6    lurasidone (LATUDA) 60 mg Tab tablet Take 60 mg by mouth.      norethindrone-ethinyl estradiol-iron (MICROGESTIN FE1.5/30) 1.5 mg-30 mcg (21)/75 mg (7) tablet Take 1 tablet by mouth once daily. 90 tablet 3    pregabalin (LYRICA) 150 MG capsule Take 1 capsule (150 mg total) by mouth 2 (two) times daily. 180 capsule 0    spironolactone (ALDACTONE) 25 MG tablet Take 2 tablets by mouth once daily 180 tablet 3    tiZANidine (ZANAFLEX) 4 MG tablet Take 1 tablet (4 mg total) by mouth every 8 (eight) hours. 90 tablet 2    [DISCONTINUED] atorvastatin (LIPITOR) 40 MG tablet TAKE ONE TABLET BY MOUTH EVERY NIGHT 30 tablet 1    [DISCONTINUED] glimepiride (AMARYL) 4 MG tablet TAKE ONE TABLET BY MOUTH EVERY MORNING WITH BREAKFAST 30 tablet 1    [DISCONTINUED] irbesartan (AVAPRO) 150 MG tablet TAKE 1 TABLET BY MOUTH EVERY DAY 90 tablet 3    [DISCONTINUED] metFORMIN (GLUCOPHAGE-XR) 750 MG ER 24hr tablet TAKE 1 TABLET BY MOUTH EVERY DAY 90 tablet 0    [DISCONTINUED] metoprolol succinate (TOPROL-XL) 50 MG 24 hr tablet TAKE ONE TABLET BY MOUTH EVERY DAY 30 tablet 11    [DISCONTINUED] zolpidem (AMBIEN) 5 MG Tab TAKE ONE TABLET BY MOUTH AT BEDTIME AS NEEDED 30 tablet 0    atorvastatin (LIPITOR) 40 MG tablet Take 1 tablet (40 mg total) by mouth every evening. 30 tablet 1    diclofenac 0.15% LIDOcaine 2.25% prilocaine 2.25% topical cream Apply 1 or 2 pumps to painful area up to 5 times daily.  Maximum 10 pumps/day. (Patient not taking: Reported on 12/23/2024) 480 g 1    dicyclomine (BENTYL) 20 mg tablet TAKE ONE TABLET BY MOUTH THREE TIMES A DAY AS NEEDED FOR ABDOMINAL PAIN (Patient not taking: Reported on 12/23/2024) 90 tablet 11    glimepiride (AMARYL) 4 MG tablet Take 1 tablet (4 mg total) by mouth daily with breakfast. 90 tablet 0    insulin detemir U-100, Levemir, (LEVEMIR FLEXPEN)  100 unit/mL (3 mL) InPn pen Inject 25 Units into the skin every evening. (Patient not taking: Reported on 12/23/2024) 15 mL 1    irbesartan (AVAPRO) 150 MG tablet Take 1 tablet (150 mg total) by mouth every evening. 90 tablet 0    metFORMIN (GLUCOPHAGE-XR) 750 MG ER 24hr tablet Take 1 tablet (750 mg total) by mouth daily with breakfast. 90 tablet 0    metoprolol succinate (TOPROL-XL) 50 MG 24 hr tablet Take 1 tablet (50 mg total) by mouth once daily. 90 tablet 0    omeprazole/sodium bicarbonate (ZEGERID ORAL) Take by mouth. (Patient not taking: Reported on 12/23/2024)      semaglutide (OZEMPIC) 1 mg/dose (4 mg/3 mL) Inject 1 mg into the skin every 7 days. 3 mL 1    simethicone (GAS-X ORAL) Take by mouth. (Patient not taking: Reported on 12/23/2024)      zolpidem (AMBIEN) 5 MG Tab TAKE ONE TABLET BY MOUTH AT BEDTIME AS NEEDED 30 tablet 3    [DISCONTINUED] OZEMPIC 1 mg/dose (4 mg/3 mL) INJECT 1 MG UNDER THE SKIN EVERY 7 DAYS. (Patient not taking: Reported on 12/23/2024) 3 mL 1     No facility-administered encounter medications on file as of 12/23/2024.       Review of Systems   Constitutional:  Positive for unexpected weight change. Negative for appetite change, chills and fever.   HENT:  Negative for ear pain, sinus pressure, sore throat and trouble swallowing.    Eyes:  Negative for visual disturbance.   Respiratory:  Negative for shortness of breath.    Cardiovascular:  Negative for chest pain.   Gastrointestinal:  Negative for abdominal pain, diarrhea, nausea and vomiting.   Endocrine: Negative for cold intolerance, polyphagia and polyuria.   Genitourinary:  Negative for decreased urine volume and dysuria.   Musculoskeletal:  Negative for back pain.   Skin:  Negative for rash.   Allergic/Immunologic: Negative for environmental allergies and food allergies.   Neurological:  Negative for dizziness, tremors, weakness and numbness.   Hematological:  Does not bruise/bleed easily.   Psychiatric/Behavioral:  Positive for  "sleep disturbance. Negative for confusion and hallucinations. The patient is not nervous/anxious and is not hyperactive.    All other systems reviewed and are negative.      Objective:      /80 (BP Location: Right arm, Patient Position: Sitting)   Pulse 73   Temp 97.8 °F (36.6 °C) (Oral)   Ht 5' 4" (1.626 m)   Wt 109.8 kg (242 lb)   LMP 10/18/2024   SpO2 96%   BMI 41.54 kg/m²   Physical Exam  Constitutional:       General: She is not in acute distress.     Appearance: Normal appearance. She is obese.   HENT:      Head: Normocephalic.      Right Ear: Tympanic membrane normal.      Left Ear: Tympanic membrane normal.      Mouth/Throat:      Mouth: Mucous membranes are moist.   Eyes:      General: No scleral icterus.  Cardiovascular:      Rate and Rhythm: Normal rate and regular rhythm.      Heart sounds: No murmur heard.  Pulmonary:      Effort: Pulmonary effort is normal.      Breath sounds: Normal breath sounds.   Abdominal:      General: Bowel sounds are normal.      Palpations: Abdomen is soft.      Tenderness: There is no abdominal tenderness. There is no right CVA tenderness or left CVA tenderness.   Musculoskeletal:         General: No swelling or tenderness.      Cervical back: Normal range of motion and neck supple.      Right lower leg: No edema.      Left lower leg: No edema.   Skin:     General: Skin is warm and dry.   Neurological:      General: No focal deficit present.      Mental Status: She is alert and oriented to person, place, and time.      Cranial Nerves: No cranial nerve deficit.      Gait: Gait normal.   Psychiatric:         Mood and Affect: Mood normal.         Behavior: Behavior normal.         Thought Content: Thought content normal.         Judgment: Judgment normal.                       Results for orders placed or performed during the hospital encounter of 05/24/24   POCT urine pregnancy    Collection Time: 05/24/24  6:28 AM   Result Value Ref Range    POC Preg Test, Ur " Negative Negative     Acceptable Yes    POCT glucose    Collection Time: 05/24/24  6:28 AM   Result Value Ref Range    POCT Glucose 158 (H) 70 - 110 mg/dL     *Note: Due to a large number of results and/or encounters for the requested time period, some results have not been displayed. A complete set of results can be found in Results Review.     Assessment:       1. Type 2 diabetes mellitus without complication, without long-term current use of insulin    2. Essential hypertension    3. Hypercholesteremia    4. Screening for HIV (human immunodeficiency virus)    5. Encounter for hepatitis C screening test for low risk patient    6. Elevated LDL cholesterol level    7. At risk for cardiovascular event    8. Type 2 diabetes mellitus without complication, with long-term current use of insulin    9. Chronic insomnia    Assessment & Plan             Plan:   Type 2 diabetes mellitus without complication, without long-term current use of insulin  -     CBC Auto Differential; Future; Expected date: 12/23/2024  -     Comprehensive Metabolic Panel; Future; Expected date: 12/23/2024  -     Hemoglobin A1C; Future; Expected date: 12/23/2024  -     Diabetic Eye Screening Photo; Future  -     Foot Exam Performed  -     MICROALBUMIN / CREATININE RATIO URINE  -     atorvastatin (LIPITOR) 40 MG tablet; Take 1 tablet (40 mg total) by mouth every evening.  Dispense: 30 tablet; Refill: 1  -     glimepiride (AMARYL) 4 MG tablet; Take 1 tablet (4 mg total) by mouth daily with breakfast.  Dispense: 90 tablet; Refill: 0  -     metFORMIN (GLUCOPHAGE-XR) 750 MG ER 24hr tablet; Take 1 tablet (750 mg total) by mouth daily with breakfast.  Dispense: 90 tablet; Refill: 0  -     semaglutide (OZEMPIC) 1 mg/dose (4 mg/3 mL); Inject 1 mg into the skin every 7 days.  Dispense: 3 mL; Refill: 1    Essential hypertension  -     CBC Auto Differential; Future; Expected date: 12/23/2024  -     Comprehensive Metabolic Panel; Future; Expected  date: 12/23/2024  -     TSH; Future; Expected date: 12/23/2024  -     T3, Free; Future; Expected date: 12/23/2024  -     T4, Free; Future; Expected date: 12/23/2024  -     HIV 1/2 Ag/Ab (4th Gen); Future; Expected date: 12/23/2024  -     irbesartan (AVAPRO) 150 MG tablet; Take 1 tablet (150 mg total) by mouth every evening.  Dispense: 90 tablet; Refill: 0  -     metoprolol succinate (TOPROL-XL) 50 MG 24 hr tablet; Take 1 tablet (50 mg total) by mouth once daily.  Dispense: 90 tablet; Refill: 0    Hypercholesteremia  -     Lipid Panel; Future; Expected date: 12/23/2024    Screening for HIV (human immunodeficiency virus)    Encounter for hepatitis C screening test for low risk patient  -     Hepatitis C Antibody; Future; Expected date: 12/23/2024    Elevated LDL cholesterol level  -     atorvastatin (LIPITOR) 40 MG tablet; Take 1 tablet (40 mg total) by mouth every evening.  Dispense: 30 tablet; Refill: 1    At risk for cardiovascular event  -     irbesartan (AVAPRO) 150 MG tablet; Take 1 tablet (150 mg total) by mouth every evening.  Dispense: 90 tablet; Refill: 0    Type 2 diabetes mellitus without complication, with long-term current use of insulin  -     irbesartan (AVAPRO) 150 MG tablet; Take 1 tablet (150 mg total) by mouth every evening.  Dispense: 90 tablet; Refill: 0    Chronic insomnia  -     zolpidem (AMBIEN) 5 MG Tab; TAKE ONE TABLET BY MOUTH AT BEDTIME AS NEEDED  Dispense: 30 tablet; Refill: 3      Today's encounter took a total time of 20 minutes, and that time included Preparing to see the patient (review records, tests), Obtaining and/or reviewing separately obtained historical data, Performing a medically appropriate examination and/or evaluation , Counseling & educating the patient/family/caregiver on treatment plan with chronic health conditions , ordering medications, tests, and/or procedures, Referring and communicating with other healthcare professionals , Documenting clinical information in the  electronic or other health record, Independently interpreting results & communicating results to the patient/family/caregiver.           [facility]   [address]  [place] [address]  Office [phone]  Fax [phone]   This note was generated with the assistance of ambient listening technology. Verbal consent was obtained by the patient and accompanying visitor(s) for the recording of patient appointment to facilitate this note. I attest to having reviewed and edited the generated note for accuracy, though some syntax or spelling errors may persist. Please contact the author of this note for any clarification.

## 2024-12-24 LAB
HIV 1+2 AB+HIV1 P24 AG SERPL QL IA: REACTIVE
HIV SUPPLEMENTAL ASSAY INTERPRETATION: NORMAL
HIV-1 RESULT: NEGATIVE
HIV-2 RESULT: NEGATIVE

## 2024-12-26 ENCOUNTER — TELEPHONE (OUTPATIENT)
Dept: PRIMARY CARE CLINIC | Facility: CLINIC | Age: 34
End: 2024-12-26
Payer: MEDICAID

## 2024-12-26 NOTE — TELEPHONE ENCOUNTER
I have attempted without success to contact this patient by phone, I left a message with pt . .     ----- Message from Bianka sent at 12/26/2024  2:38 PM CST -----  Contact: Donna with [facility]   .[email or web address]     Patient is calling for Medical Advice regarding: Donna with [facility] would like a call back regarding patient lab work that was drawn on 12/23/24      Comments: Please call     Please advise patient replies from provider may take up to 48 hours.

## 2024-12-26 NOTE — TELEPHONE ENCOUNTER
Returned call to Donna. No answer. Lvm     ----- Message from Kimberly sent at 12/26/2024  4:17 PM CST -----  Contact: [phone]  Type: Returning a call    Who left a message?Pio Milligan, TANNER    When did the practice call?Today     Does patient know what this is regarding:    Would the patient rather a call back or a response via [facility]? Call back     Please call pt and advise

## 2025-01-03 ENCOUNTER — TELEPHONE (OUTPATIENT)
Dept: PRIMARY CARE CLINIC | Facility: CLINIC | Age: 35
End: 2025-01-03
Payer: MEDICAID

## 2025-01-03 DIAGNOSIS — Z78.9 FALSE POSITIVE HIV SEROLOGY: Primary | ICD-10-CM

## 2025-01-03 NOTE — TELEPHONE ENCOUNTER
Returned call to Donna from [facility] in regards to labs. Pt was called to come in for additional labs. Pt voiced understanding.     ----- Message from Stefany sent at 1/3/2025  1:16 PM CST -----  Contact: Pt [phone]  .[email or web address]     Patient is calling for Medical Advice regarding: Donna Sheets from [facility], calling to speak to Dr. Iqbal or nurse. In reference to some lab work a patient had done on 12/23/24. This is her second time calling, she really need to speak to someone.    Patient wants a call back or thru [facility]: call back    Comments: Donna : [phone]    Please advise patient replies from provider may take up to 48 hours.            Please Call and advise    Thank you    Please do NOT rep[ly to sender as this is from the call center and they answer incoming calls only.

## 2025-01-09 ENCOUNTER — OFFICE VISIT (OUTPATIENT)
Dept: PRIMARY CARE CLINIC | Facility: CLINIC | Age: 35
End: 2025-01-09
Payer: MEDICAID

## 2025-01-09 DIAGNOSIS — Z78.9 FALSE POSITIVE HIV SEROLOGY: Primary | ICD-10-CM

## 2025-01-23 ENCOUNTER — TELEPHONE (OUTPATIENT)
Dept: PAIN MEDICINE | Facility: CLINIC | Age: 35
End: 2025-01-23
Payer: MEDICAID

## 2025-01-23 ENCOUNTER — OFFICE VISIT (OUTPATIENT)
Dept: PAIN MEDICINE | Facility: CLINIC | Age: 35
End: 2025-01-23
Payer: MEDICAID

## 2025-01-23 VITALS — BODY MASS INDEX: 41.54 KG/M2 | HEIGHT: 64 IN

## 2025-01-23 DIAGNOSIS — M51.361 DEGENERATION OF INTERVERTEBRAL DISC OF LUMBAR REGION WITH LOWER EXTREMITY PAIN: ICD-10-CM

## 2025-01-23 DIAGNOSIS — M47.816 LUMBAR SPONDYLOSIS: ICD-10-CM

## 2025-01-23 DIAGNOSIS — M54.16 LUMBAR RADICULOPATHY, CHRONIC: ICD-10-CM

## 2025-01-23 DIAGNOSIS — M54.16 LEFT LUMBAR RADICULOPATHY: Primary | ICD-10-CM

## 2025-01-23 NOTE — PROGRESS NOTES
Established Patient - TeleHealth Visit    The patient location is: [place]  The chief complaint leading to consultation is: chronic pain     Visit type: Audiovisual telemedicine visit     30 minutes of total time spent on the encounter, which includes face to face time and non-face to face time preparing to see the patient (eg, review of tests), Obtaining and/or reviewing separately obtained history, Documenting clinical information in the electronic or other health record, Independently interpreting results (not separately reported) and communicating results to the patient/family/caregiver, or Care coordination (not separately reported).     Each patient to whom he or she provides medical services by telemedicine is:  (1) informed of the relationship between the physician and patient and the respective role of any other health care provider with respect to management of the patient; and (2) notified that he or she may decline to receive medical services by telemedicine and may withdraw from such care at any time.          Chronic Pain -- Established Patient (Follow-up visit)    Chief complaint:  Follow-up     C/o Lower back with left leg pain    Interval History (1/23/2025):  Nathan Carlos presents today for follow-up visit.  Patient was last seen on 12/12/2024. At that visit, the plan was to schedule epidural, but we are unsure why insurance was denied. She continues to take Lyrica twice per day. Patient reports pain as 8/10 today.    Patient had 80% pain relief from L5/S1 IL BRUNILDA with left paramedian approach on 5/24/24 (almost 7 months ago). Pain returned within the last month, so she obtained 80% pain relief for at least 5-6 months after BRUNILDA.  Patient reports pain is exacerbated with movement, with standing, and with ambulation. Patient also states pain significantly affects activities of daily living, and subsequently her quality of life. After the BRUNILDA, she had noticeable pain relief in conjunction with  "Lyrica and was able to engage in more activity that was too painful prior.  Patient would like to move forward with interventional treatment.    Interval History (12/12/2024):  Patient presents today for follow-up visit.  Patient was last seen on 9/19/2024, about 3 months ago. she presents today for follow-up for medication refill. she feels the Lyrica is providing adequate pain relief and reduces the negative effects of chronic pain that affects quality of life. No major SE from medications.  She does feel pain has returned from previous injection. Patient reports pain as 7/10 with activity.      Interval History (9/19/2024):  Nathan Carlos presents today for follow-up visit.  Patient was last seen on 6/27/2024. At that visit, the plan was to increase Lyrica, which has been helping. Patient reports pain as "0/10 today.  She is also still reporting pain relief from last lumbar epidural.    Interval history 06/27/2024  Patient presents status post L5-S1 interlaminar epidural steroid injection with left paramedian approach 05/24/2024.  Patient reports 80% sustained relief in lower back and left-sided radicular symptoms following epidural steroid injection.  Pain can be exacerbated with exertion, standing and with ambulation.  She is continued Lyrica 150 mg twice daily with noticeable improvement in her pain.  She denies any side effects from this medication.  Today she denies significant lower extremity weakness, bowel or bladder incontinence or saddle anesthesia.    Interval history 04/03/2024  Patient presents for three-month follow-up.  Today she reports return of lower back and left leg pain.  Today she reports pain in the lower back which can radiate down the left lower extremity in L5-S1 distribution to the foot.  Pain today is rated a 6/10.  She is continued Lyrica 150 mg twice daily with noticeable improvement in her pain.  She denies any side effects from this medication.  She is continued physician " directed physical therapy exercises at home over the last 8 weeks from 02/03/2024 through 04/03/2024 with marginal improvement in pain, range of motion and functionality.  She would like to repeat interlaminar epidural steroid injection as she received greater than 50% relief exceeding 5 months in duration.    Interval Hx: 01/09/2024  Patient presents status post L5-S1 interlaminar epidural steroid injection with left paramedian approach 11/21/2023.  Patient reports approximately 60%  relief in lower extremity radiculopathy following L5-S1 interlaminar epidural steroid injection.  She does report that this is compounded with an improvement in range of motion and functionality, including standing and walking activities of daily living.  Patient has continued Lyrica titration and has reached a dosage of 75 mg twice daily.  She has not noticed a significant benefit at this particular dosage.  She denies any side effects from this medication.  Today she reports radicular pain can be exacerbated with increased exertion but overall denies lower extremity weakness, bowel or bladder incontinence or saddle anesthesia.    Interval history 10/26/2023 - initial visit with Dr. Rojas:  Nathan Carlos is a 32 y.o. female with past medical history significant for hypertension, hypercholesterolemia, type 2 diabetes, morbid obesity, GERD, s/p bunionectomy of the L foot with Dr. Cohn 05/23/2023 who presents to the clinic for the evaluation of back and leg pain.  Today she reports return of pain in a bandlike distribution in the lower back which radiates down the posterior aspect of the left lower extremity in L5-S1 distribution to the foot.  Of note patient initially believe pain was exacerbated after recent bunionectomy but she followed up with Dr. Cohn, 10/23/2023 with left foot x-rays reviewed with adequate surgical hardware placement healing well without complications and no evidence of stress fractures.  Patient was  given Medrol s Dosepak to help with capsulitis/inflammation of the forefoot.  Pain is exacerbated with prolonged standing or with ambulation.  She does endorse associated weakness in the left lower extremity associated with her pain.  Patient has continued physician directed physical therapy exercises at home over the last 6 weeks without meaningful improvement in her pain.  Of note she received greater than 80% relief exceeding 5 months in duration with her last L5-S1 interlaminar epidural steroid injection 02/2023.  Of note patient trialed Lyrica 50 mg up to twice daily from [facility] without noticeable improvement in her pain.  Patient reports significant motor weakness and loss of sensations.  Patient denies night fever/night sweats, urinary incontinence, bowel incontinence, and significant weight loss.      Pain Disability Index (PDI) Score Review:      1/9/2023     8:13 AM 10/20/2022    10:55 AM 4/14/2022     8:51 AM   Last 3 PDI Scores   Pain Disability Index (PDI) 63 70 67       Non-Pharmacologic Treatments:  Physical Therapy/Home Exercise: yes  Ice/Heat:yes  TENS: no  Acupuncture: no  Massage: no  Chiropractic: no    Other: no    Pain Medications:  - Adjuvant Medications: Ambien (Zolpidem), Lyrica ( Pregabalin), and Zanaflex ( Tizanidine)        Pain Procedures:     Dr. Rojas:  -05/24/2024: L5-S1 interlaminar epidural steroid injection with left paramedian approach  -11/21/2023:  L5-S1 interlaminar epidural steroid injection with left paramedian approach  -02/17/2023:  L5-S1 interlaminar epidural steroid injection  -12/09/2022:  Bilateral sacroiliac joint and greater trochanteric bursa injection  -11/23/2022:  L4-5 interlaminar epidural steroid injection (L5-S1 aborted)  -07/14/2022: Left-sided L4-5 and L5-S1 transforaminal epidural steroid injection    Dr. Kirkland:  -05/05/2022: Bilateral sacroiliac joint and bilateral greater trochanteric bursa injection  -03/08/2022: Left-sided L5-S1 and S1  "transforaminal epidural steroid injection  -06/17/2021: Left-sided sacroiliac joint and greater trochanteric bursa injection  -01/21/2021: Left-sided L3-5 lumbar radiofrequency ablation  -12/18/2020: Right-sided L3-5 lumbar radiofrequency ablation        Interval History (07/17/2023):  Nathan Carlos presents today for follow-up visit.  Patient was last seen on 3/13/2023. She underwent bunionectomy with Dr. Cohn on 05/23/2023. She reports since then she has been having nerve pain in her left leg, reports she feels the nerves "jumping". She reports Dr. Cohn states this is expected and should resolve. She is still wearing a post op boot. Has follow up with podiatry on July 26th to evaluate to see if she can discontinue boot. Overall doing well, still has 40-50% sustained relief from previous lumbar BRUNILDA.  Patient reports pain as 4/10 today.    Interval History (3/13/2023): Nathan Carlos presents today via telemed for follow-up visit.  she underwent Lumbar L5/S1 IL BRUNILDA with left paramedian approach on 2/17/23.  The patient reports that she is/was better following the procedure.  she reports 80% pain relief.  The changes lasted 4 weeks so far.  The changes have continued through this visit.  She reports that the injection helped her a lot, and states that her low back pain is now intermittent instead of constant.  She is also able to stand a little bit longer.  She has since began walking daily and is performing physician directed exercises at home.  She takes tizanidine as needed for muscle pain, and reports that this is helpful.  Patient reports pain as 2/10 today.    Interval History (1/9/2023): Nathan Carlos presents today for follow-up visit.  she underwent lumbar L5/S1 IL BRUNILDA on 11/23/2022 followed by bilateral SIJ + GT bursa injection on 12/9/22.  The patient reports that she is/was better following the procedure.  she reports 80-90% pain relief from SIJ injection and 60% relief from " BRUNILDA. Reports continued leg pain/burning/tingling, but improved since injection.  Patient reports pain as 10/10 today.    Interval History (10/20/2022):  Nathan Carlos presents today for follow-up visit.  Patient was last seen on 8/18/2022. Last injection Left L4/5 +L5/S1 TF BRUNILDA on 07/14/2021. Patient reports pain as 8/10 today. Reports she has had several episodes during which her left leg gave out. She also reports pain in her lower lumbar spine in a band-like distribution with radiation into her lower extremities left > right. Pain interferes with daily activities.    Interval History (8/18/2022): Nathan Carlos presents today for follow-up visit.  she underwent Left L4/5 +L5/S1 TF BRUNILDA on 7/14/22.  The patient reports that she is/was better following the procedure.  she reports 40% pain relief.  The changes lasted 4 weeks so far.  The changes have continued through this visit.  Patient reports pain as 6/10 today, 10/10 on her worst days. Reports she is still experiencing pain but is able to stand for somewhat longer periods of time. Continues to report pain in left buttock down posterior thigh, but does admit she is now having pain in the right lumbar region now as well. She reports she takes tizanidine three times daily on bad days, and this seems to help.    Interval History (6/2/2022): Nathan Carlos presents today for follow-up visit.  she underwent bilateral SIJ + GTB injection on 5/5/22.  The patient reports that she is/was better following the procedure.  she reports 60% pain relief.  The changes lasted 4 weeks so far.  The changes have continued through this visit.  Reports back and buttock pain improved, but reports continued pain along her left leg. Reports pain along anterior, lateral, and posterior aspect of left leg, radiating down to foot at times. Patient reports pain as 6/10 today. Reports that previous epidural did not offer much relief.    Interval HPI (08/04/2021) -   Isael:  Chrisssakina Carlos is a 30 y.o. female  who is presenting with a chief complaint of Lumbar Back Pain. The patient began experiencing this problem insidiously, and the pain has been gradually worsening over the past 3 month(s). The pain is described as throbbing, cramping, burning and electrical and is located in the left lumbar spine. Pain is intermittent and lasts hours. The pain radiates to  left leg. The patient rates her pain a 8 out of ten and interferes with activities of daily living a 8 out of ten. Pain is exacerbated by flexion of the lumbar spine, and is improved by rest. Patient reports no prior trauma, no prior spinal surgery     Interval HPI (04/22/2020) - Dr. Kirkland:  Nathan Carlos is a 29 y.o. female  who is presenting with a chief complaint of Low-back Pain. The patient began experiencing this problem insidiously, and the pain has been gradually worsening. The pain is described as throbbing, cramping, aching and heavy and is located in the bilateral lumbar spine. Pain is intermittent and lasts hours. The  pain is nonradiating. The patient rates her pain a 8 out of ten and interferes with activities of daily living a 7 out of ten. Pain is exacerbated by extension of the lumbar spine, and is improved by rest. Patient reports no prior trauma, no prior spinal surgery     Initial HPI (01/04/2018) - Dr. Kirkland:  Nathan Carlos is a 27 y.o. female  who is presenting with a chief complaint of Left Buttock Pain. The patient began experiencing this problem insidiously, and the pain has been gradually worsening over the past 3 month(s). The pain is described as throbbing, cramping, aching and heavy and is located in the left buttock. Pain is intermittent and lasts hours. The pain radiates to pain is nonradiating. The patient rates her pain a 8 out of ten and interferes with activities of daily living a 8 out of ten. Pain is exacerbated by getting up from a seated position, and is improved by  rest. Patient reports no prior trauma, no prior spinal surgery       - pertinent negatives: No fever, No chills, No weight loss, No bladder dysfunction, No bowel dysfunction, No saddle anesthesia  - pertinent positives: none    - medications, other therapies tried (physical therapy, injections):     >> NSAIDs, Tylenol, Norco, zanaflex and flexeril (no relief), topamax (no relief)    >> Has previously undergone Physical Therapy    >> Has previously undergone spinal injection/s   - Left SI joint injection 1/2018 with 100% relief for 6 months   - Right L3, L4, L5 MBB with local on 8/15/18 with 90% pain relief   - left SIJ + left GT bursa injection with local on 11/8/18 with good relief of bursitis but only 10% of SIJ pain   - left L3-5 MBB with local on 5/16/19 with 90% pain relief   - bilateral L3-5 MBB on 8/14/19 with limited relief   - right SIJ injection on 2/5/20 with 80% pain relief   - bilateral L3-5 MBB on 7/16/2020 with 100% relief of lumbar back pain   - right L3-5 RFA on 12/08/2020 with 100% pain relief   - left L3-5 RFA on 1/21/21 with at least 50% pain relief   - left SIJ + left GT bursa injection on 6/17/21 with at least 40% pain relief    - L5-S1, S1 TFESI on 3/8/22 with 60% Pain relief    -bilateral SIJ + GTB injection on 5/5/22 with 60% relief   -lumbar L5/S1 IL BRUNILDA on 11/23/2022 with 60% relief   -bilateral SIJ + GT bursa injection on 12/9/22 with 80-90% relief  -Lumbar L5/S1 IL BRUNILDA with left paramedian approach on 2/17/23 with 80% relief        Imaging/ Diagnostic Studies/ Labs (Reviewed on 1/23/2025):    MRI lumbar spine 12/16/2021    FINDINGS:  Detail limited by poor signal noise ratio.  Prominent edema within the subcutaneous soft tissues of the lower back.  Stable anatomic alignment, unchanged since 11/22/2017.  The vertebral body heights are maintained.  Mild disc desiccation at L4-L5 is noted.  No significant disc space narrowing.  The distal tip of the conus medullaris terminates near the L2  "level.  There are no significant areas of disc bulge or protrusion.  Minimal grade 1 retrolisthesis of L4 on L5 is noted following the administration of intravenous contrast, no abnormal areas of enhancement are noted.     L1-L2: No spinal canal or neural foraminal encroachment.     L2-L3: No spinal canal or neural foraminal encroachment.     L3-L4: No spinal canal or neural foraminal encroachment.     L4-L5: Mild facet arthropathy.  No spinal canal or neural foraminal encroachment.     L5-S1: Mild bilateral facet arthropathy.  No spinal canal or neural foraminal encroachment.     Impression:  Mild lower lumbar spine degenerative changes as above.       1/27/2022 BLE EMG/NCS   1. ABNORMAL study  2. There is electrodiagnostic evidence of an acute on chronic radiculopathy of the LEFT S1 nerve root and a subacute on chronic radiculopathy of the LEFT L5 nerve root       Review of Systems:  CONSTITUTIONAL: patient denies any fever, chills, or weight loss  SKIN: patient denies any rash or itching  RESPIRATORY: patient denies having any shortness of breath  GASTROINTESTINAL: patient denies having any diarrhea, constipation, or bowel incontinence  GENITOURINARY: patient denies having any abnormal bladder function    MUSCULOSKELETAL:  - patient complains of the above noted pain/s (see chief pain complaint)    NEUROLOGICAL:   - pain as above  - strength in Lower extremities is intact, BILATERALLY  - sensation in Lower extremities is intact, BILATERALLY  - patient denies any loss of bowel or bladder control      PSYCHIATRIC: patient denies any change in mood    Other:  All other systems reviewed and are negative        Telemedicine Physical Exam:   Vitals:    01/23/25 1144   Height: 5' 4" (1.626 m)  Comment: pt reported   Body mass index is 41.54 kg/m².   (reviewed on 1/23/2025)     (Physical exam performed virtually with patient guided on specific actions and diagnostic maneuvers)  GENERAL: Well appearing, in no acute " distress, alert and oriented x3.  Cooperative.  PSYCH:  Mood and affect appropriate.  SKIN: Skin color & texture with no obvious abnormalities.    HEAD/FACE:  Normocephalic, atraumatic.    PULM:  No difficulty breathing. No nasal flaring. No obvious wheezing.  EXTREMITIES: No obvious deformities. Moving all extremities well, appears to have symmetric strength throughout.  MUSCULOSKELETAL: No obvious atrophy abnormalities are noted.   GAIT: sitting.     Physical Exam: last in clinic visit:  General: alert and oriented, in no apparent distress, obese.  Gait: normal gait.  Skin: no rashes, no discoloration, no obvious lesions  HEENT: normocephalic, atraumatic. Pupils equal and round.  Cardiovascular: no significant peripheral edema present.  Respiratory: without use of accessory muscles of respiration.    Musculoskeletal - Lumbar Spine:  - Pain on flexion of lumbar spine: Present   - Pain on extension of lumbar spine: Present  - Lumbar facet loading: Present bilaterally  - TTP over the lumbar facet joints: Present on left at L5-S1   - TTP over the para lumbar muscles on left   - TTP over the SI joints: Present bilaterally  - TTP over GT bursa: Present bilaterally  - TTP over piriformis: absent  - Straight Leg Raise: equivocal on right, positive on left  - JULISSA: Positive bilaterally    Neuro - Extremities:  - BUE Strength:R/L: D: 5/5; B: 5/5; T: 5/5; WF: 5/5; WE: 5/5; IO: 5/5  - BLE Strength: R/L: HF: 5/5, HE: 5/5, KF: 5/5; KE: 5/4; FE: 5/5; FF: 5/5  - Extremity Reflexes: Brisk and symmetric throughout  - Sensory: Sensation to light touch intact bilaterally      Psych:  Mood and affect is appropriate        Assessment:  Nathan Carlos is a 34 y.o. year old female who is presenting with       ICD-10-CM ICD-9-CM    1. Left lumbar radiculopathy  M54.16 724.4 Case Request-RAD/Other Procedure Area: L5-S1 IL BRUNILDA with left paramedian approach      2. Degeneration of intervertebral disc of lumbar region with lower  extremity pain  M51.361 722.52       3. Lumbar radiculopathy, chronic  M54.16 724.4       4. Lumbar spondylosis  M47.816 721.3              Plan:  1. Interventional:   - Schedule L5-S1 IL BRUNILDA with left paramedian approach.   Patient is not taking prescription blood thinners or ASA.    Patient has 80% sustained relief following L5-S1 interlaminar epidural steroid injection with left paramedian approach for lumbar radiculopathy.  We discussed repeating this injection in the future should radicular symptoms exacerbate.    Patient had 80% pain relief from L5/S1 IL BRUNILDA with left paramedian approach on 5/24/24 (almost 7 months ago). Pain returned within the last month, so she obtained 80% pain relief for at least 5-6 months after BRUNILDA.  Patient reports pain is exacerbated with movement, with standing, and with ambulation. Patient also states pain significantly affects activities of daily living, and subsequently her quality of life. After the BRUNILDA, she had noticeable pain relief in conjunction with Lyrica and was able to engage in more activity that was too painful prior.  Patient would like to move forward with interventional treatment.    - Anticoagulation use: None.     2. Pharmacologic:   -Refill Lyrica (pregabalin) 150mg BID, which she is tolerating well & providing pain relief.  We previously discussed potential side effects of this medication which may include drowsiness,dizziness, dry mouth, constipation or peripheral edema.    - [place]  reviewed and appropriate.       3. Rehabilitative: We discussed continuing at home physician directed physical therapy to help manage the patient/s painful condition. The patient was counseled that muscle strengthening will improve the long term prognosis in regards to pain and may also help increase range of motion and mobility.     4. Diagnostic/ Imaging: No new imaging ordered. Previous imaging reviewed: MRI lumbar spine 12/16/2021.    5. Consult/ Referral: Continue follow-up with  Dr. Cohn (PRN): Podiatry; S/p bunionectomy, prior L foot sx    6. Follow up:  4 weeks post-procedure  - virtual visit - Mague Molina PA-C       No future appointments.        - Patient Questions: Answered all of the patient's questions regarding diagnosis, therapy, and treatment.    - This condition does not require this patient to take time off of work, and the primary goal of our Pain Management services is to improve the patient's functional capacity.   - I discussed the risks, benefits, and alternatives to potential treatment options. All questions and concerns were fully addressed today in clinic.         Mague Molina PA-C  Interventional Pain Management - [facility]    Disclaimer:  This note was prepared using voice recognition system and is likely to have sound alike errors that may have been overlooked even after proof reading.  Please call me with any questions.

## 2025-01-23 NOTE — H&P (VIEW-ONLY)
Established Patient - TeleHealth Visit    The patient location is: [place]  The chief complaint leading to consultation is: chronic pain     Visit type: Audiovisual telemedicine visit     30 minutes of total time spent on the encounter, which includes face to face time and non-face to face time preparing to see the patient (eg, review of tests), Obtaining and/or reviewing separately obtained history, Documenting clinical information in the electronic or other health record, Independently interpreting results (not separately reported) and communicating results to the patient/family/caregiver, or Care coordination (not separately reported).     Each patient to whom he or she provides medical services by telemedicine is:  (1) informed of the relationship between the physician and patient and the respective role of any other health care provider with respect to management of the patient; and (2) notified that he or she may decline to receive medical services by telemedicine and may withdraw from such care at any time.          Chronic Pain -- Established Patient (Follow-up visit)    Chief complaint:  Follow-up     C/o Lower back with left leg pain    Interval History (1/23/2025):  Nathan Carlos presents today for follow-up visit.  Patient was last seen on 12/12/2024. At that visit, the plan was to schedule epidural, but we are unsure why insurance was denied. She continues to take Lyrica twice per day. Patient reports pain as 8/10 today.    Patient had 80% pain relief from L5/S1 IL BRUNILDA with left paramedian approach on 5/24/24 (almost 7 months ago). Pain returned within the last month, so she obtained 80% pain relief for at least 5-6 months after BRUNILDA.  Patient reports pain is exacerbated with movement, with standing, and with ambulation. Patient also states pain significantly affects activities of daily living, and subsequently her quality of life. After the BRUNILDA, she had noticeable pain relief in conjunction with  "Lyrica and was able to engage in more activity that was too painful prior.  Patient would like to move forward with interventional treatment.    Interval History (12/12/2024):  Patient presents today for follow-up visit.  Patient was last seen on 9/19/2024, about 3 months ago. she presents today for follow-up for medication refill. she feels the Lyrica is providing adequate pain relief and reduces the negative effects of chronic pain that affects quality of life. No major SE from medications.  She does feel pain has returned from previous injection. Patient reports pain as 7/10 with activity.      Interval History (9/19/2024):  Nathan Carlos presents today for follow-up visit.  Patient was last seen on 6/27/2024. At that visit, the plan was to increase Lyrica, which has been helping. Patient reports pain as "0/10 today.  She is also still reporting pain relief from last lumbar epidural.    Interval history 06/27/2024  Patient presents status post L5-S1 interlaminar epidural steroid injection with left paramedian approach 05/24/2024.  Patient reports 80% sustained relief in lower back and left-sided radicular symptoms following epidural steroid injection.  Pain can be exacerbated with exertion, standing and with ambulation.  She is continued Lyrica 150 mg twice daily with noticeable improvement in her pain.  She denies any side effects from this medication.  Today she denies significant lower extremity weakness, bowel or bladder incontinence or saddle anesthesia.    Interval history 04/03/2024  Patient presents for three-month follow-up.  Today she reports return of lower back and left leg pain.  Today she reports pain in the lower back which can radiate down the left lower extremity in L5-S1 distribution to the foot.  Pain today is rated a 6/10.  She is continued Lyrica 150 mg twice daily with noticeable improvement in her pain.  She denies any side effects from this medication.  She is continued physician " directed physical therapy exercises at home over the last 8 weeks from 02/03/2024 through 04/03/2024 with marginal improvement in pain, range of motion and functionality.  She would like to repeat interlaminar epidural steroid injection as she received greater than 50% relief exceeding 5 months in duration.    Interval Hx: 01/09/2024  Patient presents status post L5-S1 interlaminar epidural steroid injection with left paramedian approach 11/21/2023.  Patient reports approximately 60%  relief in lower extremity radiculopathy following L5-S1 interlaminar epidural steroid injection.  She does report that this is compounded with an improvement in range of motion and functionality, including standing and walking activities of daily living.  Patient has continued Lyrica titration and has reached a dosage of 75 mg twice daily.  She has not noticed a significant benefit at this particular dosage.  She denies any side effects from this medication.  Today she reports radicular pain can be exacerbated with increased exertion but overall denies lower extremity weakness, bowel or bladder incontinence or saddle anesthesia.    Interval history 10/26/2023 - initial visit with Dr. Rojas:  Nathan Carlos is a 32 y.o. female with past medical history significant for hypertension, hypercholesterolemia, type 2 diabetes, morbid obesity, GERD, s/p bunionectomy of the L foot with Dr. Cohn 05/23/2023 who presents to the clinic for the evaluation of back and leg pain.  Today she reports return of pain in a bandlike distribution in the lower back which radiates down the posterior aspect of the left lower extremity in L5-S1 distribution to the foot.  Of note patient initially believe pain was exacerbated after recent bunionectomy but she followed up with Dr. Cohn, 10/23/2023 with left foot x-rays reviewed with adequate surgical hardware placement healing well without complications and no evidence of stress fractures.  Patient was  given Medrol s Dosepak to help with capsulitis/inflammation of the forefoot.  Pain is exacerbated with prolonged standing or with ambulation.  She does endorse associated weakness in the left lower extremity associated with her pain.  Patient has continued physician directed physical therapy exercises at home over the last 6 weeks without meaningful improvement in her pain.  Of note she received greater than 80% relief exceeding 5 months in duration with her last L5-S1 interlaminar epidural steroid injection 02/2023.  Of note patient trialed Lyrica 50 mg up to twice daily from [facility] without noticeable improvement in her pain.  Patient reports significant motor weakness and loss of sensations.  Patient denies night fever/night sweats, urinary incontinence, bowel incontinence, and significant weight loss.      Pain Disability Index (PDI) Score Review:      1/9/2023     8:13 AM 10/20/2022    10:55 AM 4/14/2022     8:51 AM   Last 3 PDI Scores   Pain Disability Index (PDI) 63 70 67       Non-Pharmacologic Treatments:  Physical Therapy/Home Exercise: yes  Ice/Heat:yes  TENS: no  Acupuncture: no  Massage: no  Chiropractic: no    Other: no    Pain Medications:  - Adjuvant Medications: Ambien (Zolpidem), Lyrica ( Pregabalin), and Zanaflex ( Tizanidine)        Pain Procedures:     Dr. Rojas:  -05/24/2024: L5-S1 interlaminar epidural steroid injection with left paramedian approach  -11/21/2023:  L5-S1 interlaminar epidural steroid injection with left paramedian approach  -02/17/2023:  L5-S1 interlaminar epidural steroid injection  -12/09/2022:  Bilateral sacroiliac joint and greater trochanteric bursa injection  -11/23/2022:  L4-5 interlaminar epidural steroid injection (L5-S1 aborted)  -07/14/2022: Left-sided L4-5 and L5-S1 transforaminal epidural steroid injection    Dr. Kirkland:  -05/05/2022: Bilateral sacroiliac joint and bilateral greater trochanteric bursa injection  -03/08/2022: Left-sided L5-S1 and S1  "transforaminal epidural steroid injection  -06/17/2021: Left-sided sacroiliac joint and greater trochanteric bursa injection  -01/21/2021: Left-sided L3-5 lumbar radiofrequency ablation  -12/18/2020: Right-sided L3-5 lumbar radiofrequency ablation        Interval History (07/17/2023):  Nathan Carlos presents today for follow-up visit.  Patient was last seen on 3/13/2023. She underwent bunionectomy with Dr. Cohn on 05/23/2023. She reports since then she has been having nerve pain in her left leg, reports she feels the nerves "jumping". She reports Dr. Cohn states this is expected and should resolve. She is still wearing a post op boot. Has follow up with podiatry on July 26th to evaluate to see if she can discontinue boot. Overall doing well, still has 40-50% sustained relief from previous lumbar BRUNILDA.  Patient reports pain as 4/10 today.    Interval History (3/13/2023): Nathan Carlos presents today via telemed for follow-up visit.  she underwent Lumbar L5/S1 IL BRUNILDA with left paramedian approach on 2/17/23.  The patient reports that she is/was better following the procedure.  she reports 80% pain relief.  The changes lasted 4 weeks so far.  The changes have continued through this visit.  She reports that the injection helped her a lot, and states that her low back pain is now intermittent instead of constant.  She is also able to stand a little bit longer.  She has since began walking daily and is performing physician directed exercises at home.  She takes tizanidine as needed for muscle pain, and reports that this is helpful.  Patient reports pain as 2/10 today.    Interval History (1/9/2023): Nathan Carlos presents today for follow-up visit.  she underwent lumbar L5/S1 IL BRUNILDA on 11/23/2022 followed by bilateral SIJ + GT bursa injection on 12/9/22.  The patient reports that she is/was better following the procedure.  she reports 80-90% pain relief from SIJ injection and 60% relief from " BRUNILDA. Reports continued leg pain/burning/tingling, but improved since injection.  Patient reports pain as 10/10 today.    Interval History (10/20/2022):  Nathan Carlos presents today for follow-up visit.  Patient was last seen on 8/18/2022. Last injection Left L4/5 +L5/S1 TF BRUNILDA on 07/14/2021. Patient reports pain as 8/10 today. Reports she has had several episodes during which her left leg gave out. She also reports pain in her lower lumbar spine in a band-like distribution with radiation into her lower extremities left > right. Pain interferes with daily activities.    Interval History (8/18/2022): Nathan Carlos presents today for follow-up visit.  she underwent Left L4/5 +L5/S1 TF BRUNILDA on 7/14/22.  The patient reports that she is/was better following the procedure.  she reports 40% pain relief.  The changes lasted 4 weeks so far.  The changes have continued through this visit.  Patient reports pain as 6/10 today, 10/10 on her worst days. Reports she is still experiencing pain but is able to stand for somewhat longer periods of time. Continues to report pain in left buttock down posterior thigh, but does admit she is now having pain in the right lumbar region now as well. She reports she takes tizanidine three times daily on bad days, and this seems to help.    Interval History (6/2/2022): Nathan Carlos presents today for follow-up visit.  she underwent bilateral SIJ + GTB injection on 5/5/22.  The patient reports that she is/was better following the procedure.  she reports 60% pain relief.  The changes lasted 4 weeks so far.  The changes have continued through this visit.  Reports back and buttock pain improved, but reports continued pain along her left leg. Reports pain along anterior, lateral, and posterior aspect of left leg, radiating down to foot at times. Patient reports pain as 6/10 today. Reports that previous epidural did not offer much relief.    Interval HPI (08/04/2021) -   Isael:  Chrisssakina Carlos is a 30 y.o. female  who is presenting with a chief complaint of Lumbar Back Pain. The patient began experiencing this problem insidiously, and the pain has been gradually worsening over the past 3 month(s). The pain is described as throbbing, cramping, burning and electrical and is located in the left lumbar spine. Pain is intermittent and lasts hours. The pain radiates to  left leg. The patient rates her pain a 8 out of ten and interferes with activities of daily living a 8 out of ten. Pain is exacerbated by flexion of the lumbar spine, and is improved by rest. Patient reports no prior trauma, no prior spinal surgery     Interval HPI (04/22/2020) - Dr. Kirkland:  Nathan Carlos is a 29 y.o. female  who is presenting with a chief complaint of Low-back Pain. The patient began experiencing this problem insidiously, and the pain has been gradually worsening. The pain is described as throbbing, cramping, aching and heavy and is located in the bilateral lumbar spine. Pain is intermittent and lasts hours. The  pain is nonradiating. The patient rates her pain a 8 out of ten and interferes with activities of daily living a 7 out of ten. Pain is exacerbated by extension of the lumbar spine, and is improved by rest. Patient reports no prior trauma, no prior spinal surgery     Initial HPI (01/04/2018) - Dr. Kirkland:  Nathan Carlos is a 27 y.o. female  who is presenting with a chief complaint of Left Buttock Pain. The patient began experiencing this problem insidiously, and the pain has been gradually worsening over the past 3 month(s). The pain is described as throbbing, cramping, aching and heavy and is located in the left buttock. Pain is intermittent and lasts hours. The pain radiates to pain is nonradiating. The patient rates her pain a 8 out of ten and interferes with activities of daily living a 8 out of ten. Pain is exacerbated by getting up from a seated position, and is improved by  rest. Patient reports no prior trauma, no prior spinal surgery       - pertinent negatives: No fever, No chills, No weight loss, No bladder dysfunction, No bowel dysfunction, No saddle anesthesia  - pertinent positives: none    - medications, other therapies tried (physical therapy, injections):     >> NSAIDs, Tylenol, Norco, zanaflex and flexeril (no relief), topamax (no relief)    >> Has previously undergone Physical Therapy    >> Has previously undergone spinal injection/s   - Left SI joint injection 1/2018 with 100% relief for 6 months   - Right L3, L4, L5 MBB with local on 8/15/18 with 90% pain relief   - left SIJ + left GT bursa injection with local on 11/8/18 with good relief of bursitis but only 10% of SIJ pain   - left L3-5 MBB with local on 5/16/19 with 90% pain relief   - bilateral L3-5 MBB on 8/14/19 with limited relief   - right SIJ injection on 2/5/20 with 80% pain relief   - bilateral L3-5 MBB on 7/16/2020 with 100% relief of lumbar back pain   - right L3-5 RFA on 12/08/2020 with 100% pain relief   - left L3-5 RFA on 1/21/21 with at least 50% pain relief   - left SIJ + left GT bursa injection on 6/17/21 with at least 40% pain relief    - L5-S1, S1 TFESI on 3/8/22 with 60% Pain relief    -bilateral SIJ + GTB injection on 5/5/22 with 60% relief   -lumbar L5/S1 IL BRUNILDA on 11/23/2022 with 60% relief   -bilateral SIJ + GT bursa injection on 12/9/22 with 80-90% relief  -Lumbar L5/S1 IL BRUNILDA with left paramedian approach on 2/17/23 with 80% relief        Imaging/ Diagnostic Studies/ Labs (Reviewed on 1/23/2025):    MRI lumbar spine 12/16/2021    FINDINGS:  Detail limited by poor signal noise ratio.  Prominent edema within the subcutaneous soft tissues of the lower back.  Stable anatomic alignment, unchanged since 11/22/2017.  The vertebral body heights are maintained.  Mild disc desiccation at L4-L5 is noted.  No significant disc space narrowing.  The distal tip of the conus medullaris terminates near the L2  "level.  There are no significant areas of disc bulge or protrusion.  Minimal grade 1 retrolisthesis of L4 on L5 is noted following the administration of intravenous contrast, no abnormal areas of enhancement are noted.     L1-L2: No spinal canal or neural foraminal encroachment.     L2-L3: No spinal canal or neural foraminal encroachment.     L3-L4: No spinal canal or neural foraminal encroachment.     L4-L5: Mild facet arthropathy.  No spinal canal or neural foraminal encroachment.     L5-S1: Mild bilateral facet arthropathy.  No spinal canal or neural foraminal encroachment.     Impression:  Mild lower lumbar spine degenerative changes as above.       1/27/2022 BLE EMG/NCS   1. ABNORMAL study  2. There is electrodiagnostic evidence of an acute on chronic radiculopathy of the LEFT S1 nerve root and a subacute on chronic radiculopathy of the LEFT L5 nerve root       Review of Systems:  CONSTITUTIONAL: patient denies any fever, chills, or weight loss  SKIN: patient denies any rash or itching  RESPIRATORY: patient denies having any shortness of breath  GASTROINTESTINAL: patient denies having any diarrhea, constipation, or bowel incontinence  GENITOURINARY: patient denies having any abnormal bladder function    MUSCULOSKELETAL:  - patient complains of the above noted pain/s (see chief pain complaint)    NEUROLOGICAL:   - pain as above  - strength in Lower extremities is intact, BILATERALLY  - sensation in Lower extremities is intact, BILATERALLY  - patient denies any loss of bowel or bladder control      PSYCHIATRIC: patient denies any change in mood    Other:  All other systems reviewed and are negative        Telemedicine Physical Exam:   Vitals:    01/23/25 1144   Height: 5' 4" (1.626 m)  Comment: pt reported   Body mass index is 41.54 kg/m².   (reviewed on 1/23/2025)     (Physical exam performed virtually with patient guided on specific actions and diagnostic maneuvers)  GENERAL: Well appearing, in no acute " distress, alert and oriented x3.  Cooperative.  PSYCH:  Mood and affect appropriate.  SKIN: Skin color & texture with no obvious abnormalities.    HEAD/FACE:  Normocephalic, atraumatic.    PULM:  No difficulty breathing. No nasal flaring. No obvious wheezing.  EXTREMITIES: No obvious deformities. Moving all extremities well, appears to have symmetric strength throughout.  MUSCULOSKELETAL: No obvious atrophy abnormalities are noted.   GAIT: sitting.     Physical Exam: last in clinic visit:  General: alert and oriented, in no apparent distress, obese.  Gait: normal gait.  Skin: no rashes, no discoloration, no obvious lesions  HEENT: normocephalic, atraumatic. Pupils equal and round.  Cardiovascular: no significant peripheral edema present.  Respiratory: without use of accessory muscles of respiration.    Musculoskeletal - Lumbar Spine:  - Pain on flexion of lumbar spine: Present   - Pain on extension of lumbar spine: Present  - Lumbar facet loading: Present bilaterally  - TTP over the lumbar facet joints: Present on left at L5-S1   - TTP over the para lumbar muscles on left   - TTP over the SI joints: Present bilaterally  - TTP over GT bursa: Present bilaterally  - TTP over piriformis: absent  - Straight Leg Raise: equivocal on right, positive on left  - JULISSA: Positive bilaterally    Neuro - Extremities:  - BUE Strength:R/L: D: 5/5; B: 5/5; T: 5/5; WF: 5/5; WE: 5/5; IO: 5/5  - BLE Strength: R/L: HF: 5/5, HE: 5/5, KF: 5/5; KE: 5/4; FE: 5/5; FF: 5/5  - Extremity Reflexes: Brisk and symmetric throughout  - Sensory: Sensation to light touch intact bilaterally      Psych:  Mood and affect is appropriate        Assessment:  Nathan Carlos is a 34 y.o. year old female who is presenting with       ICD-10-CM ICD-9-CM    1. Left lumbar radiculopathy  M54.16 724.4 Case Request-RAD/Other Procedure Area: L5-S1 IL BRUNILDA with left paramedian approach      2. Degeneration of intervertebral disc of lumbar region with lower  extremity pain  M51.361 722.52       3. Lumbar radiculopathy, chronic  M54.16 724.4       4. Lumbar spondylosis  M47.816 721.3              Plan:  1. Interventional:   - Schedule L5-S1 IL BRUNILDA with left paramedian approach.   Patient is not taking prescription blood thinners or ASA.    Patient has 80% sustained relief following L5-S1 interlaminar epidural steroid injection with left paramedian approach for lumbar radiculopathy.  We discussed repeating this injection in the future should radicular symptoms exacerbate.    Patient had 80% pain relief from L5/S1 IL BRUNILDA with left paramedian approach on 5/24/24 (almost 7 months ago). Pain returned within the last month, so she obtained 80% pain relief for at least 5-6 months after BRUNILDA.  Patient reports pain is exacerbated with movement, with standing, and with ambulation. Patient also states pain significantly affects activities of daily living, and subsequently her quality of life. After the BRUNILDA, she had noticeable pain relief in conjunction with Lyrica and was able to engage in more activity that was too painful prior.  Patient would like to move forward with interventional treatment.    - Anticoagulation use: None.     2. Pharmacologic:   -Refill Lyrica (pregabalin) 150mg BID, which she is tolerating well & providing pain relief.  We previously discussed potential side effects of this medication which may include drowsiness,dizziness, dry mouth, constipation or peripheral edema.    - [place]  reviewed and appropriate.       3. Rehabilitative: We discussed continuing at home physician directed physical therapy to help manage the patient/s painful condition. The patient was counseled that muscle strengthening will improve the long term prognosis in regards to pain and may also help increase range of motion and mobility.     4. Diagnostic/ Imaging: No new imaging ordered. Previous imaging reviewed: MRI lumbar spine 12/16/2021.    5. Consult/ Referral: Continue follow-up with  Dr. Cohn (PRN): Podiatry; S/p bunionectomy, prior L foot sx    6. Follow up:  4 weeks post-procedure  - virtual visit - Mague Molina PA-C       No future appointments.        - Patient Questions: Answered all of the patient's questions regarding diagnosis, therapy, and treatment.    - This condition does not require this patient to take time off of work, and the primary goal of our Pain Management services is to improve the patient's functional capacity.   - I discussed the risks, benefits, and alternatives to potential treatment options. All questions and concerns were fully addressed today in clinic.         Mague Molina PA-C  Interventional Pain Management - [facility]    Disclaimer:  This note was prepared using voice recognition system and is likely to have sound alike errors that may have been overlooked even after proof reading.  Please call me with any questions.

## 2025-01-27 NOTE — PROGRESS NOTES
Subjective:       Patient ID: Nathan Carlos is a 34 y.o. female.  Chief Complaint: Discuss lab results  The patient location is:[place], [place]    Visit type: audiovisual-Synchronous      Face to Face time with patient: 11 min  20  minutes of total time spent on the encounter, which includes face to face time and non-face to face time preparing to see the patient (eg, review of tests), Obtaining and/or reviewing separately obtained history, Documenting clinical information in the electronic or other health record, Independently interpreting results (not separately reported) and communicating results to the patient/family/caregiver, or Care coordination (not separately reported).         Each patient to whom he or she provides medical services by telemedicine is:  (1) informed of the relationship between the physician and patient and the respective role of any other health care provider with respect to management of the patient; and (2) notified that he or she may decline to receive medical services by telemedicine and may withdraw from such care at any time.       History of Present Illness:   Nathan Carlos 34 y.o. female presents today to discuss lab results- Her HIV test were positive. Will obtain viral load and labs to confirm. Patient denies any other problems or concerns at this time. Treatment options and alternatives were discussed with the patient. Patient provided opportunity to ask additional questions.  All questions were answered. Voices understanding and acceptance of this advice. Instructed to call back if any further questions or concerns.                 Past Medical History:   Diagnosis Date    Anxiety     Carpal tunnel syndrome     Diabetes mellitus, type 2     GERD (gastroesophageal reflux disease)     Herpes simplex virus (HSV) infection     High serum testosterone 09/07/2017    Hyperlipidemia     Hypertension     Insulin resistance syndrome 09/07/2017    Lumbar facet arthropathy  08/14/2019    Morbid obesity     Ovarian cyst     bilateral      Family History   Problem Relation Name Age of Onset    Hypertension Mother Raisa Villatoro     Fibroids Mother Raisa Villatoro     Arthritis Mother Raisa Villatoro     No Known Problems Father unk hx     Diabetes Sister Korina Vásquez     Depression Sister Korina Vásquez     Hypertension Brother C     No Known Problems Brother K     Hypertension Maternal Grandmother Susan Hoffman     Leukemia Maternal Grandfather Korina     Hypertension Maternal Grandfather Dinraul     Hypertension Maternal Aunt Bevelry Conestee     Diabetes Maternal Aunt Bevelry Conestee     Arthritis Maternal Aunt Bevelry Conestee     Diabetes Maternal Uncle Korina     Stroke Neg Hx       Social History     Socioeconomic History    Marital status:     Number of children: 0   Occupational History     Employer: [id number]   Tobacco Use    Smoking status: Never    Smokeless tobacco: Never   Substance and Sexual Activity    Alcohol use: Never    Drug use: No    Sexual activity: Yes     Partners: Male     Birth control/protection: None     Social Drivers of Health     Financial Resource Strain: Low Risk  (1/3/2025)    Overall Financial Resource Strain (CARDIA)     Difficulty of Paying Living Expenses: Not hard at all   Food Insecurity: No Food Insecurity (1/3/2025)    Hunger Vital Sign     Worried About Running Out of Food in the Last Year: Never true     Ran Out of Food in the Last Year: Never true   Transportation Needs: No Transportation Needs (11/8/2023)    PRAPARE - Transportation     Lack of Transportation (Medical): No     Lack of Transportation (Non-Medical): No   Physical Activity: Inactive (1/3/2025)    Exercise Vital Sign     Days of Exercise per Week: 7 days     Minutes of Exercise per Session: 0 min   Stress: Stress Concern Present (1/3/2025)    [place] [facility] [employer] [facility] - Occupational Stress Questionnaire     Feeling of Stress : Rather much   Housing  "Stability: Low Risk  (11/8/2023)    Housing Stability Vital Sign     Unable to Pay for Housing in the Last Year: No     Number of Places Lived in the Last Year: 1     Unstable Housing in the Last Year: No     Outpatient Encounter Medications as of 1/9/2025   Medication Sig Dispense Refill    atorvastatin (LIPITOR) 40 MG tablet Take 1 tablet (40 mg total) by mouth every evening. 30 tablet 1    BD ULTRA-FINE GAYLA PEN NEEDLE 32 gauge x 5/32" Ndle USE WITH INSULIN EVERY  each 6    blood-glucose meter,continuous (DEXCOM G6 ) Misc Use as directed,  to be changed every 365 days. 1 each 0    blood-glucose sensor (DEXCOM G6 SENSOR) Selin Use as directed, change sensor every 10 days 9 each 1    blood-glucose transmitter (DEXCOM G6 TRANSMITTER) Selin Use as directed, change transmitter every 3 months 1 each 1    clotrimazole-betamethasone 1-0.05% (LOTRISONE) cream Apply topically 2 (two) times daily. 45 g 2    diclofenac 0.15% LIDOcaine 2.25% prilocaine 2.25% topical cream Apply 1 or 2 pumps to painful area up to 5 times daily.  Maximum 10 pumps/day. (Patient not taking: Reported on 12/23/2024) 480 g 1    dicyclomine (BENTYL) 20 mg tablet TAKE ONE TABLET BY MOUTH THREE TIMES A DAY AS NEEDED FOR ABDOMINAL PAIN (Patient not taking: Reported on 12/23/2024) 90 tablet 11    ergocalciferol (ERGOCALCIFEROL) 50,000 unit Cap TAKE 1 CAPSULE BY MOUTH ONCE A WEEK FOR 28 DAYS      fluticasone propionate (FLONASE) 50 mcg/actuation nasal spray 1 spray by Each Nostril route 2 (two) times daily.      glimepiride (AMARYL) 4 MG tablet Take 1 tablet (4 mg total) by mouth daily with breakfast. 90 tablet 0    insulin detemir U-100, Levemir, (LEVEMIR FLEXPEN) 100 unit/mL (3 mL) InPn pen Inject 25 Units into the skin every evening. (Patient not taking: Reported on 12/23/2024) 15 mL 1    irbesartan (AVAPRO) 150 MG tablet Take 1 tablet (150 mg total) by mouth every evening. 90 tablet 0    linaCLOtide (LINZESS) 290 mcg Cap capsule " Take 1 capsule (290 mcg total) by mouth before breakfast. 30 capsule 6    lurasidone (LATUDA) 60 mg Tab tablet Take 60 mg by mouth.      metFORMIN (GLUCOPHAGE-XR) 750 MG ER 24hr tablet Take 1 tablet (750 mg total) by mouth daily with breakfast. 90 tablet 0    metoprolol succinate (TOPROL-XL) 50 MG 24 hr tablet Take 1 tablet (50 mg total) by mouth once daily. 90 tablet 0    norethindrone-ethinyl estradiol-iron (MICROGESTIN FE1.5/30) 1.5 mg-30 mcg (21)/75 mg (7) tablet Take 1 tablet by mouth once daily. 90 tablet 3    omeprazole/sodium bicarbonate (ZEGERID ORAL) Take by mouth. (Patient not taking: Reported on 12/23/2024)      pregabalin (LYRICA) 150 MG capsule Take 1 capsule (150 mg total) by mouth 2 (two) times daily. 180 capsule 0    semaglutide (OZEMPIC) 1 mg/dose (4 mg/3 mL) Inject 1 mg into the skin every 7 days. 3 mL 1    simethicone (GAS-X ORAL) Take by mouth. (Patient not taking: Reported on 12/23/2024)      spironolactone (ALDACTONE) 25 MG tablet Take 2 tablets by mouth once daily 180 tablet 3    tiZANidine (ZANAFLEX) 4 MG tablet Take 1 tablet (4 mg total) by mouth every 8 (eight) hours. 90 tablet 2    zolpidem (AMBIEN) 5 MG Tab TAKE ONE TABLET BY MOUTH AT BEDTIME AS NEEDED 30 tablet 3     No facility-administered encounter medications on file as of 1/9/2025.       Review of Systems   Constitutional:  Negative for activity change and unexpected weight change.   HENT:  Negative for hearing loss, rhinorrhea and trouble swallowing.    Eyes:  Negative for discharge and visual disturbance.   Respiratory:  Negative for chest tightness and wheezing.    Cardiovascular:  Negative for chest pain and palpitations.   Gastrointestinal:  Negative for blood in stool, constipation, diarrhea and vomiting.   Endocrine: Negative for polydipsia and polyuria.   Genitourinary:  Negative for difficulty urinating, dysuria and hematuria.   Musculoskeletal:  Negative for arthralgias, joint swelling and neck pain.   Neurological:   Negative for weakness and headaches.   Psychiatric/Behavioral:  Negative for confusion and dysphoric mood.        Objective:    Physical Exam  Constitutional:       Appearance: She is obese.   Neurological:      Mental Status: She is alert.        There were no vitals taken for this visit.  Physical Exam               Results for orders placed or performed in visit on 12/23/24   CBC Auto Differential    Collection Time: 12/23/24  8:26 AM   Result Value Ref Range    WBC 9.87 3.90 - 12.70 K/uL    RBC 5.25 4.00 - 5.40 M/uL    Hemoglobin 14.5 12.0 - 16.0 g/dL    Hematocrit 45.5 37.0 - 48.5 %    MCV 87 82 - 98 fL    MCH 27.6 27.0 - 31.0 pg    MCHC 31.9 (L) 32.0 - 36.0 g/dL    RDW 13.4 11.5 - 14.5 %    Platelets 257 150 - 450 K/uL    MPV 12.5 9.2 - 12.9 fL    Immature Granulocytes 0.3 0.0 - 0.5 %    Gran # (ANC) 4.8 1.8 - 7.7 K/uL    Immature Grans (Abs) 0.03 0.00 - 0.04 K/uL    Lymph # 4.2 1.0 - 4.8 K/uL    Mono # 0.8 0.3 - 1.0 K/uL    Eos # 0.1 0.0 - 0.5 K/uL    Baso # 0.02 0.00 - 0.20 K/uL    nRBC 0 0 /100 WBC    Gran % 48.6 38.0 - 73.0 %    Lymph % 42.1 18.0 - 48.0 %    Mono % 8.2 4.0 - 15.0 %    Eosinophil % 0.6 0.0 - 8.0 %    Basophil % 0.2 0.0 - 1.9 %    Differential Method Automated    Comprehensive Metabolic Panel    Collection Time: 12/23/24  8:26 AM   Result Value Ref Range    Sodium 139 136 - 145 mmol/L    Potassium 4.1 3.5 - 5.1 mmol/L    Chloride 102 95 - 110 mmol/L    CO2 26 23 - 29 mmol/L    Glucose 158 (H) 70 - 110 mg/dL    BUN 11 6 - 20 mg/dL    Creatinine 0.8 0.5 - 1.4 mg/dL    Calcium 9.5 8.7 - 10.5 mg/dL    Total Protein 7.2 6.0 - 8.4 g/dL    Albumin 3.9 3.5 - 5.2 g/dL    Total Bilirubin 0.4 0.1 - 1.0 mg/dL    Alkaline Phosphatase 84 40 - 150 U/L    AST 16 10 - 40 U/L    ALT 25 10 - 44 U/L    eGFR >60.0 >60 mL/min/1.73 m^2    Anion Gap 11 8 - 16 mmol/L   Lipid Panel    Collection Time: 12/23/24  8:26 AM   Result Value Ref Range    Cholesterol 203 (H) 120 - 199 mg/dL    Triglycerides 155 (H) 30 - 150  mg/dL    HDL 46 40 - 75 mg/dL    LDL Cholesterol 126.0 63.0 - 159.0 mg/dL    HDL/Cholesterol Ratio 22.7 20.0 - 50.0 %    Total Cholesterol/HDL Ratio 4.4 2.0 - 5.0    Non-HDL Cholesterol 157 mg/dL   Hemoglobin A1C    Collection Time: 12/23/24  8:26 AM   Result Value Ref Range    Hemoglobin A1C 8.7 (H) 4.0 - 5.6 %    Estimated Avg Glucose 203 (H) 68 - 131 mg/dL   TSH    Collection Time: 12/23/24  8:26 AM   Result Value Ref Range    TSH 2.013 0.400 - 4.000 uIU/mL   T3, Free    Collection Time: 12/23/24  8:26 AM   Result Value Ref Range    T3, Free 2.7 2.3 - 4.2 pg/mL   T4, Free    Collection Time: 12/23/24  8:26 AM   Result Value Ref Range    Free T4 0.74 0.71 - 1.51 ng/dL   HIV 1/2 Ag/Ab (4th Gen)    Collection Time: 12/23/24  8:26 AM   Result Value Ref Range    HIV 1/2 Ag/Ab Reactive (A) Non-reactive   Hepatitis C Antibody    Collection Time: 12/23/24  8:26 AM   Result Value Ref Range    Hepatitis C Ab Non-reactive Non-reactive   HIV 1/2 Supplemental Assay    Collection Time: 12/23/24  8:26 AM   Result Value Ref Range    HIV-1 Result Negative Negative    HIV-2 Result Negative Negative    HIV Supplemental Assay Interpretation HIV Negative Negative     *Note: Due to a large number of results and/or encounters for the requested time period, some results have not been displayed. A complete set of results can be found in Results Review.     Assessment:       1. False positive HIV serology                 Plan:   False positive HIV serology  Obtain HIV Assay and Viral Load  2. Keep follow up appointments as scheduled.   Today's encounter took a total time of 20 minutes, and that time included Preparing to see the patient (review records, tests), Obtaining and/or reviewing separately obtained historical data, Performing a medically appropriate examination and/or evaluation , Counseling & educating the patient/family/caregiver on treatment plan with chronic health conditions , ordering medications, tests, and/or procedures,  Referring and communicating with other healthcare professionals , Documenting clinical information in the electronic or other health record, Independently interpreting results & communicating results to the patient/family/caregiver.           [facility]   [address] [place] [address] [address] [place] [id number]  Office [phone]  Fax [phone]

## 2025-01-30 ENCOUNTER — PATIENT MESSAGE (OUTPATIENT)
Dept: PAIN MEDICINE | Facility: CLINIC | Age: 35
End: 2025-01-30
Payer: MEDICAID

## 2025-01-30 RX ORDER — PREGABALIN 150 MG/1
150 CAPSULE ORAL 2 TIMES DAILY
Qty: 180 CAPSULE | Refills: 1 | Status: SHIPPED | OUTPATIENT
Start: 2025-01-30 | End: 2025-04-30

## 2025-01-30 RX ORDER — PREGABALIN 150 MG/1
150 CAPSULE ORAL 2 TIMES DAILY
Qty: 180 CAPSULE | Refills: 0 | OUTPATIENT
Start: 2025-01-30 | End: 2025-04-30

## 2025-01-30 NOTE — TELEPHONE ENCOUNTER
Good Morning/Afternoon,     Pt is requesting for a refill of: Pregabalin 150 mg  Last filed: 10/29/24-01/27/2025  Last encounter: 1/23/25  Up coming apt: 3/7/2025  Pharmacy: [facility] - [place], [place] - [id number] [address]   Is this something you can do?      Max Carter  Medical Assistant

## 2025-02-03 NOTE — PRE-PROCEDURE INSTRUCTIONS
Spoke with patient regarding procedure scheduled on 2.7     Arrival time 0730     Has patient been sick with fever or on antibiotics within the last 7 days? No     Does the patient have any open wounds, sores or rashes? No     Does the patient have any recent fractures? no     Has patient received a vaccination within the last 7 days? No     Received the COVID vaccination? yes     Has the patient stopped all medications as directed? na     Does patient have a pacemaker, defibrillator, or implantable stimulator? No     Does the patient have a ride to and from procedure and someone reliable to remain with patient?  MOTHER     Is the patient diabetic? YES      Does the patient have sleep apnea? Or use O2 at home? no     Is the patient receiving sedation? Yes      Is the patient instructed to remain NPO beginning at midnight the night before their procedure? yes     Procedure location confirmed with patient? Yes     Covid- Denies signs/symptoms. Instructed to notify PAT/MD if any changes.   Detail Level: Zone Detail Level: Detailed

## 2025-02-07 ENCOUNTER — HOSPITAL ENCOUNTER (OUTPATIENT)
Facility: HOSPITAL | Age: 35
Discharge: HOME | End: 2025-02-07
Attending: ANESTHESIOLOGY | Admitting: ANESTHESIOLOGY
Payer: MEDICAID

## 2025-02-07 VITALS
BODY MASS INDEX: 39.26 KG/M2 | OXYGEN SATURATION: 99 % | TEMPERATURE: 97 F | RESPIRATION RATE: 16 BRPM | SYSTOLIC BLOOD PRESSURE: 119 MMHG | DIASTOLIC BLOOD PRESSURE: 92 MMHG | WEIGHT: 229.94 LBS | HEIGHT: 64 IN | HEART RATE: 84 BPM

## 2025-02-07 DIAGNOSIS — M54.16 LUMBAR RADICULOPATHY: ICD-10-CM

## 2025-02-07 LAB
B-HCG UR QL: NEGATIVE
CTP QC/QA: YES
POCT GLUCOSE: 230 MG/DL (ref 70–110)

## 2025-02-07 PROCEDURE — 62323 NJX INTERLAMINAR LMBR/SAC: CPT | Mod: ,,, | Performed by: ANESTHESIOLOGY

## 2025-02-07 PROCEDURE — 25500020 PHARM REV CODE 255: Performed by: ANESTHESIOLOGY

## 2025-02-07 PROCEDURE — 81025 URINE PREGNANCY TEST: CPT | Performed by: ANESTHESIOLOGY

## 2025-02-07 PROCEDURE — 25000003 PHARM REV CODE 250: Performed by: ANESTHESIOLOGY

## 2025-02-07 PROCEDURE — 63600175 PHARM REV CODE 636 W HCPCS: Performed by: ANESTHESIOLOGY

## 2025-02-07 PROCEDURE — 62323 NJX INTERLAMINAR LMBR/SAC: CPT | Performed by: ANESTHESIOLOGY

## 2025-02-07 PROCEDURE — 82962 GLUCOSE BLOOD TEST: CPT | Performed by: ANESTHESIOLOGY

## 2025-02-07 RX ORDER — METHYLPREDNISOLONE ACETATE 40 MG/ML
INJECTION, SUSPENSION INTRA-ARTICULAR; INTRALESIONAL; INTRAMUSCULAR; SOFT TISSUE
Status: DISCONTINUED | OUTPATIENT
Start: 2025-02-07 | End: 2025-02-07 | Stop reason: HOSPADM

## 2025-02-07 RX ORDER — BUPIVACAINE HYDROCHLORIDE 5 MG/ML
INJECTION, SOLUTION EPIDURAL; INTRACAUDAL
Status: DISCONTINUED | OUTPATIENT
Start: 2025-02-07 | End: 2025-02-07 | Stop reason: HOSPADM

## 2025-02-07 RX ORDER — MIDAZOLAM HYDROCHLORIDE 1 MG/ML
INJECTION, SOLUTION INTRAMUSCULAR; INTRAVENOUS
Status: DISCONTINUED | OUTPATIENT
Start: 2025-02-07 | End: 2025-02-07 | Stop reason: HOSPADM

## 2025-02-07 RX ORDER — INDOMETHACIN 25 MG/1
CAPSULE ORAL
Status: DISCONTINUED | OUTPATIENT
Start: 2025-02-07 | End: 2025-02-07 | Stop reason: HOSPADM

## 2025-02-07 RX ORDER — FENTANYL CITRATE 50 UG/ML
INJECTION, SOLUTION INTRAMUSCULAR; INTRAVENOUS
Status: DISCONTINUED | OUTPATIENT
Start: 2025-02-07 | End: 2025-02-07 | Stop reason: HOSPADM

## 2025-02-07 NOTE — PLAN OF CARE
Patient recovered to baseline.  All bandaids remain clean,dry, intact. Pt verbalized understanding of the d/c instructions. Pt voiced no complaints. Pt stood at side of bed, walked steps with no new motor deficit. Neuro intact. Discharged home to designated  at this time.

## 2025-02-07 NOTE — OP NOTE
Lumbar Interlaminar Epidural Steroid Injection under Fluoroscopic Guidance.   Time-out taken to identify patient and procedure prior to starting the procedure.     02/07/2025    PROCEDURE: Interlaminar epidural steroid injection under fluoroscopic guidance.     Pre-Op diagnosis: Left lumbar radiculopathy [M54.16]    Post-Op diagnosis: Left lumbar radiculopathy [M54.16]    PHYSICIAN: Eula Rojas MD    ASSISTANTS: None     ESTIMATED BLOOD LOSS: none.     COMPLICATIONS: none.     SPECIMENS: none    TECHNIQUE: With the patient laying in a prone position, the area was prepped and draped in the usual sterile fashion using ChloraPrep and a fenestrated drape. 1% lidocaine was given using a 27-gauge needle by raising a wheal and going down to the hub of the needle over the L5/S1 interlaminar space.  The interlaminar space was then approached with a 3.5 inch 18-gauge Touhy needle was introduced under fluoroscopic guidance in the AP and Lateral view. Once the Ligamentum flavum was encountered loss of resistance to saline was used to enter the epidural space. With positive loss of resistance and negative CSF or Blood, 3mL contrast dye Omnipaque (300mg/ml) was injected to confirm placement and there was no vascular runoff. Then 1ml  20 mg/ml Depomedrol + 5 mL 0.25% Bupivicaine  was injected slowly. Displacement of the radio opaque contrast after injection of the medication confirmed that the medication went into the area of the epidural space.  The patient tolerated the procedure well.     Conscious sedation provided by M.D    The patient was monitored with continuous pulse oximetry, EKG, and intermittent blood pressure monitors.  The patient was hemodynamically stable throughout the entire process was responsive to voice, and breathing spontaneously.  Supplemental O2 was provided at 2L/min via nasal cannula.  Patient was comfortable for the duration of the procedure. (See nurse documentation and case log for sedation  time)    There was a total of 2mg IV Midazolam and 50mcg Fentanyl titrated for the procedure      The patient was monitored after the procedure.   They were given post-procedure and discharge instructions to follow at home.  The patient was discharged in a stable condition.

## 2025-02-07 NOTE — DISCHARGE SUMMARY
Discharge Note  Short Stay      SUMMARY     Admit Date: 2/7/2025    Attending Physician: Eula Rojas MD        Discharge Physician: Eula Rojas MD        Discharge Date: 2/7/2025 8:12 AM    Procedure(s) (LRB):  L5-S1 IL BRUNILDA with left paramedian approach (N/A)    Final Diagnosis: Left lumbar radiculopathy [M54.16]    Disposition: Home or self care    Patient Instructions:   Current Discharge Medication List        CONTINUE these medications which have NOT CHANGED    Details   atorvastatin (LIPITOR) 40 MG tablet Take 1 tablet (40 mg total) by mouth every evening.  Qty: 30 tablet, Refills: 1    Associated Diagnoses: Type 2 diabetes mellitus without complication, without long-term current use of insulin; Elevated LDL cholesterol level      glimepiride (AMARYL) 4 MG tablet Take 1 tablet (4 mg total) by mouth daily with breakfast.  Qty: 90 tablet, Refills: 0    Associated Diagnoses: Type 2 diabetes mellitus without complication, without long-term current use of insulin      irbesartan (AVAPRO) 150 MG tablet Take 1 tablet (150 mg total) by mouth every evening.  Qty: 90 tablet, Refills: 0    Comments: .  Associated Diagnoses: Essential hypertension; At risk for cardiovascular event; Type 2 diabetes mellitus without complication, with long-term current use of insulin      lurasidone (LATUDA) 60 mg Tab tablet Take 60 mg by mouth.      metoprolol succinate (TOPROL-XL) 50 MG 24 hr tablet Take 1 tablet (50 mg total) by mouth once daily.  Qty: 90 tablet, Refills: 0    Comments: .  Associated Diagnoses: Essential hypertension      norethindrone-ethinyl estradiol-iron (MICROGESTIN FE1.5/30) 1.5 mg-30 mcg (21)/75 mg (7) tablet Take 1 tablet by mouth once daily.  Qty: 90 tablet, Refills: 3    Associated Diagnoses: Irregular menses      pregabalin (LYRICA) 150 MG capsule Take 1 capsule (150 mg total) by mouth 2 (two) times daily.  Qty: 180 capsule, Refills: 1      spironolactone (ALDACTONE) 25 MG tablet Take 2 tablets by mouth  "once daily  Qty: 180 tablet, Refills: 3    Associated Diagnoses: Essential hypertension      tiZANidine (ZANAFLEX) 4 MG tablet Take 1 tablet (4 mg total) by mouth every 8 (eight) hours.  Qty: 90 tablet, Refills: 2    Associated Diagnoses: Dorsalgia, unspecified; Lumbar radiculopathy      zolpidem (AMBIEN) 5 MG Tab TAKE ONE TABLET BY MOUTH AT BEDTIME AS NEEDED  Qty: 30 tablet, Refills: 3    Associated Diagnoses: Chronic insomnia      BD ULTRA-FINE GAYLA PEN NEEDLE 32 gauge x 5/32" Ndle USE WITH INSULIN EVERY DAY  Qty: 100 each, Refills: 6    Associated Diagnoses: Uncontrolled type 2 diabetes mellitus with hyperglycemia      blood-glucose meter,continuous (DEXCOM G6 ) Misc Use as directed,  to be changed every 365 days.  Qty: 1 each, Refills: 0      blood-glucose sensor (DEXCOM G6 SENSOR) Selin Use as directed, change sensor every 10 days  Qty: 9 each, Refills: 1      blood-glucose transmitter (DEXCOM G6 TRANSMITTER) Selin Use as directed, change transmitter every 3 months  Qty: 1 each, Refills: 1      clotrimazole-betamethasone 1-0.05% (LOTRISONE) cream Apply topically 2 (two) times daily.  Qty: 45 g, Refills: 2    Associated Diagnoses: Intertrigo      diclofenac 0.15% LIDOcaine 2.25% prilocaine 2.25% topical cream Apply 1 or 2 pumps to painful area up to 5 times daily.  Maximum 10 pumps/day.  Qty: 480 g, Refills: 1    Associated Diagnoses: Foot pain, left      dicyclomine (BENTYL) 20 mg tablet TAKE ONE TABLET BY MOUTH THREE TIMES A DAY AS NEEDED FOR ABDOMINAL PAIN  Qty: 90 tablet, Refills: 11      ergocalciferol (ERGOCALCIFEROL) 50,000 unit Cap TAKE 1 CAPSULE BY MOUTH ONCE A WEEK FOR 28 DAYS      fluticasone propionate (FLONASE) 50 mcg/actuation nasal spray 1 spray by Each Nostril route 2 (two) times daily.      insulin detemir U-100, Levemir, (LEVEMIR FLEXPEN) 100 unit/mL (3 mL) InPn pen Inject 25 Units into the skin every evening.  Qty: 15 mL, Refills: 1    Associated Diagnoses: Uncontrolled type 2 " diabetes mellitus with hyperglycemia; Hemoglobin A1C greater than 9%, indicating poor diabetic control      linaCLOtide (LINZESS) 290 mcg Cap capsule Take 1 capsule (290 mcg total) by mouth before breakfast.  Qty: 30 capsule, Refills: 6    Associated Diagnoses: Chronic constipation      metFORMIN (GLUCOPHAGE-XR) 750 MG ER 24hr tablet Take 1 tablet (750 mg total) by mouth daily with breakfast.  Qty: 90 tablet, Refills: 0    Associated Diagnoses: Type 2 diabetes mellitus without complication, without long-term current use of insulin      omeprazole/sodium bicarbonate (ZEGERID ORAL) Take by mouth.      semaglutide (OZEMPIC) 1 mg/dose (4 mg/3 mL) Inject 1 mg into the skin every 7 days.  Qty: 3 mL, Refills: 1    Associated Diagnoses: Type 2 diabetes mellitus without complication, without long-term current use of insulin      simethicone (GAS-X ORAL) Take by mouth.                 Discharge Diagnosis: Left lumbar radiculopathy [M54.16]  Condition on Discharge: Stable with no complications to procedure   Diet on Discharge: Same as before.  Activity: as per instruction sheet.  Discharge to: Home with a responsible adult.  Follow up: 2-4 weeks       Please call the office at [phone] if you experience any weakness or loss of sensation, fever > 101.5, pain uncontrolled with oral medications, persistent nausea/vomiting/or diarrhea, redness or drainage from the incisions, or any other worrisome concerns. If physician on call was not reached or could not communicate with our office for any reason please go to the nearest emergency department

## 2025-02-07 NOTE — DISCHARGE INSTRUCTIONS

## 2025-02-11 ENCOUNTER — PATIENT MESSAGE (OUTPATIENT)
Dept: PAIN MEDICINE | Facility: CLINIC | Age: 35
End: 2025-02-11
Payer: MEDICAID

## 2025-02-11 DIAGNOSIS — M54.9 DORSALGIA, UNSPECIFIED: ICD-10-CM

## 2025-02-11 DIAGNOSIS — M54.16 LUMBAR RADICULOPATHY: ICD-10-CM

## 2025-02-11 RX ORDER — TIZANIDINE 4 MG/1
4 TABLET ORAL EVERY 8 HOURS
Qty: 90 TABLET | Refills: 2 | Status: SHIPPED | OUTPATIENT
Start: 2025-02-11

## 2025-02-11 NOTE — TELEPHONE ENCOUNTER
Pt is requesting for a refill of: tiZANidine (ZANAFLEX) 4 MG tablet   Last filled:1/14/25  Last encounter:1/23/25   Next proc: 2/07/25   Up coming apt: 3/07/25   Pharmacy:  [facility] - [place], [place] - [phone] [facility]

## 2025-03-10 NOTE — PROGRESS NOTES
Established Patient - TeleHealth Visit    The patient location is: [place]  The chief complaint leading to consultation is: chronic pain     Visit type: Audiovisual telemedicine visit     Total time spent on the encounter includes Face-to-face time and non-face to face time preparing to see the patient (eg, review of tests), Obtaining and/or reviewing separately obtained history, Documenting clinical information in the electronic or other health record, Independently interpreting results (not separately reported) and communicating results to the patient/family/caregiver, and/or Care coordination (not separately reported).     Each patient to whom he or she provides medical services by telemedicine is:  (1) informed of the relationship between the physician and patient and the respective role of any other health care provider with respect to management of the patient; and (2) notified that he or she may decline to receive medical services by telemedicine and may withdraw from such care at any time.          Chronic Pain -- Established Patient (Follow-up visit)    Chief complaint:  Follow-up     C/o Lower back with left leg pain    Interval History (3/11/2025): Nathan Carlos presents today for follow-up visit.  she underwent L5-S1 IL BRUNILDA with left paramedian approach on 2/7/25.  The patient reports that she is/was better following the procedure.  she reports 80% pain relief.  The changes lasted 4 weeks so far.  The changes have continued through this visit.  Patient reports pain as 5/10 today.  She continues to take Lyrica 150 mg twice a day, but she is unsure how helpful this is.    Interval History (1/23/2025):  Nathan Carlos presents today for follow-up visit.  Patient was last seen on 12/12/2024. At that visit, the plan was to schedule epidural, but we are unsure why insurance was denied. She continues to take Lyrica twice per day. Patient reports pain as 8/10 today.    Patient had 80% pain relief from  "L5/S1 IL BRUNILDA with left paramedian approach on 5/24/24 (almost 7 months ago). Pain returned within the last month, so she obtained 80% pain relief for at least 5-6 months after BRUNILDA.  Patient reports pain is exacerbated with movement, with standing, and with ambulation. Patient also states pain significantly affects activities of daily living, and subsequently her quality of life. After the BRUNILDA, she had noticeable pain relief in conjunction with Lyrica and was able to engage in more activity that was too painful prior.  Patient would like to move forward with interventional treatment.    Interval History (12/12/2024):  Patient presents today for follow-up visit.  Patient was last seen on 9/19/2024, about 3 months ago. she presents today for follow-up for medication refill. she feels the Lyrica is providing adequate pain relief and reduces the negative effects of chronic pain that affects quality of life. No major SE from medications.  She does feel pain has returned from previous injection. Patient reports pain as 7/10 with activity.      Interval History (9/19/2024):  Nathan Carlos presents today for follow-up visit.  Patient was last seen on 6/27/2024. At that visit, the plan was to increase Lyrica, which has been helping. Patient reports pain as "0/10 today.  She is also still reporting pain relief from last lumbar epidural.    Interval history 06/27/2024  Patient presents status post L5-S1 interlaminar epidural steroid injection with left paramedian approach 05/24/2024.  Patient reports 80% sustained relief in lower back and left-sided radicular symptoms following epidural steroid injection.  Pain can be exacerbated with exertion, standing and with ambulation.  She is continued Lyrica 150 mg twice daily with noticeable improvement in her pain.  She denies any side effects from this medication.  Today she denies significant lower extremity weakness, bowel or bladder incontinence or saddle " anesthesia.    Interval history 04/03/2024  Patient presents for three-month follow-up.  Today she reports return of lower back and left leg pain.  Today she reports pain in the lower back which can radiate down the left lower extremity in L5-S1 distribution to the foot.  Pain today is rated a 6/10.  She is continued Lyrica 150 mg twice daily with noticeable improvement in her pain.  She denies any side effects from this medication.  She is continued physician directed physical therapy exercises at home over the last 8 weeks from 02/03/2024 through 04/03/2024 with marginal improvement in pain, range of motion and functionality.  She would like to repeat interlaminar epidural steroid injection as she received greater than 50% relief exceeding 5 months in duration.    Interval Hx: 01/09/2024  Patient presents status post L5-S1 interlaminar epidural steroid injection with left paramedian approach 11/21/2023.  Patient reports approximately 60%  relief in lower extremity radiculopathy following L5-S1 interlaminar epidural steroid injection.  She does report that this is compounded with an improvement in range of motion and functionality, including standing and walking activities of daily living.  Patient has continued Lyrica titration and has reached a dosage of 75 mg twice daily.  She has not noticed a significant benefit at this particular dosage.  She denies any side effects from this medication.  Today she reports radicular pain can be exacerbated with increased exertion but overall denies lower extremity weakness, bowel or bladder incontinence or saddle anesthesia.    Interval history 10/26/2023 - initial visit with Dr. Rojas:  Nathan Carlos is a 32 y.o. female with past medical history significant for hypertension, hypercholesterolemia, type 2 diabetes, morbid obesity, GERD, s/p bunionectomy of the L foot with Dr. Cohn 05/23/2023 who presents to the clinic for the evaluation of back and leg pain.  Today  she reports return of pain in a bandlike distribution in the lower back which radiates down the posterior aspect of the left lower extremity in L5-S1 distribution to the foot.  Of note patient initially believe pain was exacerbated after recent bunionectomy but she followed up with Dr. Cohn, 10/23/2023 with left foot x-rays reviewed with adequate surgical hardware placement healing well without complications and no evidence of stress fractures.  Patient was given Medrol s Dosepak to help with capsulitis/inflammation of the forefoot.  Pain is exacerbated with prolonged standing or with ambulation.  She does endorse associated weakness in the left lower extremity associated with her pain.  Patient has continued physician directed physical therapy exercises at home over the last 6 weeks without meaningful improvement in her pain.  Of note she received greater than 80% relief exceeding 5 months in duration with her last L5-S1 interlaminar epidural steroid injection 02/2023.  Of note patient trialed Lyrica 50 mg up to twice daily from [facility] without noticeable improvement in her pain.  Patient reports significant motor weakness and loss of sensations.  Patient denies night fever/night sweats, urinary incontinence, bowel incontinence, and significant weight loss.      Pain Disability Index (PDI) Score Review:      1/9/2023     8:13 AM 10/20/2022    10:55 AM 4/14/2022     8:51 AM   Last 3 PDI Scores   Pain Disability Index (PDI) 63 70 67       Non-Pharmacologic Treatments:  Physical Therapy/Home Exercise: yes  Ice/Heat:yes  TENS: no  Acupuncture: no  Massage: no  Chiropractic: no    Other: no    Pain Medications:  - Adjuvant Medications: Ambien (Zolpidem), Lyrica ( Pregabalin), and Zanaflex ( Tizanidine)        Pain Procedures:     Dr. Rojas:  -2/7/2025: L5-S1 IL BRUNILDA with left paramedian approach on   -05/24/2024: L5-S1 interlaminar epidural steroid injection with left paramedian approach  -11/21/2023:   "L5-S1 interlaminar epidural steroid injection with left paramedian approach  -02/17/2023:  L5-S1 interlaminar epidural steroid injection  -12/09/2022:  Bilateral sacroiliac joint and greater trochanteric bursa injection  -11/23/2022:  L4-5 interlaminar epidural steroid injection (L5-S1 aborted)  -07/14/2022: Left-sided L4-5 and L5-S1 transforaminal epidural steroid injection    Dr. Kirkland:  -05/05/2022: Bilateral sacroiliac joint and bilateral greater trochanteric bursa injection  -03/08/2022: Left-sided L5-S1 and S1 transforaminal epidural steroid injection  -06/17/2021: Left-sided sacroiliac joint and greater trochanteric bursa injection  -01/21/2021: Left-sided L3-5 lumbar radiofrequency ablation  -12/18/2020: Right-sided L3-5 lumbar radiofrequency ablation        Interval History (07/17/2023):  Nathan Cruz Carlos presents today for follow-up visit.  Patient was last seen on 3/13/2023. She underwent bunionectomy with Dr. Cohn on 05/23/2023. She reports since then she has been having nerve pain in her left leg, reports she feels the nerves "jumping". She reports Dr. Cohn states this is expected and should resolve. She is still wearing a post op boot. Has follow up with podiatry on July 26th to evaluate to see if she can discontinue boot. Overall doing well, still has 40-50% sustained relief from previous lumbar BRUNILDA.  Patient reports pain as 4/10 today.    Interval History (3/13/2023): Nathan Carlos presents today via telemed for follow-up visit.  she underwent Lumbar L5/S1 IL BRUNILDA with left paramedian approach on 2/17/23.  The patient reports that she is/was better following the procedure.  she reports 80% pain relief.  The changes lasted 4 weeks so far.  The changes have continued through this visit.  She reports that the injection helped her a lot, and states that her low back pain is now intermittent instead of constant.  She is also able to stand a little bit longer.  She has since began walking " daily and is performing physician directed exercises at home.  She takes tizanidine as needed for muscle pain, and reports that this is helpful.  Patient reports pain as 2/10 today.    Interval History (1/9/2023): Nathan Carlos presents today for follow-up visit.  she underwent lumbar L5/S1 IL BRUNILDA on 11/23/2022 followed by bilateral SIJ + GT bursa injection on 12/9/22.  The patient reports that she is/was better following the procedure.  she reports 80-90% pain relief from SIJ injection and 60% relief from BRUNILDA. Reports continued leg pain/burning/tingling, but improved since injection.  Patient reports pain as 10/10 today.    Interval History (10/20/2022):  Nathan Carlos presents today for follow-up visit.  Patient was last seen on 8/18/2022. Last injection Left L4/5 +L5/S1 TF BRUNILDA on 07/14/2021. Patient reports pain as 8/10 today. Reports she has had several episodes during which her left leg gave out. She also reports pain in her lower lumbar spine in a band-like distribution with radiation into her lower extremities left > right. Pain interferes with daily activities.    Interval History (8/18/2022): aNthan Carlos presents today for follow-up visit.  she underwent Left L4/5 +L5/S1 TF BRUNILDA on 7/14/22.  The patient reports that she is/was better following the procedure.  she reports 40% pain relief.  The changes lasted 4 weeks so far.  The changes have continued through this visit.  Patient reports pain as 6/10 today, 10/10 on her worst days. Reports she is still experiencing pain but is able to stand for somewhat longer periods of time. Continues to report pain in left buttock down posterior thigh, but does admit she is now having pain in the right lumbar region now as well. She reports she takes tizanidine three times daily on bad days, and this seems to help.    Interval History (6/2/2022): Nathan Carlos presents today for follow-up visit.  she underwent bilateral SIJ + GTB injection  on 5/5/22.  The patient reports that she is/was better following the procedure.  she reports 60% pain relief.  The changes lasted 4 weeks so far.  The changes have continued through this visit.  Reports back and buttock pain improved, but reports continued pain along her left leg. Reports pain along anterior, lateral, and posterior aspect of left leg, radiating down to foot at times. Patient reports pain as 6/10 today. Reports that previous epidural did not offer much relief.    Interval HPI (08/04/2021) - Dr. Kirkland:  Nathan Carlos is a 30 y.o. female  who is presenting with a chief complaint of Lumbar Back Pain. The patient began experiencing this problem insidiously, and the pain has been gradually worsening over the past 3 month(s). The pain is described as throbbing, cramping, burning and electrical and is located in the left lumbar spine. Pain is intermittent and lasts hours. The pain radiates to  left leg. The patient rates her pain a 8 out of ten and interferes with activities of daily living a 8 out of ten. Pain is exacerbated by flexion of the lumbar spine, and is improved by rest. Patient reports no prior trauma, no prior spinal surgery     Interval HPI (04/22/2020) - Dr. Kirkland:  Kerrietricia Carlos is a 29 y.o. female  who is presenting with a chief complaint of Low-back Pain. The patient began experiencing this problem insidiously, and the pain has been gradually worsening. The pain is described as throbbing, cramping, aching and heavy and is located in the bilateral lumbar spine. Pain is intermittent and lasts hours. The  pain is nonradiating. The patient rates her pain a 8 out of ten and interferes with activities of daily living a 7 out of ten. Pain is exacerbated by extension of the lumbar spine, and is improved by rest. Patient reports no prior trauma, no prior spinal surgery     Initial HPI (01/04/2018) - Dr. Kirkland:  Chrisssakina Carlos is a 27 y.o. female  who is presenting with a chief  complaint of Left Buttock Pain. The patient began experiencing this problem insidiously, and the pain has been gradually worsening over the past 3 month(s). The pain is described as throbbing, cramping, aching and heavy and is located in the left buttock. Pain is intermittent and lasts hours. The pain radiates to pain is nonradiating. The patient rates her pain a 8 out of ten and interferes with activities of daily living a 8 out of ten. Pain is exacerbated by getting up from a seated position, and is improved by rest. Patient reports no prior trauma, no prior spinal surgery       - pertinent negatives: No fever, No chills, No weight loss, No bladder dysfunction, No bowel dysfunction, No saddle anesthesia  - pertinent positives: none    - medications, other therapies tried (physical therapy, injections):     >> NSAIDs, Tylenol, Norco, zanaflex and flexeril (no relief), topamax (no relief)    >> Has previously undergone Physical Therapy    >> Has previously undergone spinal injection/s   - Left SI joint injection 1/2018 with 100% relief for 6 months   - Right L3, L4, L5 MBB with local on 8/15/18 with 90% pain relief   - left SIJ + left GT bursa injection with local on 11/8/18 with good relief of bursitis but only 10% of SIJ pain   - left L3-5 MBB with local on 5/16/19 with 90% pain relief   - bilateral L3-5 MBB on 8/14/19 with limited relief   - right SIJ injection on 2/5/20 with 80% pain relief   - bilateral L3-5 MBB on 7/16/2020 with 100% relief of lumbar back pain   - right L3-5 RFA on 12/08/2020 with 100% pain relief   - left L3-5 RFA on 1/21/21 with at least 50% pain relief   - left SIJ + left GT bursa injection on 6/17/21 with at least 40% pain relief    - L5-S1, S1 TFESI on 3/8/22 with 60% Pain relief    -bilateral SIJ + GTB injection on 5/5/22 with 60% relief   -lumbar L5/S1 IL BRUNILDA on 11/23/2022 with 60% relief   -bilateral SIJ + GT bursa injection on 12/9/22 with 80-90% relief  -Lumbar L5/S1 IL BRUNILDA with left  paramedian approach on 2/17/23 with 80% relief        Imaging/ Diagnostic Studies/ Labs (Reviewed on 3/11/2025):    MRI lumbar spine 12/16/2021    FINDINGS:  Detail limited by poor signal noise ratio.  Prominent edema within the subcutaneous soft tissues of the lower back.  Stable anatomic alignment, unchanged since 11/22/2017.  The vertebral body heights are maintained.  Mild disc desiccation at L4-L5 is noted.  No significant disc space narrowing.  The distal tip of the conus medullaris terminates near the L2 level.  There are no significant areas of disc bulge or protrusion.  Minimal grade 1 retrolisthesis of L4 on L5 is noted following the administration of intravenous contrast, no abnormal areas of enhancement are noted.     L1-L2: No spinal canal or neural foraminal encroachment.     L2-L3: No spinal canal or neural foraminal encroachment.     L3-L4: No spinal canal or neural foraminal encroachment.     L4-L5: Mild facet arthropathy.  No spinal canal or neural foraminal encroachment.     L5-S1: Mild bilateral facet arthropathy.  No spinal canal or neural foraminal encroachment.     Impression:  Mild lower lumbar spine degenerative changes as above.       1/27/2022 BLE EMG/NCS   1. ABNORMAL study  2. There is electrodiagnostic evidence of an acute on chronic radiculopathy of the LEFT S1 nerve root and a subacute on chronic radiculopathy of the LEFT L5 nerve root       Review of Systems:  CONSTITUTIONAL: patient denies any fever, chills, or weight loss  SKIN: patient denies any rash or itching  RESPIRATORY: patient denies having any shortness of breath  GASTROINTESTINAL: patient denies having any diarrhea, constipation, or bowel incontinence  GENITOURINARY: patient denies having any abnormal bladder function    MUSCULOSKELETAL:  - patient complains of the above noted pain/s (see chief pain complaint)    NEUROLOGICAL:   - pain as above  - strength in Lower extremities is intact, BILATERALLY  - sensation in Lower  "extremities is intact, BILATERALLY  - patient denies any loss of bowel or bladder control      PSYCHIATRIC: patient denies any change in mood    Other:  All other systems reviewed and are negative        Telemedicine Physical Exam:   Vitals:    03/11/25 1240   Height: 5' 4" (1.626 m)     Body mass index is 39.47 kg/m².   (reviewed on 3/11/2025)     (Physical exam performed virtually with patient guided on specific actions and diagnostic maneuvers)  GENERAL: Well appearing, in no acute distress, alert and oriented x3.  Cooperative.  PSYCH:  Mood and affect appropriate.  SKIN: Skin color & texture with no obvious abnormalities.    HEAD/FACE:  Normocephalic, atraumatic.    PULM:  No difficulty breathing. No nasal flaring. No obvious wheezing.  EXTREMITIES: No obvious deformities.   MUSCULOSKELETAL: No obvious atrophy abnormalities are noted.   GAIT: sitting.     Physical Exam: last in clinic visit:  General: alert and oriented, in no apparent distress, obese.  Gait: normal gait.  Skin: no rashes, no discoloration, no obvious lesions  HEENT: normocephalic, atraumatic. Pupils equal and round.  Cardiovascular: no significant peripheral edema present.  Respiratory: without use of accessory muscles of respiration.    Musculoskeletal - Lumbar Spine:  - Pain on flexion of lumbar spine: Present   - Pain on extension of lumbar spine: Present  - Lumbar facet loading: Present bilaterally  - TTP over the lumbar facet joints: Present on left at L5-S1   - TTP over the para lumbar muscles on left   - TTP over the SI joints: Present bilaterally  - TTP over GT bursa: Present bilaterally  - TTP over piriformis: absent  - Straight Leg Raise: equivocal on right, positive on left  - JULISSA: Positive bilaterally    Neuro - Extremities:  - BUE Strength:R/L: D: 5/5; B: 5/5; T: 5/5; WF: 5/5; WE: 5/5; IO: 5/5  - BLE Strength: R/L: HF: 5/5, HE: 5/5, KF: 5/5; KE: 5/4; FE: 5/5; FF: 5/5  - Extremity Reflexes: Brisk and symmetric throughout  - " Sensory: Sensation to light touch intact bilaterally      Psych:  Mood and affect is appropriate        Assessment:  Nathan Carlos is a 34 y.o. year old female who is presenting with       ICD-10-CM ICD-9-CM    1. Lumbar radiculopathy  M54.16 724.4 pregabalin (LYRICA) 200 MG Cap      2. Left lumbar radiculopathy  M54.16 724.4       3. Degeneration of intervertebral disc of lumbar region with discogenic back pain and lower extremity pain  M51.362 722.52       4. Sacroiliac joint dysfunction  M53.3 724.6            Plan:  1. Interventional:   - S/p L5-S1 IL BRUNILDA with left paramedian approach on 2/7/25 with 80% pain relief.  Patient has 80% sustained relief following L5-S1 interlaminar epidural steroid injection with left paramedian approach for lumbar radiculopathy.  We discussed repeating this injection in the future should radicular symptoms exacerbate.    Patient had 80% pain relief from L5/S1 IL BRUNILDA with left paramedian approach on 5/24/24 for about 6 months. Pain returned within the last month, so she obtained 80% pain relief for at least 5-6 months after BRUNILDA.  Patient reports pain is exacerbated with movement, with standing, and with ambulation. Patient also states pain significantly affects activities of daily living, and subsequently her quality of life. After the BRUNILDA, she had noticeable pain relief in conjunction with Lyrica and was able to engage in more activity that was too painful prior.  Patient would like to move forward with interventional treatment.    - Anticoagulation use: None.     2. Pharmacologic:   -Increase Lyrica (pregabalin) 150mg to Lyrica (pregabalin) 200mg BID, which she is tolerating well & providing pain relief.  We previously discussed potential side effects of this medication which may include drowsiness,dizziness, dry mouth, constipation or peripheral edema.    - [place]  reviewed and appropriate.       3. Rehabilitative: We discussed continuing at home physician directed  physical therapy to help manage the patient/s painful condition. The patient was counseled that muscle strengthening will improve the long term prognosis in regards to pain and may also help increase range of motion and mobility.     4. Diagnostic/ Imaging: No new imaging ordered. Previous imaging reviewed: MRI lumbar spine 12/16/2021.    5. Consult/ Referral: Continue follow-up with Dr. Cohn (PRN): Podiatry; S/p bunionectomy, prior L foot sx    6. Follow up:  3 months follow-up   - virtual visit - Mague Molina PA-C       Future Appointments   Date Time Provider Department Center   6/6/2025 10:40 AM Mague Molina PA-C [facility]             - Patient Questions: Answered all of the patient's questions regarding diagnosis, therapy, and treatment.    - This condition does not require this patient to take time off of work, and the primary goal of our Pain Management services is to improve the patient's functional capacity.   - I discussed the risks, benefits, and alternatives to potential treatment options. All questions and concerns were fully addressed today in clinic.         Mague Molina PA-C  Interventional Pain Management - [facility]    Disclaimer:  This note was prepared using voice recognition system and is likely to have sound alike errors that may have been overlooked even after proof reading.  Please call me with any questions.

## 2025-03-11 ENCOUNTER — OFFICE VISIT (OUTPATIENT)
Dept: PAIN MEDICINE | Facility: CLINIC | Age: 35
End: 2025-03-11
Payer: MEDICAID

## 2025-03-11 VITALS — HEIGHT: 64 IN | BODY MASS INDEX: 39.47 KG/M2

## 2025-03-11 DIAGNOSIS — M54.16 LUMBAR RADICULOPATHY: Primary | ICD-10-CM

## 2025-03-11 DIAGNOSIS — M51.362 DEGENERATION OF INTERVERTEBRAL DISC OF LUMBAR REGION WITH DISCOGENIC BACK PAIN AND LOWER EXTREMITY PAIN: ICD-10-CM

## 2025-03-11 DIAGNOSIS — M54.16 LEFT LUMBAR RADICULOPATHY: ICD-10-CM

## 2025-03-11 DIAGNOSIS — M53.3 SACROILIAC JOINT DYSFUNCTION: ICD-10-CM

## 2025-03-11 PROCEDURE — 1160F RVW MEDS BY RX/DR IN RCRD: CPT | Mod: CPTII,95,, | Performed by: PHYSICIAN ASSISTANT

## 2025-03-11 PROCEDURE — 98006 SYNCH AUDIO-VIDEO EST MOD 30: CPT | Mod: 95,,, | Performed by: PHYSICIAN ASSISTANT

## 2025-03-11 PROCEDURE — 1159F MED LIST DOCD IN RCRD: CPT | Mod: CPTII,95,, | Performed by: PHYSICIAN ASSISTANT

## 2025-03-11 PROCEDURE — 4010F ACE/ARB THERAPY RXD/TAKEN: CPT | Mod: CPTII,95,, | Performed by: PHYSICIAN ASSISTANT

## 2025-03-11 RX ORDER — PREGABALIN 200 MG/1
200 CAPSULE ORAL 2 TIMES DAILY
Qty: 60 CAPSULE | Refills: 1 | Status: SHIPPED | OUTPATIENT
Start: 2025-03-11

## 2025-04-07 DIAGNOSIS — I10 ESSENTIAL HYPERTENSION: ICD-10-CM

## 2025-04-08 ENCOUNTER — PATIENT MESSAGE (OUTPATIENT)
Dept: PRIMARY CARE CLINIC | Facility: CLINIC | Age: 35
End: 2025-04-08
Payer: MEDICAID

## 2025-04-08 RX ORDER — METOPROLOL SUCCINATE 50 MG/1
50 TABLET, EXTENDED RELEASE ORAL
Qty: 90 TABLET | Refills: 1 | Status: SHIPPED | OUTPATIENT
Start: 2025-04-08

## (undated) DEVICE — DRAPE U SPLIT SHEET 54X76IN

## (undated) DEVICE — SEE MEDLINE ITEM 157131

## (undated) DEVICE — COVER CAMERA OPERATING ROOM

## (undated) DEVICE — GAUZE SPONGE 4X4 12PLY

## (undated) DEVICE — ALCOHOL 70% ISOP RUBBING 4OZ

## (undated) DEVICE — PAD UNDERPAD 30X30

## (undated) DEVICE — SEE MEDLINE ITEM 157027

## (undated) DEVICE — PAD ABD 8X10 STERILE

## (undated) DEVICE — POSITIONER HEAD DONUT 9IN FOAM

## (undated) DEVICE — COVER LIGHT HANDLE 80/CA

## (undated) DEVICE — NDL SAFETY 25G X 1.5 ECLIPSE

## (undated) DEVICE — TOURNIQUET SB QC DP 34X4IN

## (undated) DEVICE — SUPPORT ULNA NERVE PROTECTOR

## (undated) DEVICE — TOWEL OR DISP STRL BLUE 4/PK

## (undated) DEVICE — GOWN SURG 2XL DISP TIE BACK

## (undated) DEVICE — TOURNIQUET SB QC SP 24X4IN

## (undated) DEVICE — SEE MEDLINE ITEM 152622

## (undated) DEVICE — SUT 4-0 ETHILON 18 PS-2

## (undated) DEVICE — BLADE SURG #15 CARBON STEEL

## (undated) DEVICE — UNDERGLOVES BIOGEL PI SIZE 8.5

## (undated) DEVICE — SEE MEDLINE ITEM 157173

## (undated) DEVICE — NDL SAFETY 22G X 1.5 ECLIPSE

## (undated) DEVICE — PAD CAST SPECIALIST STRL 4

## (undated) DEVICE — SUT VICRYL CTD 2-0 GI 27 SH

## (undated) DEVICE — SEE MEDLINE ITEM 152522

## (undated) DEVICE — SEE MEDLINE ITEM 157117

## (undated) DEVICE — DRAPE PLASTIC U 60X72

## (undated) DEVICE — SOL 9P NACL IRR PIC IL

## (undated) DEVICE — UNDERGLOVES BIOGEL PI SIZE 7.5

## (undated) DEVICE — MANIFOLD 4 PORT

## (undated) DEVICE — SYR 10CC LUER LOCK

## (undated) DEVICE — SUT VICRYL 3-0 27 SH

## (undated) DEVICE — APPLICATOR CHLORAPREP ORN 26ML

## (undated) DEVICE — DRESSING XEROFORM FOIL PK 1X8

## (undated) DEVICE — GLOVE BIOGEL SZ 8 1/2

## (undated) DEVICE — BNDG COFLEX FOAM LF2 ST 3X5YD

## (undated) DEVICE — PACK BASIC SETUP SC BR

## (undated) DEVICE — BANDAGE MATRIX HK LOOP 4IN 5YD

## (undated) DEVICE — SCRUB HIBICLENS 4% CHG 4OZ

## (undated) DEVICE — SPONGE DERMACEA GAUZE 4X4

## (undated) DEVICE — PAD CAST SPECIALIST STRL 3

## (undated) DEVICE — DRAPE MINI C-ARM

## (undated) DEVICE — BLADE MEDIUM LONG 9MM X 31MM

## (undated) DEVICE — NDL HYPODERMIC BLUNT 18G 1.5IN

## (undated) DEVICE — DRAPE THREE-QTR REINF 53X77IN

## (undated) DEVICE — ELECTRODE REM PLYHSV RETURN 9

## (undated) DEVICE — BANDAGE ELASTIC 3X5 VELCRO ST

## (undated) DEVICE — TOURNIQUET SB QC DP 18X4IN

## (undated) DEVICE — SUT ETHILON 3-0 PS2 18 BLK